# Patient Record
Sex: FEMALE | Race: WHITE | Employment: OTHER | URBAN - METROPOLITAN AREA
[De-identification: names, ages, dates, MRNs, and addresses within clinical notes are randomized per-mention and may not be internally consistent; named-entity substitution may affect disease eponyms.]

---

## 2017-08-18 ENCOUNTER — HOSPITAL ENCOUNTER (OUTPATIENT)
Dept: ULTRASOUND IMAGING | Age: 72
Discharge: HOME | End: 2017-08-18
Attending: INTERNAL MEDICINE
Payer: MEDICARE

## 2017-08-18 DIAGNOSIS — R09.89 BRUIT OF RIGHT CAROTID ARTERY: ICD-10-CM

## 2017-08-18 PROCEDURE — 93880 EXTRACRANIAL BILAT STUDY: CPT

## 2017-09-08 ENCOUNTER — HOSPITAL ENCOUNTER (OUTPATIENT)
Dept: MAMMOGRAPHY | Age: 72
Discharge: HOME | End: 2017-09-08
Attending: INTERNAL MEDICINE
Payer: MEDICARE

## 2017-09-08 DIAGNOSIS — Z12.31 VISIT FOR SCREENING MAMMOGRAM: ICD-10-CM

## 2017-09-08 PROCEDURE — 77063 BREAST TOMOSYNTHESIS BI: CPT

## 2017-11-21 ENCOUNTER — PATIENT OUTREACH (OUTPATIENT)
Dept: CASE MANAGEMENT | Age: 72
End: 2017-11-21

## 2017-11-21 NOTE — PROGRESS NOTES
Chart reviewed per Risk level 4 for CCM needs. Noted patient's last A1C of 6.1%. Outreach to Mrs. Sona Carpenter; CDE offered one visit to discuss risk factors and ways to prevent/delay T2DM dx. Mrs. Sona Carpenter states she is not interested in DSME to prevent T2DM at this time. CDE available for education if patient decides she is interested; given contact information.

## 2018-05-02 ENCOUNTER — HOSPITAL ENCOUNTER (OUTPATIENT)
Dept: MAMMOGRAPHY | Age: 73
Discharge: HOME | End: 2018-05-02
Attending: INTERNAL MEDICINE
Payer: MEDICARE

## 2018-05-02 ENCOUNTER — HOSPITAL ENCOUNTER (OUTPATIENT)
Dept: LAB | Age: 73
Discharge: HOME | End: 2018-05-02
Payer: MEDICARE

## 2018-05-02 DIAGNOSIS — Z85.3 HISTORY OF CANCER OF LEFT BREAST: ICD-10-CM

## 2018-05-02 DIAGNOSIS — N63.10 LUMP OF RIGHT BREAST: ICD-10-CM

## 2018-05-02 DIAGNOSIS — D47.2 MGUS (MONOCLONAL GAMMOPATHY OF UNKNOWN SIGNIFICANCE): ICD-10-CM

## 2018-05-02 DIAGNOSIS — E55.9 VITAMIN D DEFICIENCY: ICD-10-CM

## 2018-05-02 LAB
ALBUMIN SERPL-MCNC: 3.8 G/DL (ref 3.2–4.6)
ALBUMIN/GLOB SERPL: 0.8 {RATIO} (ref 1.2–3.5)
ALP SERPL-CCNC: 106 U/L (ref 50–136)
ALT SERPL-CCNC: 27 U/L (ref 12–65)
ANION GAP SERPL CALC-SCNC: 7 MMOL/L (ref 7–16)
AST SERPL-CCNC: 19 U/L (ref 15–37)
BASOPHILS # BLD: 0 K/UL (ref 0–0.2)
BASOPHILS NFR BLD: 1 % (ref 0–2)
BILIRUB SERPL-MCNC: 0.4 MG/DL (ref 0.2–1.1)
BUN SERPL-MCNC: 15 MG/DL (ref 8–23)
CALCIUM SERPL-MCNC: 9.3 MG/DL (ref 8.3–10.4)
CHLORIDE SERPL-SCNC: 97 MMOL/L (ref 98–107)
CO2 SERPL-SCNC: 28 MMOL/L (ref 21–32)
CREAT SERPL-MCNC: 1.07 MG/DL (ref 0.6–1)
CRP SERPL-MCNC: 2.2 MG/DL (ref 0–0.9)
DIFFERENTIAL METHOD BLD: ABNORMAL
EOSINOPHIL # BLD: 0.2 K/UL (ref 0–0.8)
EOSINOPHIL NFR BLD: 3 % (ref 0.5–7.8)
ERYTHROCYTE [DISTWIDTH] IN BLOOD BY AUTOMATED COUNT: 13.9 % (ref 11.9–14.6)
ERYTHROCYTE [SEDIMENTATION RATE] IN BLOOD: 65 MM/HR (ref 0–30)
GLOBULIN SER CALC-MCNC: 4.7 G/DL (ref 2.3–3.5)
GLUCOSE SERPL-MCNC: 106 MG/DL (ref 65–100)
HCT VFR BLD AUTO: 34.1 % (ref 35.8–46.3)
HGB BLD-MCNC: 11.6 G/DL (ref 11.7–15.4)
LYMPHOCYTES # BLD: 2.7 K/UL (ref 0.5–4.6)
LYMPHOCYTES NFR BLD: 47 % (ref 13–44)
MCH RBC QN AUTO: 30 PG (ref 26.1–32.9)
MCHC RBC AUTO-ENTMCNC: 34 G/DL (ref 31.4–35)
MCV RBC AUTO: 88.1 FL (ref 79.6–97.8)
MONOCYTES # BLD: 0.4 K/UL (ref 0.1–1.3)
MONOCYTES NFR BLD: 7 % (ref 4–12)
NEUTS SEG # BLD: 2.4 K/UL (ref 1.7–8.2)
NEUTS SEG NFR BLD: 42 % (ref 43–78)
NRBC # BLD: 0 K/UL (ref 0–0.2)
PLATELET # BLD AUTO: 255 K/UL (ref 150–450)
PMV BLD AUTO: 10.2 FL (ref 10.8–14.1)
POTASSIUM SERPL-SCNC: 3.7 MMOL/L (ref 3.5–5.1)
PROT SERPL-MCNC: 8.5 G/DL (ref 6.3–8.2)
RBC # BLD AUTO: 3.87 M/UL (ref 4.05–5.25)
SODIUM SERPL-SCNC: 132 MMOL/L (ref 136–145)
WBC # BLD AUTO: 5.7 K/UL (ref 4.3–11.1)

## 2018-05-02 PROCEDURE — 82652 VIT D 1 25-DIHYDROXY: CPT | Performed by: INTERNAL MEDICINE

## 2018-05-02 PROCEDURE — 82232 ASSAY OF BETA-2 PROTEIN: CPT | Performed by: INTERNAL MEDICINE

## 2018-05-02 PROCEDURE — 36415 COLL VENOUS BLD VENIPUNCTURE: CPT | Performed by: INTERNAL MEDICINE

## 2018-05-02 PROCEDURE — 85025 COMPLETE CBC W/AUTO DIFF WBC: CPT | Performed by: INTERNAL MEDICINE

## 2018-05-02 PROCEDURE — 86140 C-REACTIVE PROTEIN: CPT | Performed by: INTERNAL MEDICINE

## 2018-05-02 PROCEDURE — 84165 PROTEIN E-PHORESIS SERUM: CPT | Performed by: INTERNAL MEDICINE

## 2018-05-02 PROCEDURE — 77065 DX MAMMO INCL CAD UNI: CPT

## 2018-05-02 PROCEDURE — 86334 IMMUNOFIX E-PHORESIS SERUM: CPT | Performed by: INTERNAL MEDICINE

## 2018-05-02 PROCEDURE — 85652 RBC SED RATE AUTOMATED: CPT | Performed by: INTERNAL MEDICINE

## 2018-05-02 PROCEDURE — 86335 IMMUNFIX E-PHORSIS/URINE/CSF: CPT | Performed by: INTERNAL MEDICINE

## 2018-05-02 PROCEDURE — 83883 ASSAY NEPHELOMETRY NOT SPEC: CPT | Performed by: INTERNAL MEDICINE

## 2018-05-02 PROCEDURE — 84156 ASSAY OF PROTEIN URINE: CPT | Performed by: INTERNAL MEDICINE

## 2018-05-02 PROCEDURE — 76642 ULTRASOUND BREAST LIMITED: CPT

## 2018-05-02 PROCEDURE — 80053 COMPREHEN METABOLIC PANEL: CPT | Performed by: INTERNAL MEDICINE

## 2018-05-03 LAB
1,25(OH)2D3 SERPL-MCNC: 73.9 PG/ML (ref 19.9–79.3)
ALBUMIN SERPL ELPH-MCNC: 4.13 G/DL (ref 3.2–5.6)
ALBUMIN/GLOB SERPL: 1 {RATIO}
ALPHA1 GLOB SERPL ELPH-MCNC: 0.27 G/DL (ref 0.1–0.4)
ALPHA2 GLOB SERPL ELPH-MCNC: 0.86 G/DL (ref 0.4–1.2)
B-GLOBULIN SERPL QL ELPH: 1.24 G/DL (ref 0.6–1.3)
B2 MICROGLOB SERPL-MCNC: 3.2 MG/L (ref 0.8–2.34)
GAMMA GLOB MFR SERPL ELPH: 1.7 G/DL (ref 0.5–1.6)
IGA SERPL-MCNC: 165 MG/DL (ref 85–499)
IGG SERPL-MCNC: 1775 MG/DL (ref 610–1616)
IGM SERPL-MCNC: 294 MG/DL (ref 35–242)
KAPPA LC FREE SER-MCNC: 39.79 MG/L (ref 3.3–19.4)
KAPPA LC FREE/LAMBDA FREE SER: 1.58 {RATIO} (ref 0.26–1.65)
LAMBDA LC FREE SERPL-MCNC: 25.19 MG/L (ref 5.71–26.3)
M PROTEIN SERPL ELPH-MCNC: ABNORMAL G/DL
PROT PATTERN SERPL ELPH-IMP: ABNORMAL
PROT PATTERN SPEC IFE-IMP: ABNORMAL
PROT SERPL-MCNC: 8.2 G/DL (ref 6.3–8.2)

## 2018-05-04 LAB
ALBUMIN UR ELPH-MCNC: <1.2 MG/DL
ALPHA1 GLOB 24H UR ELPH-MCNC: <0.3 MG/DL
ALPHA2 GLOB SERPL ELPH-MCNC: <0.7 MG/DL
B-GLOBULIN UR QL ELPH: <1.1 MG/DL
GAMMA GLOB MFR UR ELPH: <1.7 MG/DL
M PROTEIN UR-MCNC: NORMAL MG/DL
PROT PATTERN SPEC IFE-IMP: NORMAL
PROT PATTERN UR ELPH-IMP: NORMAL
PROT UR-MCNC: <5 MG/DL

## 2018-05-10 ENCOUNTER — HOSPITAL ENCOUNTER (OUTPATIENT)
Dept: GENERAL RADIOLOGY | Age: 73
Discharge: HOME | End: 2018-05-10
Attending: INTERNAL MEDICINE
Payer: MEDICARE

## 2018-05-10 DIAGNOSIS — D47.2 MGUS (MONOCLONAL GAMMOPATHY OF UNKNOWN SIGNIFICANCE): ICD-10-CM

## 2018-05-10 PROCEDURE — 77074 RADEX OSSEOUS SURVEY LMTD: CPT

## 2018-05-21 ENCOUNTER — HOSPITAL ENCOUNTER (OUTPATIENT)
Dept: NUCLEAR MEDICINE | Age: 73
Discharge: HOME | End: 2018-05-21
Attending: INTERNAL MEDICINE
Payer: MEDICARE

## 2018-05-21 DIAGNOSIS — C50.912 MALIGNANT NEOPLASM OF LEFT FEMALE BREAST, UNSPECIFIED ESTROGEN RECEPTOR STATUS, UNSPECIFIED SITE OF BREAST (HCC): ICD-10-CM

## 2018-05-21 PROCEDURE — 78306 BONE IMAGING WHOLE BODY: CPT

## 2018-05-21 PROCEDURE — A9561 TC99M OXIDRONATE: HCPCS

## 2018-08-14 ENCOUNTER — HOSPITAL ENCOUNTER (OUTPATIENT)
Dept: PHYSICAL THERAPY | Age: 73
Discharge: HOME | End: 2018-08-14
Payer: MEDICARE

## 2018-08-14 DIAGNOSIS — M54.2 NECK PAIN: ICD-10-CM
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
## 2018-08-14 DIAGNOSIS — M25.511 ACUTE PAIN OF RIGHT SHOULDER: ICD-10-CM

## 2018-08-14 PROCEDURE — 97110 THERAPEUTIC EXERCISES: CPT

## 2018-08-14 PROCEDURE — 97162 PT EVAL MOD COMPLEX 30 MIN: CPT

## 2018-08-14 PROCEDURE — G8981 BODY POS CURRENT STATUS: HCPCS

## 2018-08-14 PROCEDURE — G8982 BODY POS GOAL STATUS: HCPCS

## 2018-08-14 PROCEDURE — 97014 ELECTRIC STIMULATION THERAPY: CPT
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
## 2018-08-14 NOTE — PROGRESS NOTES
Ambulatory/Rehab Services H2 Model Falls Risk Assessment    Risk Factor Pts. ·   Confusion/Disorientation/Impulsivity  []    4 ·   Symptomatic Depression  []   2 ·   Altered Elimination  []   1 ·   Dizziness/Vertigo  []   1 ·   Gender (Male)  []   1 ·   Any administered antiepileptics (anticonvulsants):  []   2 ·   Any administered benzodiazepines:  []   1 ·   Visual Impairment (specify):  []   1 ·   Portable Oxygen Use  []   1 ·   Orthostatic ? BP  []   1 ·   History of Recent Falls (within 3 mos.)  []   5     Ability to Rise from Chair (choose one) Pts. ·   Ability to rise in a single movement  [x]   0 ·   Pushes up, successful in one attempt  []   1 ·   Multiple attempts, but successful  []   3 ·   Unable to rise without assistance  []   4   Total: (5 or greater = High Risk) 0     Falls Prevention Plan:   []                Physical Limitations to Exercise (specify):   []                Mobility Assistance Device (type):   []                Exercise/Equipment Adaptation (specify):    ©2010 [facility] [address] Patent #[id number].  Federal Law prohibits the replication, distribution or use without written permission from [facility] [employer]

## 2018-08-16 ENCOUNTER — HOSPITAL ENCOUNTER (OUTPATIENT)
Dept: PHYSICAL THERAPY | Age: 73
Discharge: HOME | End: 2018-08-16
Payer: MEDICARE

## 2018-08-16 PROCEDURE — 97012 MECHANICAL TRACTION THERAPY: CPT

## 2018-08-16 PROCEDURE — 97140 MANUAL THERAPY 1/> REGIONS: CPT

## 2018-08-16 PROCEDURE — 97014 ELECTRIC STIMULATION THERAPY: CPT
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
## 2018-08-21 ENCOUNTER — HOSPITAL ENCOUNTER (OUTPATIENT)
Dept: PHYSICAL THERAPY | Age: 73
Discharge: HOME | End: 2018-08-21
Payer: MEDICARE

## 2018-08-21 PROCEDURE — 97014 ELECTRIC STIMULATION THERAPY: CPT

## 2018-08-21 PROCEDURE — 97012 MECHANICAL TRACTION THERAPY: CPT

## 2018-08-21 PROCEDURE — 97140 MANUAL THERAPY 1/> REGIONS: CPT
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
## 2018-08-23 ENCOUNTER — HOSPITAL ENCOUNTER (OUTPATIENT)
Dept: PHYSICAL THERAPY | Age: 73
Discharge: HOME | End: 2018-08-23
Payer: MEDICARE

## 2018-08-23 PROCEDURE — 97014 ELECTRIC STIMULATION THERAPY: CPT

## 2018-08-23 PROCEDURE — 97140 MANUAL THERAPY 1/> REGIONS: CPT

## 2018-08-23 PROCEDURE — 97012 MECHANICAL TRACTION THERAPY: CPT
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
## 2018-08-28 ENCOUNTER — HOSPITAL ENCOUNTER (OUTPATIENT)
Dept: PHYSICAL THERAPY | Age: 73
Discharge: HOME | End: 2018-08-28
Payer: MEDICARE

## 2018-08-28 PROCEDURE — 97012 MECHANICAL TRACTION THERAPY: CPT

## 2018-08-28 PROCEDURE — 97110 THERAPEUTIC EXERCISES: CPT

## 2018-08-28 PROCEDURE — 97014 ELECTRIC STIMULATION THERAPY: CPT

## 2018-08-28 PROCEDURE — 97140 MANUAL THERAPY 1/> REGIONS: CPT
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
## 2018-08-30 ENCOUNTER — HOSPITAL ENCOUNTER (OUTPATIENT)
Dept: PHYSICAL THERAPY | Age: 73
Discharge: HOME | End: 2018-08-30
Payer: MEDICARE

## 2018-08-30 PROCEDURE — 97110 THERAPEUTIC EXERCISES: CPT

## 2018-08-30 PROCEDURE — 97014 ELECTRIC STIMULATION THERAPY: CPT

## 2018-08-30 PROCEDURE — 97012 MECHANICAL TRACTION THERAPY: CPT

## 2018-08-30 PROCEDURE — 97140 MANUAL THERAPY 1/> REGIONS: CPT
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
## 2018-09-06 ENCOUNTER — HOSPITAL ENCOUNTER (OUTPATIENT)
Dept: PHYSICAL THERAPY | Age: 73
Discharge: HOME | End: 2018-09-06
Payer: MEDICARE

## 2018-09-06 PROCEDURE — 97014 ELECTRIC STIMULATION THERAPY: CPT

## 2018-09-06 PROCEDURE — 97012 MECHANICAL TRACTION THERAPY: CPT

## 2018-09-06 PROCEDURE — 97140 MANUAL THERAPY 1/> REGIONS: CPT
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
## 2018-09-11 ENCOUNTER — HOSPITAL ENCOUNTER (OUTPATIENT)
Dept: PHYSICAL THERAPY | Age: 73
Discharge: HOME | End: 2018-09-11
Payer: MEDICARE

## 2018-09-11 PROCEDURE — 97012 MECHANICAL TRACTION THERAPY: CPT

## 2018-09-11 PROCEDURE — 97110 THERAPEUTIC EXERCISES: CPT

## 2018-09-11 PROCEDURE — 97014 ELECTRIC STIMULATION THERAPY: CPT
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
## 2018-09-13 ENCOUNTER — APPOINTMENT (OUTPATIENT)
Dept: PHYSICAL THERAPY | Age: 73
End: 2018-09-13
Payer: MEDICARE

## 2018-09-18 ENCOUNTER — APPOINTMENT (OUTPATIENT)
Dept: PHYSICAL THERAPY | Age: 73
End: 2018-09-18
Payer: MEDICARE

## 2018-09-20 ENCOUNTER — APPOINTMENT (OUTPATIENT)
Dept: PHYSICAL THERAPY | Age: 73
End: 2018-09-20
Payer: MEDICARE

## 2018-11-01 ENCOUNTER — HOSPITAL ENCOUNTER (OUTPATIENT)
Dept: MRI IMAGING | Age: 73
Discharge: HOME | End: 2018-11-01
Attending: INTERNAL MEDICINE
Payer: MEDICARE

## 2018-11-01 DIAGNOSIS — M54.2 CHRONIC NECK PAIN: ICD-10-CM

## 2018-11-01 DIAGNOSIS — G89.29 CHRONIC NECK PAIN: ICD-10-CM

## 2018-11-01 PROCEDURE — 72141 MRI NECK SPINE W/O DYE: CPT

## 2018-11-02 NOTE — PROGRESS NOTES
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 Can you call the Orthopedic referral that was made for an appt with them? I requested Clydene Nissen but you may need to see Dr. Tara Harris first.  [phone]. If you need me to resend a referral I can but it was already sent to them before.    Take Care

## 2018-11-05 NOTE — PROGRESS NOTES
Spoke to patient, detailed message was relayed and understanding expressed. Patient has an appointment scheduled to see an NP at [facility] tomorrow. Per Dr. Devaughn Hodgkins, they have access to the findings and will get her taken care of.
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
## 2018-11-20 PROBLEM — I50.22 SYSTOLIC CHF, CHRONIC (HCC): Status: ACTIVE | Noted: 2018-11-20

## 2018-11-20 PROBLEM — R06.02 SHORTNESS OF BREATH: Status: ACTIVE | Noted: 2018-11-20

## 2019-01-15 PROBLEM — R06.02 SHORTNESS OF BREATH: Status: RESOLVED | Noted: 2018-11-20 | Resolved: 2019-01-15

## 2019-01-16 ENCOUNTER — HOSPITAL ENCOUNTER (OUTPATIENT)
Dept: DIABETES SERVICES | Age: 74
Discharge: HOME | End: 2019-01-16
Payer: MEDICARE

## 2019-01-16 DIAGNOSIS — E11.65 UNCONTROLLED TYPE 2 DIABETES MELLITUS WITH HYPERGLYCEMIA (HCC): ICD-10-CM

## 2019-01-16 PROCEDURE — G0108 DIAB MANAGE TRN  PER INDIV: HCPCS

## 2019-01-16 NOTE — PROGRESS NOTES
Came for diabetes educational assessment today. Provided basic information on carbohydrates, proteins and fats. Educational need/plan: Will attend 2 nutrition/2 diabetes sessions to address the following: diabetes disease process, nutritional management, physical activity, using medications, preventing complications, psychosocial adjustment, goal setting, problem solving, monitoring, behavior change strategies. Patients neuropathy was chemo induced. Four surgeries on left ankle. Difficulty with walking for exercise due to ankle per patient. Patient did ambulate to appointment with out any assist or assist devices.

## 2019-02-06 ENCOUNTER — HOSPITAL ENCOUNTER (OUTPATIENT)
Dept: DIABETES SERVICES | Age: 74
Discharge: HOME | End: 2019-02-06
Attending: INTERNAL MEDICINE
Payer: MEDICARE

## 2019-02-06 PROCEDURE — G0109 DIAB MANAGE TRN IND/GROUP: HCPCS

## 2019-02-06 NOTE — PROGRESS NOTES
Participant attended Diabetes #1 session today. Topics included: Characteristics/pathophysiology type 1/type 2 diabetes; Goal/acceptable blood glucose ranges/Hgb A1C/interpreting/using results;meters, continuous glucose monitors and insulin pumps. Using medications safely; Sick day management; Prevention/detection/treatment of acute complications. - Verbalized understanding of material covered. -Goal for next session Nutrition One  
-Anticipated adherence is Good  
-Problems/barriers none

## 2019-02-14 ENCOUNTER — HOSPITAL ENCOUNTER (OUTPATIENT)
Dept: DIABETES SERVICES | Age: 74
Discharge: HOME | End: 2019-02-14
Attending: INTERNAL MEDICINE
Payer: MEDICARE

## 2019-02-14 PROCEDURE — G0109 DIAB MANAGE TRN IND/GROUP: HCPCS

## 2019-02-27 ENCOUNTER — HOSPITAL ENCOUNTER (OUTPATIENT)
Dept: DIABETES SERVICES | Age: 74
Discharge: HOME | End: 2019-02-27
Attending: INTERNAL MEDICINE
Payer: MEDICARE

## 2019-02-27 PROCEDURE — G0109 DIAB MANAGE TRN IND/GROUP: HCPCS

## 2019-03-13 ENCOUNTER — HOSPITAL ENCOUNTER (OUTPATIENT)
Dept: DIABETES SERVICES | Age: 74
Discharge: HOME | End: 2019-03-13
Payer: MEDICARE

## 2019-03-13 PROCEDURE — G0109 DIAB MANAGE TRN IND/GROUP: HCPCS

## 2019-03-13 NOTE — PROGRESS NOTES
Participant attended Diabetes #2 session today. Topics included: Prevention/detection/treatment of chronic complications; sleep apnea; Developing strategies to promote health/change behavior/recommended screenings; Developing strategies to address psychosocial issues; Goal setting. Participants goal/support plan includes Medical Goal:  To exercise more. I will exercise 20 minutes a day everyday starting now at home for the rest of my life. I will reevaluate in a month and increase as tolerated. Medical Plan:  Set a timer everyday. Problems/barriers may be:none anticipated.     Plan for follow up/Recommendations: mail follow up survey in 3 months

## 2019-03-26 ENCOUNTER — HOSPITAL ENCOUNTER (OUTPATIENT)
Dept: DIABETES SERVICES | Age: 74
Discharge: HOME | End: 2019-03-26
Payer: MEDICARE

## 2019-03-26 PROCEDURE — G0109 DIAB MANAGE TRN IND/GROUP: HCPCS

## 2019-03-26 NOTE — PROGRESS NOTES
Attended diabetes follow up group class. Open forum for clients to ask questions based on entire diabetes self management education program. Education topics are driven in this class based on clients' individual questions and needs. Topics included: carbohydrates, proteins, fats, meal planning, sugar substitutes, recipe modification, portion control, weight loss, eating out, cholesterol, blood pressure, Hgb A1C, stress/depression, glucometer testing, continuous glucose monitoring and foot care.

## 2019-05-21 PROBLEM — I50.22 SYSTOLIC CHF, CHRONIC (HCC): Status: RESOLVED | Noted: 2018-11-20 | Resolved: 2019-05-21

## 2019-08-06 PROBLEM — I42.0 DILATED CARDIOMYOPATHY (HCC): Status: ACTIVE | Noted: 2019-08-06

## 2019-11-22 ENCOUNTER — HOSPITAL ENCOUNTER (OUTPATIENT)
Dept: MAMMOGRAPHY | Age: 74
Discharge: HOME | End: 2019-11-22
Attending: INTERNAL MEDICINE
Payer: MEDICARE

## 2019-11-22 DIAGNOSIS — Z12.39 BREAST CANCER SCREENING: ICD-10-CM

## 2019-11-22 DIAGNOSIS — N95.9 MENOPAUSAL AND POSTMENOPAUSAL DISORDER: ICD-10-CM

## 2019-11-22 PROCEDURE — 77080 DXA BONE DENSITY AXIAL: CPT

## 2019-11-22 PROCEDURE — 77067 SCR MAMMO BI INCL CAD: CPT

## 2020-02-05 PROBLEM — Z94.84 STEM CELLS TRANSPLANT STATUS (HCC): Status: ACTIVE | Noted: 2020-02-05

## 2020-02-06 PROBLEM — Z94.84 STEM CELLS TRANSPLANT STATUS (HCC): Status: RESOLVED | Noted: 2020-02-05 | Resolved: 2020-02-06

## 2020-07-23 ENCOUNTER — HOSPITAL ENCOUNTER (OUTPATIENT)
Dept: LAB | Age: 75
Discharge: HOME | End: 2020-07-23

## 2020-07-23 PROCEDURE — 88305 TISSUE EXAM BY PATHOLOGIST: CPT

## 2020-09-08 PROBLEM — R29.898 LEFT LEG WEAKNESS: Status: ACTIVE | Noted: 2020-09-08

## 2020-09-14 PROBLEM — Z94.84 STEM CELLS TRANSPLANT STATUS (HCC): Status: ACTIVE | Noted: 2020-09-14

## 2020-10-17 PROBLEM — G60.8 PERIPHERAL SENSORY-MOTOR AXONAL POLYNEUROPATHY: Status: ACTIVE | Noted: 2020-10-17

## 2020-11-05 PROBLEM — R00.2 PALPITATION: Status: ACTIVE | Noted: 2020-11-05

## 2020-11-10 ENCOUNTER — TRANSCRIBE ORDER (OUTPATIENT)
Dept: SCHEDULING | Age: 75
End: 2020-11-10

## 2020-11-10 DIAGNOSIS — Z12.31 SCREENING MAMMOGRAM, ENCOUNTER FOR: Primary | ICD-10-CM

## 2020-11-23 ENCOUNTER — HOSPITAL ENCOUNTER (OUTPATIENT)
Dept: MAMMOGRAPHY | Age: 75
Discharge: HOME | End: 2020-11-23
Attending: INTERNAL MEDICINE

## 2020-11-23 DIAGNOSIS — Z12.31 SCREENING MAMMOGRAM, ENCOUNTER FOR: ICD-10-CM

## 2021-02-09 PROBLEM — N18.31 STAGE 3A CHRONIC KIDNEY DISEASE (HCC): Status: ACTIVE | Noted: 2021-02-09

## 2021-02-11 PROBLEM — E55.9 VITAMIN D DEFICIENCY: Status: ACTIVE | Noted: 2021-02-11

## 2021-04-06 PROBLEM — M62.838 MUSCLE SPASM: Status: ACTIVE | Noted: 2019-12-17

## 2021-04-06 PROBLEM — G89.29 CHRONIC PERISCAPULAR PAIN ON RIGHT SIDE: Status: ACTIVE | Noted: 2020-07-06

## 2021-04-06 PROBLEM — M48.02 FORAMINAL STENOSIS OF CERVICAL REGION: Status: ACTIVE | Noted: 2018-11-28

## 2021-04-06 PROBLEM — M47.812 ARTHROPATHY OF CERVICAL FACET JOINT: Status: ACTIVE | Noted: 2018-11-28

## 2021-04-06 PROBLEM — M25.511 CHRONIC PERISCAPULAR PAIN ON RIGHT SIDE: Status: ACTIVE | Noted: 2020-07-06

## 2021-04-06 PROBLEM — R51.9 RECURRENT OCCIPITAL HEADACHE: Status: ACTIVE | Noted: 2018-11-28

## 2021-06-09 ENCOUNTER — TRANSCRIBE ORDER (OUTPATIENT)
Dept: SCHEDULING | Age: 76
End: 2021-06-09

## 2021-06-09 DIAGNOSIS — Z12.31 VISIT FOR SCREENING MAMMOGRAM: Primary | ICD-10-CM

## 2021-06-18 ENCOUNTER — HOSPITAL ENCOUNTER (OUTPATIENT)
Dept: CT IMAGING | Age: 76
Discharge: HOME | End: 2021-06-18
Attending: NURSE PRACTITIONER

## 2021-06-18 DIAGNOSIS — N39.0 RECURRENT UTI: ICD-10-CM

## 2021-06-18 DIAGNOSIS — R11.0 NAUSEA: ICD-10-CM

## 2021-06-18 DIAGNOSIS — R10.32 LLQ PAIN: ICD-10-CM

## 2021-06-18 DIAGNOSIS — R30.0 DYSURIA: ICD-10-CM

## 2021-06-18 DIAGNOSIS — N18.31 STAGE 3A CHRONIC KIDNEY DISEASE (HCC): ICD-10-CM

## 2021-06-18 DIAGNOSIS — I42.0 DILATED CARDIOMYOPATHY (HCC): ICD-10-CM

## 2021-06-18 DIAGNOSIS — Z85.3 HISTORY OF LEFT BREAST CANCER: ICD-10-CM

## 2021-06-18 DIAGNOSIS — Z87.448 HISTORY OF SIMPLE RENAL CYST: ICD-10-CM

## 2021-06-18 RX ORDER — SODIUM CHLORIDE 0.9 % (FLUSH) 0.9 %
10 SYRINGE (ML) INJECTION
Status: COMPLETED | OUTPATIENT
Start: 2021-06-18 | End: 2021-06-18

## 2021-06-18 RX ADMIN — Medication 10 ML: at 14:00

## 2021-06-20 NOTE — PROGRESS NOTES
Attended nutrition diabetes #2 group session today. Topics included: plate method for portion control; fiber and sodium guidelines; sugar substitutes; alcohol; eating out; recipe modification; label reading. Voiced/demonstrated understanding of material covered. Anticipated adherence is good. Pt's nutrition goal: To lower Hgb A1C and lose weight, she will slow eating time down to 20 minutes and drink water for most meals and re-evaluate in 3 months. Pt's support plan: Use the daily meal planning guide Problems/barriers: none identified by pt at this time. Recommend check pre-meal blood glucose and 2 hour post-prandial blood glucose to see how food choices affect blood glucose. Plan for follow up is attend diabetes #2 class.
Self

## 2021-06-20 NOTE — PROGRESS NOTES
Good news--there were no concerning findings on the CT. If you continue to have symptoms, we can repeat urinalysis and culture to monitor for recurrent UTI.

## 2021-06-21 NOTE — PROGRESS NOTES
Good news--there were no concerning findings on the CT. If you continue to have symptoms, we can repeat urinalysis and culture to monitor for recurrent UTI. Patient has been informed and voiced understanding.

## 2021-12-02 ENCOUNTER — HOSPITAL ENCOUNTER (OUTPATIENT)
Dept: MAMMOGRAPHY | Age: 76
Discharge: HOME | End: 2021-12-02
Attending: NURSE PRACTITIONER
Payer: MEDICARE

## 2021-12-02 DIAGNOSIS — Z78.0 ENCOUNTER FOR OSTEOPOROSIS SCREENING IN ASYMPTOMATIC POSTMENOPAUSAL PATIENT: ICD-10-CM

## 2021-12-02 DIAGNOSIS — M85.80 OSTEOPENIA, UNSPECIFIED LOCATION: ICD-10-CM

## 2021-12-02 DIAGNOSIS — Z13.820 ENCOUNTER FOR OSTEOPOROSIS SCREENING IN ASYMPTOMATIC POSTMENOPAUSAL PATIENT: ICD-10-CM

## 2021-12-02 DIAGNOSIS — Z12.31 VISIT FOR SCREENING MAMMOGRAM: ICD-10-CM

## 2021-12-02 PROCEDURE — 77080 DXA BONE DENSITY AXIAL: CPT

## 2021-12-02 PROCEDURE — 77067 SCR MAMMO BI INCL CAD: CPT

## 2021-12-03 NOTE — PROGRESS NOTES
DEXA showed osteopenia of hips, worsened since last DEXA in 2019 (improvement in lumbar spine). FRAX indicates 10-year risk of major osteoporotic fracture is 19.7% and of hip fracture is 4.7%. At this risk level, medication is recommended to strengthen your bones. Recommend making appointment to discuss medication options and lifestyle interventions to help your bones.

## 2021-12-06 NOTE — PROGRESS NOTES
No evidence of malignancy on mammogram.    Radiology also recommends obtaining screening breast MRI due to your history of breast cancer and dense tissues, for better evaluation.

## 2022-03-16 ENCOUNTER — HOSPITAL ENCOUNTER (OUTPATIENT)
Dept: MRI IMAGING | Age: 77
Discharge: HOME | End: 2022-03-16
Attending: NURSE PRACTITIONER
Payer: MEDICARE

## 2022-03-16 DIAGNOSIS — Z85.3 HISTORY OF BREAST CANCER: ICD-10-CM

## 2022-03-16 PROCEDURE — 74011250636 HC RX REV CODE- 250/636: Performed by: NURSE PRACTITIONER

## 2022-03-16 PROCEDURE — 77049 MRI BREAST C-+ W/CAD BI: CPT

## 2022-03-16 PROCEDURE — A9576 INJ PROHANCE MULTIPACK: HCPCS | Performed by: NURSE PRACTITIONER

## 2022-03-16 PROCEDURE — 74011000258 HC RX REV CODE- 258: Performed by: NURSE PRACTITIONER

## 2022-03-16 RX ORDER — SODIUM CHLORIDE 0.9 % (FLUSH) 0.9 %
10 SYRINGE (ML) INJECTION
Status: COMPLETED | OUTPATIENT
Start: 2022-03-16 | End: 2022-03-16

## 2022-03-16 RX ADMIN — SODIUM CHLORIDE 100 ML: 900 INJECTION, SOLUTION INTRAVENOUS at 17:37

## 2022-03-16 RX ADMIN — GADOTERIDOL 14 ML: 279.3 INJECTION, SOLUTION INTRAVENOUS at 17:36

## 2022-03-16 RX ADMIN — Medication 10 ML: at 17:37

## 2022-03-17 NOTE — PROGRESS NOTES
Spoke to patient, detailed message was relayed and understanding expressed. Aron Garrett doesn't know where the other breast MRI was done, but will try to figure it and let me know.

## 2022-03-17 NOTE — PROGRESS NOTES
Please call patient with results, and let me know their response. No signs of metastatic disease seen on MRI. Radiology would like to be able to compare to your last breast MRI (to compare lymph node appearance). Do you know where this was done?

## 2022-03-18 PROBLEM — G89.29 CHRONIC PERISCAPULAR PAIN ON RIGHT SIDE: Status: ACTIVE | Noted: 2020-07-06

## 2022-03-18 PROBLEM — M25.511 CHRONIC PERISCAPULAR PAIN ON RIGHT SIDE: Status: ACTIVE | Noted: 2020-07-06

## 2022-03-18 PROBLEM — M48.02 FORAMINAL STENOSIS OF CERVICAL REGION: Status: ACTIVE | Noted: 2018-11-28

## 2022-03-19 PROBLEM — E55.9 VITAMIN D DEFICIENCY: Status: ACTIVE | Noted: 2021-02-11

## 2022-03-19 PROBLEM — M62.838 MUSCLE SPASM: Status: ACTIVE | Noted: 2019-12-17

## 2022-03-19 PROBLEM — M47.812 ARTHROPATHY OF CERVICAL FACET JOINT: Status: ACTIVE | Noted: 2018-11-28

## 2022-03-19 PROBLEM — I42.0 DILATED CARDIOMYOPATHY (HCC): Status: ACTIVE | Noted: 2019-08-06

## 2022-03-19 PROBLEM — R29.898 LEFT LEG WEAKNESS: Status: ACTIVE | Noted: 2020-09-08

## 2022-03-19 PROBLEM — Z94.84 STEM CELLS TRANSPLANT STATUS (HCC): Status: ACTIVE | Noted: 2020-09-14

## 2022-03-19 PROBLEM — N18.31 STAGE 3A CHRONIC KIDNEY DISEASE (HCC): Status: ACTIVE | Noted: 2021-02-09

## 2022-03-19 PROBLEM — R51.9 RECURRENT OCCIPITAL HEADACHE: Status: ACTIVE | Noted: 2018-11-28

## 2022-03-19 PROBLEM — G60.8 PERIPHERAL SENSORY-MOTOR AXONAL POLYNEUROPATHY: Status: ACTIVE | Noted: 2020-10-17

## 2022-03-20 PROBLEM — R00.2 PALPITATION: Status: ACTIVE | Noted: 2020-11-05

## 2022-05-17 PROBLEM — N18.30 CHRONIC RENAL DISEASE, STAGE III (HCC): Status: ACTIVE | Noted: 2022-05-17

## 2022-06-02 ENCOUNTER — OFFICE VISIT (OUTPATIENT)
Dept: RHEUMATOLOGY | Age: 77
End: 2022-06-02
Payer: MEDICARE

## 2022-06-02 VITALS
BODY MASS INDEX: 23.51 KG/M2 | WEIGHT: 137.7 LBS | SYSTOLIC BLOOD PRESSURE: 136 MMHG | RESPIRATION RATE: 18 BRPM | HEIGHT: 64 IN | DIASTOLIC BLOOD PRESSURE: 82 MMHG | HEART RATE: 84 BPM

## 2022-06-02 DIAGNOSIS — M19.042 PRIMARY OSTEOARTHRITIS OF BOTH HANDS: Primary | ICD-10-CM

## 2022-06-02 DIAGNOSIS — M19.041 PRIMARY OSTEOARTHRITIS OF BOTH HANDS: Primary | ICD-10-CM

## 2022-06-02 PROCEDURE — 1123F ACP DISCUSS/DSCN MKR DOCD: CPT | Performed by: INTERNAL MEDICINE

## 2022-06-02 PROCEDURE — 99213 OFFICE O/P EST LOW 20 MIN: CPT | Performed by: INTERNAL MEDICINE

## 2022-06-02 RX ORDER — CALCIUM CARBONATE 200(500)MG
1 TABLET,CHEWABLE ORAL DAILY
COMMUNITY

## 2022-06-02 NOTE — PROGRESS NOTES
[facility] RHEUMATOLOGY  KRIS Gamble M.D. Phone: [phone]   Fax: [phone]    5/20/2021 11:36 AM      SUBJECTIVE: Per previous not from Dr. Raulito Flaherty on 5/20/21      Deleta Dandy is a 77 y.o. female is seen today because of a swollen left ankle and foot in addition to a positive SSA antibody and elevated sedimentation rate and CRP and slightly elevated rheumatoid factor. The SSA antibody was noted to be positive back in June 2018 and acute phase reactants have been elevated since about 2015 there is also a history of positive kappa light chains noted in the record from 2019    Originally seen about 3 or 4 years ago but last seen in 2018. Last seen she really has been fairly stable though continues to have chronic trouble with the left ankle where she had a fracture has had full surgical procedures. She is not quite up with her orthopedic surgeon recently. She still has some dry eye syndrome and a dry mouth but no systemic involvement related to Sjogren's syndrome though she has had a positive SSA antibody for years and positive acute phase reactants. She recently also as noted above had a low titer positive rheumatoid factor but has no clinical symptoms to suggest rheumatoid disease. She said no fever or rash no pulmonary problems she has had some urinary tract infections in the past.  She has no true dysphagia but does drink excessive liquids with meals and cannot eat saltine crackers without liquids she does not use any prescription eyedrops and has not had a tear duct plug and is followed by her ophthalmologist Dr. Alvarado Tejeda. Overall she seems stable. She does have some hand symptoms related to osteoarthritis. No Raynaud's or photosensitivity.     Apparently has some swelling of her left great toe in the past and did go to the [facility] urgent care and they considered gout but this has resolved it was a one-time event and has not recurred and only bears observation at this point. LAST VISIT 5/20/21:    Mild Sicca: Quite stable from the standpoint of mild sicca symptoms with positive serologic studies. There is no evidence of systemic involvement from Sjogren's syndrome. She does have some dryness in her eyes and uses over-the-counter drops. She has not required any prescription from her ophthalmologist.  She does have some diffuse aches and pains noted below. No pulmonary problems no GI problems other than some diarrhea since she had a colonoscopy several years ago. No unusual cutaneous trouble no Raynaud's or photosensitivity. Osteoarthritis of the hands: Heberden's and Maura's nodes and has some mild discomfort here and I did suggest she might try some Tylenol she got significantly worse might need to see the orthopedic hand specialist.    Osteopenia with high fracture risk: He had a recent DEXA scan ordered by Dr. Santi Padilla and did have increased fracture risk and has been put on alendronate but apparently did not tolerate it and will get back with them for follow-up therapy    The DEXA scan done in November 2019 and she is osteopenic but has a high fracture risk with a 10-year probability of hip fracture of 3.2% and should be on appropriate calcium and vitamin D as well as probable antiresorptive medication. She will check with Ms. Santi Padilla concerning this as I have not been specifically asked to get involved in the treatment of her osteopenia. LAB:  3/6/2021: Following were normal or negative: LUZ, chromatin, RNP, SM, SCL 70, SSB, anticentromere. SSA positive at greater than 8.0 I do not see an LUZ. CRP elevated at 97 normal less than 10, rheumatoid factor 27.6 normal less than 14. Urinalysis unremarkable. CBC normal.  2/9/9 2021: Vitamin D level 21.9  5/2/2019: Positive light chains of Kappa orgin. ESR 65, CRP slightly elevated to 2.2  6/27/2018: Following were normal or negative: SCL 70, SM antibody, RNP antibody, centromere antibody, DNA antibody.   SSB was normal.  SSA greater than 8.0  2/9/2015: ESR 82  1015/2020: Nerve conduction studies show bilateral carpal tunnel syndrome    XRAYS:      DXA:                       Patient Active Problem List   Diagnosis Code    Depression F32.9    Chronic anemia D64.9    Osteopenia M85.80    Osteoarthritis M19.90    Dyslipidemia E78.5    Cardiac Cath minimal CAD Z98.890    Vitamin B12 deficiency E53.8    Statin intolerance Z78.9    Essential hypertension I10    Chemotherapy-induced neuropathy ([facility]) G62.0, T45.1X5A    Dilated cardiomyopathy ([address] I42.0    Type 2 diabetes mellitus, controlled ([facility]) E11.9    Left leg weakness R29.898    Stem cells transplant status ([facility]) Z94.84    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  metoprolol succinate (TOPROL-XL) 50 mg XL tablet Take 1 Tab by mouth daily. 90 Tab 3    nitrofurantoin, macrocrystal-monohydrate, (MACROBID) 100 mg capsule Take 1 Cap by mouth two (2) times a day. 10 Cap 0    amLODIPine (Norvasc) 5 mg tablet Take 5 mg by mouth daily.  budesonide (RHINOCORT AQUA) 32 mcg/actuation nasal spray 32 Sprays by Both Nostrils route daily.  carvediloL (COREG) 6.25 mg tablet Take 6.25 mg by mouth daily.  fluticasone propionate (FLONASE) 50 mcg/actuation nasal spray 50 Sprays by Both Nostrils route daily.  meloxicam (MOBIC) 7.5 mg tablet Take 7.5 mg by mouth daily.  traMADoL (ULTRAM) 50 mg tablet Take 50 mg by mouth daily.       tiZANidine (ZANAFLEX) 2 mg tablet Take 2 mg by mouth daily.  benzonatate (TESSALON) 200 mg capsule       esomeprazole (NexIUM) 20 mg capsule Take  by mouth daily.  cranberry fruit extract (CRANBERRY EXTRACT PO) Take  by mouth.  lisinopriL (PRINIVIL, ZESTRIL) 5 mg tablet Take 0.5 Tabs by mouth daily. 45 Tab 1    cholecalciferol (VITAMIN D3) (2,000 UNITS /50 MCG) cap capsule Take 1 Cap by mouth daily. 90 Cap 1    valACYclovir (Valtrex) 1 gram tablet 2 tablets 2 times a day for 1 day only with mouth sore occurs 90 Tab 0    metFORMIN ER (GLUCOPHAGE XR) 500 mg tablet TAKE 2 TABLETS TWICE A  Tab 1    Insulin Syringe-Needle U-100 (BD Insulin Syringe) 1 mL 25 x 1\" syrg Use syringe to draw up for B-12 injection once weekly for 4 weeks and then once monthly. 12 Syringe 0    cyanocobalamin (Vitamin B-12) 1,000 mcg/mL injection 1 ML every week IM for 4 weeks,  then 1 ML monthly. 10 mL 3    cetirizine (ZyrTEC) 10 mg tablet Take 10 mg by mouth daily as needed for Allergies.  gabapentin (NEURONTIN) 600 mg tablet Take 1 Tab by mouth two (2) times a day. 180 Tab 3    potassium chloride (Klor-Con M20) 20 mEq tablet TAKE 1 TABLET EVERY        MORNING AND 2 TABLETS      EVERY EVENING (Patient taking differently: Take 20 mEq by mouth two (2) times a day. TAKE 1 TABLET EVERY        MORNING AND 2 TABLETS      EVERY EVENING) 270 Tab 1    mometasone (ELOCON) 0.1 % topical cream Apply  to affected area daily as needed for Skin Irritation. 45 g 1    furosemide (LASIX) 40 mg tablet Take 1 Tab by mouth as needed (swelling).  90 Tab 3    OTHER,NON-FORMULARY, Indications: CBD OIL      glucose blood VI test strips (ACCU-CHEK LULU PLUS TEST STRP) strip Dx E11.65  Use daily as directed 100 Strip 11    Blood-Glucose Meter (ACCU-CHEK LULU PLUS METER) misc Dx E11.65  Use daily as directed 1 Each 0    Lancing Device with Lancets (ACCU-CHEK MULTICLIX LANCET) kit Dx E11.65  Use daily as directed 1 Kit 0    Lancets (ACCU-CHEK MULTICLIX LANCET) misc Dx E11.65  Use daily as directed 100 Each 5    Blood Glucose Control High&Low (ACCU-CHEK LULU CONTROL SOLN) soln Dx E11.65  Use daily as directed 1 Bottle 5    acetaminophen (TYLENOL EXTRA STRENGTH) 500 mg tablet Take  by mouth every six (6) hours as needed for Pain.  diphenhydrAMINE (BENADRYL ALLERGY) 25 mg tablet Take 25 mg by mouth every six (6) hours as needed for Sleep.        Allergies   Allergen Reactions    Oxycodone Itching    Eucalyptus Hives and Other (comments)     Burns mouth      Morphine Vertigo     Feels crazy  Other reaction(s): Hallucinations    Pcn [Penicillins] Hives    Pineapple Hives and Other (comments)     Burns mouth      Statins-Hmg-Coa Reductase Inhibitors Other (comments)    Sulfa (Sulfonamide Antibiotics) Rash and Nausea Only    Vancomycin Unknown (comments)     Kidney function worsened    Zetia [Ezetimibe] Unknown (comments)     Social History     Tobacco Use    Smoking status: Never Smoker    Smokeless tobacco: Never Used   Substance Use Topics    Alcohol use: No    Drug use: No     [phone] (home)   Past Surgical History:   Procedure Laterality Date    HX BREAST BIOPSY      left breast biopsy    HX BREAST BIOPSY  1968    left cyst removed    HX BREAST LUMPECTOMY Left 2001    HX BREAST REDUCTION      Rt breast    HX CARPAL TUNNEL RELEASE Bilateral     HX COLONOSCOPY  07/2014    HX CYST REMOVAL Right 11/2020    hand    HX HEART CATHETERIZATION  2009    HX HEENT      Sinus Surgery    HX HEENT      RK procedure bilateral eyes    HX HYSTERECTOMY      HX LUMBAR LAMINECTOMY  1992    HX MASTECTOMY Left 2002    With Reconstruction    HX OOPHORECTOMY      Unilateral    HX OPEN CHOLECYSTECTOMY  1981    HX OPEN REDUCTION INTERNAL FIXATION Left 2013    Ankle    HX OPEN REDUCTION INTERNAL FIXATION  08/2016    Tibia    HX ORTHOPAEDIC Right 08/2013    Patellar fracture repair    HX OTHER SURGICAL      [place] Cath Placed and Removed    HX TONSILLECTOMY  HX TONSILLECTOMY      HX TRANSPLANT  2002    Stem Cell Transplant    HX UVULOPALATOPHARYNGOPLASTY  2004         ROS:    Gen.: no fever or significant change in appetite or weight          MSK:  Shoulders  and elbows unremarkable. She does have some Heberden's and Maura's nodes and degenerative changes of the [facility] joints. 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 and xerophthalmia  · And left ankle problem following fracture, several surgical procedures and apparently a MRSA infection years ago  · History of positive kappa light chains with unremarkable hematology work-up and presumed related to Sjogren's syndrome  · Scan showing high fracture risk of the hip-she would be a candidate for appropriate antiresorptive therapy is calcium and vitamin D  · Primary Osteoarthritis of the hands  · Osteopenia with high fracture risk but apparent intolerance to alendronate          PLAN:      · No Change in treatment and will recheck prn   · We will follow-up with Dr. Erica Espinozaain or nurse practitioner for the osteopenia. · Not involved in treating the osteopenia and high fracture risk since she was not referred for that specific problem but I would be happy to see her for that issue if her primary care physicians deem that necessary                    Long Krause M.D.   Rheumatology - [facility] Osteoporosis & Arthritis

## 2022-06-06 ENCOUNTER — OFFICE VISIT (OUTPATIENT)
Dept: INTERNAL MEDICINE CLINIC | Facility: CLINIC | Age: 77
End: 2022-06-06
Payer: MEDICARE

## 2022-06-06 VITALS
SYSTOLIC BLOOD PRESSURE: 137 MMHG | HEIGHT: 64 IN | HEART RATE: 64 BPM | BODY MASS INDEX: 25.95 KG/M2 | TEMPERATURE: 97.6 F | OXYGEN SATURATION: 95 % | DIASTOLIC BLOOD PRESSURE: 60 MMHG | WEIGHT: 152 LBS

## 2022-06-06 DIAGNOSIS — E11.22 CONTROLLED TYPE 2 DIABETES MELLITUS WITH STAGE 3 CHRONIC KIDNEY DISEASE, WITHOUT LONG-TERM CURRENT USE OF INSULIN (HCC): Chronic | ICD-10-CM

## 2022-06-06 DIAGNOSIS — E83.52 HYPERCALCEMIA: ICD-10-CM

## 2022-06-06 DIAGNOSIS — G62.0 CHEMOTHERAPY-INDUCED NEUROPATHY (HCC): Chronic | ICD-10-CM

## 2022-06-06 DIAGNOSIS — M85.89 OSTEOPENIA OF MULTIPLE SITES: Chronic | ICD-10-CM

## 2022-06-06 DIAGNOSIS — T45.1X5A CHEMOTHERAPY-INDUCED NEUROPATHY (HCC): Chronic | ICD-10-CM

## 2022-06-06 DIAGNOSIS — M15.9 PRIMARY OSTEOARTHRITIS INVOLVING MULTIPLE JOINTS: ICD-10-CM

## 2022-06-06 DIAGNOSIS — R11.0 NAUSEA: ICD-10-CM

## 2022-06-06 DIAGNOSIS — I42.0 DILATED CARDIOMYOPATHY (HCC): Primary | Chronic | ICD-10-CM

## 2022-06-06 DIAGNOSIS — E78.5 DYSLIPIDEMIA: ICD-10-CM

## 2022-06-06 DIAGNOSIS — E55.9 VITAMIN D DEFICIENCY: ICD-10-CM

## 2022-06-06 DIAGNOSIS — N18.31 STAGE 3A CHRONIC KIDNEY DISEASE (HCC): Chronic | ICD-10-CM

## 2022-06-06 DIAGNOSIS — I10 ESSENTIAL HYPERTENSION: Chronic | ICD-10-CM

## 2022-06-06 DIAGNOSIS — Z78.9 INTOLERANCE OF ORAL BISPHOSPHONATE THERAPY: ICD-10-CM

## 2022-06-06 DIAGNOSIS — E53.8 VITAMIN B12 DEFICIENCY: ICD-10-CM

## 2022-06-06 DIAGNOSIS — N18.30 CONTROLLED TYPE 2 DIABETES MELLITUS WITH STAGE 3 CHRONIC KIDNEY DISEASE, WITHOUT LONG-TERM CURRENT USE OF INSULIN (HCC): Chronic | ICD-10-CM

## 2022-06-06 DIAGNOSIS — M35.00 SJOGREN SYNDROME, UNSPECIFIED (HCC): Chronic | ICD-10-CM

## 2022-06-06 DIAGNOSIS — Z78.9 STATIN INTOLERANCE: ICD-10-CM

## 2022-06-06 DIAGNOSIS — F51.01 PRIMARY INSOMNIA: ICD-10-CM

## 2022-06-06 PROCEDURE — 99214 OFFICE O/P EST MOD 30 MIN: CPT | Performed by: NURSE PRACTITIONER

## 2022-06-06 PROCEDURE — G8417 CALC BMI ABV UP PARAM F/U: HCPCS | Performed by: NURSE PRACTITIONER

## 2022-06-06 PROCEDURE — 3044F HG A1C LEVEL LT 7.0%: CPT | Performed by: NURSE PRACTITIONER

## 2022-06-06 PROCEDURE — 1090F PRES/ABSN URINE INCON ASSESS: CPT | Performed by: NURSE PRACTITIONER

## 2022-06-06 PROCEDURE — 1036F TOBACCO NON-USER: CPT | Performed by: NURSE PRACTITIONER

## 2022-06-06 PROCEDURE — G8427 DOCREV CUR MEDS BY ELIG CLIN: HCPCS | Performed by: NURSE PRACTITIONER

## 2022-06-06 PROCEDURE — 1123F ACP DISCUSS/DSCN MKR DOCD: CPT | Performed by: NURSE PRACTITIONER

## 2022-06-06 PROCEDURE — G8399 PT W/DXA RESULTS DOCUMENT: HCPCS | Performed by: NURSE PRACTITIONER

## 2022-06-06 RX ORDER — ALBUTEROL SULFATE 90 UG/1
4 AEROSOL, METERED RESPIRATORY (INHALATION) PRN
OUTPATIENT
Start: 2022-06-06

## 2022-06-06 RX ORDER — LISINOPRIL 5 MG/1
5 TABLET ORAL DAILY
Qty: 90 TABLET | Refills: 1 | Status: SHIPPED | OUTPATIENT
Start: 2022-06-06

## 2022-06-06 RX ORDER — CYANOCOBALAMIN 1000 UG/ML
1000 INJECTION INTRAMUSCULAR; SUBCUTANEOUS
Qty: 3 ML | Refills: 3 | Status: SHIPPED | OUTPATIENT
Start: 2022-06-06 | End: 2022-08-11 | Stop reason: ALTCHOICE

## 2022-06-06 RX ORDER — TRAMADOL HYDROCHLORIDE 50 MG/1
50 TABLET ORAL DAILY PRN
Qty: 30 TABLET | Refills: 1 | Status: SHIPPED | OUTPATIENT
Start: 2022-06-06 | End: 2022-07-06

## 2022-06-06 RX ORDER — AMITRIPTYLINE HYDROCHLORIDE 10 MG/1
10 TABLET, FILM COATED ORAL NIGHTLY
Qty: 90 TABLET | Refills: 1 | Status: SHIPPED | OUTPATIENT
Start: 2022-06-06 | End: 2022-07-28

## 2022-06-06 RX ORDER — EPINEPHRINE 1 MG/ML
0.3 INJECTION, SOLUTION, CONCENTRATE INTRAVENOUS PRN
OUTPATIENT
Start: 2022-06-06

## 2022-06-06 RX ORDER — SODIUM CHLORIDE 9 MG/ML
INJECTION, SOLUTION INTRAVENOUS CONTINUOUS
OUTPATIENT
Start: 2022-06-06

## 2022-06-06 RX ORDER — ACETAMINOPHEN 325 MG/1
650 TABLET ORAL
OUTPATIENT
Start: 2022-06-06

## 2022-06-06 RX ORDER — PROMETHAZINE HYDROCHLORIDE 25 MG/1
25 TABLET ORAL DAILY PRN
Qty: 20 TABLET | Refills: 1 | Status: SHIPPED | OUTPATIENT
Start: 2022-06-06 | End: 2022-07-28 | Stop reason: SDUPTHER

## 2022-06-06 RX ORDER — FAMOTIDINE 10 MG/ML
20 INJECTION, SOLUTION INTRAVENOUS
OUTPATIENT
Start: 2022-06-06

## 2022-06-06 RX ORDER — METFORMIN HYDROCHLORIDE 500 MG/1
1000 TABLET, EXTENDED RELEASE ORAL 2 TIMES DAILY
Qty: 360 TABLET | Refills: 1 | Status: SHIPPED | OUTPATIENT
Start: 2022-06-06

## 2022-06-06 RX ORDER — GABAPENTIN 600 MG/1
600 TABLET ORAL 2 TIMES DAILY
Qty: 180 TABLET | Refills: 1 | Status: SHIPPED | OUTPATIENT
Start: 2022-06-06 | End: 2023-06-01

## 2022-06-06 RX ORDER — FUROSEMIDE 40 MG/1
40 TABLET ORAL EVERY OTHER DAY
Qty: 45 TABLET | Refills: 1 | Status: SHIPPED | OUTPATIENT
Start: 2022-06-06

## 2022-06-06 RX ORDER — DIPHENHYDRAMINE HYDROCHLORIDE 50 MG/ML
50 INJECTION INTRAMUSCULAR; INTRAVENOUS
OUTPATIENT
Start: 2022-06-06

## 2022-06-06 RX ORDER — ONDANSETRON 2 MG/ML
8 INJECTION INTRAMUSCULAR; INTRAVENOUS
OUTPATIENT
Start: 2022-06-06

## 2022-06-06 SDOH — ECONOMIC STABILITY: FOOD INSECURITY: WITHIN THE PAST 12 MONTHS, THE FOOD YOU BOUGHT JUST DIDN'T LAST AND YOU DIDN'T HAVE MONEY TO GET MORE.: NEVER TRUE

## 2022-06-06 SDOH — ECONOMIC STABILITY: FOOD INSECURITY: WITHIN THE PAST 12 MONTHS, YOU WORRIED THAT YOUR FOOD WOULD RUN OUT BEFORE YOU GOT MONEY TO BUY MORE.: NEVER TRUE

## 2022-06-06 ASSESSMENT — ENCOUNTER SYMPTOMS
BACK PAIN: 1
VOMITING: 0
ABDOMINAL PAIN: 0
COUGH: 0
WHEEZING: 0
NAUSEA: 1
SHORTNESS OF BREATH: 0

## 2022-06-06 ASSESSMENT — PATIENT HEALTH QUESTIONNAIRE - PHQ9
8. MOVING OR SPEAKING SO SLOWLY THAT OTHER PEOPLE COULD HAVE NOTICED. OR THE OPPOSITE, BEING SO FIGETY OR RESTLESS THAT YOU HAVE BEEN MOVING AROUND A LOT MORE THAN USUAL: 0
SUM OF ALL RESPONSES TO PHQ9 QUESTIONS 1 & 2: 0
6. FEELING BAD ABOUT YOURSELF - OR THAT YOU ARE A FAILURE OR HAVE LET YOURSELF OR YOUR FAMILY DOWN: 0
2. FEELING DOWN, DEPRESSED OR HOPELESS: 0
4. FEELING TIRED OR HAVING LITTLE ENERGY: 0
1. LITTLE INTEREST OR PLEASURE IN DOING THINGS: 0
SUM OF ALL RESPONSES TO PHQ QUESTIONS 1-9: 0
9. THOUGHTS THAT YOU WOULD BE BETTER OFF DEAD, OR OF HURTING YOURSELF: 0
SUM OF ALL RESPONSES TO PHQ QUESTIONS 1-9: 0
SUM OF ALL RESPONSES TO PHQ QUESTIONS 1-9: 0
3. TROUBLE FALLING OR STAYING ASLEEP: 0
SUM OF ALL RESPONSES TO PHQ QUESTIONS 1-9: 0
5. POOR APPETITE OR OVEREATING: 0
7. TROUBLE CONCENTRATING ON THINGS, SUCH AS READING THE NEWSPAPER OR WATCHING TELEVISION: 0

## 2022-06-06 ASSESSMENT — SOCIAL DETERMINANTS OF HEALTH (SDOH): HOW HARD IS IT FOR YOU TO PAY FOR THE VERY BASICS LIKE FOOD, HOUSING, MEDICAL CARE, AND HEATING?: NOT HARD AT ALL
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
## 2022-06-07 LAB
25(OH)D3 SERPL-MCNC: 54.2 NG/ML (ref 30–100)
ALBUMIN SERPL-MCNC: 3.8 G/DL (ref 3.2–4.6)
ALBUMIN/GLOB SERPL: 0.9 {RATIO} (ref 1.2–3.5)
ALP SERPL-CCNC: 54 U/L (ref 50–136)
ALT SERPL-CCNC: 20 U/L (ref 12–65)
ANION GAP SERPL CALC-SCNC: 11 MMOL/L (ref 7–16)
AST SERPL-CCNC: 18 U/L (ref 15–37)
BASOPHILS # BLD: 0 K/UL (ref 0–0.2)
BASOPHILS NFR BLD: 1 % (ref 0–2)
BILIRUB SERPL-MCNC: 0.3 MG/DL (ref 0.2–1.1)
BUN SERPL-MCNC: 16 MG/DL (ref 8–23)
CALCIUM SERPL-MCNC: 9.2 MG/DL (ref 8.3–10.4)
CALCIUM SERPL-MCNC: 9.3 MG/DL (ref 8.3–10.4)
CHLORIDE SERPL-SCNC: 104 MMOL/L (ref 98–107)
CHOLEST SERPL-MCNC: 226 MG/DL
CO2 SERPL-SCNC: 25 MMOL/L (ref 21–32)
CREAT SERPL-MCNC: 1 MG/DL (ref 0.6–1)
DIFFERENTIAL METHOD BLD: ABNORMAL
EOSINOPHIL # BLD: 0.2 K/UL (ref 0–0.8)
EOSINOPHIL NFR BLD: 3 % (ref 0.5–7.8)
ERYTHROCYTE [DISTWIDTH] IN BLOOD BY AUTOMATED COUNT: 13.4 % (ref 11.9–14.6)
EST. AVERAGE GLUCOSE BLD GHB EST-MCNC: 131 MG/DL
GLOBULIN SER CALC-MCNC: 4.1 G/DL (ref 2.3–3.5)
GLUCOSE SERPL-MCNC: 70 MG/DL (ref 65–100)
HBA1C MFR BLD: 6.2 % (ref 4.2–6.3)
HCT VFR BLD AUTO: 36.1 % (ref 35.8–46.3)
HDLC SERPL-MCNC: 52 MG/DL (ref 40–60)
HDLC SERPL: 4.3 {RATIO}
HGB BLD-MCNC: 11.8 G/DL (ref 11.7–15.4)
IMM GRANULOCYTES # BLD AUTO: 0 K/UL (ref 0–0.5)
IMM GRANULOCYTES NFR BLD AUTO: 0 % (ref 0–5)
LDLC SERPL CALC-MCNC: ABNORMAL MG/DL
LDLC SERPL DIRECT ASSAY-MCNC: 127 MG/DL
LYMPHOCYTES # BLD: 2.5 K/UL (ref 0.5–4.6)
LYMPHOCYTES NFR BLD: 41 % (ref 13–44)
MCH RBC QN AUTO: 30.3 PG (ref 26.1–32.9)
MCHC RBC AUTO-ENTMCNC: 32.7 G/DL (ref 31.4–35)
MCV RBC AUTO: 92.6 FL (ref 79.6–97.8)
MONOCYTES # BLD: 0.6 K/UL (ref 0.1–1.3)
MONOCYTES NFR BLD: 10 % (ref 4–12)
NEUTS SEG # BLD: 2.8 K/UL (ref 1.7–8.2)
NEUTS SEG NFR BLD: 46 % (ref 43–78)
NRBC # BLD: 0 K/UL (ref 0–0.2)
PLATELET # BLD AUTO: 286 K/UL (ref 150–450)
PMV BLD AUTO: 11 FL (ref 9.4–12.3)
POTASSIUM SERPL-SCNC: 3.8 MMOL/L (ref 3.5–5.1)
PROT SERPL-MCNC: 7.9 G/DL (ref 6.3–8.2)
PTH-INTACT SERPL-MCNC: 80.9 PG/ML (ref 18.5–88)
RBC # BLD AUTO: 3.9 M/UL (ref 4.05–5.2)
SODIUM SERPL-SCNC: 140 MMOL/L (ref 136–145)
TRIGL SERPL-MCNC: 450 MG/DL (ref 35–150)
TSH W FREE THYROID IF ABNORMAL: 2.68 UIU/ML (ref 0.36–3.74)
VIT B12 SERPL-MCNC: 1709 PG/ML (ref 193–986)
VLDLC SERPL CALC-MCNC: 90 MG/DL (ref 6–23)
WBC # BLD AUTO: 6.1 K/UL (ref 4.3–11.1)

## 2022-06-21 NOTE — RESULT ENCOUNTER NOTE
*blood counts normal   *A1c 6.2%   *thyroid normal   *cholesterol levels are up; I know these were nonfasting labs. Let's plan to check fasting labs at your next visit. *vitamin D normal   *vitamin B12 remains above goal.  Would try spacing your injections to every 6 weeks instead of every 4. Alternatively, you could try switching to an OTC oral supplement of vitamin B12/cyanocobalamin to see if this maintains your level adequately.

## 2022-06-29 ENCOUNTER — OFFICE VISIT (OUTPATIENT)
Dept: INTERNAL MEDICINE CLINIC | Facility: CLINIC | Age: 77
End: 2022-06-29
Payer: MEDICARE

## 2022-06-29 VITALS
SYSTOLIC BLOOD PRESSURE: 173 MMHG | OXYGEN SATURATION: 96 % | WEIGHT: 153 LBS | TEMPERATURE: 97.9 F | HEIGHT: 64 IN | BODY MASS INDEX: 26.12 KG/M2 | HEART RATE: 55 BPM | DIASTOLIC BLOOD PRESSURE: 75 MMHG

## 2022-06-29 DIAGNOSIS — M25.421 SWELLING OF RIGHT ELBOW: ICD-10-CM

## 2022-06-29 DIAGNOSIS — M25.441 SWELLING OF FINGER JOINT OF RIGHT HAND: ICD-10-CM

## 2022-06-29 DIAGNOSIS — A49.02 MRSA INFECTION: ICD-10-CM

## 2022-06-29 DIAGNOSIS — A49.02 MRSA INFECTION: Primary | ICD-10-CM

## 2022-06-29 PROCEDURE — G8399 PT W/DXA RESULTS DOCUMENT: HCPCS | Performed by: NURSE PRACTITIONER

## 2022-06-29 PROCEDURE — G8417 CALC BMI ABV UP PARAM F/U: HCPCS | Performed by: NURSE PRACTITIONER

## 2022-06-29 PROCEDURE — 99214 OFFICE O/P EST MOD 30 MIN: CPT | Performed by: NURSE PRACTITIONER

## 2022-06-29 PROCEDURE — 1036F TOBACCO NON-USER: CPT | Performed by: NURSE PRACTITIONER

## 2022-06-29 PROCEDURE — G8427 DOCREV CUR MEDS BY ELIG CLIN: HCPCS | Performed by: NURSE PRACTITIONER

## 2022-06-29 PROCEDURE — 1090F PRES/ABSN URINE INCON ASSESS: CPT | Performed by: NURSE PRACTITIONER

## 2022-06-29 PROCEDURE — 1123F ACP DISCUSS/DSCN MKR DOCD: CPT | Performed by: NURSE PRACTITIONER

## 2022-06-29 RX ORDER — LINEZOLID 600 MG/1
600 TABLET, FILM COATED ORAL 2 TIMES DAILY
Qty: 20 TABLET | Refills: 0 | Status: SHIPPED | OUTPATIENT
Start: 2022-06-29 | End: 2022-07-09

## 2022-06-29 RX ORDER — DOXYCYCLINE HYCLATE 100 MG/1
CAPSULE ORAL
COMMUNITY
Start: 2022-06-17 | End: 2022-07-07 | Stop reason: ALTCHOICE

## 2022-06-29 ASSESSMENT — ENCOUNTER SYMPTOMS
COUGH: 0
VOMITING: 0
NAUSEA: 0
SHORTNESS OF BREATH: 0

## 2022-06-29 ASSESSMENT — PATIENT HEALTH QUESTIONNAIRE - PHQ9
SUM OF ALL RESPONSES TO PHQ QUESTIONS 1-9: 0
3. TROUBLE FALLING OR STAYING ASLEEP: 0
SUM OF ALL RESPONSES TO PHQ QUESTIONS 1-9: 0
5. POOR APPETITE OR OVEREATING: 0
8. MOVING OR SPEAKING SO SLOWLY THAT OTHER PEOPLE COULD HAVE NOTICED. OR THE OPPOSITE, BEING SO FIGETY OR RESTLESS THAT YOU HAVE BEEN MOVING AROUND A LOT MORE THAN USUAL: 0
4. FEELING TIRED OR HAVING LITTLE ENERGY: 0
9. THOUGHTS THAT YOU WOULD BE BETTER OFF DEAD, OR OF HURTING YOURSELF: 0
SUM OF ALL RESPONSES TO PHQ9 QUESTIONS 1 & 2: 0
SUM OF ALL RESPONSES TO PHQ QUESTIONS 1-9: 0
1. LITTLE INTEREST OR PLEASURE IN DOING THINGS: 0
6. FEELING BAD ABOUT YOURSELF - OR THAT YOU ARE A FAILURE OR HAVE LET YOURSELF OR YOUR FAMILY DOWN: 0
SUM OF ALL RESPONSES TO PHQ QUESTIONS 1-9: 0
7. TROUBLE CONCENTRATING ON THINGS, SUCH AS READING THE NEWSPAPER OR WATCHING TELEVISION: 0
2. FEELING DOWN, DEPRESSED OR HOPELESS: 0

## 2022-06-29 NOTE — PATIENT INSTRUCTIONS
Patient Education        linezolid (oral/injection)  Pronunciation: abhinav EZ oh lid  Brand: Zyvox  What is the most important information I should know about linezolid? Some medicines can cause unwanted or dangerous effects when used with linezolid. Tell your doctor about all other medicines you use. Do not use this medicine if you have used an MAO inhibitor in the past 14 days, such as isocarboxazid, linezolid, methylene blue injection, phenelzine, ortranylcypromine. What is linezolid? Linezolid is an antibiotic that fights bacteria in the body. Linezolid is alsoan MAO (monoamine oxidase) inhibitor. Linezolid is used to treat different types of bacterial infections, such as pneumonia, skin infections, and infections that are resistant to otherantibiotics. Linezolid may also be used for purposes not listed in this medication guide. What should I discuss with my healthcare provider before using linezolid? You should not use linezolid if you are allergic to it. Do not use linezolid if you have used an MAO inhibitor in the past 14 days. A dangerous drug interaction could occur. MAO inhibitors include isocarboxazid, linezolid, methylene blue injection, phenelzine,tranylcypromine, and others. Tell your doctor if you also take stimulant medicine, opioid medicine, herbal products, or medicine for depression, mental illness, Parkinson's disease, migraine headaches, serious infections, or prevention of nausea and vomiting. An interaction with linezolid could cause a serious condition called serotonin syndrome. Tell your doctor if you have ever had:   high blood pressure;   a thyroid disorder;   a carcinoid tumor;   bone marrow suppression or a weak immune system;   kidney or liver disease;   pheochromocytoma (adrenal gland tumor);   diabetes;   seizures; or   if you use a catheter. Tell your doctor if you are pregnant or breastfeeding.   If you are breastfeeding, tell your doctor if you notice diarrhea or vomitingin the nursing baby. Linezolid liquid may contain phenylalanine and could be harmful if you have phenylketonuria(PKU). How is linezolid used? Follow all directions on your prescription label and read all medication guidesor instruction sheets. Use the medicine exactly as directed. Linezolid oral is taken by mouth. Linezolid tablets or liquid can be taken with or without food. Gently mix the oral suspension (liquid) by turning the bottle upside down 3 to 5 times. Do not shake. Measure a dose with the supplied syringe or a dose-measuring device (not akitchen spoon). Linezolid injection is given in a vein. Read and follow all Instructions for Use. Ask your doctor or pharmacist if you need help. Prepare an injection only when you are ready to give it. Call your pharmacist if the medicine has particles in it. Do not reuse a needle or syringe. Place them in a puncture-proof \"sharps\" container and dispose of it following state or local laws. Keep out of thereach of children and pets. You will need frequent medical tests. Your vision and blood pressure may alsoneed to be checked often. Keep using this medicine even if your symptoms quickly improve. Skipping doses could make your infection resistant to medication. Linezolid will not treat aviral infection (flu or a common cold). Store all forms of linezolid at room temperature away from moisture, heat, and light. Do not freeze. Throw away any liquid not used within 21 days. What happens if I miss a dose? Use the medicine as soon as you can, but skip the missed dose if it is almost time for your next dose. Do not use two doses at one time. What happens if I overdose? Seek emergency medical attention or call the Poison Help line at 1-650.251.3449. What should I avoid while using linezolid? Avoid taking anti-diarrhea medicine without first asking your doctor. Diarrhea may be a sign of a new infection.   Eating tyramine while you are using linezolid can raise your blood pressure to dangerous levels. Avoid foods that have a high level of tyramine, such as:   aged cheeses or meats;   pickled or fermented meats, smoked or air-dried meats;   sauerkraut;   soy sauce;   tap beer (alcoholic and nonalcoholic);   red wine; or   any meat, cheese, or other protein-based food that has been improperly stored. You should be very familiar with the list of foods you must avoid while you areusing linezolid. What are the possible side effects of linezolid? Get emergency medical help if you have signs of an allergic reaction (hives, difficult breathing, swelling in your face or throat) or a severe skin reaction (fever, sore throat, burning eyes, skin pain, red or purple skin rash withblistering and peeling). Call your doctor at once if you have:   vision problems, changes in color vision;   severe stomach pain, diarrhea that is watery or bloody;   a seizure;   sweating, feeling anxious or shaky (may be signs of low blood sugar);   lactic acidosis --unusual muscle pain, trouble breathing, stomach pain, vomiting, irregular heart rate, dizziness, feeling cold, or feeling very weak or tired; or   low blood cell counts --fever, chills, tiredness, weakness, confusion, mouth sores, skin sores, easy bruising, unusual bleeding, pale skin, cold hands and feet, feeling light-headed or short of breath. Seek medical attention right away if you have symptoms of serotonin syndrome, such as: agitation, hallucinations, fever, sweating, shivering, fast heart rate, musclestiffness, twitching, loss of coordination, nausea, vomiting, or diarrhea. Common side effects may include:   nausea, vomiting, diarrhea;   rash;   anemia (low red blood cells); or   headache, dizziness. This is not a complete list of side effects and others may occur. Call your doctor for medical advice about side effects. You may report side effects toFDA at 5-578-FDA-5602.   What other drugs will affect linezolid? Many drugs can affect linezolid, and some drugs should not be used at the same time. Tell your doctor about all other medicines you use. This includes prescription and over-the-counter medicines, vitamins, and herbal products. Not all possible interactions are listed here. Where can I get more information? Your pharmacist can provide more information about linezolid. Remember, keep this and all other medicines out of the reach of children, never share your medicines with others, and use this medication only for the indication prescribed. Every effort has been made to ensure that the information provided by [address] is accurate, up-to-date, and complete, but no guarantee is made to that effect. Drug information contained herein may be time sensitive. [facility] information has been compiled for use by healthcare practitioners and consumers in the [place] and therefore [employer] does not warrant that uses outside of the [place] are appropriate, unless specifically indicated otherwise. [facility]'s drug information does not endorse drugs, diagnose patients or recommend therapy. [facility]'s drug information is an informational resource designed to assist licensed healthcare practitioners in caring for their patients and/or to serve consumers viewing this service as a supplement to, and not a substitute for, the expertise, skill, knowledge and judgment of healthcare practitioners. The absence of a warning for a given drug or drug combination in no way should be construed to indicate that the drug or drug combination is safe, effective or appropriate for any given patient. [employer] does not assume any responsibility for any aspect of healthcare administered with the aid of information [facility] provides. The information contained herein is not intended to cover all possible uses, directions, precautions, warnings, drug interactions, allergic reactions, or adverse effects.  If you have questions about the drugs you are taking, check with yourdoctor, nurse or pharmacist.  Copyright 4760-1791 [facility] SOL[place]. Version: 11.02. Revision date:6/14/2021. Care instructions adapted under license by [facility] ([facility]). If you have questions about a medical condition or this instruction, always ask your healthcare professional. [address] any warranty or liability for your use of this information.

## 2022-06-29 NOTE — PROGRESS NOTES
[facility]  Office Visit Note    Subjective:  Chief Complaint   Patient presents with    Wound Check     Ugent care visit has MRSA in finger antibotics seem not to help        Patient ID: Grabiel Murphy is a 69 y.o. female presenting to the office for the above. 69year old female with finger infection. She was seen at urgent care about 10 days ago (records not visible to me) with swelling of right 1st DIP joint (swollen, erythema, heat). States a sample was taken and sent for culture, and she received a call saying it was MRSA. Treated with doxycycline. Since then, the 1st DIP has not improved. She has also developed swelling and pain of right 5th PIP joint, and right elbow. No fever/chills. She cannot take penicillins or Bactrim. --Treat with Zyvox. Creatinine clearance >40. Discussed risks/benefits, alternatives, potential side effects, proper administration of medication. She is going to stop amitriptyline for next few weeks. Take with food, take probiotic or daily yogurt with Lactobacillus. Stay hydrated. --Follow up next week; call sooner with any questions or concerns. Advised of symptoms that would require reevaluation, or that would require immediate medical attention in the ER; patient expresses understanding. History:   Allergies   Allergen Reactions    Oxycodone Itching    Eucalyptus Oil Hives and Other (See Comments)     Burns mouth    Ezetimibe Myalgia    Morphine Other (See Comments)     Feels crazy  Other reaction(s): Hallucinations    Penicillins Hives    Pineapple Hives and Other (See Comments)     Burns mouth    Vancomycin Other (See Comments)     Kidney function worsened    Sulfa Antibiotics Nausea Only and Rash       Past Medical History:   Diagnosis Date    Ankle fracture 2014    Ankle osteomyelitis, left ([address] 2014    Breast cancer (HCC)     Chemotherapy-induced neuropathy (HCC)     Chronic anemia     Depression     Dry eye syndrome of both eyes     Dyslipidemia     Essential hypertension     Fracture of right patella 2013    History of left breast cancer 2001    with mastectomy    Left leg weakness 9/8/2020    Non-ischemic cardiomyopathy ([facility] 2009    EF 15% 2009, Cath-minimal CAD. EF normalized     Osteoarthritis     Osteopenia     Peripheral sensory-motor axonal polyneuropathy 10/17/2020    S/P cardiac cath 2009    Statin intolerance     Tibial fracture 2016    Tibial plateau fracture 8/2/0239    Last Assessment & Plan:  Formatting of this note might be different from the original. Pod 2 ORIF doing well. C/o moderate pain. Controlled on PO meds.   Ready to go home    Type 2 diabetes mellitus, controlled ([address]     Vitamin B12 deficiency        Past Surgical History:   Procedure Laterality Date    BREAST BIOPSY      left breast biopsy    BREAST BIOPSY  1968    left cyst removed    BREAST LUMPECTOMY Left 2001    BREAST REDUCTION SURGERY      Rt breast    CARDIAC CATHETERIZATION  2009    CARPAL TUNNEL RELEASE Bilateral     CHOLECYSTECTOMY, OPEN  1981    COLONOSCOPY  07/2014    CYST REMOVAL Right 11/2020    hand    HEENT      Sinus Surgery    HEENT      RK procedure bilateral eyes    HX OPEN REDUCTION INTERNAL FIXATION Left 2013    Ankle    HX OPEN REDUCTION INTERNAL FIXATION  08/2016    [place]    HYSTERECTOMY (CERVIX STATUS UNKNOWN)      LUMBAR LAMINECTOMY  1992    MASTECTOMY Left 2002    With Reconstruction    ORTHOPEDIC SURGERY Right 08/2013    Patellar fracture repair    OTHER SURGICAL HISTORY      [place] Cath Placed and Removed    OVARY REMOVAL      Unilateral    TONSILLECTOMY      TONSILLECTOMY      TRANSPLANT  2002    Stem Cell Transplant    UVULOPALATOPHARYGOPLASTY  2004       Family History   Problem Relation Age of Onset    Cancer Mother     Diabetes Mother     Heart Disease Mother         MI at age 66    Heart Failure Mother         age 60    Breast Cancer Neg Hx     Stroke Sister     Cancer Brother     Heart Disease Brother     Stomach Cancer Neg Hx     Heart Disease Father         MI age 55       Social History     Tobacco Use   Smoking Status Never Smoker   Smokeless Tobacco Never Used       Social History     Substance and Sexual Activity   Alcohol Use No       Current Outpatient Medications   Medication Sig Dispense Refill    doxycycline hyclate (VIBRAMYCIN) 100 MG capsule       linezolid (ZYVOX) 600 MG tablet Take 1 tablet by mouth 2 times daily for 10 days 20 tablet 0    amitriptyline (ELAVIL) 10 mg tablet Take 1 tablet by mouth nightly 90 tablet 1    furosemide (LASIX) 40 MG tablet Take 1 tablet by mouth every other day 45 tablet 1    gabapentin (NEURONTIN) 600 MG tablet Take 1 tablet by mouth 2 times daily for 360 days. 180 tablet 1    lisinopril (PRINIVIL;ZESTRIL) 5 MG tablet Take 1 tablet by mouth daily 90 tablet 1    cyanocobalamin 1000 MCG/ML injection Inject 1 mL into the muscle every 7 days 3 mL 3    traMADol (ULTRAM) 50 MG tablet Take 1 tablet by mouth daily as needed for Pain for up to 30 days.  30 tablet 1    promethazine (PHENERGAN) 25 MG tablet Take 1 tablet by mouth daily as needed for Nausea 20 tablet 1    metFORMIN (GLUCOPHAGE-XR) 500 mg extended release tablet Take 2 tablets by mouth 2 times daily 360 tablet 1    calcium carbonate (TUMS) 500 MG chewable tablet Take 1 tablet by mouth daily      dextromethorphan-guaiFENesin (MUCINEX DM)  MG per extended release tablet Take 1 tablet by mouth every 12 hours as needed      acetaminophen (TYLENOL) 500 MG tablet Take by mouth every 6 hours as needed      cetirizine (ZYRTEC) 10 MG tablet Take 10 mg by mouth daily as needed      Cholecalciferol 50 MCG (2000 UT) CAPS Take 2,000 Units by mouth daily      metoprolol succinate (TOPROL XL) 50 MG extended release tablet Take 50 mg by mouth daily      potassium chloride (KLOR-CON M) 20 MEQ extended release tablet TAKE 1 TABLET EVERY MORNING AND 2 TABLETS EVERY EVENING      valACYclovir (VALTREX) 1 g tablet 2 tablets 2 times a day for 1 day only with mouth sore occurs       No current facility-administered medications for this visit. Review of Systems:  Review of Systems   Constitutional: Negative for activity change, chills, diaphoresis and fever. Respiratory: Negative for cough and shortness of breath. Cardiovascular: Negative for chest pain. Gastrointestinal: Negative for nausea and vomiting. Musculoskeletal: Positive for arthralgias and joint swelling. Skin: Negative for pallor and rash. Neurological: Negative for syncope and weakness. Psychiatric/Behavioral: Negative for dysphoric mood. The patient is not nervous/anxious. See HPI for other pertinent positives and negatives. Objective:  Vitals:    06/29/22 1431   BP: (!) 173/75   Site: Right Upper Arm   Position: Sitting   Cuff Size: Large Adult   Pulse: 55   Temp: 97.9 °F (36.6 °C)   TempSrc: Temporal   SpO2: 96%   Weight: 153 lb (69.4 kg)   Height: 5' 4\" (1.626 m)       Body mass index is 26.26 kg/m². Physical Exam  Vitals and nursing note reviewed. Constitutional:       General: She is not in acute distress. Appearance: Normal appearance. She is not ill-appearing. Eyes:      General: No scleral icterus. Right eye: No discharge. Left eye: No discharge. Cardiovascular:      Rate and Rhythm: Normal rate. Pulmonary:      Effort: Pulmonary effort is normal. No respiratory distress. Musculoskeletal:      Comments: Swelling, erythema, and tenderness of right 1st DIP, right 5th PIP, and right elbow. No open wounds or discharge. Skin:     General: Skin is warm and dry. Neurological:      Mental Status: She is alert and oriented to person, place, and time.    Psychiatric:         Mood and Affect: Mood normal.         Speech: Speech normal.         Behavior: Behavior normal.                  Lab Results   Component Value Date    WBC 6.1 06/06/2022 HGB 11.8 06/06/2022    HCT 36.1 06/06/2022    MCV 92.6 06/06/2022     06/06/2022   ,   Lab Results   Component Value Date     06/06/2022    K 3.8 06/06/2022     06/06/2022    CO2 25 06/06/2022    BUN 16 06/06/2022    CREATININE 1.00 06/06/2022    GLUCOSE 70 06/06/2022    CALCIUM 9.2 06/06/2022    CALCIUM 9.3 06/06/2022    ,   Lab Results   Component Value Date    TSH 1.690 06/08/2021     Lab Results   Component Value Date    ALT 20 06/06/2022    AST 18 06/06/2022    ALKPHOS 54 06/06/2022    BILITOT 0.3 06/06/2022   ,   Lab Results   Component Value Date    LABA1C 6.2 06/06/2022     Lab Results   Component Value Date     06/06/2022   ,   Lab Results   Component Value Date    LABMICR Comment 10/13/2020       PHQ-9  6/29/2022   Little interest or pleasure in doing things 0   Little interest or pleasure in doing things -   Feeling down, depressed, or hopeless 0   Trouble falling or staying asleep, or sleeping too much 0   Trouble falling or staying asleep, or sleeping too much -   Feeling tired or having little energy 0   Feeling tired or having little energy -   Poor appetite or overeating 0   Poor appetite, weight loss, or overeating -   Feeling bad about yourself - or that you are a failure or have let yourself or your family down 0   Feeling bad about yourself - or that you are a failure or have let yourself or your family down -   Trouble concentrating on things, such as reading the newspaper or watching television 0   Trouble concentrating on things such as school, work, reading, or watching TV -   Moving or speaking so slowly that other people could have noticed.  Or the opposite - being so fidgety or restless that you have been moving around a lot more than usual 0   Moving or speaking so slowly that other people could have noticed; or the opposite being so fidgety that others notice -   Thoughts that you would be better off dead, or of hurting yourself in some way 0   Thoughts of being better off dead, or hurting yourself in some way -   PHQ-2 Score 0   Total Score PHQ 2 -   PHQ-9 Total Score 0   PHQ 9 Score -        Assessment/Plan:      Health Maintenance Due   Topic Date Due    Hepatitis C screen  Never done    Shingles vaccine (2 of 3) 02/26/2015    COVID-19 Vaccine (3 - Pfizer risk 4-dose series) 03/13/2021          Isaura Sullivan was seen today for wound check. Diagnoses and all orders for this visit:    MRSA infection  -     linezolid (ZYVOX) 600 MG tablet; Take 1 tablet by mouth 2 times daily for 10 days  -     CBC with Auto Differential; Future  -     Basic Metabolic Panel; Future  -     Uric Acid; Future  -     C-Reactive Protein; Future  -     Sedimentation Rate; Future    Swelling of finger joint of right hand  -     CBC with Auto Differential; Future  -     Basic Metabolic Panel; Future  -     Uric Acid; Future  -     C-Reactive Protein; Future  -     Sedimentation Rate; Future    Swelling of right elbow  -     CBC with Auto Differential; Future  -     Basic Metabolic Panel; Future  -     Uric Acid; Future  -     C-Reactive Protein; Future  -     Sedimentation Rate; Future        Patient states she is otherwise doing well; has no further questions or concerns at this visit. Encouraged to contact office with any concerns prior to next visit. Return in about 1 week (around 7/6/2022), or if symptoms worsen or fail to improve, for Follow up.     LIU Garcia - CNP

## 2022-06-30 LAB
ANION GAP SERPL CALC-SCNC: 7 MMOL/L (ref 7–16)
BASOPHILS # BLD: 0 K/UL (ref 0–0.2)
BASOPHILS NFR BLD: 0 % (ref 0–2)
BUN SERPL-MCNC: 12 MG/DL (ref 8–23)
CALCIUM SERPL-MCNC: 9.3 MG/DL (ref 8.3–10.4)
CHLORIDE SERPL-SCNC: 107 MMOL/L (ref 98–107)
CO2 SERPL-SCNC: 28 MMOL/L (ref 21–32)
CREAT SERPL-MCNC: 1.1 MG/DL (ref 0.6–1)
CRP SERPL-MCNC: 1.9 MG/DL (ref 0–0.9)
DIFFERENTIAL METHOD BLD: ABNORMAL
EOSINOPHIL # BLD: 0.2 K/UL (ref 0–0.8)
EOSINOPHIL NFR BLD: 2 % (ref 0.5–7.8)
ERYTHROCYTE [DISTWIDTH] IN BLOOD BY AUTOMATED COUNT: 13.4 % (ref 11.9–14.6)
GLUCOSE SERPL-MCNC: 106 MG/DL (ref 65–100)
HCT VFR BLD AUTO: 34.5 % (ref 35.8–46.3)
HGB BLD-MCNC: 10.8 G/DL (ref 11.7–15.4)
IMM GRANULOCYTES # BLD AUTO: 0 K/UL (ref 0–0.5)
IMM GRANULOCYTES NFR BLD AUTO: 0 % (ref 0–5)
LYMPHOCYTES # BLD: 2.6 K/UL (ref 0.5–4.6)
LYMPHOCYTES NFR BLD: 38 % (ref 13–44)
MCH RBC QN AUTO: 28.8 PG (ref 26.1–32.9)
MCHC RBC AUTO-ENTMCNC: 31.3 G/DL (ref 31.4–35)
MCV RBC AUTO: 92 FL (ref 79.6–97.8)
MONOCYTES # BLD: 0.6 K/UL (ref 0.1–1.3)
MONOCYTES NFR BLD: 9 % (ref 4–12)
NEUTS SEG # BLD: 3.3 K/UL (ref 1.7–8.2)
NEUTS SEG NFR BLD: 51 % (ref 43–78)
NRBC # BLD: 0 K/UL (ref 0–0.2)
PLATELET # BLD AUTO: 257 K/UL (ref 150–450)
PMV BLD AUTO: 11.4 FL (ref 9.4–12.3)
POTASSIUM SERPL-SCNC: 3.4 MMOL/L (ref 3.5–5.1)
RBC # BLD AUTO: 3.75 M/UL (ref 4.05–5.2)
SODIUM SERPL-SCNC: 142 MMOL/L (ref 136–145)
URATE SERPL-MCNC: 8.4 MG/DL (ref 2.6–6)
WBC # BLD AUTO: 6.7 K/UL (ref 4.3–11.1)

## 2022-06-30 NOTE — RESULT ENCOUNTER NOTE
Labs stable. Uric acid a little high. Please finish course of antibiotics, and we'll follow up next week. Potassium is slightly low; need to increase potassium-rich foods in your diet.

## 2022-07-04 LAB — ERYTHROCYTE [SEDIMENTATION RATE] IN BLOOD: 8 MM/HR (ref 0–30)

## 2022-07-07 ENCOUNTER — OFFICE VISIT (OUTPATIENT)
Dept: INTERNAL MEDICINE CLINIC | Facility: CLINIC | Age: 77
End: 2022-07-07
Payer: MEDICARE

## 2022-07-07 ENCOUNTER — HOSPITAL ENCOUNTER (EMERGENCY)
Age: 77
Discharge: HOME | End: 2022-07-07
Attending: EMERGENCY MEDICINE
Payer: MEDICARE

## 2022-07-07 VITALS
WEIGHT: 154 LBS | HEIGHT: 66 IN | RESPIRATION RATE: 18 BRPM | OXYGEN SATURATION: 99 % | SYSTOLIC BLOOD PRESSURE: 210 MMHG | DIASTOLIC BLOOD PRESSURE: 104 MMHG | BODY MASS INDEX: 24.75 KG/M2 | HEART RATE: 77 BPM | TEMPERATURE: 98.5 F

## 2022-07-07 VITALS
BODY MASS INDEX: 26.29 KG/M2 | HEART RATE: 71 BPM | OXYGEN SATURATION: 98 % | WEIGHT: 154 LBS | HEIGHT: 64 IN | TEMPERATURE: 98.1 F | DIASTOLIC BLOOD PRESSURE: 81 MMHG | SYSTOLIC BLOOD PRESSURE: 192 MMHG

## 2022-07-07 DIAGNOSIS — L02.511 ABSCESS OF FINGER OF RIGHT HAND: Primary | ICD-10-CM

## 2022-07-07 DIAGNOSIS — A49.02 MRSA INFECTION: Primary | ICD-10-CM

## 2022-07-07 DIAGNOSIS — M25.441 SWELLING OF FINGER JOINT OF RIGHT HAND: ICD-10-CM

## 2022-07-07 DIAGNOSIS — M25.421 SWELLING OF RIGHT ELBOW: ICD-10-CM

## 2022-07-07 LAB
ANION GAP SERPL CALC-SCNC: 8 MMOL/L (ref 7–16)
BASOPHILS # BLD: 0 K/UL (ref 0–0.2)
BASOPHILS NFR BLD: 0 % (ref 0–2)
BUN SERPL-MCNC: 17 MG/DL (ref 8–23)
CALCIUM SERPL-MCNC: 10 MG/DL (ref 8.3–10.4)
CHLORIDE SERPL-SCNC: 108 MMOL/L (ref 98–107)
CO2 SERPL-SCNC: 22 MMOL/L (ref 21–32)
CREAT SERPL-MCNC: 1 MG/DL (ref 0.6–1)
DIFFERENTIAL METHOD BLD: ABNORMAL
EOSINOPHIL # BLD: 0.2 K/UL (ref 0–0.8)
EOSINOPHIL NFR BLD: 2 % (ref 0.5–7.8)
ERYTHROCYTE [DISTWIDTH] IN BLOOD BY AUTOMATED COUNT: 13 % (ref 11.9–14.6)
GLUCOSE SERPL-MCNC: 102 MG/DL (ref 65–100)
HCT VFR BLD AUTO: 34.8 % (ref 35.8–46.3)
HGB BLD-MCNC: 11.5 G/DL (ref 11.7–15.4)
IMM GRANULOCYTES # BLD AUTO: 0 K/UL (ref 0–0.5)
IMM GRANULOCYTES NFR BLD AUTO: 0 % (ref 0–5)
LYMPHOCYTES # BLD: 2.1 K/UL (ref 0.5–4.6)
LYMPHOCYTES NFR BLD: 26 % (ref 13–44)
MCH RBC QN AUTO: 29.6 PG (ref 26.1–32.9)
MCHC RBC AUTO-ENTMCNC: 33 G/DL (ref 31.4–35)
MCV RBC AUTO: 89.5 FL (ref 79.6–97.8)
MONOCYTES # BLD: 0.4 K/UL (ref 0.1–1.3)
MONOCYTES NFR BLD: 5 % (ref 4–12)
NEUTS SEG # BLD: 5.3 K/UL (ref 1.7–8.2)
NEUTS SEG NFR BLD: 67 % (ref 43–78)
NRBC # BLD: 0 K/UL (ref 0–0.2)
PLATELET # BLD AUTO: 253 K/UL (ref 150–450)
PMV BLD AUTO: 10.3 FL (ref 9.4–12.3)
POTASSIUM SERPL-SCNC: 4 MMOL/L (ref 3.5–5.1)
RBC # BLD AUTO: 3.89 M/UL (ref 4.05–5.2)
SODIUM SERPL-SCNC: 138 MMOL/L (ref 136–145)
WBC # BLD AUTO: 8 K/UL (ref 4.3–11.1)

## 2022-07-07 PROCEDURE — 1123F ACP DISCUSS/DSCN MKR DOCD: CPT | Performed by: NURSE PRACTITIONER

## 2022-07-07 PROCEDURE — 10061 I&D ABSCESS COMP/MULTIPLE: CPT

## 2022-07-07 PROCEDURE — 99213 OFFICE O/P EST LOW 20 MIN: CPT | Performed by: NURSE PRACTITIONER

## 2022-07-07 PROCEDURE — 1036F TOBACCO NON-USER: CPT | Performed by: NURSE PRACTITIONER

## 2022-07-07 PROCEDURE — G8427 DOCREV CUR MEDS BY ELIG CLIN: HCPCS | Performed by: NURSE PRACTITIONER

## 2022-07-07 PROCEDURE — 85025 COMPLETE CBC W/AUTO DIFF WBC: CPT

## 2022-07-07 PROCEDURE — 87205 SMEAR GRAM STAIN: CPT

## 2022-07-07 PROCEDURE — 99283 EMERGENCY DEPT VISIT LOW MDM: CPT

## 2022-07-07 PROCEDURE — 1090F PRES/ABSN URINE INCON ASSESS: CPT | Performed by: NURSE PRACTITIONER

## 2022-07-07 PROCEDURE — G8417 CALC BMI ABV UP PARAM F/U: HCPCS | Performed by: NURSE PRACTITIONER

## 2022-07-07 PROCEDURE — G8399 PT W/DXA RESULTS DOCUMENT: HCPCS | Performed by: NURSE PRACTITIONER

## 2022-07-07 PROCEDURE — 80048 BASIC METABOLIC PNL TOTAL CA: CPT

## 2022-07-07 RX ORDER — CLINDAMYCIN HYDROCHLORIDE 150 MG/1
450 CAPSULE ORAL 3 TIMES DAILY
Qty: 63 CAPSULE | Refills: 0 | Status: SHIPPED | OUTPATIENT
Start: 2022-07-07 | End: 2022-07-14

## 2022-07-07 ASSESSMENT — PATIENT HEALTH QUESTIONNAIRE - PHQ9
SUM OF ALL RESPONSES TO PHQ QUESTIONS 1-9: 0
SUM OF ALL RESPONSES TO PHQ QUESTIONS 1-9: 0
SUM OF ALL RESPONSES TO PHQ9 QUESTIONS 1 & 2: 0
2. FEELING DOWN, DEPRESSED OR HOPELESS: 0
3. TROUBLE FALLING OR STAYING ASLEEP: 0
7. TROUBLE CONCENTRATING ON THINGS, SUCH AS READING THE NEWSPAPER OR WATCHING TELEVISION: 0
6. FEELING BAD ABOUT YOURSELF - OR THAT YOU ARE A FAILURE OR HAVE LET YOURSELF OR YOUR FAMILY DOWN: 0
8. MOVING OR SPEAKING SO SLOWLY THAT OTHER PEOPLE COULD HAVE NOTICED. OR THE OPPOSITE, BEING SO FIGETY OR RESTLESS THAT YOU HAVE BEEN MOVING AROUND A LOT MORE THAN USUAL: 0
4. FEELING TIRED OR HAVING LITTLE ENERGY: 0
SUM OF ALL RESPONSES TO PHQ QUESTIONS 1-9: 0
SUM OF ALL RESPONSES TO PHQ QUESTIONS 1-9: 0
5. POOR APPETITE OR OVEREATING: 0
9. THOUGHTS THAT YOU WOULD BE BETTER OFF DEAD, OR OF HURTING YOURSELF: 0
1. LITTLE INTEREST OR PLEASURE IN DOING THINGS: 0

## 2022-07-07 ASSESSMENT — ENCOUNTER SYMPTOMS
SHORTNESS OF BREATH: 0
COUGH: 0
NAUSEA: 0

## 2022-07-07 ASSESSMENT — PAIN - FUNCTIONAL ASSESSMENT: PAIN_FUNCTIONAL_ASSESSMENT: 0-10

## 2022-07-07 NOTE — ED PROVIDER NOTES
[facility] Provider Note                   PCP:                Morteza Marie, APRN - MALOU               Age: 69 y.o. Sex: female       ICD-10-CM    1. Abscess of finger of right hand  L02.511        DISPOSITION          MDM   79-year-old female in the ED with complaints of finger pain and swelling. As in HPI. Patient denies all other complaints and declines offer of any further evaluation or management. States in the ED to have finger abscess drained, sent by her primary care provider. Patient is quite well-appearing and appears no distress. Afebrile and hemodynamically stable. She has raised area of fluctuance at the distal right second digit and at the fifth digit. She has full active range of motion the affected digits albeit with some pain. She has no pain with passive range of motion, she has no tenderness over tendon surfaces. No sausagelike finger, not held in passive flexion. There is no extension of redness, no streaking, there is no bleeding or drainage. She is neurovascular intact with Brisk cap refill intact and sensation intact. There is no obvious wound or injury. Differential diagnosis includes, but I have low clinical suspicion of gout flare, RA, abscess, fracture/dislocation, septic joint, osteomyelitis, tenosynovitis, cellulitis. Labs were obtained and are reassuring. I discussed the patient with ED attending, showing photographs of the patient's affected digits, who collaborated on care planning. Feel appropriate for incision and drainage in the ED, feeling imaging, admission, IV antibiotics not indicated at this time. Discussed this with patient and she is agreeable. Incision and drainage performed as in procedure note, patient tolerates well. Was able to express small amount of white, thick exudate from both sites. Obtained wound culture and sent to lab from the index finger. Applied dressings, discussed therapeutic measures.   Will prescribe clindamycin. Advised to follow-up with PCP in 1 day for reevaluation and to return to the ED immediately for any new, worsening, concerning symptoms. Patient is well-hydrated appearing, no distress. Nontoxic-appearing, tolerating oral intake, hemodynamically stable. All findings and plan were discussed with the patient. All questions answered. Discussed with the patient that an unremarkable evaluation in the ED does not preclude the development or presence of a serious or life threatening condition. Patient was instructed to return immediately for any worsening or change in current symptoms, or if symptoms do not continue to improve. I instructed them to follow up with their primary care provider, own specialist, or medical provider that I am recommending for him within the next 2-3 days  The patient acknowledged understanding plan of care and affirmed approval.     Signed by: MERRICK Hampton     This note created using Dragon voice recognition software. Please excuse any accidental errors associated with its use, as note has not been fully proofread and edited. Orders Placed This Encounter   Procedures    CBC with Auto Differential    Basic Metabolic Panel        Kirby Palacios is a 69 y.o. female who presents to the Emergency Department with chief complaint of    Chief Complaint   Patient presents with    Skin Problem      HPI  Pleasant and well-appearing 71-year-old female presents to the emergency department with complaints of finger pain and swelling. States symptoms began approximately 3 weeks ago when she noticed redness and swelling at the right index finger, just proximal to the nailbed. States that she was evaluated in urgent care and that they obtained a culture of the patient's skin and states they had a result of MRSA. States she is not sure if the were able to obtain any exudate for this testing. Patient was treated with doxycycline which states did not change her symptoms. She was seen by her PCP on 6/29 for reevaluation of this. Per chart review was treated with Zyvox and returned to day to her primary care office with complaint of continued pain, swelling and redness of the distal right index finger as well as pain and swelling at the right fifth finger, which per review of chart was noted on 6/29 episode of care. Patient states that her PCP thought there may have been pus in the finger and that the patient had have an incision and drainage, but was not able to perform that procedure in their clinic and recommended the patient go to the emergency department today for this. Patient denies known injury, activity change, fevers or chills, neck pain or stiffness, headaches, nausea or vomiting, night sweats, recent unplanned weight loss, numbness or tingling, radiating pain, difficulty with movement and no other complaint. Patient is alert and orient x4, afebrile, not tachycardic, not tachypneic, not hypoxic though is hypertensive. He is pleasant and conversational, jovial, making conversation with other patients in ED lobby and joking with provider throughout exam    Review of Systems  Constitutional: Negative for fever. Negative for appetite change, chills, diaphoresis and unexpected weight change.     HENT: Negative     Eyes: Negative   Respiratory: Negative  Cardiovascular: Negative  Musculoskeletal: In HPI  Skin: As in HPI     Allergic/Immunologic: Negative  Neurological: Negative                          Past Medical History:   Diagnosis Date    Ankle fracture 2014    Ankle osteomyelitis, left ([address] 2014    Breast cancer ([address]     Chemotherapy-induced neuropathy ([address]     Chronic anemia     Depression     Dry eye syndrome of both eyes     Dyslipidemia     Essential hypertension     Fracture of right patella 2013    History of left breast cancer 2001    with mastectomy    Left leg weakness 9/8/2020    Non-ischemic cardiomyopathy ([address] 2009    EF 15% 2009, Cath-minimal CAD. 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 Pod 2 ORIF doing well. C/o moderate pain. Controlled on PO meds.   Ready to go home    Type 2 diabetes mellitus, controlled ([address]     Vitamin B12 deficiency         Past Surgical History:   Procedure Laterality Date    BREAST BIOPSY      left breast biopsy    BREAST BIOPSY  1968    left cyst removed    BREAST LUMPECTOMY Left 2001    BREAST REDUCTION SURGERY      Rt breast    CARDIAC CATHETERIZATION  2009    CARPAL TUNNEL RELEASE Bilateral     CHOLECYSTECTOMY, OPEN  1981    COLONOSCOPY  07/2014    CYST REMOVAL Right 11/2020    hand    HEENT      Sinus Surgery    HEENT      RK procedure bilateral eyes    HX OPEN REDUCTION INTERNAL FIXATION Left 2013    Ankle    HX OPEN REDUCTION INTERNAL FIXATION  08/2016    [place]    HYSTERECTOMY (CERVIX STATUS UNKNOWN)      LUMBAR LAMINECTOMY  1992    MASTECTOMY Left 2002    With Reconstruction    ORTHOPEDIC SURGERY Right 08/2013    Patellar fracture repair    OTHER SURGICAL HISTORY      [place]hetan Cath Placed and Removed    OVARY REMOVAL      Unilateral    TONSILLECTOMY      TONSILLECTOMY      TRANSPLANT  2002    Stem Cell Transplant    UVULOPALATOPHARYGOPLASTY  2004        Family History   Problem Relation Age of Onset    Cancer Mother     Diabetes Mother     Heart Disease Mother         MI at age 66    Heart Failure Mother         age 58   [employer] Breast Cancer Neg Hx     Stroke Sister     Cancer Brother     Heart Disease Brother     Stomach Cancer Neg Hx     Heart Disease Father         MI age 55           Social Connections:     Frequency of Communication with Friends and Family: Not on file    Frequency of Social Gatherings with Friends and Family: Not on file    Attends Sikhism Services: Not on file    Active Member of Clubs or Organizations: Not on file    Attends Club or Organization Meetings: Not on file    Marital Status: Not on file        Allergies   Allergen Reactions    Oxycodone Itching    Eucalyptus Oil Hives and Other (See Comments)     Burns mouth    Ezetimibe Myalgia    Morphine Other (See Comments)     Feels crazy  Other reaction(s): Hallucinations    Penicillins Hives    Pineapple Hives and Other (See Comments)     Burns mouth    Vancomycin Other (See Comments)     Kidney function worsened    Sulfa Antibiotics Nausea Only and Rash        Vitals signs and nursing note reviewed. Patient Vitals for the past 4 hrs:   Temp Pulse Resp BP SpO2   07/07/22 1654 98.5 °F (36.9 °C) 77 18 (!) 210/104 99 %          Physical Exam   Constitutional: Oriented to person, place, and time. Appears well-developed and well-nourished. No distress. HENT:    Head: Normocephalic and atraumatic   Right Ear: External ear normal.    Left Ear: External ear normal.     Nose: Nose normal.   Mouth/Throat: Mouth normal.    Eyes: Conjunctivae are normal.   Neck: Supple. No tracheal deviation. Cardiovascular: Normal rate, intact distal pulses. Brisk capillary refill intact, less than 2 seconds. Regular rhythm present. No pitting edema. Pulmonary/Chest: Lungs are clear & equal bilaterally. No adventitious sounds. No respiratory distress. Abdominal: Soft. There is no tenderness. Musculoskeletal: No obvious deformity. There is swelling at the distal right second digit and at the right fifth digit. There is fluctuance at the sites. There is no tenderness over flexor or extensor tendon surfaces. There is no other swelling in fingers or not sausagelike. Not held in passive flexion. There is no pain with passive range of motion and she has full active range of motion of the affected digits and hand.   There is no streaking or other tenderness, no loss of sensation. Cap refill less than 2 seconds. Neurological: Alert and oriented to person, place, and time. No numbness/tingling. No loss of sensation. Positive PMS ×4. GCS= 15. Skin: Skin is warm and dry. Capillary refill takes less than 2 seconds. No abrasion, no lesion, no petechiae and no rash noted. Not diaphoretic. No cyanosis, erythema, or pallor. Psychiatric: Normal mood and affect. Behavior is normal.    Nursing note and vitals reviewed. Incision/Drainage    Date/Time: 7/7/2022 10:24 PM  Performed by: Delwyn Siemens, APRN - CNP  Authorized by: Dave La MD     Consent:     Consent obtained:  Verbal    Consent given by:  Patient    Risks discussed:  Bleeding, incomplete drainage, pain, infection and damage to other organs    Alternatives discussed:  Alternative treatment  Location:     Type:  Abscess    Size:  1    Location: Right 2nd finger. Pre-procedure details:     Skin preparation:  Betadine  Anesthesia (see MAR for exact dosages): Anesthesia method:  Local infiltration    Local anesthetic:  Lidocaine 1% w/o epi  Procedure type:     Complexity:  Simple  Procedure details:     Needle aspiration: no      Incision types:  Single straight    Scalpel blade:  11    Wound management:  Probed and deloculated, extensive cleaning and debrided    Drainage:  Bloody and purulent    Drainage amount:  Scant    Packing materials:  None  Post-procedure details:     Patient tolerance of procedure:   Tolerated well, no immediate complications  Incision/Drainage    Date/Time: 7/7/2022 10:25 PM  Performed by: Delwyn Siemens, APRN - CNP  Authorized by: Dave La MD     Consent:     Consent obtained:  Verbal    Consent given by:  Patient    Risks discussed:  Bleeding, infection, pain, incomplete drainage and damage to other organs    Alternatives discussed:  Alternative treatment  Location:     Type:  Abscess    Size:  1    Location: Right fifth digit. Pre-procedure details:     Skin preparation:  Betadine  Anesthesia (see MAR for exact dosages): Anesthesia method:  Local infiltration    Local anesthetic:  Lidocaine 1% w/o epi  Procedure type:     Complexity:  Simple  Procedure details:     Needle aspiration: no      Incision types:  Single straight    Scalpel blade:  11    Wound management:  Probed and deloculated and debrided    Drainage:  Bloody    Drainage amount:  Scant    Wound treatment:  Wound left open    Packing materials:  None  Post-procedure details:     Patient tolerance of procedure: Tolerated well, no immediate complications          Labs Reviewed   CBC WITH AUTO DIFFERENTIAL - Abnormal; Notable for the following components:       Result Value    RBC 3.89 (*)     Hemoglobin 11.5 (*)     Hematocrit 34.8 (*)     All other components within normal limits   BASIC METABOLIC PANEL - Abnormal; Notable for the following components:    Chloride 108 (*)     Glucose 102 (*)     GFR Non- 57 (*)     All other components within normal limits   CULTURE, WOUND        No orders to display                          Voice dictation software was used during the making of this note. This software is not perfect and grammatical and other typographical errors may be present. This note has not been completely proofread for errors.      Abner Parks, LIU - CNP  07/07/22 7577

## 2022-07-07 NOTE — ED TRIAGE NOTES
Patient ambulatory to triage with mask in place. Patient reports red swollen knuckles to right hand and elbow. Pt reports they were swabbed at Urgent Care and tested positive for MRSA. Pt has been put on ABX with no relief.

## 2022-07-07 NOTE — PROGRESS NOTES
[facility]  Office Visit Note    Subjective:  Chief Complaint   Patient presents with    Finger Injury     1 week follow up       Patient ID: Korey Jim is a 69 y.o. female presenting to the office for the above. 69year old female for follow up. HPI 6/29/22:    She was seen at urgent care about 10 days ago (records not visible to me) with swelling of right 1st DIP joint (swollen, erythema, heat). States a sample was taken and sent for culture, and she received a call saying it was MRSA. Treated with doxycycline. Since then, the 1st DIP has not improved. She has also developed swelling and pain of right 5th PIP joint, and right elbow. No fever/chills. She cannot take penicillins or Bactrim. 7/7/22:   She was treated with round of Zyvox. Her symptoms are no better. Continues to have erythema, heat, edema of right 1st DIP, right 5th PIP, and less severe in right elbow. There appears to be a pus pocket at the right 1st DIP. There is warmth of right arm. No extending red streaks. She denies fever/chills, but does generally feel unwell. She brings a copy of the culture from urgent care, which shows MRSA susceptible to Zyvox. However, her symptoms have worsened after a week on Zyvox. --Advise that she proceed to ER for evaluation, and she agrees. Report called to ER charge nurse. History:   Allergies   Allergen Reactions    Oxycodone Itching    Eucalyptus Oil Hives and Other (See Comments)     Burns mouth    Ezetimibe Myalgia    Morphine Other (See Comments)     Feels crazy  Other reaction(s): Hallucinations    Penicillins Hives    Pineapple Hives and Other (See Comments)     Burns mouth    Vancomycin Other (See Comments)     Kidney function worsened    Sulfa Antibiotics Nausea Only and Rash       Past Medical History:   Diagnosis Date    Ankle fracture 2014    Ankle osteomyelitis, left ([address] 2014    Breast cancer (HCC)     Chemotherapy-induced neuropathy ([address]     Chronic anemia     Depression     Dry eye syndrome of both eyes     Dyslipidemia     Essential hypertension     Fracture of right patella 2013    History of left breast cancer 2001    with mastectomy    Left leg weakness 9/8/2020    Non-ischemic cardiomyopathy ([address] 2009    EF 15% 2009, Cath-minimal CAD. 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 Pod 2 ORIF doing well. C/o moderate pain. Controlled on PO meds.   Ready to go home    Type 2 diabetes mellitus, controlled ([address]     Vitamin B12 deficiency        Past Surgical History:   Procedure Laterality Date    BREAST BIOPSY      left breast biopsy    BREAST BIOPSY  1968    left cyst removed    BREAST LUMPECTOMY Left 2001    BREAST REDUCTION SURGERY      Rt breast    CARDIAC CATHETERIZATION  2009    CARPAL TUNNEL RELEASE Bilateral     CHOLECYSTECTOMY, OPEN  1981    COLONOSCOPY  07/2014    CYST REMOVAL Right 11/2020    hand    HEENT      Sinus Surgery    HEENT      RK procedure bilateral eyes    HX OPEN REDUCTION INTERNAL FIXATION Left 2013    Ankle    HX OPEN REDUCTION INTERNAL FIXATION  08/2016    [place]    HYSTERECTOMY (CERVIX STATUS UNKNOWN)      LUMBAR LAMINECTOMY  1992    MASTECTOMY Left 2002    With Reconstruction    ORTHOPEDIC SURGERY Right 08/2013    Patellar fracture repair    OTHER SURGICAL HISTORY      [place] Cath Placed and Removed    OVARY REMOVAL      Unilateral    TONSILLECTOMY      TONSILLECTOMY      TRANSPLANT  2002    Stem Cell Transplant    UVULOPALATOPHARYGOPLASTY  2004       Family History   Problem Relation Age of Onset    Cancer Mother     Diabetes Mother     Heart Disease Mother         MI at age 66    Heart Failure Mother age 58    Breast Cancer Neg Hx     Stroke Sister     Cancer Brother     Heart Disease Brother     Stomach Cancer Neg Hx     Heart Disease Father         MI age 55       Social History     Tobacco Use   Smoking Status Never Smoker   Smokeless Tobacco Never Used       Social History     Substance and Sexual Activity   Alcohol Use No       Current Outpatient Medications   Medication Sig Dispense Refill    linezolid (ZYVOX) 600 MG tablet Take 1 tablet by mouth 2 times daily for 10 days 20 tablet 0    amitriptyline (ELAVIL) 10 mg tablet Take 1 tablet by mouth nightly 90 tablet 1    furosemide (LASIX) 40 MG tablet Take 1 tablet by mouth every other day 45 tablet 1    gabapentin (NEURONTIN) 600 MG tablet Take 1 tablet by mouth 2 times daily for 360 days.  180 tablet 1    lisinopril (PRINIVIL;ZESTRIL) 5 MG tablet Take 1 tablet by mouth daily 90 tablet 1    cyanocobalamin 1000 MCG/ML injection Inject 1 mL into the muscle every 7 days 3 mL 3    promethazine (PHENERGAN) 25 MG tablet Take 1 tablet by mouth daily as needed for Nausea 20 tablet 1    metFORMIN (GLUCOPHAGE-XR) 500 mg extended release tablet Take 2 tablets by mouth 2 times daily 360 tablet 1    calcium carbonate (TUMS) 500 MG chewable tablet Take 1 tablet by mouth daily      dextromethorphan-guaiFENesin (MUCINEX DM)  MG per extended release tablet Take 1 tablet by mouth every 12 hours as needed      acetaminophen (TYLENOL) 500 MG tablet Take by mouth every 6 hours as needed      cetirizine (ZYRTEC) 10 MG tablet Take 10 mg by mouth daily as needed      Cholecalciferol 50 MCG (2000 UT) CAPS Take 2,000 Units by mouth daily      metoprolol succinate (TOPROL XL) 50 MG extended release tablet Take 50 mg by mouth daily      potassium chloride (KLOR-CON M) 20 MEQ extended release tablet TAKE 1 TABLET EVERY MORNING AND 2 TABLETS EVERY EVENING      valACYclovir (VALTREX) 1 g tablet 2 tablets 2 times a day for 1 day only with mouth sore occurs No current facility-administered medications for this visit. Review of Systems:  Review of Systems   Constitutional: Negative for chills and fever. Respiratory: Negative for cough and shortness of breath. Gastrointestinal: Negative for nausea. Musculoskeletal: Positive for arthralgias and joint swelling. Skin: Negative for pallor and rash. Psychiatric/Behavioral: Negative for dysphoric mood. The patient is not nervous/anxious. See HPI for other pertinent positives and negatives. Objective:  Vitals:    07/07/22 1517 07/07/22 1533   BP: (!) 195/82 (!) 192/81   Site: Right Upper Arm Right Upper Arm   Position: Sitting Sitting   Cuff Size: Large Adult Large Adult   Pulse: 68 71   Temp: 98.1 °F (36.7 °C)    TempSrc: Temporal    SpO2: 98%    Weight: 154 lb (69.9 kg)    Height: 5' 4\" (1.626 m)        Body mass index is 26.43 kg/m². Physical Exam  Vitals and nursing note reviewed. Constitutional:       General: She is not in acute distress. Appearance: She is not diaphoretic. Eyes:      General: No scleral icterus. Right eye: No discharge. Left eye: No discharge. Cardiovascular:      Rate and Rhythm: Normal rate. Pulses: Normal pulses. Pulmonary:      Effort: Pulmonary effort is normal. No respiratory distress. Musculoskeletal:      Comments: Swelling, erythema, and tenderness of right 1st DIP, right 5th PIP, and right elbow. No open wounds or discharge. Skin:     General: Skin is warm and dry. Neurological:      Mental Status: She is alert and oriented to person, place, and time.    Psychiatric:         Mood and Affect: Mood normal.         Speech: Speech normal.         Behavior: Behavior normal.                  Lab Results   Component Value Date    WBC 6.7 06/29/2022    HGB 10.8 (L) 06/29/2022    HCT 34.5 (L) 06/29/2022    MCV 92.0 06/29/2022     06/29/2022   ,   Lab Results   Component Value Date/Time     06/29/2022 03:36 PM PHQ 9 Score -        Assessment/Plan:      Health Maintenance Due   Topic Date Due    Shingles vaccine (2 of 3) 02/26/2015    COVID-19 Vaccine (3 - Pfizer risk 4-dose series) 03/13/2021          Bobby Mckeon was seen today for finger injury. Diagnoses and all orders for this visit:    MRSA infection    Swelling of finger joint of right hand    Swelling of right elbow        Patient is proceeding to ER. Encouraged to contact office with any concerns prior to next visit. Return if symptoms worsen or fail to improve, for Next scheduled visit.     Carlos Blake, APRN - CNP

## 2022-07-10 LAB
BACTERIA SPEC CULT: NORMAL
GRAM STN SPEC: NORMAL
GRAM STN SPEC: NORMAL
SERVICE CMNT-IMP: NORMAL

## 2022-07-12 ENCOUNTER — HOSPITAL ENCOUNTER (EMERGENCY)
Age: 77
Discharge: HOME | End: 2022-07-12
Attending: EMERGENCY MEDICINE
Payer: MEDICARE

## 2022-07-12 ENCOUNTER — HOSPITAL ENCOUNTER (EMERGENCY)
Dept: GENERAL RADIOLOGY | Age: 77
Discharge: HOME | End: 2022-07-15
Payer: MEDICARE

## 2022-07-12 VITALS
DIASTOLIC BLOOD PRESSURE: 83 MMHG | SYSTOLIC BLOOD PRESSURE: 164 MMHG | HEART RATE: 72 BPM | RESPIRATION RATE: 16 BRPM | HEIGHT: 66 IN | TEMPERATURE: 97.7 F | BODY MASS INDEX: 24.11 KG/M2 | WEIGHT: 150 LBS | OXYGEN SATURATION: 98 %

## 2022-07-12 DIAGNOSIS — L03.011 CELLULITIS OF RIGHT INDEX FINGER: Primary | ICD-10-CM

## 2022-07-12 LAB
ALBUMIN SERPL-MCNC: 3.4 G/DL (ref 3.2–4.6)
ALBUMIN/GLOB SERPL: 0.7 {RATIO} (ref 1.2–3.5)
ALP SERPL-CCNC: 78 U/L (ref 50–136)
ALT SERPL-CCNC: 17 U/L (ref 12–65)
ANION GAP SERPL CALC-SCNC: 12 MMOL/L (ref 7–16)
AST SERPL-CCNC: 14 U/L (ref 15–37)
BILIRUB SERPL-MCNC: 0.3 MG/DL (ref 0.2–1.1)
BUN SERPL-MCNC: 15 MG/DL (ref 8–23)
CALCIUM SERPL-MCNC: 10.3 MG/DL (ref 8.3–10.4)
CHLORIDE SERPL-SCNC: 104 MMOL/L (ref 98–107)
CO2 SERPL-SCNC: 22 MMOL/L (ref 21–32)
CREAT SERPL-MCNC: 1.1 MG/DL (ref 0.6–1)
ERYTHROCYTE [DISTWIDTH] IN BLOOD BY AUTOMATED COUNT: 12.7 % (ref 11.9–14.6)
GLOBULIN SER CALC-MCNC: 4.9 G/DL (ref 2.3–3.5)
GLUCOSE SERPL-MCNC: 134 MG/DL (ref 65–100)
HCT VFR BLD AUTO: 34.2 % (ref 35.8–46.3)
HGB BLD-MCNC: 11.4 G/DL (ref 11.7–15.4)
LACTATE SERPL-SCNC: 3.4 MMOL/L (ref 0.4–2)
MCH RBC QN AUTO: 29.4 PG (ref 26.1–32.9)
MCHC RBC AUTO-ENTMCNC: 33.3 G/DL (ref 31.4–35)
MCV RBC AUTO: 88.1 FL (ref 79.6–97.8)
NRBC # BLD: 0 K/UL (ref 0–0.2)
PLATELET # BLD AUTO: 216 K/UL (ref 150–450)
PMV BLD AUTO: 10 FL (ref 9.4–12.3)
POTASSIUM SERPL-SCNC: 3.4 MMOL/L (ref 3.5–5.1)
PROT SERPL-MCNC: 8.3 G/DL (ref 6.3–8.2)
RBC # BLD AUTO: 3.88 M/UL (ref 4.05–5.2)
SODIUM SERPL-SCNC: 138 MMOL/L (ref 136–145)
WBC # BLD AUTO: 7.9 K/UL (ref 4.3–11.1)

## 2022-07-12 PROCEDURE — 6370000000 HC RX 637 (ALT 250 FOR IP): Performed by: EMERGENCY MEDICINE

## 2022-07-12 PROCEDURE — 80053 COMPREHEN METABOLIC PANEL: CPT

## 2022-07-12 PROCEDURE — 85027 COMPLETE CBC AUTOMATED: CPT

## 2022-07-12 PROCEDURE — 73130 X-RAY EXAM OF HAND: CPT

## 2022-07-12 PROCEDURE — 83605 ASSAY OF LACTIC ACID: CPT

## 2022-07-12 PROCEDURE — 99284 EMERGENCY DEPT VISIT MOD MDM: CPT

## 2022-07-12 RX ORDER — HYDROCODONE BITARTRATE AND ACETAMINOPHEN 5; 325 MG/1; MG/1
1 TABLET ORAL EVERY 6 HOURS PRN
Qty: 20 TABLET | Refills: 0 | Status: SHIPPED | OUTPATIENT
Start: 2022-07-12 | End: 2022-07-15

## 2022-07-12 RX ORDER — HYDROCODONE BITARTRATE AND ACETAMINOPHEN 5; 325 MG/1; MG/1
1 TABLET ORAL ONCE
Status: COMPLETED | OUTPATIENT
Start: 2022-07-12 | End: 2022-07-12

## 2022-07-12 RX ADMIN — HYDROCODONE BITARTRATE AND ACETAMINOPHEN 1 TABLET: 5; 325 TABLET ORAL at 19:43

## 2022-07-12 ASSESSMENT — PAIN SCALES - GENERAL
PAINLEVEL_OUTOF10: 10
PAINLEVEL_OUTOF10: 10

## 2022-07-12 NOTE — ED PROVIDER NOTES
[facility] Provider Note                   PCP:                LIU Pretty CNP               Age: 69 y.o. Sex: female     No diagnosis found. DISPOSITION         MDM  Number of Diagnoses or Management Options  Diagnosis management comments: Patient is on clindamycin and has a wound culture which shows MRSA sensitive to clinda. Patient has been encouraged to continue clindamycin. We will add Lortab for pain control since she has no prescription pain medication and she is taking that without issue in the past.  Patient has an appointment with hand surgery on Monday and she has been encouraged to keep that appointment. Patient was instructed return for any worsening symptoms or change in symptomatology. Amount and/or Complexity of Data Reviewed  Clinical lab tests: reviewed and ordered  Tests in the radiology section of CPT®: ordered and reviewed  Review and summarize past medical records: yes  Independent visualization of images, tracings, or specimens: yes    Risk of Complications, Morbidity, and/or Mortality  Presenting problems: moderate  Management options: moderate         Orders Placed This Encounter   Procedures    XR HAND RIGHT (MIN 3 VIEWS)    CBC    Comprehensive Metabolic Panel    Lactic Acid    Insert peripheral IV        Emmanuel Birmingham is a 69 y.o. female who presents to the Emergency Department with chief complaint of    Chief Complaint   Patient presents with    Hand Pain      Patient is a 71-year-old female with a history of osteoarthritis hypertension hyperlipidemia depression chronic anemia who presents with a wound/abscess involving her right index finger dorsal aspect distal phalanx as well as a wound over her fifth dorsal PIP joint. Patient states she has had this for at least 6 weeks and approximate 3 weeks ago she was seen in urgent care in which they did performed a wound culture and started her on Zyvox.   Patient was seen here 7/7/2022 and had the second digit and fifth digit abscess I&D'd and was sent home on Cleocin. Patient states now she has a swelling over the distal phalanx dorsal aspect of her fourth finger. Patient denies any fevers or chills denies any cold cough congestion or shortness of breath. The history is provided by the patient. Abscess  Location:  Finger  Finger abscess location:  R index finger and R little finger  Abscess quality: fluctuance    Red streaking: no    Duration:  6 weeks  Progression:  Worsening  Chronicity:  Recurrent  Context: not diabetes and not immunosuppression    Relieved by:  Nothing  Worsened by:  Nothing  Ineffective treatments:  Oral antibiotics and draining/squeezing  Associated symptoms: no anorexia, no fatigue, no fever and no headaches        All other systems reviewed and are negative. Review of Systems   Constitutional: Negative for fatigue and fever. Gastrointestinal: Negative for anorexia. Skin: Positive for wound (Right second and fifth and fourth finger). Neurological: Negative for headaches. All other systems reviewed and are negative. Past Medical History:   Diagnosis Date    Ankle fracture 2014    Ankle osteomyelitis, left ([address] 2014    Breast cancer ([address]     Chemotherapy-induced neuropathy (HCC)     Chronic anemia     Depression     Dry eye syndrome of both eyes     Dyslipidemia     Essential hypertension     Fracture of right patella 2013    History of left breast cancer 2001    with mastectomy    Left leg weakness 9/8/2020    Non-ischemic cardiomyopathy ([address] 2009    EF 15% 2009, Cath-minimal CAD. 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 Pod 2 ORIF doing well.   C/o moderate pain.  Controlled on PO meds.   Ready to go home    Type 2 diabetes mellitus, controlled ([address]     Vitamin B12 deficiency         Past Surgical History:   Procedure Laterality Date    BREAST BIOPSY      left breast biopsy    BREAST BIOPSY  1968    left cyst removed    BREAST LUMPECTOMY Left 2001    BREAST REDUCTION SURGERY      Rt breast    CARDIAC CATHETERIZATION  2009    CARPAL TUNNEL RELEASE Bilateral     CHOLECYSTECTOMY, OPEN  1981    COLONOSCOPY  07/2014    CYST REMOVAL Right 11/2020    hand    HEENT      Sinus Surgery    HEENT      RK procedure bilateral eyes    HX OPEN REDUCTION INTERNAL FIXATION Left 2013    Ankle    HX OPEN REDUCTION INTERNAL FIXATION  08/2016    [place]    HYSTERECTOMY (CERVIX STATUS UNKNOWN)      LUMBAR LAMINECTOMY  1992    MASTECTOMY Left 2002    With Reconstruction    ORTHOPEDIC SURGERY Right 08/2013    Patellar fracture repair    OTHER SURGICAL HISTORY      [place] Cath Placed and Removed    OVARY REMOVAL      Unilateral    TONSILLECTOMY      TONSILLECTOMY      TRANSPLANT  2002    Stem Cell Transplant    UVULOPALATOPHARYGOPLASTY  2004        Family History   Problem Relation Age of Onset    Cancer Mother     Diabetes Mother     Heart Disease Mother         MI at age 66    Heart Failure Mother         age 58   [facility] Breast Cancer Neg Hx     Stroke Sister     Cancer Brother     Heart Disease Brother     Stomach Cancer Neg Hx     Heart Disease Father         MI age 55           Social Connections:     Frequency of Communication with Friends and Family: Not on file    Frequency of Social Gatherings with Friends and Family: Not on file    Attends Buddhist Services: Not on file    Active Member of Clubs or Organizations: Not on file    Attends Club or Organization Meetings: Not on file    Marital Status: Not on file        Allergies   Allergen Reactions    Oxycodone Itching    Eucalyptus Oil Hives and Other (See Comments)     Burns mouth    Ezetimibe Myalgia    Morphine Other (See Comments)     Feels crazy  Other reaction(s): Hallucinations    Penicillins Hives    Pineapple Hives and Other (See Comments)     Burns mouth    Vancomycin Other (See Comments)     Kidney function worsened    Sulfa Antibiotics Nausea Only and Rash        Vitals signs and nursing note reviewed. No data found. Physical Exam  Vitals and nursing note reviewed. Constitutional:       Appearance: Normal appearance. HENT:      Head: Normocephalic and atraumatic. Nose: Nose normal.      Mouth/Throat:      Mouth: Mucous membranes are moist.      Pharynx: Oropharynx is clear. Eyes:      Extraocular Movements: Extraocular movements intact. Conjunctiva/sclera: Conjunctivae normal.      Pupils: Pupils are equal, round, and reactive to light. Cardiovascular:      Rate and Rhythm: Normal rate. Pulses: Normal pulses. Pulmonary:      Effort: Pulmonary effort is normal.   Abdominal:      General: Abdomen is flat. Bowel sounds are normal.      Palpations: Abdomen is soft. Musculoskeletal:         General: Normal range of motion. Cervical back: Normal range of motion and neck supple. Skin:     General: Skin is warm and dry. Capillary Refill: Capillary refill takes less than 2 seconds. Comments: Right hand: Right index finger distal aspect dorsal draining wound  Right hand: Right 4th finger dorsal aspect distal phalanx indurated area no fluctuance  Right hand: Right fifth finger and dorsal aspect over PIP indurated area no fluctuance   Neurological:      General: No focal deficit present. Mental Status: She is alert and oriented to person, place, and time. Mental status is at baseline. Psychiatric:         Mood and Affect: Mood normal.         Behavior: Behavior normal.         Thought Content:  Thought content normal.         Judgment: Judgment normal.          Procedures        Labs Reviewed   CBC - Abnormal; Notable for the following

## 2022-07-12 NOTE — ED TRIAGE NOTES
Patient arrives ambulatory to triage with mask in place. Patient reports on 7/7 she was in the ER and had abscesses to fingers on right hand opened and drained. Patient reports hand is now swelling and having increasing pain. Reports she has been taking cleocin as prescribed.

## 2022-07-12 NOTE — ED TRIAGE NOTES
52-year-old female presents today for reevaluation of right hand pain. She has pain and swelling noted to the right index finger and right fourth digit. This has been ongoing for several weeks now. She was seen 5 days ago here and had labs performed that were reassuring and incision and drainage performed. She states that since then she has had increasing pain and swelling of the fingers and the pain is now radiating up the proximal hand and forearm. She denies any associated swelling. No fevers. She does have history of MRSA in the past.  Apparently when symptoms first began she was seen at urgent care and wound culture was obtained, she showed me the results and it shows MRSA that was susceptible to clindamycin. A wound culture was performed here on 7/7 which showed no growth of bacteria. Patient does have underlying type 2 diabetes, states her blood sugars typically run in the 100s. VSS. Erythema, swelling and abscess noted to the left index finger, swelling and redness without fluctuance noted to the right 4th digit. No red streaking proximally. Patient evaluated initially in triage. Rapid Medical Evaluation was conducted and necessary orders have been placed. I have performed a medical screening exam.  Care will now be transferred to the provider in the back of the emergency department.   TERESA Schmitt 3:42 PM

## 2022-07-18 ENCOUNTER — OFFICE VISIT (OUTPATIENT)
Dept: ORTHOPEDIC SURGERY | Age: 77
End: 2022-07-18
Payer: MEDICARE

## 2022-07-18 ENCOUNTER — NURSE TRIAGE (OUTPATIENT)
Dept: OTHER | Facility: CLINIC | Age: 77
End: 2022-07-18

## 2022-07-18 VITALS — HEIGHT: 66 IN | WEIGHT: 150 LBS | BODY MASS INDEX: 24.11 KG/M2

## 2022-07-18 DIAGNOSIS — M10.9 ACUTE GOUT OF RIGHT HAND, UNSPECIFIED CAUSE: Primary | ICD-10-CM

## 2022-07-18 PROCEDURE — 99205 OFFICE O/P NEW HI 60 MIN: CPT | Performed by: ORTHOPAEDIC SURGERY

## 2022-07-18 PROCEDURE — 1123F ACP DISCUSS/DSCN MKR DOCD: CPT | Performed by: ORTHOPAEDIC SURGERY

## 2022-07-18 RX ORDER — METHYLPREDNISOLONE 4 MG/1
TABLET ORAL
Qty: 1 KIT | Refills: 0 | Status: SHIPPED | OUTPATIENT
Start: 2022-07-18 | End: 2022-07-28 | Stop reason: SDUPTHER

## 2022-07-18 NOTE — TELEPHONE ENCOUNTER
Received call from [facility] at [facility] with [facility]. Subjective: Caller states \"Gout in b/l hands\"     Current Symptoms: Pt saw Ortho doc this morning, dx with gout,advised to see PCP. No triage indicated. Pt states she has seen PCP for this in the past as well. Patient connected with Chen at [facility] for appt scheduling. Attention Provider: Thank you for allowing me to participate in the care of your patient. The patient was connected to triage in response to information provided to the ECC/PSC. Please do not respond through this encounter as the response is not directed to a shared pool.       Reason for Disposition   Caller has already spoken with the PCP (or office), and has no further questions     Saw ortho doc this morning    Protocols used: No Contact or Duplicate Contact Call-ADULT-[place]

## 2022-07-18 NOTE — PROGRESS NOTES
[facility]opa[facility] Hand Clinic Note    Name: Marcos Khan  YOB: 1945  Gender: female  MRN: [id number]      CC: Patient referred for evaluation of upper extremity pain    HPI: Marcos Khan is a 69 y.o. female with a chief complaint of approximately 6-week history of redness and swelling at the right index finger. She has had severe pain, and has significant difficulty using the finger. She went to urgent care when this started, and states that they swabbed her skin, and told her she had a MRSA infection. She was placed on antibiotics for this, but it did not improve the swelling or redness in the index finger. She then developed swelling and redness in the right small finger as well, was seen by her PCP who recommended that she present to the emergency department. The emergency department performed incision and drainage of both the right index distal interphalangeal joint, and right small finger proximal interphalangeal joint. She said that a white material was drained out of these areas, and they obtain cultures as well. She has been on several antibiotics, including linezolid and Cleocin, but has not had any improvement in the swelling or pain in her hand, and she is now beginning to have some pain in the wrist as well. She does see a rheumatologist but has no knowledge of a personal history of gout. She has not had any fevers or chills. .      ROS/Meds/PSH/PMH/FH/SH: I personally reviewed the patients standard intake form. Pertinents are discussed in the HPI    Physical Examination:    Musculoskeletal Exam:  Examination on the right upper extremity demonstrates cap refill < 5 seconds in all fingers, there is swelling, mild erythema, and palpable deposit subcutaneously at the right index distal interphalangeal joint. There is no fluctuance here, the deposits have the appearance of gouty tophi. The erythema is limited to the dorsal aspect of the DIP.  There is no fusiform swelling, and no tenderness over the flexor tendon sheath. She is tender to palpation at the index distal interphalangeal joint as well as the small finger proximal interphalangeal joint. There is mild soft tissue swelling at the proximal interphalangeal joint of the small finger, but no warmth or erythema. She has tenderness to palpation and pain with range of motion of the wrist, no but warmth, swelling, or erythema. Imaging / Electrodiagnostic Tests:     I independently reviewed and interpreted radiographs of the right hand from the emergency department. She has severe degenerative changes at the index finger distal interphalangeal joint, with joint space narrowing, osteophyte formation and periarticular calcification. She also has moderate to severe degenerative changes at the middle ring and small distal interphalangeal joints. There is severe degenerative changes at the middle finger metacarpophalangeal joint with joint space narrowing and periarticular calcifications. She also has calcification of the TFCC and periarticular calcifications at the radiocarpal joint, consistent with pseudogout. Assessment:     ICD-10-CM    1. Acute gout of right hand, unspecified cause  M10.9 methylPREDNISolone (MEDROL DOSEPACK) 4 MG tablet          Plan:   I reviewed the patient's records from her emergency department visits, as well as her laboratory data. White Blood cell counts from 6/29 and 7/7 were normal.  Uric acid level from 6/29 was elevated at 8.4 mg/dL, CRP is 1.9, ESR is normal.  Wound cultures from 7 7 demonstrated no white blood cells and no organisms to date. 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 I have given her a prescription for a Medrol dose pack.   Patient already has a rheumatologist recommended that she schedule appointment with him soon as possible for evaluation for gout acute as well as prophylactic medications. She will follow up as needed. Patient voiced accordance and understanding of the information provided and the formulated plan. All questions were answered to the patient's satisfaction during the encounter.       Edmar Aguilar MD  Orthopaedic Surgery  07/18/22  10:18 AM

## 2022-07-18 NOTE — TELEPHONE ENCOUNTER
Spoke with pt and she stated that she just started the steroid karey today and she also scheduled an appointment for next Thursday

## 2022-07-28 ENCOUNTER — OFFICE VISIT (OUTPATIENT)
Dept: INTERNAL MEDICINE CLINIC | Facility: CLINIC | Age: 77
End: 2022-07-28
Payer: MEDICARE

## 2022-07-28 VITALS
HEIGHT: 65 IN | BODY MASS INDEX: 24.66 KG/M2 | DIASTOLIC BLOOD PRESSURE: 78 MMHG | WEIGHT: 148 LBS | TEMPERATURE: 97.2 F | HEART RATE: 61 BPM | SYSTOLIC BLOOD PRESSURE: 193 MMHG | OXYGEN SATURATION: 96 %

## 2022-07-28 DIAGNOSIS — R30.0 DYSURIA: ICD-10-CM

## 2022-07-28 DIAGNOSIS — M1A.0410 IDIOPATHIC CHRONIC GOUT OF RIGHT HAND WITHOUT TOPHUS: Primary | ICD-10-CM

## 2022-07-28 DIAGNOSIS — R11.0 NAUSEA: ICD-10-CM

## 2022-07-28 LAB
BILIRUBIN, URINE, POC: NEGATIVE
BLOOD URINE, POC: ABNORMAL
GLUCOSE URINE, POC: NEGATIVE
KETONES, URINE, POC: NEGATIVE
LEUKOCYTE ESTERASE, URINE, POC: ABNORMAL
NITRITE, URINE, POC: NEGATIVE
PH, URINE, POC: 5.5 (ref 4.6–8)
PROTEIN,URINE, POC: NEGATIVE
SPECIFIC GRAVITY, URINE, POC: 1.02 (ref 1–1.03)
URINALYSIS CLARITY, POC: CLEAR
URINALYSIS COLOR, POC: ABNORMAL
UROBILINOGEN, POC: 0.2

## 2022-07-28 PROCEDURE — 1036F TOBACCO NON-USER: CPT | Performed by: NURSE PRACTITIONER

## 2022-07-28 PROCEDURE — 1090F PRES/ABSN URINE INCON ASSESS: CPT | Performed by: NURSE PRACTITIONER

## 2022-07-28 PROCEDURE — G8427 DOCREV CUR MEDS BY ELIG CLIN: HCPCS | Performed by: NURSE PRACTITIONER

## 2022-07-28 PROCEDURE — G8399 PT W/DXA RESULTS DOCUMENT: HCPCS | Performed by: NURSE PRACTITIONER

## 2022-07-28 PROCEDURE — 1123F ACP DISCUSS/DSCN MKR DOCD: CPT | Performed by: NURSE PRACTITIONER

## 2022-07-28 PROCEDURE — G8420 CALC BMI NORM PARAMETERS: HCPCS | Performed by: NURSE PRACTITIONER

## 2022-07-28 PROCEDURE — 99214 OFFICE O/P EST MOD 30 MIN: CPT | Performed by: NURSE PRACTITIONER

## 2022-07-28 PROCEDURE — 81003 URINALYSIS AUTO W/O SCOPE: CPT | Performed by: NURSE PRACTITIONER

## 2022-07-28 RX ORDER — PROMETHAZINE HYDROCHLORIDE 25 MG/1
25 TABLET ORAL DAILY PRN
Qty: 20 TABLET | Refills: 1 | Status: SHIPPED | OUTPATIENT
Start: 2022-07-28 | End: 2022-07-28 | Stop reason: SDUPTHER

## 2022-07-28 RX ORDER — TRAMADOL HYDROCHLORIDE 50 MG/1
50 TABLET ORAL 2 TIMES DAILY PRN
Qty: 28 TABLET | Refills: 0 | Status: SHIPPED | OUTPATIENT
Start: 2022-07-28 | End: 2022-08-11

## 2022-07-28 RX ORDER — PROMETHAZINE HYDROCHLORIDE 25 MG/1
25 TABLET ORAL DAILY PRN
Qty: 90 TABLET | Refills: 0 | Status: SHIPPED | OUTPATIENT
Start: 2022-07-28

## 2022-07-28 RX ORDER — ALLOPURINOL 100 MG/1
100 TABLET ORAL DAILY
Qty: 30 TABLET | Refills: 1 | Status: SHIPPED | OUTPATIENT
Start: 2022-07-28

## 2022-07-28 RX ORDER — METHYLPREDNISOLONE 4 MG/1
TABLET ORAL
Qty: 1 KIT | Refills: 0 | Status: SHIPPED | OUTPATIENT
Start: 2022-07-28 | End: 2022-08-11 | Stop reason: ALTCHOICE

## 2022-07-28 ASSESSMENT — ENCOUNTER SYMPTOMS
VOMITING: 0
WHEEZING: 0
NAUSEA: 1
COUGH: 0
SHORTNESS OF BREATH: 0

## 2022-07-28 ASSESSMENT — PATIENT HEALTH QUESTIONNAIRE - PHQ9
1. LITTLE INTEREST OR PLEASURE IN DOING THINGS: 0
SUM OF ALL RESPONSES TO PHQ QUESTIONS 1-9: 0
3. TROUBLE FALLING OR STAYING ASLEEP: 0
SUM OF ALL RESPONSES TO PHQ QUESTIONS 1-9: 0
SUM OF ALL RESPONSES TO PHQ QUESTIONS 1-9: 0
2. FEELING DOWN, DEPRESSED OR HOPELESS: 0
6. FEELING BAD ABOUT YOURSELF - OR THAT YOU ARE A FAILURE OR HAVE LET YOURSELF OR YOUR FAMILY DOWN: 0
4. FEELING TIRED OR HAVING LITTLE ENERGY: 0
SUM OF ALL RESPONSES TO PHQ9 QUESTIONS 1 & 2: 0
8. MOVING OR SPEAKING SO SLOWLY THAT OTHER PEOPLE COULD HAVE NOTICED. OR THE OPPOSITE, BEING SO FIGETY OR RESTLESS THAT YOU HAVE BEEN MOVING AROUND A LOT MORE THAN USUAL: 0
5. POOR APPETITE OR OVEREATING: 0
9. THOUGHTS THAT YOU WOULD BE BETTER OFF DEAD, OR OF HURTING YOURSELF: 0
SUM OF ALL RESPONSES TO PHQ QUESTIONS 1-9: 0
7. TROUBLE CONCENTRATING ON THINGS, SUCH AS READING THE NEWSPAPER OR WATCHING TELEVISION: 0

## 2022-07-28 NOTE — PROGRESS NOTES
[facility]  Office Visit Note    Subjective:  Chief Complaint   Patient presents with    Gout     Follow up on gout    Nausea       Patient ID: Korey Jim is a 69 y.o. female presenting to the office for the above. 69year old female for follow up of gout. Gout--    She was seen at urgent care in June with swelling of right 1st DIP. A sample was taken and sent for culture, and she received a call saying it was MRSA. Treated with doxycycline. Then seen in this office and at ER as her symptoms failed to improve. Was treated with Zyvox. Was then evaluated by orthopedics and diagnosed with gout. Treated with Medrol. 7/28/22:   Symptoms have improved slightly, but have not resolved. Continues to have erythema, heat, edema of right 1st DIP, not much improved. Right 5th PIP is improved. No more pain in elbow. No extending red streaks. Uric acid 8.4 in June 2022. --Medrol dose pack, tramadol prn. Tolerated both well in the past.  Discussed risks/benefits, alternatives, potential side effects, proper administration of medication. --Start allopurinol. Discussed risks/benefits, alternatives, potential side effects, proper administration of medication. She has follow up in about a month; recheck uric acid at that visit. Nausea, abdominal discomfort--  Started a few days ago. Denies fever/chills, flank pain, gross hematuria. UA in office unremarkable; will send for culture, with treatment as indicated. Stay hydrated. She uses phenergan prn. Osteopenia--  DEXA December 2021 showed worsening osteopenia of bilateral hips, some improvement in lumbar spine. FRAX indicates 10-year risk of major osteoporotic fracture is 19.7% and of hip fracture is 4.7%. She is taking calcium and vitamin D supplements. Vitamin D was 38.1 in June 2021.     She started Fosamax in April 2022, but caused intolerable side effects (made her feel very tired and out of it; resolved after stopping the medication). --Discussed options, and she is interested in Prolia. Discussed risks/benefits, alternatives, potential side effects, proper administration of medication. Treatment plan initiated today; notify office if do not receive call to schedule appointment in the next few weeks. --Advised to continue vitamin D supplements (level was normal in June 2021; monitor today), and calcium supplements. Regular weight-bearing exercise such as walking to strengthen bones. Take precautions to avoid falls. --Plan to repeat DEXA in April 2023, after 2 years on medication. 7/28/22: She has not yet heard to schedule Prolia. Will request call for patient to schedule. History: Allergies   Allergen Reactions    Oxycodone Itching    Eucalyptus Oil Hives and Other (See Comments)     Burns mouth    Ezetimibe Myalgia    Morphine Other (See Comments)     Feels crazy  Other reaction(s): Hallucinations    Penicillins Hives    Pineapple Hives and Other (See Comments)     Burns mouth    Vancomycin Other (See Comments)     Kidney function worsened    Sulfa Antibiotics Nausea Only and Rash       Past Medical History:   Diagnosis Date    Ankle fracture 2014    Ankle osteomyelitis, left ([address] 2014    Breast cancer ([address]     Chemotherapy-induced neuropathy ([address]     Chronic anemia     Depression     Dry eye syndrome of both eyes     Dyslipidemia     Essential hypertension     Fracture of right patella 2013    History of left breast cancer 2001    with mastectomy    Left leg weakness 9/8/2020    Non-ischemic cardiomyopathy ([address] 2009    EF 15% 2009, Cath-minimal CAD. 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 Pod 2 ORIF doing well. C/o moderate pain.   Controlled on PO meds.  Ready to go home    Type 2 diabetes mellitus, controlled ([address]     Vitamin B12 deficiency        Past Surgical History:   Procedure Laterality Date    BREAST BIOPSY      left breast biopsy    BREAST BIOPSY  1968    left cyst removed    BREAST LUMPECTOMY Left 2001    BREAST REDUCTION SURGERY      Rt breast    CARDIAC CATHETERIZATION  2009    CARPAL TUNNEL RELEASE Bilateral     CHOLECYSTECTOMY, OPEN  1981    COLONOSCOPY  07/2014    CYST REMOVAL Right 11/2020    hand    HEENT      Sinus Surgery    HEENT      RK procedure bilateral eyes    HX OPEN REDUCTION INTERNAL FIXATION Left 2013    Ankle    HX OPEN REDUCTION INTERNAL FIXATION  08/2016    [place]ibia    HYSTERECTOMY (CERVIX STATUS UNKNOWN)      LUMBAR LAMINECTOMY  1992    MASTECTOMY Left 2002    With Reconstruction    ORTHOPEDIC SURGERY Right 08/2013    Patellar fracture repair    OTHER SURGICAL HISTORY      Chetan Cath Placed and Removed    OVARY REMOVAL      Unilateral    TONSILLECTOMY      TONSILLECTOMY      TRANSPLANT  2002    Stem Cell Transplant    UVULOPALATOPHARYGOPLASTY  2004       Family History   Problem Relation Age of Onset    Cancer Mother     Diabetes Mother     Heart Disease Mother         MI at age 66    Heart Failure Mother         age 58    Breast Cancer Neg Hx     Stroke Sister     Cancer Brother     Heart Disease Brother     Stomach Cancer Neg Hx     Heart Disease Father         MI age 55       Social History     Tobacco Use   Smoking Status Never   Smokeless Tobacco Never       Social History     Substance and Sexual Activity   Alcohol Use No       Current Outpatient Medications   Medication Sig Dispense Refill    allopurinol (ZYLOPRIM) 100 MG tablet Take 1 tablet by mouth in the morning. 30 tablet 1    methylPREDNISolone (MEDROL DOSEPACK) 4 MG tablet Follow package instructions 1 kit 0    traMADol (ULTRAM) 50 MG tablet Take 1 tablet by mouth 2 times daily as needed for Pain for up to 14 days. Intended supply: 3 days.  Take lowest dose possible to manage pain 28 tablet 0    promethazine (PHENERGAN) 25 MG tablet Take 1 tablet by mouth daily as needed for Nausea 90 tablet 0    furosemide (LASIX) 40 MG tablet Take 1 tablet by mouth every other day 45 tablet 1    gabapentin (NEURONTIN) 600 MG tablet Take 1 tablet by mouth 2 times daily for 360 days. 180 tablet 1    lisinopril (PRINIVIL;ZESTRIL) 5 MG tablet Take 1 tablet by mouth daily 90 tablet 1    cyanocobalamin 1000 MCG/ML injection Inject 1 mL into the muscle every 7 days 3 mL 3    metFORMIN (GLUCOPHAGE-XR) 500 mg extended release tablet Take 2 tablets by mouth 2 times daily 360 tablet 1    calcium carbonate (TUMS) 500 MG chewable tablet Take 1 tablet by mouth daily      dextromethorphan-guaiFENesin (MUCINEX DM)  MG per extended release tablet Take 1 tablet by mouth every 12 hours as needed      acetaminophen (TYLENOL) 500 MG tablet Take by mouth every 6 hours as needed      cetirizine (ZYRTEC) 10 MG tablet Take 10 mg by mouth daily as needed      Cholecalciferol 50 MCG (2000 UT) CAPS Take 2,000 Units by mouth daily      metoprolol succinate (TOPROL XL) 50 MG extended release tablet Take 50 mg by mouth daily      potassium chloride (KLOR-CON M) 20 MEQ extended release tablet TAKE 1 TABLET EVERY MORNING AND 2 TABLETS EVERY EVENING      valACYclovir (VALTREX) 1 g tablet 2 tablets 2 times a day for 1 day only with mouth sore occurs       No current facility-administered medications for this visit. Review of Systems:  Review of Systems   Constitutional:  Negative for activity change, appetite change, fever and unexpected weight change. Respiratory:  Negative for cough, shortness of breath and wheezing. Cardiovascular:  Negative for chest pain. Gastrointestinal:  Positive for nausea. Negative for vomiting. Genitourinary:  Positive for dysuria and urgency. Negative for flank pain, hematuria and pelvic pain. Musculoskeletal:  Positive for arthralgias and joint swelling.    Skin: 07/12/2022 03:36 PM    CREATININE 1.10 07/12/2022 03:36 PM    GLUCOSE 134 07/12/2022 03:36 PM    CALCIUM 10.3 07/12/2022 03:36 PM    ,   Lab Results   Component Value Date    TSH 1.690 06/08/2021   ,   Lab Results   Component Value Date    ALT 17 07/12/2022    AST 14 (L) 07/12/2022    ALKPHOS 78 07/12/2022    BILITOT 0.3 07/12/2022   ,   Lab Results   Component Value Date    LABA1C 6.2 06/06/2022     Lab Results   Component Value Date     06/06/2022   ,   Lab Results   Component Value Date    LABMICR Comment 10/13/2020       PHQ-9  7/28/2022   Little interest or pleasure in doing things 0   Little interest or pleasure in doing things -   Feeling down, depressed, or hopeless 0   Trouble falling or staying asleep, or sleeping too much 0   Trouble falling or staying asleep, or sleeping too much -   Feeling tired or having little energy 0   Feeling tired or having little energy -   Poor appetite or overeating 0   Poor appetite, weight loss, or overeating -   Feeling bad about yourself - or that you are a failure or have let yourself or your family down 0   Feeling bad about yourself - or that you are a failure or have let yourself or your family down -   Trouble concentrating on things, such as reading the newspaper or watching television 0   Trouble concentrating on things such as school, work, reading, or watching TV -   Moving or speaking so slowly that other people could have noticed.  Or the opposite - being so fidgety or restless that you have been moving around a lot more than usual 0   Moving or speaking so slowly that other people could have noticed; or the opposite being so fidgety that others notice -   Thoughts that you would be better off dead, or of hurting yourself in some way 0   Thoughts of being better off dead, or hurting yourself in some way -   PHQ-2 Score 0   Total Score PHQ 2 -   PHQ-9 Total Score 0   PHQ 9 Score -        Assessment/Plan:      Health Maintenance Due   Topic Date Due Shingles vaccine (1 of 2) 02/26/2015    COVID-19 Vaccine (3 - Pfizer risk series) 03/13/2021          Safia Gonzalez was seen today for gout and nausea. 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  -     methylPREDNISolone (MEDROL DOSEPACK) 4 MG tablet; Follow package instructions  -     traMADol (ULTRAM) 50 MG tablet; Take 1 tablet by mouth 2 times daily as needed for Pain for up to 14 days. Intended supply: 3 days. Take lowest dose possible to manage pain    Nausea  -     Discontinue: promethazine (PHENERGAN) 25 MG tablet; Take 1 tablet by mouth daily as needed for Nausea  -     promethazine (PHENERGAN) 25 MG tablet; Take 1 tablet by mouth daily as needed for Nausea    Dysuria  -     Culture, Urine; Future  -     AMB POC URINALYSIS DIP STICK AUTO W/O MICRO      Patient states she is otherwise doing well; has no further questions or concerns at this visit. Encouraged to contact office with any concerns prior to next visit. Return if symptoms worsen or fail to improve, for Next scheduled visit.     Padmini Leyva, APRN - CNP

## 2022-07-31 LAB
BACTERIA SPEC CULT: NORMAL
SERVICE CMNT-IMP: NORMAL

## 2022-08-02 ENCOUNTER — TELEPHONE (OUTPATIENT)
Dept: INTERNAL MEDICINE CLINIC | Facility: CLINIC | Age: 77
End: 2022-08-02

## 2022-08-02 NOTE — TELEPHONE ENCOUNTER
Spoke to patient, she needs the number for the [facility] to schedule her Prolia shot. Patient given the number  [phone] to call.

## 2022-08-02 NOTE — TELEPHONE ENCOUNTER
----- Message from Carlos Wasserman sent at 8/1/2022  3:09 PM EDT -----  Subject: Message to Provider    QUESTIONS  Information for Provider? Pt called in and stated was seen on 7/28 and was   given a number to set up appointment for polio shot for bones but the   number the provider gave the pt was sleep study and pt needs the number to   set up appointment .  ---------------------------------------------------------------------------  --------------  [id number] [employer]  [phone]; OK to leave message on voicemail  ---------------------------------------------------------------------------  --------------  SCRIPT ANSWERS  Relationship to Patient?  Self

## 2022-08-11 ENCOUNTER — OFFICE VISIT (OUTPATIENT)
Dept: RHEUMATOLOGY | Age: 77
End: 2022-08-11
Payer: MEDICARE

## 2022-08-11 VITALS
WEIGHT: 148.7 LBS | SYSTOLIC BLOOD PRESSURE: 134 MMHG | DIASTOLIC BLOOD PRESSURE: 76 MMHG | HEIGHT: 65 IN | RESPIRATION RATE: 18 BRPM | HEART RATE: 76 BPM | BODY MASS INDEX: 24.78 KG/M2

## 2022-08-11 DIAGNOSIS — M13.141: Primary | ICD-10-CM

## 2022-08-11 PROCEDURE — 1123F ACP DISCUSS/DSCN MKR DOCD: CPT | Performed by: INTERNAL MEDICINE

## 2022-08-11 PROCEDURE — 99213 OFFICE O/P EST LOW 20 MIN: CPT | Performed by: INTERNAL MEDICINE

## 2022-08-11 PROCEDURE — G8427 DOCREV CUR MEDS BY ELIG CLIN: HCPCS | Performed by: INTERNAL MEDICINE

## 2022-08-11 PROCEDURE — G8399 PT W/DXA RESULTS DOCUMENT: HCPCS | Performed by: INTERNAL MEDICINE

## 2022-08-11 PROCEDURE — 1090F PRES/ABSN URINE INCON ASSESS: CPT | Performed by: INTERNAL MEDICINE

## 2022-08-11 PROCEDURE — G8420 CALC BMI NORM PARAMETERS: HCPCS | Performed by: INTERNAL MEDICINE

## 2022-08-11 PROCEDURE — 1036F TOBACCO NON-USER: CPT | Performed by: INTERNAL MEDICINE

## 2022-08-11 RX ORDER — PREDNISONE 20 MG/1
20 TABLET ORAL 2 TIMES DAILY
Qty: 20 TABLET | Refills: 0 | Status: SHIPPED | OUTPATIENT
Start: 2022-08-11 | End: 2022-08-21

## 2022-08-11 NOTE — PROGRESS NOTES
[facility] RHEUMATOLOGY  KRIS Ochoa M.D. Phone: [phone]   Fax: [phone]    5/20/2021 11:36 AM      SUBJECTIVE: Per previous not from Dr. Zoey Herndon on 5/20/21      Edgar Frost is a 77 y.o. female is seen today because of a swollen left ankle and foot in addition to a positive SSA antibody and elevated sedimentation rate and CRP and slightly elevated rheumatoid factor. The SSA antibody was noted to be positive back in June 2018 and acute phase reactants have been elevated since about 2015 there is also a history of positive kappa light chains noted in the record from 2019    Originally seen about 3 or 4 years ago but last seen in 2018. Last seen she really has been fairly stable though continues to have chronic trouble with the left ankle where she had a fracture has had full surgical procedures. She is not quite up with her orthopedic surgeon recently. She still has some dry eye syndrome and a dry mouth but no systemic involvement related to Sjogren's syndrome though she has had a positive SSA antibody for years and positive acute phase reactants. She recently also as noted above had a low titer positive rheumatoid factor but has no clinical symptoms to suggest rheumatoid disease. She said no fever or rash no pulmonary problems she has had some urinary tract infections in the past.  She has no true dysphagia but does drink excessive liquids with meals and cannot eat saltine crackers without liquids she does not use any prescription eyedrops and has not had a tear duct plug and is followed by her ophthalmologist Dr. Rosa Maria Dick. Overall she seems stable. She does have some hand symptoms related to osteoarthritis. No Raynaud's or photosensitivity.     Apparently has some swelling of her left great toe in the past and did go to the [facility] urgent care and they considered gout but this has resolved it was a one-time event and has not recurred and only bears observation at this point. LAST VISIT 6/2/2022:     Finger arthritis: DIP #2 now for several months. Has been persistent inflamed and red. She has had 2 Medrol Dosepaks with minimal improvement and apparently did have an aspiration at some point and a MRSA 3 with no improvement. Specialist who thought that she probably had gout as she had a uric acid of 8.4. This has been persistent now for about 2 months no episodic changes no history of lower extremity problems or previous significant inflammatory problems though she does have extensive primary osteoarthritis of the hands to include that joint. No history of psoriasis. Mild Sicca: Quite stable from the standpoint of mild sicca symptoms with positive serologic studies. There is no evidence of systemic involvement from Sjogren's syndrome. She does have some dryness in her eyes and uses over-the-counter drops. She has not required any prescription from her ophthalmologist.  She does have some diffuse aches and pains noted below. No pulmonary problems no GI problems other than some diarrhea since she had a colonoscopy several years ago. No unusual cutaneous trouble no Raynaud's or photosensitivity. Osteoarthritis of the hands: Heberden's and Maura's nodes and has some mild discomfort here and I did suggest she might try some Tylenol she got significantly worse might need to see the orthopedic hand specialist.    Osteopenia with high fracture risk: He had a recent DEXA scan ordered by Dr. Oliverio Del Cid and did have increased fracture risk and has been put on alendronate but apparently did not tolerate it and will get back with them for follow-up therapy    The DEXA scan done in November 2019 and she is osteopenic but has a high fracture risk with a 10-year probability of hip fracture of 3.2% and should be on appropriate calcium and vitamin D as well as probable antiresorptive medication.   She will check with Ms. Oliverio Del Cid concerning this as I have not been specifically asked to get involved in the treatment of her osteopenia. LAB:  6/29/2022: Uric Acid 8.4. Hemoglobin 10.8. Potassium 3.4 and blood sugar 134 otherwise unremarkable CMP. Creatinine elevated 1.0 and GFR decreased to 51. Liver functions normal total protein normal globulins increased to 4.1. CRP minimally elevated at 1.9 with normal being less than 1.0.  ESR is normal  3/6/2021: Following were normal or negative: LUZ, chromatin, RNP, SM, SCL 70, SSB, anticentromere. SSA positive at greater than 8.0 I do not see an LUZ. CRP elevated at 97 normal less than 10, rheumatoid factor 27.6 normal less than 14. Urinalysis unremarkable. CBC normal.  2/9/9 2021: Vitamin D level 21.9  5/2/2019: Positive light chains of Kappa orgin. ESR 65, CRP slightly elevated to 2.2  6/27/2018: Following were normal or negative: SCL 70, SM antibody, RNP antibody, centromere antibody, DNA antibody.   SSB was normal.  SSA greater than 8.0  2/9/2015: ESR 82  1015/2020: Nerve conduction studies show bilateral carpal tunnel syndrome    XRAYS:      DXA:                       Patient Active Problem List   Diagnosis Code    Depression F32.9    Chronic anemia D64.9    Osteopenia M85.80    Osteoarthritis M19.90    Dyslipidemia E78.5    Cardiac Cath minimal CAD Z98.890    Vitamin B12 deficiency E53.8    Statin intolerance Z78.9    Essential hypertension I10    Chemotherapy-induced neuropathy (HCC) G62.0, T45.1X5A    Dilated cardiomyopathy ([facility]) I42.0    Type 2 diabetes mellitus, controlled ([facility]) E11.9    Left leg weakness R29.898    Stem cells transplant status ([facility]) Z94.84    Peripheral sensory-motor axonal polyneuropathy G60.8    Palpitation R00.2    Stage 3a chronic kidney disease (HCC) N18.31    Vitamin D deficiency E55.9    Arthropathy of cervical facet joint M47.812    Chronic periscapular pain on right side M25.511, G89.29    Foraminal stenosis of cervical region M48.02    Muscle spasm M62.838    Recurrent occipital headache R51.9    Tibial plateau fracture H48.743K     Current Outpatient Medications   Medication Sig Dispense Refill    guaifenesin (MUCINEX PO) Take  by mouth.      metoprolol succinate (TOPROL-XL) 50 mg XL tablet Take 1 Tab by mouth daily. 90 Tab 3    nitrofurantoin, macrocrystal-monohydrate, (MACROBID) 100 mg capsule Take 1 Cap by mouth two (2) times a day. 10 Cap 0    amLODIPine (Norvasc) 5 mg tablet Take 5 mg by mouth daily. budesonide (RHINOCORT AQUA) 32 mcg/actuation nasal spray 32 Sprays by Both Nostrils route daily. carvediloL (COREG) 6.25 mg tablet Take 6.25 mg by mouth daily. fluticasone propionate (FLONASE) 50 mcg/actuation nasal spray 50 Sprays by Both Nostrils route daily. meloxicam (MOBIC) 7.5 mg tablet Take 7.5 mg by mouth daily. traMADoL (ULTRAM) 50 mg tablet Take 50 mg by mouth daily. tiZANidine (ZANAFLEX) 2 mg tablet Take 2 mg by mouth daily. benzonatate (TESSALON) 200 mg capsule       esomeprazole (NexIUM) 20 mg capsule Take  by mouth daily. cranberry fruit extract (CRANBERRY EXTRACT PO) Take  by mouth.      lisinopriL (PRINIVIL, ZESTRIL) 5 mg tablet Take 0.5 Tabs by mouth daily. 45 Tab 1    cholecalciferol (VITAMIN D3) (2,000 UNITS /50 MCG) cap capsule Take 1 Cap by mouth daily. 90 Cap 1    valACYclovir (Valtrex) 1 gram tablet 2 tablets 2 times a day for 1 day only with mouth sore occurs 90 Tab 0    metFORMIN ER (GLUCOPHAGE XR) 500 mg tablet TAKE 2 TABLETS TWICE A  Tab 1    Insulin Syringe-Needle U-100 (BD Insulin Syringe) 1 mL 25 x 1\" syrg Use syringe to draw up for B-12 injection once weekly for 4 weeks and then once monthly. 12 Syringe 0    cyanocobalamin (Vitamin B-12) 1,000 mcg/mL injection 1 ML every week IM for 4 weeks,  then 1 ML monthly. 10 mL 3    cetirizine (ZyrTEC) 10 mg tablet Take 10 mg by mouth daily as needed for Allergies. gabapentin (NEURONTIN) 600 mg tablet Take 1 Tab by mouth two (2) times a day.  180 Tab 3    potassium chloride (Klor-Con M20) 20 mEq tablet TAKE 1 TABLET EVERY        MORNING AND 2 TABLETS      EVERY EVENING (Patient taking differently: Take 20 mEq by mouth two (2) times a day. TAKE 1 TABLET EVERY        MORNING AND 2 TABLETS      EVERY EVENING) 270 Tab 1    mometasone (ELOCON) 0.1 % topical cream Apply  to affected area daily as needed for Skin Irritation. 45 g 1    furosemide (LASIX) 40 mg tablet Take 1 Tab by mouth as needed (swelling). 90 Tab 3    OTHER,NON-FORMULARY, Indications: CBD OIL      glucose blood VI test strips (ACCU-CHEK LULU PLUS TEST STRP) strip Dx E11.65  Use daily as directed 100 Strip 11    Blood-Glucose Meter (ACCU-CHEK LULU PLUS METER) misc Dx E11.65  Use daily as directed 1 Each 0    Lancing Device with Lancets (ACCU-CHEK MULTICLIX LANCET) kit Dx E11.65  Use daily as directed 1 Kit 0    Lancets (ACCU-CHEK MULTICLIX LANCET) misc Dx E11.65  Use daily as directed 100 Each 5    Blood Glucose Control High&Low (ACCU-CHEK LULU CONTROL SOLN) soln Dx E11.65  Use daily as directed 1 Bottle 5    acetaminophen (TYLENOL EXTRA STRENGTH) 500 mg tablet Take  by mouth every six (6) hours as needed for Pain. diphenhydrAMINE (BENADRYL ALLERGY) 25 mg tablet Take 25 mg by mouth every six (6) hours as needed for Sleep.        Allergies   Allergen Reactions    Oxycodone Itching    Eucalyptus Hives and Other (comments)     Burns mouth      Morphine Vertigo     Feels crazy  Other reaction(s): Hallucinations    Pcn [Penicillins] Hives    Pineapple Hives and Other (comments)     Burns mouth      Statins-Hmg-Coa Reductase Inhibitors Other (comments)    Sulfa (Sulfonamide Antibiotics) Rash and Nausea Only    Vancomycin Unknown (comments)     Kidney function worsened    Zetia [Ezetimibe] Unknown (comments)     Social History     Tobacco Use    Smoking status: Never Smoker    Smokeless tobacco: Never Used   Substance Use Topics    Alcohol use: No    Drug use: No     [phone] (home)   Past Surgical History:   Procedure Laterality Date HX BREAST BIOPSY      left breast biopsy    HX BREAST BIOPSY  1968    left cyst removed    HX BREAST LUMPECTOMY Left 2001    HX BREAST REDUCTION      Rt breast    HX CARPAL TUNNEL RELEASE Bilateral     HX COLONOSCOPY  07/2014    HX CYST REMOVAL Right 11/2020    hand    HX HEART CATHETERIZATION  2009    HX HEENT      Sinus Surgery    HX HEENT      RK procedure bilateral eyes    HX HYSTERECTOMY      HX LUMBAR LAMINECTOMY  1992    HX MASTECTOMY Left 2002    With Reconstruction    HX OOPHORECTOMY      Unilateral    HX OPEN CHOLECYSTECTOMY  1981    HX OPEN REDUCTION INTERNAL FIXATION Left 2013    Ankle    HX OPEN REDUCTION INTERNAL FIXATION  08/2016    [place]    HX ORTHOPAEDIC Right 08/2013    Patellar fracture repair    HX OTHER SURGICAL      Chetan Cath Placed and Removed    HX TONSILLECTOMY      HX TONSILLECTOMY      HX TRANSPLANT  2002    Stem Cell Transplant    HX UVULOPALATOPHARYNGOPLASTY  2004         ROS:    Gen.: no fever or significant change in appetite or weight          MSK:  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 The right second DIP is swollen inflamed erythematous somewhat fluctuant. No drainage  Is present and no definitive tophi noted              ASSESSMENT:        Can not dx Gout at this time without a typical history and without urate crystals being demonstrated. May Represent inflammatory osteoarthritis. Calcification was noted on the x-ray though I cannot bring up the x-ray two-view today which would certainly bring calcium pyrophosphate disease into the picture. PLAN:      Pred 20 mg bid for week and then return to the office  D? C Aiiopuronol forcCurrent time as it probably at this point,. Even even if this is gout, it is possibly causing more difficulties that is helping.   Normally I would like to get the acute flareup completely subsided before I start allopurinol or other ULT          May Need to return to The orthopedic hand specialist to have aspiration of the DIP joint done specifically to look for urate crystal  order to make definite dx of gouts of gout. Sudheer Mata M.D.   Rheumatology - [facility]

## 2022-08-18 ENCOUNTER — OFFICE VISIT (OUTPATIENT)
Dept: RHEUMATOLOGY | Age: 77
End: 2022-08-18
Payer: MEDICARE

## 2022-08-18 VITALS
SYSTOLIC BLOOD PRESSURE: 122 MMHG | BODY MASS INDEX: 24.44 KG/M2 | HEART RATE: 76 BPM | DIASTOLIC BLOOD PRESSURE: 68 MMHG | WEIGHT: 146.7 LBS | RESPIRATION RATE: 16 BRPM | HEIGHT: 65 IN

## 2022-08-18 DIAGNOSIS — M13.141: Primary | ICD-10-CM

## 2022-08-18 PROCEDURE — G8420 CALC BMI NORM PARAMETERS: HCPCS | Performed by: INTERNAL MEDICINE

## 2022-08-18 PROCEDURE — G8427 DOCREV CUR MEDS BY ELIG CLIN: HCPCS | Performed by: INTERNAL MEDICINE

## 2022-08-18 PROCEDURE — 1123F ACP DISCUSS/DSCN MKR DOCD: CPT | Performed by: INTERNAL MEDICINE

## 2022-08-18 PROCEDURE — 1090F PRES/ABSN URINE INCON ASSESS: CPT | Performed by: INTERNAL MEDICINE

## 2022-08-18 PROCEDURE — G8399 PT W/DXA RESULTS DOCUMENT: HCPCS | Performed by: INTERNAL MEDICINE

## 2022-08-18 PROCEDURE — 1036F TOBACCO NON-USER: CPT | Performed by: INTERNAL MEDICINE

## 2022-08-18 PROCEDURE — 99213 OFFICE O/P EST LOW 20 MIN: CPT | Performed by: INTERNAL MEDICINE

## 2022-08-18 RX ORDER — COLCHICINE 0.6 MG/1
0.6 TABLET ORAL 2 TIMES DAILY
Qty: 60 TABLET | Refills: 5 | Status: SHIPPED | OUTPATIENT
Start: 2022-08-18

## 2022-08-18 NOTE — PROGRESS NOTES
[facility] RHEUMATOLOGY  KRIS Mcdaniels M.D. Phone: [phone]   Fax: [phone]    5/20/2021 11:36 AM      SUBJECTIVE: Per previous not from Dr. Migdalia Salmeron on 5/20/21      Irma Ramirez is a 77 y.o. female is seen today because of a swollen left ankle and foot in addition to a positive SSA antibody and elevated sedimentation rate and CRP and slightly elevated rheumatoid factor. The SSA antibody was noted to be positive back in June 2018 and acute phase reactants have been elevated since about 2015 there is also a history of positive kappa light chains noted in the record from 2019    Originally seen about 3 or 4 years ago but last seen in 2018. Last seen she really has been fairly stable though continues to have chronic trouble with the left ankle where she had a fracture has had full surgical procedures. She is not quite up with her orthopedic surgeon recently. She still has some dry eye syndrome and a dry mouth but no systemic involvement related to Sjogren's syndrome though she has had a positive SSA antibody for years and positive acute phase reactants. She recently also as noted above had a low titer positive rheumatoid factor but has no clinical symptoms to suggest rheumatoid disease. She said no fever or rash no pulmonary problems she has had some urinary tract infections in the past.  She has no true dysphagia but does drink excessive liquids with meals and cannot eat saltine crackers without liquids she does not use any prescription eyedrops and has not had a tear duct plug and is followed by her ophthalmologist Dr. Gianni Wang. Overall she seems stable. She does have some hand symptoms related to osteoarthritis. No Raynaud's or photosensitivity.     Apparently has some swelling of her left great toe in the past and did go to the [facility] urgent care and they considered gout but this has resolved it was a one-time event and has not recurred and only bears observation at this point. LAST VISIT 8/11/22:     Finger arthritis: Been on prednisone 20 twice a day for about a week and the Right DIP #2  is significantly better though still a little bit enlarged. Before this has been a persistent problem for about 6 weeks and not episodic to suggest gout. Urate crystals have not been looked for in the joint fluid and I think that is necessary to try to make a firm diagnosis. Inflammatory osteoarthritis psoriatic arthritis even pseudogout could do this. Apparently the orthopedic hand gas at [facility] [employer] thought there was some radiocarpal calcification suggestive of chondrocalcinosis that was not read into the report by the radiologist and I personally have not been able to bring up the x-rays to look at. He did go see Dr. Zeynep Cowart who is done surgery on her arm in the past and see if he can get some fluid out of the joint to have the lab look for urate crystals. Uric acid of 8.4 but is on Lasix which can elevated to some degree. And again the history is not typical of gout with the lack of lower extremity involvement in the past and the cute onset persistent nature of this as opposed to episodic nature. Mild Sicca: Quite stable from the standpoint of mild sicca symptoms with positive serologic studies. There is no evidence of systemic involvement from Sjogren's syndrome. She does have some dryness in her eyes and uses over-the-counter drops. She has not required any prescription from her ophthalmologist.  She does have some diffuse aches and pains noted below. No pulmonary problems no GI problems other than some diarrhea since she had a colonoscopy several years ago. No unusual cutaneous trouble no Raynaud's or photosensitivity.     Osteoarthritis of the hands: Heberden's and Maura's nodes and has some mild discomfort here and I did suggest she might try some Tylenol she got significantly worse might need to see the orthopedic hand specialist.    Osteopenia with high fracture risk: He had a recent DEXA scan ordered by Dr. Tevin Sahu and did have increased fracture risk and has been put on alendronate but apparently did not tolerate it and will get back with them for follow-up therapy    The DEXA scan done in November 2019 and she is osteopenic but has a high fracture risk with a 10-year probability of hip fracture of 3.2% and should be on appropriate calcium and vitamin D as well as probable antiresorptive medication. She will check with Ms. Tevin Sahu concerning this as I have not been specifically asked to get involved in the treatment of her osteopenia. LAB:  7/12/22: K+ 3.4 and glucose 134. Creatinine elevated at 1.1 GFR decreased to 51 and total proteins increased to 8.3 otherwise unremarkable CMP. Hemoglobin low 11.4 otherwise unremarkable CMP PSA  6/29/2022: Uric Acid 8.4. Hemoglobin 10.8. Potassium 3.4 and blood sugar 134 otherwise unremarkable CMP. Creatinine elevated 1.0 and GFR decreased to 51. Liver functions normal total protein normal globulins increased to 4.1. CRP minimally elevated at 1.9 with normal being less than 1.0.  ESR is normal  3/6/2021: Following were normal or negative: LUZ, chromatin, RNP, SM, SCL 70, SSB, anticentromere. SSA positive at greater than 8.0 I do not see an LUZ. CRP elevated at 97 normal less than 10, rheumatoid factor 27.6 normal less than 14. Urinalysis unremarkable. CBC normal.  2/9/9 2021: Vitamin D level 21.9  5/2/2019: Positive light chains of Kappa orgin. ESR 65, CRP slightly elevated to 2.2  6/27/2018: Following were normal or negative: SCL 70, SM antibody, RNP antibody, centromere antibody, DNA antibody.   SSB was normal.  SSA greater than 8.0  2/9/2015: ESR 82  1015/2020: Nerve conduction studies show bilateral carpal tunnel syndrome    XRAYS:      DXA:                       Patient Active Problem List   Diagnosis Code    Depression F32.9    Chronic anemia D64.9    Osteopenia M85.80    Osteoarthritis M19.90 Dyslipidemia E78.5    Cardiac Cath minimal CAD Z98.890    Vitamin B12 deficiency E53.8    Statin intolerance Z78.9    Essential hypertension I10    Chemotherapy-induced neuropathy ([facility]) G62.0, T45.1X5A    Dilated cardiomyopathy ([facility]) I42.0    Type 2 diabetes mellitus, controlled ([facility]) E11.9    Left leg weakness R29.898    Stem cells transplant status ([facility]) Z94.84    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      metoprolol succinate (TOPROL-XL) 50 mg XL tablet Take 1 Tab by mouth daily. 90 Tab 3    nitrofurantoin, macrocrystal-monohydrate, (MACROBID) 100 mg capsule Take 1 Cap by mouth two (2) times a day. 10 Cap 0    amLODIPine (Norvasc) 5 mg tablet Take 5 mg by mouth daily. budesonide (RHINOCORT AQUA) 32 mcg/actuation nasal spray 32 Sprays by Both Nostrils route daily. carvediloL (COREG) 6.25 mg tablet Take 6.25 mg by mouth daily. fluticasone propionate (FLONASE) 50 mcg/actuation nasal spray 50 Sprays by Both Nostrils route daily. meloxicam (MOBIC) 7.5 mg tablet Take 7.5 mg by mouth daily. traMADoL (ULTRAM) 50 mg tablet Take 50 mg by mouth daily. tiZANidine (ZANAFLEX) 2 mg tablet Take 2 mg by mouth daily. benzonatate (TESSALON) 200 mg capsule       esomeprazole (NexIUM) 20 mg capsule Take  by mouth daily. cranberry fruit extract (CRANBERRY EXTRACT PO) Take  by mouth.      lisinopriL (PRINIVIL, ZESTRIL) 5 mg tablet Take 0.5 Tabs by mouth daily.  45 Tab 1    cholecalciferol (VITAMIN D3) (2,000 UNITS /50 MCG) cap as needed for Sleep. Allergies   Allergen Reactions    Oxycodone Itching    Eucalyptus Hives and Other (comments)     Burns mouth      Morphine Vertigo     Feels crazy  Other reaction(s): Hallucinations    Pcn [Penicillins] Hives    Pineapple Hives and Other (comments)     Burns mouth      Statins-Hmg-Coa Reductase Inhibitors Other (comments)    Sulfa (Sulfonamide Antibiotics) Rash and Nausea Only    Vancomycin Unknown (comments)     Kidney function worsened    Zetia [Ezetimibe] Unknown (comments)     Social History     Tobacco Use    Smoking status: Never Smoker    Smokeless tobacco: Never Used   Substance Use Topics    Alcohol use: No    Drug use: No     [phone] (home)   Past Surgical History:   Procedure Laterality Date    HX BREAST BIOPSY      left breast biopsy    HX BREAST BIOPSY  1968    left cyst removed    HX BREAST LUMPECTOMY Left 2001    HX BREAST REDUCTION      Rt breast    HX CARPAL TUNNEL RELEASE Bilateral     HX COLONOSCOPY  07/2014    HX CYST REMOVAL Right 11/2020    hand    HX HEART CATHETERIZATION  2009    HX HEENT      Sinus Surgery    HX HEENT      RK procedure bilateral eyes    HX HYSTERECTOMY      HX LUMBAR LAMINECTOMY  1992    HX MASTECTOMY Left 2002    With Reconstruction    HX OOPHORECTOMY      Unilateral    HX OPEN CHOLECYSTECTOMY  1981    HX OPEN REDUCTION INTERNAL FIXATION Left 2013    Ankle    HX OPEN REDUCTION INTERNAL FIXATION  08/2016    Tibia    HX ORTHOPAEDIC Right 08/2013    Patellar fracture repair    HX OTHER SURGICAL      [place]an Cath Placed and Removed    HX TONSILLECTOMY      HX TONSILLECTOMY      HX TRANSPLANT  2002    Stem Cell Transplant    HX UVULOPALATOPHARYNGOPLASTY  2004         ROS:    Gen.: no fever or significant change in appetite or weight          MSK:  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 The right second DIP is setter is somewhat enlarged minimally erythematous and minimally tender today wollen inflamed erythematous somewhat fluctuant.   No drainage  Is present and no definitive tophi noted              ASSESSMENT:      Acute inflammatory arthritis of the right second DIP joint: Gout, pseudogout, inflammatory osteoarthritis all possible. Doubt psoriatic arthritis        PLAN:      Colchicine 0.6 mg twice a day which should help if this is gout or pseudogout  Overlap prednisone  20 mg twice a day for 2 days with the colchicine and then discontinue prednisone is on 20 twice a day for 2 more days  She will see Dr. Gary Henao to have that right second DIP aspirated to look for urate crystals  RTO 6 weeks          KRIS Hernandez M.D.   Rheumatology - [facility] Osteoporosis & Arthritis

## 2022-08-19 ENCOUNTER — HOSPITAL ENCOUNTER (OUTPATIENT)
Dept: LAB | Age: 77
Discharge: HOME | End: 2022-08-22

## 2022-08-19 ENCOUNTER — HOSPITAL ENCOUNTER (OUTPATIENT)
Dept: INFUSION THERAPY | Age: 77
Discharge: HOME | End: 2022-08-19
Payer: MEDICARE

## 2022-08-19 VITALS
TEMPERATURE: 98.3 F | SYSTOLIC BLOOD PRESSURE: 152 MMHG | DIASTOLIC BLOOD PRESSURE: 79 MMHG | RESPIRATION RATE: 18 BRPM | HEART RATE: 65 BPM | OXYGEN SATURATION: 96 %

## 2022-08-19 DIAGNOSIS — M85.89 OSTEOPENIA OF MULTIPLE SITES: Primary | ICD-10-CM

## 2022-08-19 LAB
ALBUMIN SERPL-MCNC: 3.6 G/DL (ref 3.2–4.6)
ALBUMIN/GLOB SERPL: 0.9 {RATIO} (ref 1.2–3.5)
ALP SERPL-CCNC: 47 U/L (ref 50–136)
ALT SERPL-CCNC: 16 U/L (ref 12–65)
ANION GAP SERPL CALC-SCNC: 8 MMOL/L (ref 7–16)
AST SERPL-CCNC: 7 U/L (ref 15–37)
BILIRUB SERPL-MCNC: 0.4 MG/DL (ref 0.2–1.1)
BUN SERPL-MCNC: 46 MG/DL (ref 8–23)
CALCIUM SERPL-MCNC: 9.5 MG/DL (ref 8.3–10.4)
CHLORIDE SERPL-SCNC: 104 MMOL/L (ref 98–107)
CO2 SERPL-SCNC: 25 MMOL/L (ref 21–32)
CREAT SERPL-MCNC: 1.5 MG/DL (ref 0.6–1)
GLOBULIN SER CALC-MCNC: 4.2 G/DL (ref 2.3–3.5)
GLUCOSE SERPL-MCNC: 164 MG/DL (ref 65–100)
POTASSIUM SERPL-SCNC: 4.6 MMOL/L (ref 3.5–5.1)
PROT SERPL-MCNC: 7.8 G/DL (ref 6.3–8.2)
SODIUM SERPL-SCNC: 137 MMOL/L (ref 136–145)

## 2022-08-19 PROCEDURE — 36415 COLL VENOUS BLD VENIPUNCTURE: CPT

## 2022-08-19 PROCEDURE — 6360000002 HC RX W HCPCS: Performed by: NURSE PRACTITIONER

## 2022-08-19 PROCEDURE — 80053 COMPREHEN METABOLIC PANEL: CPT

## 2022-08-19 PROCEDURE — 96372 THER/PROPH/DIAG INJ SC/IM: CPT

## 2022-08-19 RX ORDER — ACETAMINOPHEN 325 MG/1
650 TABLET ORAL
OUTPATIENT
Start: 2023-02-17

## 2022-08-19 RX ORDER — DIPHENHYDRAMINE HYDROCHLORIDE 50 MG/ML
50 INJECTION INTRAMUSCULAR; INTRAVENOUS
OUTPATIENT
Start: 2023-02-17

## 2022-08-19 RX ORDER — EPINEPHRINE 1 MG/ML
0.3 INJECTION, SOLUTION, CONCENTRATE INTRAVENOUS PRN
OUTPATIENT
Start: 2023-02-17

## 2022-08-19 RX ORDER — SODIUM CHLORIDE 9 MG/ML
INJECTION, SOLUTION INTRAVENOUS CONTINUOUS
OUTPATIENT
Start: 2023-02-17

## 2022-08-19 RX ORDER — ALBUTEROL SULFATE 90 UG/1
4 AEROSOL, METERED RESPIRATORY (INHALATION) PRN
OUTPATIENT
Start: 2023-02-17

## 2022-08-19 RX ORDER — ONDANSETRON 2 MG/ML
8 INJECTION INTRAMUSCULAR; INTRAVENOUS
OUTPATIENT
Start: 2023-02-17

## 2022-08-19 RX ADMIN — DENOSUMAB 60 MG: 60 INJECTION SUBCUTANEOUS at 11:53

## 2022-08-19 NOTE — PROGRESS NOTES
Arrived to the [facility]. Prolia completed. Provided education on Prolia    Patient instructed to report any side affects to ordering provider. Patient tolerated without complications. Any issues or concerns during appointment: NO.  Patient aware in next injection due in  months. Discharged ambulatory.

## 2022-08-22 ENCOUNTER — TELEMEDICINE (OUTPATIENT)
Dept: INTERNAL MEDICINE CLINIC | Facility: CLINIC | Age: 77
End: 2022-08-22
Payer: MEDICARE

## 2022-08-22 ENCOUNTER — TELEPHONE (OUTPATIENT)
Dept: ORTHOPEDIC SURGERY | Age: 77
End: 2022-08-22

## 2022-08-22 DIAGNOSIS — M25.441 SWELLING OF FINGER JOINT OF RIGHT HAND: ICD-10-CM

## 2022-08-22 DIAGNOSIS — E11.9 CONTROLLED TYPE 2 DIABETES MELLITUS WITHOUT COMPLICATION, WITHOUT LONG-TERM CURRENT USE OF INSULIN (HCC): ICD-10-CM

## 2022-08-22 DIAGNOSIS — U07.1 COVID-19: Primary | ICD-10-CM

## 2022-08-22 PROCEDURE — 1090F PRES/ABSN URINE INCON ASSESS: CPT | Performed by: NURSE PRACTITIONER

## 2022-08-22 PROCEDURE — 3044F HG A1C LEVEL LT 7.0%: CPT | Performed by: NURSE PRACTITIONER

## 2022-08-22 PROCEDURE — 99214 OFFICE O/P EST MOD 30 MIN: CPT | Performed by: NURSE PRACTITIONER

## 2022-08-22 PROCEDURE — 1123F ACP DISCUSS/DSCN MKR DOCD: CPT | Performed by: NURSE PRACTITIONER

## 2022-08-22 PROCEDURE — G8399 PT W/DXA RESULTS DOCUMENT: HCPCS | Performed by: NURSE PRACTITIONER

## 2022-08-22 PROCEDURE — G8427 DOCREV CUR MEDS BY ELIG CLIN: HCPCS | Performed by: NURSE PRACTITIONER

## 2022-08-22 RX ORDER — GLIPIZIDE 5 MG/1
2.5 TABLET ORAL 2 TIMES DAILY PRN
Qty: 60 TABLET | Refills: 0 | Status: SHIPPED | OUTPATIENT
Start: 2022-08-22

## 2022-08-22 ASSESSMENT — PATIENT HEALTH QUESTIONNAIRE - PHQ9
SUM OF ALL RESPONSES TO PHQ QUESTIONS 1-9: 0
1. LITTLE INTEREST OR PLEASURE IN DOING THINGS: 0
3. TROUBLE FALLING OR STAYING ASLEEP: 0
9. THOUGHTS THAT YOU WOULD BE BETTER OFF DEAD, OR OF HURTING YOURSELF: 0
SUM OF ALL RESPONSES TO PHQ QUESTIONS 1-9: 0
5. POOR APPETITE OR OVEREATING: 0
6. FEELING BAD ABOUT YOURSELF - OR THAT YOU ARE A FAILURE OR HAVE LET YOURSELF OR YOUR FAMILY DOWN: 0
8. MOVING OR SPEAKING SO SLOWLY THAT OTHER PEOPLE COULD HAVE NOTICED. OR THE OPPOSITE, BEING SO FIGETY OR RESTLESS THAT YOU HAVE BEEN MOVING AROUND A LOT MORE THAN USUAL: 0
10. IF YOU CHECKED OFF ANY PROBLEMS, HOW DIFFICULT HAVE THESE PROBLEMS MADE IT FOR YOU TO DO YOUR WORK, TAKE CARE OF THINGS AT HOME, OR GET ALONG WITH OTHER PEOPLE: 0
4. FEELING TIRED OR HAVING LITTLE ENERGY: 0
2. FEELING DOWN, DEPRESSED OR HOPELESS: 0
SUM OF ALL RESPONSES TO PHQ QUESTIONS 1-9: 0
SUM OF ALL RESPONSES TO PHQ QUESTIONS 1-9: 0
7. TROUBLE CONCENTRATING ON THINGS, SUCH AS READING THE NEWSPAPER OR WATCHING TELEVISION: 0
SUM OF ALL RESPONSES TO PHQ9 QUESTIONS 1 & 2: 0

## 2022-08-22 ASSESSMENT — ENCOUNTER SYMPTOMS
SORE THROAT: 1
VOMITING: 0
NAUSEA: 1
SHORTNESS OF BREATH: 0
WHEEZING: 0
COUGH: 1
TROUBLE SWALLOWING: 0
DIARRHEA: 0

## 2022-08-22 ASSESSMENT — ANXIETY QUESTIONNAIRES
2. NOT BEING ABLE TO STOP OR CONTROL WORRYING: 0
4. TROUBLE RELAXING: 0
1. FEELING NERVOUS, ANXIOUS, OR ON EDGE: 0
3. WORRYING TOO MUCH ABOUT DIFFERENT THINGS: 0
7. FEELING AFRAID AS IF SOMETHING AWFUL MIGHT HAPPEN: 0
IF YOU CHECKED OFF ANY PROBLEMS ON THIS QUESTIONNAIRE, HOW DIFFICULT HAVE THESE PROBLEMS MADE IT FOR YOU TO DO YOUR WORK, TAKE CARE OF THINGS AT HOME, OR GET ALONG WITH OTHER PEOPLE: NOT DIFFICULT AT ALL
5. BEING SO RESTLESS THAT IT IS HARD TO SIT STILL: 0
6. BECOMING EASILY ANNOYED OR IRRITABLE: 0
GAD7 TOTAL SCORE: 0
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
## 2022-08-23 NOTE — TELEPHONE ENCOUNTER
I spoke with patient yesterday, she states her other doctor wants Dr. Rosalie Gonzalez to order repeat labs. Per Dr. Rosalie Gonzalez, I called and spoke with patient. Advised her that if her physician wants repeat labs ordered, they will have to order them on their own. Patient asked me to cancel her follow up appt with Dr. Rosalie Gonzalez. I advised her I would do that.

## 2022-09-12 ENCOUNTER — TELEPHONE (OUTPATIENT)
Dept: ORTHOPEDIC SURGERY | Age: 77
End: 2022-09-12

## 2022-09-22 ENCOUNTER — OFFICE VISIT (OUTPATIENT)
Dept: INTERNAL MEDICINE CLINIC | Facility: CLINIC | Age: 77
End: 2022-09-22
Payer: MEDICARE

## 2022-09-22 VITALS
WEIGHT: 154.2 LBS | HEIGHT: 65 IN | HEART RATE: 69 BPM | OXYGEN SATURATION: 99 % | DIASTOLIC BLOOD PRESSURE: 84 MMHG | BODY MASS INDEX: 25.69 KG/M2 | SYSTOLIC BLOOD PRESSURE: 144 MMHG | TEMPERATURE: 97 F

## 2022-09-22 DIAGNOSIS — G89.29 OTHER CHRONIC PAIN: ICD-10-CM

## 2022-09-22 DIAGNOSIS — M85.89 OSTEOPENIA OF MULTIPLE SITES: ICD-10-CM

## 2022-09-22 DIAGNOSIS — N18.31 STAGE 3A CHRONIC KIDNEY DISEASE (HCC): ICD-10-CM

## 2022-09-22 DIAGNOSIS — Z23 NEEDS FLU SHOT: ICD-10-CM

## 2022-09-22 DIAGNOSIS — Z94.84 STEM CELLS TRANSPLANT STATUS (HCC): ICD-10-CM

## 2022-09-22 DIAGNOSIS — N18.31 TYPE 2 DIABETES MELLITUS WITH STAGE 3A CHRONIC KIDNEY DISEASE, WITHOUT LONG-TERM CURRENT USE OF INSULIN (HCC): Primary | ICD-10-CM

## 2022-09-22 DIAGNOSIS — G62.0 CHEMOTHERAPY-INDUCED NEUROPATHY (HCC): ICD-10-CM

## 2022-09-22 DIAGNOSIS — T45.1X5A CHEMOTHERAPY-INDUCED NEUROPATHY (HCC): ICD-10-CM

## 2022-09-22 DIAGNOSIS — E78.5 DYSLIPIDEMIA: ICD-10-CM

## 2022-09-22 DIAGNOSIS — F51.01 PRIMARY INSOMNIA: ICD-10-CM

## 2022-09-22 DIAGNOSIS — I42.0 DILATED CARDIOMYOPATHY (HCC): ICD-10-CM

## 2022-09-22 DIAGNOSIS — M35.00 SJOGREN SYNDROME, UNSPECIFIED (HCC): ICD-10-CM

## 2022-09-22 DIAGNOSIS — E11.22 TYPE 2 DIABETES MELLITUS WITH STAGE 3A CHRONIC KIDNEY DISEASE, WITHOUT LONG-TERM CURRENT USE OF INSULIN (HCC): Primary | ICD-10-CM

## 2022-09-22 DIAGNOSIS — I10 ESSENTIAL HYPERTENSION: ICD-10-CM

## 2022-09-22 PROCEDURE — G8399 PT W/DXA RESULTS DOCUMENT: HCPCS | Performed by: NURSE PRACTITIONER

## 2022-09-22 PROCEDURE — 1090F PRES/ABSN URINE INCON ASSESS: CPT | Performed by: NURSE PRACTITIONER

## 2022-09-22 PROCEDURE — G8417 CALC BMI ABV UP PARAM F/U: HCPCS | Performed by: NURSE PRACTITIONER

## 2022-09-22 PROCEDURE — 1123F ACP DISCUSS/DSCN MKR DOCD: CPT | Performed by: NURSE PRACTITIONER

## 2022-09-22 PROCEDURE — 90694 VACC AIIV4 NO PRSRV 0.5ML IM: CPT | Performed by: NURSE PRACTITIONER

## 2022-09-22 PROCEDURE — 99214 OFFICE O/P EST MOD 30 MIN: CPT | Performed by: NURSE PRACTITIONER

## 2022-09-22 PROCEDURE — G8427 DOCREV CUR MEDS BY ELIG CLIN: HCPCS | Performed by: NURSE PRACTITIONER

## 2022-09-22 PROCEDURE — 1036F TOBACCO NON-USER: CPT | Performed by: NURSE PRACTITIONER

## 2022-09-22 PROCEDURE — 3044F HG A1C LEVEL LT 7.0%: CPT | Performed by: NURSE PRACTITIONER

## 2022-09-22 PROCEDURE — G0008 ADMIN INFLUENZA VIRUS VAC: HCPCS | Performed by: NURSE PRACTITIONER

## 2022-09-22 RX ORDER — TRAMADOL HYDROCHLORIDE 50 MG/1
50 TABLET ORAL 2 TIMES DAILY PRN
Qty: 30 TABLET | Refills: 1 | Status: SHIPPED | OUTPATIENT
Start: 2022-09-22 | End: 2022-10-22

## 2022-09-22 RX ORDER — FLUTICASONE PROPIONATE 50 MCG
SPRAY, SUSPENSION (ML) NASAL
COMMUNITY
Start: 2022-09-08

## 2022-09-22 ASSESSMENT — ENCOUNTER SYMPTOMS
ABDOMINAL PAIN: 0
COUGH: 0
NAUSEA: 0
TROUBLE SWALLOWING: 0
WHEEZING: 0
VOMITING: 0
DIARRHEA: 0
SHORTNESS OF BREATH: 0
BACK PAIN: 1

## 2022-09-22 ASSESSMENT — PATIENT HEALTH QUESTIONNAIRE - PHQ9
1. LITTLE INTEREST OR PLEASURE IN DOING THINGS: 0
2. FEELING DOWN, DEPRESSED OR HOPELESS: 0
8. MOVING OR SPEAKING SO SLOWLY THAT OTHER PEOPLE COULD HAVE NOTICED. OR THE OPPOSITE, BEING SO FIGETY OR RESTLESS THAT YOU HAVE BEEN MOVING AROUND A LOT MORE THAN USUAL: 0
SUM OF ALL RESPONSES TO PHQ9 QUESTIONS 1 & 2: 0
4. FEELING TIRED OR HAVING LITTLE ENERGY: 0
10. IF YOU CHECKED OFF ANY PROBLEMS, HOW DIFFICULT HAVE THESE PROBLEMS MADE IT FOR YOU TO DO YOUR WORK, TAKE CARE OF THINGS AT HOME, OR GET ALONG WITH OTHER PEOPLE: 0
SUM OF ALL RESPONSES TO PHQ QUESTIONS 1-9: 0
5. POOR APPETITE OR OVEREATING: 0
7. TROUBLE CONCENTRATING ON THINGS, SUCH AS READING THE NEWSPAPER OR WATCHING TELEVISION: 0
3. TROUBLE FALLING OR STAYING ASLEEP: 0
9. THOUGHTS THAT YOU WOULD BE BETTER OFF DEAD, OR OF HURTING YOURSELF: 0
6. FEELING BAD ABOUT YOURSELF - OR THAT YOU ARE A FAILURE OR HAVE LET YOURSELF OR YOUR FAMILY DOWN: 0
SUM OF ALL RESPONSES TO PHQ QUESTIONS 1-9: 0

## 2022-09-22 ASSESSMENT — ANXIETY QUESTIONNAIRES
GAD7 TOTAL SCORE: 0
6. BECOMING EASILY ANNOYED OR IRRITABLE: 0
3. WORRYING TOO MUCH ABOUT DIFFERENT THINGS: 0
IF YOU CHECKED OFF ANY PROBLEMS ON THIS QUESTIONNAIRE, HOW DIFFICULT HAVE THESE PROBLEMS MADE IT FOR YOU TO DO YOUR WORK, TAKE CARE OF THINGS AT HOME, OR GET ALONG WITH OTHER PEOPLE: NOT DIFFICULT AT ALL
1. FEELING NERVOUS, ANXIOUS, OR ON EDGE: 0
2. NOT BEING ABLE TO STOP OR CONTROL WORRYING: 0
7. FEELING AFRAID AS IF SOMETHING AWFUL MIGHT HAPPEN: 0
4. TROUBLE RELAXING: 0
5. BEING SO RESTLESS THAT IT IS HARD TO SIT STILL: 0
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
## 2022-10-04 ENCOUNTER — OFFICE VISIT (OUTPATIENT)
Dept: INTERNAL MEDICINE CLINIC | Facility: CLINIC | Age: 77
End: 2022-10-04
Payer: MEDICARE

## 2022-10-04 VITALS
BODY MASS INDEX: 25.66 KG/M2 | HEIGHT: 65 IN | TEMPERATURE: 97 F | HEART RATE: 66 BPM | WEIGHT: 154 LBS | SYSTOLIC BLOOD PRESSURE: 140 MMHG | DIASTOLIC BLOOD PRESSURE: 82 MMHG | OXYGEN SATURATION: 99 %

## 2022-10-04 DIAGNOSIS — R30.0 DYSURIA: ICD-10-CM

## 2022-10-04 DIAGNOSIS — N30.00 ACUTE CYSTITIS WITHOUT HEMATURIA: Primary | ICD-10-CM

## 2022-10-04 LAB
BILIRUBIN, URINE, POC: NEGATIVE
BLOOD URINE, POC: ABNORMAL
GLUCOSE URINE, POC: NEGATIVE
KETONES, URINE, POC: NEGATIVE
LEUKOCYTE ESTERASE, URINE, POC: ABNORMAL
NITRITE, URINE, POC: NEGATIVE
PH, URINE, POC: 5.5 (ref 4.6–8)
PROTEIN,URINE, POC: POSITIVE
SPECIFIC GRAVITY, URINE, POC: 1.02 (ref 1–1.03)
URINALYSIS CLARITY, POC: CLEAR
URINALYSIS COLOR, POC: ABNORMAL
UROBILINOGEN, POC: 0.2

## 2022-10-04 PROCEDURE — 1090F PRES/ABSN URINE INCON ASSESS: CPT | Performed by: NURSE PRACTITIONER

## 2022-10-04 PROCEDURE — G8399 PT W/DXA RESULTS DOCUMENT: HCPCS | Performed by: NURSE PRACTITIONER

## 2022-10-04 PROCEDURE — G8427 DOCREV CUR MEDS BY ELIG CLIN: HCPCS | Performed by: NURSE PRACTITIONER

## 2022-10-04 PROCEDURE — 99214 OFFICE O/P EST MOD 30 MIN: CPT | Performed by: NURSE PRACTITIONER

## 2022-10-04 PROCEDURE — 1036F TOBACCO NON-USER: CPT | Performed by: NURSE PRACTITIONER

## 2022-10-04 PROCEDURE — G8484 FLU IMMUNIZE NO ADMIN: HCPCS | Performed by: NURSE PRACTITIONER

## 2022-10-04 PROCEDURE — G8417 CALC BMI ABV UP PARAM F/U: HCPCS | Performed by: NURSE PRACTITIONER

## 2022-10-04 PROCEDURE — 81003 URINALYSIS AUTO W/O SCOPE: CPT | Performed by: NURSE PRACTITIONER

## 2022-10-04 PROCEDURE — 1123F ACP DISCUSS/DSCN MKR DOCD: CPT | Performed by: NURSE PRACTITIONER

## 2022-10-04 RX ORDER — FLUCONAZOLE 150 MG/1
150 TABLET ORAL ONCE
Qty: 2 TABLET | Refills: 0 | Status: SHIPPED | OUTPATIENT
Start: 2022-10-04 | End: 2022-10-04

## 2022-10-04 RX ORDER — CEPHALEXIN 500 MG/1
500 CAPSULE ORAL 3 TIMES DAILY
Qty: 21 CAPSULE | Refills: 0 | Status: SHIPPED | OUTPATIENT
Start: 2022-10-04 | End: 2022-10-11

## 2022-10-04 ASSESSMENT — ENCOUNTER SYMPTOMS
DIARRHEA: 0
SHORTNESS OF BREATH: 0
NAUSEA: 0
ABDOMINAL PAIN: 0
VOMITING: 0
COUGH: 0

## 2022-10-04 NOTE — PROGRESS NOTES
[facility]  Office Visit Note    Subjective:  Chief Complaint   Patient presents with    Urinary Pain     Going on for about 1 week        Patient ID: Jose Maria Lund is a 69 y.o. female presenting to the office for the above. 69year old female with UTI symptoms. Symptoms started about a week ago, with dysuria, frequency, urgency, low back pain, nausea. Denies fever/chills, vomiting, flank pain, gross hematuria. --Treat with Keflex. Discussed risks/benefits, alternatives, potential side effects, proper administration of medication; educational handout given. --Stay well hydrated. Will send urine for culture, with treatment adjustment as indicated. --Will send Diflucan as well, as she sometimes gets yeast infections with antibiotics. Discussed risks/benefits, alternatives, potential side effects, proper administration of medication; educational handout given. --Advised of symptoms that would require reevaluation, or that would require immediate medical attention; she expresses understanding. History: Allergies   Allergen Reactions    Oxycodone Itching    Eucalyptus Oil Hives and Other (See Comments)     Burns mouth    Ezetimibe Myalgia    Morphine Other (See Comments)     Feels crazy  Other reaction(s): Hallucinations    Penicillins Hives    Pineapple Hives and Other (See Comments)     Burns mouth    Vancomycin Other (See Comments)     Kidney function worsened    Sulfa Antibiotics Nausea Only and Rash       Past Medical History:   Diagnosis Date    Ankle fracture 2014    Ankle osteomyelitis, left ([address] 2014    Breast cancer ([address]     Chemotherapy-induced neuropathy ([address]     Chronic anemia     Depression     Dry eye syndrome of both eyes     Dyslipidemia     Essential hypertension     Fracture of right patella 2013    History of left breast cancer 2001    with mastectomy    Left leg weakness 9/8/2020    Non-ischemic cardiomyopathy ([address] 2009    EF 15% 2009, Cath-minimal CAD.  EF normalized     Osteoarthritis     Osteopenia     Peripheral sensory-motor axonal polyneuropathy 10/17/2020    S/P cardiac cath 2009    Statin intolerance     Tibial fracture 2016    Tibial plateau fracture 5/8/6079    Last Assessment & Plan:  Formatting of this note might be different from the original. Pod 2 ORIF doing well. C/o moderate pain. Controlled on PO meds.   Ready to go home    Type 2 diabetes mellitus, controlled ([address]     Vitamin B12 deficiency        Past Surgical History:   Procedure Laterality Date    BREAST BIOPSY      left breast biopsy    BREAST BIOPSY  1968    left cyst removed    BREAST LUMPECTOMY Left 2001    BREAST REDUCTION SURGERY      Rt breast    CARDIAC CATHETERIZATION  2009    CARPAL TUNNEL RELEASE Bilateral     CHOLECYSTECTOMY, OPEN  1981    COLONOSCOPY  07/2014    CYST REMOVAL Right 11/2020    hand    HEENT      Sinus Surgery    HEENT      RK procedure bilateral eyes    HX OPEN REDUCTION INTERNAL FIXATION Left 2013    [place]kle    HX OPEN REDUCTION INTERNAL FIXATION  08/2016    [place]    HYSTERECTOMY (CERVIX STATUS UNKNOWN)      LUMBAR LAMINECTOMY  1992    MASTECTOMY Left 2002    With Reconstruction    ORTHOPEDIC SURGERY Right 08/2013    Patellar fracture repair    OTHER SURGICAL HISTORY      Chetan Cath Placed and Removed    OVARY REMOVAL      Unilateral    TONSILLECTOMY      TONSILLECTOMY      TRANSPLANT  2002    Stem Cell Transplant    UVULOPALATOPHARYGOPLASTY  2004       Family History   Problem Relation Age of Onset    Cancer Mother     Diabetes Mother     Heart Disease Mother         MI at age 66    Heart Failure Mother         age 58    Breast Cancer Neg Hx     Stroke Sister     Cancer Brother     Heart Disease Brother     Stomach Cancer Neg Hx     Heart Disease Father         MI age 55       Social History     Tobacco Use   Smoking Status Never   Smokeless Tobacco Never       Social History     Substance and Sexual Activity   Alcohol Use No       Current Outpatient Medications   Medication Sig Dispense Refill    cephALEXin (KEFLEX) 500 MG capsule Take 1 capsule by mouth 3 times daily for 7 days 21 capsule 0    fluconazole (DIFLUCAN) 150 MG tablet Take 1 tablet by mouth once for 1 dose Take at first sign of yeast infection. May repeat dose in 72 hours if still having symptoms. 2 tablet 0    fluticasone (FLONASE) 50 MCG/ACT nasal spray       traMADol (ULTRAM) 50 MG tablet Take 1 tablet by mouth 2 times daily as needed for Pain for up to 30 days. Intended supply: 3 days. Take lowest dose possible to manage pain 30 tablet 1    glipiZIDE (GLUCOTROL) 5 MG tablet Take 0.5 tablets by mouth 2 times daily as needed (elevated glucose) 60 tablet 0    colchicine (COLCRYS) 0.6 MG tablet Take 1 tablet by mouth 2 times daily 60 tablet 5    allopurinol (ZYLOPRIM) 100 MG tablet Take 1 tablet by mouth in the morning. 30 tablet 1    promethazine (PHENERGAN) 25 MG tablet Take 1 tablet by mouth daily as needed for Nausea 90 tablet 0    furosemide (LASIX) 40 MG tablet Take 1 tablet by mouth every other day 45 tablet 1    gabapentin (NEURONTIN) 600 MG tablet Take 1 tablet by mouth 2 times daily for 360 days.  180 tablet 1    lisinopril (PRINIVIL;ZESTRIL) 5 MG tablet Take 1 tablet by mouth daily (Patient taking differently: Take 5 mg by mouth daily Bid) 90 tablet 1    metFORMIN (GLUCOPHAGE-XR) 500 mg extended release tablet Take 2 tablets by mouth 2 times daily 360 tablet 1    calcium carbonate (TUMS) 500 MG chewable tablet Take 1 tablet by mouth daily      dextromethorphan-guaiFENesin (MUCINEX DM)  MG per extended release tablet Take 1 tablet by mouth every 12 hours as needed      acetaminophen (TYLENOL) 500 MG tablet Take by mouth every 6 hours as needed      cetirizine (ZYRTEC) 10 MG tablet Take 10 mg by mouth daily as needed      metoprolol succinate (TOPROL XL) 50 MG extended release tablet Take 50 mg by mouth daily Once a day      potassium chloride (KLOR-CON M) 20 MEQ extended release tablet TAKE 1 TABLET EVERY MORNING AND 2 TABLETS EVERY EVENING      valACYclovir (VALTREX) 1 g tablet 2 tablets 2 times a day for 1 day only with mouth sore occurs      Cholecalciferol 50 MCG (2000 UT) CAPS Take 2,000 Units by mouth daily (Patient not taking: Reported on 10/4/2022)       No current facility-administered medications for this visit. Review of Systems:  Review of Systems   Constitutional:  Negative for appetite change, chills and fever. Respiratory:  Negative for cough and shortness of breath. Cardiovascular:  Negative for chest pain. Gastrointestinal:  Negative for abdominal pain, diarrhea, nausea and vomiting. Genitourinary:  Positive for dysuria, frequency and urgency. Negative for flank pain, hematuria and pelvic pain. Psychiatric/Behavioral:  Negative for dysphoric mood. The patient is not nervous/anxious. See HPI for other pertinent positives and negatives. Objective:  Vitals:    10/04/22 0802 10/04/22 0808 10/04/22 0832   BP: (!) 181/82 (!) 170/87 (!) 140/82   Pulse: 66     Temp: 97 °F (36.1 °C)     TempSrc: Temporal     SpO2: 99%     Weight: 154 lb (69.9 kg)     Height: 5' 5\" (1.651 m)         Body mass index is 25.63 kg/m². Physical Exam  Vitals and nursing note reviewed. Constitutional:       General: She is not in acute distress. Appearance: Normal appearance. She is not ill-appearing or diaphoretic. HENT:      Head: Normocephalic and atraumatic. Eyes:      General: No scleral icterus. Right eye: No discharge. Left eye: No discharge. Cardiovascular:      Rate and Rhythm: Normal rate and regular rhythm. Heart sounds: Normal heart sounds. Pulmonary:      Effort: Pulmonary effort is normal. No respiratory distress. Breath sounds: Normal breath sounds. Abdominal:      General: Bowel sounds are normal.      Palpations: Abdomen is soft. Tenderness: There is no right CVA tenderness or left CVA tenderness.    Skin: General: Skin is warm and dry. Neurological:      Mental Status: She is alert and oriented to person, place, and time.    Psychiatric:         Mood and Affect: Mood normal.         Speech: Speech normal.         Behavior: Behavior normal.                Lab Results   Component Value Date    WBC 7.9 07/12/2022    HGB 11.4 (L) 07/12/2022    HCT 34.2 (L) 07/12/2022    MCV 88.1 07/12/2022     07/12/2022   ,   Lab Results   Component Value Date/Time     08/19/2022 10:54 AM    K 4.6 08/19/2022 10:54 AM     08/19/2022 10:54 AM    CO2 25 08/19/2022 10:54 AM    BUN 46 08/19/2022 10:54 AM    CREATININE 1.50 08/19/2022 10:54 AM    GLUCOSE 164 08/19/2022 10:54 AM    CALCIUM 9.5 08/19/2022 10:54 AM    ,   Lab Results   Component Value Date    TSH 1.690 06/08/2021   ,   Lab Results   Component Value Date    ALT 16 08/19/2022    AST 7 (L) 08/19/2022    ALKPHOS 47 (L) 08/19/2022    BILITOT 0.4 08/19/2022   ,   Lab Results   Component Value Date    LABA1C 6.2 06/06/2022     Lab Results   Component Value Date     06/06/2022   ,   Lab Results   Component Value Date    LABMICR Comment 10/13/2020       PHQ-9  9/22/2022   Little interest or pleasure in doing things 0   Little interest or pleasure in doing things -   Feeling down, depressed, or hopeless 0   Trouble falling or staying asleep, or sleeping too much 0   Trouble falling or staying asleep, or sleeping too much -   Feeling tired or having little energy 0   Feeling tired or having little energy -   Poor appetite or overeating 0   Poor appetite, weight loss, or overeating -   Feeling bad about yourself - or that you are a failure or have let yourself or your family down 0   Feeling bad about yourself - or that you are a failure or have let yourself or your family down -   Trouble concentrating on things, such as reading the newspaper or watching television 0   Trouble concentrating on things such as school, work, reading, or watching TV -   Moving or speaking so slowly that other people could have noticed. Or the opposite - being so fidgety or restless that you have been moving around a lot more than usual 0   Moving or speaking so slowly that other people could have noticed; or the opposite being so fidgety that others notice -   Thoughts that you would be better off dead, or of hurting yourself in some way 0   Thoughts of being better off dead, or hurting yourself in some way -   PHQ-2 Score 0   Total Score PHQ 2 -   PHQ-9 Total Score 0   PHQ 9 Score -   If you checked off any problems, how difficult have these problems made it for you to do your work, take care of things at home, or get along with other people? 0        Assessment/Plan:      Health Maintenance Due   Topic Date Due    Shingles vaccine (1 of 2) 02/26/2015    COVID-19 Vaccine (3 - Pfizer risk series) 03/13/2021          Vivek Norton was seen today for urinary pain. Diagnoses and all orders for this visit:    Acute cystitis without hematuria  -     cephALEXin (KEFLEX) 500 MG capsule; Take 1 capsule by mouth 3 times daily for 7 days  -     fluconazole (DIFLUCAN) 150 MG tablet; Take 1 tablet by mouth once for 1 dose Take at first sign of yeast infection. May repeat dose in 72 hours if still having symptoms. Dysuria  -     Culture, Urine; Future  -     AMB POC URINALYSIS DIP STICK AUTO W/O MICRO      Patient states she is otherwise doing well; has no further questions or concerns at this visit. Encouraged to contact office with any concerns prior to next visit. Return if symptoms worsen or fail to improve, for Next scheduled visit.     Trent Alanis, APRN - CNP

## 2022-10-07 LAB
BACTERIA SPEC CULT: ABNORMAL
BACTERIA SPEC CULT: ABNORMAL
SERVICE CMNT-IMP: ABNORMAL

## 2022-10-13 ENCOUNTER — TELEPHONE (OUTPATIENT)
Dept: ORTHOPEDIC SURGERY | Age: 77
End: 2022-10-13

## 2022-10-13 ENCOUNTER — OFFICE VISIT (OUTPATIENT)
Dept: RHEUMATOLOGY | Age: 77
End: 2022-10-13
Payer: MEDICARE

## 2022-10-13 VITALS
DIASTOLIC BLOOD PRESSURE: 78 MMHG | BODY MASS INDEX: 25.16 KG/M2 | HEIGHT: 65 IN | HEART RATE: 71 BPM | WEIGHT: 151 LBS | SYSTOLIC BLOOD PRESSURE: 146 MMHG

## 2022-10-13 DIAGNOSIS — M13.141: Primary | ICD-10-CM

## 2022-10-13 PROCEDURE — G8427 DOCREV CUR MEDS BY ELIG CLIN: HCPCS | Performed by: INTERNAL MEDICINE

## 2022-10-13 PROCEDURE — 1090F PRES/ABSN URINE INCON ASSESS: CPT | Performed by: INTERNAL MEDICINE

## 2022-10-13 PROCEDURE — G8399 PT W/DXA RESULTS DOCUMENT: HCPCS | Performed by: INTERNAL MEDICINE

## 2022-10-13 PROCEDURE — 99213 OFFICE O/P EST LOW 20 MIN: CPT | Performed by: INTERNAL MEDICINE

## 2022-10-13 PROCEDURE — 1036F TOBACCO NON-USER: CPT | Performed by: INTERNAL MEDICINE

## 2022-10-13 PROCEDURE — G8484 FLU IMMUNIZE NO ADMIN: HCPCS | Performed by: INTERNAL MEDICINE

## 2022-10-13 PROCEDURE — 1123F ACP DISCUSS/DSCN MKR DOCD: CPT | Performed by: INTERNAL MEDICINE

## 2022-10-13 PROCEDURE — G8417 CALC BMI ABV UP PARAM F/U: HCPCS | Performed by: INTERNAL MEDICINE

## 2022-10-13 NOTE — PROGRESS NOTES
[facility] RHEUMATOLOGY  KRIS Leiva M.D. Phone: [phone]   Fax: [phone]    5/20/2021 11:36 AM      SUBJECTIVE: Per previous not from Dr. Anila Zacarias on 5/20/21      Ulysses Redwood is a 77 y.o. female is seen today because of a swollen left ankle and foot in addition to a positive SSA antibody and elevated sedimentation rate and CRP and slightly elevated rheumatoid factor. The SSA antibody was noted to be positive back in June 2018 and acute phase reactants have been elevated since about 2015 there is also a history of positive kappa light chains noted in the record from 2019    Originally seen about 3 or 4 years ago but last seen in 2018. Last seen she really has been fairly stable though continues to have chronic trouble with the left ankle where she had a fracture has had full surgical procedures. She is not quite up with her orthopedic surgeon recently. She still has some dry eye syndrome and a dry mouth but no systemic involvement related to Sjogren's syndrome though she has had a positive SSA antibody for years and positive acute phase reactants. She recently also as noted above had a low titer positive rheumatoid factor but has no clinical symptoms to suggest rheumatoid disease. She said no fever or rash no pulmonary problems she has had some urinary tract infections in the past.  She has no true dysphagia but does drink excessive liquids with meals and cannot eat saltine crackers without liquids she does not use any prescription eyedrops and has not had a tear duct plug and is followed by her ophthalmologist Dr. Hannah Crowley. Overall she seems stable. She does have some hand symptoms related to osteoarthritis. No Raynaud's or photosensitivity.     Apparently has some swelling of her left great toe in the past and did go to the [address] [place] [address] urgent care and they considered gout but this has resolved it was a one-time event and has not recurred and only bears observation at this point. LAST VISIT 8/18/2022:     Finger arthritis: She comes in today on colchicine 0.6 mg twice a day and no steroids. The right second DIP is still somewhat erythematous and minimally tender and a little fluctuant though not as painful as before taking the colchicine. Exam notable for this is unusual for gout to began in the upper extremity, be persistent instead of intermittent. In view of this I would like to have Dr. Trina Maldonado aspirate that DIP joint and have the lab look for urate and calcium pyrophosphate crystals. Apparently some calcification was seen in the wrist consistent with calcium pyrophosphate disease which theoretically could do this as could inflammatory osteoarthritis. She does have elevated uric acid is noted before but again due to the peculiar nature of this I would like better documentation of gout before pursuing the elevated uric acid level    Mild Sicca: Quite stable from the standpoint of mild sicca symptoms with positive serologic studies. There is no evidence of systemic involvement from Sjogren's syndrome. She does have some dryness in her eyes and uses over-the-counter drops. She has not required any prescription from her ophthalmologist.  She does have some diffuse aches and pains noted below. No pulmonary problems no GI problems other than some diarrhea since she had a colonoscopy several years ago. No unusual cutaneous trouble no Raynaud's or photosensitivity.     Osteoarthritis of the hands: Heberden's and Maura's nodes and has some mild discomfort here and I did suggest she might try some Tylenol she got significantly worse might need to see the orthopedic hand specialist.    Osteopenia with high fracture risk: He had a recent DEXA scan ordered by Dr. Clarisa Bruce and did have increased fracture risk and has been put on alendronate but apparently did not tolerate it and will get back with them for follow-up therapy    The DEXA scan done in November 2019 and she is osteopenic but has a high fracture risk with a 10-year probability of hip fracture of 3.2% and should be on appropriate calcium and vitamin D as well as probable antiresorptive medication. She will check with Ms. Macario Syed concerning this as I have not been specifically asked to get involved in the treatment of her osteopenia. LAB:  7/12/22: K+ 3.4 and glucose 134. Creatinine elevated at 1.1 GFR decreased to 51 and total proteins increased to 8.3 otherwise unremarkable CMP. Hemoglobin low 11.4 otherwise unremarkable CMP PSA  6/29/2022: Uric Acid 8.4. Hemoglobin 10.8. Potassium 3.4 and blood sugar 134 otherwise unremarkable CMP. Creatinine elevated 1.0 and GFR decreased to 51. Liver functions normal total protein normal globulins increased to 4.1. CRP minimally elevated at 1.9 with normal being less than 1.0.  ESR is normal  3/6/2021: Following were normal or negative: LUZ, chromatin, RNP, SM, SCL 70, SSB, anticentromere. SSA positive at greater than 8.0 I do not see an LUZ. CRP elevated at 97 normal less than 10, rheumatoid factor 27.6 normal less than 14. Urinalysis unremarkable. CBC normal.  2/9/9 2021: Vitamin D level 21.9  5/2/2019: Positive light chains of Kappa orgin. ESR 65, CRP slightly elevated to 2.2  6/27/2018: Following were normal or negative: SCL 70, SM antibody, RNP antibody, centromere antibody, DNA antibody.   SSB was normal.  SSA greater than 8.0  2/9/2015: ESR 82  1015/2020: Nerve conduction studies show bilateral carpal tunnel syndrome    XRAYS:      DXA:                       Patient Active Problem List   Diagnosis Code    Depression F32.9    Chronic anemia D64.9    Osteopenia M85.80    Osteoarthritis M19.90    Dyslipidemia E78.5    Cardiac Cath minimal CAD Z98.890    Vitamin B12 deficiency E53.8    Statin intolerance Z78.9    Essential hypertension I10    Chemotherapy-induced neuropathy (HCC) G62.0, T45.1X5A    Dilated cardiomyopathy ([address] I42.0    Type 2 diabetes mellitus, controlled ([facility] E11.9    Left leg weakness R29.898    Stem cells transplant status ([facility]) Z94.84    Peripheral sensory-motor axonal polyneuropathy G60.8    Palpitation R00.2    Stage 3a chronic kidney disease (HCC) N18.31    Vitamin D deficiency E55.9    Arthropathy of cervical facet joint M47.812    Chronic periscapular pain on right side M25.511, G89.29    Foraminal stenosis of cervical region M48.02    Muscle spasm M62.838    Recurrent occipital headache R51.9    Tibial plateau fracture H50.704W     Current Outpatient Medications   Medication Sig Dispense Refill    guaifenesin (MUCINEX PO) Take  by mouth.      metoprolol succinate (TOPROL-XL) 50 mg XL tablet Take 1 Tab by mouth daily. 90 Tab 3    nitrofurantoin, macrocrystal-monohydrate, (MACROBID) 100 mg capsule Take 1 Cap by mouth two (2) times a day. 10 Cap 0    amLODIPine (Norvasc) 5 mg tablet Take 5 mg by mouth daily. budesonide (RHINOCORT AQUA) 32 mcg/actuation nasal spray 32 Sprays by Both Nostrils route daily. carvediloL (COREG) 6.25 mg tablet Take 6.25 mg by mouth daily. fluticasone propionate (FLONASE) 50 mcg/actuation nasal spray 50 Sprays by Both Nostrils route daily. meloxicam (MOBIC) 7.5 mg tablet Take 7.5 mg by mouth daily. traMADoL (ULTRAM) 50 mg tablet Take 50 mg by mouth daily. tiZANidine (ZANAFLEX) 2 mg tablet Take 2 mg by mouth daily. benzonatate (TESSALON) 200 mg capsule       esomeprazole (NexIUM) 20 mg capsule Take  by mouth daily. cranberry fruit extract (CRANBERRY EXTRACT PO) Take  by mouth.      lisinopriL (PRINIVIL, ZESTRIL) 5 mg tablet Take 0.5 Tabs by mouth daily. 45 Tab 1    cholecalciferol (VITAMIN D3) (2,000 UNITS /50 MCG) cap capsule Take 1 Cap by mouth daily.  90 Cap 1    valACYclovir (Valtrex) 1 gram tablet 2 tablets 2 times a day for 1 day only with mouth sore occurs 90 Tab 0    metFORMIN ER (GLUCOPHAGE XR) 500 mg tablet TAKE 2 TABLETS TWICE A  Tab 1    Insulin Syringe-Needle U-100 (BD Insulin Syringe) 1 mL 25 x 1\" syrg Use syringe to draw up for B-12 injection once weekly for 4 weeks and then once monthly. 12 Syringe 0    cyanocobalamin (Vitamin B-12) 1,000 mcg/mL injection 1 ML every week IM for 4 weeks,  then 1 ML monthly. 10 mL 3    cetirizine (ZyrTEC) 10 mg tablet Take 10 mg by mouth daily as needed for Allergies. gabapentin (NEURONTIN) 600 mg tablet Take 1 Tab by mouth two (2) times a day. 180 Tab 3    potassium chloride (Klor-Con M20) 20 mEq tablet TAKE 1 TABLET EVERY        MORNING AND 2 TABLETS      EVERY EVENING (Patient taking differently: Take 20 mEq by mouth two (2) times a day. TAKE 1 TABLET EVERY        MORNING AND 2 TABLETS      EVERY EVENING) 270 Tab 1    mometasone (ELOCON) 0.1 % topical cream Apply  to affected area daily as needed for Skin Irritation. 45 g 1    furosemide (LASIX) 40 mg tablet Take 1 Tab by mouth as needed (swelling). 90 Tab 3    OTHER,NON-FORMULARY, Indications: CBD OIL      glucose blood VI test strips (ACCU-CHEK LULU PLUS TEST STRP) strip Dx E11.65  Use daily as directed 100 Strip 11    Blood-Glucose Meter (ACCU-CHEK LULU PLUS METER) misc Dx E11.65  Use daily as directed 1 Each 0    Lancing Device with Lancets (ACCU-CHEK MULTICLIX LANCET) kit Dx E11.65  Use daily as directed 1 Kit 0    Lancets (ACCU-CHEK MULTICLIX LANCET) misc Dx E11.65  Use daily as directed 100 Each 5    Blood Glucose Control High&Low (ACCU-CHEK LULU CONTROL SOLN) soln Dx E11.65  Use daily as directed 1 Bottle 5    acetaminophen (TYLENOL EXTRA STRENGTH) 500 mg tablet Take  by mouth every six (6) hours as needed for Pain. diphenhydrAMINE (BENADRYL ALLERGY) 25 mg tablet Take 25 mg by mouth every six (6) hours as needed for Sleep.        Allergies   Allergen Reactions    Oxycodone Itching    Eucalyptus Hives and Other (comments)     Burns mouth      Morphine Vertigo     Feels crazy  Other reaction(s): Hallucinations    Pcn [Penicillins] Hives    Pineapple Hives and Other (comments) Burns mouth      Statins-Hmg-Coa Reductase Inhibitors Other (comments)    Sulfa (Sulfonamide Antibiotics) Rash and Nausea Only    Vancomycin Unknown (comments)     Kidney function worsened    Zetia [Ezetimibe] Unknown (comments)     Social History     Tobacco Use    Smoking status: Never Smoker    Smokeless tobacco: Never Used   Substance Use Topics    Alcohol use: No    Drug use: No     [phone] (home)   Past Surgical History:   Procedure Laterality Date    HX BREAST BIOPSY      left breast biopsy    HX BREAST BIOPSY  1968    left cyst removed    HX BREAST LUMPECTOMY Left 2001    HX BREAST REDUCTION      Rt breast    HX CARPAL TUNNEL RELEASE Bilateral     HX COLONOSCOPY  07/2014    HX CYST REMOVAL Right 11/2020    hand    HX HEART CATHETERIZATION  2009    HX HEENT      Sinus Surgery    HX HEENT      RK procedure bilateral eyes    HX HYSTERECTOMY      HX LUMBAR LAMINECTOMY  1992    HX MASTECTOMY Left 2002    With Reconstruction    HX OOPHORECTOMY      Unilateral    HX OPEN CHOLECYSTECTOMY  1981    HX OPEN REDUCTION INTERNAL FIXATION Left 2013    [place]    HX OPEN REDUCTION INTERNAL FIXATION  08/2016    [place]    HX ORTHOPAEDIC Right 08/2013    Patellar fracture repair    HX OTHER SURGICAL      Chetan Cath Placed and Removed    HX TONSILLECTOMY      HX TONSILLECTOMY      HX TRANSPLANT  2002    Stem Cell Transplant    HX UVULOPALATOPHARYNGOPLASTY  2004         ROS:    Gen.: no fever or significant change in appetite or weight          MSK:  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 The right second DIP is setter is somewhat enlarged minimally erythematous and minimally tender today wollen inflamed erythematous somewhat fluctuant. No drainage  Is present and no definitive tophi noted              ASSESSMENT:      Acute inflammatory arthritis of the right second DIP joint: Gout, pseudogout, inflammatory osteoarthritis all possible.   Doubt psoriatic arthritis        PLAN:      Continue with colchicine 0.6 mg twice a day which should help if this is gout or pseudogout  Return in 3 months  We will have Dr. Alyssa Brar see her to aspirate that right second DIP joint looking for urate and calcium pyrophosphate crystals          KRIS Mojica M.D.   Rheumatology - [facility] Osteoporosis & Arthritis RUQ pain

## 2022-10-13 NOTE — TELEPHONE ENCOUNTER
Pt says  her  dr Perri Stevens  wants  [facility]  to  do a  biopsy on a finger of  her  right  hand    She  did not  go to see  dr Christal Leahy. .. There is  a new  referral in her chart. Is it  ok to schedule  this  pt another offic e visit?

## 2022-10-31 ENCOUNTER — OFFICE VISIT (OUTPATIENT)
Dept: ORTHOPEDIC SURGERY | Age: 77
End: 2022-10-31
Payer: MEDICARE

## 2022-10-31 VITALS — WEIGHT: 151 LBS | HEIGHT: 65 IN | BODY MASS INDEX: 25.16 KG/M2

## 2022-10-31 DIAGNOSIS — M1A.4411 OTHER SECONDARY CHRONIC GOUT OF RIGHT HAND WITH TOPHUS: Primary | ICD-10-CM

## 2022-10-31 DIAGNOSIS — M15.1 DEGENERATIVE ARTHRITIS OF DISTAL INTERPHALANGEAL JOINT OF INDEX FINGER OF RIGHT HAND: ICD-10-CM

## 2022-10-31 PROCEDURE — G8417 CALC BMI ABV UP PARAM F/U: HCPCS | Performed by: ORTHOPAEDIC SURGERY

## 2022-10-31 PROCEDURE — G8427 DOCREV CUR MEDS BY ELIG CLIN: HCPCS | Performed by: ORTHOPAEDIC SURGERY

## 2022-10-31 PROCEDURE — 1090F PRES/ABSN URINE INCON ASSESS: CPT | Performed by: ORTHOPAEDIC SURGERY

## 2022-10-31 PROCEDURE — 1123F ACP DISCUSS/DSCN MKR DOCD: CPT | Performed by: ORTHOPAEDIC SURGERY

## 2022-10-31 PROCEDURE — 99214 OFFICE O/P EST MOD 30 MIN: CPT | Performed by: ORTHOPAEDIC SURGERY

## 2022-10-31 PROCEDURE — G8399 PT W/DXA RESULTS DOCUMENT: HCPCS | Performed by: ORTHOPAEDIC SURGERY

## 2022-10-31 PROCEDURE — 1036F TOBACCO NON-USER: CPT | Performed by: ORTHOPAEDIC SURGERY

## 2022-10-31 PROCEDURE — G8484 FLU IMMUNIZE NO ADMIN: HCPCS | Performed by: ORTHOPAEDIC SURGERY

## 2022-10-31 NOTE — H&P (VIEW-ONLY)
[facility] Note    Name: Adebayo Abraham  YOB: 1945  Gender: female  MRN: [id number]      Follow up visit:   1. 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 female who is following up for right index finger pain and swelling. She says that the redness resolved after she was given the prescription for steroids. She still has swelling and pain when she moves the finger. She was sent back by her rheumatologist she says either to have the finger drained or cut so that that material can be sent to the lab. .      ROS/Meds/PSH/PMH/FH/SH: I personally reviewed the patients standard intake form. Pertinents are discussed in the HPI    Physical Examination:    Musculoskeletal Examination:  Examination on the right upper extremity demonstrates cap refill < 5 seconds in all fingers, there is swelling at the index DIP, no erythema or fluctuance. There is tenderness to palpation at the DIP, and pain with DIP range of motion . Imaging / Electrodiagnostic Tests:     Finger XR: AP, Lateral, Oblique views     Clinical Indication:  1. 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  We discussed observation, therapy, antiinflammatory medications and other pertinent treatment modalities. I explained that arthrocentesis of the distal interphalangeal joint is not possible as the joint is too small to obtain adequate fluid sample. Explained that I do not perform excision of gouty tophi in the office, that this would be a surgical procedure. I discussed that even if I remove the material on the dorsal aspect of the distal interphalangeal joint, she would likely continue to have distal interphalangeal joint pain, as a result of the underlying arthritis. As a result, she wishes to proceed with DIP arthrodesis. Patient understands risks and benefits of right index finger distal interphalangeal joint arthrodesis including but not limited to nerve injury, vessel injury, infection, failure to achieve desired results and possible need for additional surgery. Patient understands and wishes to proceed with surgery. On Exam:   The patient is alert and oriented  Cardiovascular: regular rate and rhythm  Respiratory: Non labored breathing      Patient voiced accordance and understanding of the information provided and the formulated plan. All questions were answered to the patient's satisfaction during the encounter.       Kate Zamudio MD  Orthopaedic Surgery  10/31/22  4:42 PM

## 2022-11-02 DIAGNOSIS — M15.1 DEGENERATIVE ARTHRITIS OF DISTAL INTERPHALANGEAL JOINT OF INDEX FINGER OF RIGHT HAND: Primary | ICD-10-CM

## 2022-11-04 ENCOUNTER — OFFICE VISIT (OUTPATIENT)
Dept: INTERNAL MEDICINE CLINIC | Facility: CLINIC | Age: 77
End: 2022-11-04
Payer: MEDICARE

## 2022-11-04 VITALS
DIASTOLIC BLOOD PRESSURE: 82 MMHG | WEIGHT: 151 LBS | OXYGEN SATURATION: 100 % | HEART RATE: 65 BPM | HEIGHT: 65 IN | SYSTOLIC BLOOD PRESSURE: 138 MMHG | BODY MASS INDEX: 25.16 KG/M2 | TEMPERATURE: 97.2 F

## 2022-11-04 DIAGNOSIS — R30.0 DYSURIA: ICD-10-CM

## 2022-11-04 DIAGNOSIS — N39.0 RECURRENT UTI: ICD-10-CM

## 2022-11-04 DIAGNOSIS — E11.22 TYPE 2 DIABETES MELLITUS WITH STAGE 3A CHRONIC KIDNEY DISEASE, WITHOUT LONG-TERM CURRENT USE OF INSULIN (HCC): ICD-10-CM

## 2022-11-04 DIAGNOSIS — I10 ESSENTIAL HYPERTENSION: ICD-10-CM

## 2022-11-04 DIAGNOSIS — R30.0 DYSURIA: Primary | ICD-10-CM

## 2022-11-04 DIAGNOSIS — N18.31 TYPE 2 DIABETES MELLITUS WITH STAGE 3A CHRONIC KIDNEY DISEASE, WITHOUT LONG-TERM CURRENT USE OF INSULIN (HCC): ICD-10-CM

## 2022-11-04 LAB
BILIRUBIN, URINE, POC: NEGATIVE
BLOOD URINE, POC: ABNORMAL
GLUCOSE URINE, POC: NEGATIVE
KETONES, URINE, POC: NEGATIVE
LEUKOCYTE ESTERASE, URINE, POC: ABNORMAL
NITRITE, URINE, POC: ABNORMAL
PH, URINE, POC: 5.5 (ref 4.6–8)
PROTEIN,URINE, POC: 30
SPECIFIC GRAVITY, URINE, POC: 1.02 (ref 1–1.03)
URINALYSIS CLARITY, POC: CLEAR
URINALYSIS COLOR, POC: YELLOW
UROBILINOGEN, POC: 0.2

## 2022-11-04 PROCEDURE — 3074F SYST BP LT 130 MM HG: CPT | Performed by: NURSE PRACTITIONER

## 2022-11-04 PROCEDURE — G8427 DOCREV CUR MEDS BY ELIG CLIN: HCPCS | Performed by: NURSE PRACTITIONER

## 2022-11-04 PROCEDURE — G8417 CALC BMI ABV UP PARAM F/U: HCPCS | Performed by: NURSE PRACTITIONER

## 2022-11-04 PROCEDURE — 81003 URINALYSIS AUTO W/O SCOPE: CPT | Performed by: NURSE PRACTITIONER

## 2022-11-04 PROCEDURE — 1123F ACP DISCUSS/DSCN MKR DOCD: CPT | Performed by: NURSE PRACTITIONER

## 2022-11-04 PROCEDURE — 99214 OFFICE O/P EST MOD 30 MIN: CPT | Performed by: NURSE PRACTITIONER

## 2022-11-04 PROCEDURE — G8399 PT W/DXA RESULTS DOCUMENT: HCPCS | Performed by: NURSE PRACTITIONER

## 2022-11-04 PROCEDURE — 3044F HG A1C LEVEL LT 7.0%: CPT | Performed by: NURSE PRACTITIONER

## 2022-11-04 PROCEDURE — 3078F DIAST BP <80 MM HG: CPT | Performed by: NURSE PRACTITIONER

## 2022-11-04 PROCEDURE — 1090F PRES/ABSN URINE INCON ASSESS: CPT | Performed by: NURSE PRACTITIONER

## 2022-11-04 PROCEDURE — G8484 FLU IMMUNIZE NO ADMIN: HCPCS | Performed by: NURSE PRACTITIONER

## 2022-11-04 PROCEDURE — 1036F TOBACCO NON-USER: CPT | Performed by: NURSE PRACTITIONER

## 2022-11-04 RX ORDER — BLOOD-GLUCOSE TRANSMITTER
1 EACH MISCELLANEOUS CONTINUOUS
Qty: 1 EACH | Refills: 2 | Status: SHIPPED | OUTPATIENT
Start: 2022-11-04 | End: 2022-11-15

## 2022-11-04 RX ORDER — BLOOD-GLUCOSE SENSOR
1 EACH MISCELLANEOUS CONTINUOUS
Qty: 6 EACH | Refills: 1 | Status: SHIPPED | OUTPATIENT
Start: 2022-11-04 | End: 2022-11-15

## 2022-11-04 RX ORDER — ESTRADIOL 0.1 MG/G
CREAM VAGINAL
Qty: 1 EACH | Refills: 3 | Status: SHIPPED | OUTPATIENT
Start: 2022-11-04

## 2022-11-04 RX ORDER — FLUCONAZOLE 150 MG/1
150 TABLET ORAL ONCE
Qty: 2 TABLET | Refills: 1 | Status: SHIPPED | OUTPATIENT
Start: 2022-11-04 | End: 2022-11-04

## 2022-11-04 RX ORDER — AMITRIPTYLINE HYDROCHLORIDE 10 MG/1
TABLET, FILM COATED ORAL NIGHTLY PRN
COMMUNITY
Start: 2022-10-13

## 2022-11-04 RX ORDER — BLOOD-GLUCOSE,RECEIVER,CONT
1 EACH MISCELLANEOUS CONTINUOUS
Qty: 1 EACH | Refills: 2 | Status: SHIPPED | OUTPATIENT
Start: 2022-11-04 | End: 2022-11-15

## 2022-11-04 RX ORDER — CEPHALEXIN 500 MG/1
500 CAPSULE ORAL 3 TIMES DAILY
Qty: 21 CAPSULE | Refills: 0 | Status: SHIPPED | OUTPATIENT
Start: 2022-11-04 | End: 2022-11-11

## 2022-11-04 ASSESSMENT — ANXIETY QUESTIONNAIRES
6. BECOMING EASILY ANNOYED OR IRRITABLE: 0
7. FEELING AFRAID AS IF SOMETHING AWFUL MIGHT HAPPEN: 0
3. WORRYING TOO MUCH ABOUT DIFFERENT THINGS: 0
GAD7 TOTAL SCORE: 0
IF YOU CHECKED OFF ANY PROBLEMS ON THIS QUESTIONNAIRE, HOW DIFFICULT HAVE THESE PROBLEMS MADE IT FOR YOU TO DO YOUR WORK, TAKE CARE OF THINGS AT HOME, OR GET ALONG WITH OTHER PEOPLE: NOT DIFFICULT AT ALL
1. FEELING NERVOUS, ANXIOUS, OR ON EDGE: 0
2. NOT BEING ABLE TO STOP OR CONTROL WORRYING: 0
4. TROUBLE RELAXING: 0
5. BEING SO RESTLESS THAT IT IS HARD TO SIT STILL: 0

## 2022-11-04 ASSESSMENT — PATIENT HEALTH QUESTIONNAIRE - PHQ9
3. TROUBLE FALLING OR STAYING ASLEEP: 0
6. FEELING BAD ABOUT YOURSELF - OR THAT YOU ARE A FAILURE OR HAVE LET YOURSELF OR YOUR FAMILY DOWN: 0
SUM OF ALL RESPONSES TO PHQ QUESTIONS 1-9: 0
9. THOUGHTS THAT YOU WOULD BE BETTER OFF DEAD, OR OF HURTING YOURSELF: 0
8. MOVING OR SPEAKING SO SLOWLY THAT OTHER PEOPLE COULD HAVE NOTICED. OR THE OPPOSITE, BEING SO FIGETY OR RESTLESS THAT YOU HAVE BEEN MOVING AROUND A LOT MORE THAN USUAL: 0
2. FEELING DOWN, DEPRESSED OR HOPELESS: 0
7. TROUBLE CONCENTRATING ON THINGS, SUCH AS READING THE NEWSPAPER OR WATCHING TELEVISION: 0
SUM OF ALL RESPONSES TO PHQ QUESTIONS 1-9: 0
SUM OF ALL RESPONSES TO PHQ9 QUESTIONS 1 & 2: 0
4. FEELING TIRED OR HAVING LITTLE ENERGY: 0
SUM OF ALL RESPONSES TO PHQ QUESTIONS 1-9: 0
SUM OF ALL RESPONSES TO PHQ QUESTIONS 1-9: 0
10. IF YOU CHECKED OFF ANY PROBLEMS, HOW DIFFICULT HAVE THESE PROBLEMS MADE IT FOR YOU TO DO YOUR WORK, TAKE CARE OF THINGS AT HOME, OR GET ALONG WITH OTHER PEOPLE: 0
5. POOR APPETITE OR OVEREATING: 0
1. LITTLE INTEREST OR PLEASURE IN DOING THINGS: 0

## 2022-11-04 ASSESSMENT — ENCOUNTER SYMPTOMS
COUGH: 0
NAUSEA: 0
VOMITING: 0
ABDOMINAL PAIN: 1
SHORTNESS OF BREATH: 0

## 2022-11-04 NOTE — PROGRESS NOTES
[facility]  Office Visit Note    Subjective:  Chief Complaint   Patient presents with    Urinary Tract Infection     Possible, dysuria, hurting in stomach and back, not able to make it to the bathroom        Patient ID: Korey Finch is a 69 y.o. female presenting to the office for the above. 69year old female with UTI symptoms. Symptoms started several days ago, with dysuria, frequency, urgency, low back pain, suprapubic discomfort. Denies fever/chills, vomiting, flank pain, gross hematuria. She has had recurrent UTIs. Treated for one in October with Keflex, then [place] with Macrobid (initially felt better, then symptoms returned). --Treat with Keflex. Will send Diflucan as well, as she sometimes gets yeast infections with antibiotics. Discussed risks/benefits, alternatives, potential side effects, proper administration of medication; educational handout given. --Stay well hydrated. Will send urine for culture, with treatment adjustment as indicated. --Start vaginal Estrace cream.  Discussed risks/benefits, alternatives, potential side effects, proper administration of medication. --Will refer to urogynecology due to frequent UTIs. Advised patient to contact office if they do not hear from the referral in the next 7-10 days; they express understanding. --Advised of symptoms that would require reevaluation, or that would require immediate medical attention; she expresses understanding. Diabetes--  Last A1c 6.2% June 2022 (5.9% February 2022, 5.8% October 2021). Currently treated with metformin 1000mg BID. Takes glipizide 2.5mg BID prn if glucose is elevated (rarely needs). Tolerating well without report of side effects. She checks her blood sugars at home, with readings <150. She tries to follow a diabetic diet. Denies hypoglycemic episodes or symptoms.    *Urine microalbumin level: February 2022, microalbuminuria; on lisinopril   *Diabetic eye exam: 2/18/21, Dr. Ulises Brandt at [facility], no retinopathy. *Diabetic foot exam: 2/24/2021. Has pre-ulcerative callus at base of right great toe. Has peripheral neuropathy. Has history of surgery to left ankle making it hard to find shoes that fit well. Has diabetic shoes. 11/4/22: She would like to get the Dexcom continuous glucose monitor. Her fingers are getting very sore from daily finger checks. Order placed today. BP was initially quite elevated with automatic cuff, but question the reading, as manual recheck soon after was 138/82. Advised to monitor BP regularly at home, and notify office if consistently >140/90. History: Allergies   Allergen Reactions    Oxycodone Itching    Eucalyptus Oil Hives and Other (See Comments)     Burns mouth    Ezetimibe Myalgia    Morphine Other (See Comments)     Feels crazy  Other reaction(s): Hallucinations    Penicillins Hives    Pineapple Hives and Other (See Comments)     Burns mouth    Vancomycin Other (See Comments)     Kidney function worsened    Sulfa Antibiotics Nausea Only and Rash       Past Medical History:   Diagnosis Date    Ankle fracture 2014    Ankle osteomyelitis, left ([address] 2014    Breast cancer ([address]     Chemotherapy-induced neuropathy ([address]     Chronic anemia     Depression     Dry eye syndrome of both eyes     Dyslipidemia     Essential hypertension     Fracture of right patella 2013    History of left breast cancer 2001    with mastectomy    Left leg weakness 9/8/2020    Non-ischemic cardiomyopathy ([address] 2009    EF 15% 2009, Cath-minimal CAD. EF normalized     Osteoarthritis     Osteopenia     Peripheral sensory-motor axonal polyneuropathy 10/17/2020    S/P cardiac cath 2009    Statin intolerance     Tibial fracture 2016    Tibial plateau fracture 4/9/4835    Last Assessment & Plan:  Formatting of this note might be different from the original. Pod 2 ORIF doing well. C/o moderate pain. Controlled on PO meds.   Ready to go home Type 2 diabetes mellitus, controlled ([facility]     Vitamin B12 deficiency        Past Surgical History:   Procedure Laterality Date    BREAST BIOPSY      left breast biopsy    BREAST BIOPSY  1968    left cyst removed    BREAST LUMPECTOMY Left 2001    BREAST REDUCTION SURGERY      Rt breast    CARDIAC CATHETERIZATION  2009    CARPAL TUNNEL RELEASE Bilateral     CHOLECYSTECTOMY, OPEN  1981    COLONOSCOPY  07/2014    CYST REMOVAL Right 11/2020    hand    HEENT      Sinus Surgery    HEENT      RK procedure bilateral eyes    HX OPEN REDUCTION INTERNAL FIXATION Left 2013    Ankle    HX OPEN REDUCTION INTERNAL FIXATION  08/2016    [place]    HYSTERECTOMY (CERVIX STATUS UNKNOWN)      LUMBAR LAMINECTOMY  1992    MASTECTOMY Left 2002    With Reconstruction    ORTHOPEDIC SURGERY Right 08/2013    Patellar fracture repair    OTHER SURGICAL HISTORY      [place] Cath Placed and Removed    OVARY REMOVAL      Unilateral    TONSILLECTOMY      TONSILLECTOMY      TRANSPLANT  2002    Stem Cell Transplant    UVULOPALATOPHARYGOPLASTY  2004       Family History   Problem Relation Age of Onset    Cancer Mother     Diabetes Mother     Heart Disease Mother         MI at age 66    Heart Failure Mother         age 58    Breast Cancer Neg Hx     Stroke Sister     Cancer Brother     Heart Disease Brother     Stomach Cancer Neg Hx     Heart Disease Father         MI age 55       Social History     Tobacco Use   Smoking Status Never   Smokeless Tobacco Never       Social History     Substance and Sexual Activity   Alcohol Use No       Current Outpatient Medications   Medication Sig Dispense Refill    cephALEXin (KEFLEX) 500 MG capsule Take 1 capsule by mouth 3 times daily for 7 days 21 capsule 0    fluconazole (DIFLUCAN) 150 MG tablet Take 1 tablet by mouth once for 1 dose Take at first sign of yeast infection. May repeat dose in 48-72 hours if still having symptoms.  2 tablet 1    estradiol (ESTRACE VAGINAL) 0.1 MG/GM vaginal cream Insert 2g daily vaginally for two weeks. Then insert 1g three times a week. 1 each 3    Continuous Blood Gluc Transmit (DEXCOM G6 TRANSMITTER) MISC 1 each by Does not apply route continuous 1 each 2    Continuous Blood Gluc Sensor (DEXCOM G6 SENSOR) MISC 1 each by Does not apply route continuous 6 each 1    Continuous Blood Gluc  (DEXCOM G6 ) GABRIEL 1 each by Does not apply route continuous 1 each 2    fluticasone (FLONASE) 50 MCG/ACT nasal spray       glipiZIDE (GLUCOTROL) 5 MG tablet Take 0.5 tablets by mouth 2 times daily as needed (elevated glucose) 60 tablet 0    colchicine (COLCRYS) 0.6 MG tablet Take 1 tablet by mouth 2 times daily 60 tablet 5    allopurinol (ZYLOPRIM) 100 MG tablet Take 1 tablet by mouth in the morning. 30 tablet 1    promethazine (PHENERGAN) 25 MG tablet Take 1 tablet by mouth daily as needed for Nausea 90 tablet 0    furosemide (LASIX) 40 MG tablet Take 1 tablet by mouth every other day 45 tablet 1    gabapentin (NEURONTIN) 600 MG tablet Take 1 tablet by mouth 2 times daily for 360 days.  180 tablet 1    lisinopril (PRINIVIL;ZESTRIL) 5 MG tablet Take 1 tablet by mouth daily (Patient taking differently: Take 5 mg by mouth daily Bid) 90 tablet 1    metFORMIN (GLUCOPHAGE-XR) 500 mg extended release tablet Take 2 tablets by mouth 2 times daily 360 tablet 1    calcium carbonate (TUMS) 500 MG chewable tablet Take 1 tablet by mouth daily      dextromethorphan-guaiFENesin (MUCINEX DM)  MG per extended release tablet Take 1 tablet by mouth every 12 hours as needed      acetaminophen (TYLENOL) 500 MG tablet Take by mouth every 6 hours as needed      cetirizine (ZYRTEC) 10 MG tablet Take 10 mg by mouth daily as needed      Cholecalciferol 50 MCG (2000 UT) CAPS Take 2,000 Units by mouth daily      metoprolol succinate (TOPROL XL) 50 MG extended release tablet Take 50 mg by mouth daily Once a day      potassium chloride (KLOR-CON M) 20 MEQ extended release tablet TAKE 1 TABLET EVERY MORNING AND 2 TABLETS EVERY EVENING      valACYclovir (VALTREX) 1 g tablet 2 tablets 2 times a day for 1 day only with mouth sore occurs      amitriptyline (ELAVIL) 10 MG tablet        No current facility-administered medications for this visit. Review of Systems:  Review of Systems   Constitutional:  Negative for activity change, appetite change, fever and unexpected weight change. Respiratory:  Negative for cough and shortness of breath. Cardiovascular:  Negative for chest pain. Gastrointestinal:  Positive for abdominal pain (suprapubic discomfort). Negative for nausea and vomiting. Genitourinary:  Positive for dysuria, frequency and urgency. Negative for flank pain, hematuria and pelvic pain. Musculoskeletal:  Positive for arthralgias. Skin:  Negative for pallor and rash. Neurological:  Negative for dizziness, seizures, syncope, weakness and headaches. Psychiatric/Behavioral:  Negative for dysphoric mood. The patient is not nervous/anxious. See HPI for other pertinent positives and negatives. Objective:  Vitals:    11/04/22 0814 11/04/22 0849   BP: (!) 211/91 138/82   Site: Right Upper Arm    Position: Sitting    Cuff Size: Large Adult    Pulse: 65    Temp: 97.2 °F (36.2 °C)    TempSrc: Temporal    SpO2: 100%    Weight: 151 lb (68.5 kg)    Height: 5' 5\" (1.651 m)        Body mass index is 25.13 kg/m². Physical Exam  Vitals and nursing note reviewed. Constitutional:       General: She is not in acute distress. Appearance: Normal appearance. She is not ill-appearing or diaphoretic. HENT:      Head: Normocephalic and atraumatic. Eyes:      General: No scleral icterus. Right eye: No discharge. Left eye: No discharge. Cardiovascular:      Rate and Rhythm: Normal rate and regular rhythm. Heart sounds: Normal heart sounds. Pulmonary:      Effort: Pulmonary effort is normal. No respiratory distress. Musculoskeletal:      Right lower leg: No edema. Left lower leg: No edema. Skin:     General: Skin is warm and dry. Neurological:      Mental Status: She is alert and oriented to person, place, and time.    Psychiatric:         Mood and Affect: Mood normal.         Speech: Speech normal.         Behavior: Behavior normal.                Lab Results   Component Value Date    WBC 7.9 07/12/2022    HGB 11.4 (L) 07/12/2022    HCT 34.2 (L) 07/12/2022    MCV 88.1 07/12/2022     07/12/2022   ,   Lab Results   Component Value Date/Time     08/19/2022 10:54 AM    K 4.6 08/19/2022 10:54 AM     08/19/2022 10:54 AM    CO2 25 08/19/2022 10:54 AM    BUN 46 08/19/2022 10:54 AM    CREATININE 1.50 08/19/2022 10:54 AM    GLUCOSE 164 08/19/2022 10:54 AM    CALCIUM 9.5 08/19/2022 10:54 AM    ,   Lab Results   Component Value Date    TSH 1.690 06/08/2021   ,  Lab Results   Component Value Date    ALT 16 08/19/2022    AST 7 (L) 08/19/2022    ALKPHOS 47 (L) 08/19/2022    BILITOT 0.4 08/19/2022   ,   Hemoglobin A1C   Date Value Ref Range Status   06/06/2022 6.2 4.20 - 6.30 % Final   ,   Lab Results   Component Value Date    LABMICR Comment 10/13/2020       PHQ-9  11/4/2022   Little interest or pleasure in doing things 0   Little interest or pleasure in doing things -   Feeling down, depressed, or hopeless 0   Trouble falling or staying asleep, or sleeping too much 0   Trouble falling or staying asleep, or sleeping too much -   Feeling tired or having little energy 0   Feeling tired or having little energy -   Poor appetite or overeating 0   Poor appetite, weight loss, or overeating -   Feeling bad about yourself - or that you are a failure or have let yourself or your family down 0   Feeling bad about yourself - or that you are a failure or have let yourself or your family down -   Trouble concentrating on things, such as reading the newspaper or watching television 0   Trouble concentrating on things such as school, work, reading, or watching TV -   Moving or speaking so slowly that other people could have noticed. Or the opposite - being so fidgety or restless that you have been moving around a lot more than usual 0   Moving or speaking so slowly that other people could have noticed; or the opposite being so fidgety that others notice -   Thoughts that you would be better off dead, or of hurting yourself in some way 0   Thoughts of being better off dead, or hurting yourself in some way -   PHQ-2 Score 0   Total Score PHQ 2 -   PHQ-9 Total Score 0   PHQ 9 Score -   If you checked off any problems, how difficult have these problems made it for you to do your work, take care of things at home, or get along with other people? 0        Assessment/Plan:      Health Maintenance Due   Topic Date Due    Shingles vaccine (1 of 2) 02/26/2015    COVID-19 Vaccine (3 - Pfizer risk series) 03/13/2021          Thomas Looney was seen today for urinary tract infection. Diagnoses and all orders for this visit:    Dysuria  -     AMB POC URINALYSIS DIP STICK AUTO W/O MICRO  -     Culture, Urine; Future    Recurrent UTI  -     AMB POC URINALYSIS DIP STICK AUTO W/O MICRO  -     Culture, Urine; Future  -     cephALEXin (KEFLEX) 500 MG capsule; Take 1 capsule by mouth 3 times daily for 7 days  -     fluconazole (DIFLUCAN) 150 MG tablet; Take 1 tablet by mouth once for 1 dose Take at first sign of yeast infection. May repeat dose in 48-72 hours if still having symptoms.  -     [facility] - Alea Jacome DO, Urogynecology, [place]  -     estradiol (ESTRACE VAGINAL) 0.1 MG/GM vaginal cream; Insert 2g daily vaginally for two weeks. Then insert 1g three times a week.     Type 2 diabetes mellitus with stage 3a chronic kidney disease, without long-term current use of insulin (HCC)  -     Continuous Blood Gluc Transmit (DEXCOM G6 TRANSMITTER) MISC; 1 each by Does not apply route continuous  -     Continuous Blood Gluc Sensor (DEXCOM G6 SENSOR) MISC; 1 each by Does not apply route continuous  -     Continuous Blood Gluc  ([address]) [address]; 1 each by Does not apply route continuous    Essential hypertension      Patient states she is otherwise doing well; has no further questions or concerns at this visit. Encouraged to contact office with any concerns prior to next visit. Advise patient to notify office if they do not receive results of any labs or tests ordered within 2-3 days of the lab/test.     Return if symptoms worsen or fail to improve, for Next scheduled visit.     Ancelmo Valera, APRN - CNP

## 2022-11-07 LAB
BACTERIA SPEC CULT: NORMAL
SERVICE CMNT-IMP: NORMAL

## 2022-11-09 ENCOUNTER — TELEPHONE (OUTPATIENT)
Dept: ORTHOPEDIC SURGERY | Age: 77
End: 2022-11-09

## 2022-11-09 DIAGNOSIS — M15.1 DEGENERATIVE ARTHRITIS OF DISTAL INTERPHALANGEAL JOINT OF INDEX FINGER OF RIGHT HAND: Primary | ICD-10-CM

## 2022-11-09 NOTE — TELEPHONE ENCOUNTER
I spoke w/ MsJose Martin Hugo [place] and gave her her sx information as well as her post op appts. She voiced understanding.

## 2022-11-15 ENCOUNTER — ANESTHESIA EVENT (OUTPATIENT)
Dept: SURGERY | Age: 77
End: 2022-11-15
Payer: MEDICARE

## 2022-11-15 NOTE — PRE-PROCEDURE INSTRUCTIONS
Patient verified name and . Order for consent was not found in EHR ; patient verifies procedure. Type 1B surgery, phone assessment complete. Orders were not received. Labs per surgeon: none received. Labs per anesthesia protocol: DOS Glucose and Potassium. Patient answered medical/surgical history questions at their best of ability. All prior to admission medications documented in [facility]. Patient instructed to take the following medications the day of surgery according to anesthesia guidelines with a small sip of water: Gabapentin, Cetirizine and Flonase Nasal spray as needed. On the day before surgery please take Acetaminophen 1000mg in the morning and then again before bed. You may substitute for Tylenol 650 mg. Hold all vitamins 7 days prior to surgery and NSAIDS 5 days prior to surgery. Prescription meds to hold:Metformin, Lisinopril, Furosemide. Patient instructed on the following:    > Arrive at Outpatient Entrance, time of arrival to be called the day before by 1700  > NPO after midnight, unless otherwise indicated, including gum, mints, and ice chips  > Responsible adult must drive patient to the hospital, stay during surgery, and patient will need supervision 24 hours after anesthesia  > Use antibacterial soap in shower the night before surgery and on the morning of surgery  > All piercings must be removed prior to arrival.    > Leave all valuables (money and jewelry) at home but bring insurance card and ID on DOS.   > You may be required to pay a deductible or co-pay on the day of your procedure. You can pre-pay by calling [phone] if your surgery is at the [facility] or [phone] if your surgery is at the [facility]. > Do not wear make-up, nail polish, lotions, cologne, perfumes, powders, or oil on skin. Artificial nails are not permitted.

## 2022-11-16 ENCOUNTER — HOSPITAL ENCOUNTER (OUTPATIENT)
Age: 77
Setting detail: OUTPATIENT SURGERY
Discharge: HOME | End: 2022-11-16
Attending: ORTHOPAEDIC SURGERY | Admitting: ORTHOPAEDIC SURGERY
Payer: MEDICARE

## 2022-11-16 ENCOUNTER — APPOINTMENT (OUTPATIENT)
Dept: GENERAL RADIOLOGY | Age: 77
End: 2022-11-16
Attending: ORTHOPAEDIC SURGERY
Payer: MEDICARE

## 2022-11-16 ENCOUNTER — ANESTHESIA (OUTPATIENT)
Dept: SURGERY | Age: 77
End: 2022-11-16
Payer: MEDICARE

## 2022-11-16 VITALS
RESPIRATION RATE: 18 BRPM | OXYGEN SATURATION: 100 % | WEIGHT: 151 LBS | HEART RATE: 61 BPM | SYSTOLIC BLOOD PRESSURE: 184 MMHG | BODY MASS INDEX: 24.27 KG/M2 | HEIGHT: 66 IN | TEMPERATURE: 97.9 F | DIASTOLIC BLOOD PRESSURE: 86 MMHG

## 2022-11-16 DIAGNOSIS — M15.1 OSTEOARTHRITIS OF DISTAL INTERPHALANGEAL (DIP) JOINT OF RIGHT INDEX FINGER: ICD-10-CM

## 2022-11-16 PROBLEM — M1A.0410 CHRONIC GOUT OF RIGHT HAND: Status: ACTIVE | Noted: 2022-11-16

## 2022-11-16 LAB
GLUCOSE BLD STRIP.AUTO-MCNC: 101 MG/DL (ref 65–100)
POTASSIUM BLD-SCNC: 3.7 MMOL/L (ref 3.5–5.1)
SERVICE CMNT-IMP: ABNORMAL

## 2022-11-16 PROCEDURE — 2500000003 HC RX 250 WO HCPCS: Performed by: NURSE ANESTHETIST, CERTIFIED REGISTERED

## 2022-11-16 PROCEDURE — C1713 ANCHOR/SCREW BN/BN,TIS/BN: HCPCS | Performed by: ORTHOPAEDIC SURGERY

## 2022-11-16 PROCEDURE — 6360000002 HC RX W HCPCS: Performed by: ANESTHESIOLOGY

## 2022-11-16 PROCEDURE — 26860 FUSION OF FINGER JOINT: CPT | Performed by: ORTHOPAEDIC SURGERY

## 2022-11-16 PROCEDURE — 7100000011 HC PHASE II RECOVERY - ADDTL 15 MIN: Performed by: ORTHOPAEDIC SURGERY

## 2022-11-16 PROCEDURE — 3600000004 HC SURGERY LEVEL 4 BASE: Performed by: ORTHOPAEDIC SURGERY

## 2022-11-16 PROCEDURE — 2580000003 HC RX 258: Performed by: NURSE ANESTHETIST, CERTIFIED REGISTERED

## 2022-11-16 PROCEDURE — 7100000010 HC PHASE II RECOVERY - FIRST 15 MIN: Performed by: ORTHOPAEDIC SURGERY

## 2022-11-16 PROCEDURE — 2500000003 HC RX 250 WO HCPCS: Performed by: ORTHOPAEDIC SURGERY

## 2022-11-16 PROCEDURE — 88305 TISSUE EXAM BY PATHOLOGIST: CPT

## 2022-11-16 PROCEDURE — 6360000002 HC RX W HCPCS: Performed by: NURSE ANESTHETIST, CERTIFIED REGISTERED

## 2022-11-16 PROCEDURE — 3700000000 HC ANESTHESIA ATTENDED CARE: Performed by: ORTHOPAEDIC SURGERY

## 2022-11-16 PROCEDURE — 2709999900 HC NON-CHARGEABLE SUPPLY: Performed by: ORTHOPAEDIC SURGERY

## 2022-11-16 PROCEDURE — 7100000001 HC PACU RECOVERY - ADDTL 15 MIN: Performed by: ORTHOPAEDIC SURGERY

## 2022-11-16 PROCEDURE — 6360000002 HC RX W HCPCS: Performed by: ORTHOPAEDIC SURGERY

## 2022-11-16 PROCEDURE — 84132 ASSAY OF SERUM POTASSIUM: CPT

## 2022-11-16 PROCEDURE — 7100000000 HC PACU RECOVERY - FIRST 15 MIN: Performed by: ORTHOPAEDIC SURGERY

## 2022-11-16 PROCEDURE — 3700000001 HC ADD 15 MINUTES (ANESTHESIA): Performed by: ORTHOPAEDIC SURGERY

## 2022-11-16 PROCEDURE — 3600000014 HC SURGERY LEVEL 4 ADDTL 15MIN: Performed by: ORTHOPAEDIC SURGERY

## 2022-11-16 PROCEDURE — 88304 TISSUE EXAM BY PATHOLOGIST: CPT

## 2022-11-16 PROCEDURE — 82962 GLUCOSE BLOOD TEST: CPT

## 2022-11-16 PROCEDURE — 2720000010 HC SURG SUPPLY STERILE: Performed by: ORTHOPAEDIC SURGERY

## 2022-11-16 RX ORDER — PROPOFOL 10 MG/ML
INJECTION, EMULSION INTRAVENOUS PRN
Status: DISCONTINUED | OUTPATIENT
Start: 2022-11-16 | End: 2022-11-16 | Stop reason: SDUPTHER

## 2022-11-16 RX ORDER — LIDOCAINE HYDROCHLORIDE 10 MG/ML
1 INJECTION, SOLUTION INFILTRATION; PERINEURAL
Status: DISCONTINUED | OUTPATIENT
Start: 2022-11-16 | End: 2022-11-16 | Stop reason: HOSPADM

## 2022-11-16 RX ORDER — PROCHLORPERAZINE EDISYLATE 5 MG/ML
5 INJECTION INTRAMUSCULAR; INTRAVENOUS
Status: DISCONTINUED | OUTPATIENT
Start: 2022-11-16 | End: 2022-11-16 | Stop reason: HOSPADM

## 2022-11-16 RX ORDER — LABETALOL HYDROCHLORIDE 5 MG/ML
10 INJECTION, SOLUTION INTRAVENOUS
Status: DISCONTINUED | OUTPATIENT
Start: 2022-11-16 | End: 2022-11-16 | Stop reason: HOSPADM

## 2022-11-16 RX ORDER — HYDROCODONE BITARTRATE AND ACETAMINOPHEN 5; 325 MG/1; MG/1
1 TABLET ORAL EVERY 6 HOURS PRN
Qty: 20 TABLET | Refills: 0 | Status: SHIPPED | OUTPATIENT
Start: 2022-11-16 | End: 2022-11-19

## 2022-11-16 RX ORDER — MIDAZOLAM HYDROCHLORIDE 2 MG/2ML
2 INJECTION, SOLUTION INTRAMUSCULAR; INTRAVENOUS
Status: DISCONTINUED | OUTPATIENT
Start: 2022-11-16 | End: 2022-11-16 | Stop reason: HOSPADM

## 2022-11-16 RX ORDER — SODIUM CHLORIDE 0.9 % (FLUSH) 0.9 %
5-40 SYRINGE (ML) INJECTION EVERY 12 HOURS SCHEDULED
Status: DISCONTINUED | OUTPATIENT
Start: 2022-11-16 | End: 2022-11-16 | Stop reason: HOSPADM

## 2022-11-16 RX ORDER — SODIUM CHLORIDE, SODIUM LACTATE, POTASSIUM CHLORIDE, CALCIUM CHLORIDE 600; 310; 30; 20 MG/100ML; MG/100ML; MG/100ML; MG/100ML
INJECTION, SOLUTION INTRAVENOUS CONTINUOUS PRN
Status: DISCONTINUED | OUTPATIENT
Start: 2022-11-16 | End: 2022-11-16 | Stop reason: SDUPTHER

## 2022-11-16 RX ORDER — LIDOCAINE HYDROCHLORIDE 20 MG/ML
INJECTION, SOLUTION EPIDURAL; INFILTRATION; INTRACAUDAL; PERINEURAL PRN
Status: DISCONTINUED | OUTPATIENT
Start: 2022-11-16 | End: 2022-11-16 | Stop reason: SDUPTHER

## 2022-11-16 RX ORDER — SODIUM CHLORIDE 0.9 % (FLUSH) 0.9 %
5-40 SYRINGE (ML) INJECTION PRN
Status: DISCONTINUED | OUTPATIENT
Start: 2022-11-16 | End: 2022-11-16 | Stop reason: HOSPADM

## 2022-11-16 RX ORDER — BUPIVACAINE HYDROCHLORIDE 2.5 MG/ML
INJECTION, SOLUTION EPIDURAL; INFILTRATION; INTRACAUDAL PRN
Status: DISCONTINUED | OUTPATIENT
Start: 2022-11-16 | End: 2022-11-16 | Stop reason: HOSPADM

## 2022-11-16 RX ORDER — FENTANYL CITRATE 50 UG/ML
100 INJECTION, SOLUTION INTRAMUSCULAR; INTRAVENOUS
Status: COMPLETED | OUTPATIENT
Start: 2022-11-16 | End: 2022-11-16

## 2022-11-16 RX ORDER — SODIUM CHLORIDE 9 MG/ML
INJECTION, SOLUTION INTRAVENOUS PRN
Status: DISCONTINUED | OUTPATIENT
Start: 2022-11-16 | End: 2022-11-16 | Stop reason: HOSPADM

## 2022-11-16 RX ORDER — ONDANSETRON 2 MG/ML
4 INJECTION INTRAMUSCULAR; INTRAVENOUS
Status: DISCONTINUED | OUTPATIENT
Start: 2022-11-16 | End: 2022-11-16 | Stop reason: HOSPADM

## 2022-11-16 RX ORDER — HYDROMORPHONE HYDROCHLORIDE 2 MG/ML
0.25 INJECTION, SOLUTION INTRAMUSCULAR; INTRAVENOUS; SUBCUTANEOUS EVERY 5 MIN PRN
Status: DISCONTINUED | OUTPATIENT
Start: 2022-11-16 | End: 2022-11-16 | Stop reason: HOSPADM

## 2022-11-16 RX ORDER — HYDRALAZINE HYDROCHLORIDE 20 MG/ML
10 INJECTION INTRAMUSCULAR; INTRAVENOUS
Status: DISCONTINUED | OUTPATIENT
Start: 2022-11-16 | End: 2022-11-16 | Stop reason: HOSPADM

## 2022-11-16 RX ORDER — LIDOCAINE HYDROCHLORIDE 10 MG/ML
INJECTION, SOLUTION INFILTRATION; PERINEURAL PRN
Status: DISCONTINUED | OUTPATIENT
Start: 2022-11-16 | End: 2022-11-16 | Stop reason: HOSPADM

## 2022-11-16 RX ORDER — HYDROMORPHONE HYDROCHLORIDE 2 MG/ML
0.5 INJECTION, SOLUTION INTRAMUSCULAR; INTRAVENOUS; SUBCUTANEOUS EVERY 5 MIN PRN
Status: DISCONTINUED | OUTPATIENT
Start: 2022-11-16 | End: 2022-11-16 | Stop reason: HOSPADM

## 2022-11-16 RX ADMIN — PROPOFOL 40 MG: 10 INJECTION, EMULSION INTRAVENOUS at 09:54

## 2022-11-16 RX ADMIN — HYDRALAZINE HYDROCHLORIDE 10 MG: 20 INJECTION INTRAMUSCULAR; INTRAVENOUS at 11:07

## 2022-11-16 RX ADMIN — PROPOFOL 140 MCG/KG/MIN: 10 INJECTION, EMULSION INTRAVENOUS at 09:55

## 2022-11-16 RX ADMIN — SODIUM CHLORIDE, SODIUM LACTATE, POTASSIUM CHLORIDE, AND CALCIUM CHLORIDE: 600; 310; 30; 20 INJECTION, SOLUTION INTRAVENOUS at 09:50

## 2022-11-16 RX ADMIN — FENTANYL CITRATE 25 MCG: 50 INJECTION, SOLUTION INTRAMUSCULAR; INTRAVENOUS at 10:02

## 2022-11-16 RX ADMIN — PROPOFOL 30 MG: 10 INJECTION, EMULSION INTRAVENOUS at 09:58

## 2022-11-16 RX ADMIN — LIDOCAINE HYDROCHLORIDE 40 MG: 20 INJECTION, SOLUTION EPIDURAL; INFILTRATION; INTRACAUDAL; PERINEURAL at 09:54

## 2022-11-16 RX ADMIN — Medication 2000 MG: at 09:55

## 2022-11-16 ASSESSMENT — PAIN - FUNCTIONAL ASSESSMENT: PAIN_FUNCTIONAL_ASSESSMENT: 0-10

## 2022-11-16 NOTE — ANESTHESIA PRE PROCEDURE
Department of Anesthesiology  Preprocedure Note       Name:  Mj Andrade   Age:  69 y.o.  :  1945                                          MRN:  [id number]         Date:  2022      Surgeon: Noris Escalona):  Raisa Ferguson MD    Procedure: Procedure(s):  Right index finger distal interphalangeal joint arthrodesis    Medications prior to admission:   Prior to Admission medications    Medication Sig Start Date End Date Taking? Authorizing Provider   CRANBERRY PO Take by mouth daily   Yes Historical Provider, MD   amitriptyline (ELAVIL) 10 MG tablet nightly as needed 10/13/22   Historical Provider, MD   estradiol (ESTRACE VAGINAL) 0.1 MG/GM vaginal cream Insert 2g daily vaginally for two weeks. Then insert 1g three times a week. 22   Radha LIU Reaves - MALOU   fluticasone [facility]) 50 MCG/ACT nasal spray 2 times daily as needed 22   Historical Provider, MD   colchicine (COLCRYS) 0.6 MG tablet Take 1 tablet by mouth 2 times daily  Patient taking differently: Take 0.6 mg by mouth 2 times daily as needed 22   Levi Renee MD   promethazine (PHENERGAN) 25 MG tablet Take 1 tablet by mouth daily as needed for Nausea 22   LIU Adam - CNP   furosemide (LASIX) 40 MG tablet Take 1 tablet by mouth every other day 22   LIU Adam - CNP   gabapentin (NEURONTIN) 600 MG tablet Take 1 tablet by mouth 2 times daily for 360 days.  22  RadhaLIU Small - CNP   lisinopril (PRINIVIL;ZESTRIL) 5 MG tablet Take 1 tablet by mouth daily  Patient taking differently: Take 5 mg by mouth daily Bid 22   RadhaLIU Small - CNP   metFORMIN (GLUCOPHAGE-XR) 500 mg extended release tablet Take 2 tablets by mouth 2 times daily 22   LIU Adam - CNP   calcium carbonate (TUMS) 500 MG chewable tablet Take 1 tablet by mouth at bedtime    Historical Provider, MD   dextromethorphan-guaiFENesin ([place] DM)  MG per extended release tablet Take 1 tablet by mouth every 12 hours as needed    Historical Provider, MD   acetaminophen (TYLENOL) 500 MG tablet Take by mouth every 6 hours as needed    Ar Automatic Reconciliation   cetirizine (ZYRTEC) 10 MG tablet Take 10 mg by mouth daily as needed    Ar Automatic Reconciliation   metoprolol succinate (TOPROL XL) 50 MG extended release tablet Take 50 mg by mouth at bedtime Once a day 8/27/21   Ar Automatic Reconciliation   potassium chloride (KLOR-CON M) 20 MEQ extended release tablet TAKE 1 TABLET EVERY MORNING AND 2 TABLETS EVERY EVENING 5/25/21   Ar Automatic Reconciliation   valACYclovir (VALTREX) 1 g tablet 2 tablets 2 times a day for 1 day only with mouth sore occurs 12/7/20   Ar Automatic Reconciliation       Current medications:    Current Facility-Administered Medications   Medication Dose Route Frequency Provider Last Rate Last Admin    ceFAZolin (ANCEF) 2000 mg in sterile water 20 mL IV syringe  2,000 mg IntraVENous On Call to Beatrice Valencia MD        sodium chloride flush 0.9 % injection 5-40 mL  5-40 mL IntraVENous 2 times per day Ted Pierson MD        sodium chloride flush 0.9 % injection 5-40 mL  5-40 mL IntraVENous PRN Ted Pierson MD        0.9 % sodium chloride infusion   IntraVENous PRN Ted Pierson MD        lidocaine 1 % injection 1 mL  1 mL IntraDERmal Once PRN Lucille Uriostegui DO        fentaNYL (SUBLIMAZE) injection 100 mcg  100 mcg IntraVENous Once PRN Lucille Uriostegui DO        midazolam PF (VERSED) injection 2 mg  2 mg IntraVENous Once PRN Lucille Gonzales,            Allergies:     Allergies   Allergen Reactions    Oxycodone Itching    Eucalyptus Oil Hives and Other (See Comments)     Burns mouth    Ezetimibe Myalgia    Morphine Other (See Comments)     Feels crazy  Other reaction(s): Hallucinations    Penicillins Hives    Pineapple Hives and Other (See Comments)     Burns mouth    Vancomycin Other (See Comments)     Kidney function worsened    Sulfa Antibiotics Nausea Only and Rash       Problem List:    Patient Active Problem List   Diagnosis Code    Chronic periscapular pain on right side M25.511, G89.29    Foraminal stenosis of cervical region M48.02    Statin intolerance Z78.9    Dyslipidemia E78.5    Depression F32. 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    Osteopenia M85.80    Type 2 diabetes mellitus, controlled ([facility]) E11.9    Muscle spasm M62.838    Recurrent occipital headache R51.9    Left leg weakness R29.898    Chemotherapy-induced neuropathy (HCC) G62.0, T45.1X5A    Stage 3a chronic kidney disease ([facility]) N18.31    Arthropathy of cervical facet joint M47.812    Stem cells transplant status ([facility] Z94.84    Dilated cardiomyopathy ([facility]) I42.0    Vitamin D deficiency E55.9    Vitamin B12 deficiency E53.8    S/P cardiac cath Z98.890    Essential hypertension I10    Chronic anemia D64.9    Palpitation R00.2    Chronic renal disease, stage III ([facility]) N18.30    Primary insomnia F51.01    Intolerance of oral bisphosphonate therapy Z78.9    Sjogren syndrome, unspecified ([facility]) M35.00    Type 2 diabetes mellitus with chronic kidney disease E11.22    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 M15.1       Past Medical History:        Diagnosis Date    Ankle fracture 2014    Ankle osteomyelitis, left ([facility] [facility] 2014    Breast cancer (HCC)     Chemotherapy-induced neuropathy (HCC)     Chronic anemia     Chronic systolic CHF (congestive heart failure) (HCC)     CKD (chronic kidney disease) stage 3, GFR 30-59 ml/min (HCC)     Depression     Dilated cardiomyopathy (HCC)     Dry eye syndrome of both eyes     Dyslipidemia     Essential hypertension     Fracture of right patella 2013    History of left breast cancer 2001    with mastectomy    Left leg weakness 9/8/2020    Non-ischemic cardiomyopathy ([facility] [address] 2018    Echo 50-55%, Cath-minimal CAD. EF normalized    Osteoarthritis     Osteopenia     Peripheral sensory-motor axonal polyneuropathy 10/17/2020    S/P cardiac cath 2009    Statin intolerance     Tibial fracture 2016    Tibial plateau fracture 6/5/5442    Last Assessment & Plan:  Formatting of this note might be different from the original. Pod 2 ORIF doing well. C/o moderate pain. Controlled on PO meds. Ready to go home    Type 2 diabetes mellitus, controlled ([address]     BS am 104    Vitamin B12 deficiency        Past Surgical History:        Procedure Laterality Date    BREAST BIOPSY      left breast biopsy    BREAST BIOPSY  1968    left cyst removed    BREAST LUMPECTOMY Left 2001    BREAST REDUCTION SURGERY      Rt breast    CARDIAC CATHETERIZATION  2009    CARPAL TUNNEL RELEASE Bilateral     CHOLECYSTECTOMY, OPEN  1981    COLONOSCOPY  07/2014    CYST REMOVAL Right 11/2020    hand    HEENT      Sinus Surgery    HEENT      RK procedure bilateral eyes    HX OPEN REDUCTION INTERNAL FIXATION Left 2013    Ankle    HX OPEN REDUCTION INTERNAL FIXATION  08/2016    [place]    HYSTERECTOMY (CERVIX STATUS UNKNOWN)      LUMBAR LAMINECTOMY  1992    MASTECTOMY Left 2002    With Reconstruction    ORTHOPEDIC SURGERY Right 08/2013    Patellar fracture repair    OTHER SURGICAL HISTORY      [place] Cath Placed and Removed    OVARY REMOVAL      Unilateral    TONSILLECTOMY      TONSILLECTOMY      TRANSPLANT  2002    Stem Cell Transplant    UVULOPALATOPHARYGOPLASTY  2004       Social History:    Social History     Tobacco Use    Smoking status: Never    Smokeless tobacco: Never   Substance Use Topics    Alcohol use:  No                                Counseling given: Not Answered      Vital Signs (Current):   Vitals:    11/15/22 1127 11/16/22 0857 11/16/22 0900   BP:  (!) 191/87    Pulse:  71    Resp:  18    Temp:  98.3 °F (36.8 °C)    TempSrc:  Oral    SpO2:  100%    Weight: 150 lb (68 kg) 150 lb (68 kg) 151 lb (68.5 kg)   Height: 5' 5.5\" (1.664 m)  5' 5.5\" (1.664 m)                                              BP Readings from Last 3 Encounters:   11/16/22 (!) 191/87   11/04/22 138/82   10/13/22 (!) 146/78       NPO Status: Time of last liquid consumption: 2300                        Time of last solid consumption: 2300                        Date of last liquid consumption: 11/15/22                        Date of last solid food consumption: 11/15/22    BMI:   Wt Readings from Last 3 Encounters:   11/16/22 151 lb (68.5 kg)   11/04/22 151 lb (68.5 kg)   10/31/22 151 lb (68.5 kg)     Body mass index is 24.75 kg/m².     CBC:   Lab Results   Component Value Date/Time    WBC 7.9 07/12/2022 03:36 PM    RBC 3.88 07/12/2022 03:36 PM    HGB 11.4 07/12/2022 03:36 PM    HCT 34.2 07/12/2022 03:36 PM    MCV 88.1 07/12/2022 03:36 PM    RDW 12.7 07/12/2022 03:36 PM     07/12/2022 03:36 PM       CMP:   Lab Results   Component Value Date/Time     08/19/2022 10:54 AM    K 4.6 08/19/2022 10:54 AM     08/19/2022 10:54 AM    CO2 25 08/19/2022 10:54 AM    BUN 46 08/19/2022 10:54 AM    CREATININE 1.50 08/19/2022 10:54 AM    GFRAA 43 08/19/2022 10:54 AM    AGRATIO 1.4 10/13/2020 02:56 PM    LABGLOM 36 08/19/2022 10:54 AM    GLUCOSE 164 08/19/2022 10:54 AM    PROT 7.8 08/19/2022 10:54 AM    CALCIUM 9.5 08/19/2022 10:54 AM    BILITOT 0.4 08/19/2022 10:54 AM    ALKPHOS 47 08/19/2022 10:54 AM    ALKPHOS 65 06/08/2021 02:12 PM    AST 7 08/19/2022 10:54 AM    ALT 16 08/19/2022 10:54 AM       POC Tests:   Recent Labs     11/16/22  0913   POCGLU 101*   POCK 3.7       Coags: No results found for: PROTIME, INR, APTT    HCG (If Applicable): No results found for: PREGTESTUR, PREGSERUM, HCG, HCGQUANT     ABGs: No results found for: PHART, PO2ART, RIF7PKX, NYS1AYE, BEART, E3MOFYYN     Type & Screen (If Applicable):  No results found for: LABABO, LABRH    Drug/Infectious Status (If Applicable):  No results found for: HIV, HEPCAB    COVID-19 Screening (If Applicable): No results found for: COVID19        Anesthesia Evaluation  Patient summary reviewed and Nursing notes reviewed  Airway: Mallampati: II          Dental:    (+) edentulous      Pulmonary:Negative Pulmonary ROS and normal exam  breath sounds clear to auscultation                             Cardiovascular:    (+) hypertension:, CHF: no interval change,         Rhythm: regular  Rate: normal                    Neuro/Psych:   (+) neuromuscular disease:, headaches:, psychiatric history: stable with treatment            GI/Hepatic/Renal:   (+) renal disease: CRI,           Endo/Other:    (+) DiabetesType II DM, , : arthritis: OA., .                 Abdominal:             Vascular: Other Findings:           Anesthesia Plan      TIVA     ASA 3       Induction: intravenous. Anesthetic plan and risks discussed with patient and spouse.                         Minnie Padron DO   11/16/2022

## 2022-11-16 NOTE — OP NOTE
ORTHOPAEDIC SURGICAL NOTE        Ulysses Redwood female 69 y.o.  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 Item Serial No.  Lot No. LRB No. Used Action   28 mm Screw    [employer] [id number] Right 1 Implanted      Procedure(s):  Right index finger distal interphalangeal joint arthrodesis   Surgeon(s):  Laura Portillo MD   Procedure(s):  Right index finger distal interphalangeal joint arthrodesis     ANESTHESIA: TIVA     STAFF:    Circulator: Sallie Ibanez RN  Scrub Person First: Annmarie Turner  Scrub Person Second: Germaine Contreras     ESTIMATED BLOOD LOSS: Minimal       TOTAL IV FLUIDS : See anesthesia note    COMPLICATIONS: None     TOURNIQUET TIME:   Total Tourniquet Time Documented:  Arm  (laterality) - 23 minutes  Total: Arm  (laterality) - 23 minutes       INDICATION FOR PROCEDURE:     Ulysses Redwood has a history of gouty arthritis right index distal interphalangeal joint. Surgical and non-surgical treatment options were discussed with the patient and their family, as well as the risk and benefits of each option. After thorough discussion, the patient decided to proceed with surgical management. Specific to this treatment plan, we discussed in detail surgical risks including scar, pain, bleeding, infection, anesthetic risks, neurovascular injury, failure to achieve desired results, hardware problems, need for further surgery,  weakness, stiffness, risk of death and potential risk of other unforseen complication. The Patient consented to the procedure after discussion of the risks and benefits.          DESCRIPTION OF PROCEDURE:     The patient was identified in the holding room. The right index finger was marked and confirmed as the correct operative site. They were then brought to the OR and transferred onto the OR table in the supine position. All bony prominences were well padded. SCDs were placed on the bilateral legs throughout the case. A timeout was performed, verifying the correct patient, the correct side  and the correct procedure. Antibiotics were then administered, and were redosed during the procedure as needed at indicated intervals. A non-sterile tourniquet was placed on the arm. The upper extremity was pre scrubbed and then prepped and draped in routine sterile fashion. An incisional timeout was performed re-confirming the correct patient, surgical site and procedure, as well as verifying antibiotics. Right upper extremity was exsanguinated and the tourniquet was inflated to 250 mmHg. 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 This was excised in its entirety and sent to pathology for identification. Extensor tendon and collateral ligaments were incised in order to access the articular surfaces. Articular surfaces were then prepared with cup and cone reamers. A guidewire for a skeletal dynamics 2.5 mm arthrodesis screw was then placed across the distal interphalangeal joint using fluoroscopic guidance. I then drilled measured and placed a 2.5 mm arthrodesis screw, and good compression at the arthrodesis site was noted with final tightening of the screw. Final radiographs confirmed appropriate hardware position    The tourniquet was deflated and hemostasis was ensured. The wounds were then thoroughly irrigated and closed with 4-0 nylon     A sterile dressing was applied followed by a  alumafoam splint     The patient was awakened and taken to PACU in stable condition.  All sponge and needle counts were correct at the end of the case. I was present and scrubbed for the entire procedure. DISPOSITION:    The patient will be discharged home.      Weightbearing status: NWB       CHEMICAL VTE (DVT) PROPHYLAXIS: None warranted for this case     FOLLOW-UP:  10-14 days for wound check and XRs     Katina Martin MD    11/16/22  10:37 AM

## 2022-11-16 NOTE — PERIOP NOTE
Pt has lymphedema in left arm. States no sticks or Bps are to be done in this arm. Dr. Alexia Rivera notified. Stated that the ryan block doesn't have to be completed, she can do local numbing in the OR. States iv can be placed in the antecubital on the right side.

## 2022-11-16 NOTE — DISCHARGE INSTRUCTIONS
Postoperative  Instructions:      Weightbearing or Lifting: You  are  not  allowed  to  lift  any  weight  or  bear  any  weight  on  the  surgical  extremity  until  cleared  by  your  surgeon. Dressing  instructions:    Keep  your  dressing  and/or  splint  clean  and  dry  at  all  times. It  will  be  removed  at  your  first  post-operative  appointment or therapy apppointment. Your  stitches  will  be  removed  at  this  visit. Showering  Instructions:  May  shower  But keep surgical dressing clean and dry until removed as explained above. Pain  Control:  - You  have  been  given  a  prescription  to  be  taken  as  directed  for  post-operative  pain  control. In  addition,  elevate  the  operative  extremity  above  the  heart  at  all  times  to  prevent  swelling  and  throbbing  pain. - If you develop constipation while taking narcotic pain medications (Norco, Hydrocodone, Percocet, Oxycodone, Dilaudid, Hydromorphone) take  over-the-counter  Colace,  100mg  by  mouth  twice  a  Day. - Nausea  is  a  common  side  effect  of  many  pain  medications. You  will  want  to  eat something  before  taking  your  pain  medicine  to  help  prevent  Nausea. - If  you  are  taking  a  prescription  pain  medication  that  contains  acetaminophen,  we  recommend  that  you  do  not  take  additional  over  the  counter  acetaminophen  (Tylenol®). Other  pain  relieving  options:   - Using  a  cold  pack  to  ice  the  affected  area  a  few  times  a  day  (15  to  20  minutes  at  a  time)  can  help  to  relieve  pain,  reduce  swelling  and  bruising.      - Elevation  of  the  affected  area  can  also  help  to  reduce  pain  and  swelling. Did  you  receive  a  nerve  Block? A  nerve  block  can  provide  pain  relief  for  one  hour  to  two  days  after  your  surgery.   As  long  as  the  nerve  block  is  working,  you  will  experience  little  or  no sensation  in  the  area  the  surgeon  operated  on. As  the  nerve  block  wears  off,  you  will  begin  to  experience  pain  or  discomfort. It  is  very  important  that  you  begin  taking  your  prescribed  pain  medication  before  the  nerve  block  fully  wears  off. The first sign that the nerve block is wearing off is tingling in your fingers. Treating  your  pain  at  the  first  sign  of  the  block  wearing  off  will  ensure  your  pain  is  better  controlled  and  more  tolerable  when  full-sensation  returns. Do  not  wait  until  the  pain  is  intolerable,  as  the  medicine  will  be  less  effective. It  is  better  to  treat  pain  in  advance  than  to  try  and  catch  up. General  Anesthesia or Sedation:      If  you  did  not  receive  a  nerve  block  during  your  surgery,  you  will  need  to  start  taking  your  pain  medication  shortly  after  your  surgery  and  should  continue  to  do  so  as  prescribed  by  your  surgeon. Please  call  [phone]  with any concern and ask to speak with John Houston    Concerning problems include:      -  Excessive  redness  of  the  incisions      -  Drainage  for  more  than  2  Days after surgery or any foul smelling drainage  -  Fever  of  more  than  101.5  F      Please  call  [phone]  if  you  do  not  receive  or  are  unsure  of  your  first  follow-up  appointment. You  should  see  the  doctor  10-14  days  after  your  Surgery. Thank you for choosing me and [address] [place] [address] for your care. I will go above and beyond to ensure you receive the best care possible. Leyda Almazan MD    ACTIVITY  As tolerated and as directed by your doctor. Bathe or shower as directed by your doctor. DIET  Clear liquids until no nausea or vomiting; then light diet for the first day. Advance to regular diet on second day, unless your doctor orders otherwise.    If nausea and vomiting continues, call your doctor. PAIN  Take pain medication as directed by your doctor. Call your doctor if pain is NOT relieved by medication. DO NOT take aspirin of blood thinners unless directed by your doctor. MEDICATION INTERACTION:During your procedure you potentially received a medication or medications which may reduce the effectiveness of oral contraceptives. Please consider other forms of contraception for 1 month following your procedure if you are currently using oral contraceptives as your primary form of birth control. In addition to this, we recommend continuing your oral contraceptive as prescribed, unless otherwise instructed by your physician, during this time      [address] IF   Excessive bleeding that does not stop after holding pressure over the area  Temperature of 101 degrees F or above  Excessive redness, swelling or bruising, and/ or green or yellow, smelly discharge from incision    After general anesthesia or intravenous sedation, for 24 hours or while taking prescription Narcotics:  Limit your activities  A responsible adult needs to be with you for the next 24 hours  Do not drive and operate hazardous machinery  Do not make important personal or business decisions  Do not drink alcoholic beverages  If you have not urinated within 8 hours after discharge, and you are experiencing discomfort from urinary retention, please go to the nearest ED. If you have sleep apnea and have a CPAP machine, please use it for all naps and sleeping. Please use caution when taking narcotics and any of your home medications that may cause drowsiness. *  Please give a list of your current medications to your Primary Care Provider. *  Please update this list whenever your medications are discontinued, doses are      changed, or new medications (including over-the-counter products) are added. *  Please carry medication information at all times in case of emergency situations.     These are general instructions for a healthy lifestyle:  No smoking/ No tobacco products/ Avoid exposure to second hand smoke  Surgeon General's Warning:  Quitting smoking now greatly reduces serious risk to your health. Obesity, smoking, and sedentary lifestyle greatly increases your risk for illness  A healthy diet, regular physical exercise & weight monitoring are important for maintaining a healthy lifestyle    You may be retaining fluid if you have a history of heart failure or if you experience any of the following symptoms:  Weight gain of 3 pounds or more overnight or 5 pounds in a week, increased swelling in our hands or feet or shortness of breath while lying flat in bed. Please call your doctor as soon as you notice any of these symptoms; do not wait until your next office visit.

## 2022-11-16 NOTE — PERIOP NOTE
Pt received from OR on NC tolerating JANEE Horta at bedside, discharge instructions reviewed and questions answered. Denies pain/nausea. VSS. Pt will discuss hypertension with PCP tomorrow at her appointment. No further needs at this time.

## 2022-11-17 ENCOUNTER — OFFICE VISIT (OUTPATIENT)
Dept: CARDIOLOGY CLINIC | Age: 77
End: 2022-11-17
Payer: MEDICARE

## 2022-11-17 VITALS
WEIGHT: 152 LBS | DIASTOLIC BLOOD PRESSURE: 82 MMHG | BODY MASS INDEX: 24.43 KG/M2 | SYSTOLIC BLOOD PRESSURE: 138 MMHG | HEIGHT: 66 IN | HEART RATE: 62 BPM

## 2022-11-17 DIAGNOSIS — I10 ESSENTIAL HYPERTENSION: ICD-10-CM

## 2022-11-17 DIAGNOSIS — R00.2 PALPITATION: ICD-10-CM

## 2022-11-17 DIAGNOSIS — N18.31 STAGE 3A CHRONIC KIDNEY DISEASE (HCC): ICD-10-CM

## 2022-11-17 DIAGNOSIS — I42.0 DILATED CARDIOMYOPATHY (HCC): Primary | ICD-10-CM

## 2022-11-17 PROCEDURE — 1036F TOBACCO NON-USER: CPT | Performed by: INTERNAL MEDICINE

## 2022-11-17 PROCEDURE — 99214 OFFICE O/P EST MOD 30 MIN: CPT | Performed by: INTERNAL MEDICINE

## 2022-11-17 PROCEDURE — G8427 DOCREV CUR MEDS BY ELIG CLIN: HCPCS | Performed by: INTERNAL MEDICINE

## 2022-11-17 PROCEDURE — 1123F ACP DISCUSS/DSCN MKR DOCD: CPT | Performed by: INTERNAL MEDICINE

## 2022-11-17 PROCEDURE — G8399 PT W/DXA RESULTS DOCUMENT: HCPCS | Performed by: INTERNAL MEDICINE

## 2022-11-17 PROCEDURE — 1090F PRES/ABSN URINE INCON ASSESS: CPT | Performed by: INTERNAL MEDICINE

## 2022-11-17 PROCEDURE — 3078F DIAST BP <80 MM HG: CPT | Performed by: INTERNAL MEDICINE

## 2022-11-17 PROCEDURE — G8484 FLU IMMUNIZE NO ADMIN: HCPCS | Performed by: INTERNAL MEDICINE

## 2022-11-17 PROCEDURE — 3074F SYST BP LT 130 MM HG: CPT | Performed by: INTERNAL MEDICINE

## 2022-11-17 PROCEDURE — G8420 CALC BMI NORM PARAMETERS: HCPCS | Performed by: INTERNAL MEDICINE

## 2022-11-17 RX ORDER — LISINOPRIL 10 MG/1
10 TABLET ORAL 2 TIMES DAILY
Qty: 180 TABLET | Refills: 3 | Status: SHIPPED | OUTPATIENT
Start: 2022-11-17

## 2022-11-17 RX ORDER — METOPROLOL SUCCINATE 50 MG/1
50 TABLET, EXTENDED RELEASE ORAL NIGHTLY
Qty: 90 TABLET | Refills: 3 | Status: SHIPPED | OUTPATIENT
Start: 2022-11-17

## 2022-11-17 NOTE — PROGRESS NOTES
[address], [address] [employer] [address]  PHONE: [phone]     22    NAME:  Jose Maria Lund  :   MRN: [id number]       SUBJECTIVE:   Jose Maria Lund is a 69 y.o. female seen for a follow up visit regarding the following:     Chief Complaint   Patient presents with    Hypertension    Cardiomyopathy       HPI:    She has h/o NICM and SHF, EF 40% in  with LHC showing mild CAD. She has prior breast CA, went thru chemo and radiation on left breast in .     2018 NST: low risk   Echo 2018: low normal EF. No new angina, CP. Not as active with hand gout issue. No new RUFFIN. BP can run higher now. Patient denies recent history of orthopnea, PND, excessive dizziness and/or syncope. Patient denies recent history of orthopnea, PND, excessive dizziness and/or syncope. Past Medical History, Past Surgical History, Family history, Social History, and Medications were all reviewed with the patient today and updated as necessary. Current Outpatient Medications   Medication Sig Dispense Refill    metoprolol succinate (TOPROL XL) 50 MG extended release tablet Take 1 tablet by mouth at bedtime Once a day 90 tablet 3    lisinopril (PRINIVIL;ZESTRIL) 10 MG tablet Take 1 tablet by mouth 2 times daily 180 tablet 3    HYDROcodone-acetaminophen (NORCO) 5-325 MG per tablet Take 1 tablet by mouth every 6 hours as needed for Pain for up to 3 days. Intended supply: 3 days. Take lowest dose possible to manage pain 20 tablet 0    CRANBERRY PO Take by mouth daily      estradiol (ESTRACE VAGINAL) 0.1 MG/GM vaginal cream Insert 2g daily vaginally for two weeks. Then insert 1g three times a week.  1 each 3    fluticasone (FLONASE) 50 MCG/ACT nasal spray 2 times daily as needed      colchicine (COLCRYS) 0.6 MG tablet Take 1 tablet by mouth 2 times daily (Patient taking differently: Take 0.6 mg by mouth 2 times daily as needed) 60 tablet 5    promethazine (PHENERGAN) 25 MG tablet Take 1 tablet by mouth daily as needed for Nausea 90 tablet 0    furosemide (LASIX) 40 MG tablet Take 1 tablet by mouth every other day 45 tablet 1    gabapentin (NEURONTIN) 600 MG tablet Take 1 tablet by mouth 2 times daily for 360 days. 180 tablet 1    lisinopril (PRINIVIL;ZESTRIL) 5 MG tablet Take 1 tablet by mouth daily (Patient taking differently: Take 5 mg by mouth in the morning and at bedtime) 90 tablet 1    metFORMIN (GLUCOPHAGE-XR) 500 mg extended release tablet Take 2 tablets by mouth 2 times daily 360 tablet 1    calcium carbonate (TUMS) 500 MG chewable tablet Take 1 tablet by mouth at bedtime      dextromethorphan-guaiFENesin (MUCINEX DM)  MG per extended release tablet Take 1 tablet by mouth every 12 hours as needed      acetaminophen (TYLENOL) 500 MG tablet Take by mouth every 6 hours as needed      cetirizine (ZYRTEC) 10 MG tablet Take 10 mg by mouth daily as needed      potassium chloride (KLOR-CON M) 20 MEQ extended release tablet TAKE 1 TABLET EVERY MORNING AND 2 TABLETS EVERY EVENING      valACYclovir (VALTREX) 1 g tablet 2 tablets 2 times a day for 1 day only with mouth sore occurs      amitriptyline (ELAVIL) 10 MG tablet nightly as needed       No current facility-administered medications for this visit.         Allergies   Allergen Reactions    Oxycodone Itching    Eucalyptus Oil Hives and Other (See Comments)     Burns mouth    Ezetimibe Myalgia    Morphine Other (See Comments)     Feels crazy  Other reaction(s): Hallucinations    Penicillins Hives    Pineapple Hives and Other (See Comments)     Burns mouth    Vancomycin Other (See Comments)     Kidney function worsened    Sulfa Antibiotics Nausea Only and Rash     Patient Active Problem List    Diagnosis Date Noted    Chronic gout of right hand 11/16/2022     Priority: Medium    Sjogren syndrome, unspecified ([facility] 09/22/2022     Priority: Medium    Type 2 diabetes mellitus with chronic kidney disease 09/22/2022     Priority: Medium    Primary insomnia 06/06/2022     Priority: Medium    Intolerance of oral bisphosphonate therapy 06/06/2022     Priority: Medium    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  Appears to be either referral pattern pain from the neck or, muscle   spasms. See treatment above        Muscle spasm 12/17/2019     Last Assessment & Plan:   Formatting of this note might be different from the original.  Periscapular pain may be muscle spasms could be referral pattern from the   cervical spine. Plan trigger point injections  I discussed the risks and benefits of the procedure with the patient. Risks include but are not limited to death, paralysis, spinal headache,   drug reaction, nerve damage and bleeding. The patient agrees with the   above plan. For any patient with diabetes they are warned of the concern   over increase in blood glucose with steroids and are made aware to call   their primary care MD for assistance to control their blood glucose and   also to watch diet/ sugar levels closely after the injection. If the   patient must have anticoagulation medication stopped for this procedure,   an approval from the writing MD must be obtained and the patient   understands the risk of stopping anticoagulation including, but not   limited to, stroke.   Is not interested in physical therapy/dry needling secondary to COVID   concerns        Type 2 diabetes mellitus, controlled ([facility]     Dilated cardiomyopathy ([facility] 08/06/2019    Foraminal stenosis of cervical region 11/28/2018     Last Assessment & Plan:   Formatting of this note might be different from the original.  Continue to the right periscapular pain, if treatment for muscle spasms   with muscle relaxant and trigger point injections are not beneficial then   I would recommend cervical epidural steroid injection for diagnostic and   therapeutic purposes        Recurrent occipital headache 11/28/2018     Last Assessment & Plan:   Formatting of this note might be different from the original.  Occipital neuralgia versus referral pattern pain from cervical facets. Reassess after injection. Arthropathy of cervical facet joint 11/28/2018     Last Assessment & Plan:   Formatting of this note might be different from the original.  Over 90% pain relief after cervical facet medial branch block/RFA, RFA was   in sick/4/20.         Statin intolerance     Dyslipidemia     Osteoarthritis     Osteopenia     Chemotherapy-induced neuropathy (HCC)     Vitamin B12 deficiency     Essential hypertension     Chronic anemia     Depression 02/06/2013    S/P cardiac cath 01/01/2009      Past Surgical History:   Procedure Laterality Date    BREAST BIOPSY      left breast biopsy    BREAST BIOPSY  1968    left cyst removed    BREAST LUMPECTOMY Left 2001    BREAST REDUCTION SURGERY      Rt breast    CARDIAC CATHETERIZATION  2009    CARPAL TUNNEL RELEASE Bilateral     CHOLECYSTECTOMY, OPEN  1981    COLONOSCOPY  07/2014    CYST REMOVAL Right 11/2020    hand    HEENT      Sinus Surgery    HEENT      RK procedure bilateral eyes    HX OPEN REDUCTION INTERNAL FIXATION Left 2013    Ankle    HX OPEN REDUCTION INTERNAL FIXATION  08/2016    [place]    HYSTERECTOMY (CERVIX STATUS UNKNOWN)      LUMBAR LAMINECTOMY  1992    MASTECTOMY Left 2002    With Reconstruction    ORTHOPEDIC SURGERY Right 08/2013    Patellar fracture repair    OTHER SURGICAL HISTORY      Chetan Cath Placed and Removed    OVARY REMOVAL      Unilateral    TONSILLECTOMY      TONSILLECTOMY      TRANSPLANT  2002    Stem Cell Transplant    UVULOPALATOPHARYGOPLASTY  2004     Family History   Problem Relation Age of Onset    Cancer Mother     Diabetes Mother     Heart Disease Mother         MI at age 66    Heart Failure Mother         age 58    Breast Cancer Neg Hx     Stroke Sister     Cancer Brother     Heart Disease Brother     Stomach Cancer Neg Hx     Heart Disease Father         MI age 55     Social History     Tobacco Use    Smoking status: Never    Smokeless tobacco: Never   Substance Use Topics    Alcohol use: No         ROS:    No obvious pertinent positives on review of systems except for what was outlined in the HPI today.       PHYSICAL EXAM:     /82   Pulse 62   Ht 5' 5.5\" (1.664 m)   Wt 152 lb (68.9 kg)   BMI 24.91 kg/m²    General/Constitutional:   Alert and oriented x 3, no acute distress  HEENT:   normocephalic, atraumatic, moist mucous membranes  Neck:   No JVD or carotid bruits bilaterally  Cardiovascular:   regular rate and rhythm, no murmur/rub/gallop appreciated  Pulmonary:   clear to auscultation bilaterally, no respiratory distress  Abdomen:   soft, non-tender, non-distended  Ext:   No sig LE edema bilaterally  Skin:  warm and dry, no obvious rashes seen  Neuro:   no obvious sensory or motor deficits  Psychiatric:   normal mood and affect      Lab Results   Component Value Date/Time     08/19/2022 10:54 AM    K 4.6 08/19/2022 10:54 AM     08/19/2022 10:54 AM    CO2 25 08/19/2022 10:54 AM    BUN 46 08/19/2022 10:54 AM    CREATININE 1.50 08/19/2022 10:54 AM    GLUCOSE 164 08/19/2022 10:54 AM    CALCIUM 9.5 08/19/2022 10:54 AM        Lab Results   Component Value Date    WBC 7.9 07/12/2022    HGB 11.4 (L) 07/12/2022    HCT 34.2 (L) 07/12/2022    MCV 88.1 07/12/2022     07/12/2022       Lab Results   Component Value Date    TSH 1.690 06/08/2021 Lab Results   Component Value Date    LABA1C 6.2 06/06/2022     Lab Results   Component Value Date     06/06/2022       Lab Results   Component Value Date    CHOL 226 (H) 06/06/2022    CHOL 191 06/08/2021    CHOL 267 (H) 10/13/2020     Lab Results   Component Value Date    TRIG 450 (H) 06/06/2022    TRIG 293 (H) 06/08/2021    TRIG 237 (H) 10/13/2020     Lab Results   Component Value Date    HDL 52 06/06/2022    HDL 53 06/08/2021    HDL 66 10/13/2020     Lab Results   Component Value Date    LDLCALC Not calculated due to elevated triglyceride level 06/06/2022    LDLCALC 90 06/08/2021    LDLCALC 158 (H) 10/13/2020     Lab Results   Component Value Date    LABVLDL 90 (H) 06/06/2022    LABVLDL 43 (H) 09/26/2019    VLDL 48 (H) 06/08/2021    VLDL 43 (H) 10/13/2020     Lab Results   Component Value Date    CHOLHDLRATIO 4.3 06/06/2022           I have Independently reviewed prior care notes, any ER records available, cardiac testing, labs and results with the patient and before seeing the patient today. Also independently reviewed outside records when available. ASSESSMENT:    Wai Luke was seen today for hypertension and cardiomyopathy. Diagnoses and all orders for this visit:    Dilated cardiomyopathy ([facility]    Essential hypertension    Palpitation    Stage 3a chronic kidney disease ([facility]    Other orders  -     metoprolol succinate (TOPROL XL) 50 MG extended release tablet; Take 1 tablet by mouth at bedtime Once a day  -     lisinopril (PRINIVIL;ZESTRIL) 10 MG tablet; Take 1 tablet by mouth 2 times daily        PLAN:      1. HTN:   Follow on higher dose BB. Work on stress mgmt. Anxiety mgmt is key for her. Inc Ace to 10 BID. Needs better diet, less SALT. Instructed patient to follow low sodium diet. Monitor BP at home, will review at follow up. Aim for at least 30 minutes moderate physical activity at least 5 days a week. If needed, work on stress reduction and lifestyle modification. 2. NICM/SHF: EF better now, on toprol and Ace. Lasix PRN. follow, as needed. Less K as above. 3. Palp:   Remain on BB, follow stress. Patient has been instructed and agrees to call our office with any issues or other concerns related to their cardiac condition(s) and/or complaint(s). Return in about 3 months (around 2/17/2023).        MAGDALENE WALDEN,   11/17/2022

## 2022-11-18 ENCOUNTER — TELEPHONE (OUTPATIENT)
Dept: INTERNAL MEDICINE CLINIC | Facility: CLINIC | Age: 77
End: 2022-11-18

## 2022-11-18 NOTE — TELEPHONE ENCOUNTER
Referral was placed to urogynecology, Dr. Isidro Cordova, on 11/4. Looks like this was approved, and they should be calling her to schedule. Can you give her their phone number in case she would like to go ahead and call herself?   Thanks

## 2022-11-18 NOTE — TELEPHONE ENCOUNTER
----- Message from Farzaneh Laws sent at 11/17/2022  3:29 PM EST -----  Subject: Message to Provider    QUESTIONS  Information for Provider? Patient is calling in regarding a referral for a   kidney doctor that her and Dr. Scooby Connell had discussed prior. Patient   requesting a call back please.   ---------------------------------------------------------------------------  --------------  Oumou Werner [facility]  [phone]; OK to leave message on voicemail  ---------------------------------------------------------------------------  --------------  SCRIPT ANSWERS  Relationship to Patient?  Self

## 2022-11-28 ENCOUNTER — OFFICE VISIT (OUTPATIENT)
Dept: ORTHOPEDIC SURGERY | Age: 77
End: 2022-11-28

## 2022-11-28 DIAGNOSIS — R29.898 UPPER EXTREMITY WEAKNESS: ICD-10-CM

## 2022-11-28 DIAGNOSIS — M25.641 STIFFNESS OF FINGER JOINT OF RIGHT HAND: ICD-10-CM

## 2022-11-28 DIAGNOSIS — M15.1 DEGENERATIVE ARTHRITIS OF DISTAL INTERPHALANGEAL JOINT OF INDEX FINGER OF RIGHT HAND: Primary | ICD-10-CM

## 2022-11-28 DIAGNOSIS — R29.898 DECREASED GRIP STRENGTH: ICD-10-CM

## 2022-11-28 DIAGNOSIS — M79.641 PAIN OF RIGHT HAND: ICD-10-CM

## 2022-11-28 PROCEDURE — 99024 POSTOP FOLLOW-UP VISIT: CPT | Performed by: ORTHOPAEDIC SURGERY

## 2022-11-28 NOTE — PROGRESS NOTES
[address] [facility] ORTHOPEDICS - INTERNATIONAL  [facility] [address] [address]  Dept: [phone]      Occupational Therapy Initial Assessment     Referring MD: Julito Cooper MD    Diagnosis:     ICD-10-CM    1. 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 Stiffness of finger joint of right hand  M25.641       3. Pain of right hand  M79.641       4. Upper extremity weakness  R29.898       5. Decreased  strength  R29.898            Surgery/Medical Dx: Date DOS  11/16/22     Therapy precautions:  DIP Arthrodesis     History of injury/onset : Pt developed redness/edema/pain and inability to flex/use R IF approx 6/1/22 which was diagnosed by Dr Hans Barksdale in July as gout,  pt underwent R IF DIP arthrodesis on 11/16/22      Total Direct Treatment Time: 20 min  Total In Office Time: 55 min    Preferred Name: Felecia Ospina HISTORY     [facility]:   Past Medical History:   Diagnosis Date    Ankle fracture 2014    Ankle osteomyelitis, left ([address] 2014    Breast cancer ([address]     Chemotherapy-induced neuropathy ([address]     Chronic anemia     Chronic systolic CHF (congestive heart failure) ([facility])     CKD (chronic kidney disease) stage 3, GFR 30-59 ml/min ([facility])     Depression     Dilated cardiomyopathy ([address]     Dry eye syndrome of both eyes     Dyslipidemia     Essential hypertension     Fracture of right patella 2013    History of left breast cancer 2001    with mastectomy    Left leg weakness 9/8/2020    Non-ischemic cardiomyopathy ([address] 2018    Echo 50-55%, Cath-minimal CAD. EF normalized    Osteoarthritis     Osteopenia     Peripheral sensory-motor axonal polyneuropathy 10/17/2020    S/P cardiac cath 2009    Statin intolerance     Tibial fracture 2016    Tibial plateau fracture 3/9/1952    Last Assessment & Plan:  Formatting of this note might be different from the original. Pod 2 ORIF doing well. C/o moderate pain.   Controlled on PO meds.  Ready to go home    Type 2 diabetes mellitus, controlled ([address]     BS am 104    Vitamin B12 deficiency    ,   Past Surgical History:   Procedure Laterality Date    BREAST BIOPSY      left breast biopsy    BREAST BIOPSY  1968    left cyst removed    BREAST LUMPECTOMY Left 2001    BREAST REDUCTION SURGERY      Rt breast    CARDIAC CATHETERIZATION  2009    CARPAL TUNNEL RELEASE Bilateral     CHOLECYSTECTOMY, OPEN  1981    COLONOSCOPY  07/2014    CYST REMOVAL Right 11/2020    hand    HAND SURGERY Right 11/16/2022    Right index finger distal interphalangeal joint arthrodesis performed by Edilberto Madsen MD at [facility]      Sinus Surgery    HEENT      RK procedure bilateral eyes    HX OPEN REDUCTION INTERNAL FIXATION Left 2013    Ankle    HX OPEN REDUCTION INTERNAL FIXATION  08/2016    [place]    HYSTERECTOMY (CERVIX STATUS UNKNOWN)      LUMBAR LAMINECTOMY  1992    MASTECTOMY Left 2002    With Reconstruction    ORTHOPEDIC SURGERY Right 08/2013    Patellar fracture repair    OTHER SURGICAL HISTORY      Chetan Cath Placed and Removed    OVARY REMOVAL      Unilateral    TONSILLECTOMY      TONSILLECTOMY      TRANSPLANT  2002    Stem Cell Transplant    UVULOPALATOPHARYGOPLASTY  2004      Medications. : Reviewed in chart  Allergies: Allergies   Allergen Reactions    Oxycodone Itching    Eucalyptus Oil Hives and Other (See Comments)     Burns mouth    Ezetimibe Myalgia    Morphine Other (See Comments)     Feels crazy  Other reaction(s): Hallucinations    Penicillins Hives    Pineapple Hives and Other (See Comments)     Burns mouth    Vancomycin Other (See Comments)     Kidney function worsened    Sulfa Antibiotics Nausea Only and Rash        SUBJECTIVE     Current Symptoms/Chief complaints: No chief complaint on file.       Chief complaint/history of injury:   Date symptoms began: approx 6/1/22  Juan Caroleen of condition:Chronic (continuous duration > 3 months)  Primary cause of current episode:  degenerative gout  How did symptoms start: 6/1/22  Describe current symptoms: pain/redness/stiffness and inability to use R IF    Received previous outpatient therapy? No      Pain Assessment:  Pain location: R IF   Average Pain/symptom intensity (0-10 scale)  Last 24 hours: 0-7/10  Last week (1-7 days): _0-7/10  How often do you feel symptoms? Occasionally (26-50%)  Description: sharp and \"shooting\" paresthesias  Aggravating factors:  R IF PIP/MP arom  Alleviating factors:  rest/orthosis    Social/Functional Hx:  Pt lives lives with their spouse   Current DME: none  Work Status: Home-maker   Sleep: minimally disturbed  PLOF & Social Hx/Interests:  Independent and active without physical limitations and participated in cooking  Current level of function:  unable to use R IF due to healing fusion/pain/precautions    Neuro screen: Pt c/o, tingling, radiating symptoms, and in R IF intermittently    Patient Stated Goals: \"to get this healed and get back to using my hand\"    OBJECTIVE     Functional Outcome Measures: Quick Dash  42 score=   70 % functional deficit  Hand/Side Dominance: right handed  Observation/Posture: Muscle guarding R IF in MP/IP Ext  Palpation: TTP dorsal P3  Swelling/Edema:  R IF Min/mod  Skin Integrity: steri-strips intact     AROM Measures:    Shoulder and elbow arom WNLs      Digital AROM: IE IF MF RF SF   MCP 0/59°  0/85°  0/93°  +25/95°    PIP 0/40°  0/90°  0/103°  -25/97°    DIP FUSED°  0/60°  °-25/97 -10/80    Flexion to [facility]                Special Tests:  None performed   Evaluation Modifications/Assistance required : Verbal cues and Physical cues  Degree of assistance provided: minimal     Treatment:  Initial Evaluation: Low Complexity (46872)     Fabricated (1)  R IF DIP extension orthosis to protect healing arthrodesis/ for pain/ edema management      Occupational Therapy Orthosis Note       ORTHOSIS ISSUED:   : FO (Finger orthosis) C/F (custom fabricated) without joints, include soft interface, straps, includes fitting and adjustment. Fabricated (1)  R IF DIP extension orthosis to protect healing arthrodesis/ for pain/ edema management    TREATMENT PROVIDED:   Patient instructed in purpose, care, and precaution of orthosis wearing, Patient instructed in wearing schedule, Patient instructed on pin/wound care and signs of infection, and Patient instructed in HEP    WEAR SCHEDULE:   Remove for hygiene under running water    CARE OF ORTHOSIS AND PRECAUTIONS REVIEWED:   The orthosis can be cleaned with soap and water, rubbing alcohol, or a mild cleanser  Keep away from any direct source of heat, it will melt and/or lose it's shape  Keep away from dogs and/or pets that will chew the orthosis  Should the orthosis result in increased pain, numbness/tingling, increased swelling, or overall worsening of condition, patient is to contact the office immediately during business hours     TREATMENT GOALS:   Patient to demonstrate independence with donning/doffing orthosis in one visit, Patient to verbalize understanding of inspecting skin to prevent pressure areas and allergic reaction due to orthosis, Patient to verbalize understanding of precautions and necessity of wearing orthosis as indicated by therapist, and Patient to demonstrate independence with HEP in one visit    ALL TREATMENT GOALS MET AT TIME OF EVALUATION:   No: Will review next visit to assure independence    PLAN:   Pt will be seen 1 x every 1 to 2 wks for formal OT       Therapeutic exercise (68084) x 15 min:  Home Exercise Program development and Education: see below. Patient I with program following instruction and performance  Use of heat and ice as needed for pain and inflammation reduction. Precautions reviewed.   OT POC and rationale, education for surgical precautions and pain management, edema management  With DIP ext in place, pt was instructed and performed:  PIP blocking, MP flexion, MP/IP extension and full fist to JEANETH/HORTENSIA/SF    Access Code: [email or web address]  URL: [email or web address]  Date: 11/28/2022  Prepared by: Orly Vásquez    Exercises  Seated Finger PIP AROM - 5 x daily - 7 x weekly - 1 sets - 15 reps  Finger MP Flexion AROM - 5 x daily - 7 x weekly - 1 sets - 10 reps  Seated Full Fist AROM - 5 x daily - 7 x weekly - 1 sets - 10 reps    CLINICAL DECISION MAKING/ASSESSMENT     Performance Deficits  Physical: Dexterity, Mobility, Strength, and Fine motor coordination  Cognitive: No deficiencies noted  Psychosocial: No deficiencies noted  Rehab potential: good    The patient presents today s/p L IF DIP arthrodesis . The patient presents with R IF pain/edema/ MP/PIP stiffness and decreased fxal use . These deficits appear to be secondary to condition/sx . Based on these subjective and objective findings, the patient is perceived as currently having a primary functional deficit of  72% dysfunction. After a brief chart/PMH and OT profile review, evaluation requiring minimal  assistance/modifications and simple patient and data analysis, I have determined the patient exhibits several (1-3) performance deficits. Comorbidities may affect the patient's occupational performance. The following performance deficits (including but not limited to physical, cognitive and/or psychosocial components) result in activity limitations and participation restrictions: Mobility and Strength    The patient would benefit from skilled occupational therapy services to address the deficits noted above for return to prior level of function. PLAN OF CARE     Effective Dates: 11/28/2022 TO 1/27/2023 (60 days).     Frequency/Duration:  1 to 2 x wk  for 60 Day(s)  Interventions may include but are not limited to: (72787) Therapeutic exercise to develop strength and endurance, range of motion, and flexibility, (09476) Manual Therapy:   Consisting of but not limited to hands on treatment by therapist for connective tissue massage, pain management, edema management, joint mobilization and manipulation, manual traction, passive range of motion, soft tissue and neural system mobilization/manipulation and therapeutic massage., (71919) Therapeutic activities:Direct one on one patient contact with provider, use of dynamic activities to improve functional performance, (91252 Initial orthotic management and training: Fabrication/adjustments as indicated, (36794) Subsequent orthotic management as indicated, Modalities prn to address pain, spasms, and swelling: (35012) Hot/cold pack  (30237) Fluidotherapy  (44930) Paraffin, Home exercise program (HEP) development, Orthotic codes: , (88605) Kineseotaping for Neuromuscular Re-education : Muscle inhibition or facilitation, space correction, to improve proprioception,; for endurance or correction of postural stabilizers; addressing trophic changes of complex regional pain syndrome, (47755) Kineseotaping for Manual Therapy Techniques for soft tissue mobilization, facilitation of muscles, pain management, lymphatic management, swelling management, scar mobilization, myofascial correction, mechanical joint correction or support, and (64914) Kineseotaping for Therapeutic Procedure/Exercise  for strengthening or lengthening a muscle. Used in conjunction with retraining posture, endurance and flexibility    The referring physician has reviewed and approved this evaluation and plan of care as noted by the electronic signature attached to note. GOALS   Short Term Goals:  12/26/2022  (4 weeks)  Patient will be I with HEP and precautions.   Decrease pain to no > than 1-3/10 at rest,  Pt will increase R active MF/RF/SF flexion to [facility] and will increase R IF MP/PIP to 80 deg or > to increase ease with gripping toothbrush in future  Incision will be healed  Decrease hypersensitivity to minimal or be resolved  Improve Quick DASH functional assessment score to less than 40% deficit     Long Term Goals: 1/23/2023  (8 weeks)  Pt will have full active MF/RF/SF flexion to [facility] and will increase R IF MP/PIP to 85 degrees each of greater to increase ease with grasping  Pt will have adequate strength to be independent with buttons/pinching open ziplocks and gripping containers  Pt will be able to lift and carry items (ie grocery bags, laundry basket etc) with R UE pain 2 of 10 or less.   Pts Quick DASH functional assessment score will be less than 20% functional deficit  Pt will be I with HEP for ROM and strengthening as indicated at time of discharge     [address] [facility] Portal     OT Protocols

## 2022-11-28 NOTE — PROGRESS NOTES
Orthopaedic Hand Surgery Note    Name: Dimas Peterson  YOB: 1945  Gender: female  MRN: [id number]    Post Operative Visit: Right index finger distal interphalangeal joint arthrodesis - Right    HPI: Patient is status post Right index finger distal interphalangeal joint arthrodesis - Right on 11/16/2022. Patient reports well controlled pain. Physical Examination:  Surgical incision is clean, dry and intact. Sutures are in place. There is no erythema or drainage. Sensation is intact in all fingers. Motor exam reveals no deficits. .    Imaging:     Finger XR: AP, Lateral, Oblique views     Clinical Indication:  1. 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 Sutures removed and Steri-Strips were applied today. She has an appointment to begin hand therapy today, and will be constructed a custom molded finger splint. Splint should remain in place at all times set for hygiene and range of motion exercises. She denies do any lifting heavier than 1 to 2 pounds with the right upper extremity.   I will see her back in 4 weeks for repeat radiographs        Tova Tan MD  11/28/22  4:25 PM

## 2022-12-05 ENCOUNTER — OFFICE VISIT (OUTPATIENT)
Dept: ORTHOPEDIC SURGERY | Age: 77
End: 2022-12-05
Payer: MEDICARE

## 2022-12-05 DIAGNOSIS — Z78.9 DECREASED ACTIVITIES OF DAILY LIVING (ADL): ICD-10-CM

## 2022-12-05 DIAGNOSIS — M25.641 STIFFNESS OF FINGER JOINT OF RIGHT HAND: Primary | ICD-10-CM

## 2022-12-05 DIAGNOSIS — M79.644 PAIN OF FINGER OF RIGHT HAND: ICD-10-CM

## 2022-12-05 DIAGNOSIS — R29.898 DECREASED GRIP STRENGTH: ICD-10-CM

## 2022-12-05 DIAGNOSIS — R29.898 UPPER EXTREMITY WEAKNESS: ICD-10-CM

## 2022-12-05 PROCEDURE — 97110 THERAPEUTIC EXERCISES: CPT | Performed by: OCCUPATIONAL THERAPIST

## 2022-12-05 PROCEDURE — 97010 HOT OR COLD PACKS THERAPY: CPT | Performed by: OCCUPATIONAL THERAPIST

## 2022-12-05 NOTE — PROGRESS NOTES
eschar still present)  DIP Extension orthosis refurbished with new velcro/straps, fitting well  With DIP ext in place, pt performed AROM including  PIP blocking, MP flexion, MP/IP extension and straight fist, full flexion MF/RF/SFs    Access Code: [email or web address]  URL: [email or web address]  Date: 11/28/2022  Prepared by: Harry Schumacher    Exercises  Seated Finger PIP AROM - 5 x daily - 7 x weekly - 1 sets - 15 reps  Finger MP Flexion AROM - 5 x daily - 7 x weekly - 1 sets - 10 reps  Seated Full Fist AROM - 5 x daily - 7 x weekly - 1 sets - 10 reps    CLINICAL DECISION MAKING/ASSESSMENT     ASSESSMENT:   Progressing well with IF MP/PIP flexion improving nicely, edema decreasing, pain decreasing, reports only occasional paresthesias in IF, incision healing, dried eschar \"scab\" still present. PLAN OF CARE     PLAN:  Pt requesting to limit OT visits due to distance traveled, to be seen at next MD apt in 2 wks but was instructed to call OT if any redness/increases in edema arise    GOALS   Short Term Goals:  12/26/2022  (4 weeks)  Patient will be I with HEP and precautions. Decrease pain to no > than 1-3/10 at rest,  Pt will increase R active MF/RF/SF flexion to [facility] and will increase R IF MP/PIP to 80 deg or > to increase ease with gripping toothbrush in future  Incision will be healed  Decrease hypersensitivity to minimal or be resolved  Improve Quick DASH functional assessment score to less than 40% deficit     Long Term Goals: 1/23/2023  (8 weeks)  Pt will have full active MF/RF/SF flexion to [facility] and will increase R IF MP/PIP to 85 degrees each of greater to increase ease with grasping  Pt will have adequate strength to be independent with buttons/pinching open ziplocks and gripping containers  Pt will be able to lift and carry items (ie grocery bags, laundry basket etc) with R UE pain 2 of 10 or less.   Pts Quick DASH functional assessment score will be less than 20% functional deficit  Pt will be I with HEP for ROM and strengthening as indicated at time of discharge     [address] [facility] Portal     OT Protocols

## 2022-12-07 DIAGNOSIS — R11.0 NAUSEA: ICD-10-CM

## 2022-12-08 RX ORDER — PROMETHAZINE HYDROCHLORIDE 25 MG/1
25 TABLET ORAL DAILY PRN
Qty: 90 TABLET | Refills: 0 | Status: SHIPPED | OUTPATIENT
Start: 2022-12-08

## 2022-12-16 ENCOUNTER — OFFICE VISIT (OUTPATIENT)
Dept: INTERNAL MEDICINE CLINIC | Facility: CLINIC | Age: 77
End: 2022-12-16
Payer: MEDICARE

## 2022-12-16 VITALS
BODY MASS INDEX: 23.5 KG/M2 | OXYGEN SATURATION: 96 % | HEART RATE: 75 BPM | SYSTOLIC BLOOD PRESSURE: 132 MMHG | DIASTOLIC BLOOD PRESSURE: 78 MMHG | WEIGHT: 146.2 LBS | HEIGHT: 66 IN | TEMPERATURE: 97.3 F

## 2022-12-16 DIAGNOSIS — F51.01 PRIMARY INSOMNIA: ICD-10-CM

## 2022-12-16 DIAGNOSIS — I10 ESSENTIAL HYPERTENSION: Chronic | ICD-10-CM

## 2022-12-16 DIAGNOSIS — N18.31 TYPE 2 DIABETES MELLITUS WITH STAGE 3A CHRONIC KIDNEY DISEASE, WITHOUT LONG-TERM CURRENT USE OF INSULIN (HCC): Chronic | ICD-10-CM

## 2022-12-16 DIAGNOSIS — E11.22 TYPE 2 DIABETES MELLITUS WITH STAGE 3A CHRONIC KIDNEY DISEASE, WITHOUT LONG-TERM CURRENT USE OF INSULIN (HCC): Chronic | ICD-10-CM

## 2022-12-16 DIAGNOSIS — R63.1 POLYDIPSIA: ICD-10-CM

## 2022-12-16 DIAGNOSIS — M35.00 SJOGREN SYNDROME, UNSPECIFIED (HCC): Chronic | ICD-10-CM

## 2022-12-16 DIAGNOSIS — M85.89 OSTEOPENIA OF MULTIPLE SITES: ICD-10-CM

## 2022-12-16 DIAGNOSIS — R30.0 DYSURIA: ICD-10-CM

## 2022-12-16 DIAGNOSIS — R21 PERINEAL RASH IN FEMALE: ICD-10-CM

## 2022-12-16 DIAGNOSIS — D64.9 ANEMIA, UNSPECIFIED TYPE: ICD-10-CM

## 2022-12-16 DIAGNOSIS — E11.22 TYPE 2 DIABETES MELLITUS WITH STAGE 3A CHRONIC KIDNEY DISEASE, WITHOUT LONG-TERM CURRENT USE OF INSULIN (HCC): Primary | Chronic | ICD-10-CM

## 2022-12-16 DIAGNOSIS — M1A.0410 IDIOPATHIC CHRONIC GOUT OF RIGHT HAND WITHOUT TOPHUS: ICD-10-CM

## 2022-12-16 DIAGNOSIS — N18.31 STAGE 3A CHRONIC KIDNEY DISEASE (HCC): ICD-10-CM

## 2022-12-16 DIAGNOSIS — N39.0 FREQUENT UTI: ICD-10-CM

## 2022-12-16 DIAGNOSIS — N18.31 TYPE 2 DIABETES MELLITUS WITH STAGE 3A CHRONIC KIDNEY DISEASE, WITHOUT LONG-TERM CURRENT USE OF INSULIN (HCC): Primary | Chronic | ICD-10-CM

## 2022-12-16 PROCEDURE — 1036F TOBACCO NON-USER: CPT | Performed by: NURSE PRACTITIONER

## 2022-12-16 PROCEDURE — G8399 PT W/DXA RESULTS DOCUMENT: HCPCS | Performed by: NURSE PRACTITIONER

## 2022-12-16 PROCEDURE — 1123F ACP DISCUSS/DSCN MKR DOCD: CPT | Performed by: NURSE PRACTITIONER

## 2022-12-16 PROCEDURE — G8427 DOCREV CUR MEDS BY ELIG CLIN: HCPCS | Performed by: NURSE PRACTITIONER

## 2022-12-16 PROCEDURE — 99215 OFFICE O/P EST HI 40 MIN: CPT | Performed by: NURSE PRACTITIONER

## 2022-12-16 PROCEDURE — G8484 FLU IMMUNIZE NO ADMIN: HCPCS | Performed by: NURSE PRACTITIONER

## 2022-12-16 PROCEDURE — 3044F HG A1C LEVEL LT 7.0%: CPT | Performed by: NURSE PRACTITIONER

## 2022-12-16 PROCEDURE — 3074F SYST BP LT 130 MM HG: CPT | Performed by: NURSE PRACTITIONER

## 2022-12-16 PROCEDURE — G8420 CALC BMI NORM PARAMETERS: HCPCS | Performed by: NURSE PRACTITIONER

## 2022-12-16 PROCEDURE — 1090F PRES/ABSN URINE INCON ASSESS: CPT | Performed by: NURSE PRACTITIONER

## 2022-12-16 PROCEDURE — 3078F DIAST BP <80 MM HG: CPT | Performed by: NURSE PRACTITIONER

## 2022-12-16 RX ORDER — CYANOCOBALAMIN 1000 UG/ML
INJECTION, SOLUTION INTRAMUSCULAR; SUBCUTANEOUS
COMMUNITY
Start: 2022-11-16

## 2022-12-16 RX ORDER — SYRINGE WITH NEEDLE, 1 ML 25GX5/8"
SYRINGE, EMPTY DISPOSABLE MISCELLANEOUS
COMMUNITY
Start: 2022-11-16

## 2022-12-16 RX ORDER — FLUCONAZOLE 150 MG/1
150 TABLET ORAL ONCE
Qty: 2 TABLET | Refills: 1 | Status: SHIPPED | OUTPATIENT
Start: 2022-12-16 | End: 2022-12-16

## 2022-12-16 RX ORDER — CEPHALEXIN 500 MG/1
500 CAPSULE ORAL 2 TIMES DAILY
Qty: 14 CAPSULE | Refills: 0 | Status: SHIPPED | OUTPATIENT
Start: 2022-12-16 | End: 2022-12-23

## 2022-12-16 ASSESSMENT — ENCOUNTER SYMPTOMS
DIARRHEA: 0
COUGH: 0
ABDOMINAL PAIN: 0
BACK PAIN: 1
SHORTNESS OF BREATH: 0
VOMITING: 0
TROUBLE SWALLOWING: 0
PHOTOPHOBIA: 0
WHEEZING: 0
NAUSEA: 0

## 2022-12-16 ASSESSMENT — ANXIETY QUESTIONNAIRES
7. FEELING AFRAID AS IF SOMETHING AWFUL MIGHT HAPPEN: 0
4. TROUBLE RELAXING: 0
2. NOT BEING ABLE TO STOP OR CONTROL WORRYING: 0
6. BECOMING EASILY ANNOYED OR IRRITABLE: 0
IF YOU CHECKED OFF ANY PROBLEMS ON THIS QUESTIONNAIRE, HOW DIFFICULT HAVE THESE PROBLEMS MADE IT FOR YOU TO DO YOUR WORK, TAKE CARE OF THINGS AT HOME, OR GET ALONG WITH OTHER PEOPLE: NOT DIFFICULT AT ALL
3. WORRYING TOO MUCH ABOUT DIFFERENT THINGS: 0
1. FEELING NERVOUS, ANXIOUS, OR ON EDGE: 0
5. BEING SO RESTLESS THAT IT IS HARD TO SIT STILL: 0
GAD7 TOTAL SCORE: 0

## 2022-12-16 ASSESSMENT — PATIENT HEALTH QUESTIONNAIRE - PHQ9
4. FEELING TIRED OR HAVING LITTLE ENERGY: 0
1. LITTLE INTEREST OR PLEASURE IN DOING THINGS: 0
SUM OF ALL RESPONSES TO PHQ9 QUESTIONS 1 & 2: 0
SUM OF ALL RESPONSES TO PHQ QUESTIONS 1-9: 0
SUM OF ALL RESPONSES TO PHQ QUESTIONS 1-9: 0
10. IF YOU CHECKED OFF ANY PROBLEMS, HOW DIFFICULT HAVE THESE PROBLEMS MADE IT FOR YOU TO DO YOUR WORK, TAKE CARE OF THINGS AT HOME, OR GET ALONG WITH OTHER PEOPLE: 0
3. TROUBLE FALLING OR STAYING ASLEEP: 0
6. FEELING BAD ABOUT YOURSELF - OR THAT YOU ARE A FAILURE OR HAVE LET YOURSELF OR YOUR FAMILY DOWN: 0
SUM OF ALL RESPONSES TO PHQ QUESTIONS 1-9: 0
9. THOUGHTS THAT YOU WOULD BE BETTER OFF DEAD, OR OF HURTING YOURSELF: 0
8. MOVING OR SPEAKING SO SLOWLY THAT OTHER PEOPLE COULD HAVE NOTICED. OR THE OPPOSITE, BEING SO FIGETY OR RESTLESS THAT YOU HAVE BEEN MOVING AROUND A LOT MORE THAN USUAL: 0
2. FEELING DOWN, DEPRESSED OR HOPELESS: 0
5. POOR APPETITE OR OVEREATING: 0
7. TROUBLE CONCENTRATING ON THINGS, SUCH AS READING THE NEWSPAPER OR WATCHING TELEVISION: 0
SUM OF ALL RESPONSES TO PHQ QUESTIONS 1-9: 0
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
## 2022-12-17 ENCOUNTER — HOSPITAL ENCOUNTER (OUTPATIENT)
Dept: MAMMOGRAPHY | Age: 77
DRG: 683 | End: 2022-12-17
Payer: MEDICARE

## 2022-12-17 DIAGNOSIS — Z12.31 VISIT FOR SCREENING MAMMOGRAM: ICD-10-CM

## 2022-12-17 LAB
ANION GAP SERPL CALC-SCNC: 9 MMOL/L (ref 2–11)
BUN SERPL-MCNC: 29 MG/DL (ref 8–23)
CALCIUM SERPL-MCNC: 10.3 MG/DL (ref 8.3–10.4)
CHLORIDE SERPL-SCNC: 102 MMOL/L (ref 101–110)
CO2 SERPL-SCNC: 27 MMOL/L (ref 21–32)
CORTIS BS SERPL-MCNC: 20.7 UG/DL
CREAT SERPL-MCNC: 2.2 MG/DL (ref 0.6–1)
GLUCOSE SERPL-MCNC: 110 MG/DL (ref 65–100)
IRON SATN MFR SERPL: 19 %
IRON SERPL-MCNC: 69 UG/DL (ref 35–150)
IRON SERPL-MCNC: 69 UG/DL (ref 35–150)
POTASSIUM SERPL-SCNC: 3.7 MMOL/L (ref 3.5–5.1)
SODIUM SERPL-SCNC: 138 MMOL/L (ref 133–143)
TIBC SERPL-MCNC: 361 UG/DL (ref 250–450)
TIBC SERPL-MCNC: 368 UG/DL (ref 250–450)

## 2022-12-17 PROCEDURE — 77063 BREAST TOMOSYNTHESIS BI: CPT

## 2022-12-19 ENCOUNTER — TELEPHONE (OUTPATIENT)
Dept: INTERNAL MEDICINE CLINIC | Facility: CLINIC | Age: 77
End: 2022-12-19

## 2022-12-19 ENCOUNTER — HOSPITAL ENCOUNTER (INPATIENT)
Age: 77
LOS: 2 days | Discharge: HOME | DRG: 683 | End: 2022-12-21
Attending: EMERGENCY MEDICINE | Admitting: FAMILY MEDICINE
Payer: MEDICARE

## 2022-12-19 ENCOUNTER — HOSPITAL ENCOUNTER (EMERGENCY)
Dept: CT IMAGING | Age: 77
Discharge: HOME | DRG: 683 | End: 2022-12-22
Payer: MEDICARE

## 2022-12-19 DIAGNOSIS — N17.9 AKI (ACUTE KIDNEY INJURY) (HCC): ICD-10-CM

## 2022-12-19 DIAGNOSIS — I42.0 DILATED CARDIOMYOPATHY (HCC): Chronic | ICD-10-CM

## 2022-12-19 DIAGNOSIS — R30.0 DYSURIA: ICD-10-CM

## 2022-12-19 DIAGNOSIS — N39.0 RECURRENT URINARY TRACT INFECTION: ICD-10-CM

## 2022-12-19 DIAGNOSIS — N17.9 AKI (ACUTE KIDNEY INJURY) (HCC): Primary | ICD-10-CM

## 2022-12-19 DIAGNOSIS — R30.0 DYSURIA: Primary | ICD-10-CM

## 2022-12-19 DIAGNOSIS — I10 ESSENTIAL HYPERTENSION: Chronic | ICD-10-CM

## 2022-12-19 PROBLEM — I50.42 CHRONIC COMBINED SYSTOLIC AND DIASTOLIC CHF (CONGESTIVE HEART FAILURE) (HCC): Status: ACTIVE | Noted: 2022-12-19

## 2022-12-19 LAB
ANION GAP SERPL CALC-SCNC: 7 MMOL/L (ref 2–11)
ANION GAP SERPL CALC-SCNC: 8 MMOL/L (ref 2–11)
APPEARANCE UR: CLEAR
BASOPHILS # BLD: 0.1 K/UL (ref 0–0.2)
BASOPHILS NFR BLD: 1 % (ref 0–2)
BILIRUB UR QL: NEGATIVE
BUN SERPL-MCNC: 44 MG/DL (ref 8–23)
BUN SERPL-MCNC: 45 MG/DL (ref 8–23)
CALCIUM SERPL-MCNC: 10.6 MG/DL (ref 8.3–10.4)
CALCIUM SERPL-MCNC: 10.8 MG/DL (ref 8.3–10.4)
CHLORIDE SERPL-SCNC: 104 MMOL/L (ref 101–110)
CHLORIDE SERPL-SCNC: 104 MMOL/L (ref 101–110)
CO2 SERPL-SCNC: 26 MMOL/L (ref 21–32)
CO2 SERPL-SCNC: 27 MMOL/L (ref 21–32)
COLOR UR: ABNORMAL
CREAT SERPL-MCNC: 1.6 MG/DL (ref 0.6–1)
CREAT SERPL-MCNC: 1.7 MG/DL (ref 0.6–1)
DIFFERENTIAL METHOD BLD: ABNORMAL
EOSINOPHIL # BLD: 0.1 K/UL (ref 0–0.8)
EOSINOPHIL NFR BLD: 2 % (ref 0.5–7.8)
ERYTHROCYTE [DISTWIDTH] IN BLOOD BY AUTOMATED COUNT: 13.1 % (ref 11.9–14.6)
GLUCOSE SERPL-MCNC: 112 MG/DL (ref 65–100)
GLUCOSE SERPL-MCNC: 99 MG/DL (ref 65–100)
GLUCOSE UR STRIP.AUTO-MCNC: NEGATIVE MG/DL
HCT VFR BLD AUTO: 39.7 % (ref 35.8–46.3)
HGB BLD-MCNC: 12.6 G/DL (ref 11.7–15.4)
HGB UR QL STRIP: NEGATIVE
IMM GRANULOCYTES # BLD AUTO: 0 K/UL (ref 0–0.5)
IMM GRANULOCYTES NFR BLD AUTO: 0 % (ref 0–5)
KETONES UR QL STRIP.AUTO: NEGATIVE MG/DL
LEUKOCYTE ESTERASE UR QL STRIP.AUTO: ABNORMAL
LYMPHOCYTES # BLD: 2.3 K/UL (ref 0.5–4.6)
LYMPHOCYTES NFR BLD: 45 % (ref 13–44)
MCH RBC QN AUTO: 29.2 PG (ref 26.1–32.9)
MCHC RBC AUTO-ENTMCNC: 31.7 G/DL (ref 31.4–35)
MCV RBC AUTO: 91.9 FL (ref 82–102)
MONOCYTES # BLD: 0.5 K/UL (ref 0.1–1.3)
MONOCYTES NFR BLD: 10 % (ref 4–12)
NEUTS SEG # BLD: 2.2 K/UL (ref 1.7–8.2)
NEUTS SEG NFR BLD: 42 % (ref 43–78)
NITRITE UR QL STRIP.AUTO: NEGATIVE
NRBC # BLD: 0 K/UL (ref 0–0.2)
PH UR STRIP: 5 (ref 5–9)
PLATELET # BLD AUTO: 229 K/UL (ref 150–450)
PMV BLD AUTO: 11.5 FL (ref 9.4–12.3)
POTASSIUM SERPL-SCNC: 3.9 MMOL/L (ref 3.5–5.1)
POTASSIUM SERPL-SCNC: 4.2 MMOL/L (ref 3.5–5.1)
PROT UR STRIP-MCNC: ABNORMAL MG/DL
RBC # BLD AUTO: 4.32 M/UL (ref 4.05–5.2)
SODIUM SERPL-SCNC: 137 MMOL/L (ref 133–143)
SODIUM SERPL-SCNC: 139 MMOL/L (ref 133–143)
SP GR UR REFRACTOMETRY: 1.02 (ref 1–1.02)
UROBILINOGEN UR QL STRIP.AUTO: 0.2 EU/DL (ref 0.2–1)
WBC # BLD AUTO: 5.2 K/UL (ref 4.3–11.1)

## 2022-12-19 PROCEDURE — 74176 CT ABD & PELVIS W/O CONTRAST: CPT

## 2022-12-19 PROCEDURE — 2580000003 HC RX 258: Performed by: EMERGENCY MEDICINE

## 2022-12-19 PROCEDURE — 81001 URINALYSIS AUTO W/SCOPE: CPT

## 2022-12-19 PROCEDURE — 99285 EMERGENCY DEPT VISIT HI MDM: CPT

## 2022-12-19 PROCEDURE — 80048 BASIC METABOLIC PNL TOTAL CA: CPT

## 2022-12-19 PROCEDURE — 85025 COMPLETE CBC W/AUTO DIFF WBC: CPT

## 2022-12-19 PROCEDURE — 1100000000 HC RM PRIVATE

## 2022-12-19 RX ORDER — POLYETHYLENE GLYCOL 3350 17 G/17G
17 POWDER, FOR SOLUTION ORAL DAILY PRN
Status: DISCONTINUED | OUTPATIENT
Start: 2022-12-19 | End: 2022-12-21 | Stop reason: HOSPADM

## 2022-12-19 RX ORDER — DEXTROSE MONOHYDRATE 100 MG/ML
INJECTION, SOLUTION INTRAVENOUS CONTINUOUS PRN
Status: DISCONTINUED | OUTPATIENT
Start: 2022-12-19 | End: 2022-12-21 | Stop reason: HOSPADM

## 2022-12-19 RX ORDER — INSULIN LISPRO 100 [IU]/ML
0-4 INJECTION, SOLUTION INTRAVENOUS; SUBCUTANEOUS NIGHTLY
Status: DISCONTINUED | OUTPATIENT
Start: 2022-12-19 | End: 2022-12-21 | Stop reason: HOSPADM

## 2022-12-19 RX ORDER — METOPROLOL SUCCINATE 50 MG/1
50 TABLET, EXTENDED RELEASE ORAL NIGHTLY
Status: DISCONTINUED | OUTPATIENT
Start: 2022-12-19 | End: 2022-12-21 | Stop reason: HOSPADM

## 2022-12-19 RX ORDER — SODIUM CHLORIDE 0.9 % (FLUSH) 0.9 %
5-40 SYRINGE (ML) INJECTION PRN
Status: DISCONTINUED | OUTPATIENT
Start: 2022-12-19 | End: 2022-12-21 | Stop reason: HOSPADM

## 2022-12-19 RX ORDER — GABAPENTIN 300 MG/1
300 CAPSULE ORAL 2 TIMES DAILY
Status: DISCONTINUED | OUTPATIENT
Start: 2022-12-19 | End: 2022-12-21 | Stop reason: HOSPADM

## 2022-12-19 RX ORDER — CALCIUM CARBONATE 200(500)MG
1 TABLET,CHEWABLE ORAL NIGHTLY
Status: DISCONTINUED | OUTPATIENT
Start: 2022-12-19 | End: 2022-12-21 | Stop reason: HOSPADM

## 2022-12-19 RX ORDER — ACETAMINOPHEN 650 MG/1
650 SUPPOSITORY RECTAL EVERY 6 HOURS PRN
Status: DISCONTINUED | OUTPATIENT
Start: 2022-12-19 | End: 2022-12-21 | Stop reason: HOSPADM

## 2022-12-19 RX ORDER — LIDOCAINE 4 G/G
1 PATCH TOPICAL EVERY 24 HOURS
Status: DISCONTINUED | OUTPATIENT
Start: 2022-12-19 | End: 2022-12-21 | Stop reason: HOSPADM

## 2022-12-19 RX ORDER — ACETAMINOPHEN 325 MG/1
650 TABLET ORAL EVERY 6 HOURS PRN
Status: DISCONTINUED | OUTPATIENT
Start: 2022-12-19 | End: 2022-12-21 | Stop reason: HOSPADM

## 2022-12-19 RX ORDER — SODIUM CHLORIDE 0.9 % (FLUSH) 0.9 %
5-40 SYRINGE (ML) INJECTION EVERY 12 HOURS SCHEDULED
Status: DISCONTINUED | OUTPATIENT
Start: 2022-12-19 | End: 2022-12-21 | Stop reason: HOSPADM

## 2022-12-19 RX ORDER — 0.9 % SODIUM CHLORIDE 0.9 %
1000 INTRAVENOUS SOLUTION INTRAVENOUS
Status: COMPLETED | OUTPATIENT
Start: 2022-12-19 | End: 2022-12-19

## 2022-12-19 RX ORDER — SODIUM CHLORIDE 9 MG/ML
INJECTION, SOLUTION INTRAVENOUS PRN
Status: DISCONTINUED | OUTPATIENT
Start: 2022-12-19 | End: 2022-12-21 | Stop reason: HOSPADM

## 2022-12-19 RX ORDER — SODIUM CHLORIDE 9 MG/ML
INJECTION, SOLUTION INTRAVENOUS CONTINUOUS
Status: DISCONTINUED | OUTPATIENT
Start: 2022-12-19 | End: 2022-12-20

## 2022-12-19 RX ORDER — INSULIN LISPRO 100 [IU]/ML
0-8 INJECTION, SOLUTION INTRAVENOUS; SUBCUTANEOUS
Status: DISCONTINUED | OUTPATIENT
Start: 2022-12-19 | End: 2022-12-21 | Stop reason: HOSPADM

## 2022-12-19 RX ADMIN — SODIUM CHLORIDE 1000 ML: 9 INJECTION, SOLUTION INTRAVENOUS at 15:43

## 2022-12-19 ASSESSMENT — ENCOUNTER SYMPTOMS: ABDOMINAL PAIN: 1

## 2022-12-19 ASSESSMENT — PAIN SCALES - GENERAL: PAINLEVEL_OUTOF10: 6

## 2022-12-19 ASSESSMENT — PAIN DESCRIPTION - LOCATION: LOCATION: ABDOMEN;BACK

## 2022-12-19 ASSESSMENT — PAIN - FUNCTIONAL ASSESSMENT: PAIN_FUNCTIONAL_ASSESSMENT: 0-10

## 2022-12-19 NOTE — TELEPHONE ENCOUNTER
----- Message from LIU Ivory CNP sent at 12/19/2022 11:26 AM EST -----  Kidney function has decreased. Need to have her return today to recheck labs. How is she feeling?

## 2022-12-19 NOTE — TELEPHONE ENCOUNTER
Pt called asking about her labs . She states that she is still not feeling well and still ing having pain in the abdomen area. She made an steven for today at 1:30pm for recheck labs.

## 2022-12-19 NOTE — ED PROVIDER NOTES
Emergency Department Provider Note                   PCP:                Florencio Puente APRN - MALOU               Age: 69 y.o. Sex: female       ICD-10-CM    1. JACKY (acute kidney injury) ([facility] [address]  N17.9       2. Recurrent urinary tract infection  N39.0           DISPOSITION Decision To Admit 12/19/2022 05:14:47 PM       MDM  Number of Diagnoses or Management Options  JACKY (acute kidney injury) ([facility] [address]  Recurrent urinary tract infection  Diagnosis management comments: Viral infection, gastroenteritis, viral adenitis, pseudomembranous colitis, inflammatory  bowel disease, infectious diarrhea    Abdominal wall pain,     Constipation, fecal impaction, small bowel obstruction, partial small bowel obstruction,  Ileus    gallbladder disease, cholecystitis, diverticulitis, appendicitis, appendicitis with rupture,    UTI, pyelonephritis, renal colic, ureteral stone     Peptic ulcer disease, esophagitis, GERD    Pancreatitis, pancreatic pseudocyst,    hepatic cirrhosis, GI bleed, esophageal varices, poisoning,    ingestion of foreign material         Amount and/or Complexity of Data Reviewed  Clinical lab tests: ordered and reviewed  Tests in the radiology section of CPT®: ordered and reviewed  Tests in the medicine section of CPT®: reviewed and ordered  Review and summarize past medical records: yes  Independent visualization of images, tracings, or specimens: yes         ED Course as of 12/19/22 1714   Mon Dec 19, 2022   1645 CT ABDOMEN PELVIS RENAL STONE  IMPRESSION:  1. No urinary tract calculi or hydronephrosis. No acute changes in the abdomen  or pelvis to account for patient's symptoms. No bowel obstruction or  diverticulitis. [QD]   0130 I talked to the patient and the patient's  about the findings in the emergency department and the discussed plan for admission. [RG]   1905 Hospitalist consulted and will see the patient for planned admission secondary to renal failure.  [KH]      ED Course User Index  [KH] Mar Gilliland DO        Orders Placed This Encounter   Procedures    CT ABDOMEN PELVIS RENAL STONE    Basic Metabolic Panel    CBC with Auto Differential    Urinalysis w rflx microscopic    Nursing communication        Medications   0.9 % sodium chloride bolus (1,000 mLs IntraVENous New Bag 12/19/22 8823)       New Prescriptions    No medications on file        Norma Borwn is a 69 y.o. female who presents to the Emergency Department with chief complaint of    Chief Complaint   Patient presents with    Abnormal Lab      Patient is a 69-year-old female presenting to the emergency department today complaining of right flank pain and acute renal failure. The patient's had serial urinary tract infections on multiple rounds of antibiotics. She saw her doctor on Friday and had blood work done and they called her today to come back to the office secondary to acute renal failure. The patient has had decreased urination over the last 3 days. The patient has not had any fevers. All other systems reviewed and are negative unless otherwise stated in the history of present illness section. Review of Systems   Gastrointestinal:  Positive for abdominal pain. Genitourinary:  Positive for difficulty urinating and flank pain. All other systems reviewed and are negative. Past Medical History:   Diagnosis Date    Ankle fracture 2014    Ankle osteomyelitis, left ([address] 2014    Breast cancer (HCC)     Chemotherapy-induced neuropathy ([facility])     Chronic anemia     Chronic systolic CHF (congestive heart failure) ([facility])     CKD (chronic kidney disease) stage 3, GFR 30-59 ml/min ([facility])     Depression     Dilated cardiomyopathy ([address]     Dry eye syndrome of both eyes     Dyslipidemia     Essential hypertension     Fracture of right patella 2013    History of left breast cancer 2001    with mastectomy    Left leg weakness 9/8/2020    Non-ischemic cardiomyopathy ([address] 2018    Echo 50-55%, Cath-minimal CAD.  EF normalized    Osteoarthritis     Osteopenia     Peripheral sensory-motor axonal polyneuropathy 10/17/2020    S/P cardiac cath 2009    Statin intolerance     Tibial fracture 2016    Tibial plateau fracture 8/1/0551    Last Assessment & Plan:  Formatting of this note might be different from the original. Pod 2 ORIF doing well. C/o moderate pain. Controlled on PO meds.   Ready to go home    Type 2 diabetes mellitus, controlled ([address]     BS am 104    Vitamin B12 deficiency         Past Surgical History:   Procedure Laterality Date    BREAST BIOPSY      left breast biopsy    BREAST BIOPSY  1968    left cyst removed    BREAST LUMPECTOMY Left 2001    BREAST REDUCTION SURGERY      Rt breast    CARDIAC CATHETERIZATION  2009    CARPAL TUNNEL RELEASE Bilateral     CHOLECYSTECTOMY, OPEN  1981    COLONOSCOPY  07/2014    CYST REMOVAL Right 11/2020    hand    HAND SURGERY Right 11/16/2022    Right index finger distal interphalangeal joint arthrodesis performed by Dorcas Cummings MD at [address] [place] [address]      Sinus Surgery    HEENT      RK procedure bilateral eyes    HX OPEN REDUCTION INTERNAL FIXATION Left 2013    Ankle    HX OPEN REDUCTION INTERNAL FIXATION  08/2016    [place]ia    HYSTERECTOMY (CERVIX STATUS UNKNOWN)      LUMBAR LAMINECTOMY  1992    MASTECTOMY Left 2002    With Reconstruction    ORTHOPEDIC SURGERY Right 08/2013    Patellar fracture repair    OTHER SURGICAL HISTORY      [place] Cath Placed and Removed    OVARY REMOVAL      Unilateral    TONSILLECTOMY      TONSILLECTOMY      TRANSPLANT  2002    Stem Cell Transplant    UVULOPALATOPHARYGOPLASTY  2004        Family History   Problem Relation Age of Onset    Cancer Mother     Diabetes Mother     Heart Disease Mother         MI at age 66    Heart Failure Mother         age 58    Breast Cancer Neg Hx     Stroke Sister     Cancer Brother     Heart Disease Brother     Stomach Cancer Neg Hx     Heart Disease Father         MI age 55        Social History     Socioeconomic History    Marital status:    Tobacco Use    Smoking status: Never    Smokeless tobacco: Never   Vaping Use    Vaping Use: Never used   Substance and Sexual Activity    Alcohol use: No    Drug use: No     Social Determinants of Health     Financial Resource Strain: Low Risk     Difficulty of Paying Living Expenses: Not hard at all   Food Insecurity: No Food Insecurity    Worried About Running Out of Food in the Last Year: Never true    Ran Out of Food in the Last Year: Never true        Allergies: Oxycodone, Eucalyptus oil, Ezetimibe, Morphine, Penicillins, Pineapple, Vancomycin, and Sulfa antibiotics    Previous Medications    ACETAMINOPHEN (TYLENOL) 500 MG TABLET    Take by mouth every 6 hours as needed    AMITRIPTYLINE (ELAVIL) 10 MG TABLET    nightly as needed    B-D 3CC LUER-KATY SYR 25GX1\" 25G X 1\" 3 ML MISC        CALCIUM CARBONATE (TUMS) 500 MG CHEWABLE TABLET    Take 1 tablet by mouth at bedtime    CEPHALEXIN (KEFLEX) 500 MG CAPSULE    Take 1 capsule by mouth 2 times daily for 7 days    CETIRIZINE (ZYRTEC) 10 MG TABLET    Take 10 mg by mouth daily as needed    COLCHICINE (COLCRYS) 0.6 MG TABLET    Take 1 tablet by mouth 2 times daily    CRANBERRY PO    Take by mouth daily    CYANOCOBALAMIN 1000 MCG/ML INJECTION        DEXTROMETHORPHAN-GUAIFENESIN (MUCINEX DM)  MG PER EXTENDED RELEASE TABLET    Take 1 tablet by mouth every 12 hours as needed    ESTRADIOL (ESTRACE VAGINAL) 0.1 MG/GM VAGINAL CREAM    Insert 2g daily vaginally for two weeks. Then insert 1g three times a week. FLUTICASONE (FLONASE) 50 MCG/ACT NASAL SPRAY    2 times daily as needed    FUROSEMIDE (LASIX) 40 MG TABLET    Take 1 tablet by mouth every other day    GABAPENTIN (NEURONTIN) 600 MG TABLET    Take 1 tablet by mouth 2 times daily for 360 days.     LISINOPRIL (PRINIVIL;ZESTRIL) 10 MG TABLET    Take 1 tablet by mouth 2 times daily    METFORMIN (GLUCOPHAGE-XR) 500 MG EXTENDED RELEASE TABLET    Take 2 tablets by mouth 2 times daily    METOPROLOL SUCCINATE (TOPROL XL) 50 MG EXTENDED RELEASE TABLET    Take 1 tablet by mouth at bedtime Once a day    NYSTATIN-TRIAMCINOLONE (MYCOLOG II) 696442-9.1 UNIT/GM-% CREAM    Apply topically 2 times daily. NYSTATIN-TRIAMCINOLONE (MYCOLOG II) 443543-8.1 UNIT/GM-% CREAM    Apply topically 2 times daily. POTASSIUM CHLORIDE (KLOR-CON M) 20 MEQ EXTENDED RELEASE TABLET    TAKE 1 TABLET EVERY MORNING AND 2 TABLETS EVERY EVENING    PROMETHAZINE (PHENERGAN) 25 MG TABLET    Take 1 tablet by mouth daily as needed for Nausea    VALACYCLOVIR (VALTREX) 1 G TABLET    2 tablets 2 times a day for 1 day only with mouth sore occurs        Vitals signs and nursing note reviewed. Patient Vitals for the past 4 hrs:   Temp Pulse Resp BP SpO2   12/19/22 1504 97.7 °F (36.5 °C) -- 18 -- --   12/19/22 1434 -- 70 -- (!) 168/72 98 %          Physical Exam     GENERAL:The patient has Body mass index is 23.93 kg/m². Well-hydrated. VITAL SIGNS: Heart rate, blood pressure, respiratory rate reviewed as recorded in  nurse's notes  EYES: Pupils reactive. Extraocular motion intact. No conjunctival redness or drainage. MOUTH/THROAT: Pharynx clear; airway patent. NECK: Supple, no meningeal signs. Trachea midline. No masses or thyromegaly. LUNGS: Breath sounds clear and equal bilaterally no accessory muscle use. CHEST: No deformity  CARDIOVASCULAR: Regular rate and rhythm  ABDOMEN: Soft with RLQ tenderness. No palpable masses or organomegaly. No  peritoneal signs. No rigidity. EXTREMITIES: No clubbing or cyanosis. No joint swelling. Normal muscle tone. No  restricted range of motion appreciated. NEUROLOGIC: Sensation is grossly intact. Cranial nerve exam reveals face is  symmetrical, tongue is midline speech is clear. SKIN: No rash or petechiae. Good skin turgor palpated. PSYCHIATRIC: Alert and oriented. Appropriate behavior and judgment.       Procedures      Results for orders placed or performed during the hospital encounter of 12/19/22   CT ABDOMEN PELVIS RENAL STONE    Narrative    CT ABDOMEN PELVIS RENAL STONE 12/19/2022 3:21 PM    HISTORY: Decreased urine output and frequent UTIs. Flank pain. COMPARISON: CT chest abdomen pelvis 6/18/2021. TECHNIQUE: Multiple axial images were obtained through the abdomen and pelvis  without intravenous or oral contrast.  Radiation dose reduction techniques were  used for this study: All CT scans performed at this facility use one or all of  the following: Automated exposure control, adjustment of the mA and/or kVp  according to patient's size, iterative reconstruction. FINDINGS:    LOWER CHEST: Normal.    HEPATOBILIARY: Liver is unremarkable allowing for the noncontrast technique. Cholecystectomy. PANCREAS: Normal.    SPLEEN: Normal.    ADRENAL GLANDS: Normal.    KIDNEYS/BLADDER: No urinary tract calculi or hydronephrosis. Areas of bilateral  renal cortical scarring are present. Bladder is decompressed. BOWEL: No bowel obstruction. Appendix not visualized. LYMPH NODES: No enlarged abdominal or pelvic lymph nodes. VASCULATURE: Unremarkable. PELVIC ORGANS: The uterus not identified. No pelvic mass or free fluid. Rectum  is unremarkable. MUSCULOSKELETAL: Degenerative spine changes. Impression    1. No urinary tract calculi or hydronephrosis. No acute changes in the abdomen  or pelvis to account for patient's symptoms. No bowel obstruction or  diverticulitis.      Basic Metabolic Panel   Result Value Ref Range    Sodium 137 133 - 143 mmol/L    Potassium 4.2 3.5 - 5.1 mmol/L    Chloride 104 101 - 110 mmol/L    CO2 26 21 - 32 mmol/L    Anion Gap 7 2 - 11 mmol/L    Glucose 99 65 - 100 mg/dL    BUN 44 (H) 8 - 23 MG/DL    Creatinine 1.60 (H) 0.6 - 1.0 MG/DL    Est, Glom Filt Rate 33 (L) >60 ml/min/1.73m2    Calcium 10.6 (H) 8.3 - 10.4 MG/DL   CBC with Auto Differential   Result Value Ref Range    WBC 5.2 4.3 - 11.1 K/uL    RBC 4.32 4.05 - 5.2 M/uL    Hemoglobin 12.6 11.7 - 15.4 g/dL    Hematocrit 39.7 35.8 - 46.3 %    MCV 91.9 82 - 102 FL    MCH 29.2 26.1 - 32.9 PG    MCHC 31.7 31.4 - 35.0 g/dL    RDW 13.1 11.9 - 14.6 %    Platelets 127 031 - 516 K/uL    MPV 11.5 9.4 - 12.3 FL    nRBC 0.00 0.0 - 0.2 K/uL    Differential Type AUTOMATED      Seg Neutrophils 42 (L) 43 - 78 %    Lymphocytes 45 (H) 13 - 44 %    Monocytes 10 4.0 - 12.0 %    Eosinophils % 2 0.5 - 7.8 %    Basophils 1 0.0 - 2.0 %    Immature Granulocytes 0 0.0 - 5.0 %    Segs Absolute 2.2 1.7 - 8.2 K/UL    Absolute Lymph # 2.3 0.5 - 4.6 K/UL    Absolute Mono # 0.5 0.1 - 1.3 K/UL    Absolute Eos # 0.1 0.0 - 0.8 K/UL    Basophils Absolute 0.1 0.0 - 0.2 K/UL    Absolute Immature Granulocyte 0.0 0.0 - 0.5 K/UL   Urinalysis w rflx microscopic   Result Value Ref Range    Color, UA YELLOW/STRAW      Appearance CLEAR      Specific Gravity, UA 1.018 1.001 - 1.023      pH, Urine 5.0 5.0 - 9.0      Protein, UA TRACE (A) NEG mg/dL    Glucose, UA Negative mg/dL    Ketones, Urine Negative NEG mg/dL    Bilirubin Urine Negative NEG      Blood, Urine Negative NEG      Urobilinogen, Urine 0.2 0.2 - 1.0 EU/dL    Nitrite, Urine Negative NEG      Leukocyte Esterase, Urine MODERATE (A) NEG          CT ABDOMEN PELVIS RENAL STONE   Final Result   1. No urinary tract calculi or hydronephrosis. No acute changes in the abdomen   or pelvis to account for patient's symptoms. No bowel obstruction or   diverticulitis. Voice dictation software was used during the making of this note. This software is not perfect and grammatical and other typographical errors may be present. This note has not been completely proofread for errors.        Juliana French, DO  12/19/22 [id number]

## 2022-12-19 NOTE — TELEPHONE ENCOUNTER
----- Message from LIU Pemberton CNP sent at 12/19/2022 11:26 AM EST -----  Kidney function has decreased. Need to have her return today to recheck labs. How is she feeling?

## 2022-12-19 NOTE — ED TRIAGE NOTES
Patient ambulatory to triage with report that she was sent from her Dr after having blood work done on 12/16 with decreased urine output.  Reports frequent UTI

## 2022-12-20 ENCOUNTER — TELEPHONE (OUTPATIENT)
Dept: INTERNAL MEDICINE CLINIC | Facility: CLINIC | Age: 77
End: 2022-12-20

## 2022-12-20 PROBLEM — I50.42 CHRONIC COMBINED SYSTOLIC AND DIASTOLIC CHF (CONGESTIVE HEART FAILURE) (HCC): Chronic | Status: ACTIVE | Noted: 2022-12-19

## 2022-12-20 PROBLEM — N39.0 UTI (URINARY TRACT INFECTION): Status: ACTIVE | Noted: 2022-12-20

## 2022-12-20 PROBLEM — N18.31 ACUTE RENAL FAILURE SUPERIMPOSED ON STAGE 3A CHRONIC KIDNEY DISEASE (HCC): Status: ACTIVE | Noted: 2022-12-19

## 2022-12-20 PROBLEM — E11.22 TYPE 2 DIABETES MELLITUS WITH CHRONIC KIDNEY DISEASE (HCC): Chronic | Status: ACTIVE | Noted: 2022-09-22

## 2022-12-20 LAB
ANION GAP SERPL CALC-SCNC: 5 MMOL/L (ref 2–11)
BASOPHILS # BLD: 0 K/UL (ref 0–0.2)
BASOPHILS NFR BLD: 1 % (ref 0–2)
BUN SERPL-MCNC: 32 MG/DL (ref 8–23)
CALCIUM SERPL-MCNC: 9.9 MG/DL (ref 8.3–10.4)
CHLORIDE SERPL-SCNC: 107 MMOL/L (ref 101–110)
CO2 SERPL-SCNC: 26 MMOL/L (ref 21–32)
CREAT SERPL-MCNC: 1.3 MG/DL (ref 0.6–1)
DIFFERENTIAL METHOD BLD: ABNORMAL
EOSINOPHIL # BLD: 0.1 K/UL (ref 0–0.8)
EOSINOPHIL NFR BLD: 2 % (ref 0.5–7.8)
ERYTHROCYTE [DISTWIDTH] IN BLOOD BY AUTOMATED COUNT: 12.9 % (ref 11.9–14.6)
GLUCOSE BLD STRIP.AUTO-MCNC: 102 MG/DL (ref 65–100)
GLUCOSE BLD STRIP.AUTO-MCNC: 110 MG/DL (ref 65–100)
GLUCOSE BLD STRIP.AUTO-MCNC: 81 MG/DL (ref 65–100)
GLUCOSE BLD STRIP.AUTO-MCNC: 87 MG/DL (ref 65–100)
GLUCOSE BLD STRIP.AUTO-MCNC: 94 MG/DL (ref 65–100)
GLUCOSE SERPL-MCNC: 88 MG/DL (ref 65–100)
HCT VFR BLD AUTO: 38.4 % (ref 35.8–46.3)
HGB BLD-MCNC: 12.4 G/DL (ref 11.7–15.4)
IMM GRANULOCYTES # BLD AUTO: 0 K/UL (ref 0–0.5)
IMM GRANULOCYTES NFR BLD AUTO: 0 % (ref 0–5)
LYMPHOCYTES # BLD: 2.5 K/UL (ref 0.5–4.6)
LYMPHOCYTES NFR BLD: 50 % (ref 13–44)
MCH RBC QN AUTO: 29.5 PG (ref 26.1–32.9)
MCHC RBC AUTO-ENTMCNC: 32.3 G/DL (ref 31.4–35)
MCV RBC AUTO: 91.4 FL (ref 82–102)
MONOCYTES # BLD: 0.5 K/UL (ref 0.1–1.3)
MONOCYTES NFR BLD: 10 % (ref 4–12)
NEUTS SEG # BLD: 1.9 K/UL (ref 1.7–8.2)
NEUTS SEG NFR BLD: 37 % (ref 43–78)
NRBC # BLD: 0 K/UL (ref 0–0.2)
PLATELET # BLD AUTO: 217 K/UL (ref 150–450)
PMV BLD AUTO: 11.5 FL (ref 9.4–12.3)
POTASSIUM SERPL-SCNC: 3.9 MMOL/L (ref 3.5–5.1)
RBC # BLD AUTO: 4.2 M/UL (ref 4.05–5.2)
SERVICE CMNT-IMP: ABNORMAL
SERVICE CMNT-IMP: ABNORMAL
SERVICE CMNT-IMP: NORMAL
SODIUM SERPL-SCNC: 138 MMOL/L (ref 133–143)
WBC # BLD AUTO: 5.1 K/UL (ref 4.3–11.1)

## 2022-12-20 PROCEDURE — 6370000000 HC RX 637 (ALT 250 FOR IP): Performed by: FAMILY MEDICINE

## 2022-12-20 PROCEDURE — 6360000002 HC RX W HCPCS: Performed by: FAMILY MEDICINE

## 2022-12-20 PROCEDURE — 2580000003 HC RX 258: Performed by: FAMILY MEDICINE

## 2022-12-20 PROCEDURE — 6370000000 HC RX 637 (ALT 250 FOR IP): Performed by: INTERNAL MEDICINE

## 2022-12-20 PROCEDURE — 85025 COMPLETE CBC W/AUTO DIFF WBC: CPT

## 2022-12-20 PROCEDURE — 6360000002 HC RX W HCPCS: Performed by: INTERNAL MEDICINE

## 2022-12-20 PROCEDURE — 82962 GLUCOSE BLOOD TEST: CPT

## 2022-12-20 PROCEDURE — 2580000003 HC RX 258: Performed by: EMERGENCY MEDICINE

## 2022-12-20 PROCEDURE — 1100000000 HC RM PRIVATE

## 2022-12-20 PROCEDURE — 80048 BASIC METABOLIC PNL TOTAL CA: CPT

## 2022-12-20 RX ORDER — HYDRALAZINE HYDROCHLORIDE 25 MG/1
25 TABLET, FILM COATED ORAL EVERY 8 HOURS SCHEDULED
Status: DISCONTINUED | OUTPATIENT
Start: 2022-12-20 | End: 2022-12-21 | Stop reason: HOSPADM

## 2022-12-20 RX ORDER — HYDRALAZINE HYDROCHLORIDE 20 MG/ML
20 INJECTION INTRAMUSCULAR; INTRAVENOUS EVERY 4 HOURS PRN
Status: DISCONTINUED | OUTPATIENT
Start: 2022-12-20 | End: 2022-12-21 | Stop reason: HOSPADM

## 2022-12-20 RX ORDER — HYDRALAZINE HYDROCHLORIDE 20 MG/ML
10 INJECTION INTRAMUSCULAR; INTRAVENOUS EVERY 4 HOURS PRN
Status: DISCONTINUED | OUTPATIENT
Start: 2022-12-20 | End: 2022-12-21 | Stop reason: HOSPADM

## 2022-12-20 RX ORDER — ENOXAPARIN SODIUM 100 MG/ML
40 INJECTION SUBCUTANEOUS EVERY 24 HOURS
Status: DISCONTINUED | OUTPATIENT
Start: 2022-12-20 | End: 2022-12-21 | Stop reason: HOSPADM

## 2022-12-20 RX ADMIN — CEFTRIAXONE 1000 MG: 1 INJECTION, POWDER, FOR SOLUTION INTRAMUSCULAR; INTRAVENOUS at 00:49

## 2022-12-20 RX ADMIN — METOPROLOL SUCCINATE 50 MG: 50 TABLET, EXTENDED RELEASE ORAL at 00:40

## 2022-12-20 RX ADMIN — SODIUM CHLORIDE: 9 INJECTION, SOLUTION INTRAVENOUS at 11:54

## 2022-12-20 RX ADMIN — METOPROLOL SUCCINATE 50 MG: 50 TABLET, EXTENDED RELEASE ORAL at 20:47

## 2022-12-20 RX ADMIN — GABAPENTIN 300 MG: 300 CAPSULE ORAL at 20:46

## 2022-12-20 RX ADMIN — GABAPENTIN 300 MG: 300 CAPSULE ORAL at 00:40

## 2022-12-20 RX ADMIN — CEFTRIAXONE 1000 MG: 1 INJECTION, POWDER, FOR SOLUTION INTRAMUSCULAR; INTRAVENOUS at 19:19

## 2022-12-20 RX ADMIN — HYDRALAZINE HYDROCHLORIDE 25 MG: 25 TABLET, FILM COATED ORAL at 20:46

## 2022-12-20 RX ADMIN — GABAPENTIN 300 MG: 300 CAPSULE ORAL at 09:19

## 2022-12-20 RX ADMIN — HYDRALAZINE HYDROCHLORIDE 20 MG: 20 INJECTION INTRAMUSCULAR; INTRAVENOUS at 23:40

## 2022-12-20 RX ADMIN — ENOXAPARIN SODIUM 40 MG: 100 INJECTION SUBCUTANEOUS at 14:02

## 2022-12-20 RX ADMIN — HYDRALAZINE HYDROCHLORIDE 25 MG: 25 TABLET, FILM COATED ORAL at 14:02

## 2022-12-20 RX ADMIN — CALCIUM CARBONATE (ANTACID) CHEW TAB 500 MG 500 MG: 500 CHEW TAB at 20:46

## 2022-12-20 RX ADMIN — CALCIUM CARBONATE (ANTACID) CHEW TAB 500 MG 500 MG: 500 CHEW TAB at 00:45

## 2022-12-20 ASSESSMENT — PAIN SCALES - GENERAL
PAINLEVEL_OUTOF10: 0
PAINLEVEL_OUTOF10: 0

## 2022-12-20 NOTE — PROGRESS NOTES
END OF SHIFT NOTE:    INTAKE/OUTPUT  No intake/output data recorded. Voiding: Yes  Catheter: No  Drain:              Flatus: Patient does have flatus present. Stool:  occurrences. Characteristics:           Stool Assessment  Incontinence: No    Emesis:  occurrences. Characteristics:        VITAL SIGNS  Patient Vitals for the past 12 hrs:   Temp Pulse Resp BP SpO2   12/20/22 1516 97.4 °F (36.3 °C) 62 18 134/70 94 %   12/20/22 1124 98.7 °F (37.1 °C) 64 18 (!) 179/79 96 %   12/20/22 1030 -- 68 -- (!) 140/59 93 %   12/20/22 1014 -- -- -- -- 95 %   12/20/22 1004 -- -- -- (!) 168/73 --   12/20/22 0744 -- -- -- -- 97 %   12/20/22 0656 -- -- -- (!) 164/64 98 %   12/20/22 0638 -- -- -- -- 98 %   12/20/22 0635 -- -- -- (!) 177/66 --       Pain Assessment  Pain Level: 0 (12/20/22 1121)  Pain Location: Abdomen, Back  Patient's Stated Pain Goal: 0 - No pain    Ambulating  Yes, sat up in the chair. Shift report given to oncoming nurse at the bedside.     Vitor Cano RN

## 2022-12-20 NOTE — PROGRESS NOTES
Hospitalist Progress Note   Admit Date:  2022  3:09 PM   Name:  Jami Ahn   Age:  69 y.o. Sex:  female  :  1945   MRN:  [id number]   Room:      Presenting Complaint: Abnormal Lab     Reason(s) for Admission: Recurrent urinary tract infection [N39.0]  JACKY (acute kidney injury) [facility]) [N17.9]     Hospital Course:   Patient is a 69 y.o. female who presented to the ED for cc abnormal labs by per PCP. Saw her PCP Friday due to right flank pain and was found to have JACKY along with a UTI. Was placed on Keflex. Hx of combined CHF EF 50% from ECHO in 2018, left breast cancer s/p mastectomy, DM type II, HLD, CKD stage 3. CT negative for stone or other abnormalities. Admitted for UTI and [id number]. Subjective & 24hr Events (22): Pt feeling better. Still slightly tired and weak. Reports normal PO intake. No pain, SOB, fevers    Likely home tomorrow if still stable/improved. Assessment & Plan:         Acute renal failure superimposed on stage 3a chronic kidney disease (HCC)  -baseline Cr around 1.1?  -slightly improved today  -stop IVF today; should continue to improve now that she is euvolemic with regular PO intake.  -daily BMP  -holding home lisinopril      UTI (urinary tract infection)  -pt says she was feeling better on keflex even before she came to hospital. Will continue rocephin here but likely can resume keflex at discharge      Type 2 diabetes mellitus with chronic kidney disease  -controlled. ISS      Chronic combined systolic and diastolic CHF (congestive heart failure) ([address]  -stop IVF today  -holding home lasix      Essential hypertension  -uncontrolled. Start PO hydralazine today. IV PRN ordered also  -holding home lisinopril for now      Anticipated discharge needs:    -therapy ordered but expect home tomorrow    Diet:  ADULT DIET; Regular; 4 carb choices (60 gm/meal);  Low Fat/Low Chol/High Fiber/2 gm Na  DVT PPx: lovenox  Code status: Full Code    Hospital Problems:  Principal Problem:    JACKY (acute kidney injury) ([facility]  Active Problems:    Type 2 diabetes mellitus with chronic kidney disease    Chronic combined systolic and diastolic CHF (congestive heart failure) ([facility]    Dyslipidemia    Essential hypertension  Resolved Problems:    * No resolved hospital problems. *      Objective:   Patient Vitals for the past 24 hrs:   Temp Pulse Resp BP SpO2   12/20/22 1124 98.7 °F (37.1 °C) 64 18 (!) 179/79 96 %   12/20/22 1030 -- 68 -- (!) 140/59 93 %   12/20/22 1014 -- -- -- -- 95 %   12/20/22 1004 -- -- -- (!) 168/73 --   12/20/22 0744 -- -- -- -- 97 %   12/20/22 0656 -- -- -- (!) 164/64 98 %   12/20/22 0638 -- -- -- -- 98 %   12/20/22 0635 -- -- -- (!) 177/66 --   12/20/22 0530 -- -- -- (!) 172/86 97 %   12/20/22 0514 -- -- -- (!) 177/83 98 %   12/20/22 0345 -- -- -- (!) 180/101 --   12/20/22 0335 -- -- -- -- 97 %   12/20/22 0040 -- 74 18 (!) 162/84 98 %   12/19/22 2140 -- 68 16 (!) 168/78 100 %   12/19/22 1959 -- -- 16 (!) 158/74 100 %   12/19/22 1504 97.7 °F (36.5 °C) -- 18 -- --   12/19/22 1434 -- 70 -- (!) 168/72 98 %       Oxygen Therapy  SpO2: 96 %  O2 Device: None (Room air)    Estimated body mass index is 23.93 kg/m² as calculated from the following:    Height as of this encounter: 5' 5.5\" (1.664 m). Weight as of this encounter: 146 lb (66.2 kg). Intake/Output Summary (Last 24 hours) at 12/20/2022 1237  Last data filed at 12/20/2022 1124  Gross per 24 hour   Intake --   Output 0 ml   Net 0 ml         Physical Exam:     Blood pressure (!) 179/79, pulse 64, temperature 98.7 °F (37.1 °C), temperature source Oral, resp. rate 18, height 5' 5.5\" (1.664 m), weight 146 lb (66.2 kg), SpO2 96 %. General:    Well nourished. Head:  Normocephalic, atraumatic  Eyes:  Sclerae appear normal.  Pupils equally round. ENT:  Nares appear normal, no drainage. Moist oral mucosa  Neck:  No restricted ROM. Trachea midline   CV:   RRR.    No jugular venous distension. Lungs:   No distress. Symmetric expansion. Abdomen:   nondistended. Extremities: No cyanosis or clubbing. No edema  Skin:     No rashes and normal coloration. Warm and dry. Neuro:  CN II-XII grossly intact. Sensation intact. Alert  Psych:  Normal mood and affect. I have personally reviewed labs and tests showing:  Recent Labs:  Recent Results (from the past 48 hour(s))   Basic Metabolic Panel    Collection Time: 12/19/22  1:33 PM   Result Value Ref Range    Sodium 139 133 - 143 mmol/L    Potassium 3.9 3.5 - 5.1 mmol/L    Chloride 104 101 - 110 mmol/L    CO2 27 21 - 32 mmol/L    Anion Gap 8 2 - 11 mmol/L    Glucose 112 (H) 65 - 100 mg/dL    BUN 45 (H) 8 - 23 MG/DL    Creatinine 1.70 (H) 0.6 - 1.0 MG/DL    Est, Glom Filt Rate 31 (L) >60 ml/min/1.73m2    Calcium 10.8 (H) 8.3 - 10.4 MG/DL   Culture, Urine    Collection Time: 12/19/22  1:33 PM    Specimen: Urine, clean catch   Result Value Ref Range    Special Requests NO SPECIAL REQUESTS      Culture        No growth after short period of incubation. Further results to follow after overnight incubation.    Basic Metabolic Panel    Collection Time: 12/19/22  3:34 PM   Result Value Ref Range    Sodium 137 133 - 143 mmol/L    Potassium 4.2 3.5 - 5.1 mmol/L    Chloride 104 101 - 110 mmol/L    CO2 26 21 - 32 mmol/L    Anion Gap 7 2 - 11 mmol/L    Glucose 99 65 - 100 mg/dL    BUN 44 (H) 8 - 23 MG/DL    Creatinine 1.60 (H) 0.6 - 1.0 MG/DL    Est, Glom Filt Rate 33 (L) >60 ml/min/1.73m2    Calcium 10.6 (H) 8.3 - 10.4 MG/DL   CBC with Auto Differential    Collection Time: 12/19/22  3:34 PM   Result Value Ref Range    WBC 5.2 4.3 - 11.1 K/uL    RBC 4.32 4.05 - 5.2 M/uL    Hemoglobin 12.6 11.7 - 15.4 g/dL    Hematocrit 39.7 35.8 - 46.3 %    MCV 91.9 82 - 102 FL    MCH 29.2 26.1 - 32.9 PG    MCHC 31.7 31.4 - 35.0 g/dL    RDW 13.1 11.9 - 14.6 %    Platelets 968 482 - 881 K/uL    MPV 11.5 9.4 - 12.3 FL    nRBC 0.00 0.0 - 0.2 K/uL    Differential Type AUTOMATED      Seg Neutrophils 42 (L) 43 - 78 %    Lymphocytes 45 (H) 13 - 44 %    Monocytes 10 4.0 - 12.0 %    Eosinophils % 2 0.5 - 7.8 %    Basophils 1 0.0 - 2.0 %    Immature Granulocytes 0 0.0 - 5.0 %    Segs Absolute 2.2 1.7 - 8.2 K/UL    Absolute Lymph # 2.3 0.5 - 4.6 K/UL    Absolute Mono # 0.5 0.1 - 1.3 K/UL    Absolute Eos # 0.1 0.0 - 0.8 K/UL    Basophils Absolute 0.1 0.0 - 0.2 K/UL    Absolute Immature Granulocyte 0.0 0.0 - 0.5 K/UL   Urinalysis w rflx microscopic    Collection Time: 12/19/22  3:34 PM   Result Value Ref Range    Color, UA YELLOW/STRAW      Appearance CLEAR      Specific Gravity, UA 1.018 1.001 - 1.023      pH, Urine 5.0 5.0 - 9.0      Protein, UA TRACE (A) NEG mg/dL    Glucose, UA Negative mg/dL    Ketones, Urine Negative NEG mg/dL    Bilirubin Urine Negative NEG      Blood, Urine Negative NEG      Urobilinogen, Urine 0.2 0.2 - 1.0 EU/dL    Nitrite, Urine Negative NEG      Leukocyte Esterase, Urine MODERATE (A) NEG     POCT Glucose    Collection Time: 12/20/22 12:39 AM   Result Value Ref Range    POC Glucose 87 65 - 100 mg/dL    Performed by:  Jc    Basic Metabolic Panel w/ Reflex to MG    Collection Time: 12/20/22  5:40 AM   Result Value Ref Range    Sodium 138 133 - 143 mmol/L    Potassium 3.9 3.5 - 5.1 mmol/L    Chloride 107 101 - 110 mmol/L    CO2 26 21 - 32 mmol/L    Anion Gap 5 2 - 11 mmol/L    Glucose 88 65 - 100 mg/dL    BUN 32 (H) 8 - 23 MG/DL    Creatinine 1.30 (H) 0.6 - 1.0 MG/DL    Est, Glom Filt Rate 43 (L) >60 ml/min/1.73m2    Calcium 9.9 8.3 - 10.4 MG/DL   CBC with Auto Differential    Collection Time: 12/20/22  5:40 AM   Result Value Ref Range    WBC 5.1 4.3 - 11.1 K/uL    RBC 4.20 4.05 - 5.2 M/uL    Hemoglobin 12.4 11.7 - 15.4 g/dL    Hematocrit 38.4 35.8 - 46.3 %    MCV 91.4 82 - 102 FL    MCH 29.5 26.1 - 32.9 PG    MCHC 32.3 31.4 - 35.0 g/dL    RDW 12.9 11.9 - 14.6 %    Platelets 208 564 - 707 K/uL    MPV 11.5 9.4 - 12.3 FL    nRBC 0.00 0.0 - 0.2 K/uL Differential Type AUTOMATED      Seg Neutrophils 37 (L) 43 - 78 %    Lymphocytes 50 (H) 13 - 44 %    Monocytes 10 4.0 - 12.0 %    Eosinophils % 2 0.5 - 7.8 %    Basophils 1 0.0 - 2.0 %    Immature Granulocytes 0 0.0 - 5.0 %    Segs Absolute 1.9 1.7 - 8.2 K/UL    Absolute Lymph # 2.5 0.5 - 4.6 K/UL    Absolute Mono # 0.5 0.1 - 1.3 K/UL    Absolute Eos # 0.1 0.0 - 0.8 K/UL    Basophils Absolute 0.0 0.0 - 0.2 K/UL    Absolute Immature Granulocyte 0.0 0.0 - 0.5 K/UL   POCT Glucose    Collection Time: 12/20/22  9:01 AM   Result Value Ref Range    POC Glucose 81 65 - 100 mg/dL    Performed by: Destiny    POCT Glucose    Collection Time: 12/20/22 11:23 AM   Result Value Ref Range    POC Glucose 102 (H) 65 - 100 mg/dL    Performed by: Joie QUIROGA have personally reviewed imaging studies showing: Other Studies:  CT ABDOMEN PELVIS RENAL STONE   Final Result   1. No urinary tract calculi or hydronephrosis. No acute changes in the abdomen   or pelvis to account for patient's symptoms. No bowel obstruction or   diverticulitis.              Current Meds:  Current Facility-Administered Medications   Medication Dose Route Frequency    hydrALAZINE (APRESOLINE) injection 10 mg  10 mg IntraVENous Q4H PRN    Or    hydrALAZINE (APRESOLINE) injection 20 mg  20 mg IntraVENous Q4H PRN    hydrALAZINE (APRESOLINE) tablet 25 mg  25 mg Oral 3 times per day    sodium chloride flush 0.9 % injection 5-40 mL  5-40 mL IntraVENous 2 times per day    sodium chloride flush 0.9 % injection 5-40 mL  5-40 mL IntraVENous PRN    0.9 % sodium chloride infusion   IntraVENous PRN    polyethylene glycol (GLYCOLAX) packet 17 g  17 g Oral Daily PRN    acetaminophen (TYLENOL) tablet 650 mg  650 mg Oral Q6H PRN    Or    acetaminophen (TYLENOL) suppository 650 mg  650 mg Rectal Q6H PRN    cefTRIAXone (ROCEPHIN) 1,000 mg in sodium chloride 0.9 % 50 mL IVPB mini-bag  1,000 mg IntraVENous Q24H    calcium carbonate (TUMS) chewable tablet 500 mg  1 tablet Oral Nightly    gabapentin (NEURONTIN) capsule 300 mg  300 mg Oral BID    metoprolol succinate (TOPROL XL) extended release tablet 50 mg  50 mg Oral Nightly    insulin lispro (HUMALOG) injection vial 0-8 Units  0-8 Units SubCUTAneous TID WC    insulin lispro (HUMALOG) injection vial 0-4 Units  0-4 Units SubCUTAneous Nightly    glucose chewable tablet 16 g  4 tablet Oral PRN    dextrose bolus 10% 125 mL  125 mL IntraVENous PRN    Or    dextrose bolus 10% 250 mL  250 mL IntraVENous PRN    glucagon (rDNA) injection 1 mg  1 mg SubCUTAneous PRN    dextrose 10 % infusion   IntraVENous Continuous PRN    lidocaine 4 % external patch 1 patch  1 patch TransDERmal Q24H       Signed:  Kristine Manriquez MD    Part of this note may have been written by using a voice dictation software. The note has been proof read but may still contain some grammatical/other typographical errors.

## 2022-12-20 NOTE — CARE COORDINATION
CM following for d/c planning. Patient admitted 12.19.2022 with acute renal failure. Patient was admitted from home where she lives with her . Patient stated she is independent and does not use any dme/home health services. PCP confirmed. Patient plans to go home @ d/c - potentially tomorrow if medically stable. CM will follow patient's plan of care and assist with supportive care referrals pending patient's clinical progress. Please consult case management if specific needs arise. 12/20/22 1512   Service Assessment   Patient Orientation Alert and Oriented   Cognition Alert   History Provided By Patient   Primary Caregiver Self   Support Systems Spouse/Significant Other   Patient's Healthcare Decision Maker is: Legal Next of Kin   PCP Verified by CM Yes   Last Visit to PCP Within last 6 months   Prior Functional Level Independent in ADLs/IADLs   Current Functional Level Independent in ADLs/IADLs   Can patient return to prior living arrangement Yes   Ability to make needs known: Good   Family able to assist with home care needs: Yes   Would you like for me to discuss the discharge plan with any other family members/significant others, and if so, who? No   Social/Functional History   Lives With Spouse   Type of Home House   ADL Assistance Independent   Homemaking Assistance Independent   Ambulation Assistance Independent   Transfer Assistance Independent   Discharge Planning   [facility]/Significant Other   [facility]   Mode of Transport at Discharge Self   Confirm Follow Up Transport Family   Condition of Participation: Discharge Planning   The Patient and/or Patient Representative was provided with a Choice of Provider? Patient   The Patient and/Or Patient Representative agree with the Discharge Plan?  Yes   Freedom of Choice list was provided with basic dialogue that supports the patient's individualized plan of care/goals, treatment preferences, and shares the quality data associated with the providers?   Yes

## 2022-12-20 NOTE — ED NOTES
TRANSFER - OUT REPORT:    Verbal report given to Susana Billy RN on Jose Maria Lund  being transferred to [facility] for routine progression of patient care       Report consisted of patient's Situation, Background, Assessment and   Recommendations(SBAR). Information from the following report(s) ED Encounter Summary was reviewed with the receiving nurse. [place] Assessment: Presents to emergency department  because of falls (Syncope, seizure, or loss of consciousness): No, Age > 79: Yes, Altered Mental Status, Intoxication with alcohol or substance confusion (Disorientation, impaired judgment, poor safety awaremess, or inability to follow instructions): No, Impaired Mobility: Ambulates or transfers with assistive devices or assistance; Unable to ambulate or transer.: No, Nursing Judgement: No  Lines:   Peripheral IV 12/19/22 Right Antecubital (Active)   Site Assessment Clean, dry & intact 12/19/22 1535   Line Status Blood return noted 12/19/22 1535   Phlebitis Assessment No symptoms 12/19/22 1535   Infiltration Assessment 0 12/19/22 1535   Alcohol Cap Used No 12/19/22 1535   Dressing Status Clean, dry & intact 12/19/22 1535   Dressing Type Transparent 12/19/22 1535   Dressing Intervention New 12/19/22 1535        Opportunity for questions and clarification was provided.       Patient transported with:  Rosa Elena Denis RN  12/20/22 2305

## 2022-12-20 NOTE — TELEPHONE ENCOUNTER
----- Message from Veva Goltz, APRN - CNP sent at 12/20/2022  3:25 PM EST -----  No evidence of malignancy on mammogram

## 2022-12-20 NOTE — H&P
Hospitalist Admission History and Physical         NAME:            Estela Sahu    Age:                69 y.o.    :               1945    MRN:              [id number]    PCP: LIU Wilburn - CNP    Consulting MD:    Treatment Team: Attending Provider: Juanita Holter, DO; Registered Nurse: Vicky Carlos RN         Chief Complaint   Patient presents with    Abnormal Lab   HPI:    Patient is a 69 y.o. female who presented to the ED for cc abnormal labs by per PCP. Saw her PCP Friday due to right flank pain and was found to have JACKY along with a UTI. Was placed on Keflex. Hx of combined CHF EF 50% from ECHO in 2018, left breast cancer s/p mastectomy, DM type II, HLD, CKD stage 3       CT renal -   No urinary tract calculi or hydronephrosis. No acute changes in the abdomen   or pelvis to account for patient's symptoms. No bowel obstruction or   diverticulitis. Creatine 1.6 from baseline 1  Past Medical History:   Diagnosis Date    Ankle fracture     Ankle osteomyelitis, left ([address]     Breast cancer (HCC)     Chemotherapy-induced neuropathy (HCC)     Chronic anemia     Chronic systolic CHF (congestive heart failure) (HCC)     CKD (chronic kidney disease) stage 3, GFR 30-59 ml/min (HCC)     Depression     Dilated cardiomyopathy ([address]     Dry eye syndrome of both eyes     Dyslipidemia     Essential hypertension     Fracture of right patella     History of left breast cancer 2001    with mastectomy    Left leg weakness 2020    Non-ischemic cardiomyopathy ([address] 2018    Echo 50-55%, Cath-minimal CAD. 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 Pod 2 ORIF doing well. C/o moderate pain. Controlled on PO meds.   Ready to go home    Type 2 diabetes mellitus, controlled ([address]     BS am 104    Vitamin B12 deficiency             Past Surgical History:   Procedure Laterality Date    BREAST BIOPSY      left breast biopsy    BREAST BIOPSY  1968    left cyst removed    BREAST LUMPECTOMY Left 2001    BREAST REDUCTION SURGERY      Rt breast    CARDIAC CATHETERIZATION  2009    CARPAL TUNNEL RELEASE Bilateral     CHOLECYSTECTOMY, OPEN  1981    COLONOSCOPY  07/2014    CYST REMOVAL Right 11/2020    hand    HAND SURGERY Right 11/16/2022    Right index finger distal interphalangeal joint arthrodesis performed by Ashely Blanchard MD at [address] [facility]      Sinus Surgery    HEENT      RK procedure bilateral eyes    HX OPEN REDUCTION INTERNAL FIXATION Left 2013    Ankle    HX OPEN REDUCTION INTERNAL FIXATION  08/2016    [place]    HYSTERECTOMY (CERVIX STATUS UNKNOWN)      LUMBAR LAMINECTOMY  1992    MASTECTOMY Left 2002    With Reconstruction    ORTHOPEDIC SURGERY Right 08/2013    Patellar fracture repair    OTHER SURGICAL HISTORY      [place] Cath Placed and Removed    OVARY REMOVAL      Unilateral    TONSILLECTOMY      TONSILLECTOMY      TRANSPLANT  2002    Stem Cell Transplant    UVULOPALATOPHARYGOPLASTY  2004            Family History   Problem Relation Age of Onset    Cancer Mother     Diabetes Mother     Heart Disease Mother         MI at age 66    Heart Failure Mother         age 58    Breast Cancer Neg Hx     Stroke Sister     Cancer Brother     Heart Disease Brother     Stomach Cancer Neg Hx     Heart Disease Father         MI age 55       Family history reviewed and negative except as noted above.          Social History     Social History Narrative    Not on file            Social History     Tobacco Use    Smoking status: Never    Smokeless tobacco: Never   Substance Use Topics    Alcohol use: No            Social History     Substance and Sexual Activity   Drug Use No                 Allergies   Allergen Reactions    Oxycodone Itching    Eucalyptus Oil Hives and Other (See Comments)     Burns mouth    Ezetimibe Myalgia    Morphine Other (See Comments)     Feels crazy  Other reaction(s): Hallucinations    Penicillins Hives    Pineapple Hives and Other (See Comments)     Burns mouth    Vancomycin Other (See Comments)     Kidney function worsened    Sulfa Antibiotics Nausea Only and Rash            Prior to Admission medications    Medication Sig Start Date End Date Taking? Authorizing Provider   B-D 3CC LUER-KATY SYR 25GX1\" 25G X 1\" 3 ML MISC  11/16/22   Historical Provider, MD   cyanocobalamin 1000 MCG/ML injection  11/16/22   Historical Provider, MD   nystatin-triamcinolone (MYCOLOG II) 656172-7.4 UNIT/GM-% cream Apply topically 2 times daily. 12/16/22   LIU Smith CNP   cephALEXin [facility]) 500 MG capsule Take 1 capsule by mouth 2 times daily for 7 days 12/16/22 12/23/22  LIU Smith CNP   nystatin-triamcinolone [facility]) 801298-6.0 UNIT/GM-% cream Apply topically 2 times daily. 12/16/22   LIU Smith CNP   promethazine (PHENERGAN) 25 MG tablet Take 1 tablet by mouth daily as needed for Nausea 12/8/22   LIU Smith CNP   metoprolol succinate (TOPROL XL) 50 MG extended release tablet Take 1 tablet by mouth at bedtime Once a day 11/17/22   Daniel Parmar DO   lisinopril (PRINIVIL;ZESTRIL) 10 MG tablet Take 1 tablet by mouth 2 times daily 11/17/22   Daniel Parmar DO   CRANBERRY PO Take by mouth daily    Historical Provider, MD   amitriptyline (ELAVIL) 10 MG tablet nightly as needed 10/13/22   Historical Provider, MD   estradiol (ESTRACE VAGINAL) 0.1 MG/GM vaginal cream Insert 2g daily vaginally for two weeks. Then insert 1g three times a week.  11/4/22   LIU Smith CNP   fluticasone [facility]) 50 MCG/ACT nasal spray 2 times daily as needed 9/8/22   Historical Provider, MD   colchicine (COLCRYS) 0.6 MG tablet Take 1 tablet by mouth 2 times daily  Patient taking differently: Take 0.6 mg by mouth 2 times daily as needed 8/18/22   Rell Olguin MD   furosemide (LASIX) 40 MG tablet Take 1 tablet by mouth every other day 6/6/22   LIU Gomez  CNP   gabapentin (NEURONTIN) 600 MG tablet Take 1 tablet by mouth 2 times daily for 360 days.  6/6/22 6/1/23  LIU Gomez CNP   metFORMIN (GLUCOPHAGE-XR) 500 mg extended release tablet Take 2 tablets by mouth 2 times daily 6/6/22   LIU Gomez  MALOU   calcium carbonate (TUMS) 500 MG chewable tablet Take 1 tablet by mouth at bedtime    Historical Provider, MD   dextromethorphan-guaiFENesin ([address] DM)  MG per extended release tablet Take 1 tablet by mouth every 12 hours as needed    Historical Provider, MD   acetaminophen (TYLENOL) 500 MG tablet Take by mouth every 6 hours as needed    Ar Automatic Reconciliation   cetirizine (ZYRTEC) 10 MG tablet Take 10 mg by mouth daily as needed    Ar Automatic Reconciliation   potassium chloride (KLOR-CON M) 20 MEQ extended release tablet TAKE 1 TABLET EVERY MORNING AND 2 TABLETS EVERY EVENING 5/25/21   Ar Automatic Reconciliation   valACYclovir (VALTREX) 1 g tablet 2 tablets 2 times a day for 1 day only with mouth sore occurs 12/7/20   Ar Automatic Reconciliation                      Review of Systems         Constitutional:  NAD    Eyes:  no change in visual acuity, no photophobia    Ears, nose, mouth, throat, and face: no  Odynphagia, dysphagia, no thrush or exudate, negative for chronic sinus congestion, recurrent headaches    Respiratory: negative for SOB, hemoptysis or cough    Cardiovascular: negative for CP, palpitations, or PND    Gastrointestinal: negative for abdominal pain, no hematemesis, hematochezia or BRBPR    Genitourinary: no urgency, frequency, or dysuria, no nocturia    Integument/breast: negative for skin rash or skin lesions    Hematologic/lymphatic: negative for known bleeding disorder    Musculoskeletal:Chronic back pain with right sided CVA tenderness    Neurological: negative for lightheadedness, syncope or presyncopal events, no seizure or CVA history    Behavioral/Psych: negative for depression or chronic anxiety,    Endocrine: negative for polydyspia, polyuria or intolerance to heat or cold    Allergic/Immunologic: negative for chronic allergic rhinitis, or known connective tissue disorder              Objective:         Patient Vitals for the past 24 hrs:   Temp Pulse Resp BP SpO2   12/19/22 1504 97.7 °F (36.5 °C) -- 18 -- --   12/19/22 1434 -- 70 -- (!) 168/72 98 %            No intake/output data recorded. No intake/output data recorded.          Data Review:   Recent Results (from the past 24 hour(s))   Basic Metabolic Panel    Collection Time: 12/19/22  3:34 PM   Result Value Ref Range    Sodium 137 133 - 143 mmol/L    Potassium 4.2 3.5 - 5.1 mmol/L    Chloride 104 101 - 110 mmol/L    CO2 26 21 - 32 mmol/L    Anion Gap 7 2 - 11 mmol/L    Glucose 99 65 - 100 mg/dL    BUN 44 (H) 8 - 23 MG/DL    Creatinine 1.60 (H) 0.6 - 1.0 MG/DL    Est, Glom Filt Rate 33 (L) >60 ml/min/1.73m2    Calcium 10.6 (H) 8.3 - 10.4 MG/DL   CBC with Auto Differential    Collection Time: 12/19/22  3:34 PM   Result Value Ref Range    WBC 5.2 4.3 - 11.1 K/uL    RBC 4.32 4.05 - 5.2 M/uL    Hemoglobin 12.6 11.7 - 15.4 g/dL    Hematocrit 39.7 35.8 - 46.3 %    MCV 91.9 82 - 102 FL    MCH 29.2 26.1 - 32.9 PG    MCHC 31.7 31.4 - 35.0 g/dL    RDW 13.1 11.9 - 14.6 %    Platelets 767 916 - 511 K/uL    MPV 11.5 9.4 - 12.3 FL    nRBC 0.00 0.0 - 0.2 K/uL    Differential Type AUTOMATED      Seg Neutrophils 42 (L) 43 - 78 %    Lymphocytes 45 (H) 13 - 44 %    Monocytes 10 4.0 - 12.0 %    Eosinophils % 2 0.5 - 7.8 %    Basophils 1 0.0 - 2.0 %    Immature Granulocytes 0 0.0 - 5.0 %    Segs Absolute 2.2 1.7 - 8.2 K/UL    Absolute Lymph # 2.3 0.5 - 4.6 K/UL    Absolute Mono # 0.5 0.1 - 1.3 K/UL    Absolute Eos # 0.1 0.0 - 0.8 K/UL    Basophils Absolute 0.1 0.0 - 0.2 K/UL    Absolute Immature Granulocyte 0.0 0.0 - 0.5 K/UL   Urinalysis w rflx microscopic    Collection Time: 12/19/22  3:34 PM   Result Value Ref Range    Color, UA YELLOW/STRAW      Appearance CLEAR      Specific Gravity, UA 1.018 1.001 - 1.023      pH, Urine 5.0 5.0 - 9.0      Protein, UA TRACE (A) NEG mg/dL    Glucose, UA Negative mg/dL    Ketones, Urine Negative NEG mg/dL    Bilirubin Urine Negative NEG      Blood, Urine Negative NEG      Urobilinogen, Urine 0.2 0.2 - 1.0 EU/dL    Nitrite, Urine Negative NEG      Leukocyte Esterase, Urine MODERATE (A) NEG              Physical Exam:         General:    Alert, cooperative, no distress    Eyes:    Conjunctivae/corneas clear. PERRL    Ears:    Normal     Nose:    Nares normal.     Mouth/Throat:    Lips, mucosa, and tongue normal. Teeth and gums normal.    Neck:    no JVD. Back:     Mild tenderness to right CVA but no erythema or masses palpated. Scar to lumbar region    Lungs:     Clear to auscultation bilaterally. Heart:    Regular rate and rhythm, S1, S2 normal    Abdomen:     Soft, non-tender. Bowel sounds normal. No masses,  No organomegaly. Extremities:    Chronic edema to left arm from mastectomy     Skin:    Skin color, texture, turgor normal. No rashes or lesions    Neurologic:    CNII-XII intact. Assessment and Plan         Principal Problem:    JACKY (acute kidney injury) ([address]  Active Problems:    Type 2 diabetes mellitus with chronic kidney disease    Chronic combined systolic and diastolic CHF (congestive heart failure) ([address]    Dyslipidemia    Essential hypertension  Resolved Problems:    * No resolved hospital problems. *    JACKY - Pre renal. IV fluids given in ER. Holding lasix, metformin, and ACE. UTI - cultures in the past have grown Klebsiella sensitive to Cefriaxone. Will start Ceftriaxone. Order urine culture. Per , constantly gets UTIs. May need to see urology outpatient for daily abx.      Chronic back pain - Believe her right sided CVA tenderness is musckuloskeletal related. Can continue her gabapentin (reduced dose due to current creatine clearance) but will add lidocaine patch    Combined CHF - Appearing compensated on exam. Holding lasix for today. Hold ACE due to JACKY. BB. Strict Is and Os. DM type II - Hold metformin due to JACKY.  SS    DVT prophylaxis - SCDs  Signed By:    Usman Montelongo DO    December 19, 2022

## 2022-12-20 NOTE — ACP (ADVANCE CARE PLANNING)
[facility]  At the heart of better care     Advance Care Planning   Admit Date:  2022  3:09 PM   Name:  Alvaro Bruce   Age:  69 y.o. Sex:  female  :  1945   MRN:  [id number]   Room:  [place] [address]    Alvaro Bruce is able to make her own decisions:   Yes    Patient / surrogate decision-maker directed code status:  FULL    Patient or surrogate consented to discussion of the current conditions, workup, management plans, prognosis, and the risk for further deterioration. Time spent: 17 minutes in direct discussion.       Signed:  Usman Montelongo DO

## 2022-12-21 VITALS
SYSTOLIC BLOOD PRESSURE: 124 MMHG | HEART RATE: 81 BPM | HEIGHT: 66 IN | DIASTOLIC BLOOD PRESSURE: 73 MMHG | WEIGHT: 146 LBS | BODY MASS INDEX: 23.46 KG/M2 | RESPIRATION RATE: 18 BRPM | TEMPERATURE: 98.2 F | OXYGEN SATURATION: 95 %

## 2022-12-21 LAB
ANION GAP SERPL CALC-SCNC: 6 MMOL/L (ref 2–11)
BUN SERPL-MCNC: 21 MG/DL (ref 8–23)
CALCIUM SERPL-MCNC: 10 MG/DL (ref 8.3–10.4)
CHLORIDE SERPL-SCNC: 108 MMOL/L (ref 101–110)
CO2 SERPL-SCNC: 26 MMOL/L (ref 21–32)
CREAT SERPL-MCNC: 1.1 MG/DL (ref 0.6–1)
GLUCOSE BLD STRIP.AUTO-MCNC: 89 MG/DL (ref 65–100)
GLUCOSE SERPL-MCNC: 93 MG/DL (ref 65–100)
POTASSIUM SERPL-SCNC: 3.7 MMOL/L (ref 3.5–5.1)
SERVICE CMNT-IMP: NORMAL
SODIUM SERPL-SCNC: 140 MMOL/L (ref 133–143)

## 2022-12-21 PROCEDURE — 36415 COLL VENOUS BLD VENIPUNCTURE: CPT

## 2022-12-21 PROCEDURE — 97165 OT EVAL LOW COMPLEX 30 MIN: CPT

## 2022-12-21 PROCEDURE — 6370000000 HC RX 637 (ALT 250 FOR IP): Performed by: FAMILY MEDICINE

## 2022-12-21 PROCEDURE — 6370000000 HC RX 637 (ALT 250 FOR IP): Performed by: INTERNAL MEDICINE

## 2022-12-21 PROCEDURE — 2580000003 HC RX 258: Performed by: FAMILY MEDICINE

## 2022-12-21 PROCEDURE — 97161 PT EVAL LOW COMPLEX 20 MIN: CPT

## 2022-12-21 PROCEDURE — 80048 BASIC METABOLIC PNL TOTAL CA: CPT

## 2022-12-21 PROCEDURE — 97535 SELF CARE MNGMENT TRAINING: CPT

## 2022-12-21 PROCEDURE — 82962 GLUCOSE BLOOD TEST: CPT

## 2022-12-21 RX ORDER — CLOTRIMAZOLE 1 %
100 CREAM WITH APPLICATOR VAGINAL NIGHTLY
Qty: 7 SUPPOSITORY | Refills: 0 | Status: SHIPPED | OUTPATIENT
Start: 2022-12-21 | End: 2022-12-28

## 2022-12-21 RX ORDER — GREEN TEA/HOODIA GORDONII 315-12.5MG
1 CAPSULE ORAL 2 TIMES DAILY
Qty: 14 TABLET | Refills: 0 | Status: SHIPPED | OUTPATIENT
Start: 2022-12-21 | End: 2022-12-28

## 2022-12-21 RX ADMIN — HYDRALAZINE HYDROCHLORIDE 25 MG: 25 TABLET, FILM COATED ORAL at 05:42

## 2022-12-21 RX ADMIN — SODIUM CHLORIDE, PRESERVATIVE FREE 10 ML: 5 INJECTION INTRAVENOUS at 08:52

## 2022-12-21 RX ADMIN — GABAPENTIN 300 MG: 300 CAPSULE ORAL at 08:53

## 2022-12-21 ASSESSMENT — PAIN SCALES - GENERAL
PAINLEVEL_OUTOF10: 0
PAINLEVEL_OUTOF10: 0

## 2022-12-21 NOTE — PLAN OF CARE
Problem: Discharge Planning  Goal: Discharge to home or other facility with appropriate resources  12/21/2022 0800 by Sarah Melendez RN  Outcome: Progressing  12/20/2022 1912 by Reji Hurtado RN  Outcome: Progressing     Problem: Skin/Tissue Integrity  Goal: Absence of new skin breakdown  Description: 1. Monitor for areas of redness and/or skin breakdown  2. Assess vascular access sites hourly  3. Every 4-6 hours minimum:  Change oxygen saturation probe site  4. Every 4-6 hours:  If on nasal continuous positive airway pressure, respiratory therapy assess nares and determine need for appliance change or resting period.   12/21/2022 0800 by Sarah Melendez RN  Outcome: Progressing  12/20/2022 1912 by Reji Hurtado RN  Outcome: Progressing     Problem: Safety - Adult  Goal: Free from fall injury  12/21/2022 0800 by Sarah Melendez RN  Outcome: Progressing  12/20/2022 1912 by Reji Hurtado RN  Outcome: Progressing

## 2022-12-21 NOTE — PLAN OF CARE
Problem: Discharge Planning  Goal: Discharge to home or other facility with appropriate resources  12/20/2022 1912 by Sugar Hood RN  Outcome: Progressing  12/20/2022 1144 by Magda Dixon RN  Outcome: Progressing     Problem: Skin/Tissue Integrity  Goal: Absence of new skin breakdown  Description: 1. Monitor for areas of redness and/or skin breakdown  2. Assess vascular access sites hourly  3. Every 4-6 hours minimum:  Change oxygen saturation probe site  4. Every 4-6 hours:  If on nasal continuous positive airway pressure, respiratory therapy assess nares and determine need for appliance change or resting period.   Outcome: Progressing     Problem: Safety - Adult  Goal: Free from fall injury  Outcome: Progressing

## 2022-12-21 NOTE — DISCHARGE SUMMARY
Hospitalist Discharge Summary   Admit Date:  2022  3:09 PM   DC Note date: 2022  Name:  Vamshi Pina   Age:  69 y.o. Sex:  female  :  1945   MRN:  [id number]   Room:    PCP:  LIU Baeza CNP    Presenting Complaint: Abnormal Lab     Initial Admission Diagnosis: Recurrent urinary tract infection [N39.0]  JACKY (acute kidney injury) ([facility] [address] [N17.9]     Problem List for this Hospitalization (present on admission):    Principal Problem:    Acute renal failure superimposed on stage 3a chronic kidney disease ([address]  Active Problems:    Type 2 diabetes mellitus with chronic kidney disease    Chronic combined systolic and diastolic CHF (congestive heart failure) ([facility] [address]    UTI (urinary tract infection)    Dyslipidemia    Essential hypertension  Resolved Problems:    * No resolved hospital problems. [facility] Course:  Patient is a 69 y.o. female w/ a PMH of combined CHF EF 50% from ECHO in 2018, left breast cancer s/p mastectomy, DM type II, HLD, CKD stage 3 who presented to the ER due to abnormal labs by per PCP. She saw her PCP Friday due to right flank pain and was found to have JACKY along with a UTI. She was prescribed Keflex. CT negative for stone or other abnormalities. Admitted for UTI and OiOCG9m. She improved with IV fluids and her renal function has returned to her baseline. Her home ACEi, diuretic, and metformin were held on admission. She will complete her Keflex post discharge. We asked her to follow up with her PCP in 1 week for a check up and medication review. She may be discharged home on Dec/.     A&P  Acute renal failure superimposed on stage 3a CKD (HCC)  -baseline Cr around 1.1  -Cr has returned to 1.1 on the day of discharge  -holding home lisinopril     UTI (urinary tract infection)  -pt says she was feeling better on Keflex even before she came to hospital.   -Was on ceftriaxone here but will resume Keflex at discharge  -CT abd/pel w/o any acute finding     Type 2 diabetes mellitus with chronic kidney disease  -controlled. -correction scale lispro while inpatient  -metformin was held on admission     Chronic combined systolic and diastolic HF ([address]  -held home Lasix  -takes every other day at home; review dosing with your prescriber     Essential hypertension  -uncontrolled.     -hydralazine while inpatient  -ACEi can be resumed at discharge as renal function has returned to her baseline      Disposition: Home  Diet: ADULT DIET; Regular; 4 carb choices (60 gm/meal); Low Fat/Low Chol/High Fiber/2 gm Na  Code Status: Full Code    Follow Ups:   Follow-up Information     Veva Goltz, APRN - CNP Follow up in 1 week(s). Specialties: Family Medicine, Nurse Practitioner  Why: post discharge check up and medication review  Contact information:  [address] Follow up. Specialty: Urology  Why: follow up ASAP new patient appointment for recurrent UTIs.  may need   cystoscopy to investigate for bladder abnormalities. Contact information:  [address] [place] [address] Rd  [phone]                   Time spent in patient discharge and coordination 37 minutes. Plan was discussed with the patient. All questions answered. Patient was stable at time of discharge. Instructions given to call a physician or return if any concerns. Current Discharge Medication List        START taking these medications    Details   Probiotic Acidophilus (FLORANEX) TABS Take 1 tablet by mouth 2 times daily for 7 days  Qty: 14 tablet, Refills: 0           CONTINUE these medications which have NOT CHANGED    Details   B-D 3CC LUER-KATY SYR 25GX1\" 25G X 1\" 3 ML MISC       nystatin-triamcinolone (MYCOLOG II) 028874-3.2 UNIT/GM-% cream Apply topically 2 times daily.   Qty: 1 each, Refills: 1    Associated Diagnoses: Perineal rash in female      cephALEXin (KEFLEX) 500 MG capsule Take 1 capsule by mouth 2 times daily for 7 days  Qty: 14 capsule, Refills: 0    Associated Diagnoses: Frequent UTI; Dysuria      promethazine (PHENERGAN) 25 MG tablet Take 1 tablet by mouth daily as needed for Nausea  Qty: 90 tablet, Refills: 0    Comments: Pt requests 90 day supply  Associated Diagnoses: Nausea      metoprolol succinate (TOPROL XL) 50 MG extended release tablet Take 1 tablet by mouth at bedtime Once a day  Qty: 90 tablet, Refills: 3      lisinopril (PRINIVIL;ZESTRIL) 10 MG tablet Take 1 tablet by mouth 2 times daily  Qty: 180 tablet, Refills: 3      CRANBERRY PO Take by mouth daily      estradiol (ESTRACE VAGINAL) 0.1 MG/GM vaginal cream Insert 2g daily vaginally for two weeks. Then insert 1g three times a week. Qty: 1 each, Refills: 3    Associated Diagnoses: Recurrent UTI      fluticasone (FLONASE) 50 MCG/ACT nasal spray 2 times daily as needed      colchicine (COLCRYS) 0.6 MG tablet Take 1 tablet by mouth 2 times daily  Qty: 60 tablet, Refills: 5    Associated Diagnoses: Monoarthritis of hand, right      furosemide (LASIX) 40 MG tablet Take 1 tablet by mouth every other day  Qty: 45 tablet, Refills: 1    Associated Diagnoses: Dilated cardiomyopathy ([address] Essential hypertension      gabapentin (NEURONTIN) 600 MG tablet Take 1 tablet by mouth 2 times daily for 360 days. Qty: 180 tablet, Refills: 1    Associated Diagnoses: Controlled type 2 diabetes mellitus with stage 3 chronic kidney disease, without long-term current use of insulin ([address]  Chemotherapy-induced neuropathy (HCC)      metFORMIN (GLUCOPHAGE-XR) 500 mg extended release tablet Take 2 tablets by mouth 2 times daily  Qty: 360 tablet, Refills: 1    Associated Diagnoses: Controlled type 2 diabetes mellitus with stage 3 chronic kidney disease, without long-term current use of insulin (HCC)      calcium carbonate (TUMS) 500 MG chewable tablet Take 1 tablet by mouth at bedtime      dextromethorphan-guaiFENesin (MUCINEX DM)  MG per extended release tablet Take 1 tablet by mouth every 12 hours as needed      cetirizine (ZYRTEC) 10 MG tablet Take 10 mg by mouth daily as needed      potassium chloride (KLOR-CON M) 20 MEQ extended release tablet TAKE 1 TABLET EVERY MORNING AND 2 TABLETS EVERY EVENING      valACYclovir (VALTREX) 1 g tablet 2 tablets 2 times a day for 1 day only with mouth sore occurs             Procedures done this admission:  * No surgery found *    Consults this admission:  None    Echocardiogram results:  No results found for this or any previous visit. Diagnostic Imaging/Tests:   XR FINGER RIGHT (MIN 2 VIEWS)    Result Date: 11/28/2022  See Progress note in the chart. BRIAN MARTIN DIGITAL SCREEN UNILATERAL RIGHT    Result Date: 12/20/2022  No mammographic evidence of malignancy on the right. Recommend annual mammogram in one year. A reminder letter will be scheduled. BI-RADS Assessment Category 1: Negative     CT ABDOMEN PELVIS RENAL STONE    Result Date: 12/19/2022  1. No urinary tract calculi or hydronephrosis. No acute changes in the abdomen or pelvis to account for patient's symptoms. No bowel obstruction or diverticulitis.         Labs: Results:       BMP, Mg, Phos Recent Labs     12/19/22  1534 12/20/22  0540 12/21/22  0657    138 140   K 4.2 3.9 3.7    107 108   CO2 26 26 26   ANIONGAP 7 5 6   BUN 44* 32* 21   CREATININE 1.60* 1.30* 1.10*   LABGLOM 33* 43* 52*   CALCIUM 10.6* 9.9 10.0   GLUCOSE 99 88 93      CBC Recent Labs     12/19/22  1534 12/20/22  0540   WBC 5.2 5.1   RBC 4.32 4.20   HGB 12.6 12.4   HCT 39.7 38.4   MCV 91.9 91.4   MCH 29.2 29.5   MCHC 31.7 32.3   RDW 13.1 12.9    217   MPV 11.5 11.5   NRBC 0.00 0.00   SEGS 42* 37*   LYMPHOPCT 45* 50*   EOSRELPCT 2 2   MONOPCT 10 10   BASOPCT 1 1   IMMGRAN 0 0   SEGSABS 2.2 1.9   LYMPHSABS 2.3 2.5   EOSABS 0.1 0.1   MONOSABS 0.5 0.5   BASOSABS 0.1 0.0   ABSIMMGRAN 0.0 0.0      LFT No results for input(s): BILITOT, BILIDIR, ALKPHOS, AST, ALT, PROT, LABALBU, GLOB in the last 72 hours. Cardiac  No results found for: NTPROBNP, TROPHS   Coags No results found for: PROTIME, INR, APTT   A1c Lab Results   Component Value Date/Time    LABA1C 6.2 06/06/2022 02:38 PM    LABA1C 5.9 02/24/2022 03:44 PM    LABA1C 5.8 10/12/2021 01:55 PM     06/06/2022 02:38 PM     02/24/2022 03:44 PM     10/12/2021 01:55 PM      Lipids Lab Results   Component Value Date/Time    CHOL 226 06/06/2022 02:38 PM    [address] Not calculated due to elevated triglyceride level 06/06/2022 02:38 PM    LABVLDL 90 06/06/2022 02:38 PM    HDL 52 06/06/2022 02:38 PM    CHOLHDLRATIO 4.3 06/06/2022 02:38 PM    TRIG 450 06/06/2022 02:38 PM      Thyroid  Lab Results   Component Value Date/Time    TSHELE 2.68 06/06/2022 02:38 PM        Most Recent UA Lab Results   Component Value Date/Time    COLORU YELLOW/STRAW 12/19/2022 03:34 PM    APPEARANCE CLEAR 12/19/2022 03:34 PM    SPECGRAV 1.018 12/19/2022 03:34 PM    LABPH 5.0 12/19/2022 03:34 PM    PROTEINU TRACE 12/19/2022 03:34 PM    GLUCOSEU Negative 12/19/2022 03:34 PM    KETUA Negative 12/19/2022 03:34 PM    BILIRUBINUR Negative 12/19/2022 03:34 PM    BILIRUBINUR Negative 02/09/2021 02:48 PM    BLOODU Negative 12/19/2022 03:34 PM    UROBILINOGEN 0.2 12/19/2022 03:34 PM    NITRU Negative 12/19/2022 03:34 PM    LEUKOCYTESUR MODERATE 12/19/2022 03:34 PM    WBCUA 0-5 05/21/2019 02:41 PM    RBCUA 0-2 05/21/2019 02:41 PM    BACTERIA Few 05/21/2019 02:41 PM    MUCUS Present 05/21/2019 02:41 PM        Recent Labs     12/19/22  1333   CULTURE No growth after short period of incubation. Further results to follow after overnight incubation.        All Labs from Last 24 Hrs:  Recent Results (from the past 24 hour(s))   POCT Glucose    Collection Time: 12/20/22  9:01 AM   Result Value Ref Range    POC Glucose 81 65 - 100 mg/dL    Performed by: Destiny    POCT Glucose    Collection Time: 12/20/22 11:23 AM   Result Value Ref Range    POC Glucose 102 (H) 65 - 100 mg/dL    Performed by: Joie    POCT Glucose    Collection Time: 12/20/22  5:01 PM   Result Value Ref Range    POC Glucose 110 (H) 65 - 100 mg/dL    Performed by: Joie    POCT Glucose    Collection Time: 12/20/22  9:13 PM   Result Value Ref Range    POC Glucose 94 65 - 100 mg/dL    Performed by: Jackeline Eller    Basic Metabolic Panel w/ Reflex to MG    Collection Time: 12/21/22  6:57 AM   Result Value Ref Range    Sodium 140 133 - 143 mmol/L    Potassium 3.7 3.5 - 5.1 mmol/L    Chloride 108 101 - 110 mmol/L    CO2 26 21 - 32 mmol/L    Anion Gap 6 2 - 11 mmol/L    Glucose 93 65 - 100 mg/dL    BUN 21 8 - 23 MG/DL    Creatinine 1.10 (H) 0.6 - 1.0 MG/DL    Est, Glom Filt Rate 52 (L) >60 ml/min/1.73m2    Calcium 10.0 8.3 - 10.4 MG/DL   POCT Glucose    Collection Time: 12/21/22  7:03 AM   Result Value Ref Range    POC Glucose 89 65 - 100 mg/dL    Performed by:  Joie        Allergies   Allergen Reactions    Oxycodone Itching    Eucalyptus Oil Hives and Other (See Comments)     Burns mouth    Ezetimibe Myalgia    Morphine Other (See Comments)     Feels crazy  Other reaction(s): Hallucinations    Penicillins Hives    Pineapple Hives and Other (See Comments)     Burns mouth    Vancomycin Other (See Comments)     Kidney function worsened    Sulfa Antibiotics Nausea Only and Rash     Immunization History   Administered Date(s) Administered    COVID-19, PFIZER PURPLE top, DILUTE for use, (age 15 y+), 30mcg/0.3mL 01/19/2021, 02/13/2021    Influenza Trivalent 09/18/2013, 10/06/2014    Influenza Virus Vaccine 09/24/2020    Influenza, FLUAD, (age 72 y+), Adjuvanted, 0.5mL 09/24/2020, 10/07/2021, 10/07/2021, 09/22/2022    Influenza, High Dose (Fluzone 65 yrs and older) 10/10/2016, 11/01/2017    Influenza, Triv, inactivated, subunit, adjuvanted, IM (Fluad 65 yrs and older) 10/16/2018, 09/26/2019    Influenza, Trivalent PF 09/28/2015    Pneumococcal Conjugate 13-valent [place]) 12/30/2015    Pneumococcal Polysaccharide ([id number]) 08/22/2013    Tdap (Boostrix, Adacel) 03/18/2014    Zoster Live (Zostavax) 01/01/2015       Recent Vital Data:  Patient Vitals for the past 24 hrs:   Temp Pulse Resp BP SpO2   12/21/22 0704 98.2 °F (36.8 °C) 81 18 124/73 95 %   12/21/22 0311 97.3 °F (36.3 °C) 68 18 (!) 170/73 96 %   12/21/22 0129 98.1 °F (36.7 °C) 76 18 (!) 153/69 96 %   12/20/22 2337 98.2 °F (36.8 °C) 62 19 (!) 181/77 96 %   12/20/22 1955 97.9 °F (36.6 °C) 65 18 (!) 166/76 95 %   12/20/22 1516 97.4 °F (36.3 °C) 62 18 134/70 94 %   12/20/22 1124 98.7 °F (37.1 °C) 64 18 (!) 179/79 96 %   12/20/22 1030 -- 68 -- (!) 140/59 93 %   12/20/22 1014 -- -- -- -- 95 %   12/20/22 1004 -- -- -- [facility] 168/73 --       Oxygen Therapy  SpO2: 95 %  O2 Device: None (Room air)    Estimated body mass index is 23.93 kg/m² as calculated from the following:    Height as of this encounter: 5' 5.5\" (1.664 m). Weight as of this encounter: 146 lb (66.2 kg). Intake/Output Summary (Last 24 hours) at 12/21/2022 0859  Last data filed at 12/21/2022 9982  Gross per 24 hour   Intake 750 ml   Output 250 ml   Net 500 ml         Physical Exam:  General:    No overt distress  Head:  Normocephalic, atraumatic  Eyes:  Sclerae appear normal.  Pupils equally round. HENT:  Nares appear normal, no drainage. Moist mucous membranes  Neck:  No restricted ROM. Trachea midline  CV:   RRR. No m/r/g. Lungs: No wheezing. Respirations even, unlabored on room air. Abdomen:   Soft, nondistended. Extremities: Warm and dry. No cyanosis or clubbing. Skin:     No rashes. Normal coloration  Neuro:  CN II-XII grossly intact. Psych:  Normal mood and affect. Signed:  LIU Griffin CNP    Part of this note may have been written by using a voice dictation software. The note has been proof read but may still contain some grammatical/other typographical errors.

## 2022-12-21 NOTE — PROGRESS NOTES
END OF SHIFT NOTE:    Pt c/o \"vaginal yeast infection\"    INTAKE/OUTPUT  12/20 0701 - 12/21 0700  In: 350 [P.O.:350]  Out: 250 [Urine:250]  Voiding: Yes  Catheter: No  Drain:              Flatus: Patient does have flatus present. Stool: 0 occurrences. Characteristics:           Stool Assessment  Incontinence: No    Emesis: 0  occurrences. Characteristics:        VITAL SIGNS  Patient Vitals for the past 12 hrs:   Temp Pulse Resp BP SpO2   12/21/22 0311 97.3 °F (36.3 °C) 68 18 (!) 170/73 96 %   12/21/22 0129 98.1 °F (36.7 °C) 76 18 (!) 153/69 96 %   12/20/22 2337 98.2 °F (36.8 °C) 62 19 (!) 181/77 96 %   12/20/22 1955 97.9 °F (36.6 °C) 65 18 (!) 166/76 95 %       Pain Assessment  Pain Level: 0 (12/21/22 0130)  Pain Location: Abdomen, Back  Patient's Stated Pain Goal: 0 - No pain    Ambulating  Yes    Shift report given to oncoming nurse at the bedside.     Bev Bryson, RN

## 2022-12-21 NOTE — PROGRESS NOTES
Pt c/o itching and \"vaginal yeast infection\" Messaged hospitalist. Will need day team to examine pt. No new orders. Will pass on to day nurse.

## 2022-12-21 NOTE — PROGRESS NOTES
ACUTE OCCUPATIONAL THERAPY GOALS:   (Developed with and agreed upon by patient and/or caregiver.)  1. Pt will complete LB ADL set up only with AE as needed. 2. Pt will complete showering supervision only. 3. Pt will tolerate OT treatment session requiring 1-2 breaks as needed. 4. Pt will complete functional mobility supervision. ALL GOALS MET 12/21/22    OCCUPATIONAL THERAPY Initial Assessment and Discharge       OT Visit Days: 1  Acknowledge Orders  Time  OT Charge Capture  Rehab Caseload Tracker      Eugenia Parisi is a 69 y.o. female   PRIMARY DIAGNOSIS: Acute renal failure superimposed on stage 3a chronic kidney disease ([facility] [address]  Recurrent urinary tract infection [N39.0]  JACKY (acute kidney injury) ([facility] [address] [N17.9]       Reason for Referral: Generalized Muscle Weakness (M62.81)  Inpatient: Payor: MEDICARE / Plan: MEDICARE PART A AND B / Product Type: *No Product type* /     ASSESSMENT:     REHAB RECOMMENDATIONS:   Recommendation to date pending progress:  Setting:  No further skilled therapy after discharge from hospital    Equipment:    None     ASSESSMENT:  Ms. Maninder Gee is a 69 y F with a hx of CHF, L breast cancer/mastectomy/lymphedema LUE, DMII, HLD, CKDIII, gout LH. Pt was admitted for ARF/frequent UTI. Pt was received standing in room by bed. Pt required assistance for functional mobility and ADLs today for fall safety protocol only. Pt is functioning at supervision level for all mobility and ADLs. Pt states she showered at hospital yesterday. Pt is not appropriate for skilled acute OT services at this time. OT will DC.       [employer] AM-PAC 6 Clicks Daily Activity Inpatient Short Form:    AM-PAC Daily Activity Inpatient   How much help for putting on and taking off regular lower body clothing?: None  How much help for Bathing?: None  How much help for Toileting?: None  How much help for putting on and taking off regular upper body clothing?: None  How much help for taking care of personal grooming?: None  How much help for eating meals?: None  AM-PAC Inpatient Daily Activity Raw Score: 24  AM-PAC Inpatient ADL T-Scale Score : 57.54  ADL Inpatient CMS 0-100% Score: 0  ADL Inpatient CMS G-Code Modifier : CH           SUBJECTIVE:     Ms. Houston Peoples states, \"My finger don't bend. \"     Social/Functional Lives With: Spouse  Type of Home: House  ADL Assistance: Independent  Homemaking Assistance: Independent  Ambulation Assistance: Independent  Transfer Assistance: Independent    OBJECTIVE:     LINES / Verlean Appl / Micheal Whytes: NA    RESTRICTIONS/PRECAUTIONS:       PAIN: VITALS / O2:   Pre Treatment:   Pain Assessment: None - Denies Pain      Post Treatment: none       Vitals          Oxygen            GROSS EVALUATION: INTACT IMPAIRED   (See Comments)   UE AROM [x] []   UE PROM [] []   Strength WFL[]       Posture / Balance [] Sitting - Dynamic: Good  Standing - Dynamic: Fair, +, Good, -   Sensation [x]     Coordination [x]       Tone [x]       Edema [x]    Activity Tolerance [x] Patient Tolerated treatment well     Hand Dominance R [] L []      COGNITION/  PERCEPTION: INTACT IMPAIRED   (See Comments)   Orientation [x]     Vision [x]     Hearing [x]     Cognition  [x]     Perception []       MOBILITY: I Mod I S SBA CGA Min Mod Max Total  NT x2 Comments:   Bed Mobility    Rolling [] [] [] [] [] [] [] [] [] [] []    Supine to Sit [] [] [] [] [] [] [] [] [] [] []    Scooting [] [] [] [] [] [] [] [] [] [] []    Sit to Supine [] [] [] [] [] [] [] [] [] [] []    Transfers    Sit to Stand [] [] [x] [] [] [] [] [] [] [] []    Bed to Chair [] [] [] [] [] [] [] [] [] [] []    Stand to Sit [] [] [x] [] [] [] [] [] [] [] []    Tub/Shower [] [] [] [] [] [] [] [] [] [] []     Toilet [] [] [] [] [] [] [] [] [] [] []      [] [] [] [] [] [] [] [] [] [] []    I=Independent, Mod I=Modified Independent, S=Supervision/Setup, SBA=Standby Assistance, CGA=Contact Guard Assistance, Min=Minimal Assistance, Mod=Moderate Assistance, Max=Maximal Assistance, Total=Total Assistance, NT=Not Tested    ACTIVITIES OF DAILY LIVING: I Mod I S SBA CGA Min Mod Max Total NT Comments   BASIC ADLs:              Upper Body Bathing  [] [] [] [] [] [] [] [] [] []    Lower Body Bathing [] [] [] [] [] [] [] [] [] []    Toileting [] [] [] [] [] [] [] [] [] []    Upper Body Dressing [x] [] [] [] [] [] [] [] [] [] Sitting EOB doffing robe   Lower Body Dressing [] [] [x] [] [] [] [] [] [] [] Sitting EOB donning socks   Feeding [] [] [] [] [] [] [] [] [] []    Grooming [] [] [] [] [] [] [] [] [] []    Personal Device Care [] [] [] [] [] [] [] [] [] []    Functional Mobility [] [] [x] [] [] [] [] [] [] [] Bed > hallway > bed no AD   I=Independent, Mod I=Modified Independent, S=Supervision/Setup, SBA=Standby Assistance, CGA=Contact Guard Assistance, Min=Minimal Assistance, Mod=Moderate Assistance, Max=Maximal Assistance, Total=Total Assistance, NT=Not Tested    PLAN:   FREQUENCY/DURATION   OT Plan of Care:  (EVAL AND DC) for duration of hospital stay or until stated goals are met, whichever comes first.    PROBLEM LIST:   (Skilled intervention is medically necessary to address:)  NONE   INTERVENTIONS PLANNED:  (Benefits and precautions of occupational therapy have been discussed with the patient.)  NONE         TREATMENT:     EVALUATION: LOW COMPLEXITY: (Untimed Charge)    TREATMENT:   Self Care (8 minutes): Patient participated in upper body dressing and lower body dressing ADLs in unsupported sitting with no visual, verbal, and manual cueing to increase activity tolerance. Patient also participated in functional mobility training to increase activity tolerance. TREATMENT GRID:  N/A    AFTER TREATMENT PRECAUTIONS: Bed, Call light within reach, Needs within reach, RN notified, and Side rails x2    INTERDISCIPLINARY COLLABORATION:  RN/ PCT, PT/ PTA, and OT/ CADENA    EDUCATION:  Education Given To: Patient  Education Provided: Role of Therapy;Plan of Care; Fall Prevention Strategies  Education Outcome: Verbalized understanding    TOTAL TREATMENT DURATION AND TIME:  Time In: 0930  Time Out: [address] [facility]  Minutes: Julia Gunn., OT

## 2022-12-21 NOTE — PROGRESS NOTES
Pt /69. PRN Q4 hydralazine 20mg given at 2340 . Pt has PRN hydralazine 10mg IV for High Blood Pressure, -160 or DBP . Messaged hospitalist. Continue to monitor.

## 2022-12-21 NOTE — THERAPY DISCHARGE
ACUTE PHYSICAL THERAPY GOALS: All goals met at [facility]  (Developed with and agreed upon by patient and/or caregiver.)  (1.) Dimas Peterson will perform standing static and dynamic balance activities x 10 minutes with INDEPENDENT to improve safety within 1 treatment day(s). (2.) Dimas Peterson will transfer from bed to chair and chair to bed with INDEPENDENT using the least restrictive device within 1 treatment day(s). (3.) Dimas Peterson will ambulate with INDEPENDENT for 250 feet with the least restrictive device within 1 treatment day(s). PHYSICAL THERAPY Initial Assessment and Discharge  (Link to Caseload Tracking: PT Visit Days : 1  Acknowledge Orders  Time In/Out  PT Charge Capture  Rehab Caseload Tracker    Dimas Peterson is a 69 y.o. female   PRIMARY DIAGNOSIS: Acute renal failure superimposed on stage 3a chronic kidney disease ([facility]  Recurrent urinary tract infection [N39.0]  JACKY (acute kidney injury) ([facility] [address] [facility] [N17.9]       Reason for Referral: Other abnormalities of gait and mobility (R26.89)  Inpatient: Payor: MEDICARE / Plan: MEDICARE PART A AND B / Product Type: *No Product type* /     ASSESSMENT:     REHAB RECOMMENDATIONS:   Recommendation to date pending progress:  Setting:  No further skilled therapy after discharge from hospital    Equipment:    None     ASSESSMENT:  Ms. Bronson Teague is a 70 y/o female who presents with abnormal labs due to UTI and JACKY. Pt is IND at baseline with no h/o falls in the past 4-5 years. Pt was IND for mobility during evaluation including ambulation x250ft. No skilled therapy needs identified at this time and pt will be discharged from PT caseload. Please reconsult if pt status changes.      [address] [facility] [address] [address] AM-PAC 6 Clicks Basic Mobility Inpatient Short Form  AM-PAC Mobility Inpatient   How much difficulty turning over in bed?: None  How much difficulty sitting down on / standing up from a chair with arms?: None  How much difficulty moving from lying on back to sitting on side of bed?: None  How much help from another person moving to and from a bed to a chair?: None  How much help from another person needed to walk in hospital room?: None  How much help from another person for climbing 3-5 steps with a railing?: None  AM-PAC Inpatient Mobility Raw Score : 24  AM-PAC Inpatient T-Scale Score : 61.14  Mobility Inpatient CMS 0-100% Score: 0  Mobility Inpatient CMS G-Code Modifier : CH    SUBJECTIVE:   Ms. Deanne Hollingsworth states, \"My left knee collapsed after all the surgery so that's why I walk weird\"     Social/Functional Lives With: Spouse  Type of Home: House  ADL Assistance: Independent  Homemaking Assistance: Independent  Ambulation Assistance: Independent  Transfer Assistance: Independent    OBJECTIVE:     PAIN: Anil Barb / O2: Sidney Butler / Jadon Raymond / Charmayne Big:   Pre Treatment: None         Post Treatment: None Vitals    VSS    Oxygen   Room air   None    RESTRICTIONS/PRECAUTIONS:                    GROSS EVALUATION: Intact Impaired (Comments):   AROM [x]     PROM [x]    Strength [x]     Balance []  Good standing balance   Posture [] Forward Head  Rounded Shoulders   Sensation [x]     Coordination [x]      Tone [x]     Edema [x]    Activity Tolerance [x]      []      COGNITION/  PERCEPTION: Intact Impaired (Comments):   Orientation [x]     Vision [x]     Hearing [x]     Cognition  [x]       MOBILITY: I Mod I S SBA CGA Min Mod Max Total  NT x2 Comments: standing in room upon PT entry   Bed Mobility    Rolling [] [] [] [] [] [] [] [] [] [x] []    Supine to Sit [] [] [] [] [] [] [] [] [] [x] []    Scooting [] [] [] [] [] [] [] [] [] [x] []    Sit to Supine [] [] [] [] [] [] [] [] [] [x] []    Transfers    Sit to Stand [x] [] [] [] [] [] [] [] [] [] []    Bed to Chair [] [] [] [] [] [] [] [] [] [x] []    Stand to Sit [x] [] [] [] [] [] [] [] [] [] []     [] [] [] [] [] [] [] [] [] [] []    I=Independent, Mod I=Modified Independent, S=Supervision, SBA=Standby Assistance, CGA=Contact Guard Assistance,   Min=Minimal Assistance, Mod=Moderate Assistance, Max=Maximal Assistance, Total=Total Assistance, NT=Not Tested    GAIT: I Mod I S SBA CGA Min Mod Max Total  NT x2 Comments:   Level of Assistance [x] [] [] [] [] [] [] [] [] [] []    Distance 250  feet    DME None    Gait Quality Hip hike, Trunk sway increased, Wide base of support, and limited L knee flexion/extension    Weightbearing Status      Stairs      I=Independent, Mod I=Modified Independent, S=Supervision, SBA=Standby Assistance, CGA=Contact Guard Assistance,   Min=Minimal Assistance, Mod=Moderate Assistance, Max=Maximal Assistance, Total=Total Assistance, NT=Not Tested    PLAN:     THERAPY PROGNOSIS: Excellent    PROBLEM LIST:   (Skilled intervention is medically necessary to address:)  No skilled needs INTERVENTIONS PLANNED:   (Benefits and precautions of physical therapy have been discussed with the patient.)  N/A       TREATMENT:   EVALUATION: LOW COMPLEXITY: (Untimed Charge)    AFTER TREATMENT PRECAUTIONS: Bed, Bed/Chair Locked, Call light within reach, and RN notified    INTERDISCIPLINARY COLLABORATION:  RN/ PCT and OT/ CADENA    EDUCATION: Education Given To: Patient  Education Provided: Role of Therapy;Plan of Care  Education Method: Verbal  Barriers to Learning: None  Education Outcome: Verbalized understanding    TIME IN/OUT:  Time In: 0930  Time Out: [place] 231  Minutes: [facility],

## 2022-12-21 NOTE — CARE COORDINATION
Patient is medically stable for discharge. Patient will be discharging home today with no needs at this time.      ASSESSMENT NOTE    Attending Physician: Darlene Mcnally MD  Admit Problem: Recurrent urinary tract infection [N39.0]  JACKY (acute kidney injury) ([facility] [address] [N17.9]  Date/Time of Admission: 12/19/2022  3:09 PM  Problem List:  Patient Active Problem List   Diagnosis    Chronic periscapular pain on right side    Foraminal stenosis of cervical region    Statin intolerance    Dyslipidemia    Depression    Osteoarthritis    Peripheral sensory-motor axonal polyneuropathy    Osteopenia    Type 2 diabetes mellitus, controlled (HCC)    Muscle spasm    Recurrent occipital headache    Left leg weakness    Chemotherapy-induced neuropathy (HCC)    Stage 3a chronic kidney disease (HCC)    Arthropathy of cervical facet joint    Stem cells transplant status ([facility] [address]    Dilated cardiomyopathy (HCC)    Vitamin D deficiency    Vitamin B12 deficiency    S/P cardiac cath    Essential hypertension    Chronic anemia    Palpitation    Chronic renal disease, stage III (HCC)    Primary insomnia    Intolerance of oral bisphosphonate therapy    Sjogren syndrome, unspecified (HCC)    Type 2 diabetes mellitus with chronic kidney disease    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    Chronic gout of right hand    Acute renal failure superimposed on stage 3a chronic kidney disease (HCC)    Chronic combined systolic and diastolic CHF (congestive heart failure) (HCC)    UTI (urinary tract infection)       Service Assessment  Patient Orientation Alert and Oriented   Cognition Alert   History Provided By Patient   Primary Caregiver Self   Accompanied By/Relationship     Support Systems Spouse/Significant Other   Patient's Healthcare Decision Maker is: Legal Next of Kin   PCP Verified by CM Yes   Last Visit to PCP Within last 6 months   Prior Functional Level Independent in ADLs/IADLs   Current Functional Level Independent in ADLs/IADLs   Can patient return to prior living arrangement Yes   Ability to make needs known: Good   Family able to assist with home care needs: Yes   Would you like for me to discuss the discharge plan with any other family members/significant others, and if so, who? No   Financial Resources     Community Resources     CM/SW Referral       Social/Functional History  Lives With Spouse   Type of [address] [place] [address] Access     Entrance Stairs - Number of Steps     Entrance Stairs - Rails     Bathroom Shower/Tub     Bathroom Toilet     Bathroom Equipment     Bathroom Accessibility     Home Equipment     Receives Help From     ADL Assistance Independent   Bath     Dressing     Grooming     Feeding     Toileting     [place]   Meal Prep     Laundry     Vacuuming     Cleaning     Gardening     Yard Work     Driving     Shopping          Other (Comment)     Homemaking Responsibilities     Meal Prep Responsibility     Laundry Responsibility     Cleaning Responsibility     Bill Paying/Finance Responsibility     [address] Management     Other (Comment)     Ambulation Assistance Independent   Transfer Assistance Independent   Active      Patient's  Info     Mode of Transportation     Education     Occupation     Type of Occupation       Discharge Planning   Type of [facility] Prior To Admission None   Potential Assistance Needed N/A   DME     DME     DME Ordered? Potential Assistance Purchasing Medications     Meds-to-Beds: Does the patient want to have any new prescriptions delivered to bedside prior to discharge?      Type of Home Care Services None   Patient expects to be discharged to: House   Follow Up Appointment: Best Day/Time     One/Two Story Residence:     # of Interior Steps     Height of Each Step (in)     [employer] Available     History of Falls? Services At/After Discharge  Transition of Care Consult (CM Consult): Internal Home Health     Internal Hospice     Reason Outside Agency [address]     Partner SNF     Reason Why Partner SNF Not Chosen     Internal Comfort Care     Reason Outside [address] Discharge     [address] Provided? Mode of Transport at Discharge [address] [facility] Time of Discharge     Confirm Follow Up Transport Family     Condition of Participation: Discharge Planning  The plan for Transition of Care is related to the following treatment goals: The Patient and/or Patient Representative was provided with a Choice of Provider? Patient   Name of the Patient Representative who was provided with the Choice of Provider and agrees with the Discharge Plan? The Patient and/or Patient Representative Agree with the Discharge Plan? Yes   Freedom of Choice list was provided with basic dialogue that supports the individualized plan of care/goals, treatment preferences, and shares the quality data associated with the providers?  Yes     Documentation for Discharge Appeal  Discharge Appealed by     Date notified by QIO of appeal request:     Time notified by QIO of appeal request:     Detailed Notice of Discharge given to:     Date Notice of Discharge given:     Time Notice of Discharge given:     Date records sent to [facility]     Time records sent to [facility]     Date Notified of Outcome     Time Notified of Outcome     Outcome of appeal           [facility] 12/21/22 9:40 AM

## 2022-12-22 ENCOUNTER — CARE COORDINATION (OUTPATIENT)
Dept: CARE COORDINATION | Facility: CLINIC | Age: 77
End: 2022-12-22

## 2022-12-22 LAB
BACTERIA SPEC CULT: NORMAL
SERVICE CMNT-IMP: NORMAL

## 2022-12-22 NOTE — CARE COORDINATION
[facility] Care Transitions Initial Follow Up Call    Call within 2 business days of discharge: Yes    LPN Care Coordinator contacted the patient by telephone to perform post hospital discharge assessment. Verified name and  with patient as identifiers. Provided introduction to self, and explanation of the LPN Care Coordinator role. Patient: Krissy Smith Patient : [id number]   MRN: [id number]  Reason for Admission: UTI  Discharge Date: 22 RARS: Readmission Risk Score: 12.9      Last Discharge  Street       Date Complaint Diagnosis Description Type Department Provider    22 Abnormal Lab JACKY (acute kidney injury) ([facility] [address] . .. ED to Hosp-Admission (Discharged) (ADMITTED) Nima Le MD; Jasson Amaya,... Was this an external facility discharge? No Discharge Facility: [facility]    Challenges to be reviewed by the provider   Additional needs identified to be addressed with provider: No  none               Method of communication with provider: none. LPN Care Coordinator reviewed discharge instructions, medical action plan, and red flags with patient who verbalized understanding. The patient was given an opportunity to ask questions and does not have any further questions or concerns at this time. Were discharge instructions available to patient? Yes. Reviewed appropriate site of care based on symptoms and resources available to patient including: PCP  Specialist  Urgent care clinics  When to call [address] The patient agrees to contact the PCP office for questions related to their healthcare. Advance Care Planning:   Does patient have an Advance Directive: not on file; education provided. Medication reconciliation was performed with patient, who verbalizes understanding of administration of home medications.  Medications reviewed, 1111F entered: N/A    Was patient discharged with a pulse oximeter? no    Non-face-to-face services provided:  Obtained and reviewed discharge summary and/or continuity of care documents  Education of patient/family/caregiver/guardian to support self-management-Alcira Guerrero LPN -[phone]  All scheduled appointments. Offered patient enrollment in the Remote Patient Monitoring (RPM) program for in-home monitoring: NA.    Care Transitions 24 Hour Call    Do you have a copy of your discharge instructions?: Yes  Do you have all of your prescriptions and are they filled?: Yes  Have you been contacted by a [address]?: No  Have you scheduled your follow up appointment?: No  Do you have support at home?: Partner/Spouse/SO  Do you feel like you have everything you need to keep you well at home?: Yes  Are you an active caregiver in your home?: No  Care Transitions Interventions         Follow Up  Future Appointments   Date Time Provider Susana Hutchins   1/12/2023  2:00 PM Alea Jacome DO [facility]   1/19/2023  2:00 PM Heavenly Luque MD [facility]   [facility]20[facility]  3:15 [facility]   3/27/2023  9:20 AM [facility] [address] [facility] provided contact information. Plan for follow-up call in 5-7 days based on severity of symptoms and risk factors. Plan for next call: self management-diet, hydration, exercise, follow up appointments.      Leno Villagomez LPN

## 2022-12-23 ENCOUNTER — TELEPHONE (OUTPATIENT)
Dept: INTERNAL MEDICINE CLINIC | Facility: CLINIC | Age: 77
End: 2022-12-23

## 2022-12-29 ENCOUNTER — CARE COORDINATION (OUTPATIENT)
Dept: CARE COORDINATION | Facility: CLINIC | Age: 77
End: 2022-12-29

## 2022-12-29 NOTE — CARE COORDINATION
[facility] Care Transitions Follow Up Call    LPN Care Coordinator contacted the patient by telephone to follow up after admission on 2022. Verified name and  with patient as identifiers. Patient: Suhail Mancia  Patient : [id number]   MRN: [id number]  Reason for Admission: UTI  Discharge Date: 22 RARS: Readmission Risk Score: 12.9      Needs to be reviewed by the provider   Additional needs identified to be addressed with provider: No  none             Method of communication with provider: none. Addressed changes since last contact:  none  Discussed follow-up appointments. If no appointment was previously scheduled, appointment scheduling offered: Yes. Is follow up appointment scheduled within 7 days of discharge? No.    Follow Up  Future Appointments   Date Time Provider Susana Hutchins   1/3/2023 11:20 AM LIU Greene - CNP [address] [facility] AM[facility]   2023  2:00 PM Alea Jacome DO [facility]   2023  1:15 PM Millie Santana OT [facility]   2023  2:30 PM Suhail Bardales MD [facility]   2023  2:00 PM Barron Nails MD [facility]   2023  2:00 PM Charlie Ashby MD [facility]   2023  3:15 PM Tc Quintanilla DO [facility]   3/27/2023  9:20 AM LIU Greene CNP [address] [facility]     Non-BS follow up appointment(s): tyler    LPN Care Coordinator reviewed discharge instructions, medical action plan, and red flags with patient and discussed any barriers to care and/or understanding of plan of care after discharge. Discussed appropriate site of care based on symptoms and resources available to patient including: PCP  Specialist  Urgent care clinics  When to call [phone]. The patient agrees to contact the PCP office for questions related to their healthcare. Advance Care Planning:   not on file; education provided.      Patients top risk factors for readmission: medical condition-s/dCHF, HTN, Cardiomyopathy, DM, UTI, CKD, HLD, Gout right hand, Breast CA, Left Mastectomy  Interventions to address risk factors: Obtained and reviewed discharge summary and/or continuity of care documents, Education of patient/family/caregiver/guardian to support self-management-Alcira Guerrero LPN -[phone], and all scheduled appointments. Offered patient enrollment in the Remote Patient Monitoring (RPM) program for in-home monitoring: NA.     Care Transitions Subsequent and Final Call    Subsequent and Final Calls  Do you have any ongoing symptoms?: No  Have your medications changed?: No  Do you have any questions related to your medications?: No  Do you currently have any active services?: No  Do you have any needs or concerns that I can assist you with?: No  Identified Barriers: None  Care Transitions Interventions  Other Interventions:             LPN Care Coordinator provided contact information for future needs. Plan for follow-up call in 7-10 days based on severity of symptoms and risk factors. Plan for next call: self management-diet, hydration, daily weight, exercise, follow up appointments.      Elizabeth Anderson LPN

## 2023-01-03 ENCOUNTER — OFFICE VISIT (OUTPATIENT)
Dept: INTERNAL MEDICINE CLINIC | Facility: CLINIC | Age: 78
End: 2023-01-03

## 2023-01-03 VITALS
OXYGEN SATURATION: 95 % | HEIGHT: 66 IN | DIASTOLIC BLOOD PRESSURE: 75 MMHG | TEMPERATURE: 97.3 F | WEIGHT: 145.6 LBS | SYSTOLIC BLOOD PRESSURE: 155 MMHG | HEART RATE: 58 BPM | BODY MASS INDEX: 23.4 KG/M2

## 2023-01-03 DIAGNOSIS — N18.31 STAGE 3A CHRONIC KIDNEY DISEASE (HCC): ICD-10-CM

## 2023-01-03 DIAGNOSIS — G89.29 OTHER CHRONIC PAIN: ICD-10-CM

## 2023-01-03 DIAGNOSIS — Z09 HOSPITAL DISCHARGE FOLLOW-UP: Primary | ICD-10-CM

## 2023-01-03 DIAGNOSIS — N17.9 ACUTE RENAL FAILURE SUPERIMPOSED ON STAGE 3A CHRONIC KIDNEY DISEASE, UNSPECIFIED ACUTE RENAL FAILURE TYPE (HCC): ICD-10-CM

## 2023-01-03 DIAGNOSIS — N39.0 URINARY TRACT INFECTION WITHOUT HEMATURIA, SITE UNSPECIFIED: ICD-10-CM

## 2023-01-03 DIAGNOSIS — N18.31 ACUTE RENAL FAILURE SUPERIMPOSED ON STAGE 3A CHRONIC KIDNEY DISEASE, UNSPECIFIED ACUTE RENAL FAILURE TYPE (HCC): ICD-10-CM

## 2023-01-03 DIAGNOSIS — K13.79 MOUTH SORES: ICD-10-CM

## 2023-01-03 DIAGNOSIS — E11.22 TYPE 2 DIABETES MELLITUS WITH STAGE 3A CHRONIC KIDNEY DISEASE, WITHOUT LONG-TERM CURRENT USE OF INSULIN (HCC): Chronic | ICD-10-CM

## 2023-01-03 DIAGNOSIS — N18.31 TYPE 2 DIABETES MELLITUS WITH STAGE 3A CHRONIC KIDNEY DISEASE, WITHOUT LONG-TERM CURRENT USE OF INSULIN (HCC): Chronic | ICD-10-CM

## 2023-01-03 PROBLEM — N18.30 CHRONIC RENAL DISEASE, STAGE III (HCC): Status: RESOLVED | Noted: 2022-05-17 | Resolved: 2023-01-03

## 2023-01-03 RX ORDER — TRAMADOL HYDROCHLORIDE 50 MG/1
50 TABLET ORAL DAILY PRN
Qty: 20 TABLET | Refills: 0 | Status: SHIPPED | OUTPATIENT
Start: 2023-01-03 | End: 2023-02-02

## 2023-01-03 RX ORDER — VALACYCLOVIR HYDROCHLORIDE 1 G/1
1000 TABLET, FILM COATED ORAL
Qty: 40 TABLET | Refills: 1 | Status: SHIPPED | OUTPATIENT
Start: 2023-01-03 | End: 2023-01-03

## 2023-01-03 ASSESSMENT — PATIENT HEALTH QUESTIONNAIRE - PHQ9
5. POOR APPETITE OR OVEREATING: 0
2. FEELING DOWN, DEPRESSED OR HOPELESS: 0
6. FEELING BAD ABOUT YOURSELF - OR THAT YOU ARE A FAILURE OR HAVE LET YOURSELF OR YOUR FAMILY DOWN: 0
SUM OF ALL RESPONSES TO PHQ QUESTIONS 1-9: 0
SUM OF ALL RESPONSES TO PHQ QUESTIONS 1-9: 0
8. MOVING OR SPEAKING SO SLOWLY THAT OTHER PEOPLE COULD HAVE NOTICED. OR THE OPPOSITE, BEING SO FIGETY OR RESTLESS THAT YOU HAVE BEEN MOVING AROUND A LOT MORE THAN USUAL: 0
SUM OF ALL RESPONSES TO PHQ9 QUESTIONS 1 & 2: 0
10. IF YOU CHECKED OFF ANY PROBLEMS, HOW DIFFICULT HAVE THESE PROBLEMS MADE IT FOR YOU TO DO YOUR WORK, TAKE CARE OF THINGS AT HOME, OR GET ALONG WITH OTHER PEOPLE: 0
3. TROUBLE FALLING OR STAYING ASLEEP: 0
1. LITTLE INTEREST OR PLEASURE IN DOING THINGS: 0
7. TROUBLE CONCENTRATING ON THINGS, SUCH AS READING THE NEWSPAPER OR WATCHING TELEVISION: 0
SUM OF ALL RESPONSES TO PHQ QUESTIONS 1-9: 0
4. FEELING TIRED OR HAVING LITTLE ENERGY: 0
SUM OF ALL RESPONSES TO PHQ QUESTIONS 1-9: 0
9. THOUGHTS THAT YOU WOULD BE BETTER OFF DEAD, OR OF HURTING YOURSELF: 0

## 2023-01-03 ASSESSMENT — ANXIETY QUESTIONNAIRES
GAD7 TOTAL SCORE: 0
2. NOT BEING ABLE TO STOP OR CONTROL WORRYING: 0
5. BEING SO RESTLESS THAT IT IS HARD TO SIT STILL: 0
IF YOU CHECKED OFF ANY PROBLEMS ON THIS QUESTIONNAIRE, HOW DIFFICULT HAVE THESE PROBLEMS MADE IT FOR YOU TO DO YOUR WORK, TAKE CARE OF THINGS AT HOME, OR GET ALONG WITH OTHER PEOPLE: NOT DIFFICULT AT ALL
4. TROUBLE RELAXING: 0
1. FEELING NERVOUS, ANXIOUS, OR ON EDGE: 0
6. BECOMING EASILY ANNOYED OR IRRITABLE: 0
7. FEELING AFRAID AS IF SOMETHING AWFUL MIGHT HAPPEN: 0
3. WORRYING TOO MUCH ABOUT DIFFERENT THINGS: 0

## 2023-01-03 ASSESSMENT — ENCOUNTER SYMPTOMS
DIARRHEA: 0
NAUSEA: 0
SHORTNESS OF BREATH: 0
VOMITING: 0
COUGH: 0
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
## 2023-01-05 ENCOUNTER — CARE COORDINATION (OUTPATIENT)
Dept: CARE COORDINATION | Facility: CLINIC | Age: 78
End: 2023-01-05

## 2023-01-05 NOTE — CARE COORDINATION
[facility] Care Transitions Follow Up Call    LPN Care Coordinator contacted the patient by telephone to follow up after admission on 2022. Verified name and  with patient as identifiers. Patient: Rula Goins  Patient : 1945   MRN: [id number]  Reason for Admission: UTI  Discharge Date: 22 RARS: Readmission Risk Score: 12.9      Needs to be reviewed by the provider   Additional needs identified to be addressed with provider: No  none             Method of communication with provider: none. Addressed changes since last contact:  none  Discussed follow-up appointments. If no appointment was previously scheduled, appointment scheduling offered: Yes. Is follow up appointment scheduled within 7 days of discharge? No.    Follow Up  Future Appointments   Date Time Provider Susana Hutchins   2023  2:00 PM Alea Jacome, DO BSUG [facility] AMB   2023  1:15 PM Millie Santana, OT POAI [facility] AMB   2023  2:30 PM Lynette Nissen, MD POAI [facility] AMB   2023  2:00 PM Zaida Esposito  [address], [address] [place] [address] [facility] AMB   2023  2:00 PM Alla Gonzalez MD [id number] [facility] AMB   2023  3:15 PM Antonio Kc DO UCDG [facility] AMB   3/27/2023  9:20 AM LIU Dickerson - CNP [address] [facility] AMB     Non-BS follow up appointment(s): tyler    LPN Care Coordinator reviewed discharge instructions, medical action plan, and red flags with patient and discussed any barriers to care and/or understanding of plan of care after discharge. Discussed appropriate site of care based on symptoms and resources available to patient including: PCP  Specialist  Urgent care clinics  When to call [address] The patient agrees to contact the PCP office for questions related to their healthcare. Advance Care Planning:   not on file; education provided. Patients top risk factors for readmission: medical condition-s/dCHF, HTN, Cardiomyopathy, DM, UTI, CKD, HLD, Gout right hand, Breast CA, Left Mastectomy. Interventions to address risk factors: Obtained and reviewed discharge summary and/or continuity of care documents, Education of patient/family/caregiver/guardian to support self-management-Alcira Guerrero LPN -[phone], and all scheduled appointments. Offered patient enrollment in the Remote Patient Monitoring (RPM) program for in-home monitoring: NA.     Care Transitions Subsequent and Final Call    Subsequent and Final Calls  Do you have any ongoing symptoms?: No  Have your medications changed?: No  Do you have any questions related to your medications?: No  Do you currently have any active services?: No  Do you have any needs or concerns that I can assist you with?: No  Identified Barriers: None  Care Transitions Interventions  Other Interventions:             LPN Care Coordinator provided contact information for future needs. Plan for follow-up call in 7-10 days based on severity of symptoms and risk factors.   Plan for next call: self management-diet, hydration, exercise, follow up appointment,     Cr Uribe LPN

## 2023-01-12 ENCOUNTER — CARE COORDINATION (OUTPATIENT)
Dept: CARE COORDINATION | Facility: CLINIC | Age: 78
End: 2023-01-12

## 2023-01-12 ENCOUNTER — OFFICE VISIT (OUTPATIENT)
Dept: UROGYNECOLOGY | Age: 78
End: 2023-01-12

## 2023-01-12 DIAGNOSIS — N17.9 ACUTE RENAL FAILURE SUPERIMPOSED ON STAGE 3A CHRONIC KIDNEY DISEASE, UNSPECIFIED ACUTE RENAL FAILURE TYPE (HCC): Primary | ICD-10-CM

## 2023-01-12 DIAGNOSIS — N18.31 ACUTE RENAL FAILURE SUPERIMPOSED ON STAGE 3A CHRONIC KIDNEY DISEASE, UNSPECIFIED ACUTE RENAL FAILURE TYPE (HCC): Primary | ICD-10-CM

## 2023-01-12 LAB
BILIRUBIN, URINE, POC: NEGATIVE
BLOOD URINE, POC: NEGATIVE
GLUCOSE URINE, POC: NEGATIVE
KETONES, URINE, POC: NEGATIVE
LEUKOCYTE ESTERASE, URINE, POC: NEGATIVE
NITRITE, URINE, POC: NEGATIVE
PH, URINE, POC: 5 (ref 4.6–8)
PROTEIN,URINE, POC: NEGATIVE
SPECIFIC GRAVITY, URINE, POC: 1.02 (ref 1–1.03)
URINALYSIS CLARITY, POC: CLEAR
URINALYSIS COLOR, POC: YELLOW
UROBILINOGEN, POC: 0.2

## 2023-01-12 NOTE — PROGRESS NOTES
[facility]  [address]  Dept: [phone]        PCP:  LIU Lehman CNP    1/12/2023    HPI:  I am being asked to see this patient in consultation by Dr. Herrera Sandy   for New Patient and Incontinence    Ms. Nando Palomino  has been leaking urine for 2-3 years. She feels like it when it happens is when she is having a UTI sx. Otherwise she does not leak urine at all. She does not leak urine with cough, laugh, sneeze and activity. She does not leak urine with urgency. She uses adult depends and they are always dry unless she has a UTI. She has not tried PT, she has tried medication Myrbetriq. She has not had procedures/surgery for this in the past.     She only leaks with a UTI. Otherwise she only voids 2 times per day. She voids 1-2 times during the day. She voids 0 times over night. She has 7-10 everytime she eats BM per week, and does not strain. She drinks 1 caffeine drinks beverages per day. She uses 1 artificial sweeteners per day. She drinks 0 alcoholic beverages per week. She has not pelvic surgery in the past.   Her last PAP: 2 years ago  No components found for: [id number]  Her last Colonoscopy: 2020  Her last Mammogram: 2022    She does have a history of DM. Lab Results   Component Value Date    LABA1C 6.2 06/06/2022     Lab Results   Component Value Date     06/06/2022       She does have a history of sleep apnea. Tobacco: No    Sexual History: not sexually active. Notes were reviewed from the referring provider Dr Herrera Sandy.   Results were reviewed from prior tests:     Results for orders placed or performed in visit on 01/12/23   AMB POC URINALYSIS DIP STICK AUTO W/O MICRO   Result Value Ref Range    Color, Urine, POC yellow     Clarity, Urine, POC clear     Glucose, Urine, POC Negative Negative    Bilirubin, Urine, POC Negative Negative    Ketones, Urine, POC Negative Negative    Specific Gravity, Urine, POC 1.020 1.001 - 1.035    Blood, Urine, POC negative Negative    pH, Urine, POC 5.0 4.6 - 8.0    Protein, Urine, POC Negative Negative    Urobilinogen, POC 0.2     Nitrate, Urine, POC negative Negative    Leukocyte Esterase, Urine, POC Negative Negative       There were no vitals taken for this visit. PVR by straight catheterization: 60 cc    Physical Exam  Vitals reviewed. Exam conducted with a chaperone present. Constitutional:       General: She is not in acute distress. Appearance: Normal appearance. She is normal weight. HENT:      Head: Normocephalic and atraumatic. Pulmonary:      Effort: Pulmonary effort is normal. No respiratory distress. Abdominal:      General: There is no distension. Palpations: There is no mass. Tenderness: There is no abdominal tenderness. There is no guarding or rebound. Hernia: No hernia is present. Musculoskeletal:      Cervical back: Normal range of motion. Skin:     General: Skin is warm and dry. Neurological:      Mental Status: She is alert and oriented to person, place, and time. Psychiatric:         Mood and Affect: Mood normal.         Behavior: Behavior normal.         Thought Content: Thought content normal.         Judgment: Judgment normal.        Female Genitourinary   Vulva:    Normal. No lesions  Bartholin's Gland:  Bilateral , Normal, nontender  Skenes Gland:  Bilateral, Normal, nontender   Clitoris:  Normal.   Introitus:    Normal.   Urethral Meatus:  Normal appearing, normal size, no lesions, no prolapse  Urethra:  No masses, no tenderness  Vagina:  No atrophy, no discharge, no lesions  Cervix:  No lesions, no discharge  Uterus:  No tenderness, normal mobility   Adnexa:   No masses palpated, no tenderness  Bladder:  No tenderness, no masses palpated  Perineum:  Normal, no lesions    Rectal   Anorectal Exam: No hemorrhoids and no masses or lesions of the perineum        POP-Q: (Pelvic Organ Prolapse - Quantification Exam):   No flowsheet data found. Pelvic floor muscles: Tender Spasm     R. Puborectalis: NO 0 /5    L. Puborectalis: NO 0 /5    R. Pubococcyg NO 0 /5    L. Pubococcyg NO 0 /5    R. Ileococcyg: NO 0 /5    L. Ileococcyg: NO 0 /5    R. Obturator Int: NO 0 /5    L. Obturator Int: NO 0 /5    R. Coccygeus: NO 0 /5    L. Coccygeus: NO 0 /5      Pelvic floor contractions: 0/5    Supine Stress Test of RAHUL: negative      1. Acute renal failure superimposed on stage 3a chronic kidney disease, unspecified acute renal failure type [facility])  Assessment & Plan:  Recently discharged from the hospital with stage 3 CKD. She is seeing urology on the 23rd. This is likely some of the cause of her urinary issues. She is not leaking urine. Her PVR is normal so she is not retaining urine      Follow up with Urology and PCP. Not having any urogyn issues at this time. She knows to call if symptoms change. No follow-ups on file.             Lauran Jamari, DO

## 2023-01-12 NOTE — ASSESSMENT & PLAN NOTE
Recently discharged from the hospital with stage 3 CKD. She is seeing urology on the 23rd. This is likely some of the cause of her urinary issues. She is not leaking urine. Her PVR is normal so she is not retaining urine      Follow up with Urology and PCP. Not having any urogyn issues at this time. She knows to call if symptoms change.

## 2023-01-17 ENCOUNTER — OFFICE VISIT (OUTPATIENT)
Age: 78
End: 2023-01-17
Payer: MEDICARE

## 2023-01-17 ENCOUNTER — OFFICE VISIT (OUTPATIENT)
Dept: ORTHOPEDIC SURGERY | Age: 78
End: 2023-01-17

## 2023-01-17 DIAGNOSIS — R29.898 UPPER EXTREMITY WEAKNESS: ICD-10-CM

## 2023-01-17 DIAGNOSIS — R29.898 DECREASED GRIP STRENGTH: ICD-10-CM

## 2023-01-17 DIAGNOSIS — M79.644 PAIN OF FINGER OF RIGHT HAND: ICD-10-CM

## 2023-01-17 DIAGNOSIS — Z78.9 DECREASED ACTIVITIES OF DAILY LIVING (ADL): ICD-10-CM

## 2023-01-17 DIAGNOSIS — M25.641 STIFFNESS OF FINGER JOINT OF RIGHT HAND: Primary | ICD-10-CM

## 2023-01-17 DIAGNOSIS — M15.1 DEGENERATIVE ARTHRITIS OF DISTAL INTERPHALANGEAL JOINT OF INDEX FINGER OF RIGHT HAND: Primary | ICD-10-CM

## 2023-01-17 PROCEDURE — 99024 POSTOP FOLLOW-UP VISIT: CPT | Performed by: ORTHOPAEDIC SURGERY

## 2023-01-17 PROCEDURE — 97022 WHIRLPOOL THERAPY: CPT | Performed by: OCCUPATIONAL THERAPIST

## 2023-01-17 PROCEDURE — 97110 THERAPEUTIC EXERCISES: CPT | Performed by: OCCUPATIONAL THERAPIST

## 2023-01-17 NOTE — PROGRESS NOTES
Orthopaedic Hand Surgery Note    Name: Polly Mcclain  YOB: 1945  Gender: female  MRN: [id number]    Post Operative Visit: Right index finger distal interphalangeal joint arthrodesis - Right    HPI: Patient is status post Right index finger distal interphalangeal joint arthrodesis - Right on 11/16/2022. Patient reports well controlled pain. Physical Examination:  Surgical incision is well healed. There is no erythema or drainage. Sensation is intact in all fingers. Motor exam reveals no deficits. Good index PIP motion    Imaging:     Finger XR: AP, Lateral, Oblique views     Clinical Indication:  1. 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 She can continue with hand therapy. She can progress strengthening as tolerated. She no longer needs to use her splint.   She can follow-up as needed        Nevaeh Cobian MD  01/17/23  4:18 PM

## 2023-01-17 NOTE — PROGRESS NOTES
[facility] [address] [address]  Dept: [phone]      Occupational Discharge Note     Referring MD: Jose Ahn MD    Diagnosis:     ICD-10-CM    1. 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 Stiffness of finger joint of right hand  M25.641       3. Pain of right hand  M79.641       4. Upper extremity weakness  R29.898       5. Decreased  strength  R29.898            Surgery/Medical Dx: Date DOS  11/16/22     Therapy precautions: DIP Arthrodesis     History of injury/onset : Pt developed redness/edema/pain and inability to flex/use R IF approx 6/1/22 which was diagnosed by Dr Albert Mercado in July as gout,  pt underwent R IF DIP arthrodesis on 11/16/22      Total Direct Treatment Time: 25  min  Total In Office Time: 35 min    Preferred Name: Roque Duarte     Pt reports she can use her IF/hand for all she needs and wants to do at home, feels ready for D/C with HEP at this time    OBJECTIVE       AROM Measures:    AROM (1/17/23 - Post tx)      R IF MP   0/95 PIP 0/80    DIP fused      Treatment:    Fluidotherapy [phone] x 10 minutes Therapist directed exercise in Fluidotherapy to right hand/wrist for preparation for tx and desensitization     Therapeutic exercise (05530) x 25 min:  Home Exercise Program development and Education: see below. Patient I with program following instruction and performance  Use of heat and ice as needed for pain and inflammation reduction. Precautions reviewed. OT POC and rationale, education for surgical precautions and pain management, edema management  Retrograde edema massage with lotion  AROM including  PIP blocking, MP flexion, MP/IP extension and straight fist, full flexion  Brief reassessment, AROM for D/C planning    Access Code: [email or web address]  URL: [email or web address]  Date: 11/28/2022  Prepared by: Yaritza Spangler Higinio-Betker    Exercises  Seated Finger PIP AROM - 5 x daily - 7 x weekly - 1 sets - 15 reps  Finger MP Flexion AROM - 5 x daily - 7 x weekly - 1 sets - 10 reps  Seated Full Fist AROM - 5 x daily - 7 x weekly - 1 sets - 10 reps    CLINICAL DECISION MAKING/ASSESSMENT     ASSESSMENT:   Pt reports she has full functional painfree use of her R IF/hand at home, and feels ready for D/C from OT at this time. PLAN OF CARE     PLAN:  D/C from OT at this time    GOALS   Short Term Goals:  12/26/2022  (4 weeks)  Patient will be I with HEP and precautions. MET   Decrease pain to no > than 1-3/10 at rest,   Met   Pt will increase R active MF/RF/SF flexion to [facility] and will increase R IF MP/PIP to 80 deg or > to increase ease with gripping toothbrush in future  Incision will be healed  MET 1/17/23  Decrease hypersensitivity to minimal or be resolved   MET  1/17/23  Improve Quick DASH functional assessment score to less than 40% deficit    MET    Long Term Goals: 1/23/2023  (8 weeks)  Pt will have full active MF/RF/SF flexion to [facility] and will increase R IF MP/PIP to 85 degrees each of greater to increase ease with grasping   MET 1/17/23  Pt will have adequate strength to be independent with buttons/pinching open ziplocks and gripping containers  MET 1/17/23  Pt will be able to lift and carry items (ie grocery bags, laundry basket etc) with R UE pain 2 of 10 or less.   MET  1/17/23  Pts Quick DASH functional assessment score will be less than 20% functional deficit     MET 1/17/23  Pt will be I with HEP for ROM and strengthening as indicated at time of discharge   MET 1/17/23    [employer] Portal     OT Protocols

## 2023-01-19 ENCOUNTER — CARE COORDINATION (OUTPATIENT)
Dept: CARE COORDINATION | Facility: CLINIC | Age: 78
End: 2023-01-19

## 2023-01-19 ENCOUNTER — OFFICE VISIT (OUTPATIENT)
Dept: RHEUMATOLOGY | Age: 78
End: 2023-01-19
Payer: MEDICARE

## 2023-01-19 VITALS
HEART RATE: 88 BPM | RESPIRATION RATE: 18 BRPM | BODY MASS INDEX: 23.63 KG/M2 | SYSTOLIC BLOOD PRESSURE: 170 MMHG | WEIGHT: 147 LBS | DIASTOLIC BLOOD PRESSURE: 71 MMHG | HEIGHT: 66 IN

## 2023-01-19 DIAGNOSIS — M13.141: Primary | ICD-10-CM

## 2023-01-19 PROBLEM — N39.0 UTI (URINARY TRACT INFECTION): Status: RESOLVED | Noted: 2022-12-20 | Resolved: 2023-01-19

## 2023-01-19 PROCEDURE — 1090F PRES/ABSN URINE INCON ASSESS: CPT | Performed by: INTERNAL MEDICINE

## 2023-01-19 PROCEDURE — 99213 OFFICE O/P EST LOW 20 MIN: CPT | Performed by: INTERNAL MEDICINE

## 2023-01-19 PROCEDURE — G8484 FLU IMMUNIZE NO ADMIN: HCPCS | Performed by: INTERNAL MEDICINE

## 2023-01-19 PROCEDURE — G8399 PT W/DXA RESULTS DOCUMENT: HCPCS | Performed by: INTERNAL MEDICINE

## 2023-01-19 PROCEDURE — 1111F DSCHRG MED/CURRENT MED MERGE: CPT | Performed by: INTERNAL MEDICINE

## 2023-01-19 PROCEDURE — 3078F DIAST BP <80 MM HG: CPT | Performed by: INTERNAL MEDICINE

## 2023-01-19 PROCEDURE — 3077F SYST BP >= 140 MM HG: CPT | Performed by: INTERNAL MEDICINE

## 2023-01-19 PROCEDURE — 1123F ACP DISCUSS/DSCN MKR DOCD: CPT | Performed by: INTERNAL MEDICINE

## 2023-01-19 PROCEDURE — G8420 CALC BMI NORM PARAMETERS: HCPCS | Performed by: INTERNAL MEDICINE

## 2023-01-19 PROCEDURE — 1036F TOBACCO NON-USER: CPT | Performed by: INTERNAL MEDICINE

## 2023-01-19 PROCEDURE — G8427 DOCREV CUR MEDS BY ELIG CLIN: HCPCS | Performed by: INTERNAL MEDICINE

## 2023-01-19 RX ORDER — VALACYCLOVIR HYDROCHLORIDE 1 G/1
1000 TABLET, FILM COATED ORAL 2 TIMES DAILY
COMMUNITY

## 2023-01-19 RX ORDER — LANOLIN ALCOHOL/MO/W.PET/CERES
3 CREAM (GRAM) TOPICAL DAILY
COMMUNITY

## 2023-01-19 RX ORDER — AMITRIPTYLINE HYDROCHLORIDE 10 MG/1
10 TABLET, FILM COATED ORAL NIGHTLY
COMMUNITY

## 2023-01-19 NOTE — CARE COORDINATION
Patient has graduated from the Care Transitions program on 01/19/2023. Patient/family has the ability to self-manage at this time. Patient has no further care management needs, no referral to the [facility] team for further management. Patient has Care Transition Nurse's contact information for any further questions, concerns, or needs. [address] [phone].   Patients upcoming visits:    Future Appointments   Date Time Provider Susana Hutchins   1/19/2023  2:00 PM Ángel Parmar MD [facility] AM[facility]   1/23/2023  2:00 PM Joe Goncalves MD [id number] AMB   2/24/2023  3:15 PM Ulises Gomez DO [facility] AMB   3/27/2023  9:20 AM Garland Ramsey APRN - CNP [address] [facility] AMB

## 2023-01-19 NOTE — PROGRESS NOTES
[facility] RHEUMATOLOGY  KRIS Fall M.D. Phone: [phone]   Fax: [phone]    5/20/2021 11:36 AM      SUBJECTIVE: Per previous not from Dr. Caty Sue on 5/20/21      Jessie Anand is a 77 y.o. female is seen today because of a swollen left ankle and foot in addition to a positive SSA antibody and elevated sedimentation rate and CRP and slightly elevated rheumatoid factor. The SSA antibody was noted to be positive back in June 2018 and acute phase reactants have been elevated since about 2015 there is also a history of positive kappa light chains noted in the record from 2019    Originally seen about 3 or 4 years ago but last seen in 2018. Last seen she really has been fairly stable though continues to have chronic trouble with the left ankle where she had a fracture has had full surgical procedures. She is not quite up with her orthopedic surgeon recently. She still has some dry eye syndrome and a dry mouth but no systemic involvement related to Sjogren's syndrome though she has had a positive SSA antibody for years and positive acute phase reactants. She recently also as noted above had a low titer positive rheumatoid factor but has no clinical symptoms to suggest rheumatoid disease. She said no fever or rash no pulmonary problems she has had some urinary tract infections in the past.  She has no true dysphagia but does drink excessive liquids with meals and cannot eat saltine crackers without liquids she does not use any prescription eyedrops and has not had a tear duct plug and is followed by her ophthalmologist Dr. Elijah Bunch. Overall she seems stable. She does have some hand symptoms related to osteoarthritis. No Raynaud's or photosensitivity.     Apparently has some swelling of her left great toe in the past and did go to the [facility] urgent care and they considered gout but this has resolved it was a one-time event and has not recurred and only bears observation at this point. LAST VISIT 10/13/2022:    She was hospitalized for renal failure at present due to some sort of urinary tract infection but apparently with antibiotics and time things resolved. Finger arthritis: Since her last visit she has had an arthrodesis done of that distal interphalangeal joint though again there is no apparent fluid or any other thing to look for evidence of gout. She has had an elevated uric acid as noted below under the lab but has never had classic symptoms of crystal arthropathy in that her problem has been chronic since the onset most suggestive of active psoriatic arthritis which I do not think she has or possible inflammatory osteoarthritis. I will note that historically she had what may have been an episode of podagra in the past and went to an urgent care center who told her she probably had gout but again I do not know that any crystals have yet to be identified. Since she had the surgery with fusion of that right second DIP joint she has had minimal symptoms and no real inflammatory change. She is still on colchicine twice a day 0.6 mg      Mild Sicca symptoms with a positive SSA antibody: Quite stable from the standpoint of mild sicca symptoms with positive serologic studies. There is no evidence of other systemic involvement from Sjogren's syndrome. She does have some dryness in her eyes and uses over-the-counter drops. She has not required any prescription from her ophthalmologist.  She does have some diffuse aches and pains noted below. No pulmonary problems no GI problems other than some diarrhea since she had a colonoscopy several years ago. No unusual cutaneous trouble no Raynaud's or photosensitivity.     Osteoarthritis of the hands: Heberden's and Maura's nodes and has some mild discomfort here and I did suggest she might try some Tylenol she got significantly worse might need to see the orthopedic hand specialist.    Osteopenia with high fracture risk: He had a recent DEXA scan ordered by Dr. Virgen Sharp and did have increased fracture risk and has been put on alendronate but apparently did not tolerate it and will get back with them for follow-up therapy. .. Juan David Zhu since she was last here she has been started on denosumab and received an injection I think in August 2022      LAB:  1/3/2023: Creatinine elevated 1.2 GFR decreased at 47  7/12/22: K+ 3.4 and glucose 134. Creatinine elevated at 1.1 GFR decreased to 51 and total proteins increased to 8.3 otherwise unremarkable CMP. Hemoglobin low 11.4 otherwise unremarkable CMP PSA  6/29/2022: Uric Acid 8.4. Hemoglobin 10.8. Potassium 3.4 and blood sugar 134 otherwise unremarkable CMP. Creatinine elevated 1.0 and GFR decreased to 51. Liver functions normal total protein normal globulins increased to 4.1. CRP minimally elevated at 1.9 with normal being less than 1.0.  ESR is normal  3/6/2021: Following were normal or negative: LUZ, chromatin, RNP, SM, SCL 70, SSB, anticentromere. SSA positive at greater than 8.0 I do not see an LUZ. CRP elevated at 97 normal less than 10, rheumatoid factor 27.6 normal less than 14. Urinalysis unremarkable. CBC normal.  2/9/9 2021: Vitamin D level 21.9  5/2/2019: Positive light chains of Kappa orgin. ESR 65, CRP slightly elevated to 2.2  6/27/2018: Following were normal or negative: SCL 70, SM antibody, RNP antibody, centromere antibody, DNA antibody.   SSB was normal.  SSA greater than 8.0  2/9/2015: ESR 82  1015/2020: Nerve conduction studies show bilateral carpal tunnel syndrome    XRAYS:      DXA:                       Patient Active Problem List   Diagnosis Code    Depression F32.9    Chronic anemia D64.9    Osteopenia M85.80    Osteoarthritis M19.90    Dyslipidemia E78.5    Cardiac Cath minimal CAD Z98.890    Vitamin B12 deficiency E53.8    Statin intolerance Z78.9    Essential hypertension I10    Chemotherapy-induced neuropathy (HCC) G62.0, T45.1X5A    Dilated cardiomyopathy ([address] I42.0 Type 2 diabetes mellitus, controlled ([facility]) E11.9    Left leg weakness R29.898    Stem cells transplant status ([facility]) Z94.84    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      metoprolol succinate (TOPROL-XL) 50 mg XL tablet Take 1 Tab by mouth daily. 90 Tab 3    nitrofurantoin, macrocrystal-monohydrate, (MACROBID) 100 mg capsule Take 1 Cap by mouth two (2) times a day. 10 Cap 0    amLODIPine (Norvasc) 5 mg tablet Take 5 mg by mouth daily. budesonide (RHINOCORT AQUA) 32 mcg/actuation nasal spray 32 Sprays by Both Nostrils route daily. carvediloL (COREG) 6.25 mg tablet Take 6.25 mg by mouth daily. fluticasone propionate (FLONASE) 50 mcg/actuation nasal spray 50 Sprays by Both Nostrils route daily. meloxicam (MOBIC) 7.5 mg tablet Take 7.5 mg by mouth daily. traMADoL (ULTRAM) 50 mg tablet Take 50 mg by mouth daily. tiZANidine (ZANAFLEX) 2 mg tablet Take 2 mg by mouth daily. benzonatate (TESSALON) 200 mg capsule       esomeprazole (NexIUM) 20 mg capsule Take  by mouth daily. cranberry fruit extract (CRANBERRY EXTRACT PO) Take  by mouth.      lisinopriL (PRINIVIL, ZESTRIL) 5 mg tablet Take 0.5 Tabs by mouth daily. 45 Tab 1    cholecalciferol (VITAMIN D3) (2,000 UNITS /50 MCG) cap capsule Take 1 Cap by mouth daily.  90 Cap 1    valACYclovir (Valtrex) 1 gram tablet 2 tablets 2 times a day for 1 day only with mouth sore occurs 90 Tab 0    metFORMIN ER (GLUCOPHAGE XR) 500 mg tablet TAKE 2 TABLETS TWICE A  Tab 1 Insulin Syringe-Needle U-100 (BD Insulin Syringe) 1 mL 25 x 1\" syrg Use syringe to draw up for B-12 injection once weekly for 4 weeks and then once monthly. 12 Syringe 0    cyanocobalamin (Vitamin B-12) 1,000 mcg/mL injection 1 ML every week IM for 4 weeks,  then 1 ML monthly. 10 mL 3    cetirizine (ZyrTEC) 10 mg tablet Take 10 mg by mouth daily as needed for Allergies. gabapentin (NEURONTIN) 600 mg tablet Take 1 Tab by mouth two (2) times a day. 180 Tab 3    potassium chloride (Klor-Con M20) 20 mEq tablet TAKE 1 TABLET EVERY        MORNING AND 2 TABLETS      EVERY EVENING (Patient taking differently: Take 20 mEq by mouth two (2) times a day. TAKE 1 TABLET EVERY        MORNING AND 2 TABLETS      EVERY EVENING) 270 Tab 1    mometasone (ELOCON) 0.1 % topical cream Apply  to affected area daily as needed for Skin Irritation. 45 g 1    furosemide (LASIX) 40 mg tablet Take 1 Tab by mouth as needed (swelling). 90 Tab 3    OTHER,NON-FORMULARY, Indications: CBD OIL      glucose blood VI test strips (ACCU-CHEK LULU PLUS TEST STRP) strip Dx E11.65  Use daily as directed 100 Strip 11    Blood-Glucose Meter (ACCU-CHEK LULU PLUS METER) misc Dx E11.65  Use daily as directed 1 Each 0    Lancing Device with Lancets (ACCU-CHEK MULTICLIX LANCET) kit Dx E11.65  Use daily as directed 1 Kit 0    Lancets (ACCU-CHEK MULTICLIX LANCET) misc Dx E11.65  Use daily as directed 100 Each 5    Blood Glucose Control High&Low (ACCU-CHEK LULU CONTROL SOLN) soln Dx E11.65  Use daily as directed 1 Bottle 5    acetaminophen (TYLENOL EXTRA STRENGTH) 500 mg tablet Take  by mouth every six (6) hours as needed for Pain. diphenhydrAMINE (BENADRYL ALLERGY) 25 mg tablet Take 25 mg by mouth every six (6) hours as needed for Sleep.        Allergies   Allergen Reactions    Oxycodone Itching    Eucalyptus Hives and Other (comments)     Burns mouth      Morphine Vertigo     Feels crazy  Other reaction(s): Hallucinations    Pcn [Penicillins] Hives Pineapple Hives and Other (comments)     Burns mouth      Statins-Hmg-Coa Reductase Inhibitors Other (comments)    Sulfa (Sulfonamide Antibiotics) Rash and Nausea Only    Vancomycin Unknown (comments)     Kidney function worsened    Zetia [Ezetimibe] Unknown (comments)     Social History     Tobacco Use    Smoking status: Never Smoker    Smokeless tobacco: Never Used   Substance Use Topics    Alcohol use: No    Drug use: No     [phone] (home)   Past Surgical History:   Procedure Laterality Date    HX BREAST BIOPSY      left breast biopsy    HX BREAST BIOPSY  1968    left cyst removed    HX BREAST LUMPECTOMY Left 2001    HX BREAST REDUCTION      Rt breast    HX CARPAL TUNNEL RELEASE Bilateral     HX COLONOSCOPY  07/2014    HX CYST REMOVAL Right 11/2020    hand    HX HEART CATHETERIZATION  2009    HX HEENT      Sinus Surgery    HX HEENT      RK procedure bilateral eyes    HX HYSTERECTOMY      HX LUMBAR LAMINECTOMY  1992    HX MASTECTOMY Left 2002    With Reconstruction    HX OOPHORECTOMY      Unilateral    HX OPEN CHOLECYSTECTOMY  1981    HX OPEN REDUCTION INTERNAL FIXATION Left 2013    Ankle    HX OPEN REDUCTION INTERNAL FIXATION  08/2016    [place]    HX ORTHOPAEDIC Right 08/2013    Patellar fracture repair    HX OTHER SURGICAL      Chetan Cath Placed and Removed    HX TONSILLECTOMY      HX TONSILLECTOMY      HX TRANSPLANT  2002    Stem Cell Transplant    HX UVULOPALATOPHARYNGOPLASTY  2004         ROS:    Gen.: no fever or significant change in appetite or weight          MSK:  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 She does have fairly scattered Heberden's and Maura's nodes. 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   Not at this point convinced this is gout        PLAN:      Reduce colchicine to 1 a day for a month and then discontinue   return in 6 months  If she gets other future significant inflammatory arthropathy then I will at that point consider treating her as gout but for now we will observe her clinical course. Rogelio Fernandez M.D.   Rheumatology - [facility] Osteoporosis & Arthritis

## 2023-01-22 NOTE — PROGRESS NOTES
Physician Progress Note      Chirag MUNOZ #:                  [id number]  :                       1945  ADMIT DATE:       2022 3:09 PM  Andrew Leggett DATE:        2022 10:35 AM  RESPONDING  PROVIDER #:        Carmelita Contreras MD          QUERY TEXT:    Patient admitted with JACKY and UTI. In order to support the diagnosis of JACKY,   please include additional clinical indicators in your documentation. ? Or   please document if the diagnosis of JACKY has been ruled out after further   study. The medical record reflects the following:  Risk Factors: DM2, HLD, CKD3  Clinical Indicators: Cr 1.6 ()> 1.3 ()> 1.1 ()  Treatment: serial labs, IVFs, held Lasix/metformin/ACE    Defined by Kidney Disease Improving Global Outcomes (KDIGO) clinical practice   guideline for acute kidney injury:  -Increase in SCr by greater than or equal to 0.3 mg/dl within 48 hours; or  -Increase or decrease in SCr to greater than or equal to 1.5 times baseline,   which is known or presumed to have occurred within the prior 7 days; or  -Urine volume < 0.5ml/kg/h for 6 hours. Options provided:  -- Acute kidney injury evidenced by, Please document evidence as well as a   numerical baseline creatinine, if known. -- Currently resolved acute kidney injury was evidenced by, Please document   evidence as well as a numerical baseline creatinine, if known.   -- Acute kidney injury ruled out after study  -- Other - I will add my own diagnosis  -- Disagree - Not applicable / Not valid  -- Disagree - Clinically unable to determine / Unknown  -- Refer to Clinical Documentation Reviewer    PROVIDER RESPONSE TEXT:    This patient has acute kidney injury as evidenced by Cr of 1.7, baseline of   1.1    Query created by: Pam Belcher on 2023 2:53 PM      Electronically signed by:  Carmelita Contreras MD 2023 4:19 PM

## 2023-01-23 ENCOUNTER — OFFICE VISIT (OUTPATIENT)
Dept: UROLOGY | Age: 78
End: 2023-01-23
Payer: MEDICARE

## 2023-01-23 DIAGNOSIS — N39.0 RECURRENT UTI (URINARY TRACT INFECTION): Primary | ICD-10-CM

## 2023-01-23 LAB
BILIRUBIN, URINE, POC: NEGATIVE
BLOOD URINE, POC: NEGATIVE
GLUCOSE URINE, POC: NEGATIVE
KETONES, URINE, POC: NEGATIVE
LEUKOCYTE ESTERASE, URINE, POC: NORMAL
NITRITE, URINE, POC: NEGATIVE
PH, URINE, POC: 5.5 (ref 4.6–8)
PROTEIN,URINE, POC: 30
SPECIFIC GRAVITY, URINE, POC: 1.02 (ref 1–1.03)
URINALYSIS CLARITY, POC: NORMAL
URINALYSIS COLOR, POC: NORMAL
UROBILINOGEN, POC: NORMAL

## 2023-01-23 PROCEDURE — 1036F TOBACCO NON-USER: CPT | Performed by: UROLOGY

## 2023-01-23 PROCEDURE — 99204 OFFICE O/P NEW MOD 45 MIN: CPT | Performed by: UROLOGY

## 2023-01-23 PROCEDURE — G8420 CALC BMI NORM PARAMETERS: HCPCS | Performed by: UROLOGY

## 2023-01-23 PROCEDURE — 1123F ACP DISCUSS/DSCN MKR DOCD: CPT | Performed by: UROLOGY

## 2023-01-23 PROCEDURE — G8484 FLU IMMUNIZE NO ADMIN: HCPCS | Performed by: UROLOGY

## 2023-01-23 PROCEDURE — 1090F PRES/ABSN URINE INCON ASSESS: CPT | Performed by: UROLOGY

## 2023-01-23 PROCEDURE — G8427 DOCREV CUR MEDS BY ELIG CLIN: HCPCS | Performed by: UROLOGY

## 2023-01-23 PROCEDURE — 81003 URINALYSIS AUTO W/O SCOPE: CPT | Performed by: UROLOGY

## 2023-01-23 PROCEDURE — G8399 PT W/DXA RESULTS DOCUMENT: HCPCS | Performed by: UROLOGY

## 2023-01-23 RX ORDER — NITROFURANTOIN MACROCRYSTALS 50 MG/1
50 CAPSULE ORAL DAILY
Qty: 30 CAPSULE | Refills: 11 | Status: SHIPPED | OUTPATIENT
Start: 2023-01-23

## 2023-01-23 NOTE — PROGRESS NOTES
[facility] Urology  [address] [facility] [address], [address]  [phone]          Meera Best  : 1945    Chief Complaint   Patient presents with    New Patient     Recurrent UTI's. HPI     Meera Best is a 69 y.o. female referred in regards to recurrent UTI's. She has had this issue since . At this point, to her, it seems a UTI comes back shortly anytime she finishes an antibiotic.  UTI's are accompanied by urinary incontinence. She as admitted for 2 days in  with ARF. With fluids, her serum cr returned to normal prior to discharge. She uses vaginal estrace cream twice weekly. She is on cranberry supplementation po. Patient can think of no other instigating or exacerbating events. PVR: 13 ml          Past Medical History:   Diagnosis Date    Ankle fracture     Ankle osteomyelitis, left ([address]     Breast cancer (HCC)     Chemotherapy-induced neuropathy (HCC)     Chronic anemia     Chronic systolic CHF (congestive heart failure) ([facility])     CKD (chronic kidney disease) stage 3, GFR 30-59 ml/min (HCC)     Colon polyp     Depression     Dilated cardiomyopathy ([address]     Dry eye syndrome of both eyes     Dyslipidemia     Essential hypertension     Fracture of right patella     History of left breast cancer 2001    with mastectomy    Left leg weakness 2020    Non-ischemic cardiomyopathy ([address] 2018    Echo 50-55%, Cath-minimal CAD. EF normalized    Osteoarthritis     Osteopenia     Peripheral sensory-motor axonal polyneuropathy 10/17/2020    S/P cardiac cath 2009    Statin intolerance     Tibial fracture 2016    Tibial plateau fracture 1848    Last Assessment & Plan:  Formatting of this note might be different from the original. Pod 2 ORIF doing well. C/o moderate pain. Controlled on PO meds.   Ready to go home    Type 2 diabetes mellitus, controlled ([address]     BS am 104    UTI (urinary tract infection)     Vaginal infection Vitamin B12 deficiency      Past Surgical History:   Procedure Laterality Date    BREAST BIOPSY      left breast biopsy    BREAST BIOPSY  1968    left cyst removed    BREAST LUMPECTOMY Left 2001    BREAST REDUCTION SURGERY      Rt breast    CARDIAC CATHETERIZATION  2009    CARPAL TUNNEL RELEASE Bilateral     CHOLECYSTECTOMY, OPEN  1981    COLONOSCOPY  07/2014    CYST REMOVAL Right 11/2020    hand    HAND SURGERY Right 11/16/2022    Right index finger distal interphalangeal joint arthrodesis performed by Fernando Gayle MD at [address] [facility]      Sinus Surgery    HEENT      RK procedure bilateral eyes    HX OPEN REDUCTION INTERNAL FIXATION Left 2013    Ankle    HX OPEN REDUCTION INTERNAL FIXATION  08/2016    [place]    HYSTERECTOMY (CERVIX STATUS UNKNOWN)      LUMBAR LAMINECTOMY  1992    MASTECTOMY Left 2002    With Reconstruction    ORTHOPEDIC SURGERY Right 08/2013    Patellar fracture repair    OTHER SURGICAL HISTORY      Chetan Cath Placed and Removed    OVARY REMOVAL      Unilateral    PARTIAL HYSTERECTOMY (CERVIX NOT REMOVED)  1974    TONSILLECTOMY      TONSILLECTOMY      TRANSPLANT  2002    Stem Cell Transplant    UVULOPALATOPHARYGOPLASTY  2004     Current Outpatient Medications   Medication Sig Dispense Refill    nitrofurantoin (MACRODANTIN) 50 MG capsule Take 1 capsule by mouth daily 30 capsule 11    valACYclovir (VALTREX) 1 g tablet Take 1,000 mg by mouth 2 times daily      amitriptyline (ELAVIL) 10 MG tablet Take 10 mg by mouth nightly      melatonin 3 MG TABS tablet Take 3 mg by mouth daily      traMADol (ULTRAM) 50 MG tablet Take 1 tablet by mouth daily as needed for Pain for up to 30 days. Intended supply: 3 days. Take lowest dose possible to manage pain Max Daily Amount: 50 mg 20 tablet 0    B-D 3CC LUER-KATY SYR 25GX1\" 25G X 1\" 3 ML MISC       nystatin-triamcinolone (MYCOLOG II) 934637-1.6 UNIT/GM-% cream Apply topically 2 times daily.  1 each 1    promethazine (PHENERGAN) 25 MG tablet Take 1 tablet by mouth daily as needed for Nausea 90 tablet 0    metoprolol succinate (TOPROL XL) 50 MG extended release tablet Take 1 tablet by mouth at bedtime Once a day 90 tablet 3    lisinopril (PRINIVIL;ZESTRIL) 10 MG tablet Take 1 tablet by mouth 2 times daily 180 tablet 3    CRANBERRY PO Take by mouth daily      estradiol (ESTRACE VAGINAL) 0.1 MG/GM vaginal cream Insert 2g daily vaginally for two weeks. Then insert 1g three times a week. 1 each 3    fluticasone (FLONASE) 50 MCG/ACT nasal spray 2 times daily as needed      colchicine (COLCRYS) 0.6 MG tablet Take 1 tablet by mouth 2 times daily (Patient taking differently: Take 0.6 mg by mouth 2 times daily as needed) 60 tablet 5    furosemide (LASIX) 40 MG tablet Take 1 tablet by mouth every other day 45 tablet 1    gabapentin (NEURONTIN) 600 MG tablet Take 1 tablet by mouth 2 times daily for 360 days. 180 tablet 1    metFORMIN (GLUCOPHAGE-XR) 500 mg extended release tablet Take 2 tablets by mouth 2 times daily 360 tablet 1    calcium carbonate (TUMS) 500 MG chewable tablet Take 1 tablet by mouth at bedtime      dextromethorphan-guaiFENesin (MUCINEX DM)  MG per extended release tablet Take 1 tablet by mouth every 12 hours as needed      cetirizine (ZYRTEC) 10 MG tablet Take 10 mg by mouth daily as needed      potassium chloride (KLOR-CON M) 20 MEQ extended release tablet TAKE 1 TABLET EVERY MORNING AND 2 TABLETS EVERY EVENING       No current facility-administered medications for this visit.      Allergies   Allergen Reactions    Oxycodone Itching    Eucalyptus Oil Hives and Other (See Comments)     Burns mouth    Ezetimibe Myalgia    Morphine Other (See Comments)     Feels crazy  Other reaction(s): Hallucinations    Penicillins Hives    Pineapple Hives and Other (See Comments)     Burns mouth    Vancomycin Other (See Comments)     Kidney function worsened    Sulfa Antibiotics Nausea Only and Rash     Social History     Socioeconomic History    Marital status:      Spouse name: Not on file    Number of children: Not on file    Years of education: Not on file    Highest education level: Not on file   Occupational History    Not on file   Tobacco Use    Smoking status: Never    Smokeless tobacco: Never    Tobacco comments:     never used tobacco   Vaping Use    Vaping Use: Never used   Substance and Sexual Activity    Alcohol use: No    Drug use: No    Sexual activity: Not Currently     Partners: Male   Other Topics Concern    Not on file   Social History Narrative    Not on file     Social Determinants of Health     Financial Resource Strain: Low Risk     Difficulty of Paying Living Expenses: Not hard at all   Food Insecurity: No Food Insecurity    Worried About Running Out of Food in the Last Year: Never true    Ran Out of Food in the Last Year: Never true   Transportation Needs: Not on file   Physical Activity: Not on file   Stress: Not on file   Social Connections: Not on file   Intimate Partner Violence: Not on file   Housing Stability: Not on file     Family History   Problem Relation Age of Onset    Cancer Mother     Diabetes Mother     Heart Disease Mother         MI at age 66    Heart Failure Mother         age 58    Stroke Sister     Cancer Brother     Heart Disease Brother     Heart Disease Father         MI age 55    Breast Cancer Neg Hx     Stomach Cancer Neg Hx        ROS    Urinalysis  UA - Dipstick  Results for orders placed or performed in visit on 01/23/23   AMB POC URINALYSIS DIP STICK AUTO W/O MICRO   Result Value Ref Range    Color (UA POC)      Clarity (UA POC)      Glucose, Urine, POC Negative Negative    Bilirubin, Urine, POC Negative Negative    KETONES, Urine, POC Negative Negative    Specific Gravity, Urine, POC 1.020 1.001 - 1.035    Blood (UA POC) Negative Negative    pH, Urine, POC 5.5 4.6 - 8.0    Protein, Urine, POC 30 Negative    Urobilinogen, POC 0.2 mg/dL     Nitrite, Urine, POC Negative Negative    Leukocyte Esterase, Urine, POC Trace Negative   Results for orders placed or performed in visit on 01/12/23   AMB POC URINALYSIS DIP STICK AUTO W/O MICRO   Result Value Ref Range    Color, Urine, POC yellow     Clarity, Urine, POC clear     Glucose, Urine, POC Negative Negative    Bilirubin, Urine, POC Negative Negative    Ketones, Urine, POC Negative Negative    Specific Gravity, Urine, POC 1.020 1.001 - 1.035    Blood, Urine, POC negative Negative    pH, Urine, POC 5.0 4.6 - 8.0    Protein, Urine, POC Negative Negative    Urobilinogen, POC 0.2     Nitrate, Urine, POC negative Negative    Leukocyte Esterase, Urine, POC Negative Negative       There were no vitals taken for this visit. GENERAL: NAD, ALERT, A&O x 3, GAIT NORMAL  LUNGS: easy work of breathing  ABDOMEN: soft, non tender  NEUROLOGIC: cranial nerves 2-12 grossly intact         Assessment and Plan    ICD-10-CM    1. Recurrent UTI (urinary tract infection)  N39.0 AMB POC URINALYSIS DIP STICK AUTO W/O MICRO     nitrofurantoin (MACRODANTIN) 50 MG capsule     AMB POC PVR, MELODY,POST-VOID RES,US,NON-IMAGING        UA does not appear infected today. PVR is low. Options were discussed. She will continue estrace and cranberry supplements. She will begin 50 mg daily macrodantin. Potential side effects were discussed. She will return in 3 mo with NP and, if doing well, macrodantin could drop to M,W,F for 3 more months prior to cessation.

## 2023-02-06 ENCOUNTER — HOSPITAL ENCOUNTER (INPATIENT)
Age: 78
LOS: 4 days | Discharge: NURSING, REHAB OR LONG-TERM CARE | End: 2023-02-10
Attending: EMERGENCY MEDICINE | Admitting: FAMILY MEDICINE
Payer: MEDICARE

## 2023-02-06 ENCOUNTER — APPOINTMENT (OUTPATIENT)
Dept: GENERAL RADIOLOGY | Age: 78
End: 2023-02-06
Payer: MEDICARE

## 2023-02-06 DIAGNOSIS — M25.552 ACUTE PAIN OF LEFT HIP: ICD-10-CM

## 2023-02-06 DIAGNOSIS — R93.89 ABNORMAL FINDING ON CT SCAN: ICD-10-CM

## 2023-02-06 DIAGNOSIS — S72.002A CLOSED LEFT HIP FRACTURE, INITIAL ENCOUNTER (HCC): ICD-10-CM

## 2023-02-06 DIAGNOSIS — S72.002A CLOSED FRACTURE OF LEFT HIP, INITIAL ENCOUNTER (HCC): Primary | ICD-10-CM

## 2023-02-06 PROBLEM — N18.31 STAGE 3A CHRONIC KIDNEY DISEASE (HCC): Status: ACTIVE | Noted: 2021-02-09

## 2023-02-06 PROBLEM — M25.551 RIGHT HIP PAIN: Status: ACTIVE | Noted: 2023-02-06

## 2023-02-06 LAB
ALBUMIN SERPL-MCNC: 3.8 G/DL (ref 3.2–4.6)
ALBUMIN/GLOB SERPL: 0.8 (ref 0.4–1.6)
ALP SERPL-CCNC: 74 U/L (ref 50–136)
ALT SERPL-CCNC: 36 U/L (ref 12–65)
ANION GAP SERPL CALC-SCNC: 5 MMOL/L (ref 2–11)
AST SERPL-CCNC: 29 U/L (ref 15–37)
BASOPHILS # BLD: 0 K/UL (ref 0–0.2)
BASOPHILS NFR BLD: 0 % (ref 0–2)
BILIRUB SERPL-MCNC: 0.6 MG/DL (ref 0.2–1.1)
BUN SERPL-MCNC: 18 MG/DL (ref 8–23)
CALCIUM SERPL-MCNC: 9.6 MG/DL (ref 8.3–10.4)
CHLORIDE SERPL-SCNC: 103 MMOL/L (ref 101–110)
CO2 SERPL-SCNC: 30 MMOL/L (ref 21–32)
CREAT SERPL-MCNC: 1 MG/DL (ref 0.6–1)
DIFFERENTIAL METHOD BLD: NORMAL
EOSINOPHIL # BLD: 0.1 K/UL (ref 0–0.8)
EOSINOPHIL NFR BLD: 1 % (ref 0.5–7.8)
ERYTHROCYTE [DISTWIDTH] IN BLOOD BY AUTOMATED COUNT: 14.3 % (ref 11.9–14.6)
GLOBULIN SER CALC-MCNC: 4.5 G/DL (ref 2.8–4.5)
GLUCOSE BLD STRIP.AUTO-MCNC: 127 MG/DL (ref 65–100)
GLUCOSE BLD STRIP.AUTO-MCNC: 84 MG/DL (ref 65–100)
GLUCOSE SERPL-MCNC: 123 MG/DL (ref 65–100)
HCT VFR BLD AUTO: 40.2 % (ref 35.8–46.3)
HGB BLD-MCNC: 12.9 G/DL (ref 11.7–15.4)
IMM GRANULOCYTES # BLD AUTO: 0.1 K/UL (ref 0–0.5)
IMM GRANULOCYTES NFR BLD AUTO: 1 % (ref 0–5)
LYMPHOCYTES # BLD: 1.6 K/UL (ref 0.5–4.6)
LYMPHOCYTES NFR BLD: 18 % (ref 13–44)
MCH RBC QN AUTO: 29.6 PG (ref 26.1–32.9)
MCHC RBC AUTO-ENTMCNC: 32.1 G/DL (ref 31.4–35)
MCV RBC AUTO: 92.2 FL (ref 82–102)
MONOCYTES # BLD: 0.6 K/UL (ref 0.1–1.3)
MONOCYTES NFR BLD: 7 % (ref 4–12)
NEUTS SEG # BLD: 6.2 K/UL (ref 1.7–8.2)
NEUTS SEG NFR BLD: 73 % (ref 43–78)
NRBC # BLD: 0 K/UL (ref 0–0.2)
PLATELET # BLD AUTO: 245 K/UL (ref 150–450)
PMV BLD AUTO: 10.6 FL (ref 9.4–12.3)
POTASSIUM SERPL-SCNC: 3.6 MMOL/L (ref 3.5–5.1)
PROT SERPL-MCNC: 8.3 G/DL (ref 6.3–8.2)
RBC # BLD AUTO: 4.36 M/UL (ref 4.05–5.2)
SERVICE CMNT-IMP: ABNORMAL
SERVICE CMNT-IMP: NORMAL
SODIUM SERPL-SCNC: 138 MMOL/L (ref 133–143)
WBC # BLD AUTO: 8.5 K/UL (ref 4.3–11.1)

## 2023-02-06 PROCEDURE — 2500000003 HC RX 250 WO HCPCS: Performed by: FAMILY MEDICINE

## 2023-02-06 PROCEDURE — 6370000000 HC RX 637 (ALT 250 FOR IP): Performed by: FAMILY MEDICINE

## 2023-02-06 PROCEDURE — 6360000002 HC RX W HCPCS: Performed by: FAMILY MEDICINE

## 2023-02-06 PROCEDURE — 1100000000 HC RM PRIVATE

## 2023-02-06 PROCEDURE — 85025 COMPLETE CBC W/AUTO DIFF WBC: CPT

## 2023-02-06 PROCEDURE — 80053 COMPREHEN METABOLIC PANEL: CPT

## 2023-02-06 PROCEDURE — 96374 THER/PROPH/DIAG INJ IV PUSH: CPT

## 2023-02-06 PROCEDURE — 82962 GLUCOSE BLOOD TEST: CPT

## 2023-02-06 PROCEDURE — 99285 EMERGENCY DEPT VISIT HI MDM: CPT

## 2023-02-06 PROCEDURE — 71045 X-RAY EXAM CHEST 1 VIEW: CPT

## 2023-02-06 PROCEDURE — 96376 TX/PRO/DX INJ SAME DRUG ADON: CPT

## 2023-02-06 PROCEDURE — 2580000003 HC RX 258: Performed by: FAMILY MEDICINE

## 2023-02-06 PROCEDURE — 73502 X-RAY EXAM HIP UNI 2-3 VIEWS: CPT

## 2023-02-06 PROCEDURE — 6360000002 HC RX W HCPCS: Performed by: EMERGENCY MEDICINE

## 2023-02-06 PROCEDURE — 93005 ELECTROCARDIOGRAM TRACING: CPT | Performed by: EMERGENCY MEDICINE

## 2023-02-06 RX ORDER — ONDANSETRON 4 MG/1
4 TABLET, ORALLY DISINTEGRATING ORAL EVERY 8 HOURS PRN
Status: DISCONTINUED | OUTPATIENT
Start: 2023-02-06 | End: 2023-02-09

## 2023-02-06 RX ORDER — ACETAMINOPHEN 650 MG/1
650 SUPPOSITORY RECTAL EVERY 6 HOURS PRN
Status: DISCONTINUED | OUTPATIENT
Start: 2023-02-06 | End: 2023-02-10 | Stop reason: HOSPADM

## 2023-02-06 RX ORDER — INSULIN LISPRO 100 [IU]/ML
0-4 INJECTION, SOLUTION INTRAVENOUS; SUBCUTANEOUS NIGHTLY
Status: DISCONTINUED | OUTPATIENT
Start: 2023-02-06 | End: 2023-02-10 | Stop reason: HOSPADM

## 2023-02-06 RX ORDER — CALCIUM CARBONATE 200(500)MG
1 TABLET,CHEWABLE ORAL NIGHTLY
Status: DISCONTINUED | OUTPATIENT
Start: 2023-02-06 | End: 2023-02-10 | Stop reason: HOSPADM

## 2023-02-06 RX ORDER — LABETALOL HYDROCHLORIDE 5 MG/ML
10 INJECTION, SOLUTION INTRAVENOUS EVERY 4 HOURS PRN
Status: DISCONTINUED | OUTPATIENT
Start: 2023-02-06 | End: 2023-02-08 | Stop reason: HOSPADM

## 2023-02-06 RX ORDER — SODIUM CHLORIDE 9 MG/ML
INJECTION, SOLUTION INTRAVENOUS PRN
Status: DISCONTINUED | OUTPATIENT
Start: 2023-02-06 | End: 2023-02-10 | Stop reason: HOSPADM

## 2023-02-06 RX ORDER — FUROSEMIDE 40 MG/1
40 TABLET ORAL EVERY OTHER DAY
Status: DISCONTINUED | OUTPATIENT
Start: 2023-02-06 | End: 2023-02-10 | Stop reason: HOSPADM

## 2023-02-06 RX ORDER — NITROFURANTOIN MACROCRYSTALS 50 MG/1
50 CAPSULE ORAL DAILY
Status: DISCONTINUED | OUTPATIENT
Start: 2023-02-06 | End: 2023-02-10 | Stop reason: HOSPADM

## 2023-02-06 RX ORDER — SODIUM CHLORIDE 9 MG/ML
INJECTION, SOLUTION INTRAVENOUS CONTINUOUS
Status: DISCONTINUED | OUTPATIENT
Start: 2023-02-06 | End: 2023-02-10

## 2023-02-06 RX ORDER — POTASSIUM CHLORIDE 750 MG/1
10 TABLET, EXTENDED RELEASE ORAL
Status: DISCONTINUED | OUTPATIENT
Start: 2023-02-07 | End: 2023-02-10 | Stop reason: HOSPADM

## 2023-02-06 RX ORDER — LISINOPRIL 5 MG/1
10 TABLET ORAL 2 TIMES DAILY
Status: DISCONTINUED | OUTPATIENT
Start: 2023-02-06 | End: 2023-02-10 | Stop reason: HOSPADM

## 2023-02-06 RX ORDER — LANOLIN ALCOHOL/MO/W.PET/CERES
3 CREAM (GRAM) TOPICAL DAILY
Status: DISCONTINUED | OUTPATIENT
Start: 2023-02-06 | End: 2023-02-06

## 2023-02-06 RX ORDER — METOPROLOL SUCCINATE 50 MG/1
50 TABLET, EXTENDED RELEASE ORAL NIGHTLY
Status: DISCONTINUED | OUTPATIENT
Start: 2023-02-06 | End: 2023-02-10 | Stop reason: HOSPADM

## 2023-02-06 RX ORDER — POLYETHYLENE GLYCOL 3350 17 G/17G
17 POWDER, FOR SOLUTION ORAL DAILY PRN
Status: DISCONTINUED | OUTPATIENT
Start: 2023-02-06 | End: 2023-02-10 | Stop reason: HOSPADM

## 2023-02-06 RX ORDER — INSULIN LISPRO 100 [IU]/ML
0-8 INJECTION, SOLUTION INTRAVENOUS; SUBCUTANEOUS
Status: DISCONTINUED | OUTPATIENT
Start: 2023-02-06 | End: 2023-02-10 | Stop reason: HOSPADM

## 2023-02-06 RX ORDER — HYDROMORPHONE HYDROCHLORIDE 1 MG/ML
1 INJECTION, SOLUTION INTRAMUSCULAR; INTRAVENOUS; SUBCUTANEOUS EVERY 4 HOURS PRN
Status: DISCONTINUED | OUTPATIENT
Start: 2023-02-06 | End: 2023-02-08

## 2023-02-06 RX ORDER — ENOXAPARIN SODIUM 100 MG/ML
40 INJECTION SUBCUTANEOUS EVERY 24 HOURS
Status: DISCONTINUED | OUTPATIENT
Start: 2023-02-06 | End: 2023-02-08

## 2023-02-06 RX ORDER — SODIUM CHLORIDE 0.9 % (FLUSH) 0.9 %
5-40 SYRINGE (ML) INJECTION EVERY 12 HOURS SCHEDULED
Status: DISCONTINUED | OUTPATIENT
Start: 2023-02-06 | End: 2023-02-10 | Stop reason: HOSPADM

## 2023-02-06 RX ORDER — LANOLIN ALCOHOL/MO/W.PET/CERES
3 CREAM (GRAM) TOPICAL NIGHTLY
Status: DISCONTINUED | OUTPATIENT
Start: 2023-02-06 | End: 2023-02-10 | Stop reason: HOSPADM

## 2023-02-06 RX ORDER — AMITRIPTYLINE HYDROCHLORIDE 10 MG/1
10 TABLET, FILM COATED ORAL NIGHTLY
Status: DISCONTINUED | OUTPATIENT
Start: 2023-02-06 | End: 2023-02-10 | Stop reason: HOSPADM

## 2023-02-06 RX ORDER — GABAPENTIN 300 MG/1
600 CAPSULE ORAL 2 TIMES DAILY
Status: DISCONTINUED | OUTPATIENT
Start: 2023-02-06 | End: 2023-02-10 | Stop reason: HOSPADM

## 2023-02-06 RX ORDER — ONDANSETRON 2 MG/ML
4 INJECTION INTRAMUSCULAR; INTRAVENOUS EVERY 6 HOURS PRN
Status: DISCONTINUED | OUTPATIENT
Start: 2023-02-06 | End: 2023-02-09

## 2023-02-06 RX ORDER — ACETAMINOPHEN 325 MG/1
650 TABLET ORAL EVERY 6 HOURS PRN
Status: DISCONTINUED | OUTPATIENT
Start: 2023-02-06 | End: 2023-02-10 | Stop reason: HOSPADM

## 2023-02-06 RX ORDER — SODIUM CHLORIDE 0.9 % (FLUSH) 0.9 %
5-40 SYRINGE (ML) INJECTION PRN
Status: DISCONTINUED | OUTPATIENT
Start: 2023-02-06 | End: 2023-02-10 | Stop reason: HOSPADM

## 2023-02-06 RX ORDER — CETIRIZINE HYDROCHLORIDE 10 MG/1
10 TABLET ORAL DAILY PRN
Status: DISCONTINUED | OUTPATIENT
Start: 2023-02-06 | End: 2023-02-10 | Stop reason: HOSPADM

## 2023-02-06 RX ADMIN — ONDANSETRON 4 MG: 2 INJECTION INTRAMUSCULAR; INTRAVENOUS at 20:38

## 2023-02-06 RX ADMIN — FENTANYL CITRATE 25 MCG: 50 INJECTION, SOLUTION INTRAMUSCULAR; INTRAVENOUS at 13:23

## 2023-02-06 RX ADMIN — HYDROMORPHONE HYDROCHLORIDE 1 MG: 1 INJECTION, SOLUTION INTRAMUSCULAR; INTRAVENOUS; SUBCUTANEOUS at 20:08

## 2023-02-06 RX ADMIN — LISINOPRIL 10 MG: 5 TABLET ORAL at 22:02

## 2023-02-06 RX ADMIN — CALCIUM CARBONATE (ANTACID) CHEW TAB 500 MG 500 MG: 500 CHEW TAB at 22:01

## 2023-02-06 RX ADMIN — FENTANYL CITRATE 50 MCG: 50 INJECTION, SOLUTION INTRAMUSCULAR; INTRAVENOUS at 14:48

## 2023-02-06 RX ADMIN — SODIUM CHLORIDE: 9 INJECTION, SOLUTION INTRAVENOUS at 17:12

## 2023-02-06 RX ADMIN — ENOXAPARIN SODIUM 40 MG: 100 INJECTION SUBCUTANEOUS at 17:16

## 2023-02-06 RX ADMIN — Medication 3 MG: at 22:01

## 2023-02-06 RX ADMIN — METOPROLOL SUCCINATE 50 MG: 50 TABLET, EXTENDED RELEASE ORAL at 22:02

## 2023-02-06 RX ADMIN — GABAPENTIN 600 MG: 300 CAPSULE ORAL at 22:02

## 2023-02-06 RX ADMIN — LABETALOL HYDROCHLORIDE 10 MG: 5 INJECTION INTRAVENOUS at 20:11

## 2023-02-06 RX ADMIN — FUROSEMIDE 40 MG: 40 TABLET ORAL at 17:15

## 2023-02-06 RX ADMIN — NITROFURANTOIN MACROCRYSTALS 50 MG: 50 CAPSULE ORAL at 19:50

## 2023-02-06 ASSESSMENT — ENCOUNTER SYMPTOMS
COLOR CHANGE: 0
COUGH: 0
NAUSEA: 0
ABDOMINAL PAIN: 0
VOMITING: 0
SHORTNESS OF BREATH: 0

## 2023-02-06 ASSESSMENT — PAIN - FUNCTIONAL ASSESSMENT
PAIN_FUNCTIONAL_ASSESSMENT: 0-10

## 2023-02-06 ASSESSMENT — PAIN SCALES - GENERAL
PAINLEVEL_OUTOF10: 5
PAINLEVEL_OUTOF10: 8
PAINLEVEL_OUTOF10: 8
PAINLEVEL_OUTOF10: 10
PAINLEVEL_OUTOF10: 10
PAINLEVEL_OUTOF10: 6
PAINLEVEL_OUTOF10: 10
PAINLEVEL_OUTOF10: 10

## 2023-02-06 ASSESSMENT — LIFESTYLE VARIABLES
HOW MANY STANDARD DRINKS CONTAINING ALCOHOL DO YOU HAVE ON A TYPICAL DAY: PATIENT DOES NOT DRINK
HOW OFTEN DO YOU HAVE A DRINK CONTAINING ALCOHOL: NEVER

## 2023-02-06 NOTE — ED NOTES
Message sent to pharmacy for missing medication & pt stated she only takes melatonin at HS.       Ora Gonsalves RN  02/06/23 [id number]

## 2023-02-06 NOTE — H&P
Hospitalist History and Physical   Admit Date:  2023 11:38 AM   Name:  Sharon Gonzalez   Age:  69 y.o. Sex:  female  :  1945   MRN:  [id number]   Room:  ER15/15    Presenting Complaint: Fall and Hip Pain     Reason(s) for Admission: Right hip pain [M25.551]     History of Present Illness:   Sharon Gonzalez is a 69 y.o. female with medical history of GERD, diabetes mellitus, recurrent UTIs, CKD stage IIIa, hyperlipidemia, Sjogren's syndrome, polyneuropathy, breast cancer status post mastectomy and chemotherapy and GERD presented to ED after a fall. Patient reports she twisted her ankle and fell on the left hip. Complaining of left hip pain with movement. Denies hitting her head or loss of consciousness. Patient reports left leg is weak due to history of left ankle and knee cap fractures. Denies chest pain, palpitation, nausea, vomiting or abdominal pain. Denies dizziness. Does not use any assistance for ambulation at home. Not on blood thinner. In the ED patient was hypertensive. Labs unremarkable. X-ray hip with degenerative changes versus nondisplaced left femur neck fracture. ED spoke to Ortho who recommended MRI and hospitalist to admit. Hospitalist consulted for admission. Assessment & Plan:     Concern for left femoral neck fracture:  Status post mechanical fall  MRI hip with and without contrast ordered  Pain control  N.p.o. after midnight in case patient needs intervention  IV fluids  PT/OT depending upon MRI results    Uncontrolled hypertension:  Blood pressure elevated, likely pain contributing factor  Pain control  Continue home meds lisinopril, metoprolol, furosemide  Labetalol as needed for blood pressure more than 160/100    CKD stage IIIa:   Stable  Avoid nephrotoxic agents    GERD:  Continue PPI    Diabetes mellitus:  Hold metformin  Start patient on sliding scale    History of recurrent UTIs:  Overactive bladder:   On nitrofurantoin, continue  Continue Myrbetriq 25 mg daily    Polyneuropathy:  Continue gabapentin    Sjogren's syndrome:  Following rheumatology outpatient    History of breast cancer:  Status post mastectomy and chemotherapy  Outpatient follow-up      Anticipated discharge needs: Will need rehab on discharge, PPD ordered    Diet: ADULT DIET; Regular  Diet NPO  VTE ppx: Lovenox  Code status: Full Code    Hospital Problems:  Principal Problem:    Right hip pain  Resolved Problems:    * No resolved hospital problems. *       Past History:     Past Medical History:   Diagnosis Date    Ankle fracture 2014    Ankle osteomyelitis, left ([address] 2014    Breast cancer ([address]     Chemotherapy-induced neuropathy (HCC)     Chronic anemia     Chronic systolic CHF (congestive heart failure) (HCC)     CKD (chronic kidney disease) stage 3, GFR 30-59 ml/min (HCC)     Colon polyp 2020    Depression     Dilated cardiomyopathy ([address]     Dry eye syndrome of both eyes     Dyslipidemia     Essential hypertension     Fracture of right patella 2013    History of left breast cancer 2001    with mastectomy    Left leg weakness 9/8/2020    Non-ischemic cardiomyopathy ([address] 2018    Echo 50-55%, Cath-minimal CAD. EF normalized    Osteoarthritis     Osteopenia     Peripheral sensory-motor axonal polyneuropathy 10/17/2020    S/P cardiac cath 2009    Statin intolerance     Tibial fracture 2016    Tibial plateau fracture 6/5/8606    Last Assessment & Plan:  Formatting of this note might be different from the original. Pod 2 ORIF doing well. C/o moderate pain. Controlled on PO meds.   Ready to go home    Type 2 diabetes mellitus, controlled ([address]     BS am 104    UTI (urinary tract infection) 2020    Vaginal infection     Vitamin B12 deficiency        Past Surgical History:   Procedure Laterality Date    BREAST BIOPSY      left breast biopsy    BREAST BIOPSY  1968    left cyst removed    BREAST LUMPECTOMY Left 2001    BREAST REDUCTION SURGERY      Rt breast    CARDIAC CATHETERIZATION  2009    CARPAL TUNNEL RELEASE Bilateral     CHOLECYSTECTOMY, OPEN  1981    COLONOSCOPY  07/2014    CYST REMOVAL Right 11/2020    hand    HAND SURGERY Right 11/16/2022    Right index finger distal interphalangeal joint arthrodesis performed by Sofia Walter MD at [address] [place] [address]      Sinus Surgery    HEENT      RK procedure bilateral eyes    HX OPEN REDUCTION INTERNAL FIXATION Left 2013    Ankle    HX OPEN REDUCTION INTERNAL FIXATION  08/2016    [place]    HYSTERECTOMY (CERVIX STATUS UNKNOWN)      LUMBAR LAMINECTOMY  1992    MASTECTOMY Left 2002    With Reconstruction    ORTHOPEDIC SURGERY Right 08/2013    Patellar fracture repair    OTHER SURGICAL HISTORY      [place] Cath Placed and Removed    OVARY REMOVAL      Unilateral    PARTIAL HYSTERECTOMY (CERVIX NOT REMOVED)  1974    TONSILLECTOMY      TONSILLECTOMY      TRANSPLANT  2002    Stem Cell Transplant    UVULOPALATOPHARYGOPLASTY  2004        Social History     Tobacco Use    Smoking status: Never    Smokeless tobacco: Never    Tobacco comments:     never used tobacco   Substance Use Topics    Alcohol use: No      Social History     Substance and Sexual Activity   Drug Use No       Family History   Problem Relation Age of Onset    Cancer Mother     Diabetes Mother     Heart Disease Mother         MI at age 66    Heart Failure Mother         age 58    Stroke Sister     Cancer Brother     Heart Disease Brother     Heart Disease Father         MI age 55    Breast Cancer Neg Hx     Stomach Cancer Neg Hx         Immunization History   Administered Date(s) Administered    COVID-19, PFIZER PURPLE top, DILUTE for use, (age 15 y+), 30mcg/0.3mL 01/19/2021, 02/13/2021    Influenza Trivalent 09/18/2013, 10/06/2014    Influenza Virus Vaccine 09/24/2020    Influenza, FLUAD, (age 72 y+), Adjuvanted, 0.5mL 09/24/2020, 10/07/2021, 10/07/2021, 09/22/2022    Influenza, High Dose (Fluzone 65 yrs and older) 10/10/2016, 11/01/2017    Influenza, Triv, inactivated, subunit, adjuvanted, IM (Fluad 65 yrs and older) 10/16/2018, 09/26/2019    Influenza, Trivalent PF 09/28/2015    Pneumococcal Conjugate 13-valent ([id number]) 12/30/2015    Pneumococcal Polysaccharide ([id number]) 08/22/2013    Tdap (Boostrix, Adacel) 03/18/2014    Zoster Live (Zostavax) 01/01/2015     Allergies   Allergen Reactions    Oxycodone Itching    Eucalyptus Oil Hives and Other (See Comments)     Burns mouth    Ezetimibe Myalgia    Morphine Other (See Comments)     Feels crazy  Other reaction(s): Hallucinations    Penicillins Hives    Pineapple Hives and Other (See Comments)     Burns mouth    Vancomycin Other (See Comments)     Kidney function worsened    Sulfa Antibiotics Nausea Only and Rash     Prior to Admit Medications:  Current Outpatient Medications   Medication Instructions    amitriptyline (ELAVIL) 10 mg, Oral, NIGHTLY    B-D 3CC LUER-KATY SYR 25GX1\" 25G X 1\" 3 ML MISC No dose, route, or frequency recorded. calcium carbonate (TUMS) 500 MG chewable tablet 1 tablet, Oral, Nightly    cetirizine (ZYRTEC) 10 mg, Oral, DAILY PRN    colchicine (COLCRYS) 0.6 mg, Oral, 2 TIMES DAILY    CRANBERRY PO Oral, DAILY    dextromethorphan-guaiFENesin (MUCINEX DM)  MG per extended release tablet 1 tablet, Oral, EVERY 12 HOURS PRN    estradiol (ESTRACE VAGINAL) 0.1 MG/GM vaginal cream Insert 2g daily vaginally for two weeks. Then insert 1g three times a week. fluticasone (FLONASE) 50 MCG/ACT nasal spray 2 TIMES DAILY PRN    furosemide (LASIX) 40 mg, Oral, EVERY OTHER DAY    gabapentin (NEURONTIN) 600 mg, Oral, 2 TIMES DAILY    lisinopril (PRINIVIL;ZESTRIL) 10 mg, Oral, 2 TIMES DAILY    melatonin 3 mg, Oral, DAILY    metFORMIN (GLUCOPHAGE-XR) 1,000 mg, Oral, 2 TIMES DAILY    metoprolol succinate (TOPROL XL) 50 mg, Oral, Nightly, Once a day    nitrofurantoin (MACRODANTIN) 50 mg, Oral, DAILY    nystatin-triamcinolone (MYCOLOG II) 872784-6.1 UNIT/GM-% cream Apply topically 2 times daily.     potassium chloride (KLOR-CON M) 20 MEQ extended release tablet TAKE 1 TABLET EVERY MORNING AND 2 TABLETS EVERY EVENING    promethazine (PHENERGAN) 25 mg, Oral, DAILY PRN    valACYclovir (VALTREX) 1,000 mg, Oral, 2 TIMES DAILY         Objective:   Patient Vitals for the past 24 hrs:   Temp Pulse Resp BP SpO2   02/06/23 1449 -- 74 15 (!) 186/94 92 %   02/06/23 1429 -- 73 15 (!) 196/96 91 %   02/06/23 1419 -- 75 13 (!) 204/97 95 %   02/06/23 1359 -- 74 13 (!) 197/90 93 %   02/06/23 1349 -- 73 15 (!) 200/90 94 %   02/06/23 1329 -- 73 18 (!) 209/96 92 %   02/06/23 1325 -- 75 15 (!) 180/94 94 %   02/06/23 1159 -- 71 16 (!) 188/92 --   02/06/23 1143 98.5 °F (36.9 °C) 75 18 (!) 191/98 92 %       Oxygen Therapy  SpO2: 92 %  Pulse Oximeter Device Mode: Continuous  O2 Device: None (Room air)    Estimated body mass index is 24.3 kg/m² as calculated from the following:    Height as of this encounter: 5' 5\" (1.651 m). Weight as of this encounter: 146 lb (66.2 kg). No intake or output data in the 24 hours ending 02/06/23 1506      Physical Exam:    Blood pressure (!) 186/94, pulse 74, temperature 98.5 °F (36.9 °C), temperature source Oral, resp. rate 15, height 5' 5\" (1.651 m), weight 146 lb (66.2 kg), SpO2 92 %. General:    Well nourished. Head:  Normocephalic, atraumatic  Eyes:  Sclerae appear normal.  Pupils equally round. ENT:  Nares appear normal.  Moist oral mucosa  Neck:  No restricted ROM. Trachea midline   CV:   RRR. No m/r/g. No jugular venous distension. Lungs:   CTAB. No wheezing, rhonchi, or rales. Symmetric expansion. Abdomen:   Soft, nontender, nondistended. Extremities: Left hip tenderness, limited range of motion due to pain  Skin:     No rashes and normal coloration. Warm and dry. Neuro:  CN II-XII grossly intact. Sensation intact. Psych:  Normal mood and affect.       I have personally reviewed labs and tests:  Recent Labs:  Recent Results (from the past 24 hour(s))   CBC with Auto Differential Collection Time: 02/06/23 12:01 PM   Result Value Ref Range    WBC 8.5 4.3 - 11.1 K/uL    RBC 4.36 4.05 - 5.2 M/uL    Hemoglobin 12.9 11.7 - 15.4 g/dL    Hematocrit 40.2 35.8 - 46.3 %    MCV 92.2 82 - 102 FL    MCH 29.6 26.1 - 32.9 PG    MCHC 32.1 31.4 - 35.0 g/dL    RDW 14.3 11.9 - 14.6 %    Platelets 830 695 - 874 K/uL    MPV 10.6 9.4 - 12.3 FL    nRBC 0.00 0.0 - 0.2 K/uL    Differential Type AUTOMATED      Seg Neutrophils 73 43 - 78 %    Lymphocytes 18 13 - 44 %    Monocytes 7 4.0 - 12.0 %    Eosinophils % 1 0.5 - 7.8 %    Basophils 0 0.0 - 2.0 %    Immature Granulocytes 1 0.0 - 5.0 %    Segs Absolute 6.2 1.7 - 8.2 K/UL    Absolute Lymph # 1.6 0.5 - 4.6 K/UL    Absolute Mono # 0.6 0.1 - 1.3 K/UL    Absolute Eos # 0.1 0.0 - 0.8 K/UL    Basophils Absolute 0.0 0.0 - 0.2 K/UL    Absolute Immature Granulocyte 0.1 0.0 - 0.5 K/UL   CMP    Collection Time: 02/06/23 12:01 PM   Result Value Ref Range    Sodium 138 133 - 143 mmol/L    Potassium 3.6 3.5 - 5.1 mmol/L    Chloride 103 101 - 110 mmol/L    CO2 30 21 - 32 mmol/L    Anion Gap 5 2 - 11 mmol/L    Glucose 123 (H) 65 - 100 mg/dL    BUN 18 8 - 23 MG/DL    Creatinine 1.00 0.6 - 1.0 MG/DL    Est, Glom Filt Rate 58 (L) >60 ml/min/1.73m2    Calcium 9.6 8.3 - 10.4 MG/DL    Total Bilirubin 0.6 0.2 - 1.1 MG/DL    ALT 36 12 - 65 U/L    AST 29 15 - 37 U/L    Alk Phosphatase 74 50 - 136 U/L    Total Protein 8.3 (H) 6.3 - 8.2 g/dL    Albumin 3.8 3.2 - 4.6 g/dL    Globulin 4.5 2.8 - 4.5 g/dL    Albumin/Globulin Ratio 0.8 0.4 - 1.6         I have personally reviewed imaging studies:  XR HIP LEFT (2-3 VIEWS)    Result Date: 2/6/2023  Left Hip INDICATION: Freada Ed, Pain Three views of the left hip were obtained FINDINGS: There is faint sclerosis in the subcapital portion of the left femoral neck. This could represent a nondisplaced fracture or artifact from degenerative change. No other evidence of fracture is seen. There are degenerative changes in the lumbar spine.      Degenerative change versus nondisplaced left femoral neck fracture. If high clinical concern, consider follow-up films or MRI evaluation. XR CHEST PORTABLE    Result Date: 2/6/2023  Chest X-ray INDICATION: Trauma, left hip pain AP/PA view of the chest was obtained. FINDINGS: The lungs are clear. There are no infiltrates or effusions. There is stable mild cardiomegaly. There are surgical changes in the left axilla. .      No acute findings in the chest       Echocardiogram:  No results found for this or any previous visit. Orders Placed This Encounter   Medications    fentaNYL (SUBLIMAZE) injection 25 mcg    fentaNYL (SUBLIMAZE) injection 50 mcg    amitriptyline (ELAVIL) tablet 10 mg    calcium carbonate (TUMS) chewable tablet 500 mg    cetirizine (ZYRTEC) tablet 10 mg    dextromethorphan-guaiFENesin (MUCINEX DM)  MG per extended release tablet 1 tablet    furosemide (LASIX) tablet 40 mg    gabapentin (NEURONTIN) tablet 600 mg    lisinopril (PRINIVIL;ZESTRIL) tablet 10 mg    melatonin tablet 3 mg    insulin lispro (HUMALOG) injection vial 0-8 Units    insulin lispro (HUMALOG) injection vial 0-4 Units    metoprolol succinate (TOPROL XL) extended release tablet 50 mg    nitrofurantoin (MACRODANTIN) capsule 50 mg     Order Specific Question:   Antimicrobial Indications     Answer:    Other     Order Specific Question:   Other Abx Indication     Answer:   UTI prophylaxis    potassium chloride (KLOR-CON M) extended release tablet 10 mEq    sodium chloride flush 0.9 % injection 5-40 mL    sodium chloride flush 0.9 % injection 5-40 mL    0.9 % sodium chloride infusion    enoxaparin (LOVENOX) injection 40 mg     Order Specific Question:   Indication of Use     Answer:   Prophylaxis-DVT/PE    OR Linked Order Group     ondansetron (ZOFRAN-ODT) disintegrating tablet 4 mg     ondansetron (ZOFRAN) injection 4 mg    polyethylene glycol (GLYCOLAX) packet 17 g    OR Linked Order Group     acetaminophen (TYLENOL) tablet 650 mg acetaminophen (TYLENOL) suppository 650 mg    tuberculin injection 5 Units    0.9 % sodium chloride infusion    HYDROmorphone HCl PF (DILAUDID) injection 1 mg         Signed:  Jessica Parson MD    Part of this note may have been written by using a voice dictation software. The note has been proof read but may still contain some grammatical/other typographical errors.

## 2023-02-06 NOTE — ED PROVIDER NOTES
Emergency Department Provider Note                   PCP:                LIU Mora - MALOU               Age: 69 y.o. Sex: female       ICD-10-CM    1. Closed fracture of left hip, initial encounter ([facility]  S72.002A       2. Acute pain of left hip  M25.552           DISPOSITION Admitted 02/06/2023 03:05:45 PM        Medical Decision Making  70 yo F w/ PMHx of anemia, hypertension, CKD, CHF, depression, dyslipidemia, breast cancer, history of left ankle fracture status post repair presents via EMS status post mechanical trip and fall with complaint of left hip pain. Patient unable to place any weight on affected extremity since fall. Denies hitting head or loss of consciousness. Differential diagnosis includes but is not limited to left hip fracture, left hip dislocation, osteoarthritis, syncope, electrolyte abnormality, anemia, UTI, etc.  Patient with limited range of motion of left hip with moderate tenderness to palpation. NVID. Cap refill <2 sec. DP/PT pulses 2+ and equal throughout. X-ray L hip w/ degenerative change versus nondisplaced left femoral neck fracture. If high clinical concern, consider follow-up films or MRI evaluation. X-ray reviewed by myself. In agreement with radiologist interpretation. Given history of trauma with limited range of motion and significant pain on exam concern for fracture. CBC with no leukocytosis. H&H stable. CMP with no major electrolyte abnormalities. Creatinine 1.00. Glucose 123. Portable Chest X-ray with no acute cardiopulmonary process. Chest x-ray reviewed by myself; in agreement with radiologist's interpretation. Patient given fentanyl IV for pain control. Discussed with Ortho LOUISA Greene who recommends hospitalist admit for further work-up including MRI. Discussed case with hospitalist via [employer]. Hospitalist to admit at this time.     Problems Addressed:  Acute pain of left hip: acute illness or injury  Closed fracture of left hip, initial encounter [facility]): acute illness or injury    Amount and/or Complexity of Data Reviewed  Labs: ordered. Radiology: ordered. ECG/medicine tests: ordered and independent interpretation performed. Risk  Decision regarding hospitalization. Complexity of Problem: 1 acute complicated illness or injury. (4)  The patients assessment required an independent historian: I spoke with the paramedic. I have conducted an independent ordering and review of Labs. I have conducted an independent ordering and review of EKG. I have conducted an independent ordering and review of X-rays. Considerations: Shared decision making was utilized in the care of this patient. I discussed study results with another external provider. I discussed disposition with another provider. Patient was admitted I have communication with admitting physician. ED Course as of 02/06/23 1900   Mon Feb 06, 2023   1307 Portable Chest X-ray    FINDINGS: The lungs are clear. There are no infiltrates or effusions. There is  stable mild cardiomegaly. There are surgical changes in the left axilla. .       IMPRESSION:  No acute findings in the chest. [DF]   1307 X-ray L hip FINDINGS: There is faint sclerosis in the subcapital portion of the left femoral  neck. This could represent a nondisplaced fracture or artifact from  degenerative change. No other evidence of fracture is seen. There are  degenerative changes in the lumbar spine. IMPRESSION:  Degenerative change versus nondisplaced left femoral neck fracture. If high clinical concern, consider follow-up films or MRI evaluation. [DF]      ED Course User Index  [DF] Michael Dupont MD        Orders Placed This Encounter   Procedures    XR HIP LEFT (2-3 VIEWS)    XR CHEST PORTABLE    CBC with Auto Differential    CMP    Basic Metabolic Panel w/ Reflex to MG    CBC with Auto Differential    ADULT DIET;  Regular    Diet NPO    Vital signs per unit routine    Up with assistance    Intake and output    Monitor for signs/symptoms of urinary retention    Full code    Inpatient consult to Orthopedic Surgery    OT eval and treat    PT evaluation and treat    Initiate Oxygen Therapy Protocol    PLEASE READ & DOCUMENT PPD TEST IN 24 HRS    PLEASE READ & DOCUMENT PPD TEST IN 48 HRS    PLEASE READ & DOCUMENT PPD TEST IN 72 HRS    POCT Glucose    EKG 12 Lead    Saline lock IV    ADMIT TO INPATIENT        Medications   amitriptyline (ELAVIL) tablet 10 mg (has no administration in time range)   calcium carbonate (TUMS) chewable tablet 500 mg (has no administration in time range)   cetirizine (ZYRTEC) tablet 10 mg (has no administration in time range)   dextromethorphan-guaiFENesin (MUCINEX DM)  MG per extended release tablet 1 tablet (Patient Supplied) (has no administration in time range)   furosemide (LASIX) tablet 40 mg (40 mg Oral Given 2/6/23 1715)   gabapentin (NEURONTIN) capsule 600 mg (has no administration in time range)   lisinopril (PRINIVIL;ZESTRIL) tablet 10 mg (has no administration in time range)   melatonin tablet 3 mg (has no administration in time range)   insulin lispro (HUMALOG) injection vial 0-8 Units (0 Units SubCUTAneous Not Given 2/6/23 1738)   insulin lispro (HUMALOG) injection vial 0-4 Units (has no administration in time range)   metoprolol succinate (TOPROL XL) extended release tablet 50 mg (has no administration in time range)   nitrofurantoin (MACRODANTIN) capsule 50 mg (has no administration in time range)   potassium chloride (KLOR-CON M) extended release tablet 10 mEq (has no administration in time range)   sodium chloride flush 0.9 % injection 5-40 mL (has no administration in time range)   sodium chloride flush 0.9 % injection 5-40 mL (has no administration in time range)   0.9 % sodium chloride infusion (has no administration in time range)   enoxaparin (LOVENOX) injection 40 mg (40 mg SubCUTAneous Given 2/6/23 1716)   ondansetron (ZOFRAN-ODT) disintegrating tablet 4 mg (has no administration in time range)     Or   ondansetron (ZOFRAN) injection 4 mg (has no administration in time range)   polyethylene glycol (GLYCOLAX) packet 17 g (has no administration in time range)   acetaminophen (TYLENOL) tablet 650 mg (has no administration in time range)     Or   acetaminophen (TYLENOL) suppository 650 mg (has no administration in time range)   tuberculin injection 5 Units (has no administration in time range)   0.9 % sodium chloride infusion ( IntraVENous New Bag 2/6/23 1712)   HYDROmorphone HCl PF (DILAUDID) injection 1 mg (has no administration in time range)   mirabegron (MYRBETRIQ) extended release tablet 25 mg (Patient Supplied) (25 mg Oral Not Given 2/6/23 1829)   labetalol (NORMODYNE;TRANDATE) injection 10 mg (has no administration in time range)   fentaNYL (SUBLIMAZE) injection 25 mcg (25 mcg IntraVENous Given 2/6/23 1323)   fentaNYL (SUBLIMAZE) injection 50 mcg (50 mcg IntraVENous Given 2/6/23 1448)       New Prescriptions    No medications on file        Debra Starkey is a 69 y.o. female who presents to the Emergency Department with chief complaint of L hip pain. Chief Complaint   Patient presents with    Fall    Hip Pain      80-year-old female with history of anemia, CHF, breast cancer, hypertension, history of left ankle fracture s/p repair who presents via EMS with complaint of mechanical trip and fall earlier today with complaint of left hip pain. Denies hitting head or loss of conscious. Denies being on any anticoagulation. Denies neck pain, back pain. Rates pain as severe when attempting to move affected extremity. Radiation of pain. Denies bowel or bladder incontinence. States that she has been unable to place any weight on affected extremity since fall. The history is provided by the patient and the EMS personnel. No  was used. Review of Systems   Constitutional:  Negative for chills, fatigue and fever. Respiratory:  Negative for cough and shortness of breath. Cardiovascular:  Negative for chest pain. Gastrointestinal:  Negative for abdominal pain, nausea and vomiting. Genitourinary:  Negative for dysuria and flank pain. Musculoskeletal:  Positive for arthralgias, gait problem and joint swelling. Negative for neck pain and neck stiffness. Skin:  Negative for color change and wound. Neurological:  Negative for dizziness, syncope, weakness and light-headedness. Hematological:  Does not bruise/bleed easily. Psychiatric/Behavioral:  Negative for confusion. Past Medical History:   Diagnosis Date    Ankle fracture 2014    Ankle osteomyelitis, left ([address] 2014    Breast cancer (HCC)     Chemotherapy-induced neuropathy (HCC)     Chronic anemia     Chronic systolic CHF (congestive heart failure) (HCC)     CKD (chronic kidney disease) stage 3, GFR 30-59 ml/min (HCC)     Colon polyp 2020    Depression     Dilated cardiomyopathy ([address]     Dry eye syndrome of both eyes     Dyslipidemia     Essential hypertension     Fracture of right patella 2013    History of left breast cancer 2001    with mastectomy    Left leg weakness 9/8/2020    Non-ischemic cardiomyopathy ([address] 2018    Echo 50-55%, Cath-minimal CAD. 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 Pod 2 ORIF doing well. C/o moderate pain. Controlled on PO meds.   Ready to go home    Type 2 diabetes mellitus, controlled ([address]     BS am 104    UTI (urinary tract infection) 2020    Vaginal infection     Vitamin B12 deficiency         Past Surgical History:   Procedure Laterality Date    BREAST BIOPSY      left breast biopsy    BREAST BIOPSY  1968    left cyst removed    BREAST LUMPECTOMY Left 2001    BREAST REDUCTION SURGERY      Rt breast    CARDIAC CATHETERIZATION  2009    CARPAL TUNNEL RELEASE Bilateral     CHOLECYSTECTOMY, OPEN  1981    COLONOSCOPY  07/2014    CYST REMOVAL Right 11/2020    hand    HAND SURGERY Right 11/16/2022    Right index finger distal interphalangeal joint arthrodesis performed by Edy Dean MD at [facility]      Sinus Surgery    HEENT      RK procedure bilateral eyes    HX OPEN REDUCTION INTERNAL FIXATION Left 2013    Ankle    HX OPEN REDUCTION INTERNAL FIXATION  08/2016    [place]    HYSTERECTOMY (CERVIX STATUS UNKNOWN)      LUMBAR LAMINECTOMY  1992    MASTECTOMY Left 2002    With Reconstruction    ORTHOPEDIC SURGERY Right 08/2013    Patellar fracture repair    OTHER SURGICAL HISTORY      [place] Cath Placed and Removed    OVARY REMOVAL      Unilateral    PARTIAL HYSTERECTOMY (CERVIX NOT REMOVED)  1974    TONSILLECTOMY      TONSILLECTOMY      TRANSPLANT  2002    Stem Cell Transplant    UVULOPALATOPHARYGOPLASTY  2004        Family History   Problem Relation Age of Onset    Cancer Mother     Diabetes Mother     Heart Disease Mother         MI at age 66    Heart Failure Mother         age 58    Stroke Sister     Cancer Brother     Heart Disease Brother     Heart Disease Father         MI age 55    Breast Cancer Neg Hx     Stomach Cancer Neg Hx         Social History     Socioeconomic History    Marital status:    Tobacco Use    Smoking status: Never    Smokeless tobacco: Never    Tobacco comments:     never used tobacco   Vaping Use    Vaping Use: Never used   Substance and Sexual Activity    Alcohol use: No    Drug use: No    Sexual activity: Not Currently     Partners: Male     Social Determinants of Health     Financial Resource Strain: Low Risk     Difficulty of Paying Living Expenses: Not hard at all   Food Insecurity: No Food Insecurity    Worried About Running Out of Food in the Last Year: Never true    Ran Out of Food in the Last Year: Never true Oxycodone, Eucalyptus oil, Ezetimibe, Morphine, Penicillins, Pineapple, Vancomycin, and Sulfa antibiotics     Previous Medications    AMITRIPTYLINE (ELAVIL) 10 MG TABLET    Take 10 mg by mouth nightly    B-D 3CC LUER-KATY SYR 25GX1\" 25G X 1\" 3 ML MISC        CALCIUM CARBONATE (TUMS) 500 MG CHEWABLE TABLET    Take 1 tablet by mouth at bedtime    CETIRIZINE (ZYRTEC) 10 MG TABLET    Take 10 mg by mouth daily as needed    COLCHICINE (COLCRYS) 0.6 MG TABLET    Take 1 tablet by mouth 2 times daily    CRANBERRY PO    Take by mouth daily    DEXTROMETHORPHAN-GUAIFENESIN (MUCINEX DM)  MG PER EXTENDED RELEASE TABLET    Take 1 tablet by mouth every 12 hours as needed    ESTRADIOL (ESTRACE VAGINAL) 0.1 MG/GM VAGINAL CREAM    Insert 2g daily vaginally for two weeks. Then insert 1g three times a week. FLUTICASONE (FLONASE) 50 MCG/ACT NASAL SPRAY    2 times daily as needed    FUROSEMIDE (LASIX) 40 MG TABLET    Take 1 tablet by mouth every other day    GABAPENTIN (NEURONTIN) 600 MG TABLET    Take 1 tablet by mouth 2 times daily for 360 days. LISINOPRIL (PRINIVIL;ZESTRIL) 10 MG TABLET    Take 1 tablet by mouth 2 times daily    MELATONIN 3 MG TABS TABLET    Take 3 mg by mouth daily    METFORMIN (GLUCOPHAGE-XR) 500 MG EXTENDED RELEASE TABLET    Take 2 tablets by mouth 2 times daily    METOPROLOL SUCCINATE (TOPROL XL) 50 MG EXTENDED RELEASE TABLET    Take 1 tablet by mouth at bedtime Once a day    NITROFURANTOIN (MACRODANTIN) 50 MG CAPSULE    Take 1 capsule by mouth daily    NYSTATIN-TRIAMCINOLONE (MYCOLOG II) 092066-4.1 UNIT/GM-% CREAM    Apply topically 2 times daily. POTASSIUM CHLORIDE (KLOR-CON M) 20 MEQ EXTENDED RELEASE TABLET    TAKE 1 TABLET EVERY MORNING AND 2 TABLETS EVERY EVENING    PROMETHAZINE (PHENERGAN) 25 MG TABLET    Take 1 tablet by mouth daily as needed for Nausea    VALACYCLOVIR (VALTREX) 1 G TABLET    Take 1,000 mg by mouth 2 times daily        Vitals signs and nursing note reviewed.    Patient Vitals for the past 4 hrs:   Pulse Resp BP SpO2   02/06/23 1831 73 14 (!) 173/86 97 %   02/06/23 1800 73 17 (!) 184/92 98 %   02/06/23 1740 73 17 (!) 217/95 99 %   02/06/23 1735 75 15 (!) 151/99 99 %   02/06/23 1715 70 13 (!) 181/87 99 %   02/06/23 1645 73 16 (!) 168/86 96 %   02/06/23 1625 70 14 (!) 170/81 97 %   02/06/23 1615 71 15 (!) 181/92 97 %   02/06/23 1557 75 12 (!) 191/95 95 %   02/06/23 1527 73 17 (!) 174/94 (!) 89 %          Physical Exam  Vitals and nursing note reviewed. Constitutional:       Appearance: Normal appearance. HENT:      Head: Normocephalic. Comments: Atraumatic. No findings suggestive of basilar skull fracture. Nose: Nose normal.      Mouth/Throat:      Mouth: Mucous membranes are moist.   Eyes:      Extraocular Movements: Extraocular movements intact. Pupils: Pupils are equal, round, and reactive to light. Neck:      Comments: FROM. No midline C-spine TTP. Cardiovascular:      Rate and Rhythm: Normal rate. Pulses: Normal pulses. Heart sounds: Normal heart sounds. Pulmonary:      Effort: Pulmonary effort is normal.      Breath sounds: Normal breath sounds. Abdominal:      Palpations: Abdomen is soft. Tenderness: There is no abdominal tenderness. There is no guarding or rebound. Comments: Soft, NTND. Musculoskeletal:         General: Tenderness and deformity present. No swelling. Normal range of motion. Cervical back: Normal range of motion. No tenderness. Comments: LLE shortening noted. Significant L hip TTP. Limited ROM. DP/PT pulses 2+ and equal bilaterally. NVID. Cap refill <2 sec. No midline T-spine, L-spine TTP. No step-off. S/p L ankle repair. Skin:     General: Skin is warm. Findings: No rash. Comments: No abrasions, lacerations, bruising noted. Neurological:      General: No focal deficit present. Mental Status: She is alert and oriented to person, place, and time.       Cranial Nerves: No cranial nerve deficit. Sensory: No sensory deficit. Motor: No weakness. Comments: No focal deficits. Strength 5/5 throughout. Normal sensory exam. No saddle anesthesia. Normal DTRs. EKG 12 Lead    Date/Time: 2/6/2023 2:34 PM  Performed by: Alejandro Gong MD  Authorized by: Alejandro Gong MD     ECG reviewed by ED Physician in the absence of a cardiologist: yes    Rate:     ECG rate:  70    ECG rate assessment: normal    Rhythm:     Rhythm: sinus rhythm    Ectopy:     Ectopy: none    QRS:     QRS axis:  Right  ST segments:     ST segments:  Normal  T waves:     T waves: normal      Results for orders placed or performed during the hospital encounter of 02/06/23   XR HIP LEFT (2-3 VIEWS)    Narrative    Left Hip    INDICATION: Marnie Janes, Pain    Three views of the left hip were obtained    FINDINGS: There is faint sclerosis in the subcapital portion of the left femoral  neck. This could represent a nondisplaced fracture or artifact from  degenerative change. No other evidence of fracture is seen. There are  degenerative changes in the lumbar spine. Impression    Degenerative change versus nondisplaced left femoral neck fracture. If high clinical concern, consider follow-up films or MRI evaluation. XR CHEST PORTABLE    Narrative    Chest X-ray    INDICATION: Trauma, left hip pain    AP/PA view of the chest was obtained. FINDINGS: The lungs are clear. There are no infiltrates or effusions. There is  stable mild cardiomegaly. There are surgical changes in the left axilla. .        Impression    No acute findings in the chest     CBC with Auto Differential   Result Value Ref Range    WBC 8.5 4.3 - 11.1 K/uL    RBC 4.36 4.05 - 5.2 M/uL    Hemoglobin 12.9 11.7 - 15.4 g/dL    Hematocrit 40.2 35.8 - 46.3 %    MCV 92.2 82 - 102 FL    MCH 29.6 26.1 - 32.9 PG    MCHC 32.1 31.4 - 35.0 g/dL    RDW 14.3 11.9 - 14.6 %    Platelets 262 389 - 595 K/uL    MPV 10.6 9.4 - 12.3 FL    nRBC 0.00 0.0 - 0.2 K/uL    Differential Type AUTOMATED      Seg Neutrophils 73 43 - 78 %    Lymphocytes 18 13 - 44 %    Monocytes 7 4.0 - 12.0 %    Eosinophils % 1 0.5 - 7.8 %    Basophils 0 0.0 - 2.0 %    Immature Granulocytes 1 0.0 - 5.0 %    Segs Absolute 6.2 1.7 - 8.2 K/UL    Absolute Lymph # 1.6 0.5 - 4.6 K/UL    Absolute Mono # 0.6 0.1 - 1.3 K/UL    Absolute Eos # 0.1 0.0 - 0.8 K/UL    Basophils Absolute 0.0 0.0 - 0.2 K/UL    Absolute Immature Granulocyte 0.1 0.0 - 0.5 K/UL   CMP   Result Value Ref Range    Sodium 138 133 - 143 mmol/L    Potassium 3.6 3.5 - 5.1 mmol/L    Chloride 103 101 - 110 mmol/L    CO2 30 21 - 32 mmol/L    Anion Gap 5 2 - 11 mmol/L    Glucose 123 (H) 65 - 100 mg/dL    BUN 18 8 - 23 MG/DL    Creatinine 1.00 0.6 - 1.0 MG/DL    Est, Glom Filt Rate 58 (L) >60 ml/min/1.73m2    Calcium 9.6 8.3 - 10.4 MG/DL    Total Bilirubin 0.6 0.2 - 1.1 MG/DL    ALT 36 12 - 65 U/L    AST 29 15 - 37 U/L    Alk Phosphatase 74 50 - 136 U/L    Total Protein 8.3 (H) 6.3 - 8.2 g/dL    Albumin 3.8 3.2 - 4.6 g/dL    Globulin 4.5 2.8 - 4.5 g/dL    Albumin/Globulin Ratio 0.8 0.4 - 1.6     POCT Glucose   Result Value Ref Range    POC Glucose 84 65 - 100 mg/dL    Performed by: Eber         XR HIP LEFT (2-3 VIEWS)   Final Result   Degenerative change versus nondisplaced left femoral neck fracture. If high clinical concern, consider follow-up films or MRI evaluation. XR CHEST PORTABLE   Final Result   No acute findings in the chest                             Voice dictation software was used during the making of this note. This software is not perfect and grammatical and other typographical errors may be present. This note has not been completely proofread for errors.      Roselia Martinez., MD  02/06/23 4021

## 2023-02-06 NOTE — ED TRIAGE NOTES
Pt arrives via EMS from home. Pt fell on left hip. Pt reports pain with movement.  called ambulance. Pt is not on blood thinners. Pt denies hitting head or losing consciousness. Pt denies dizziness. Pt states her ankle gave way. /110, hx of HTN, HR 80, )2 98 on room air,      Hx of diabetes. Left leg shortened due to previous injury and surgery.

## 2023-02-06 NOTE — PROGRESS NOTES
[facility]        Consult Received:       Left hip concern for fracture. Awaiting MRI. Patient discussed with Dr. Amira Rivera. Hospitalist to admit. Hold blood thinners. Will follow up after MRI resulted.

## 2023-02-07 ENCOUNTER — APPOINTMENT (OUTPATIENT)
Dept: MRI IMAGING | Age: 78
End: 2023-02-07
Payer: MEDICARE

## 2023-02-07 ENCOUNTER — APPOINTMENT (OUTPATIENT)
Dept: GENERAL RADIOLOGY | Age: 78
End: 2023-02-07
Payer: MEDICARE

## 2023-02-07 ENCOUNTER — ANESTHESIA EVENT (OUTPATIENT)
Dept: SURGERY | Age: 78
End: 2023-02-07
Payer: MEDICARE

## 2023-02-07 ENCOUNTER — APPOINTMENT (OUTPATIENT)
Dept: CT IMAGING | Age: 78
End: 2023-02-07
Payer: MEDICARE

## 2023-02-07 PROBLEM — R41.0 DELIRIUM: Status: ACTIVE | Noted: 2023-02-07

## 2023-02-07 PROBLEM — G92.8 TOXIC METABOLIC ENCEPHALOPATHY: Status: ACTIVE | Noted: 2023-02-07

## 2023-02-07 PROBLEM — I42.0 DILATED CARDIOMYOPATHY (HCC): Status: ACTIVE | Noted: 2019-08-06

## 2023-02-07 PROBLEM — S72.002A CLOSED LEFT HIP FRACTURE, INITIAL ENCOUNTER (HCC): Status: ACTIVE | Noted: 2023-02-06

## 2023-02-07 LAB
ANION GAP SERPL CALC-SCNC: 5 MMOL/L (ref 2–11)
B PERT DNA SPEC QL NAA+PROBE: NOT DETECTED
BASOPHILS # BLD: 0 K/UL (ref 0–0.2)
BASOPHILS # BLD: 0 K/UL (ref 0–0.2)
BASOPHILS NFR BLD: 1 % (ref 0–2)
BASOPHILS NFR BLD: 1 % (ref 0–2)
BORDETELLA PARAPERTUSSIS BY PCR: NOT DETECTED
BUN SERPL-MCNC: 20 MG/DL (ref 8–23)
C PNEUM DNA SPEC QL NAA+PROBE: NOT DETECTED
CALCIUM SERPL-MCNC: 9.5 MG/DL (ref 8.3–10.4)
CHLORIDE SERPL-SCNC: 100 MMOL/L (ref 101–110)
CO2 SERPL-SCNC: 33 MMOL/L (ref 21–32)
CREAT SERPL-MCNC: 1.2 MG/DL (ref 0.6–1)
DIFFERENTIAL METHOD BLD: ABNORMAL
DIFFERENTIAL METHOD BLD: ABNORMAL
EOSINOPHIL # BLD: 0.1 K/UL (ref 0–0.8)
EOSINOPHIL # BLD: 0.1 K/UL (ref 0–0.8)
EOSINOPHIL NFR BLD: 1 % (ref 0.5–7.8)
EOSINOPHIL NFR BLD: 2 % (ref 0.5–7.8)
ERYTHROCYTE [DISTWIDTH] IN BLOOD BY AUTOMATED COUNT: 14.4 % (ref 11.9–14.6)
ERYTHROCYTE [DISTWIDTH] IN BLOOD BY AUTOMATED COUNT: 14.6 % (ref 11.9–14.6)
FLUAV SUBTYP SPEC NAA+PROBE: NOT DETECTED
FLUBV RNA SPEC QL NAA+PROBE: NOT DETECTED
GLUCOSE BLD STRIP.AUTO-MCNC: 105 MG/DL (ref 65–100)
GLUCOSE BLD STRIP.AUTO-MCNC: 106 MG/DL (ref 65–100)
GLUCOSE BLD STRIP.AUTO-MCNC: 84 MG/DL (ref 65–100)
GLUCOSE BLD STRIP.AUTO-MCNC: 86 MG/DL (ref 65–100)
GLUCOSE SERPL-MCNC: 113 MG/DL (ref 65–100)
HADV DNA SPEC QL NAA+PROBE: NOT DETECTED
HCOV 229E RNA SPEC QL NAA+PROBE: NOT DETECTED
HCOV HKU1 RNA SPEC QL NAA+PROBE: NOT DETECTED
HCOV NL63 RNA SPEC QL NAA+PROBE: NOT DETECTED
HCOV OC43 RNA SPEC QL NAA+PROBE: NOT DETECTED
HCT VFR BLD AUTO: 33.9 % (ref 35.8–46.3)
HCT VFR BLD AUTO: 35.5 % (ref 35.8–46.3)
HGB BLD-MCNC: 10.7 G/DL (ref 11.7–15.4)
HGB BLD-MCNC: 11.5 G/DL (ref 11.7–15.4)
HMPV RNA SPEC QL NAA+PROBE: NOT DETECTED
HPIV1 RNA SPEC QL NAA+PROBE: NOT DETECTED
HPIV2 RNA SPEC QL NAA+PROBE: NOT DETECTED
HPIV3 RNA SPEC QL NAA+PROBE: NOT DETECTED
HPIV4 RNA SPEC QL NAA+PROBE: NOT DETECTED
IMM GRANULOCYTES # BLD AUTO: 0 K/UL (ref 0–0.5)
IMM GRANULOCYTES # BLD AUTO: 0 K/UL (ref 0–0.5)
IMM GRANULOCYTES NFR BLD AUTO: 0 % (ref 0–5)
IMM GRANULOCYTES NFR BLD AUTO: 1 % (ref 0–5)
LACTATE SERPL-SCNC: 1.2 MMOL/L (ref 0.4–2)
LYMPHOCYTES # BLD: 1.5 K/UL (ref 0.5–4.6)
LYMPHOCYTES # BLD: 1.9 K/UL (ref 0.5–4.6)
LYMPHOCYTES NFR BLD: 24 % (ref 13–44)
LYMPHOCYTES NFR BLD: 29 % (ref 13–44)
M PNEUMO DNA SPEC QL NAA+PROBE: NOT DETECTED
MCH RBC QN AUTO: 29.6 PG (ref 26.1–32.9)
MCH RBC QN AUTO: 30.2 PG (ref 26.1–32.9)
MCHC RBC AUTO-ENTMCNC: 31.6 G/DL (ref 31.4–35)
MCHC RBC AUTO-ENTMCNC: 32.4 G/DL (ref 31.4–35)
MCV RBC AUTO: 93.2 FL (ref 82–102)
MCV RBC AUTO: 93.9 FL (ref 82–102)
MONOCYTES # BLD: 0.5 K/UL (ref 0.1–1.3)
MONOCYTES # BLD: 0.5 K/UL (ref 0.1–1.3)
MONOCYTES NFR BLD: 7 % (ref 4–12)
MONOCYTES NFR BLD: 9 % (ref 4–12)
NEUTS SEG # BLD: 4 K/UL (ref 1.7–8.2)
NEUTS SEG # BLD: 4 K/UL (ref 1.7–8.2)
NEUTS SEG NFR BLD: 61 % (ref 43–78)
NEUTS SEG NFR BLD: 64 % (ref 43–78)
NRBC # BLD: 0 K/UL (ref 0–0.2)
NRBC # BLD: 0 K/UL (ref 0–0.2)
PLATELET # BLD AUTO: 192 K/UL (ref 150–450)
PLATELET # BLD AUTO: 226 K/UL (ref 150–450)
PMV BLD AUTO: 10.7 FL (ref 9.4–12.3)
PMV BLD AUTO: 11 FL (ref 9.4–12.3)
POTASSIUM SERPL-SCNC: 4.2 MMOL/L (ref 3.5–5.1)
PROCALCITONIN SERPL-MCNC: 0.18 NG/ML (ref 0–0.49)
RBC # BLD AUTO: 3.61 M/UL (ref 4.05–5.2)
RBC # BLD AUTO: 3.81 M/UL (ref 4.05–5.2)
RSV RNA SPEC QL NAA+PROBE: NOT DETECTED
RV+EV RNA SPEC QL NAA+PROBE: NOT DETECTED
SARS-COV-2 RNA RESP QL NAA+PROBE: NOT DETECTED
SERVICE CMNT-IMP: ABNORMAL
SERVICE CMNT-IMP: ABNORMAL
SERVICE CMNT-IMP: NORMAL
SERVICE CMNT-IMP: NORMAL
SODIUM SERPL-SCNC: 138 MMOL/L (ref 133–143)
WBC # BLD AUTO: 6.2 K/UL (ref 4.3–11.1)
WBC # BLD AUTO: 6.6 K/UL (ref 4.3–11.1)

## 2023-02-07 PROCEDURE — 6360000004 HC RX CONTRAST MEDICATION: Performed by: PSYCHIATRY & NEUROLOGY

## 2023-02-07 PROCEDURE — 1100000000 HC RM PRIVATE

## 2023-02-07 PROCEDURE — 6360000002 HC RX W HCPCS: Performed by: STUDENT IN AN ORGANIZED HEALTH CARE EDUCATION/TRAINING PROGRAM

## 2023-02-07 PROCEDURE — 6370000000 HC RX 637 (ALT 250 FOR IP): Performed by: STUDENT IN AN ORGANIZED HEALTH CARE EDUCATION/TRAINING PROGRAM

## 2023-02-07 PROCEDURE — 36415 COLL VENOUS BLD VENIPUNCTURE: CPT

## 2023-02-07 PROCEDURE — 6360000002 HC RX W HCPCS: Performed by: FAMILY MEDICINE

## 2023-02-07 PROCEDURE — 71045 X-RAY EXAM CHEST 1 VIEW: CPT

## 2023-02-07 PROCEDURE — 70450 CT HEAD/BRAIN W/O DYE: CPT

## 2023-02-07 PROCEDURE — 73721 MRI JNT OF LWR EXTRE W/O DYE: CPT

## 2023-02-07 PROCEDURE — 2580000003 HC RX 258: Performed by: STUDENT IN AN ORGANIZED HEALTH CARE EDUCATION/TRAINING PROGRAM

## 2023-02-07 PROCEDURE — 6370000000 HC RX 637 (ALT 250 FOR IP): Performed by: PHYSICIAN ASSISTANT

## 2023-02-07 PROCEDURE — 6370000000 HC RX 637 (ALT 250 FOR IP): Performed by: FAMILY MEDICINE

## 2023-02-07 PROCEDURE — 99222 1ST HOSP IP/OBS MODERATE 55: CPT | Performed by: PSYCHIATRY & NEUROLOGY

## 2023-02-07 PROCEDURE — APPSS60 APP SPLIT SHARED TIME 46-60 MINUTES: Performed by: NURSE PRACTITIONER

## 2023-02-07 PROCEDURE — 84145 PROCALCITONIN (PCT): CPT

## 2023-02-07 PROCEDURE — 0202U NFCT DS 22 TRGT SARS-COV-2: CPT

## 2023-02-07 PROCEDURE — 80048 BASIC METABOLIC PNL TOTAL CA: CPT

## 2023-02-07 PROCEDURE — 82962 GLUCOSE BLOOD TEST: CPT

## 2023-02-07 PROCEDURE — 85025 COMPLETE CBC W/AUTO DIFF WBC: CPT

## 2023-02-07 PROCEDURE — 83605 ASSAY OF LACTIC ACID: CPT

## 2023-02-07 PROCEDURE — 2580000003 HC RX 258: Performed by: FAMILY MEDICINE

## 2023-02-07 PROCEDURE — 2580000003 HC RX 258: Performed by: PSYCHIATRY & NEUROLOGY

## 2023-02-07 PROCEDURE — 2500000003 HC RX 250 WO HCPCS: Performed by: STUDENT IN AN ORGANIZED HEALTH CARE EDUCATION/TRAINING PROGRAM

## 2023-02-07 PROCEDURE — 70498 CT ANGIOGRAPHY NECK: CPT

## 2023-02-07 PROCEDURE — 87040 BLOOD CULTURE FOR BACTERIA: CPT

## 2023-02-07 PROCEDURE — 99223 1ST HOSP IP/OBS HIGH 75: CPT | Performed by: PHYSICIAN ASSISTANT

## 2023-02-07 RX ORDER — 0.9 % SODIUM CHLORIDE 0.9 %
100 INTRAVENOUS SOLUTION INTRAVENOUS
Status: COMPLETED | OUTPATIENT
Start: 2023-02-07 | End: 2023-02-07

## 2023-02-07 RX ORDER — SODIUM CHLORIDE 0.9 % (FLUSH) 0.9 %
10 SYRINGE (ML) INJECTION
Status: DISCONTINUED | OUTPATIENT
Start: 2023-02-07 | End: 2023-02-10 | Stop reason: HOSPADM

## 2023-02-07 RX ORDER — DEXTROSE MONOHYDRATE 100 MG/ML
INJECTION, SOLUTION INTRAVENOUS CONTINUOUS PRN
Status: DISCONTINUED | OUTPATIENT
Start: 2023-02-07 | End: 2023-02-10 | Stop reason: HOSPADM

## 2023-02-07 RX ORDER — VITAMIN B COMPLEX
2000 TABLET ORAL DAILY
Status: DISCONTINUED | OUTPATIENT
Start: 2023-02-07 | End: 2023-02-10 | Stop reason: HOSPADM

## 2023-02-07 RX ADMIN — GABAPENTIN 600 MG: 300 CAPSULE ORAL at 21:59

## 2023-02-07 RX ADMIN — SODIUM CHLORIDE: 9 INJECTION, SOLUTION INTRAVENOUS at 17:38

## 2023-02-07 RX ADMIN — IOPAMIDOL 50 ML: 755 INJECTION, SOLUTION INTRAVENOUS at 17:17

## 2023-02-07 RX ADMIN — ACETAMINOPHEN 650 MG: 325 TABLET ORAL at 17:13

## 2023-02-07 RX ADMIN — Medication 1 AMPULE: at 12:08

## 2023-02-07 RX ADMIN — AMITRIPTYLINE HYDROCHLORIDE 10 MG: 10 TABLET, FILM COATED ORAL at 21:59

## 2023-02-07 RX ADMIN — HYDROMORPHONE HYDROCHLORIDE 1 MG: 1 INJECTION, SOLUTION INTRAMUSCULAR; INTRAVENOUS; SUBCUTANEOUS at 06:30

## 2023-02-07 RX ADMIN — Medication 3 MG: at 21:59

## 2023-02-07 RX ADMIN — HYDROMORPHONE HYDROCHLORIDE 1 MG: 1 INJECTION, SOLUTION INTRAMUSCULAR; INTRAVENOUS; SUBCUTANEOUS at 12:22

## 2023-02-07 RX ADMIN — CEFEPIME 2000 MG: 2 INJECTION, POWDER, FOR SOLUTION INTRAVENOUS at 18:53

## 2023-02-07 RX ADMIN — GABAPENTIN 600 MG: 300 CAPSULE ORAL at 09:39

## 2023-02-07 RX ADMIN — DOXYCYCLINE 100 MG: 100 INJECTION, POWDER, LYOPHILIZED, FOR SOLUTION INTRAVENOUS at 17:38

## 2023-02-07 RX ADMIN — LISINOPRIL 10 MG: 5 TABLET ORAL at 09:39

## 2023-02-07 RX ADMIN — CALCIUM CARBONATE (ANTACID) CHEW TAB 500 MG 500 MG: 500 CHEW TAB at 21:58

## 2023-02-07 RX ADMIN — LISINOPRIL 10 MG: 5 TABLET ORAL at 21:58

## 2023-02-07 RX ADMIN — METOPROLOL SUCCINATE 50 MG: 50 TABLET, EXTENDED RELEASE ORAL at 21:59

## 2023-02-07 RX ADMIN — Medication 1 AMPULE: at 21:57

## 2023-02-07 RX ADMIN — POTASSIUM CHLORIDE 10 MEQ: 1500 TABLET, EXTENDED RELEASE ORAL at 09:39

## 2023-02-07 RX ADMIN — HYDROMORPHONE HYDROCHLORIDE 1 MG: 1 INJECTION, SOLUTION INTRAMUSCULAR; INTRAVENOUS; SUBCUTANEOUS at 21:57

## 2023-02-07 RX ADMIN — NITROFURANTOIN MACROCRYSTALS 50 MG: 50 CAPSULE ORAL at 09:39

## 2023-02-07 RX ADMIN — SODIUM CHLORIDE 100 ML: 9 INJECTION, SOLUTION INTRAVENOUS at 17:17

## 2023-02-07 RX ADMIN — CHOLECALCIFEROL TAB 25 MCG (1000 UNIT) 2000 UNITS: 25 TAB at 12:22

## 2023-02-07 ASSESSMENT — PAIN DESCRIPTION - ORIENTATION
ORIENTATION: LEFT

## 2023-02-07 ASSESSMENT — PAIN DESCRIPTION - ONSET: ONSET: ON-GOING

## 2023-02-07 ASSESSMENT — PAIN - FUNCTIONAL ASSESSMENT: PAIN_FUNCTIONAL_ASSESSMENT: PREVENTS OR INTERFERES WITH MANY ACTIVE NOT PASSIVE ACTIVITIES

## 2023-02-07 ASSESSMENT — PAIN DESCRIPTION - FREQUENCY: FREQUENCY: CONTINUOUS

## 2023-02-07 ASSESSMENT — PAIN SCALES - GENERAL
PAINLEVEL_OUTOF10: 8
PAINLEVEL_OUTOF10: 5
PAINLEVEL_OUTOF10: 8
PAINLEVEL_OUTOF10: 10
PAINLEVEL_OUTOF10: 0

## 2023-02-07 ASSESSMENT — PAIN DESCRIPTION - DESCRIPTORS
DESCRIPTORS: ACHING
DESCRIPTORS: HEAVINESS;ACHING

## 2023-02-07 ASSESSMENT — PAIN DESCRIPTION - PAIN TYPE: TYPE: ACUTE PAIN

## 2023-02-07 ASSESSMENT — PAIN DESCRIPTION - LOCATION
LOCATION: HIP
LOCATION: HIP
LOCATION: LEG

## 2023-02-07 NOTE — PROGRESS NOTES
Hospitalist Progress Note   Admit Date:  2023 11:38 AM   Name:  Mallorie Miller   Age:  69 y.o. Sex:  female  :  1945   MRN:  [id number]   Room:  [place] [address]    Presenting Complaint: Fall and Hip Pain     Reason(s) for Admission: Right hip pain [M25.551]  Closed fracture of left hip, initial encounter ([facility] [S72.002A]  Acute pain of left hip [facility]     Hospital Course:   Patient is a 70 y/o female with medical history of GERD, DM II, recurrent UTI, CKD IIIa, hyperlipidemia, Sjogren's syndrome, OA of bilateral hands, osteopenia, polyneuropathy, vit D and B12 deficiency, breast cancer s/p mastectomy and chemotherapy, depression, dilated cardiomyopathy, hypertension who presented to ED s/p mechanical fall with impact to left hip. Denies hitting head or LOC. Patient does not take any blood thinners. ED workup: hypertensive to 191/98   Labs unremarkable, CXR with no acute findings  Hip XR with degenerative changes vs. nondisplaced left femur neck fracture. Ortho recommended MRI. Hospitalist consulted for admission. MRI demonstrates minimally displaced acute left femoral neck fracture. Other noted findings include left gluteus medius/minimus muscle strains, bilateral hip chondrosis, partial-thickness tears of bilateral gluteus minimus/medius tendinous insertions, bilateral hamstring tendon origins. Orthopedics consultation, plan for surgical intervention of L femoral neck fracture . Subjective & 24hr Events (23): Patient alert and oriented, supine in bed. Family members present in room. Patient denies chest pain, SOB, abdominal pain. LLE pain with movement otherwise at rest with no complaints.      Assessment & Plan:     Closed left hip fracture, initial encounter [facility])  -Orthopedic consultation, surgical intervention planned   -PT/OT post-operatively, place ppd, likely rehab on discharge  -prn analgesia  -Noted history of osteopenia, did not tolerate alendronate, did receive Prolia  injection last August  -Cont Vit D supplementation 2000 u daily x 12 weeks    Sjogren syndrome, unspecified ([facility]  -Follows Rheumatology, reviewed OV 1/19/23, no evidence of systemic involvement    Type 2 diabetes mellitus with chronic kidney disease  -Hold metformin, cont sliding scale insulin, adjust to carb controlled diet    Chronic gout of right hand / Bilateral Inflammatory Arthritis  -Follows Rheumatology, reviewed OV 1/19/23, noted BID colchicine but patient reports not taking    Stage 3a chronic kidney disease ([facility]  -Renal function stable at baseline    Essential hypertension  -Cont lisinopril, toprol-xl, furosemide  -BP improved, currently well controlled    Recurrent UTI  -Cont prophylactic nitrofurantoin    Depression  -Cont elavil    Dilated Cardiomyopathy  -Reviewed Echo 2018 with low-norm LVEF  -Cont Furosemide  -Strict I&O's, daily weights    Discharge planning: OR 2/8, therapy evaluations post-op    Diet:  Diet NPO  ADULT DIET; Regular; 4 carb choices (60 gm/meal)  DVT PPx: Lovenox  Code status: Full Code    Hospital Problems:  Principal Problem:    Closed left hip fracture, initial encounter ([facility]  Active Problems:    Sjogren syndrome, unspecified ([facility]    Type 2 diabetes mellitus with chronic kidney disease    Chronic gout of right hand    Left hip pain    Depression    Stage 3a chronic kidney disease (HCC)    Dilated cardiomyopathy ([facility]    Essential hypertension  Resolved Problems:    * No resolved hospital problems.  *      Objective:   Patient Vitals for the past 24 hrs:   Temp Pulse Resp BP SpO2   02/07/23 0858 98.3 °F (36.8 °C) 72 18 111/89 97 %   02/07/23 0700 -- -- 18 -- --   02/07/23 0630 -- -- 18 -- --   02/07/23 0355 98.4 °F (36.9 °C) 66 16 130/69 96 %   02/06/23 2130 98.6 °F (37 °C) 74 16 (!) 161/73 94 %   02/06/23 2055 -- 73 12 (!) 169/81 94 %   02/06/23 2029 -- 78 13 (!) 164/88 93 %   02/06/23 2019 -- 79 20 (!) 146/81 96 %   02/06/23 1955 -- 76 20 (!) 191/95 97 %   02/06/23 1831 -- 73 14 (!) 173/86 97 %   02/06/23 1800 -- 73 17 (!) 184/92 98 %   02/06/23 1740 -- 73 17 (!) 217/95 99 %   02/06/23 1735 -- 75 15 (!) 151/99 99 %   02/06/23 1715 -- 70 13 (!) 181/87 99 %   02/06/23 1645 -- 73 16 (!) 168/86 96 %   02/06/23 1625 -- 70 14 (!) 170/81 97 %   02/06/23 1615 -- 71 15 (!) 181/92 97 %   02/06/23 1557 -- 75 12 (!) 191/95 95 %   02/06/23 1527 -- 73 17 (!) 174/94 (!) 89 %   02/06/23 1449 -- 74 15 (!) 186/94 92 %   02/06/23 1429 -- 73 15 (!) 196/96 91 %   02/06/23 1419 -- 75 13 (!) 204/97 95 %   02/06/23 1359 -- 74 13 (!) 197/90 93 %   02/06/23 1349 -- 73 15 (!) 200/90 94 %   02/06/23 1329 -- 73 18 (!) 209/96 92 %   02/06/23 1325 -- 75 15 (!) 180/94 94 %   02/06/23 1159 -- 71 16 (!) 188/92 --   02/06/23 1143 98.5 °F (36.9 °C) 75 18 (!) 191/98 92 %       Oxygen Therapy  SpO2: 97 %  Pulse Oximeter Device Mode: Continuous  O2 Device: Nasal cannula  O2 Flow Rate (L/min): 2 L/min    Estimated body mass index is 24.3 kg/m² as calculated from the following:    Height as of this encounter: 5' 5\" (1.651 m). Weight as of this encounter: 146 lb (66.2 kg). Intake/Output Summary (Last 24 hours) at 2/7/2023 1055  Last data filed at 2/7/2023 1020  Gross per 24 hour   Intake 720 ml   Output 250 ml   Net 470 ml         Physical Exam:     Blood pressure 111/89, pulse 72, temperature 98.3 °F (36.8 °C), temperature source Oral, resp. rate 18, height 5' 5\" (1.651 m), weight 146 lb (66.2 kg), SpO2 97 %. General:    Well nourished. No acute distress. Head:  Normocephalic, atraumatic  Eyes:  Sclerae appear normal.  Pupils equally round. Glasses intact. ENT:  Nares appear normal.  Moist oral mucosa  Neck:  No restricted ROM. Trachea midline   CV:   RRR. No m/r/g. No jugular venous distension. Lungs:   CTAB. No wheezing, rhonchi, or rales. Symmetric expansion. Respirations even and unlabored on RA  Abdomen:   Soft, nontender, nondistended. Extremities: No cyanosis or clubbing. Left hip TTP.  No peripheral edema. Skin:     No rashes and normal coloration. Warm and dry. Neuro:  CN II-XII grossly intact. A&O x 3. Psych:  Normal mood and affect.       I have personally reviewed labs and tests:  Recent Labs:  Recent Results (from the past 48 hour(s))   CBC with Auto Differential    Collection Time: 02/06/23 12:01 PM   Result Value Ref Range    WBC 8.5 4.3 - 11.1 K/uL    RBC 4.36 4.05 - 5.2 M/uL    Hemoglobin 12.9 11.7 - 15.4 g/dL    Hematocrit 40.2 35.8 - 46.3 %    MCV 92.2 82 - 102 FL    MCH 29.6 26.1 - 32.9 PG    MCHC 32.1 31.4 - 35.0 g/dL    RDW 14.3 11.9 - 14.6 %    Platelets 591 574 - 694 K/uL    MPV 10.6 9.4 - 12.3 FL    nRBC 0.00 0.0 - 0.2 K/uL    Differential Type AUTOMATED      Seg Neutrophils 73 43 - 78 %    Lymphocytes 18 13 - 44 %    Monocytes 7 4.0 - 12.0 %    Eosinophils % 1 0.5 - 7.8 %    Basophils 0 0.0 - 2.0 %    Immature Granulocytes 1 0.0 - 5.0 %    Segs Absolute 6.2 1.7 - 8.2 K/UL    Absolute Lymph # 1.6 0.5 - 4.6 K/UL    Absolute Mono # 0.6 0.1 - 1.3 K/UL    Absolute Eos # 0.1 0.0 - 0.8 K/UL    Basophils Absolute 0.0 0.0 - 0.2 K/UL    Absolute Immature Granulocyte 0.1 0.0 - 0.5 K/UL   CMP    Collection Time: 02/06/23 12:01 PM   Result Value Ref Range    Sodium 138 133 - 143 mmol/L    Potassium 3.6 3.5 - 5.1 mmol/L    Chloride 103 101 - 110 mmol/L    CO2 30 21 - 32 mmol/L    Anion Gap 5 2 - 11 mmol/L    Glucose 123 (H) 65 - 100 mg/dL    BUN 18 8 - 23 MG/DL    Creatinine 1.00 0.6 - 1.0 MG/DL    Est, Glom Filt Rate 58 (L) >60 ml/min/1.73m2    Calcium 9.6 8.3 - 10.4 MG/DL    Total Bilirubin 0.6 0.2 - 1.1 MG/DL    ALT 36 12 - 65 U/L    AST 29 15 - 37 U/L    Alk Phosphatase 74 50 - 136 U/L    Total Protein 8.3 (H) 6.3 - 8.2 g/dL    Albumin 3.8 3.2 - 4.6 g/dL    Globulin 4.5 2.8 - 4.5 g/dL    Albumin/Globulin Ratio 0.8 0.4 - 1.6     POCT Glucose    Collection Time: 02/06/23  5:38 PM   Result Value Ref Range    POC Glucose 84 65 - 100 mg/dL    Performed by: Eber    POCT Glucose Collection Time: 02/06/23 10:57 PM   Result Value Ref Range    POC Glucose 127 (H) 65 - 100 mg/dL    [facility]    Basic Metabolic Panel w/ Reflex to MG    Collection Time: 02/07/23  2:59 AM   Result Value Ref Range    Sodium 138 133 - 143 mmol/L    Potassium 4.2 3.5 - 5.1 mmol/L    Chloride 100 (L) 101 - 110 mmol/L    CO2 33 (H) 21 - 32 mmol/L    Anion Gap 5 2 - 11 mmol/L    Glucose 113 (H) 65 - 100 mg/dL    BUN 20 8 - 23 MG/DL    Creatinine 1.20 (H) 0.6 - 1.0 MG/DL    Est, Glom Filt Rate 47 (L) >60 ml/min/1.73m2    Calcium 9.5 8.3 - 10.4 MG/DL   CBC with Auto Differential    Collection Time: 02/07/23  2:59 AM   Result Value Ref Range    WBC 6.6 4.3 - 11.1 K/uL    RBC 3.81 (L) 4.05 - 5.2 M/uL    Hemoglobin 11.5 (L) 11.7 - 15.4 g/dL    Hematocrit 35.5 (L) 35.8 - 46.3 %    MCV 93.2 82 - 102 FL    MCH 30.2 26.1 - 32.9 PG    MCHC 32.4 31.4 - 35.0 g/dL    RDW 14.6 11.9 - 14.6 %    Platelets 307 910 - 065 K/uL    MPV 11.0 9.4 - 12.3 FL    nRBC 0.00 0.0 - 0.2 K/uL    Differential Type AUTOMATED      Seg Neutrophils 61 43 - 78 %    Lymphocytes 29 13 - 44 %    Monocytes 7 4.0 - 12.0 %    Eosinophils % 1 0.5 - 7.8 %    Basophils 1 0.0 - 2.0 %    Immature Granulocytes 1 0.0 - 5.0 %    Segs Absolute 4.0 1.7 - 8.2 K/UL    Absolute Lymph # 1.9 0.5 - 4.6 K/UL    Absolute Mono # 0.5 0.1 - 1.3 K/UL    Absolute Eos # 0.1 0.0 - 0.8 K/UL    Basophils Absolute 0.0 0.0 - 0.2 K/UL    Absolute Immature Granulocyte 0.0 0.0 - 0.5 K/UL   POCT Glucose    Collection Time: 02/07/23  8:57 AM   Result Value Ref Range    POC Glucose 106 (H) 65 - 100 mg/dL    Performed by: Prabhu Birmingham)        I have personally reviewed imaging studies:  Other Studies:  MRI HIP LEFT WO CONTRAST   Final Result      1. Minimally displaced acute left femoral neck fracture. 2. Intramuscular edema within the left gluteus medius/minimus muscles on the   lateral left adductor musculature, likely reflecting muscle strains.       3. Mild bilateral hip chondrosis. Degeneration of the left superior anterior   acetabular labrum. 4. Partial-thickness tears of the bilateral gluteus minimus tendinous   insertions, worse on the right. Low-grade partial-thickness tears of the   bilateral gluteus medius tendon insertions. 5. Low-grade partial-thickness tears of the bilateral hamstring tendon origins. XR HIP LEFT (2-3 VIEWS)   Final Result   Degenerative change versus nondisplaced left femoral neck fracture. If high clinical concern, consider follow-up films or MRI evaluation.          XR CHEST PORTABLE   Final Result   No acute findings in the chest             Current Meds:  Current Facility-Administered Medications   Medication Dose Route Frequency    glucose chewable tablet 16 g  4 tablet Oral PRN    dextrose bolus 10% 125 mL  125 mL IntraVENous PRN    Or    dextrose bolus 10% 250 mL  250 mL IntraVENous PRN    glucagon (rDNA) injection 1 mg  1 mg SubCUTAneous PRN    dextrose 10 % infusion   IntraVENous Continuous PRN    alcohol 62% (NOZIN) nasal  1 ampule  1 ampule Topical Q12H    amitriptyline (ELAVIL) tablet 10 mg  10 mg Oral Nightly    calcium carbonate (TUMS) chewable tablet 500 mg  1 tablet Oral Nightly    cetirizine (ZYRTEC) tablet 10 mg  10 mg Oral Daily PRN    dextromethorphan-guaiFENesin (MUCINEX DM)  MG per extended release tablet 1 tablet (Patient Supplied)  1 tablet Oral Q12H PRN    furosemide (LASIX) tablet 40 mg  40 mg Oral Every Other Day    gabapentin (NEURONTIN) capsule 600 mg  600 mg Oral BID    lisinopril (PRINIVIL;ZESTRIL) tablet 10 mg  10 mg Oral BID    insulin lispro (HUMALOG) injection vial 0-8 Units  0-8 Units SubCUTAneous TID     insulin lispro (HUMALOG) injection vial 0-4 Units  0-4 Units SubCUTAneous Nightly    metoprolol succinate (TOPROL XL) extended release tablet 50 mg  50 mg Oral Nightly    nitrofurantoin (MACRODANTIN) capsule 50 mg  50 mg Oral Daily    potassium chloride (KLOR-CON M) extended release tablet 10 mEq  10 mEq Oral Daily with breakfast    sodium chloride flush 0.9 % injection 5-40 mL  5-40 mL IntraVENous 2 times per day    sodium chloride flush 0.9 % injection 5-40 mL  5-40 mL IntraVENous PRN    0.9 % sodium chloride infusion   IntraVENous PRN    [Held by provider] enoxaparin (LOVENOX) injection 40 mg  40 mg SubCUTAneous Q24H    ondansetron (ZOFRAN-ODT) disintegrating tablet 4 mg  4 mg Oral Q8H PRN    Or    ondansetron (ZOFRAN) injection 4 mg  4 mg IntraVENous Q6H PRN    polyethylene glycol (GLYCOLAX) packet 17 g  17 g Oral Daily PRN    acetaminophen (TYLENOL) tablet 650 mg  650 mg Oral Q6H PRN    Or    acetaminophen (TYLENOL) suppository 650 mg  650 mg Rectal Q6H PRN    tuberculin injection 5 Units  5 Units IntraDERmal Once    0.9 % sodium chloride infusion   IntraVENous Continuous    HYDROmorphone HCl PF (DILAUDID) injection 1 mg  1 mg IntraVENous Q4H PRN    mirabegron (MYRBETRIQ) extended release tablet 25 mg (Patient Supplied)  25 mg Oral Daily    labetalol (NORMODYNE;TRANDATE) injection 10 mg  10 mg IntraVENous Q4H PRN    melatonin tablet 3 mg  3 mg Oral Nightly     Signed: Humaira Olmedo AGACNP-BC

## 2023-02-07 NOTE — PROGRESS NOTES
Occupational Therapy Note:    Attempted to see patient this AM for occupational therapy evaluation session. Patient is s/p fall and is awaiting MRI for possible nondisplaced left femur neck fracture. Will HOLD until MRI results. Will follow and re-attempt as schedule permits/patient available. Thank you,    DEBORAH ECHOLS, OT    Rehab Caseload Tracker         ADDENDUM: Left femoral neck fracture confirmed on imaging. Plan for surgical repair tomorrow with Dr. Bradly Almodovar. Will see post operatively per orthopedic surgery.     Mary Leon MS, OTR/L

## 2023-02-07 NOTE — PROGRESS NOTES
Physical Therapy Note:    Physical therapy evaluation orders received and chart reviewed. Patient admitted s/p fall with MRI pending for concern for left femur fracture. Will hold mobility assessment at this time and await PLOC. Thank you,    Rachelle Alicia, PT, DPT  2/7/23    ADDENDUM: Left femoral neck fracture confirmed on imaging. Plan for surgical repair tomorrow with Dr. Karine Marquez. Will see post operatively per orthopedic surgery.

## 2023-02-07 NOTE — CARE COORDINATION
CM spoke with patient's spouse at bedside in regards to assessment. Spouse states that him and patient live in a one story home with 3STE. Prior to admission, patient was independent with ADL's, does not drive - spouse transports her everywhere. Spouse states that patient doesn't use any DME's to ambulate. Demographics, insurance and PCP confirmed. Patient had HH(RN) in the past multiple times - unknown agency. No hx of STR but has had Outpatient therapy several times. PT/OT evals pending. PPD placed already just incase SNF needed. CM will continue to follow to assist with discharge planning. 02/07/23 1112   Service Assessment   Patient Orientation Unable to Assess   History Provided By Spouse   Support Systems Spouse/Significant Other   PCP Verified by CM Yes   Prior Functional Level Independent in ADLs/IADLs   Can patient return to prior living arrangement Unknown at present   Family able to assist with home care needs: Yes   Would you like for me to discuss the discharge plan with any other family members/significant others, and if so, who?  Yes  (patient's spouse)   Social/Functional History   Lives With Spouse   Type of [address] One level   Entrance Stairs - Number of Steps 3   Discharge Planning   Type of Residence House   Living Arrangements Spouse/Significant Other   [address] Medications No

## 2023-02-07 NOTE — CONSULTS
[facility]  Consultation Note    Patient ID:  Name: Coral Guerrero  MRN: [id number]  AGE: 69 y.o.  : 1945    Date of Consultation:  2023  Referring Physician:  Hospitalist     Subjective: Pt complains of left hip pain after sustaining a ground level fall at home on yesterday . They presented to the [facility] Emergency Dept. They were found to have left hip pain. Initially x-rays were inconclusive. An MRI was ordered yesterday and was performed this morning. They were admitted by the hospitalist. They localizes their pain to the left hip. They have pain with weightbearing. They have no other orthopedic concerns at this time. She has an extensive orthopedic history. She has undergone ORIF of the right patella by Dr. Hira Ferreira in the past.  She is recently underwent surgery with Dr. Breanna Baxter for her finger. She has had multiple surgeries with her left ankle and says that she is normally short on her left side because of this ankle she has a history of MRSA infection. Past Medical History Includes:   Past Medical History:   Diagnosis Date    Ankle fracture     Ankle osteomyelitis, left ([address]     Breast cancer ([address]     Chemotherapy-induced neuropathy (HCC)     Chronic anemia     Chronic systolic CHF (congestive heart failure) (HCC)     CKD (chronic kidney disease) stage 3, GFR 30-59 ml/min (HCC)     Colon polyp     Depression     Dilated cardiomyopathy ([address]     Dry eye syndrome of both eyes     Dyslipidemia     Essential hypertension     Fracture of right patella     History of left breast cancer 2001    with mastectomy    Left leg weakness 2020    Non-ischemic cardiomyopathy ([address] 2018    Echo 50-55%, Cath-minimal CAD.  EF normalized    Osteoarthritis     Osteopenia     Peripheral sensory-motor axonal polyneuropathy 10/17/2020    S/P cardiac cath 2009    Statin intolerance     Tibial fracture 2016    Tibial plateau fracture 2502    Last Assessment & Plan:  Formatting of this note might be different from the original. Pod 2 ORIF doing well. C/o moderate pain. Controlled on PO meds.   Ready to go home    Type 2 diabetes mellitus, controlled ([address]     BS am 104    UTI (urinary tract infection) 2020    Vaginal infection     Vitamin B12 deficiency    ,   Past Surgical History:   Procedure Laterality Date    BREAST BIOPSY      left breast biopsy    BREAST BIOPSY  1968    left cyst removed    BREAST LUMPECTOMY Left 2001    BREAST REDUCTION SURGERY      Rt breast    CARDIAC CATHETERIZATION  2009    CARPAL TUNNEL RELEASE Bilateral     CHOLECYSTECTOMY, OPEN  1981    COLONOSCOPY  07/2014    CYST REMOVAL Right 11/2020    hand    HAND SURGERY Right 11/16/2022    Right index finger distal interphalangeal joint arthrodesis performed by Reed Gill MD at [facility]      Sinus Surgery    HEENT      RK procedure bilateral eyes    HX OPEN REDUCTION INTERNAL FIXATION Left 2013    Ankle    HX OPEN REDUCTION INTERNAL FIXATION  08/2016    [place]    HYSTERECTOMY (CERVIX STATUS UNKNOWN)      LUMBAR LAMINECTOMY  1992    MASTECTOMY Left 2002    With Reconstruction    ORTHOPEDIC SURGERY Right 08/2013    Patellar fracture repair    OTHER SURGICAL HISTORY      [place] Cath Placed and Removed    OVARY REMOVAL      Unilateral    PARTIAL HYSTERECTOMY (CERVIX NOT REMOVED)  [address]  2002    Stem Cell Transplant    UVULOPALATOPHARYGOPLASTY  2004     Family History:   Family History   Problem Relation Age of Onset    Cancer Mother     Diabetes Mother     Heart Disease Mother         MI at age 66    Heart Failure Mother         age 58    Stroke Sister     Cancer Brother     Heart Disease Brother     Heart Disease Father         MI age 55    Breast Cancer Neg Hx     Stomach Cancer Neg Hx       Social History:   Social History     Tobacco Use    Smoking status: Never    Smokeless tobacco: Never    Tobacco comments:     never used tobacco Substance Use Topics    Alcohol use: No       ALLERGIES:   Allergies   Allergen Reactions    Oxycodone Itching    Eucalyptus Oil Hives and Other (See Comments)     Burns mouth    Ezetimibe Myalgia    Morphine Other (See Comments)     Feels crazy  Other reaction(s): Hallucinations    Penicillins Hives    Pineapple Hives and Other (See Comments)     Burns mouth    Vancomycin Other (See Comments)     Kidney function worsened    Sulfa Antibiotics Nausea Only and Rash        Patient Medications    Current Facility-Administered Medications   Medication Dose Route Frequency    glucose chewable tablet 16 g  4 tablet Oral PRN    dextrose bolus 10% 125 mL  125 mL IntraVENous PRN    Or    dextrose bolus 10% 250 mL  250 mL IntraVENous PRN    glucagon (rDNA) injection 1 mg  1 mg SubCUTAneous PRN    dextrose 10 % infusion   IntraVENous Continuous PRN    alcohol 62% (NOZIN) nasal  1 ampule  1 ampule Topical Q12H    amitriptyline (ELAVIL) tablet 10 mg  10 mg Oral Nightly    calcium carbonate (TUMS) chewable tablet 500 mg  1 tablet Oral Nightly    cetirizine (ZYRTEC) tablet 10 mg  10 mg Oral Daily PRN    dextromethorphan-guaiFENesin (MUCINEX DM)  MG per extended release tablet 1 tablet (Patient Supplied)  1 tablet Oral Q12H PRN    furosemide (LASIX) tablet 40 mg  40 mg Oral Every Other Day    gabapentin (NEURONTIN) capsule 600 mg  600 mg Oral BID    lisinopril (PRINIVIL;ZESTRIL) tablet 10 mg  10 mg Oral BID    insulin lispro (HUMALOG) injection vial 0-8 Units  0-8 Units SubCUTAneous TID WC    insulin lispro (HUMALOG) injection vial 0-4 Units  0-4 Units SubCUTAneous Nightly    metoprolol succinate (TOPROL XL) extended release tablet 50 mg  50 mg Oral Nightly    nitrofurantoin (MACRODANTIN) capsule 50 mg  50 mg Oral Daily    potassium chloride (KLOR-CON M) extended release tablet 10 mEq  10 mEq Oral Daily with breakfast    sodium chloride flush 0.9 % injection 5-40 mL  5-40 mL IntraVENous 2 times per day    sodium chloride flush 0.9 % injection 5-40 mL  5-40 mL IntraVENous PRN    0.9 % sodium chloride infusion   IntraVENous PRN    [Held by provider] enoxaparin (LOVENOX) injection 40 mg  40 mg SubCUTAneous Q24H    ondansetron (ZOFRAN-ODT) disintegrating tablet 4 mg  4 mg Oral Q8H PRN    Or    ondansetron (ZOFRAN) injection 4 mg  4 mg IntraVENous Q6H PRN    polyethylene glycol (GLYCOLAX) packet 17 g  17 g Oral Daily PRN    acetaminophen (TYLENOL) tablet 650 mg  650 mg Oral Q6H PRN    Or    acetaminophen (TYLENOL) suppository 650 mg  650 mg Rectal Q6H PRN    tuberculin injection 5 Units  5 Units IntraDERmal Once    0.9 % sodium chloride infusion   IntraVENous Continuous    HYDROmorphone HCl PF (DILAUDID) injection 1 mg  1 mg IntraVENous Q4H PRN    mirabegron (MYRBETRIQ) extended release tablet 25 mg (Patient Supplied)  25 mg Oral Daily    labetalol (NORMODYNE;TRANDATE) injection 10 mg  10 mg IntraVENous Q4H PRN    melatonin tablet 3 mg  3 mg Oral Nightly         Review of Systems:  Pertinent items are noted in HPI. Physical Exam:      General: NAD, Alert, Oriented x 3   Mental Status: Appropriate   Psych: Normal Affect, Normal Mood    HEENT: Normal Cephalic/Atraumatic, PERRL   Lungs: Respirations even and unlabored, Breath Sounds were clear, no respiratory distress   Heart: Regular Rate and Rhythm   Vascular: Distal pulses intact, good capillary refill   Skin: No redness, No Rashes, Skin is dry   Musculoskeletal: Range of motion left hip not assessed due to fracture. . NV intact.    Lymphatic: No lympahdenopathy, No distal edema   Neuro: No gross deficits   Abdomen: Soft, Non tender, No distension      VITALS: Patient Vitals for the past 8 hrs:   BP Temp Temp src Pulse Resp SpO2   23 0858 111/89 98.3 °F (36.8 °C) Oral 72 18 97 %   23 0700 -- -- -- -- 18 --   23 0630 -- -- -- -- 18 --   23 0355 130/69 98.4 °F (36.9 °C) Oral 66 16 96 %    , Temp (24hrs), Av.5 °F (36.9 °C), Min:98.3 °F (36.8 °C), Max:98.6 °F (37 °C)           Diagnosis   Patient Active Problem List   Diagnosis    Chronic periscapular pain on right side    Foraminal stenosis of cervical region    Statin intolerance    Dyslipidemia    Depression    Osteoarthritis    Peripheral sensory-motor axonal polyneuropathy    Osteopenia    Type 2 diabetes mellitus, controlled (HCC)    Muscle spasm    Recurrent occipital headache    Left leg weakness    Chemotherapy-induced neuropathy (HCC)    Stage 3a chronic kidney disease (HCC)    Arthropathy of cervical facet joint    Stem cells transplant status [address]    Dilated cardiomyopathy ([facility])    Vitamin D deficiency    Vitamin B12 deficiency    S/P cardiac cath    Essential hypertension    Chronic anemia    Palpitation    Primary insomnia    Intolerance of oral bisphosphonate therapy    Sjogren syndrome, unspecified (HCC)    Type 2 diabetes mellitus with chronic kidney disease    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    Chronic gout of right hand    Acute renal failure superimposed on stage 3a chronic kidney disease (HCC)    Chronic combined systolic and diastolic CHF (congestive heart failure) (HCC)    Left hip pain          Assessment     Closed nondisplaced left femoral neck fracture    Medical Decision Making:     X-rays and chart have been reviewed. The patient was discussed with Dr. Gita Hanson. The patient has a closed nondisplaced left femoral neck fracture. We will plan for operative intervention for tomorrow morning. The patient will be n.p.o. after midnight. Hold blood thinners. The patient does have a history of MRSA infections. Will order nosin. Dr. Gita Hanson will discuss the exact surgical plan with the patient prior to surgery.        TERESA Saba, PA  2/7/2023,  9:43 AM

## 2023-02-07 NOTE — PROGRESS NOTES
TRANSFER - IN REPORT:    Verbal report received from IRINEO Haile on Daija Arn  being received from [facility] [place] for routine progression of patient care      Report consisted of patient's Situation, Background, Assessment and   Recommendations(SBAR). Information from the following report(s) Nurse Handoff Report was reviewed with the receiving nurse. Opportunity for questions and clarification was provided. Assessment completed upon patient's arrival to unit and care assumed.

## 2023-02-07 NOTE — CASE COMMUNICATION
Code S called due to word alex. Presented to room 703 with primary RN. Patient alert, oriented x 2. Appears confused. She is following commands in all extremities but delayed response. Some words are clear but some noted slurring. New onset fever of 102.9. BP elevated 178/105. Glucose wnl. Patient was A&O x 3 on rounding earlier today when I saw her in the ED overflow, afebrile, no leukocytosis on morning labs. Neurology to bedside, confusion likely related to new onset fever. Obtain stat head CT. Further recommendations by Neurology. Complete full fever workup including blood cultures, UA, CXR, LA, procal, RVP. Will initiate Cefepime/Doxycycline at this time (allergies to Vancomycin, PCN, Sulfa). I called and spoke with spouse Meche Medrano and updated on plan of care.     Veronica Burkett AGACNP-BC

## 2023-02-07 NOTE — ED NOTES
TRANSFER - OUT REPORT:    Verbal report given to IRINEO Zhu on Greg Records  being transferred to [facility] for routine progression of patient care       Report consisted of patient's Situation, Background, Assessment and   Recommendations(SBAR). Information from the following report(s) Nurse Handoff Report, ED Encounter Summary, ED SBAR, MAR, and Event Log was reviewed with the receiving nurse. Ronan Assessment: Presents to emergency department  because of falls (Syncope, seizure, or loss of consciousness): Yes, Age > 79: Yes, Altered Mental Status, Intoxication with alcohol or substance confusion (Disorientation, impaired judgment, poor safety awaremess, or inability to follow instructions): No, Impaired Mobility: Ambulates or transfers with assistive devices or assistance; Unable to ambulate or transer.: No, Nursing Judgement: Yes  Lines:   Peripheral IV 02/06/23 Right Antecubital (Active)   Site Assessment Clean, dry & intact 02/06/23 1203   Line Status Normal saline locked 02/06/23 [address] Connections checked and tightened 02/06/23 1203   Phlebitis Assessment No symptoms 02/06/23 1203   Infiltration Assessment 0 02/06/23 1203   Dressing Status Clean, dry & intact 02/06/23 1203   Dressing Type Transparent 02/06/23 1203   Dressing Intervention New 02/06/23 1203        Opportunity for questions and clarification was provided.       Patient transported with:  Registered Nurse          Michelle Rea RN  02/06/23 8291

## 2023-02-07 NOTE — ANESTHESIA PRE PROCEDURE
Department of Anesthesiology  Preprocedure Note       Name:  Rubio Noland   Age:  69 y.o.  :  1945                                          MRN:  [id number]         Date:  2023      Surgeon: Terrence Hickey):  Guilherme Wallace MD    Procedure: Procedure(s):  HIP OPEN REDUCTION INTERNAL FIXATION    Medications prior to admission:   Prior to Admission medications    Medication Sig Start Date End Date Taking? Authorizing Provider   nitrofurantoin (MACRODANTIN) 50 MG capsule Take 1 capsule by mouth daily 23   Tigre Montano MD   valACYclovir (VALTREX) 1 g tablet Take 1,000 mg by mouth 2 times daily    Historical Provider, MD   amitriptyline (ELAVIL) 10 MG tablet Take 10 mg by mouth nightly  Patient not taking: Reported on 2023    Historical Provider, MD   melatonin 3 MG TABS tablet Take 3 mg by mouth daily    Historical Provider, MD SIMPSON 3CC LUER-KATY SYR 25GX1\" 25G X 1\" 3 ML MISC  22   Historical Provider, MD   nystatin-triamcinolone (MYCOLOG II) 698494-8.1 UNIT/GM-% cream Apply topically 2 times daily. 22   LIU Smith CNP   promethazine (PHENERGAN) 25 MG tablet Take 1 tablet by mouth daily as needed for Nausea 22   LIU Smith CNP   metoprolol succinate (TOPROL XL) 50 MG extended release tablet Take 1 tablet by mouth at bedtime Once a day 22   Amberly Sparks DO   lisinopril (PRINIVIL;ZESTRIL) 10 MG tablet Take 1 tablet by mouth 2 times daily 22   Amberly Sparks DO   CRANBERRY PO Take by mouth daily    Historical Provider, MD   estradiol (ESTRACE VAGINAL) 0.1 MG/GM vaginal cream Insert 2g daily vaginally for two weeks. Then insert 1g three times a week.  22   LIU Smith CNP   fluticasone [facility]) 50 MCG/ACT nasal spray 2 times daily as needed 22   Historical Provider, MD   colchicine (COLCRYS) 0.6 MG tablet Take 1 tablet by mouth 2 times daily  Patient not taking: Reported on 2023   Chantal Cola Cristo Davis MD   furosemide (LASIX) 40 MG tablet Take 1 tablet by mouth every other day 6/6/22   LIU Rodriguez CNP   gabapentin (NEURONTIN) 600 MG tablet Take 1 tablet by mouth 2 times daily for 360 days.  6/6/22 6/1/23  LIU Rodriguez CNP   metFORMIN (GLUCOPHAGE-XR) 500 mg extended release tablet Take 2 tablets by mouth 2 times daily 6/6/22   LIU Rodriguez CNP   calcium carbonate (TUMS) 500 MG chewable tablet Take 1 tablet by mouth at bedtime    Historical Provider, MD   dextromethorphan-guaiFENesin ([place] [address] DM)  MG per extended release tablet Take 1 tablet by mouth every 12 hours as needed    Historical Provider, MD   cetirizine (ZYRTEC) 10 MG tablet Take 10 mg by mouth daily as needed  Patient not taking: Reported on 2/6/2023    Ar Automatic Reconciliation   potassium chloride (KLOR-CON M) 20 MEQ extended release tablet TAKE 1 TABLET EVERY MORNING AND 2 TABLETS EVERY EVENING 5/25/21   Ar Automatic Reconciliation       Current medications:    Current Facility-Administered Medications   Medication Dose Route Frequency Provider Last Rate Last Admin    glucose chewable tablet 16 g  4 tablet Oral PRN Caity Tilley MD        dextrose bolus 10% 125 mL  125 mL IntraVENous PRN Caity Tilley MD        Or    dextrose bolus 10% 250 mL  250 mL IntraVENous PRN Caity Tilley MD        glucagon (rDNA) injection 1 mg  1 mg SubCUTAneous PRN Caity Tilley MD        dextrose 10 % infusion   IntraVENous Continuous PRN Caity Tilley MD        alcohol 62% (NOZIN) nasal  1 ampule  1 ampule Topical Q12H TERESA Yeung   1 ampule at 02/07/23 1208    Vitamin D (CHOLECALCIFEROL) tablet 2,000 Units  2,000 Units Oral Daily LIU Carbone CNP   2,000 Units at 02/07/23 1222    ceFAZolin (ANCEF) 2000 mg in sterile water 20 mL IV syringe  2,000 mg IntraVENous On Call to 1100 Ciro Dawkins MD        doxycycline (VIBRAMYCIN) 100 mg in sodium chloride 0.9 % 100 mL IVPB (mini-bag)  100 mg IntraVENous Q12H LIU Fischer -  mL/hr at 02/07/23 1738 100 mg at 02/07/23 1738    sodium chloride flush 0.9 % injection 10 mL  10 mL IntraVENous ONCE PRN Nusrat Martinez MD        cefepime (MAXIPIME) 2,000 mg in sodium chloride 0.9 % 50 mL IVPB (mini-bag)  2,000 mg IntraVENous Once Chaka Browne MD        Followed by   Michael Tellez ON 2/8/2023] cefepime (MAXIPIME) 2,000 mg in sodium chloride 0.9 % 50 mL IVPB (mini-bag)  2,000 mg IntraVENous Q12H Chaka Browne MD        amitriptyline (ELAVIL) tablet 10 mg  10 mg Oral Nightly Evelyn Bucio MD        calcium carbonate (TUMS) chewable tablet 500 mg  1 tablet Oral Nightly Evelyn Bucio MD   500 mg at 02/06/23 2201    cetirizine (ZYRTEC) tablet 10 mg  10 mg Oral Daily PRN Evelyn Bucio MD        dextromethorphan-guaiFENesin ([place] [address] DM)  MG per extended release tablet 1 tablet (Patient Supplied)  1 tablet Oral Q12H PRN Evelyn Bucio MD        furosemide (LASIX) tablet 40 mg  40 mg Oral Every Other Fritz Torres MD   40 mg at 02/06/23 1715    gabapentin (NEURONTIN) capsule 600 mg  600 mg Oral BID Evelyn Bucio MD   600 mg at 02/07/23 0939    lisinopril (PRINIVIL;ZESTRIL) tablet 10 mg  10 mg Oral BID Evelyn Bucio MD   10 mg at 02/07/23 0939    insulin lispro (HUMALOG) injection vial 0-8 Units  0-8 Units SubCUTAneous TID WC Evelyn Bucio MD        insulin lispro (HUMALOG) injection vial 0-4 Units  0-4 Units SubCUTAneous Nightly Evelyn Bucio MD        metoprolol succinate (TOPROL XL) extended release tablet 50 mg  50 mg Oral Nightly Evelyn Bucio MD   50 mg at 02/06/23 2202    nitrofurantoin (MACRODANTIN) capsule 50 mg  50 mg Oral Daily Evelyn Bucio MD   50 mg at 02/07/23 7999    potassium chloride (KLOR-CON M) extended release tablet 10 mEq  10 mEq Oral Daily with breakfast Evelyn Bucio MD   10 mEq at 02/07/23 0939    sodium chloride flush 0.9 % injection 5-40 mL  5-40 mL IntraVENous 2 times per day Evelyn Bucio MD        sodium chloride flush 0.9 % injection 5-40 mL  5-40 mL IntraVENous PRN Aleksandra Dominguez MD        0.9 % sodium chloride infusion   IntraVENous PRN Aleksandra Dominguez MD        [Held by provider] enoxaparin (LOVENOX) injection 40 mg  40 mg SubCUTAneous Q24H Aleksandra Dominguez MD   40 mg at 02/06/23 1716    ondansetron (ZOFRAN-ODT) disintegrating tablet 4 mg  4 mg Oral Q8H PRN Aleksandra Dominguez MD        Or    ondansetron (ZOFRAN) injection 4 mg  4 mg IntraVENous Q6H PRN Aleksandra Dominguez MD   4 mg at 02/06/23 2038    polyethylene glycol (GLYCOLAX) packet 17 g  17 g Oral Daily PRN Aleksandra Dominguez MD        acetaminophen (TYLENOL) tablet 650 mg  650 mg Oral Q6H PRN Aleksandra Dominguez MD   650 mg at 02/07/23 1713    Or    acetaminophen (TYLENOL) suppository 650 mg  650 mg Rectal Q6H PRN Aleksandra Dominguez MD        tuberculin injection 5 Units  5 Units IntraDERmal Once Aleksandra Dominguez MD        0.9 % sodium chloride infusion   IntraVENous Continuous Aleksandra Dominguez MD 75 mL/hr at 02/07/23 1738 New Bag at 02/07/23 1738    HYDROmorphone HCl PF (DILAUDID) injection 1 mg  1 mg IntraVENous Q4H PRN Aleksandra Dominguez MD   1 mg at 02/07/23 1222    mirabegron (MYRBETRIQ) extended release tablet 25 mg (Patient Supplied)  25 mg Oral Daily Aleksandra Dominguez MD        labetalol (NORMODYNE;TRANDATE) injection 10 mg  10 mg IntraVENous Q4H PRN Aleksandra Dominguez MD   10 mg at 02/06/23 2011    melatonin tablet 3 mg  3 mg Oral Nightly Aleksandra Dominguez MD   3 mg at 02/06/23 2201       Allergies:     Allergies   Allergen Reactions    Oxycodone Itching    Eucalyptus Oil Hives and Other (See Comments)     Burns mouth    Ezetimibe Myalgia    Morphine Other (See Comments)     Feels crazy  Other reaction(s): Hallucinations    Penicillins Hives    Pineapple Hives and Other (See Comments)     Burns mouth    Vancomycin Other (See Comments)     Kidney function worsened    Sulfa Antibiotics Nausea Only and Rash       Problem List:    Patient Active Problem List   Diagnosis Code    Chronic periscapular pain on right side M25.511, G89.29    Foraminal stenosis of cervical region M48.02    Statin intolerance Z78.9    Dyslipidemia E78.5    Depression F32. 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    Osteopenia M85.80    Type 2 diabetes mellitus, controlled ([facility]) E11.9    Muscle spasm M62.838    Recurrent occipital headache R51.9    Left leg weakness R29.898    Chemotherapy-induced neuropathy ([facility]) G62.0, T45.1X5A    Stage 3a chronic kidney disease (HCC) N18.31    Arthropathy of cervical facet joint M47.812    Stem cells transplant status ([facility] Z94.84    Dilated cardiomyopathy ([facility]) I42.0    Vitamin D deficiency E55.9    Vitamin B12 deficiency E53.8    S/P cardiac cath Z98.890    Essential hypertension I10    Chronic anemia D64.9    Palpitation R00.2    Primary insomnia F51.01    Intolerance of oral bisphosphonate therapy Z78.9    Sjogren syndrome, unspecified ([facility] [facility] M35.00    Type 2 diabetes mellitus with chronic kidney disease E11.22    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 M15.1    Chronic gout of right hand M1A.0410    Acute renal failure superimposed on stage 3a chronic kidney disease (HCC) N17.9, N18.31    Chronic combined systolic and diastolic CHF (congestive heart failure) ([facility]) I50.42    Left hip pain M25.552    Closed fracture of left hip ([facility]) S72.002A    Delirium R41.0    Toxic metabolic encephalopathy I12.3       Past Medical History:        Diagnosis Date    Ankle fracture 2014    Ankle osteomyelitis, left ([facility] [address] 2014    Breast cancer (HCC)     Chemotherapy-induced neuropathy (HCC)     Chronic anemia     Chronic systolic CHF (congestive heart failure) ([facility])     CKD (chronic kidney disease) stage 3, GFR 30-59 ml/min (HCC)     Colon polyp 2020    Depression     Dilated cardiomyopathy (HCC)     Dry eye syndrome of both eyes     Dyslipidemia     Essential hypertension     Fracture of right patella 2013    History of left breast cancer 2001    with mastectomy    Left leg weakness 9/8/2020    Non-ischemic cardiomyopathy ([facility] [address] 2018    Echo 50-55%, Cath-minimal CAD. EF normalized    Osteoarthritis     Osteopenia     Peripheral sensory-motor axonal polyneuropathy 10/17/2020    S/P cardiac cath 2009    Statin intolerance     Tibial fracture 2016    Tibial plateau fracture 7/5/4181    Last Assessment & Plan:  Formatting of this note might be different from the original. Pod 2 ORIF doing well. C/o moderate pain. Controlled on PO meds.   Ready to go home    Type 2 diabetes mellitus, controlled ([address]     BS am 104    UTI (urinary tract infection) 2020    Vaginal infection     Vitamin B12 deficiency        Past Surgical History:        Procedure Laterality Date    BREAST BIOPSY      left breast biopsy    BREAST BIOPSY  1968    left cyst removed    BREAST LUMPECTOMY Left 2001    BREAST REDUCTION SURGERY      Rt breast    CARDIAC CATHETERIZATION  2009    CARPAL TUNNEL RELEASE Bilateral     CHOLECYSTECTOMY, OPEN  1981    COLONOSCOPY  07/2014    CYST REMOVAL Right 11/2020    hand    HAND SURGERY Right 11/16/2022    Right index finger distal interphalangeal joint arthrodesis performed by Elmer Hernandez MD at [address] [place] [address]      Sinus Surgery    HEENT      RK procedure bilateral eyes    HX OPEN REDUCTION INTERNAL FIXATION Left 2013    Ankle    HX OPEN REDUCTION INTERNAL FIXATION  08/2016    [place]    HYSTERECTOMY (CERVIX STATUS UNKNOWN)      LUMBAR LAMINECTOMY  1992    MASTECTOMY Left 2002    With Reconstruction    ORTHOPEDIC SURGERY Right 08/2013    Patellar fracture repair    OTHER SURGICAL HISTORY      [place] Cath Placed and Removed    OVARY REMOVAL      Unilateral    PARTIAL HYSTERECTOMY (CERVIX NOT REMOVED)  1974    TONSILLECTOMY      TONSILLECTOMY      TRANSPLANT  2002    Stem Cell Transplant    UVULOPALATOPHARYGOPLASTY  2004       Social History:    Social History Tobacco Use    Smoking status: Never    Smokeless tobacco: Never    Tobacco comments:     never used tobacco   Substance Use Topics    Alcohol use: No                                Counseling given: Not Answered  Tobacco comments: never used tobacco      Vital Signs (Current):   Vitals:    02/07/23 1103 02/07/23 1252 02/07/23 1626 02/07/23 1638   BP: (!) 163/80  (!) 194/80 (!) 178/108   Pulse: 80  84    Resp: 20 18 15    Temp: 98.8 °F (37.1 °C)  (!) 102.9 °F (39.4 °C)    TempSrc: Oral  Oral    SpO2: 96%  99%    Weight:       Height:                                                  BP Readings from Last 3 Encounters:   02/07/23 (!) 178/108   01/19/23 (!) 170/71   01/03/23 (!) 155/75       NPO Status:                                                                                 BMI:   Wt Readings from Last 3 Encounters:   02/06/23 146 lb (66.2 kg)   02/06/23 146 lb (66.2 kg)   01/19/23 147 lb (66.7 kg)     Body mass index is 24.3 kg/m².     CBC:   Lab Results   Component Value Date/Time    WBC 6.6 02/07/2023 02:59 AM    RBC 3.81 02/07/2023 02:59 AM    HGB 11.5 02/07/2023 02:59 AM    HCT 35.5 02/07/2023 02:59 AM    MCV 93.2 02/07/2023 02:59 AM    RDW 14.6 02/07/2023 02:59 AM     02/07/2023 02:59 AM       CMP:   Lab Results   Component Value Date/Time     02/07/2023 02:59 AM    K 4.2 02/07/2023 02:59 AM     02/07/2023 02:59 AM    CO2 33 02/07/2023 02:59 AM    BUN 20 02/07/2023 02:59 AM    CREATININE 1.20 02/07/2023 02:59 AM    GFRAA 43 08/19/2022 10:54 AM    AGRATIO 1.4 10/13/2020 02:56 PM    LABGLOM 47 02/07/2023 02:59 AM    GLUCOSE 113 02/07/2023 02:59 AM    PROT 8.3 02/06/2023 12:01 PM    CALCIUM 9.5 02/07/2023 02:59 AM    BILITOT 0.6 02/06/2023 12:01 PM    ALKPHOS 74 02/06/2023 12:01 PM    ALKPHOS 65 06/08/2021 02:12 PM    AST 29 02/06/2023 12:01 PM    ALT 36 02/06/2023 12:01 PM       POC Tests:   Recent Labs     02/07/23  1620   POCGLU 105*       Coags: No results found for: PROTIME, INR, APTT    HCG (If Applicable): No results found for: PREGTESTUR, PREGSERUM, HCG, HCGQUANT     ABGs: No results found for: PHART, PO2ART, REV1LXZ, WZZ6VGZ, BEART, H1JOWUAC     Type & Screen (If Applicable):  No results found for: LABABO, LABRH    Drug/Infectious Status (If Applicable):  No results found for: HIV, HEPCAB    COVID-19 Screening (If Applicable): No results found for: COVID19        Anesthesia Evaluation  Patient summary reviewed and Nursing notes reviewed  Airway: Mallampati: II  TM distance: >3 FB   Neck ROM: full     Dental:          Pulmonary:Negative Pulmonary ROS and normal exam                               Cardiovascular:  Exercise tolerance: good (>4 METS),   (+) hypertension:, CHF:,         Rhythm: regular  Rate: normal                    Neuro/Psych:   Negative Neuro/Psych ROS  (+) CVA:,             GI/Hepatic/Renal: Neg GI/Hepatic/Renal ROS            Endo/Other: Negative Endo/Other ROS   (+) DiabetesType II DM, , .          Pt had no PAT visit       Abdominal:             Vascular: negative vascular ROS. Other Findings:           Anesthesia Plan      general and spinal     ASA 3     (New cva  Ruled out)  Induction: intravenous. MIPS: Postoperative opioids intended and Prophylactic antiemetics administered. Anesthetic plan and risks discussed with patient.       Plan discussed with surgical team.                    Shivani Marquez MD   2/7/2023

## 2023-02-07 NOTE — CONSULTS
[facility]  [address] [facility], [address] [facility]            Noa Rocha is a 69 y.o. female who presents on referral from an acute Code S the patient was admitted to the hospital yesterday after a fall the patient fell on her left hip  Past medical history of GERD diabetes UTIs chronic kidney disease dyslipidemia Sjogren's disease polyneuropathy status post breast cancer mastectomy and chemo  No prior neurologic history but does have a history of chronic congestive heart failure and dilated cardiomyopathy  Acute onset of confusion and disorientation this afternoon with some comment that the way she was speaking was reminiscent of word salad however immediately thereafter it was noted that she was febrile with a temperature of 103 and developed some rigors while she was in the CT scanner      Past Medical History:   Diagnosis Date    Ankle fracture 2014    Ankle osteomyelitis, left ([address] 2014    Breast cancer ([address]     Chemotherapy-induced neuropathy (HCC)     Chronic anemia     Chronic systolic CHF (congestive heart failure) (HCC)     CKD (chronic kidney disease) stage 3, GFR 30-59 ml/min ([address]     Colon polyp 2020    Depression     Dilated cardiomyopathy ([address]     Dry eye syndrome of both eyes     Dyslipidemia     Essential hypertension     Fracture of right patella 2013    History of left breast cancer 2001    with mastectomy    Left leg weakness 9/8/2020    Non-ischemic cardiomyopathy ([address] 2018    Echo 50-55%, Cath-minimal CAD. EF normalized    Osteoarthritis     Osteopenia     Peripheral sensory-motor axonal polyneuropathy 10/17/2020    S/P cardiac cath 2009    Statin intolerance     Tibial fracture 2016    Tibial plateau fracture 8/2/1352    Last Assessment & Plan:  Formatting of this note might be different from the original. Pod 2 ORIF doing well. C/o moderate pain. Controlled on PO meds.   Ready to go home    Type 2 diabetes mellitus, controlled ([address]     BS am 104    UTI (urinary tract infection) 2020    Vaginal infection     Vitamin B12 deficiency        Past Surgical History:   Procedure Laterality Date    BREAST BIOPSY      left breast biopsy    BREAST BIOPSY  1968    left cyst removed    BREAST LUMPECTOMY Left 2001    BREAST REDUCTION SURGERY      Rt breast    CARDIAC CATHETERIZATION  2009    CARPAL TUNNEL RELEASE Bilateral     CHOLECYSTECTOMY, OPEN  1981    COLONOSCOPY  07/2014    CYST REMOVAL Right 11/2020    hand    HAND SURGERY Right 11/16/2022    Right index finger distal interphalangeal joint arthrodesis performed by Severa Maine, MD at [address] [facility]      Sinus Surgery    HEENT      RK procedure bilateral eyes    HX OPEN REDUCTION INTERNAL FIXATION Left 2013    Ankle    HX OPEN REDUCTION INTERNAL FIXATION  08/2016    [place]    HYSTERECTOMY (CERVIX STATUS UNKNOWN)      LUMBAR LAMINECTOMY  1992    MASTECTOMY Left 2002    With Reconstruction    ORTHOPEDIC SURGERY Right 08/2013    Patellar fracture repair    OTHER SURGICAL HISTORY      Chetan Cath Placed and Removed    OVARY REMOVAL      Unilateral    PARTIAL HYSTERECTOMY (CERVIX NOT REMOVED)  1974    TONSILLECTOMY      TONSILLECTOMY      TRANSPLANT  2002    Stem Cell Transplant    UVULOPALATOPHARYGOPLASTY  2004        Family History   Problem Relation Age of Onset    Cancer Mother     Diabetes Mother     Heart Disease Mother         MI at age 66    Heart Failure Mother         age 58    Stroke Sister     Cancer Brother     Heart Disease Brother     Heart Disease Father         MI age 55    Breast Cancer Neg Hx     Stomach Cancer Neg Hx         Social History     Socioeconomic History    Marital status:    Tobacco Use    Smoking status: Never    Smokeless tobacco: Never    Tobacco comments:     never used tobacco   Vaping Use    Vaping Use: Never used   Substance and Sexual Activity    Alcohol use: No    Drug use: No    Sexual activity: Not Currently     Partners: Male     Social Determinants of Health     Financial Resource Strain: Low Risk     Difficulty of Paying Living Expenses: Not hard at all   Food Insecurity: No Food Insecurity    Worried About [facility] in the Last Year: Never true    Ran Out of Food in the Last Year: Never true         Current Facility-Administered Medications   Medication Dose Route Frequency Provider Last Rate Last Admin    glucose chewable tablet 16 g  4 tablet Oral PRN Vickey Amezcua MD        dextrose bolus 10% 125 mL  125 mL IntraVENous PRN Vickey Amezcua MD        Or    dextrose bolus 10% 250 mL  250 mL IntraVENous PRN Vickey Amezcua MD        glucagon (rDNA) injection 1 mg  1 mg SubCUTAneous PRN Vickey Amezcua MD        dextrose 10 % infusion   IntraVENous Continuous PRN Vickey Amezcua MD        alcohol 62% (NOZIN) nasal  1 ampule  1 ampule Topical Q12H TERESA Goldstein   1 ampule at 02/07/23 1208    Vitamin D (CHOLECALCIFEROL) tablet 2,000 Units  2,000 Units Oral Daily LIU Cho CNP   2,000 Units at 02/07/23 1222    ceFAZolin (ANCEF) 2000 mg in sterile water 20 mL IV syringe  2,000 mg IntraVENous On Call to 1100 Ciro Dawkins MD        cefepime (MAXIPIME) 2,000 mg in sodium chloride 0.9 % 50 mL IVPB (mini-bag)  2,000 mg IntraVENous Q12H LIU Cho CNP        doxycycline (VIBRAMYCIN) 100 mg in sodium chloride 0.9 % 100 mL IVPB (mini-bag)  100 mg IntraVENous Q12H LIU Cho - MALOU        amitriptyline (ELAVIL) tablet 10 mg  10 mg Oral Nightly Tiera Ordaz MD        calcium carbonate (TUMS) chewable tablet 500 mg  1 tablet Oral Nightly Tiera Ordaz MD   500 mg at 02/06/23 2201    cetirizine (ZYRTEC) tablet 10 mg  10 mg Oral Daily PRN Tiera Ordaz MD        dextromethorphan-guaiFENesin (MUCINEX DM)  MG per extended release tablet 1 tablet (Patient Supplied)  1 tablet Oral Q12H PRN Tiera Ordaz MD        furosemide (LASIX) tablet 40 mg  40 mg Oral Every Other Day Tiera Ordaz MD   40 mg at 02/06/23 1715 gabapentin (NEURONTIN) capsule 600 mg  600 mg Oral BID Inessa Culver MD   600 mg at 02/07/23 0939    lisinopril (PRINIVIL;ZESTRIL) tablet 10 mg  10 mg Oral BID Inessa Culver MD   10 mg at 02/07/23 0939    insulin lispro (HUMALOG) injection vial 0-8 Units  0-8 Units SubCUTAneous TID WC Inessa Culver MD        insulin lispro (HUMALOG) injection vial 0-4 Units  0-4 Units SubCUTAneous Nightly Inessa Culver MD        metoprolol succinate (TOPROL XL) extended release tablet 50 mg  50 mg Oral Nightly Inessa Culver MD   50 mg at 02/06/23 2202    nitrofurantoin (MACRODANTIN) capsule 50 mg  50 mg Oral Daily Inessa Culver MD   50 mg at 02/07/23 0939    potassium chloride (KLOR-CON M) extended release tablet 10 mEq  10 mEq Oral Daily with breakfast Inessa Culver MD   10 mEq at 02/07/23 0939    sodium chloride flush 0.9 % injection 5-40 mL  5-40 mL IntraVENous 2 times per day Inessa Culver MD        sodium chloride flush 0.9 % injection 5-40 mL  5-40 mL IntraVENous PRN Inessa Culver MD        0.9 % sodium chloride infusion   IntraVENous PRN Inessa Culver MD        [Held by provider] enoxaparin (LOVENOX) injection 40 mg  40 mg SubCUTAneous Q24H Inessa Culver MD   40 mg at 02/06/23 1716    ondansetron (ZOFRAN-ODT) disintegrating tablet 4 mg  4 mg Oral Q8H PRN Inessa Culver MD        Or    ondansetron (ZOFRAN) injection 4 mg  4 mg IntraVENous Q6H PRN Inessa Culver MD   4 mg at 02/06/23 2038    polyethylene glycol (GLYCOLAX) packet 17 g  17 g Oral Daily PRN Inessa Culver MD        acetaminophen (TYLENOL) tablet 650 mg  650 mg Oral Q6H PRN Inessa Culver MD        Or    acetaminophen (TYLENOL) suppository 650 mg  650 mg Rectal Q6H PRN Inessa Culver MD        tuberculin injection 5 Units  5 Units IntraDERmal Once Inessa Culver MD        0.9 % sodium chloride infusion   IntraVENous Continuous Inessa Culver MD 75 mL/hr at 02/07/23 0700 Rate Verify at 02/07/23 0700    HYDROmorphone HCl PF (DILAUDID) injection 1 mg  1 mg IntraVENous Q4H PRN Inessa Culver MD   1 mg at 02/07/23 1222    mirabegron (MYRBETRIQ) extended release tablet 25 mg (Patient Supplied)  25 mg Oral Daily Leeann Villanueva MD        labetalol (NORMODYNE;TRANDATE) injection 10 mg  10 mg IntraVENous Q4H PRN Leeann Villanueva MD   10 mg at 02/06/23 2011    melatonin tablet 3 mg  3 mg Oral Nightly Leeann Villanueva MD   3 mg at 02/06/23 2201        Allergies   Allergen Reactions    Oxycodone Itching    Eucalyptus Oil Hives and Other (See Comments)     Burns mouth    Ezetimibe Myalgia    Morphine Other (See Comments)     Feels crazy  Other reaction(s): Hallucinations    Penicillins Hives    Pineapple Hives and Other (See Comments)     Burns mouth    Vancomycin Other (See Comments)     Kidney function worsened    Sulfa Antibiotics Nausea Only and Rash     Medical record review  Recent visit with Dr. Tiera Shen of urology who reported that she had a history of chronic urinary tract infections which were recurrent she reported having symptoms including incontinence she had been admitted with acute renal failure in December 2022. At the time she was seen by urology her urinalysis was unremarkable she was placed on Macrodantin 50 mg daily  I note that she had seen Dr. Cristy Jimenez with a normal pelvic examination 1 month ago    Review of Systems  Acutely ill not obtained  BP (!) 178/108   Pulse 84   Temp (!) 102.9 °F (39.4 °C) (Oral)   Resp 15   Ht 5' 5\" (1.651 m)   Wt 146 lb (66.2 kg)   SpO2 99%   BMI 24.30 kg/m²     Neurologic Exam  She is a confused lady who appears disoriented can give us her name and her age. She does not have any facial asymmetry has a full range of extraocular movements no drift of the upper extremities. The neurologic examination in the lower extremities is clearly compromised secondary to the acute pathology in the left hip    Most recent MRI   No results found for this or any previous visit. Most recent MRA   No results found for this or any previous visit.         Most recent CT  CT head scan demonstrates white matter ischemic disease and evidence of probable old infarct in the right occipital lobe there is no acute pathology no evidence of a mass shift or other pathology    Most recent Echo  No results found for this or any previous visit. Most recent lipid panels  Lab Results   Component Value Date/Time    CHOL 226 06/06/2022 02:38 PM    HDL 52 06/06/2022 02:38 PM    VLDL 48 06/08/2021 02:12 PM       Most recent Hgb A1C  No results found for: HBA1C, [id number]      Assessment/Plan:    Most likely underlying etiology is clearly a acute toxic metabolic event. Given the history of recurrent UTIs this would certainly appear to be the most likely etiology and a urinalysis may be of help. Will defer infection work-up to hospitalists.   We will see on request        Arianna Hernandez MD

## 2023-02-07 NOTE — PROGRESS NOTES
TRANSFER - OUT REPORT:    Verbal report given to Sotero Smith (name) on Jensen Kraus  being transferred to 7th floor (unit) for routine progression of patient care       Report consisted of patients Situation, Background, Assessment and   Recommendations(SBAR). Information from the following report(s) Nurse Handoff Report was reviewed with the receiving nurse. Opportunity for questions and clarification was provided. Patient transported with:   Patient-specific medications from Pharmacy    Lines: 1 Help Text      This SmartLink retrieves the last documented value for LDA assessment data, retrieved by either LDA type or by LDA group ID. This SmartLink should be used in a SmartText or SmartPhrase. If one is not available, please contact your .

## 2023-02-07 NOTE — ED NOTES
PIV redressed d/t blood noted under dressing. BP cuff placed on wrist d/t putting pressure on PIV when inflating.       Pantera Lynn RN  02/06/23 1944

## 2023-02-07 NOTE — PROGRESS NOTES
Rapid Response Team Note      Subjective: Responded to Code Stroke secondary to new onset word salad, confusion. Summary: Pt. New admission to 703 from ED for hip fx. Upon arrival to room patient confused with word salad at times. Stated Niya Mukherjee was president, it was 2002, and she was at [address] commands appropriately no other deficits noted. BP elevated and patient febrile, temp 102.9    Dr. Rolando Corona and Adela Sales, NP at bedside for assessment. Stat head CT ordered, assisted with transport to and from [address] with 7th floor charge RN. See Rapid Response/Code Blue Narrator for full documentation    Outcome: Patient to remain in current location. Will follow-up per Critical Care Clinical Rounding protocol.     Randa Louis, [address] [facility]: [facility]: [phone]

## 2023-02-07 NOTE — PERIOP NOTE
ONE[facility] nurse informed of patient's procedure on 2/8 with Dr. Christian Darden. OR time of 0939 with pre-op arrival of 0730.

## 2023-02-08 ENCOUNTER — ANESTHESIA (OUTPATIENT)
Dept: SURGERY | Age: 78
End: 2023-02-08
Payer: MEDICARE

## 2023-02-08 ENCOUNTER — APPOINTMENT (OUTPATIENT)
Dept: GENERAL RADIOLOGY | Age: 78
End: 2023-02-08
Payer: MEDICARE

## 2023-02-08 LAB
ABO + RH BLD: NORMAL
ANION GAP SERPL CALC-SCNC: 3 MMOL/L (ref 2–11)
APPEARANCE UR: CLEAR
BACTERIA URNS QL MICRO: NEGATIVE /HPF
BILIRUB UR QL: NEGATIVE
BLOOD GROUP ANTIBODIES SERPL: NORMAL
BUN SERPL-MCNC: 17 MG/DL (ref 8–23)
CALCIUM SERPL-MCNC: 9.6 MG/DL (ref 8.3–10.4)
CASTS URNS QL MICRO: ABNORMAL /LPF
CHLORIDE SERPL-SCNC: 105 MMOL/L (ref 101–110)
CO2 SERPL-SCNC: 30 MMOL/L (ref 21–32)
COLOR UR: ABNORMAL
CREAT SERPL-MCNC: 0.9 MG/DL (ref 0.6–1)
EPI CELLS #/AREA URNS HPF: ABNORMAL /HPF
GLUCOSE BLD STRIP.AUTO-MCNC: 101 MG/DL (ref 65–100)
GLUCOSE BLD STRIP.AUTO-MCNC: 113 MG/DL (ref 65–100)
GLUCOSE BLD STRIP.AUTO-MCNC: 135 MG/DL (ref 65–100)
GLUCOSE BLD STRIP.AUTO-MCNC: 90 MG/DL (ref 65–100)
GLUCOSE BLD STRIP.AUTO-MCNC: 94 MG/DL (ref 65–100)
GLUCOSE SERPL-MCNC: 108 MG/DL (ref 65–100)
GLUCOSE UR STRIP.AUTO-MCNC: NEGATIVE MG/DL
HCT VFR BLD AUTO: 31.9 % (ref 35.8–46.3)
HGB BLD-MCNC: 10.2 G/DL (ref 11.7–15.4)
HGB UR QL STRIP: NEGATIVE
KETONES UR QL STRIP.AUTO: NEGATIVE MG/DL
LEUKOCYTE ESTERASE UR QL STRIP.AUTO: NEGATIVE
MUCOUS THREADS URNS QL MICRO: 0 /LPF
NITRITE UR QL STRIP.AUTO: NEGATIVE
PH UR STRIP: 5 (ref 5–9)
POTASSIUM SERPL-SCNC: 3.9 MMOL/L (ref 3.5–5.1)
PROT UR STRIP-MCNC: NEGATIVE MG/DL
RBC #/AREA URNS HPF: ABNORMAL /HPF
SERVICE CMNT-IMP: ABNORMAL
SERVICE CMNT-IMP: NORMAL
SERVICE CMNT-IMP: NORMAL
SODIUM SERPL-SCNC: 138 MMOL/L (ref 133–143)
SP GR UR REFRACTOMETRY: 1.03 (ref 1–1.02)
SPECIMEN EXP DATE BLD: NORMAL
URINE CULTURE IF INDICATED: ABNORMAL
UROBILINOGEN UR QL STRIP.AUTO: 0.2 EU/DL (ref 0.2–1)
WBC URNS QL MICRO: ABNORMAL /HPF

## 2023-02-08 PROCEDURE — 3600000004 HC SURGERY LEVEL 4 BASE: Performed by: ORTHOPAEDIC SURGERY

## 2023-02-08 PROCEDURE — 3700000001 HC ADD 15 MINUTES (ANESTHESIA): Performed by: ORTHOPAEDIC SURGERY

## 2023-02-08 PROCEDURE — 2580000003 HC RX 258: Performed by: FAMILY MEDICINE

## 2023-02-08 PROCEDURE — 3600000014 HC SURGERY LEVEL 4 ADDTL 15MIN: Performed by: ORTHOPAEDIC SURGERY

## 2023-02-08 PROCEDURE — 6370000000 HC RX 637 (ALT 250 FOR IP): Performed by: PHYSICIAN ASSISTANT

## 2023-02-08 PROCEDURE — 6370000000 HC RX 637 (ALT 250 FOR IP): Performed by: ORTHOPAEDIC SURGERY

## 2023-02-08 PROCEDURE — 6370000000 HC RX 637 (ALT 250 FOR IP): Performed by: ANESTHESIOLOGY

## 2023-02-08 PROCEDURE — 2580000003 HC RX 258: Performed by: ANESTHESIOLOGY

## 2023-02-08 PROCEDURE — 2709999900 HC NON-CHARGEABLE SUPPLY: Performed by: ORTHOPAEDIC SURGERY

## 2023-02-08 PROCEDURE — 2580000003 HC RX 258: Performed by: ORTHOPAEDIC SURGERY

## 2023-02-08 PROCEDURE — 36415 COLL VENOUS BLD VENIPUNCTURE: CPT

## 2023-02-08 PROCEDURE — 6360000002 HC RX W HCPCS: Performed by: ANESTHESIOLOGY

## 2023-02-08 PROCEDURE — 6360000002 HC RX W HCPCS: Performed by: REGISTERED NURSE

## 2023-02-08 PROCEDURE — C1713 ANCHOR/SCREW BN/BN,TIS/BN: HCPCS | Performed by: ORTHOPAEDIC SURGERY

## 2023-02-08 PROCEDURE — 2580000003 HC RX 258: Performed by: STUDENT IN AN ORGANIZED HEALTH CARE EDUCATION/TRAINING PROGRAM

## 2023-02-08 PROCEDURE — 2720000010 HC SURG SUPPLY STERILE: Performed by: ORTHOPAEDIC SURGERY

## 2023-02-08 PROCEDURE — 2500000003 HC RX 250 WO HCPCS: Performed by: STUDENT IN AN ORGANIZED HEALTH CARE EDUCATION/TRAINING PROGRAM

## 2023-02-08 PROCEDURE — 6360000002 HC RX W HCPCS: Performed by: ORTHOPAEDIC SURGERY

## 2023-02-08 PROCEDURE — 3700000000 HC ANESTHESIA ATTENDED CARE: Performed by: ORTHOPAEDIC SURGERY

## 2023-02-08 PROCEDURE — 80048 BASIC METABOLIC PNL TOTAL CA: CPT

## 2023-02-08 PROCEDURE — 6360000002 HC RX W HCPCS: Performed by: STUDENT IN AN ORGANIZED HEALTH CARE EDUCATION/TRAINING PROGRAM

## 2023-02-08 PROCEDURE — 0QS70ZZ REPOSITION LEFT UPPER FEMUR, OPEN APPROACH: ICD-10-PCS | Performed by: ORTHOPAEDIC SURGERY

## 2023-02-08 PROCEDURE — 1100000000 HC RM PRIVATE

## 2023-02-08 PROCEDURE — C1769 GUIDE WIRE: HCPCS | Performed by: ORTHOPAEDIC SURGERY

## 2023-02-08 PROCEDURE — 7100000001 HC PACU RECOVERY - ADDTL 15 MIN: Performed by: ORTHOPAEDIC SURGERY

## 2023-02-08 PROCEDURE — 7100000000 HC PACU RECOVERY - FIRST 15 MIN: Performed by: ORTHOPAEDIC SURGERY

## 2023-02-08 PROCEDURE — 27236 TREAT THIGH FRACTURE: CPT | Performed by: ORTHOPAEDIC SURGERY

## 2023-02-08 PROCEDURE — 82962 GLUCOSE BLOOD TEST: CPT

## 2023-02-08 PROCEDURE — 73502 X-RAY EXAM HIP UNI 2-3 VIEWS: CPT

## 2023-02-08 PROCEDURE — 2500000003 HC RX 250 WO HCPCS: Performed by: ORTHOPAEDIC SURGERY

## 2023-02-08 PROCEDURE — 86901 BLOOD TYPING SEROLOGIC RH(D): CPT

## 2023-02-08 PROCEDURE — 81001 URINALYSIS AUTO W/SCOPE: CPT

## 2023-02-08 PROCEDURE — 85018 HEMOGLOBIN: CPT

## 2023-02-08 PROCEDURE — 6360000002 HC RX W HCPCS: Performed by: FAMILY MEDICINE

## 2023-02-08 PROCEDURE — 2500000003 HC RX 250 WO HCPCS: Performed by: REGISTERED NURSE

## 2023-02-08 DEVICE — SCREW BONE L44MM DIA5MM TI ALLOY LCK ST W/ T25 STARDRV: Type: IMPLANTABLE DEVICE | Site: HIP | Status: FUNCTIONAL

## 2023-02-08 DEVICE — SCREW BONE L90MM DIA6.4MM BLU TI ALLOY ANTIROTATION T25: Type: IMPLANTABLE DEVICE | Site: HIP | Status: FUNCTIONAL

## 2023-02-08 DEVICE — PLATE BONE 1 H TI ALLOY FOR FEM NK SYS: Type: IMPLANTABLE DEVICE | Site: HIP | Status: FUNCTIONAL

## 2023-02-08 DEVICE — BOLT BONE L90MM DIA10MM GLD TI ALLOY FOR FEM NK SYS: Type: IMPLANTABLE DEVICE | Site: HIP | Status: FUNCTIONAL

## 2023-02-08 RX ORDER — HYDROMORPHONE HYDROCHLORIDE 1 MG/ML
0.5 INJECTION, SOLUTION INTRAMUSCULAR; INTRAVENOUS; SUBCUTANEOUS
Status: DISCONTINUED | OUTPATIENT
Start: 2023-02-08 | End: 2023-02-10 | Stop reason: HOSPADM

## 2023-02-08 RX ORDER — SODIUM CHLORIDE 0.9 % (FLUSH) 0.9 %
5-40 SYRINGE (ML) INJECTION EVERY 12 HOURS SCHEDULED
Status: DISCONTINUED | OUTPATIENT
Start: 2023-02-08 | End: 2023-02-08 | Stop reason: HOSPADM

## 2023-02-08 RX ORDER — PROPOFOL 10 MG/ML
INJECTION, EMULSION INTRAVENOUS PRN
Status: DISCONTINUED | OUTPATIENT
Start: 2023-02-08 | End: 2023-02-08 | Stop reason: SDUPTHER

## 2023-02-08 RX ORDER — SODIUM CHLORIDE 0.9 % (FLUSH) 0.9 %
5-40 SYRINGE (ML) INJECTION PRN
Status: DISCONTINUED | OUTPATIENT
Start: 2023-02-08 | End: 2023-02-08 | Stop reason: HOSPADM

## 2023-02-08 RX ORDER — HYDROMORPHONE HYDROCHLORIDE 2 MG/1
1 TABLET ORAL EVERY 4 HOURS PRN
Status: DISCONTINUED | OUTPATIENT
Start: 2023-02-08 | End: 2023-02-10 | Stop reason: HOSPADM

## 2023-02-08 RX ORDER — HYDROMORPHONE HCL 110MG/55ML
0.5 PATIENT CONTROLLED ANALGESIA SYRINGE INTRAVENOUS
Status: DISPENSED | OUTPATIENT
Start: 2023-02-08 | End: 2023-02-08

## 2023-02-08 RX ORDER — MIDAZOLAM HYDROCHLORIDE 2 MG/2ML
2 INJECTION, SOLUTION INTRAMUSCULAR; INTRAVENOUS
Status: DISCONTINUED | OUTPATIENT
Start: 2023-02-08 | End: 2023-02-08 | Stop reason: HOSPADM

## 2023-02-08 RX ORDER — ONDANSETRON 4 MG/1
4 TABLET, ORALLY DISINTEGRATING ORAL EVERY 8 HOURS PRN
Status: DISCONTINUED | OUTPATIENT
Start: 2023-02-08 | End: 2023-02-10 | Stop reason: HOSPADM

## 2023-02-08 RX ORDER — ONDANSETRON 2 MG/ML
4 INJECTION INTRAMUSCULAR; INTRAVENOUS
Status: DISCONTINUED | OUTPATIENT
Start: 2023-02-08 | End: 2023-02-08

## 2023-02-08 RX ORDER — HYDROMORPHONE HYDROCHLORIDE 2 MG/1
1 TABLET ORAL EVERY 4 HOURS PRN
Status: DISCONTINUED | OUTPATIENT
Start: 2023-02-08 | End: 2023-02-08 | Stop reason: SDUPTHER

## 2023-02-08 RX ORDER — ONDANSETRON 2 MG/ML
INJECTION INTRAMUSCULAR; INTRAVENOUS PRN
Status: DISCONTINUED | OUTPATIENT
Start: 2023-02-08 | End: 2023-02-08 | Stop reason: SDUPTHER

## 2023-02-08 RX ORDER — TRAMADOL HYDROCHLORIDE 50 MG/1
50 TABLET ORAL
Status: COMPLETED | OUTPATIENT
Start: 2023-02-08 | End: 2023-02-08

## 2023-02-08 RX ORDER — LIDOCAINE HYDROCHLORIDE 20 MG/ML
INJECTION, SOLUTION EPIDURAL; INFILTRATION; INTRACAUDAL; PERINEURAL PRN
Status: DISCONTINUED | OUTPATIENT
Start: 2023-02-08 | End: 2023-02-08 | Stop reason: SDUPTHER

## 2023-02-08 RX ORDER — ONDANSETRON 2 MG/ML
4 INJECTION INTRAMUSCULAR; INTRAVENOUS EVERY 6 HOURS PRN
Status: DISCONTINUED | OUTPATIENT
Start: 2023-02-08 | End: 2023-02-10 | Stop reason: HOSPADM

## 2023-02-08 RX ORDER — SODIUM CHLORIDE 0.9 % (FLUSH) 0.9 %
5-40 SYRINGE (ML) INJECTION EVERY 12 HOURS SCHEDULED
Status: DISCONTINUED | OUTPATIENT
Start: 2023-02-08 | End: 2023-02-10 | Stop reason: HOSPADM

## 2023-02-08 RX ORDER — SODIUM CHLORIDE, SODIUM LACTATE, POTASSIUM CHLORIDE, CALCIUM CHLORIDE 600; 310; 30; 20 MG/100ML; MG/100ML; MG/100ML; MG/100ML
INJECTION, SOLUTION INTRAVENOUS CONTINUOUS
Status: DISCONTINUED | OUTPATIENT
Start: 2023-02-08 | End: 2023-02-08 | Stop reason: HOSPADM

## 2023-02-08 RX ORDER — SODIUM CHLORIDE 0.9 % (FLUSH) 0.9 %
5-40 SYRINGE (ML) INJECTION PRN
Status: DISCONTINUED | OUTPATIENT
Start: 2023-02-08 | End: 2023-02-10 | Stop reason: HOSPADM

## 2023-02-08 RX ORDER — SODIUM CHLORIDE 9 MG/ML
INJECTION, SOLUTION INTRAVENOUS PRN
Status: DISCONTINUED | OUTPATIENT
Start: 2023-02-08 | End: 2023-02-10 | Stop reason: HOSPADM

## 2023-02-08 RX ORDER — HYDROMORPHONE HYDROCHLORIDE 2 MG/ML
0.5 INJECTION, SOLUTION INTRAMUSCULAR; INTRAVENOUS; SUBCUTANEOUS
Status: DISCONTINUED | OUTPATIENT
Start: 2023-02-08 | End: 2023-02-08 | Stop reason: SDUPTHER

## 2023-02-08 RX ORDER — SODIUM CHLORIDE 9 MG/ML
INJECTION, SOLUTION INTRAVENOUS PRN
Status: DISCONTINUED | OUTPATIENT
Start: 2023-02-08 | End: 2023-02-08 | Stop reason: HOSPADM

## 2023-02-08 RX ORDER — FENTANYL CITRATE 50 UG/ML
100 INJECTION, SOLUTION INTRAMUSCULAR; INTRAVENOUS
Status: DISCONTINUED | OUTPATIENT
Start: 2023-02-08 | End: 2023-02-08 | Stop reason: HOSPADM

## 2023-02-08 RX ORDER — ACETAMINOPHEN 500 MG
1000 TABLET ORAL ONCE
Status: COMPLETED | OUTPATIENT
Start: 2023-02-08 | End: 2023-02-08

## 2023-02-08 RX ORDER — DIPHENHYDRAMINE HYDROCHLORIDE 50 MG/ML
12.5 INJECTION INTRAMUSCULAR; INTRAVENOUS
Status: DISCONTINUED | OUTPATIENT
Start: 2023-02-08 | End: 2023-02-08

## 2023-02-08 RX ADMIN — NITROFURANTOIN MACROCRYSTALS 50 MG: 50 CAPSULE ORAL at 13:26

## 2023-02-08 RX ADMIN — ACETAMINOPHEN 650 MG: 325 TABLET ORAL at 23:34

## 2023-02-08 RX ADMIN — Medication 1 AMPULE: at 08:04

## 2023-02-08 RX ADMIN — CEFEPIME 2000 MG: 2 INJECTION, POWDER, FOR SOLUTION INTRAVENOUS at 06:12

## 2023-02-08 RX ADMIN — POTASSIUM CHLORIDE 10 MEQ: 1500 TABLET, EXTENDED RELEASE ORAL at 13:26

## 2023-02-08 RX ADMIN — Medication 3 MG: at 21:22

## 2023-02-08 RX ADMIN — CEFEPIME 2000 MG: 2 INJECTION, POWDER, FOR SOLUTION INTRAVENOUS at 17:48

## 2023-02-08 RX ADMIN — HYDROMORPHONE HYDROCHLORIDE 0.5 MG: 2 INJECTION, SOLUTION INTRAMUSCULAR; INTRAVENOUS; SUBCUTANEOUS at 11:12

## 2023-02-08 RX ADMIN — ONDANSETRON 4 MG: 2 INJECTION INTRAMUSCULAR; INTRAVENOUS at 10:17

## 2023-02-08 RX ADMIN — Medication 1 AMPULE: at 23:34

## 2023-02-08 RX ADMIN — DOXYCYCLINE 100 MG: 100 INJECTION, POWDER, LYOPHILIZED, FOR SOLUTION INTRAVENOUS at 06:12

## 2023-02-08 RX ADMIN — HYDROMORPHONE HYDROCHLORIDE 0.5 MG: 1 INJECTION, SOLUTION INTRAMUSCULAR; INTRAVENOUS; SUBCUTANEOUS at 21:21

## 2023-02-08 RX ADMIN — SODIUM CHLORIDE, SODIUM LACTATE, POTASSIUM CHLORIDE, AND CALCIUM CHLORIDE: 600; 310; 30; 20 INJECTION, SOLUTION INTRAVENOUS at 08:05

## 2023-02-08 RX ADMIN — METOPROLOL SUCCINATE 50 MG: 50 TABLET, EXTENDED RELEASE ORAL at 21:22

## 2023-02-08 RX ADMIN — LIDOCAINE HYDROCHLORIDE 100 MG: 20 INJECTION, SOLUTION EPIDURAL; INFILTRATION; INTRACAUDAL; PERINEURAL at 10:01

## 2023-02-08 RX ADMIN — HYDROMORPHONE HYDROCHLORIDE 1 MG: 1 INJECTION, SOLUTION INTRAMUSCULAR; INTRAVENOUS; SUBCUTANEOUS at 04:29

## 2023-02-08 RX ADMIN — CALCIUM CARBONATE (ANTACID) CHEW TAB 500 MG 500 MG: 500 CHEW TAB at 21:22

## 2023-02-08 RX ADMIN — TRAMADOL HYDROCHLORIDE 50 MG: 50 TABLET, COATED ORAL at 11:06

## 2023-02-08 RX ADMIN — SODIUM CHLORIDE, PRESERVATIVE FREE 10 ML: 5 INJECTION INTRAVENOUS at 13:29

## 2023-02-08 RX ADMIN — TUBERCULIN PURIFIED PROTEIN DERIVATIVE 5 UNITS: 5 INJECTION, SOLUTION INTRADERMAL at 21:00

## 2023-02-08 RX ADMIN — PROPOFOL 100 MG: 10 INJECTION, EMULSION INTRAVENOUS at 10:01

## 2023-02-08 RX ADMIN — AMITRIPTYLINE HYDROCHLORIDE 10 MG: 10 TABLET, FILM COATED ORAL at 21:22

## 2023-02-08 RX ADMIN — LISINOPRIL 10 MG: 5 TABLET ORAL at 21:22

## 2023-02-08 RX ADMIN — FUROSEMIDE 40 MG: 40 TABLET ORAL at 13:26

## 2023-02-08 RX ADMIN — SODIUM CHLORIDE: 9 INJECTION, SOLUTION INTRAVENOUS at 06:12

## 2023-02-08 RX ADMIN — LISINOPRIL 10 MG: 5 TABLET ORAL at 13:25

## 2023-02-08 RX ADMIN — HYDROMORPHONE HYDROCHLORIDE 0.5 MG: 2 INJECTION, SOLUTION INTRAMUSCULAR; INTRAVENOUS; SUBCUTANEOUS at 11:35

## 2023-02-08 RX ADMIN — GABAPENTIN 600 MG: 300 CAPSULE ORAL at 13:25

## 2023-02-08 RX ADMIN — DOXYCYCLINE 100 MG: 100 INJECTION, POWDER, LYOPHILIZED, FOR SOLUTION INTRAVENOUS at 17:44

## 2023-02-08 RX ADMIN — GABAPENTIN 600 MG: 300 CAPSULE ORAL at 21:22

## 2023-02-08 RX ADMIN — CHOLECALCIFEROL TAB 25 MCG (1000 UNIT) 2000 UNITS: 25 TAB at 13:26

## 2023-02-08 RX ADMIN — ACETAMINOPHEN 1000 MG: 500 TABLET, FILM COATED ORAL at 08:15

## 2023-02-08 ASSESSMENT — PAIN DESCRIPTION - DESCRIPTORS
DESCRIPTORS: ACHING

## 2023-02-08 ASSESSMENT — PAIN DESCRIPTION - ORIENTATION
ORIENTATION: LEFT

## 2023-02-08 ASSESSMENT — PAIN DESCRIPTION - LOCATION
LOCATION: HIP
LOCATION: LEG

## 2023-02-08 ASSESSMENT — PAIN - FUNCTIONAL ASSESSMENT
PAIN_FUNCTIONAL_ASSESSMENT: ACTIVITIES ARE NOT PREVENTED
PAIN_FUNCTIONAL_ASSESSMENT: 0-10
PAIN_FUNCTIONAL_ASSESSMENT: ACTIVITIES ARE NOT PREVENTED

## 2023-02-08 ASSESSMENT — PAIN DESCRIPTION - PAIN TYPE: TYPE: SURGICAL PAIN

## 2023-02-08 ASSESSMENT — PAIN SCALES - GENERAL
PAINLEVEL_OUTOF10: 8
PAINLEVEL_OUTOF10: 10
PAINLEVEL_OUTOF10: 5
PAINLEVEL_OUTOF10: 7
PAINLEVEL_OUTOF10: 10

## 2023-02-08 ASSESSMENT — PAIN DESCRIPTION - ONSET: ONSET: ON-GOING

## 2023-02-08 ASSESSMENT — PAIN DESCRIPTION - FREQUENCY: FREQUENCY: CONTINUOUS

## 2023-02-08 NOTE — INTERVAL H&P NOTE
Update History & Physical    The Patient's History and Physical of 2/6/2023 was reviewed with the patient and I examined the patient. There was no change. The surgical site was confirmed by the patient and me. Plan:  The risk, benefits, expected outcome, and alternative to the recommended procedure have been discussed with the patient. Patient understands and wants to proceed with open treatment of left femoral neck fracture with plate and screw fixation.     Electronically signed by Narayan Ramon MD on 2/8/2023 at 7:02 AM

## 2023-02-08 NOTE — OP NOTE
Operative Report    Patient: Yan Felipe MRN: [id number]  SSN: [id number]    YOB: 1945  Age: 69 y.o. Sex: female       Date of Surgery: February 8, 2023     History:  Yan Felipe is a 69 y.o. female who fell and injured her left hip. She was seen and had plain film x-rays were little bit equivocal for something going on with her femoral neck so we did get an MRI to confirm she did have a acute left femoral neck fracture. This was nondisplaced we talked her about operative fixation we thought it would be reasonable to try to fix this with a Synthes femoral neck system. She seemed to be okay with this idea. I talked to the patient and/or their representative and explained the exact nature the procedure. I also went through a detailed list of the material risks associated with  the procedure which included risk of bleeding, infection, injury to nearby structures, worsening the situation, as well as the risks associate with anesthesia and finally death. Also talked with him regarding the benefits and alternatives to the procedure. Preoperative Diagnosis: Closed left hip fracture, initial encounter ([facility] [S72.002A]     Postoperative Diagnosis:   Closed nondisplaced left femoral neck fracture      Surgeon(s) and Role:     * Lg Damico MD - Primary    Anesthesia: Choice     Procedure: Open treatment of left femoral neck fracture with plate and screw fixation    Procedure in Detail: After the successful induction of general anesthesia the left lower extremity was placed in boot traction on a fracture table. At that point we made sure we could visualize the hip on both the AP and lateral projection with the C arm image intensifier. Once this was confirmed we then prepped and draped the ostoeporotic  hip. After this was done I then used a guidewire to localize my incision and then made a small incision laterally over the left hip.   I placed a guidewire into the center the femoral head on both the AP and the lateral projection. After placing the guidewire and confirmed its position I then measured for a 90 mm screw. The proximal femur was then drilled corresponding to the length of the screw. Once the screw and plate construct was assembled we then placed this with an impacter and confirmed its position on both AP and lateral projection. I then drilled for and placed an antirotation screw corresponding to the same length of the screw through the plate which in this case was a 90 mm screw. I then used the outrigger device and drilled for and placed a single screw in the shaft portion of the plate. As I placed both of my screws I tried to make sure to limit the amount of torque present and make sure that both of these would back out so that there was minimal risk for cold welding of the locking screws to the plate. Once this was in appropriate position I checked my final reduction as well as the placement of my hardware on both AP and lateral projection. I was pleased with this. I then remove the insertion device and closed my incision with 2.0 Monocryl for the subcutaneous tissue and staples for the skin. Dressings were applied and the patient was awakened and taken to the recovery room in stable condition. Estimated Blood Loss: 120 cc    Tourniquet Time: * No tourniquets in log *      Implants:   Implant Name Type Inv.  Item Serial No.  Lot No. LRB No. Used Action   PLATE BONE 1 H TI ALLOY FOR FEM NK SYS - [id number]  PLATE BONE 1 H TI ALLOY FOR FEM NK SYS  [employer] [id number] Left 1 Implanted   BOLT BONE L90MM [id number] GLD TI ALLOY FOR FEM NK SYS - [id number]  BOLT BONE L90MM [id number] GLD TI ALLOY FOR FEM NK SYS  [employer] [facility] [id number] Left 1 Implanted   SCREW BONE L90MM DIA6.4MM IESHA TI ALLOY ANTIROTATION T25 - [id number]  SCREW BONE L90MM DIA6.4MM IESHA TI ALLOY ANTIROTATION T25  [employer] [id number] Left 1 Implanted   SCREW BONE L44MM DIA5MM TI ALLOY LCK ST W/ T25 [employer] - [id number]  SCREW BONE L44MM DIA5MM TI ALLOY LCK ST W/ T25 [employer]  [employer]WD [id number] Left 1 Implanted               Specimens: * No specimens in log *        Drains: None                Complications: None    Counts: Sponge and needle counts were correct times two.     Signed By:  Florencio Briseno MD     February 8, 2023

## 2023-02-08 NOTE — PROGRESS NOTES
Physician Progress Note      PATIENT:               Juan David Vazquez  CSN #:                  [id number]  :                       1945  ADMIT DATE:       2023 11:38 AM  [address] [place] DATE:  Khurram Edwards  PROVIDER #:        Caity Tliley MD          QUERY TEXT:    Pt admitted with a Left Hip Fracture . Pt noted to have a hx of osteopenia. Last dexascan showed low bone density . If possible, please document in   progress notes and discharge summary if you are evaluating and/or treating any   of the following: The medical record reflects the following:  Risk Factors: Osteopenia, Low bone density, DM, hx of breast cancer  Clinical Indicators: Fall from standing height--- last dexascan showing low   bone density. . Has osteopenia listed per history. .. Treatment: Pt take supplements outpatient---vitd, calcium. .. for surgery. Options provided:  -- Pathological L Hip  fracture due to osteopenia following fall which would   not usually break a normal, healthy bone  -- Osteoporotic L hip  fracture following fall which would not usually break a   normal, healthy bone  -- Traumatic L hip  fracture  -- Other - I will add my own diagnosis  -- Disagree - Not applicable / Not valid  -- Disagree - Clinically unable to determine / Unknown  -- Refer to Clinical Documentation Reviewer    PROVIDER RESPONSE TEXT:    This patient has an osteoporotic L hip fracture following fall which would not   break a normal, healthy bone.     Query created by: Jahaira Carter on 2023 11:21 AM      Electronically signed by:  Caity Tilley MD 2023 1:41 PM

## 2023-02-08 NOTE — PROGRESS NOTES
TRANSFER - OUT REPORT:    Verbal report given to Theresa Morrison on Junaid Garcia  being transferred to Preop for ordered procedure       Report consisted of patient's Situation, Background, Assessment and   Recommendations(SBAR). Information from the following report(s) Nurse Handoff Report was reviewed with the receiving nurse. Amelia Assessment: Presents to emergency department  because of falls (Syncope, seizure, or loss of consciousness): Yes, Age > 79: Yes, Altered Mental Status, Intoxication with alcohol or substance confusion (Disorientation, impaired judgment, poor safety awaremess, or inability to follow instructions): No, Impaired Mobility: Ambulates or transfers with assistive devices or assistance; Unable to ambulate or transer.: No, Nursing Judgement: Yes  Lines:   Peripheral IV 02/06/23 Right Antecubital (Active)   Site Assessment Clean, dry & intact 02/07/23 2157   Line Status Infusing 02/07/23 2157   Line Care Connections checked and tightened 02/07/23 2157   Phlebitis Assessment No symptoms 02/07/23 2157   Infiltration Assessment 0 02/07/23 2157   Alcohol Cap Used No 02/07/23 2157   Dressing Status Clean, dry & intact 02/07/23 2157   Dressing Type Transparent 02/07/23 2157   Dressing Intervention New 02/06/23 1203        Opportunity for questions and clarification was provided.

## 2023-02-08 NOTE — PROGRESS NOTES
[address] [place] [address] responded to Code Stroke. [address] [place] [address] prayed silently for PT, PT's Family, and Staff. PT is [employer]. [address] [place] [address] spoke gently to PT as PT went for testing. [address] [place] [address] checked in with staff and offered support. Rev. Meme Antunez M.Div.

## 2023-02-08 NOTE — PROGRESS NOTES
Hospitalist Progress Note   Admit Date:  2023 11:38 AM   Name:  Dianelys Valiente   Age:  69 y.o. Sex:  female  :  1945   MRN:  [id number]   Room:  /    Presenting Complaint: Fall and Hip Pain     Reason(s) for Admission: Right hip pain [M25.551]  Closed fracture of left hip, initial encounter ([facility] [address] [S72.002A]  Acute pain of left hip [facility]     Hospital Course:   Patient is a 70 y/o female with medical history of GERD, DM II, recurrent UTI, CKD IIIa, hyperlipidemia, Sjogren's syndrome, OA of bilateral hands, osteopenia, polyneuropathy, vit D and B12 deficiency, breast cancer s/p mastectomy and chemotherapy, depression, dilated cardiomyopathy, hypertension who presented to ED s/p mechanical fall with impact to left hip. Denies hitting head or LOC. Patient does not take any blood thinners. ED workup: hypertensive to 191/98   Labs unremarkable, CXR with no acute findings  Hip XR with degenerative changes vs. nondisplaced left femur neck fracture. Hospitalist consulted for admission. MRI demonstrates minimally displaced acute left femoral neck fracture. Other noted findings include left gluteus medius/minimus muscle strains, bilateral hip chondrosis, partial-thickness tears of bilateral gluteus minimus/medius tendinous insertions, bilateral hamstring tendon origins. Orthopedic consultation. Patient underwent open treatment of left femoral neck fracture with plate and screw fixation with Dr. Konstantin Eli . Fever of 102.9 with associated AMS 2/7, noted rigors. Code S called due to word salad. Neurology evaluation, secondary to acute toxic metabolic event. CT head obtained and negative for acute pathology. 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 Infectious workup initiated, currently negative to date.  UA negative. Procal 0.18. No leukocytosis. RVP negative. Blood cultures obtained and pending. Continue broad-spectrum antibiotics. No fever recurrence. Mentation has normalized. CT head did show large calcification adjacent to choroid plexus, possible choroid plexus calcification vs meningioma due to size, recommendations for MRI brain w/wo contrast for further evaluation. I have ordered although spaced out due to contrast use yesterday. Subjective & 24hr Events (02/08/23): Patient is alert and oriented x 3 today. Granddaughter present at bedside. Patient denies chest pain, SOB, abdominal pain. She has history of MRSA infection in left ankle and fingers but no current open wounds. She is ready to work with therapy tomorrow. She is very knowledgeable on past medical history. No focal deficits currently.     Assessment & Plan:     Closed left hip fracture, initial encounter [facility])  -s/p surgical repair of left femoral neck fracture with plate and screw fixation with Dr. Gabriel Hoff 2/8  -PT/OT post-operatively, likely rehab on discharge  -prn analgesia  -Noted history of osteopenia, did not tolerate alendronate, first Prolia  injection last August  -Cont Vit D supplementation 2000 u daily x 12 weeks    Sjogren syndrome, unspecified ([facility] [address]  -Follows Rheumatology, reviewed OV 1/19/23, no evidence of systemic involvement    Type 2 diabetes mellitus with chronic kidney disease  -Hold metformin, cont sliding scale insulin, adjust to carb controlled diet    Chronic gout of right hand / Bilateral Inflammatory Arthritis  -Follows Rheumatology, reviewed OV 1/19/23, noted BID colchicine but patient reports not taking, R first DIP joint mildly swollen, baseline per patient    Stage 3a chronic kidney disease ([address]  -Renal function stable at baseline    Essential hypertension  -Cont lisinopril, toprol-xl, furosemide  -BP control improved today    Recurrent UTI  -Prophylactic nitrofurantoin  -UA negative    Depression  -Cont elavil    Dilated Cardiomyopathy  -Reviewed Echo 2018 with low-norm LVEF  -Cont Furosemide  -Strict I&O's, daily weights    Fever of unknown origin  -Work-up noted above, UA negative, CXR negative, RVP negative, Bcx pending, continue cefepime/doxycycline, continue to follow cultures, no fever recurrence, no leukocytosis, procal 0.18    Discharge planning: therapy evaluations tomorrow    Diet:  ADULT DIET; Regular; 4 carb choices (60 gm/meal)  DVT PPx: Lovenox  Code status: Full Code    Hospital Problems:  Principal Problem:    Closed fracture of left hip ([facility] [address]  Active Problems:    Sjogren syndrome, unspecified ([facility] [address]    Type 2 diabetes mellitus with chronic kidney disease    Chronic gout of right hand    Left hip pain    Delirium    Toxic metabolic encephalopathy    Depression    Stage 3a chronic kidney disease (HCC)    Dilated cardiomyopathy ([facility] [address]    Essential hypertension  Resolved Problems:    * No resolved hospital problems.  *      Objective:   Patient Vitals for the past 24 hrs:   Temp Pulse Resp BP SpO2   02/08/23 1248 98.1 °F (36.7 °C) 75 16 (!) 152/74 95 %   02/08/23 1210 -- 76 18 (!) 153/70 94 %   02/08/23 1205 -- 75 18 (!) 155/74 95 %   02/08/23 1200 -- 78 18 (!) 157/69 93 %   02/08/23 1155 -- 79 18 (!) 161/77 95 %   02/08/23 1150 -- 74 16 (!) 158/71 92 %   02/08/23 1147 -- 76 16 (!) 155/71 96 %   02/08/23 1142 -- 75 16 (!) 157/72 96 %   02/08/23 1137 -- 77 16 (!) 175/83 93 %   02/08/23 1135 -- -- 16 -- --   02/08/23 1132 -- 75 16 (!) 175/81 92 %   02/08/23 1127 -- 77 14 (!) 173/79 94 %   02/08/23 1122 -- 76 16 (!) 182/84 94 %   02/08/23 1117 -- 74 14 (!) 192/84 93 %   02/08/23 1112 -- 75 14 (!) 195/89 94 %   02/08/23 1107 -- 76 16 (!) 197/95 91 %   02/08/23 1106 -- -- 16 -- --   02/08/23 1102 -- 76 18 (!) 195/94 95 %   02/08/23 1057 -- 75 16 (!) 190/91 94 %   02/08/23 1052 -- 75 16 (!) 194/89 93 %   02/08/23 1047 -- 76 16 (!) 189/96 96 %   02/08/23 1043 98.3 °F (36.8 °C) -- 16 -- --   02/08/23 1042 (!) 95.3 °F (35.2 °C) 77 16 (!) 202/93 95 %   02/08/23 0746 98.2 °F (36.8 °C) 76 16 (!) 174/80 100 %   02/08/23 0717 98.8 °F (37.1 °C) 75 16 (!) 188/94 100 %   02/08/23 0408 99.7 °F (37.6 °C) 74 17 (!) 169/70 100 %   02/07/23 2330 98.8 °F (37.1 °C) 81 18 (!) 151/89 96 %   02/07/23 2157 -- -- 18 -- --   02/07/23 1956 98.6 °F (37 °C) 94 18 139/65 97 %   02/07/23 1638 -- -- -- (!) 178/108 --   02/07/23 1626 (!) 102.9 °F (39.4 °C) 84 15 (!) 194/80 99 %       Oxygen Therapy  SpO2: 95 %  Pulse via Oximetry: 76 beats per minute  Pulse Oximeter Device Mode: Continuous  O2 Device: Nasal cannula  O2 Flow Rate (L/min): 4 L/min    Estimated body mass index is 24.3 kg/m² as calculated from the following:    Height as of this encounter: 5' 5\" (1.651 m). Weight as of this encounter: 146 lb (66.2 kg). Intake/Output Summary (Last 24 hours) at 2/8/2023 1500  Last data filed at 2/8/2023 1135  Gross per 24 hour   Intake 1061.25 ml   Output 25 ml   Net 1036.25 ml         Physical Exam:     Blood pressure (!) 152/74, pulse 75, temperature 98.1 °F (36.7 °C), temperature source Oral, resp. rate 16, height 5' 5\" (1.651 m), weight 146 lb (66.2 kg), SpO2 95 %. General:    Well nourished. No acute distress. Alert. Head:  Normocephalic, atraumatic  Eyes:  Sclerae appear normal.  Pupils equally round. Glasses intact. ENT:  Nares appear normal.  Moist oral mucosa  Neck:  No restricted ROM. Trachea midline   CV:   RRR. No m/r/g. No jugular venous distension. Lungs:   CTAB anterior. No wheezing, rhonchi, or rales. Abdomen:   Soft, nontender, nondistended. Extremities: No cyanosis or clubbing. No peripheral edema. Left hip dressing C/D/I. Mild swelling to R first DIP/baseline  Skin:     No rashes and normal coloration. Warm and dry. No open wounds on feet or hands  Neuro:  CN II-XII grossly intact. A&O x 3. No focal deficits. Psych:  Normal mood and affect.       I have personally reviewed labs and tests:  Recent Labs:  Recent Results (from the past 48 hour(s))   POCT Glucose    Collection Time: 02/06/23  5:38 PM   Result Value Ref Range    POC Glucose 84 65 - 100 mg/dL    Performed by: Eber    POCT Glucose    Collection Time: 02/06/23 10:57 PM   Result Value Ref Range    POC Glucose 127 (H) 65 - 100 mg/dL    Performed by: Betsey    Basic Metabolic Panel w/ Reflex to MG    Collection Time: 02/07/23  2:59 AM   Result Value Ref Range    Sodium 138 133 - 143 mmol/L    Potassium 4.2 3.5 - 5.1 mmol/L    Chloride 100 (L) 101 - 110 mmol/L    CO2 33 (H) 21 - 32 mmol/L    Anion Gap 5 2 - 11 mmol/L    Glucose 113 (H) 65 - 100 mg/dL    BUN 20 8 - 23 MG/DL    Creatinine 1.20 (H) 0.6 - 1.0 MG/DL    Est, Glom Filt Rate 47 (L) >60 ml/min/1.73m2    Calcium 9.5 8.3 - 10.4 MG/DL   CBC with Auto Differential    Collection Time: 02/07/23  2:59 AM   Result Value Ref Range    WBC 6.6 4.3 - 11.1 K/uL    RBC 3.81 (L) 4.05 - 5.2 M/uL    Hemoglobin 11.5 (L) 11.7 - 15.4 g/dL    Hematocrit 35.5 (L) 35.8 - 46.3 %    MCV 93.2 82 - 102 FL    MCH 30.2 26.1 - 32.9 PG    MCHC 32.4 31.4 - 35.0 g/dL    RDW 14.6 11.9 - 14.6 %    Platelets 657 207 - 094 K/uL    MPV 11.0 9.4 - 12.3 FL    nRBC 0.00 0.0 - 0.2 K/uL    Differential Type AUTOMATED      Seg Neutrophils 61 43 - 78 %    Lymphocytes 29 13 - 44 %    Monocytes 7 4.0 - 12.0 %    Eosinophils % 1 0.5 - 7.8 %    Basophils 1 0.0 - 2.0 %    Immature Granulocytes 1 0.0 - 5.0 %    Segs Absolute 4.0 1.7 - 8.2 K/UL    Absolute Lymph # 1.9 0.5 - 4.6 K/UL    Absolute Mono # 0.5 0.1 - 1.3 K/UL    Absolute Eos # 0.1 0.0 - 0.8 K/UL    Basophils Absolute 0.0 0.0 - 0.2 K/UL    Absolute Immature Granulocyte 0.0 0.0 - 0.5 K/UL   POCT Glucose    Collection Time: 02/07/23  8:57 AM   Result Value Ref Range    POC Glucose 106 (H) 65 - 100 mg/dL    Performed by: Pedrito)    POCT Glucose    Collection Time: 02/07/23 10:58 AM   Result Value Ref Range    POC Glucose 86 65 - 100 mg/dL    Performed by: Pedrito)    POCT Glucose    Collection Time: 02/07/23 12:07 PM   Result Value Ref Range    POC Glucose 84 65 - 100 mg/dL    Performed by: Keena Sterling    POCT Glucose    Collection Time: 02/07/23  4:20 PM   Result Value Ref Range    POC Glucose 105 (H) 65 - 100 mg/dL    Performed by: VinhHAILEY    Respiratory Panel, Molecular, with COVID-19 (Restricted: peds pts or suitable admitted adults)    Collection Time: 02/07/23  5:47 PM    Specimen: Nasopharyngeal   Result Value Ref Range    Adenovirus by PCR NOT DETECTED NOTDET      Coronavirus 229E by PCR NOT DETECTED NOTDET      Coronavirus HKU1 by PCR NOT DETECTED NOTDET      Coronavirus NL63 by PCR NOT DETECTED NOTDET      Coronavirus OC43 by PCR NOT DETECTED NOTDET      SARS-CoV-2, PCR NOT DETECTED NOTDET      Human Metapneumovirus by PCR NOT DETECTED NOTDET      Rhinovirus Enterovirus PCR NOT DETECTED NOTDET      Influenza A by PCR NOT DETECTED NOTDET      Influenza B PCR NOT DETECTED NOTDET      Parainfluenza 1 PCR NOT DETECTED NOTDET      Parainfluenza 2 PCR NOT DETECTED NOTDET      Parainfluenza 3 PCR NOT DETECTED NOTDET      Parainfluenza 4 PCR NOT DETECTED NOTDET      Respiratory Syncytial Virus by PCR NOT DETECTED NOTDET      Bordetella parapertussis by PCR NOT DETECTED NOTDET      Bordetella pertussis by PCR NOT DETECTED NOTDET      Chlamydophila Pneumonia PCR NOT DETECTED NOTDET      Mycoplasma pneumo by PCR NOT DETECTED NOTDET     Lactic Acid    Collection Time: 02/07/23  6:19 PM   Result Value Ref Range    Lactic Acid, Plasma 1.2 0.4 - 2.0 MMOL/L   Procalcitonin    Collection Time: 02/07/23  6:19 PM   Result Value Ref Range    Procalcitonin 0.18 0.00 - 0.49 ng/mL   CBC with Auto Differential    Collection Time: 02/07/23  6:19 PM   Result Value Ref Range    WBC 6.2 4.3 - 11.1 K/uL    RBC 3.61 (L) 4.05 - 5.2 M/uL    Hemoglobin 10.7 (L) 11.7 - 15.4 g/dL    Hematocrit 33.9 (L) 35.8 - 46.3 %    MCV 93.9 82 - 102 FL    MCH 29.6 26.1 - 32.9 PG MCHC 31.6 31.4 - 35.0 g/dL    RDW 14.4 11.9 - 14.6 %    Platelets 844 384 - 638 K/uL    MPV 10.7 9.4 - 12.3 FL    nRBC 0.00 0.0 - 0.2 K/uL    Differential Type AUTOMATED      Seg Neutrophils 64 43 - 78 %    Lymphocytes 24 13 - 44 %    Monocytes 9 4.0 - 12.0 %    Eosinophils % 2 0.5 - 7.8 %    Basophils 1 0.0 - 2.0 %    Immature Granulocytes 0 0.0 - 5.0 %    Segs Absolute 4.0 1.7 - 8.2 K/UL    Absolute Lymph # 1.5 0.5 - 4.6 K/UL    Absolute Mono # 0.5 0.1 - 1.3 K/UL    Absolute Eos # 0.1 0.0 - 0.8 K/UL    Basophils Absolute 0.0 0.0 - 0.2 K/UL    Absolute Immature Granulocyte 0.0 0.0 - 0.5 K/UL   Urinalysis with Reflex to Culture    Collection Time: 02/08/23  3:27 AM    Specimen: Urine   Result Value Ref Range    Color, UA YELLOW/STRAW      Appearance CLEAR      Specific Gravity, UA 1.028 (H) 1.001 - 1.023      pH, Urine 5.0 5.0 - 9.0      Protein, UA Negative NEG mg/dL    Glucose, UA Negative mg/dL    Ketones, Urine Negative NEG mg/dL    Bilirubin Urine Negative NEG      Blood, Urine Negative NEG      Urobilinogen, Urine 0.2 0.2 - 1.0 EU/dL    Nitrite, Urine Negative NEG      Leukocyte Esterase, Urine Negative NEG      Urine Culture if Indicated CULTURE NOT INDICATED BY UA RESULT      WBC, UA 0-4 U4 /hpf    RBC, UA 0-5 U5 /hpf    BACTERIA, URINE Negative NEG /hpf    Epithelial Cells UA 0-5 U5 /hpf    Casts 2-5 (A) U2 /lpf    Mucus, UA 0 0 /lpf   Hemoglobin and Hematocrit    Collection Time: 02/08/23  4:57 AM   Result Value Ref Range    Hemoglobin 10.2 (L) 11.7 - 15.4 g/dL    Hematocrit 31.9 (L) 35.8 - 46.3 %   Basic Metabolic Panel w/ Reflex to MG    Collection Time: 02/08/23  4:57 AM   Result Value Ref Range    Sodium 138 133 - 143 mmol/L    Potassium 3.9 3.5 - 5.1 mmol/L    Chloride 105 101 - 110 mmol/L    CO2 30 21 - 32 mmol/L    Anion Gap 3 2 - 11 mmol/L    Glucose 108 (H) 65 - 100 mg/dL    BUN 17 8 - 23 MG/DL    Creatinine 0.90 0.6 - 1.0 MG/DL    Est, Glom Filt Rate >60 >60 ml/min/1.73m2    Calcium 9.6 8.3 - 10.4 MG/DL   POCT Glucose    Collection Time: 02/08/23  6:11 AM   Result Value Ref Range    POC Glucose 90 65 - 100 mg/dL    Performed by: Peggy    TYPE AND SCREEN    Collection Time: 02/08/23  6:22 AM   Result Value Ref Range    Crossmatch expiration date 02/11/2023,2359     ABO/Rh O POSITIVE     Antibody Screen NEG    POCT Glucose    Collection Time: 02/08/23 11:23 AM   Result Value Ref Range    POC Glucose 94 65 - 100 mg/dL    Performed by: Tiffani Logan    POCT Glucose    Collection Time: 02/08/23 12:46 PM   Result Value Ref Range    POC Glucose 113 (H) 65 - 100 mg/dL    Performed by: Geovanna Duval I have personally reviewed imaging studies:  Other Studies:  XR HIP LEFT (2-3 VIEWS)   Final Result   Status post left hip ORIF without evidence of acute abnormality. NC XR TECHNOLOGIST SERVICE   Final Result      XR CHEST PORTABLE   Final Result      No acute cardiopulmonary process. Shabana Roberts M.D.    2/7/2023 8:24:00 PM      CTA HEAD NECK W CONTRAST   Final Result      1. No occlusion, significant stenosis, or evidence of dissection in the cervical    carotid or vertebral arteries. 2. No large vessel occlusion or significant stenosis of the major intracranial    arteries. 3. 3 x 3 mm aneurysm or infundibulum at the origin of the left ophthalmic    artery. 2 mm infundibulum at the origin of the right ophthalmic artery. 4. Mild intracranial ICA atherosclerosis. Audelia Denise M.D.    2/7/2023 5:56:00 PM      CT HEAD WO CONTRAST   Final Result      1. Moderate to severe chronic small vessel ischemic changes. 2. Senescent changes roughly commensurate with patient age. 3. No acute intracranial abnormality. 4. Moderate to severe inflammatory mucosal thickening at the left aspect the    sphenoid sinus.       5. An usually large and coarse, 1.1 x 0.8 cm calcification at or immediately    adjacent to the choroid plexus of the right atria. Although it is possible this    is a simple choroid plexus calcification, the size of the structure at least    raises the possibility of other pathology such as a meningioma. An MRI brain    with and without contrast is suggested for further evaluation. Kahlil Rodriguez MD, PhD   Neuroradiologist         Thank you for this referral.  This exam was interpreted by a neuroradiologist    with 2 years of subspecialty training in neuroradiology, a Certificate of Added    Qualification [phone]) in neuroradiology, and a PhD in molecular neuroscience. If    the patient's healthcare provider has any questions, a [facility]    neuroradiologist can be reached directly at [phone] at any time. Tiffany Millan M.D.    2/7/2023 6:58:00 PM      MRI HIP LEFT WO CONTRAST   Final Result      1. Minimally displaced acute left femoral neck fracture. 2. Intramuscular edema within the left gluteus medius/minimus muscles on the   lateral left adductor musculature, likely reflecting muscle strains. 3. Mild bilateral hip chondrosis. Degeneration of the left superior anterior   acetabular labrum. 4. Partial-thickness tears of the bilateral gluteus minimus tendinous   insertions, worse on the right. Low-grade partial-thickness tears of the   bilateral gluteus medius tendon insertions. 5. Low-grade partial-thickness tears of the bilateral hamstring tendon origins. XR HIP LEFT (2-3 VIEWS)   Final Result   Degenerative change versus nondisplaced left femoral neck fracture. If high clinical concern, consider follow-up films or MRI evaluation.          XR CHEST PORTABLE   Final Result   No acute findings in the chest             Current Meds:  Current Facility-Administered Medications   Medication Dose Route Frequency    HYDROmorphone HCl PF (DILAUDID) injection 0.5 mg  0.5 mg IntraVENous Q2H PRN    sodium chloride flush 0.9 % injection 5-40 mL  5-40 mL IntraVENous 2 times per day    sodium chloride flush 0.9 % injection 5-40 mL  5-40 mL IntraVENous PRN    0.9 % sodium chloride infusion   IntraVENous PRN    ondansetron (ZOFRAN-ODT) disintegrating tablet 4 mg  4 mg Oral Q8H PRN    Or    ondansetron (ZOFRAN) injection 4 mg  4 mg IntraVENous Q6H PRN    [START ON 2/9/2023] aspirin EC tablet 325 mg  325 mg Oral Daily    HYDROmorphone (DILAUDID) tablet 1 mg  1 mg Oral Q4H PRN    glucose chewable tablet 16 g  4 tablet Oral PRN    dextrose bolus 10% 125 mL  125 mL IntraVENous PRN    Or    dextrose bolus 10% 250 mL  250 mL IntraVENous PRN    glucagon (rDNA) injection 1 mg  1 mg SubCUTAneous PRN    dextrose 10 % infusion   IntraVENous Continuous PRN    alcohol 62% (NOZIN) nasal  1 ampule  1 ampule Topical Q12H    Vitamin D (CHOLECALCIFEROL) tablet 2,000 Units  2,000 Units Oral Daily    doxycycline (VIBRAMYCIN) 100 mg in sodium chloride 0.9 % 100 mL IVPB (mini-bag)  100 mg IntraVENous Q12H    sodium chloride flush 0.9 % injection 10 mL  10 mL IntraVENous ONCE PRN    cefepime (MAXIPIME) 2,000 mg in sodium chloride 0.9 % 50 mL IVPB (mini-bag)  2,000 mg IntraVENous Q12H    amitriptyline (ELAVIL) tablet 10 mg  10 mg Oral Nightly    calcium carbonate (TUMS) chewable tablet 500 mg  1 tablet Oral Nightly    cetirizine (ZYRTEC) tablet 10 mg  10 mg Oral Daily PRN    dextromethorphan-guaiFENesin (MUCINEX DM)  MG per extended release tablet 1 tablet (Patient Supplied)  1 tablet Oral Q12H PRN    furosemide (LASIX) tablet 40 mg  40 mg Oral Every Other Day    gabapentin (NEURONTIN) capsule 600 mg  600 mg Oral BID    lisinopril (PRINIVIL;ZESTRIL) tablet 10 mg  10 mg Oral BID    insulin lispro (HUMALOG) injection vial 0-8 Units  0-8 Units SubCUTAneous TID WC    insulin lispro (HUMALOG) injection vial 0-4 Units  0-4 Units SubCUTAneous Nightly    metoprolol succinate (TOPROL XL) extended release tablet 50 mg  50 mg Oral Nightly    nitrofurantoin (MACRODANTIN) capsule 50 mg  50 mg Oral Daily potassium chloride (KLOR-CON M) extended release tablet 10 mEq  10 mEq Oral Daily with breakfast    sodium chloride flush 0.9 % injection 5-40 mL  5-40 mL IntraVENous 2 times per day    sodium chloride flush 0.9 % injection 5-40 mL  5-40 mL IntraVENous PRN    0.9 % sodium chloride infusion   IntraVENous PRN    ondansetron (ZOFRAN-ODT) disintegrating tablet 4 mg  4 mg Oral Q8H PRN    Or    ondansetron (ZOFRAN) injection 4 mg  4 mg IntraVENous Q6H PRN    polyethylene glycol (GLYCOLAX) packet 17 g  17 g Oral Daily PRN    acetaminophen (TYLENOL) tablet 650 mg  650 mg Oral Q6H PRN    Or    acetaminophen (TYLENOL) suppository 650 mg  650 mg Rectal Q6H PRN    tuberculin injection 5 Units  5 Units IntraDERmal Once    0.9 % sodium chloride infusion   IntraVENous Continuous    mirabegron (MYRBETRIQ) extended release tablet 25 mg (Patient Supplied)  25 mg Oral Daily    melatonin tablet 3 mg  3 mg Oral Nightly     Signed: Paresh Torres AGACNP-BC

## 2023-02-08 NOTE — ANESTHESIA POSTPROCEDURE EVALUATION
Department of Anesthesiology  Postprocedure Note    Patient: Anny Hill  MRN: [id number]  YOB: 1945  Date of evaluation: 2/8/2023      Procedure Summary     Date: 02/08/23 Room / Location: [facility] MAIN OR 08 / [facility] MAIN OR    Anesthesia Start: 0957 Anesthesia Stop: 7564    Procedure: HIP OPEN REDUCTION INTERNAL FIXATION (Left: Hip) Diagnosis:       Closed left hip fracture, initial encounter ([facility] [address]      ([facility] Brenda Mays)    Providers: Sancho Martinez MD Responsible Provider: Trina Garcia MD    Anesthesia Type: General ASA Status: 3          Anesthesia Type: General    Sharmila Phase I: Sharmila Score: 8    Sharmila Phase II:        Anesthesia Post Evaluation    Patient location during evaluation: PACU  Patient participation: complete - patient participated  Level of consciousness: awake and alert  Airway patency: patent  Nausea & Vomiting: no nausea and no vomiting  Complications: no  Cardiovascular status: hemodynamically stable  Respiratory status: acceptable, nonlabored ventilation and spontaneous ventilation  Hydration status: euvolemic  Comments: BP (!) 189/96   Pulse 76   Temp 98.3 °F (36.8 °C) (Infrared)   Resp 16   Ht 5' 5\" (1.651 m)   Wt 146 lb (66.2 kg)   SpO2 96%   BMI 24.30 kg/m²     Multimodal analgesia pain management approach

## 2023-02-08 NOTE — PLAN OF CARE
Problem: Discharge Planning  Goal: Discharge to home or other facility with appropriate resources  Outcome: Progressing  Flowsheets  Taken 2/8/2023 0228 by Osmar Soares RN  Discharge to home or other facility with appropriate resources: Identify barriers to discharge with patient and caregiver  Taken 2/7/2023 2030 by Osmar Soares RN  Discharge to home or other facility with appropriate resources: Identify barriers to discharge with patient and caregiver  Taken 2/7/2023 1626 by Kaiser Sosa RN  Discharge to home or other facility with appropriate resources: Identify barriers to discharge with patient and caregiver     Problem: Pain  Goal: Verbalizes/displays adequate comfort level or baseline comfort level  Outcome: Progressing     Problem: Skin/Tissue Integrity  Goal: Absence of new skin breakdown  Description: 1. Monitor for areas of redness and/or skin breakdown  2. Assess vascular access sites hourly  3. Every 4-6 hours minimum:  Change oxygen saturation probe site  4. Every 4-6 hours:  If on nasal continuous positive airway pressure, respiratory therapy assess nares and determine need for appliance change or resting period.   Outcome: Progressing     Problem: Safety - Adult  Goal: Free from fall injury  Outcome: Progressing  Flowsheets  Taken 2/7/2023 2157 by Osmar Soares RN  Free From Fall Injury: Instruct family/caregiver on patient safety  Taken 2/7/2023 1626 by Kaiser Sosa RN  Free From Fall Injury: Instruct family/caregiver on patient safety     Problem: ABCDS Injury Assessment  Goal: Absence of physical injury  Outcome: Progressing  Flowsheets  Taken 2/7/2023 2157 by Osmar Soares RN  Absence of Physical Injury: Implement safety measures based on patient assessment  Taken 2/7/2023 1626 by Kaiser Sosa RN  Absence of Physical Injury: Implement safety measures based on patient assessment     Problem: Chronic Conditions and Co-morbidities  Goal: Patient's chronic conditions and co-morbidity symptoms are monitored and maintained or improved  Outcome: Progressing  Flowsheets  Taken 2/7/2023 2030 by Mary Ann Bonilla 34 - Patient's Chronic Conditions and Co-Morbidity Symptoms are Monitored and Maintained or Improved: Monitor and assess patient's chronic conditions and comorbid symptoms for stability, deterioration, or improvement  Taken 2/7/2023 1626 by Mary Ann Mccoy 34 - Patient's Chronic Conditions and Co-Morbidity Symptoms are Monitored and Maintained or Improved: Monitor and assess patient's chronic conditions and comorbid symptoms for stability, deterioration, or improvement     Problem: Neurosensory - Adult  Goal: Achieves stable or improved neurological status  Outcome: Progressing  Flowsheets (Taken 2/7/2023 2030)  Achieves stable or improved neurological status: Assess for and report changes in neurological status  Goal: Achieves maximal functionality and self care  Outcome: Progressing  Flowsheets (Taken 2/7/2023 2030)  Achieves maximal functionality and self care: Monitor swallowing and airway patency with patient fatigue and changes in neurological status     Problem: Respiratory - Adult  Goal: Achieves optimal ventilation and oxygenation  Outcome: Progressing  Flowsheets (Taken 2/7/2023 2030)  Achieves optimal ventilation and oxygenation: Assess for changes in respiratory status     Problem: Cardiovascular - Adult  Goal: Maintains optimal cardiac output and hemodynamic stability  Outcome: Progressing  Flowsheets (Taken 2/7/2023 2030)  Maintains optimal cardiac output and hemodynamic stability: Monitor blood pressure and heart rate     Problem: Skin/Tissue Integrity - Adult  Goal: Skin integrity remains intact  Outcome: Progressing  Flowsheets  Taken 2/7/2023 2157 by Brooke Silva RN  Skin Integrity Remains Intact: Monitor for areas of redness and/or skin breakdown  Taken 2/7/2023 2030 by Brooke Silva RN  Skin Integrity Remains Intact: Monitor for areas of redness and/or skin breakdown  Taken 2/7/2023 1626 by Vicky Nolan RN  Skin Integrity Remains Intact: Monitor for areas of redness and/or skin breakdown  Goal: Incisions, wounds, or drain sites healing without S/S of infection  Outcome: Progressing  Flowsheets (Taken 2/7/2023 2030)  Incisions, Wounds, or Drain Sites Healing Without Sign and Symptoms of Infection: ADMISSION and DAILY: Assess and document risk factors for pressure ulcer development     Problem: Musculoskeletal - Adult  Goal: Return mobility to safest level of function  Outcome: Progressing  Flowsheets (Taken 2/7/2023 2030)  Return Mobility to Safest Level of Function: Assess patient stability and activity tolerance for standing, transferring and ambulating with or without assistive devices  Goal: Maintain proper alignment of affected body part  Outcome: Progressing  Flowsheets (Taken 2/7/2023 2030)  Maintain proper alignment of affected body part: Support and protect limb and body alignment per provider's orders  Goal: Return ADL status to a safe level of function  Outcome: Progressing  Flowsheets (Taken 2/7/2023 2030)  Return ADL Status to a Safe Level of Function: Administer medication as ordered     Problem: Gastrointestinal - Adult  Goal: Maintains or returns to baseline bowel function  Outcome: Progressing  Flowsheets (Taken 2/7/2023 2030)  Maintains or returns to baseline bowel function: Assess bowel function     Problem: Infection - Adult  Goal: Absence of infection at discharge  Outcome: Progressing  Flowsheets (Taken 2/7/2023 2030)  Absence of infection at discharge: Assess and monitor for signs and symptoms of infection  Goal: Absence of infection during hospitalization  Outcome: Progressing  Flowsheets (Taken 2/7/2023 2030)  Absence of infection during hospitalization: Assess and monitor for signs and symptoms of infection     Problem: Metabolic/Fluid and Electrolytes - Adult  Goal: Electrolytes maintained within normal limits  Outcome: Progressing  Flowsheets (Taken 2/7/2023 2030)  Electrolytes maintained within normal limits: Monitor labs and assess patient for signs and symptoms of electrolyte imbalances  Goal: Hemodynamic stability and optimal renal function maintained  Outcome: Progressing  Flowsheets (Taken 2/7/2023 2030)  Hemodynamic stability and optimal renal function maintained: Monitor labs and assess for signs and symptoms of volume excess or deficit  Goal: Glucose maintained within prescribed range  Outcome: Progressing  Flowsheets (Taken 2/7/2023 2030)  Glucose maintained within prescribed range: Monitor blood glucose as ordered     Problem: Hematologic - Adult  Goal: Maintains hematologic stability  Outcome: Progressing  Flowsheets (Taken 2/7/2023 2030)  Maintains hematologic stability: Assess for signs and symptoms of bleeding or hemorrhage     Problem: Genitourinary - Adult  Goal: Absence of urinary retention  Outcome: Progressing  Flowsheets (Taken 2/7/2023 2030)  Absence of urinary retention: Assess patients ability to void and empty bladder

## 2023-02-08 NOTE — PROGRESS NOTES
Physical Therapy Note:    Patient scheduled for surgical repair of left femoral neck fracture today with Dr. Amira Rivera. Will evaluate post operatively day #1 per orthopedic surgery.  Thank you,    Marjan Huggins, PT, DPT  2/8/23

## 2023-02-08 NOTE — PERIOP NOTE
TRANSFER - OUT REPORT:    Verbal report given to Primary, RN on Glen Wilson  being transferred to [address] [place] [address] for routine post-op       Report consisted of patients Situation, Background, Assessment and   Recommendations(SBAR). Information from the following report(s) Nurse Handoff Report, Intake/Output, MAR, Cardiac Rhythm SR, and Quality Measures was reviewed with the receiving nurse. Lines:   Peripheral IV 02/06/23 Right Antecubital (Active)   Site Assessment Clean, dry & intact 02/08/23 1040   [facility] and [facility] 02/08/23 1040   Phlebitis Assessment No symptoms 02/08/23 1040   Infiltration Assessment 0 02/08/23 1040   Alcohol Cap Used No 02/07/23 2157   Dressing Status Clean, dry & intact 02/08/23 1040   Dressing Type Transparent 02/08/23 1040   Dressing Intervention New 02/06/23 1203        Opportunity for questions and clarification was provided. Patient transported with:   O2 @ 4 liters  Tech    VTE prophylaxis orders have been written for Glen Wilson. Patient and family given floor number and nurses name. Family updated re: pt status after security code verified.

## 2023-02-08 NOTE — PROGRESS NOTES
TRANSFER - IN REPORT:    Verbal report received from [address], RN on Rubio Noland  being received from PACU for routine progression of patient care      Report consisted of patient's Situation, Background, Assessment and   Recommendations(SBAR). Information from the following report(s) Nurse Handoff Report was reviewed with the receiving nurse. Opportunity for questions and clarification was provided. Assessment completed upon patient's arrival to unit and care assumed.

## 2023-02-09 ENCOUNTER — APPOINTMENT (OUTPATIENT)
Dept: MRI IMAGING | Age: 78
End: 2023-02-09
Payer: MEDICARE

## 2023-02-09 LAB
ANION GAP SERPL CALC-SCNC: 9 MMOL/L (ref 2–11)
BUN SERPL-MCNC: 16 MG/DL (ref 8–23)
CALCIUM SERPL-MCNC: 9.8 MG/DL (ref 8.3–10.4)
CHLORIDE SERPL-SCNC: 96 MMOL/L (ref 101–110)
CO2 SERPL-SCNC: 32 MMOL/L (ref 21–32)
CREAT SERPL-MCNC: 1 MG/DL (ref 0.6–1)
GLUCOSE BLD STRIP.AUTO-MCNC: 112 MG/DL (ref 65–100)
GLUCOSE BLD STRIP.AUTO-MCNC: 121 MG/DL (ref 65–100)
GLUCOSE BLD STRIP.AUTO-MCNC: 127 MG/DL (ref 65–100)
GLUCOSE BLD STRIP.AUTO-MCNC: 97 MG/DL (ref 65–100)
GLUCOSE SERPL-MCNC: 158 MG/DL (ref 65–100)
HCT VFR BLD AUTO: 32.4 % (ref 35.8–46.3)
HGB BLD-MCNC: 10.6 G/DL (ref 11.7–15.4)
MM INDURATION, POC: 0 MM (ref 0–5)
POTASSIUM SERPL-SCNC: 3.6 MMOL/L (ref 3.5–5.1)
PPD, POC: NEGATIVE
SERVICE CMNT-IMP: ABNORMAL
SERVICE CMNT-IMP: NORMAL
SODIUM SERPL-SCNC: 137 MMOL/L (ref 133–143)

## 2023-02-09 PROCEDURE — 6370000000 HC RX 637 (ALT 250 FOR IP): Performed by: ORTHOPAEDIC SURGERY

## 2023-02-09 PROCEDURE — 6370000000 HC RX 637 (ALT 250 FOR IP): Performed by: STUDENT IN AN ORGANIZED HEALTH CARE EDUCATION/TRAINING PROGRAM

## 2023-02-09 PROCEDURE — 85014 HEMATOCRIT: CPT

## 2023-02-09 PROCEDURE — 1100000000 HC RM PRIVATE

## 2023-02-09 PROCEDURE — 2700000000 HC OXYGEN THERAPY PER DAY

## 2023-02-09 PROCEDURE — 36415 COLL VENOUS BLD VENIPUNCTURE: CPT

## 2023-02-09 PROCEDURE — 2580000003 HC RX 258: Performed by: ORTHOPAEDIC SURGERY

## 2023-02-09 PROCEDURE — 97162 PT EVAL MOD COMPLEX 30 MIN: CPT

## 2023-02-09 PROCEDURE — 6360000002 HC RX W HCPCS: Performed by: ORTHOPAEDIC SURGERY

## 2023-02-09 PROCEDURE — 80048 BASIC METABOLIC PNL TOTAL CA: CPT

## 2023-02-09 PROCEDURE — 97530 THERAPEUTIC ACTIVITIES: CPT

## 2023-02-09 PROCEDURE — 97165 OT EVAL LOW COMPLEX 30 MIN: CPT

## 2023-02-09 PROCEDURE — 82962 GLUCOSE BLOOD TEST: CPT

## 2023-02-09 PROCEDURE — 6360000002 HC RX W HCPCS: Performed by: FAMILY MEDICINE

## 2023-02-09 PROCEDURE — 76937 US GUIDE VASCULAR ACCESS: CPT

## 2023-02-09 PROCEDURE — 97535 SELF CARE MNGMENT TRAINING: CPT

## 2023-02-09 RX ORDER — HYDRALAZINE HYDROCHLORIDE 20 MG/ML
10 INJECTION INTRAMUSCULAR; INTRAVENOUS EVERY 6 HOURS PRN
Status: DISCONTINUED | OUTPATIENT
Start: 2023-02-09 | End: 2023-02-10 | Stop reason: HOSPADM

## 2023-02-09 RX ORDER — DOXYCYCLINE HYCLATE 100 MG/1
100 CAPSULE ORAL EVERY 12 HOURS SCHEDULED
Status: DISCONTINUED | OUTPATIENT
Start: 2023-02-09 | End: 2023-02-10

## 2023-02-09 RX ADMIN — POTASSIUM CHLORIDE 10 MEQ: 1500 TABLET, EXTENDED RELEASE ORAL at 10:06

## 2023-02-09 RX ADMIN — METOPROLOL SUCCINATE 50 MG: 50 TABLET, EXTENDED RELEASE ORAL at 21:21

## 2023-02-09 RX ADMIN — HYDROMORPHONE HYDROCHLORIDE 0.5 MG: 1 INJECTION, SOLUTION INTRAMUSCULAR; INTRAVENOUS; SUBCUTANEOUS at 21:21

## 2023-02-09 RX ADMIN — DOXYCYCLINE HYCLATE 100 MG: 100 CAPSULE ORAL at 21:20

## 2023-02-09 RX ADMIN — SODIUM CHLORIDE, PRESERVATIVE FREE 5 ML: 5 INJECTION INTRAVENOUS at 21:21

## 2023-02-09 RX ADMIN — ASPIRIN 325 MG: 325 TABLET, COATED ORAL at 09:56

## 2023-02-09 RX ADMIN — LISINOPRIL 10 MG: 5 TABLET ORAL at 21:21

## 2023-02-09 RX ADMIN — DOXYCYCLINE HYCLATE 100 MG: 100 CAPSULE ORAL at 13:28

## 2023-02-09 RX ADMIN — AMITRIPTYLINE HYDROCHLORIDE 10 MG: 10 TABLET, FILM COATED ORAL at 21:20

## 2023-02-09 RX ADMIN — CEFEPIME 2000 MG: 2 INJECTION, POWDER, FOR SOLUTION INTRAVENOUS at 11:16

## 2023-02-09 RX ADMIN — Medication 1 AMPULE: at 10:07

## 2023-02-09 RX ADMIN — HYDROMORPHONE HYDROCHLORIDE 1 MG: 2 TABLET ORAL at 13:28

## 2023-02-09 RX ADMIN — Medication 3 MG: at 21:21

## 2023-02-09 RX ADMIN — Medication 1 AMPULE: at 21:20

## 2023-02-09 RX ADMIN — CALCIUM CARBONATE (ANTACID) CHEW TAB 500 MG 500 MG: 500 CHEW TAB at 21:22

## 2023-02-09 RX ADMIN — GABAPENTIN 600 MG: 300 CAPSULE ORAL at 21:21

## 2023-02-09 RX ADMIN — CHOLECALCIFEROL TAB 25 MCG (1000 UNIT) 2000 UNITS: 25 TAB at 09:56

## 2023-02-09 RX ADMIN — HYDRALAZINE HYDROCHLORIDE 10 MG: 20 INJECTION INTRAMUSCULAR; INTRAVENOUS at 23:52

## 2023-02-09 RX ADMIN — HYDROMORPHONE HYDROCHLORIDE 0.5 MG: 1 INJECTION, SOLUTION INTRAMUSCULAR; INTRAVENOUS; SUBCUTANEOUS at 02:40

## 2023-02-09 RX ADMIN — HYDROMORPHONE HYDROCHLORIDE 1 MG: 2 TABLET ORAL at 06:28

## 2023-02-09 RX ADMIN — GABAPENTIN 600 MG: 300 CAPSULE ORAL at 09:57

## 2023-02-09 RX ADMIN — LISINOPRIL 10 MG: 5 TABLET ORAL at 09:56

## 2023-02-09 RX ADMIN — SODIUM CHLORIDE, PRESERVATIVE FREE 10 ML: 5 INJECTION INTRAVENOUS at 21:21

## 2023-02-09 RX ADMIN — NITROFURANTOIN MACROCRYSTALS 50 MG: 50 CAPSULE ORAL at 09:57

## 2023-02-09 RX ADMIN — CEFEPIME 2000 MG: 2 INJECTION, POWDER, FOR SOLUTION INTRAVENOUS at 23:53

## 2023-02-09 ASSESSMENT — PAIN DESCRIPTION - ONSET
ONSET: ON-GOING
ONSET: ON-GOING

## 2023-02-09 ASSESSMENT — PAIN - FUNCTIONAL ASSESSMENT
PAIN_FUNCTIONAL_ASSESSMENT: ACTIVITIES ARE NOT PREVENTED
PAIN_FUNCTIONAL_ASSESSMENT: ACTIVITIES ARE NOT PREVENTED

## 2023-02-09 ASSESSMENT — PAIN DESCRIPTION - LOCATION
LOCATION: HIP
LOCATION: LEG
LOCATION: HIP
LOCATION: LEG

## 2023-02-09 ASSESSMENT — PAIN DESCRIPTION - PAIN TYPE
TYPE: SURGICAL PAIN
TYPE: SURGICAL PAIN

## 2023-02-09 ASSESSMENT — PAIN SCALES - GENERAL
PAINLEVEL_OUTOF10: 5
PAINLEVEL_OUTOF10: 0
PAINLEVEL_OUTOF10: 6
PAINLEVEL_OUTOF10: 6
PAINLEVEL_OUTOF10: 10
PAINLEVEL_OUTOF10: 6

## 2023-02-09 ASSESSMENT — PAIN DESCRIPTION - DESCRIPTORS
DESCRIPTORS: ACHING
DESCRIPTORS: ACHING
DESCRIPTORS: ACHING;THROBBING

## 2023-02-09 ASSESSMENT — PAIN DESCRIPTION - ORIENTATION
ORIENTATION: LEFT

## 2023-02-09 ASSESSMENT — PAIN DESCRIPTION - FREQUENCY
FREQUENCY: CONTINUOUS
FREQUENCY: CONTINUOUS

## 2023-02-09 NOTE — PROGRESS NOTES
[facility]        February 9, 2023         Post Op day: 1 Day Post-Op Procedure(s) (LRB):  HIP OPEN REDUCTION INTERNAL FIXATION (Left)      Admit Date: 2/6/2023  Admit Diagnosis: Right hip pain [M25.551]  Closed fracture of left hip, initial encounter ([facility]) [S72.002A]  Acute pain of left hip [M25.552]       Principle Problem: Closed fracture of left hip ([address]            Subjective: Doing well, No complaints, No SOB, No Chest Pain, No Nausea or Vomiting     Objective:   Vital Signs are Stable, No Acute Distress, Alert,  Dressing is Dry,  Neurovascular exam is normal.     Assessment / Plan :  Patient Active Problem List   Diagnosis    Chronic periscapular pain on right side    Foraminal stenosis of cervical region    Statin intolerance    Dyslipidemia    Depression    Osteoarthritis    Peripheral sensory-motor axonal polyneuropathy    Osteopenia    Type 2 diabetes mellitus, controlled ([facility])    Muscle spasm    Recurrent occipital headache    Left leg weakness    Chemotherapy-induced neuropathy ([facility])    Stage 3a chronic kidney disease ([facility])    Arthropathy of cervical facet joint    Stem cells transplant status ([facility])    Dilated cardiomyopathy ([facility])    Vitamin D deficiency    Vitamin B12 deficiency    S/P cardiac cath    Essential hypertension    Chronic anemia    Palpitation    Primary insomnia    Intolerance of oral bisphosphonate therapy    Sjogren syndrome, unspecified ([facility])    Type 2 diabetes mellitus with chronic kidney disease    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    Chronic gout of right hand    Acute renal failure superimposed on stage 3a chronic kidney disease (HCC)    Chronic combined systolic and diastolic CHF (congestive heart failure) ([facility])    Left hip pain    Closed fracture of left hip ([facility])    Delirium    Toxic metabolic encephalopathy    Patient Vitals for the past 8 hrs:   BP Temp Temp src Pulse Resp SpO2   02/09/23 0750 (!) 140/70 97.4 °F (36.3 °C) Oral 71 17 98 %   23 0543 133/65 98.6 °F (37 °C) Oral 70 18 96 %   23 0240 -- -- -- -- 20 --    Temp (24hrs), Av.2 °F (36.8 °C), Min:95.3 °F (35.2 °C), Max:100 °F (37.8 °C)    Body mass index is 24.3 kg/m².     Lab Results   Component Value Date/Time    HGB 10.6 2023 04:38 AM          S/P Procedure(s) (LRB):  HIP OPEN REDUCTION INTERNAL FIXATION (Left)      Vit D 2000iu daily x 12 weeks   Medical Mgmt per hospitalist  Anticoagulation plan: ASA 325mg daily  Continue PT  Fall Precautions  DC disp: rehab placement  F/U: 2 weeks postop for wound check and staple removal        Signed By: TERESA Peterson, PA  2023,  9:42 AM

## 2023-02-09 NOTE — PROGRESS NOTES
ACUTE PHYSICAL THERAPY GOALS:   (Developed with and agreed upon by patient and/or caregiver. )    LTG:  (1.)Ms. Simone Rivera will move from supine to sit and sit to supine , scoot up and down, and roll side to side in bed with MINIMAL ASSIST within 7 treatment day(s). (2.)Ms. Simone Rivera will transfer from bed to chair and chair to bed with MINIMAL ASSIST using the least restrictive device within 7 treatment day(s). (3.)Ms. Simone Rivera will ambulate with MINIMAL ASSIST for 50+ feet with the least restrictive device within 7 treatment day(s). ________________________________________________________________________________________________      PHYSICAL THERAPY Initial Assessment, Daily Note, and AM  (Link to Caseload Tracking: PT Visit Days : 1  Acknowledge Orders  Time In/Out  PT Charge Capture  Rehab Caseload Tracker    Bessy Escalona is a 69 y.o. female   PRIMARY DIAGNOSIS: Closed fracture of left hip (HCC)  Right hip pain [M25.551]  Closed fracture of left hip, initial encounter ([address] [S72.002A]  Acute pain of left hip [M25.552]  Procedure(s) (LRB):  HIP OPEN REDUCTION INTERNAL FIXATION (Left)  1 Day Post-Op  Reason for Referral: Other abnormalities of gait and mobility (R26.89)  Inpatient: Payor: MEDICARE / Plan: MEDICARE PART A AND B / Product Type: *No Product type* /     ASSESSMENT:     REHAB RECOMMENDATIONS:   Recommendation to date pending progress:  Setting:  Inpatient Rehab Facility    Equipment:    To Be Determined     ASSESSMENT:  Ms. Simone Rivera was supine in bed and ready to get up. She required max Ax2 to transition to sit. Sitting balance good statically. She stood with mod Ax2 and then ambulated few feet to recliner with max cueing for sequencing and correct use of RW. Required cueing for hand placement during sit to /from stand transfers as well. Patient has declined in functional mobility.   Ms. Simone Rivera would benefit from skilled physical therapy while in acute care (medically necessary) to address her deficits and maximize her function. .     [facility] [address] [address] AM-PAC 6 Clicks Basic Mobility Inpatient Short Form  AM-PAC Basic Mobility - Inpatient   How much help is needed turning from your back to your side while in a flat bed without using bedrails?: A Lot  How much help is needed moving from lying on your back to sitting on the side of a flat bed without using bedrails?: A Lot  How much help is needed moving to and from a bed to a chair?: A Lot  How much help is needed standing up from a chair using your arms?: A Lot  How much help is needed walking in hospital room?: A Little  How much help is needed climbing 3-5 steps with a railing?: A Lot  AM-PAC Inpatient Mobility Raw Score : 13  AM-PAC Inpatient T-Scale Score : 36.74  Mobility Inpatient CMS 0-100% Score: 64.91  Mobility Inpatient CMS G-Code Modifier : CL    SUBJECTIVE:   Ms. Montilla Lizzy states, \"Are yall putting me back to bed? \"     Social/Functional Lives With: Spouse  Type of Home: House  Home Layout: One level  Home Access: Stairs to enter without rails  Entrance Stairs - Number of Steps: 2  ADL Assistance: Independent  Ambulation Assistance: Independent  Transfer Assistance: Independent    OBJECTIVE:     PAIN: VITALS / O2: PRECAUTION / Orpha Yuliet / DRAINS:   Pre Treatment:   Pain Assessment: None - Denies Pain      Post Treatment: 5, Rn notified Vitals        Oxygen      Purewick    RESTRICTIONS/PRECAUTIONS:  Restrictions/Precautions: Weight Bearing, Fall Risk  Left Lower Extremity Weight Bearing: Weight Bearing As Tolerated              GROSS EVALUATION: Intact Impaired (Comments):   AROM []  Geerally decreased, functional   PROM []    Strength []  Generally decreased, functional   Balance []     Posture [] Forward Head  Rounded Shoulders   Sensation []     Coordination []      Tone [x]     Edema []    Activity Tolerance []      []      COGNITION/  PERCEPTION: Intact Impaired (Comments):   Orientation []  Ox3, not to place   Vision [] Hearing [x]     Cognition  []  Follows commands     MOBILITY: I Mod I S SBA CGA Min Mod Max Total  NT x2 Comments:   Bed Mobility    Rolling [] [] [] [] [] [] [] [] [] [] []    Supine to Sit [] [] [] [] [] [] [] [x] [] [] [x]    Scooting [] [] [] [] [] [] [] [x] [] [] [x]    Sit to Supine [] [] [] [] [] [] [] [] [] [] []    Transfers    Sit to Stand [] [] [] [] [] [] [x] [] [] [] [x]    Bed to Chair [] [] [] [] [] [] [x] [] [] [] [x]    Stand to Sit [] [] [] [] [] [] [x] [] [] [] [x]     [] [] [] [] [] [] [] [] [] [] []    I=Independent, Mod I=Modified Independent, S=Supervision, SBA=Standby Assistance, CGA=Contact Guard Assistance,   Min=Minimal Assistance, Mod=Moderate Assistance, Max=Maximal Assistance, Total=Total Assistance, NT=Not Tested    GAIT: I Mod I S SBA CGA Min Mod Max Total  NT x2 Comments:   Level of Assistance [] [] [] [] [] [x] [] [] [] [] [x]    Distance 3 feet    DME Gait Belt and Rolling Walker    Gait Quality Decreased akil[place] , Decreased step clearance, Decreased step length, and Trunk flexion    Weightbearing Status Restrictions/Precautions  Restrictions/Precautions: Weight Bearing, Fall Risk  Lower Extremity Weight Bearing Restrictions  Left Lower Extremity Weight Bearing: Weight Bearing As Tolerated    Stairs      I=Independent, Mod I=Modified Independent, S=Supervision, SBA=Standby Assistance, CGA=Contact Guard Assistance,   Min=Minimal Assistance, Mod=Moderate Assistance, Max=Maximal Assistance, Total=Total Assistance, NT=Not Tested    PLAN:   FREQUENCY AND DURATION: BID for duration of hospital stay or until stated goals are met, whichever comes first.    THERAPY PROGNOSIS: Excellent    PROBLEM LIST:   (Skilled intervention is medically necessary to address:)  Decreased Activity Tolerance  Decreased AROM/PROM  Decreased Coordination  Decreased Gait Ability  Decreased Transfer Abilities  Increased Pain INTERVENTIONS PLANNED:   (Benefits and precautions of physical therapy have been discussed with the patient.)  Self Care Training  Therapeutic Activity  Therapeutic Exercise/HEP  Neuromuscular Re-education  Gait Training  Education       TREATMENT:   EVALUATION: MODERATE COMPLEXITY: (Untimed Charge)    TREATMENT:   Co-Treatment PT/OT necessary due to patient's decreased overall endurance/tolerance levels, as well as need for high level skilled assistance to complete functional transfers/mobility and functional tasks  Therapeutic Activity (24 Minutes): Therapeutic activity included Supine to Sit, Scooting, Transfer Training, Ambulation on level ground, Sitting balance , and Standing balance to improve functional Activity tolerance, Balance, Coordination, Mobility, and Strength. Educated patient in GRASSE status.     TREATMENT GRID:  N/A    AFTER TREATMENT PRECAUTIONS: Alarm Activated, Bed/Chair Locked, Call light within reach, Chair, and Needs within reach    INTERDISCIPLINARY COLLABORATION:  RN/ PCT, PT/ PTA, and OT/ CADENA    EDUCATION: Education Given To: Patient  Education Provided: Role of Therapy;Plan of Care;Transfer Training  Education Outcome: Continued education needed    TIME IN/OUT:  Time In: 0904  Time Out: 0940  Minutes: Via Earle Menendez PT

## 2023-02-09 NOTE — CARE COORDINATION
Therapy evals complete with the recommendation for acute inpatient rehab. Physiatrist consult placed. CM met with pt to update her on [facility]. She is in agreement. CM following. 46:  CM spoke with pt's spouse, at the bedside, and provided SNF list to review in the event subacute rehab is needed. Their first choice is [facility]. CM encouraged him to review the list for additional choices. CM will send referral to [facility] to check bed availability.

## 2023-02-09 NOTE — PROGRESS NOTES
ACUTE OCCUPATIONAL THERAPY GOALS:   (Developed with and agreed upon by patient and/or caregiver.)  1. Pt will complete LB ADL Mod I.  2. Pt will complete toileting Mod I.  3. Pt will complete UB ADL Mod I.  4. Pt will tolerate 25 minutes of OT treatment requiring 1-2 breaks as needed. 5. Pt will complete grooming tasks while standing at sink Mod I..  6. Pt will complete functional mobility Mod I via RW.  7. Pt will tolerate BUE exercises to increase strength for safe, functional transfers, ADL participation. 8. Pt will verbalize and/or demonstrate understanding of sternal precautions during functional activity. 9. Pt will verbalize and/or demonstrate understanding of mobilizing with WBAT for LLE during functional activity 100% of the time. Timeframe: 7 visits    OCCUPATIONAL THERAPY Initial Assessment, Daily Note, and AM       OT Visit Days: 1  Acknowledge Orders  Time  OT Charge Capture  Rehab Caseload Tracker      Loren Underwood is a 69 y.o. female   PRIMARY DIAGNOSIS: Closed fracture of left hip ([facility]  Right hip pain [M25.551]  [facility] fracture of left hip, [facility] encounter [facility] [S72.002A]  Acute pain of left hip [M25.552]  Procedure(s) (LRB):  HIP OPEN REDUCTION INTERNAL FIXATION (Left)  1 Day Post-Op  Reason for Referral: Generalized Muscle Weakness (M62.81)  Inpatient: Payor: MEDICARE / Plan: MEDICARE PART A AND B / Product Type: *No Product type* /     ASSESSMENT:     REHAB RECOMMENDATIONS:   Recommendation to date pending progress:  Setting:  Inpatient Rehab Facility    Equipment:    To Be Determined     ASSESSMENT:  Ms. Chapito Zavaleta is a 69 y F with a hx of GERD, DMII, recurrent UTI, HLD, OA of bilateral hands, HTN, depression. Pt was admitted for closed fracture of L hip with plate and screw fixation 2/8/23. Pt was received supine in bed on 2L O2 NC, 96%.  Pt required assistance for functional mobility and ADLs today due to generalized weakness, L hip pain, guidance in sequence of akilah with use of RW, guidance in use of AE for LBD. Pt tolerated the session very well today. Did replace 2L of O2 due to falling to 86% on RA but not SOB. Bilateral hand OA did not impact ability to perform ADLs today neither did her LUE lymphedema. Pt has deficits in strength, balance, activity tolerance, and performing ADLs. Pt requires continued skilled OT services due to performing functionally below baseline. Faith Jones Daily Activity Inpatient Short Form:    AM-PAC Daily Activity - Inpatient   How much help is needed for putting on and taking off regular lower body clothing?: A Lot  How much help is needed for bathing (which includes washing, rinsing, drying)?: A Lot  How much help is needed for toileting (which includes using toilet, bedpan, or urinal)?: A Lot  How much help is needed for putting on and taking off regular upper body clothing?: None  How much help is needed for taking care of personal grooming?: None  How much help for eating meals?: None  AM-[facility] Inpatient Daily Activity Raw Score: 18  AM-PAC Inpatient ADL T-Scale Score : 38.66  ADL Inpatient CMS 0-100% Score: 46.65  ADL Inpatient CMS G-Code Modifier : CK           SUBJECTIVE:     Ms. Sandi Cook states, \"I live near [facility]. \"     Social/Functional Lives With: Spouse  Type of Home: House  Home Layout: One level  Home Access: Stairs to enter without rails  Entrance Stairs - Number of Steps: 2  ADL Assistance: Independent  Ambulation Assistance: Independent  Transfer Assistance: Independent    OBJECTIVE:     LINES / Viveca Deist / Meryle Slocumb: IV and Purewick    RESTRICTIONS/PRECAUTIONS:  Restrictions/Precautions: Weight Bearing, Fall Risk  Left Lower Extremity Weight Bearing: Weight Bearing As Tolerated    PAIN: VITALS / O2:   Pre Treatment:   Pain Assessment: None - Denies Pain      Post Treatment: non-quantified L hip pain but became nauseous once up in chair. RN notified.         Vitals          Oxygen            GROSS EVALUATION: INTACT IMPAIRED   (See Comments)   UE AROM [x] []   UE PROM [] []   Strength []  Below baseline but functional     Posture / Balance [] Sitting - Dynamic:  (F/F+)  Standing - Dynamic: Fair   Sensation []     Coordination [x]       Tone [x]       Edema [] LUE lymphedema; hx of mastectomy    Activity Tolerance [] Patient limited by pain, Patient Tolerated treatment well     Hand Dominance R [x] L []      COGNITION/  PERCEPTION: INTACT IMPAIRED   (See Comments)   Orientation []  Oriented to all but place; seemed to be anesthesia wearing off   Vision []  glasses   Hearing [x]     Cognition  []  Slightly generally confused   Perception []       MOBILITY: I Mod I S SBA CGA Min Mod Max Total  NT x2 Comments:   Bed Mobility    Rolling [] [] [] [] [] [] [] [] [] [] []    Supine to Sit [] [] [] [] [] [] [] [x] [] [] [x]    Scooting [] [] [] [] [] [] [x] [x] [] [] []    Sit to Supine [] [] [] [] [] [] [] [] [] [] []    Transfers    Sit to Stand [] [] [] [] [] [] [x] [] [] [] [x]    Bed to Chair [] [] [] [] [] [x] [] [] [] [] [x]    Stand to Sit [] [] [] [] [] [x] [x] [] [] [] []    Tub/Shower [] [] [] [] [] [] [] [] [] [] []     Toilet [] [] [] [] [] [] [] [] [] [] []      [] [] [] [] [] [] [] [] [] [] []    I=Independent, Mod I=Modified Independent, S=Supervision/Setup, SBA=Standby Assistance, CGA=Contact Guard Assistance, Min=Minimal Assistance, Mod=Moderate Assistance, Max=Maximal Assistance, Total=Total Assistance, NT=Not Tested    ACTIVITIES OF DAILY LIVING: I Mod I S SBA CGA Min Mod Max Total NT Comments   BASIC ADLs:              Upper Body Bathing  [] [] [x] [] [] [] [] [] [] [] Seated in chair sink bath   Lower Body Bathing [] [] [] [x] [] [] [] [] [] [] Was able to reach to bilateral ankles to wash   Toileting [] [] [] [] [] [] [] [] [] []    Upper Body Dressing [] [] [x] [] [] [] [] [] [] [] Seated in chair gown   Lower Body Dressing [] [] [] [] [] [x] [x] [] [] [] was able to cross leg sitting EOB but could not bend over far enough due to pain therefore sock aid was used; Min- mod A for assistance with sock aid   Feeding [] [] [] [] [] [] [] [] [] []    Grooming [] [] [x] [] [] [] [] [] [] [] Seated in chair washing face   Personal Device Care [] [] [x] [] [] [] [] [] [] [] Seated in chair glasses set up only   Functional Mobility [] [] [] [] [] [x] [] [] [] [] X2 bed > chair via RW   I=Independent, Mod I=Modified Independent, S=Supervision/Setup, SBA=Standby Assistance, CGA=Contact Guard Assistance, Min=Minimal Assistance, Mod=Moderate Assistance, Max=Maximal Assistance, Total=Total Assistance, NT=Not Tested    PLAN:   [address] [place] [address] of Care: 5 times/week for duration of hospital stay or until stated goals are met, whichever comes first.    PROBLEM LIST:   (Skilled intervention is medically necessary to address:)  Decreased ADL/Functional Activities  Decreased Activity Tolerance  Decreased Balance  Decreased Cognition  Decreased Gait Ability  Decreased Strength  Decreased Transfer Abilities  Increased Pain   INTERVENTIONS PLANNED:  (Benefits and precautions of occupational therapy have been discussed with the patient.)  Self Care Training  Therapeutic Activity  Therapeutic Exercise/HEP  Neuromuscular Re-education  Gait Training  Manual Therapy  Education         TREATMENT:     EVALUATION: LOW COMPLEXITY: (Untimed Charge)    TREATMENT:   Co-Treatment PT/OT necessary due to patient's decreased overall endurance/tolerance levels, as well as need for high level skilled assistance to complete functional transfers/mobility and functional tasks  Self Care (28 minutes): Patient participated in upper body bathing, lower body bathing, upper body dressing, lower body dressing, personal device care, and grooming ADLs in supported sitting and unsupported sitting with moderate visual, verbal, manual, and tactile cueing to increase independence, decrease assistance required, and increase activity tolerance.  Patient also participated in functional mobility and adaptive equipment training to increase independence, decrease assistance required, increase activity tolerance, increase safety awareness, and maintain precautions. TREATMENT GRID:  N/A    AFTER TREATMENT PRECAUTIONS: Alarm Activated, Bed/Chair Locked, Call light within reach, Chair, Needs within reach, and RN notified    INTERDISCIPLINARY COLLABORATION:  RN/ PCT, PT/ PTA, and OT/ CADENA    EDUCATION:  Education Given To: Patient  Education Provided: Role of Therapy;Plan of Care;Precautions; ADL Adaptive Strategies;Transfer Training;Equipment; Fall Prevention Strategies  Barriers to Learning: Cognition  Education Outcome: Verbalized understanding;Continued education needed    TOTAL TREATMENT DURATION AND TIME:  Time In: 0903  Time Out: [facility]  Minutes: [address], OT

## 2023-02-09 NOTE — DISCHARGE INSTRUCTIONS
[address] [place] [address]      IF YOU HAVE ANY PROBLEMS ONCE YOU ARE AT HOME CALL THE FOLLOWING NUMBERS:   Main office number: [phone] ask for Ailin Russell (medical assistant with Dr. Calvin Pierre)  Office Address: [facility] [address] [address],[address], [address] [facility]      Patient Discharge Instructions    Angel Luis Tiffany / [id number] : 1945    Admitted 2023           To be given to [address] on Admission         Weight bearing status: As tolerated with walker and assistance    Activity  Continue Physical Therapy and Occupational Therapy   Fall precautions     Wound Care  Dry dressing changes using sterile technique every other day or more frequently if needed   Staples are to be left in and removed in our office 2 weeks postop    Diet  Resume regular or diabetic diet      Medications    Patient medications are listed on the medication reconciliation sheet. Follow up   Follow up in our office in 2 weeks postop   All patients are to be transported via stretcher unless they are able to independently get out of a chair and stand without assistance. Information obtained by :  I understand that if any problems occur once I am at home I am to contact my physician. I understand and acknowledge receipt of the instructions indicated above.                                                                                                                                            Physician's or R.N.'s Signature                                                                  Date/Time                                                                                                                                              Patient or Representative Signature                                                          Date/Time

## 2023-02-09 NOTE — PROGRESS NOTES
Hospitalist Progress Note   Admit Date:  2023 11:38 AM   Name:  Praneeth Pedersen   Age:  69 y.o. Sex:  female  :  1945   MRN:  [id number]   Room:  703/01    Presenting Complaint: Fall and Hip Pain     Reason(s) for Admission: Right hip pain [M25.551]  Closed fracture of left hip, initial encounter ([facility] [address] [S72.002A]  Acute pain of left hip [facility]     Hospital Course:   Patient is a 68 y/o female with medical history of GERD, DM II, recurrent UTI, CKD IIIa, hyperlipidemia, Sjogren's syndrome, OA of bilateral hands, osteopenia, polyneuropathy, vit D and B12 deficiency, breast cancer s/p mastectomy and chemotherapy, depression, dilated cardiomyopathy, hypertension who presented to ED s/p mechanical fall with impact to left hip. Denies hitting head or LOC. Patient does not take any blood thinners. ED workup: hypertensive to 191/98   Labs unremarkable, CXR with no acute findings  Hip XR with degenerative changes vs. nondisplaced left femur neck fracture. Hospitalist consulted for admission. MRI demonstrates minimally displaced acute left femoral neck fracture. Other noted findings include left gluteus medius/minimus muscle strains, bilateral hip chondrosis, partial-thickness tears of bilateral gluteus minimus/medius tendinous insertions, bilateral hamstring tendon origins. Orthopedic consultation. Patient underwent open treatment of left femoral neck fracture with plate and screw fixation with Dr. Soriano Em . Fever of 102.9 with associated AMS 2/7, noted rigors. Code S called due to word salad. Neurology evaluation, secondary to acute toxic metabolic event. CT head obtained and negative for acute pathology. 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 Infectious workup initiated, currently negative to date.  UA negative. Procal 0.18. No leukocytosis. RVP negative. Blood cultures obtained and pending. Continue broad-spectrum antibiotics. Mentation has normalized. CT head did show large calcification adjacent to choroid plexus, possible choroid plexus calcification vs meningioma due to size, recommendations for MRI brain w/wo contrast for further evaluation. MRI ordered, not yet obtained. PT/OT with rehab recommendations. [facility] consult placed. Subjective & 24hr Events (02/09/23): Patient is alert and oriented x 3 today. Patient denies chest pain, SOB, abdominal pain. Low grade fever to 100 overnight, continue broad spectrum antibiotics, no current source. No family present at bedside on rounding.     Assessment & Plan:     Closed left hip fracture, initial encounter [facility])  -s/p surgical repair of left femoral neck fracture with plate and screw fixation with Dr. Cathy Gibbons 2/8  -PT/OT with rehab recommendations, pending [facility] eval  -prn analgesia  -Noted history of osteopenia, did not tolerate alendronate, first Prolia  injection last August  -Cont Vit D supplementation 2000 u daily x 12 weeks    Sjogren syndrome, unspecified ([address]  -Follows Rheumatology, reviewed OV 1/19/23, no evidence of systemic involvement    Type 2 diabetes mellitus with chronic kidney disease  -Hold metformin, cont sliding scale insulin, carb controlled diet    Chronic gout of right hand / Bilateral Inflammatory Arthritis  -Follows Rheumatology, reviewed OV 1/19/23, noted BID colchicine but patient reports not taking, R first DIP joint mildly swollen, baseline per patient    Stage 3a chronic kidney disease ([address]  -Renal function stable at baseline    Essential hypertension  -Cont lisinopril, toprol-xl, furosemide  -BP control improving    Recurrent UTI  -Prophylactic nitrofurantoin  -UA negative    Depression  -Cont elavil    Dilated Cardiomyopathy  -Reviewed Echo 2018 with low-norm LVEF  -Cont Furosemide  -Strict I&O's, daily weights    Fever of unknown origin  -Work-up noted above, UA negative, CXR negative, RVP negative, Bcx pending, no leukocytosis, procal 0.18  -Low grade fever overnight to 100, continue cefepime/doxycyline, Bcx pending    Discharge planning: IRC vs STR    Diet:  ADULT DIET; Regular; 4 carb choices (60 gm/meal)  DVT PPx: Lovenox  Code status: Full Code    Hospital Problems:  Principal Problem:    Closed fracture of left hip ([facility] [address]  Active Problems:    Sjogren syndrome, unspecified ([facility] [address]    Type 2 diabetes mellitus with chronic kidney disease    Chronic gout of right hand    Left hip pain    Delirium    Toxic metabolic encephalopathy    Depression    Stage 3a chronic kidney disease (HCC)    Dilated cardiomyopathy ([facility] [address]    Essential hypertension  Resolved Problems:    * No resolved hospital problems. *      Objective:   Patient Vitals for the past 24 hrs:   Temp Pulse Resp BP SpO2   02/09/23 1328 -- -- 16 -- --   02/09/23 1119 97.3 °F (36.3 °C) 74 17 (!) 147/72 100 %   02/09/23 0956 -- -- -- (!) 140/70 --   02/09/23 0750 97.4 °F (36.3 °C) 71 17 (!) 140/70 98 %   02/09/23 0543 98.6 °F (37 °C) 70 18 133/65 96 %   02/09/23 0240 -- -- 20 -- --   02/08/23 2252 100 °F (37.8 °C) 89 18 (!) 186/81 97 %   02/08/23 2134 99.9 °F (37.7 °C) 90 18 (!) 190/90 96 %   02/08/23 2121 -- -- 18 -- --   02/08/23 1628 98.2 °F (36.8 °C) 79 18 (!) 170/90 98 %       Oxygen Therapy  SpO2: 100 %  Pulse via Oximetry: 76 beats per minute  Pulse Oximeter Device Mode: Continuous  O2 Device: Nasal cannula  O2 Flow Rate (L/min): 2.5 L/min    Estimated body mass index is 24.3 kg/m² as calculated from the following:    Height as of this encounter: 5' 5\" (1.651 m). Weight as of this encounter: 146 lb (66.2 kg).     Intake/Output Summary (Last 24 hours) at 2/9/2023 1435  Last data filed at 2/9/2023 0843  Gross per 24 hour   Intake 40 ml   Output 1100 ml   Net -1060 ml         Physical Exam:     Blood pressure (!) 147/72, pulse 74, temperature 97.3 °F (36.3 °C), temperature source Axillary, resp. rate 16, height 5' 5\" (1.651 m), weight 146 lb (66.2 kg), SpO2 100 %. General:    Well nourished. No acute distress. Alert. Head:  Normocephalic, atraumatic  Eyes:  Sclerae appear normal.  Pupils equally round. Glasses intact. ENT:  Nares appear normal.  Moist oral mucosa  Neck:  No restricted ROM. Trachea midline   CV:   RRR. No m/r/g. No jugular venous distension. Lungs:   CTAB anterior. No wheezing, rhonchi, or rales. Abdomen:   Soft, nontender, nondistended. Extremities: No cyanosis or clubbing. No peripheral edema. Left hip dressing C/D/I. Mild swelling to R first DIP/baseline  Skin:     No rashes and normal coloration. Warm and dry. No open wounds on feet or hands  Neuro:  CN II-XII grossly intact. A&O x 3. No focal deficits. Psych:  Normal mood and affect.       I have personally reviewed labs and tests:  Recent Labs:  Recent Results (from the past 48 hour(s))   POCT Glucose    Collection Time: 02/07/23  4:20 PM   Result Value Ref Range    POC Glucose 105 (H) 65 - 100 mg/dL    Performed by: [facility]    Respiratory Panel, Molecular, with COVID-19 (Restricted: peds pts or suitable admitted adults)    Collection Time: 02/07/23  5:47 PM    Specimen: Nasopharyngeal   Result Value Ref Range    Adenovirus by PCR NOT DETECTED NOTDET      Coronavirus 229E by PCR NOT DETECTED NOTDET      Coronavirus HKU1 by PCR NOT DETECTED NOTDET      Coronavirus NL63 by PCR NOT DETECTED NOTDET      Coronavirus OC43 by PCR NOT DETECTED NOTDET      SARS-CoV-2, PCR NOT DETECTED NOTDET      Human Metapneumovirus by PCR NOT DETECTED NOTDET      Rhinovirus Enterovirus PCR NOT DETECTED NOTDET      Influenza A by PCR NOT DETECTED NOTDET      Influenza B PCR NOT DETECTED NOTDET      Parainfluenza 1 PCR NOT DETECTED NOTDET      Parainfluenza 2 PCR NOT DETECTED NOTDET      Parainfluenza 3 PCR NOT DETECTED NOTDET      Parainfluenza 4 PCR NOT DETECTED NOTDET      Respiratory Syncytial Virus by PCR NOT DETECTED NOTDET      Bordetella parapertussis by PCR NOT DETECTED NOTDET      Bordetella pertussis by PCR NOT DETECTED NOTDET      Chlamydophila Pneumonia PCR NOT DETECTED NOTDET      Mycoplasma pneumo by PCR NOT DETECTED NOTDET     Lactic Acid    Collection Time: 02/07/23  6:19 PM   Result Value Ref Range    Lactic Acid, Plasma 1.2 0.4 - 2.0 MMOL/L   Procalcitonin    Collection Time: 02/07/23  6:19 PM   Result Value Ref Range    Procalcitonin 0.18 0.00 - 0.49 ng/mL   CBC with Auto Differential    Collection Time: 02/07/23  6:19 PM   Result Value Ref Range    WBC 6.2 4.3 - 11.1 K/uL    RBC 3.61 (L) 4.05 - 5.2 M/uL    Hemoglobin 10.7 (L) 11.7 - 15.4 g/dL    Hematocrit 33.9 (L) 35.8 - 46.3 %    MCV 93.9 82 - 102 FL    MCH 29.6 26.1 - 32.9 PG    MCHC 31.6 31.4 - 35.0 g/dL    RDW 14.4 11.9 - 14.6 %    Platelets 823 065 - 017 K/uL    MPV 10.7 9.4 - 12.3 FL    nRBC 0.00 0.0 - 0.2 K/uL    Differential Type AUTOMATED      Seg Neutrophils 64 43 - 78 %    Lymphocytes 24 13 - 44 %    Monocytes 9 4.0 - 12.0 %    Eosinophils % 2 0.5 - 7.8 %    Basophils 1 0.0 - 2.0 %    Immature Granulocytes 0 0.0 - 5.0 %    Segs Absolute 4.0 1.7 - 8.2 K/UL    Absolute Lymph # 1.5 0.5 - 4.6 K/UL    Absolute Mono # 0.5 0.1 - 1.3 K/UL    Absolute Eos # 0.1 0.0 - 0.8 K/UL    Basophils Absolute 0.0 0.0 - 0.2 K/UL    Absolute Immature Granulocyte 0.0 0.0 - 0.5 K/UL   Urinalysis with Reflex to Culture    Collection Time: 02/08/23  3:27 AM    Specimen: Urine   Result Value Ref Range    Color, UA YELLOW/STRAW      Appearance CLEAR      Specific Gravity, UA 1.028 (H) 1.001 - 1.023      pH, Urine 5.0 5.0 - 9.0      Protein, UA Negative NEG mg/dL    Glucose, UA Negative mg/dL    Ketones, Urine Negative NEG mg/dL    Bilirubin Urine Negative NEG      Blood, Urine Negative NEG      Urobilinogen, Urine 0.2 0.2 - 1.0 EU/dL    Nitrite, Urine Negative NEG      Leukocyte Esterase, Urine Negative NEG      Urine Culture if Indicated CULTURE NOT INDICATED BY UA RESULT      WBC, UA 0-4 U4 /hpf    RBC, UA 0-5 U5 /hpf    BACTERIA, URINE Negative NEG /hpf    Epithelial Cells UA 0-5 U5 /hpf    Casts 2-5 (A) U2 /lpf    Mucus, UA 0 0 /lpf   Hemoglobin and Hematocrit    Collection Time: 02/08/23  4:57 AM   Result Value Ref Range    Hemoglobin 10.2 (L) 11.7 - 15.4 g/dL    Hematocrit 31.9 (L) 35.8 - 46.3 %   Basic Metabolic Panel w/ Reflex to MG    Collection Time: 02/08/23  4:57 AM   Result Value Ref Range    Sodium 138 133 - 143 mmol/L    Potassium 3.9 3.5 - 5.1 mmol/L    Chloride 105 101 - 110 mmol/L    CO2 30 21 - 32 mmol/L    Anion Gap 3 2 - 11 mmol/L    Glucose 108 (H) 65 - 100 mg/dL    BUN 17 8 - 23 MG/DL    Creatinine 0.90 0.6 - 1.0 MG/DL    Est, Glom Filt Rate >60 >60 ml/min/1.73m2    Calcium 9.6 8.3 - 10.4 MG/DL   POCT Glucose    Collection Time: 02/08/23  6:11 AM   Result Value Ref Range    POC Glucose 90 65 - 100 mg/dL    Performed by: Peggy    TYPE AND SCREEN    Collection Time: 02/08/23  6:22 AM   Result Value Ref Range    Crossmatch expiration date 02/11/2023,2359     ABO/Rh O POSITIVE     Antibody Screen NEG    POCT Glucose    Collection Time: 02/08/23 11:23 AM   Result Value Ref Range    POC Glucose 94 65 - 100 mg/dL    Performed by: Myrick Barthel    POCT Glucose    Collection Time: 02/08/23 12:46 PM   Result Value Ref Range    POC Glucose 113 (H) 65 - 100 mg/dL    Performed by: Kari    POCT Glucose    Collection Time: 02/08/23  4:30 PM   Result Value Ref Range    POC Glucose 101 (H) 65 - 100 mg/dL    Performed by: Kari    POCT Glucose    Collection Time: 02/08/23 10:11 PM   Result Value Ref Range    POC Glucose 135 (H) 65 - 100 mg/dL    Performed by: Luiza Paige    Hemoglobin and Hematocrit    Collection Time: 02/09/23  4:38 AM   Result Value Ref Range    Hemoglobin 10.6 (L) 11.7 - 15.4 g/dL    Hematocrit 32.4 (L) 35.8 - 46.3 %   Basic Metabolic Panel w/ Reflex to MG    Collection Time: 02/09/23  4:38 AM   Result Value Ref Range    Sodium 137 133 - 143 mmol/L    Potassium 3.6 3.5 - 5.1 mmol/L    Chloride 96 (L) 101 - 110 mmol/L    CO2 32 21 - 32 mmol/L    Anion Gap 9 2 - 11 mmol/L    Glucose 158 (H) 65 - 100 mg/dL    BUN 16 8 - 23 MG/DL    Creatinine 1.00 0.6 - 1.0 MG/DL    Est, Glom Filt Rate 58 (L) >60 ml/min/1.73m2    Calcium 9.8 8.3 - 10.4 MG/DL   POCT Glucose    Collection Time: 02/09/23  6:49 AM   Result Value Ref Range    POC Glucose 121 (H) 65 - 100 mg/dL    Performed by: Kd Hinton    POCT Glucose    Collection Time: 02/09/23 11:22 AM   Result Value Ref Range    POC Glucose 112 (H) 65 - 100 mg/dL    Performed by: Dutch        I have personally reviewed imaging studies:  Other Studies:  XR HIP LEFT (2-3 VIEWS)   Final Result   Status post left hip ORIF without evidence of acute abnormality. [facility] XR TECHNOLOGIST SERVICE   Final Result      XR CHEST PORTABLE   Final Result      No acute cardiopulmonary process. Shabana Morales M.D.    2/7/2023 8:24:00 PM      CTA HEAD NECK W CONTRAST   Final Result      1. No occlusion, significant stenosis, or evidence of dissection in the cervical    carotid or vertebral arteries. 2. No large vessel occlusion or significant stenosis of the major intracranial    arteries. 3. 3 x 3 mm aneurysm or infundibulum at the origin of the left ophthalmic    artery. 2 mm infundibulum at the origin of the right ophthalmic artery. 4. Mild intracranial ICA atherosclerosis. Ranjeet Pierre M.D.    2/7/2023 5:56:00 PM      CT HEAD WO CONTRAST   Final Result      1. Moderate to severe chronic small vessel ischemic changes. 2. Senescent changes roughly commensurate with patient age. 3. No acute intracranial abnormality. 4. Moderate to severe inflammatory mucosal thickening at the left aspect the    sphenoid sinus.       5. An usually large and coarse, 1.1 x 0.8 cm calcification at or immediately    adjacent to the choroid plexus of the right atria. Although it is possible this    is a simple choroid plexus calcification, the size of the structure at least    raises the possibility of other pathology such as a meningioma. An MRI brain    with and without contrast is suggested for further evaluation. Margot Mckeon MD, PhD   Neuroradiologist         Thank you for this referral.  This exam was interpreted by a neuroradiologist    with 2 years of subspecialty training in neuroradiology, a Certificate of Added    Qualification [phone] in neuroradiology, and a PhD in molecular neuroscience. If    the patient's healthcare provider has any questions, a [employer]    neuroradiologist can be reached directly at [phone] at any time. Silva Hernandez M.D.    2/7/2023 6:58:00 PM      MRI HIP LEFT WO CONTRAST   Final Result      1. Minimally displaced acute left femoral neck fracture. 2. Intramuscular edema within the left gluteus medius/minimus muscles on the   lateral left adductor musculature, likely reflecting muscle strains. 3. Mild bilateral hip chondrosis. Degeneration of the left superior anterior   acetabular labrum. 4. Partial-thickness tears of the bilateral gluteus minimus tendinous   insertions, worse on the right. Low-grade partial-thickness tears of the   bilateral gluteus medius tendon insertions. 5. Low-grade partial-thickness tears of the bilateral hamstring tendon origins. XR HIP LEFT (2-3 VIEWS)   Final Result   Degenerative change versus nondisplaced left femoral neck fracture. If high clinical concern, consider follow-up films or MRI evaluation.          XR CHEST PORTABLE   Final Result   No acute findings in the chest         MRI BRAIN W WO CONTRAST    (Results Pending)       Current Meds:  Current Facility-Administered Medications   Medication Dose Route Frequency    hydrALAZINE (APRESOLINE) injection 10 mg  10 mg IntraVENous Q6H PRN    doxycycline hyclate (VIBRAMYCIN) capsule 100 mg  100 mg Oral 2 times per day    sodium chloride flush 0.9 % injection 5-40 mL  5-40 mL IntraVENous 2 times per day    sodium chloride flush 0.9 % injection 5-40 mL  5-40 mL IntraVENous PRN    0.9 % sodium chloride infusion   IntraVENous PRN    ondansetron (ZOFRAN-ODT) disintegrating tablet 4 mg  4 mg Oral Q8H PRN    Or    ondansetron (ZOFRAN) injection 4 mg  4 mg IntraVENous Q6H PRN    aspirin EC tablet 325 mg  325 mg Oral Daily    tuberculin injection 5 Units  5 Units IntraDERmal Once    HYDROmorphone HCl PF (DILAUDID) injection 0.5 mg  0.5 mg IntraVENous Q2H PRN    HYDROmorphone (DILAUDID) tablet 1 mg  1 mg Oral Q4H PRN    glucose chewable tablet 16 g  4 tablet Oral PRN    dextrose bolus 10% 125 mL  125 mL IntraVENous PRN    Or    dextrose bolus 10% 250 mL  250 mL IntraVENous PRN    glucagon (rDNA) injection 1 mg  1 mg SubCUTAneous PRN    dextrose 10 % infusion   IntraVENous Continuous PRN    alcohol 62% (NOZIN) nasal  1 ampule  1 ampule Topical Q12H    Vitamin D (CHOLECALCIFEROL) tablet 2,000 Units  2,000 Units Oral Daily    sodium chloride flush 0.9 % injection 10 mL  10 mL IntraVENous ONCE PRN    cefepime (MAXIPIME) 2,000 mg in sodium chloride 0.9 % 50 mL IVPB (mini-bag)  2,000 mg IntraVENous Q12H    amitriptyline (ELAVIL) tablet 10 mg  10 mg Oral Nightly    calcium carbonate (TUMS) chewable tablet 500 mg  1 tablet Oral Nightly    cetirizine (ZYRTEC) tablet 10 mg  10 mg Oral Daily PRN    dextromethorphan-guaiFENesin (MUCINEX DM)  MG per extended release tablet 1 tablet (Patient Supplied)  1 tablet Oral Q12H PRN    furosemide (LASIX) tablet 40 mg  40 mg Oral Every Other Day    gabapentin (NEURONTIN) capsule 600 mg  600 mg Oral BID    lisinopril (PRINIVIL;ZESTRIL) tablet 10 mg  10 mg Oral BID    insulin lispro (HUMALOG) injection vial 0-8 Units  0-8 Units SubCUTAneous TID WC    insulin lispro (HUMALOG) injection vial 0-4 Units  0-4 Units SubCUTAneous Nightly metoprolol succinate (TOPROL XL) extended release tablet 50 mg  50 mg Oral Nightly    nitrofurantoin (MACRODANTIN) capsule 50 mg  50 mg Oral Daily    potassium chloride (KLOR-CON M) extended release tablet 10 mEq  10 mEq Oral Daily with breakfast    sodium chloride flush 0.9 % injection 5-40 mL  5-40 mL IntraVENous 2 times per day    sodium chloride flush 0.9 % injection 5-40 mL  5-40 mL IntraVENous PRN    0.9 % sodium chloride infusion   IntraVENous PRN    polyethylene glycol (GLYCOLAX) packet 17 g  17 g Oral Daily PRN    acetaminophen (TYLENOL) tablet 650 mg  650 mg Oral Q6H PRN    Or    acetaminophen (TYLENOL) suppository 650 mg  650 mg Rectal Q6H PRN    0.9 % sodium chloride infusion   IntraVENous Continuous    mirabegron (MYRBETRIQ) extended release tablet 25 mg (Patient Supplied)  25 mg Oral Daily    melatonin tablet 3 mg  3 mg Oral Nightly     Signed: Prakash Hunter AGACNP-BC

## 2023-02-09 NOTE — PROGRESS NOTES
Physician Progress Note      Trevor Henry  CSN #:                  [id number]  :                       1945  ADMIT DATE:       2023 11:38 AM  [address] [place] DATE:  Adi Han  PROVIDER #:        Ladan Nunes MD          QUERY TEXT:    Pt admitted with an pathologic fracture of her L femur--sp surgical repair   this admission  . Pt noted to have to have a > 2gm drop in HGB . If possible,   please document in the progress notes and discharge summary if you are   evaluating and/or treating any of the following: The medical record reflects the following:  Risk Factors: Hip fracture sp surgery this admission. Clinical Indicators: hgb 12.9 dropping to 10.2 -surgical EBL--120cc's. Treatment: Monitoring of H/H, type and cross, fluids, labs, xray, essential   home meds. Options provided:  -- Acute blood loss anemia  -- Chronic blood loss anemia  -- Acute on chronic blood loss anemia  -- Iron deficiency anemia  -- Postoperative acute blood loss anemia  -- Dilutional anemia  -- Precipitous drop in Hemoglobin and Hematocrit  -- Other - I will add my own diagnosis  -- Disagree - Not applicable / Not valid  -- Disagree - Clinically unable to determine / Unknown  -- Refer to Clinical Documentation Reviewer    PROVIDER RESPONSE TEXT:    This patient has acute on chronic blood loss anemia.     Query created by: Kalani Ugarte on 2023 10:49 AM      Electronically signed by:  Ladan Nunes MD 2023 1:31 PM

## 2023-02-09 NOTE — PLAN OF CARE
Problem: Discharge Planning  Goal: Discharge to home or other facility with appropriate resources  2/9/2023 0304 by Kristal Calles RN  Outcome: Progressing  Flowsheets (Taken 2/8/2023 2045)  Discharge to home or other facility with appropriate resources: Identify barriers to discharge with patient and caregiver  2/8/2023 1652 by Kylie Massey RN  Outcome: Progressing  Flowsheets (Taken 2/8/2023 1248)  Discharge to home or other facility with appropriate resources: Identify barriers to discharge with patient and caregiver     Problem: Pain  Goal: Verbalizes/displays adequate comfort level or baseline comfort level  2/9/2023 0304 by Kristal Calles RN  Outcome: Progressing  Flowsheets (Taken 2/8/2023 2121)  Verbalizes/displays adequate comfort level or baseline comfort level:   Encourage patient to monitor pain and request assistance   Assess pain using appropriate pain scale   Administer analgesics based on type and severity of pain and evaluate response  2/8/2023 1652 by Kylie Massey RN  Outcome: Progressing     Problem: Skin/Tissue Integrity  Goal: Absence of new skin breakdown  Description: 1. Monitor for areas of redness and/or skin breakdown  2. Assess vascular access sites hourly  3. Every 4-6 hours minimum:  Change oxygen saturation probe site  4. Every 4-6 hours:  If on nasal continuous positive airway pressure, respiratory therapy assess nares and determine need for appliance change or resting period.   2/9/2023 0304 by Kristal Calles RN  Outcome: Progressing  2/8/2023 1652 by Kylie Massey RN  Outcome: Progressing     Problem: Safety - Adult  Goal: Free from fall injury  2/9/2023 0304 by Kristal Calles RN  Outcome: Progressing  Flowsheets (Taken 2/8/2023 2121)  Free From Fall Injury: Instruct family/caregiver on patient safety  2/8/2023 1652 by Kylie Massey RN  Outcome: Progressing  Flowsheets (Taken 2/8/2023 1248)  Free From Fall Injury: Instruct family/caregiver on patient safety     Problem: ABCDS Injury Assessment  Goal: Absence of physical injury  2/9/2023 0304 by Milana Meyer RN  Outcome: Progressing  Flowsheets (Taken 2/8/2023 2121)  Absence of Physical Injury: Implement safety measures based on patient assessment  2/8/2023 1652 by Graciela Nava RN  Outcome: Progressing  Flowsheets (Taken 2/8/2023 1248)  Absence of Physical Injury: Implement safety measures based on patient assessment     Problem: Chronic Conditions and Co-morbidities  Goal: Patient's chronic conditions and co-morbidity symptoms are monitored and maintained or improved  2/9/2023 0304 by Milana Meyer RN  Outcome: Progressing  Flowsheets (Taken 2/8/2023 2045)  Care Plan - Patient's Chronic Conditions and Co-Morbidity Symptoms are Monitored and Maintained or Improved: Monitor and assess patient's chronic conditions and comorbid symptoms for stability, deterioration, or improvement  2/8/2023 1652 by Graciela Nava RN  Outcome: Progressing  Flowsheets (Taken 2/8/2023 1248)  Care Plan - Patient's Chronic Conditions and Co-Morbidity Symptoms are Monitored and Maintained or Improved: Monitor and assess patient's chronic conditions and comorbid symptoms for stability, deterioration, or improvement     Problem: Neurosensory - Adult  Goal: Achieves stable or improved neurological status  2/9/2023 0304 by Milana Meyer RN  Outcome: Progressing  Flowsheets (Taken 2/8/2023 2045)  Achieves stable or improved neurological status: Assess for and report changes in neurological status  2/8/2023 1652 by Graciela Nava RN  Outcome: Progressing  Flowsheets (Taken 2/8/2023 1248)  Achieves stable or improved neurological status: Assess for and report changes in neurological status  Goal: Achieves maximal functionality and self care  2/9/2023 0304 by Milana Meyer RN  Outcome: Progressing  Flowsheets (Taken 2/8/2023 2045)  Achieves maximal functionality and self care: Monitor swallowing and airway patency with patient fatigue and changes in neurological status  2/8/2023 1652 by Mandy Knapp RN  Outcome: Progressing  Flowsheets (Taken 2/8/2023 1248)  Achieves maximal functionality and self care: Monitor swallowing and airway patency with patient fatigue and changes in neurological status     Problem: Respiratory - Adult  Goal: Achieves optimal ventilation and oxygenation  2/9/2023 0304 by Darnell Medrano RN  Outcome: Progressing  Flowsheets (Taken 2/8/2023 2045)  Achieves optimal ventilation and oxygenation: Assess for changes in respiratory status  2/8/2023 1652 by Mandy Knapp RN  Outcome: Progressing  Flowsheets (Taken 2/8/2023 1248)  Achieves optimal ventilation and oxygenation: Assess for changes in respiratory status     Problem: Cardiovascular - Adult  Goal: Maintains optimal cardiac output and hemodynamic stability  2/9/2023 0304 by Darnell Medrano RN  Outcome: Progressing  Flowsheets (Taken 2/8/2023 2045)  Maintains optimal cardiac output and hemodynamic stability: Monitor blood pressure and heart rate  2/8/2023 1652 by Mandy Knapp RN  Outcome: Progressing  Flowsheets (Taken 2/8/2023 1248)  Maintains optimal cardiac output and hemodynamic stability: Monitor blood pressure and heart rate     Problem: Skin/Tissue Integrity - Adult  Goal: Skin integrity remains intact  2/9/2023 0304 by Darnell Medrano RN  Outcome: Progressing  Flowsheets  Taken 2/8/2023 2121  Skin Integrity Remains Intact: Monitor for areas of redness and/or skin breakdown  Taken 2/8/2023 2045  Skin Integrity Remains Intact: Monitor for areas of redness and/or skin breakdown  2/8/2023 1652 by Mandy Knapp RN  Outcome: Progressing  Flowsheets (Taken 2/8/2023 1248)  Skin Integrity Remains Intact: Monitor for areas of redness and/or skin breakdown  Goal: Incisions, wounds, or drain sites healing without S/S of infection  2/9/2023 0304 by Darnell Medrano RN  Outcome: Progressing  Flowsheets  Taken 2/8/2023 2121  Incisions, Wounds, or Drain Sites Healing Without Sign and Symptoms of Infection: ADMISSION and DAILY: Assess and document risk factors for pressure ulcer development  Taken 2/8/2023 2045  Incisions, Wounds, or Drain Sites Healing Without Sign and Symptoms of Infection: ADMISSION and DAILY: Assess and document risk factors for pressure ulcer development  2/8/2023 1652 by Kaiser Sosa RN  Outcome: Progressing  Flowsheets (Taken 2/8/2023 1248)  Incisions, Wounds, or Drain Sites Healing Without Sign and Symptoms of Infection: ADMISSION and DAILY: Assess and document risk factors for pressure ulcer development     Problem: Musculoskeletal - Adult  Goal: Return mobility to safest level of function  2/9/2023 0304 by Osmar Soares RN  Outcome: Progressing  Flowsheets (Taken 2/8/2023 2045)  Return Mobility to Safest Level of Function: Assess patient stability and activity tolerance for standing, transferring and ambulating with or without assistive devices  2/8/2023 1652 by Kaiser Sosa RN  Outcome: Progressing  Flowsheets (Taken 2/8/2023 1248)  Return Mobility to Safest Level of Function: Assess patient stability and activity tolerance for standing, transferring and ambulating with or without assistive devices  Goal: Maintain proper alignment of affected body part  2/9/2023 0304 by Osmar Soares RN  Outcome: Progressing  Flowsheets (Taken 2/8/2023 2045)  Maintain proper alignment of affected body part: Support and protect limb and body alignment per provider's orders  2/8/2023 1652 by Kaiser Sosa RN  Outcome: Progressing  Flowsheets (Taken 2/8/2023 1248)  Maintain proper alignment of affected body part: Support and protect limb and body alignment per provider's orders  Goal: Return ADL status to a safe level of function  2/9/2023 0304 by Osmar Soares RN  Outcome: Progressing  Flowsheets (Taken 2/8/2023 2045)  Return ADL Status to a Safe Level of Function: Administer medication as ordered  2/8/2023 1652 by Leanord Mom, RN  Outcome: Progressing  Flowsheets (Taken 2/8/2023 1248)  Return ADL Status to a Safe Level of Function: Administer medication as ordered     Problem: Gastrointestinal - Adult  Goal: Maintains or returns to baseline bowel function  2/9/2023 0304 by Narciso Rinne, RN  Outcome: Progressing  Flowsheets (Taken 2/8/2023 2045)  Maintains or returns to baseline bowel function: Assess bowel function  2/8/2023 1652 by Vicky Nolan RN  Outcome: Progressing  Flowsheets (Taken 2/8/2023 1248)  Maintains or returns to baseline bowel function: Assess bowel function     Problem: Infection - Adult  Goal: Absence of infection at discharge  2/9/2023 0304 by Narciso Rinne, RN  Outcome: Progressing  Flowsheets (Taken 2/8/2023 2045)  Absence of infection at discharge: Assess and monitor for signs and symptoms of infection  2/8/2023 1652 by Vicky Nolan RN  Outcome: Progressing  Flowsheets (Taken 2/8/2023 1248)  Absence of infection at discharge: Assess and monitor for signs and symptoms of infection  Goal: Absence of infection during hospitalization  2/9/2023 0304 by Narciso Rinne, RN  Outcome: Progressing  Flowsheets (Taken 2/8/2023 2045)  Absence of infection during hospitalization: Assess and monitor for signs and symptoms of infection  2/8/2023 1652 by Vicky Nolan RN  Outcome: Progressing  Flowsheets (Taken 2/8/2023 1248)  Absence of infection during hospitalization: Assess and monitor for signs and symptoms of infection     Problem: Metabolic/Fluid and Electrolytes - Adult  Goal: Electrolytes maintained within normal limits  2/9/2023 0304 by Narciso Rinne, RN  Outcome: Progressing  Flowsheets (Taken 2/8/2023 2045)  Electrolytes maintained within normal limits: Monitor labs and assess patient for signs and symptoms of electrolyte imbalances  2/8/2023 1652 by Vicky Nolan RN  Outcome: Progressing  Flowsheets (Taken 2/8/2023 1248)  Electrolytes maintained within normal limits: Monitor labs and assess patient for signs and symptoms of electrolyte imbalances  Goal: Hemodynamic stability and optimal renal function maintained  2/9/2023 0304 by Hua Jc RN  Outcome: Progressing  Flowsheets (Taken 2/8/2023 2045)  Hemodynamic stability and optimal renal function maintained: Monitor labs and assess for signs and symptoms of volume excess or deficit  2/8/2023 1652 by Phil Mai RN  Outcome: Progressing  Flowsheets (Taken 2/8/2023 1248)  Hemodynamic stability and optimal renal function maintained: Monitor labs and assess for signs and symptoms of volume excess or deficit  Goal: Glucose maintained within prescribed range  2/9/2023 0304 by Hua Jc RN  Outcome: Progressing  Flowsheets (Taken 2/8/2023 2045)  Glucose maintained within prescribed range: Monitor blood glucose as ordered  2/8/2023 1652 by Phil Mai RN  Outcome: Progressing  Flowsheets (Taken 2/8/2023 1248)  Glucose maintained within prescribed range: Monitor blood glucose as ordered     Problem: Hematologic - Adult  Goal: Maintains hematologic stability  2/9/2023 0304 by Hua Jc RN  Outcome: Progressing  Flowsheets (Taken 2/8/2023 2045)  Maintains hematologic stability: Assess for signs and symptoms of bleeding or hemorrhage  2/8/2023 1652 by Phil Mai RN  Outcome: Progressing  Flowsheets (Taken 2/8/2023 1248)  Maintains hematologic stability: Assess for signs and symptoms of bleeding or hemorrhage     Problem: Genitourinary - Adult  Goal: Absence of urinary retention  2/9/2023 0304 by Hua Jc RN  Outcome: Progressing  Flowsheets (Taken 2/8/2023 2045)  Absence of urinary retention: Assess patients ability to void and empty bladder  2/8/2023 1652 by Phil Mai RN  Outcome: Progressing  Flowsheets (Taken 2/8/2023 1248)  Absence of urinary retention: Assess patients ability to void and empty bladder

## 2023-02-09 NOTE — PROGRESS NOTES
ACUTE PHYSICAL THERAPY GOALS:   (Developed with and agreed upon by patient and/or caregiver. )     LTG:  (1.)Ms. Lida Hernandez will move from supine to sit and sit to supine , scoot up and down, and roll side to side in bed with MINIMAL ASSIST within 7 treatment day(s). (2.)Ms. Lida Hernandez will transfer from bed to chair and chair to bed with MINIMAL ASSIST using the least restrictive device within 7 treatment day(s). (3.)Ms. Lida Hernandez will ambulate with MINIMAL ASSIST for 50+ feet with the least restrictive device within 7 treatment day(s). PHYSICAL THERAPY: Daily Note PM   (Link to Caseload Tracking: PT Visit Days : 1  Time In/Out PT Charge Capture  Rehab Caseload Tracker  Orders    Praneeth Pedersen is a 69 y.o. female   PRIMARY DIAGNOSIS: Closed fracture of left hip (HCC)  Right hip pain [M25.551]  Closed fracture of left hip, initial encounter ([facility] [address] [S72.002A]  Acute pain of left hip [M25.552]  Procedure(s) (LRB):  HIP OPEN REDUCTION INTERNAL FIXATION (Left)  1 Day Post-Op  Inpatient: Payor: Donnie Castro / Plan: MEDICARE PART A AND B / Product Type: *No Product type* /     ASSESSMENT:     REHAB RECOMMENDATIONS:   Recommendation to date pending progress:  Setting:  Inpatient Rehab Facility    Equipment:    To Be Determined     ASSESSMENT:  Ms. Lida Hernandez was sitting in recliner and eager to get back to bed. Spouse reports that every time she undergoes anesthesia or lots of medicine she becomes confused. She does know her location this afternoon. Patient continued to require mod Ax2 to come to stand and pivot to [facility]. Urinated then transferred to bed with mod Ax2 and lots of additional time and cueing. .  She sat with uncontrolled decent on the bed before safely close encough and would have fallen if 2 people were not holding onto her. .  Sit to supine with max Ax2. No progress noted this afternoon, however, patient did tolerate sitting in the recliner most of the day. Will continue working towards goals. SUBJECTIVE:   Ms. Luis E Bolaños states, Iram Tolliver said you'd be back at 2. \"     Social/Functional Lives With: Spouse  Type of Home: House  Home Layout: One level  Home Access: Stairs to enter without rails  Entrance Stairs - Number of Steps: 2  ADL Assistance: Independent  Ambulation Assistance: Independent  Transfer Assistance: Independent  OBJECTIVE:     PAIN: VITALS / O2: PRECAUTION / José Ly / DRAINS:   Pre Treatment:   Pain Level: 6      Post Treatment: 6 Vitals        Oxygen    IV and Purewick    RESTRICTIONS/PRECAUTIONS:  Restrictions/Precautions  Restrictions/Precautions: Weight Bearing, Fall Risk  Lower Extremity Weight Bearing Restrictions  Left Lower Extremity Weight Bearing: Weight Bearing As Tolerated  Restrictions/Precautions: Weight Bearing, Fall Risk     MOBILITY: I Mod I S SBA CGA Min Mod Max Total  NT x2 Comments:   Bed Mobility    Rolling [] [] [] [] [] [] [] [] [] [] []    Supine to Sit [] [] [] [] [] [] [] [] [] [] []    Scooting [] [] [] [] [] [] [] [] [] [] []    Sit to Supine [] [] [] [] [] [] [] [x] [] [] [x]    Transfers    Sit to Stand [] [] [] [] [] [] [x] [] [] [] [x]    Bed to Chair [] [] [] [] [] [] [x] [] [] [] [x]    Stand to Sit [] [] [] [] [] [] [x] [] [] [] [x]     [] [] [] [] [] [] [] [] [] [] []    I=Independent, Mod I=Modified Independent, S=Supervision, SBA=Standby Assistance, CGA=Contact Guard Assistance,   Min=Minimal Assistance, Mod=Moderate Assistance, Max=Maximal Assistance, Total=Total Assistance, NT=Not Tested    BALANCE: Good Fair+ Fair Fair- Poor NT Comments   Sitting Static [] [] [] [] [] []    Sitting Dynamic [] [] [] [] [] []              Standing Static [] [] [] [] [x] []    Standing Dynamic [] [] [] [] [x] []      GAIT: I Mod I S SBA CGA Min Mod Max Total  NT x2 Comments:   Level of Assistance [] [] [] [] [] [] [x] [] [] [] [x]    Distance 3 feet    DME Gait Belt and Rolling Walker    Gait Quality Decreased akilah , Decreased step clearance, Decreased step length, Shuffling , Step-to, and Trunk flexion    Weightbearing Status Left Lower Extremity Weight Bearing: Weight Bearing As Tolerated    Stairs      I=Independent, Mod I=Modified Independent, S=Supervision, SBA=Standby Assistance, CGA=Contact Guard Assistance,   Min=Minimal Assistance, Mod=Moderate Assistance, Max=Maximal Assistance, Total=Total Assistance, NT=Not Tested    PLAN:   FREQUENCY AND DURATION: BID for duration of hospital stay or until stated goals are met, whichever comes first.    TREATMENT:   TREATMENT:   Therapeutic Activity (25 Minutes): Therapeutic activity included Sit to Supine, Scooting, Transfer Training, Ambulation on level ground, Standing balance, and education in safe use of RW to improve functional Activity tolerance, Balance, Mobility, and Strength.     TREATMENT GRID:  N/A    AFTER TREATMENT PRECAUTIONS: Alarm Activated, Bed, Bed/Chair Locked, Call light within reach, Needs within reach, and Visitors at bedside    INTERDISCIPLINARY COLLABORATION:  RN/ PCT, PT/ PTA, RN Case Manager/  , and Rehab Attendant    EDUCATION: Education Given To: Patient  Education Provided: Role of Therapy;Plan of Care;Transfer Training  Education Outcome: Continued education needed    TIME IN/OUT:  Time In: 1400  Time Out: [address]  Minutes: [address], PT

## 2023-02-10 ENCOUNTER — APPOINTMENT (OUTPATIENT)
Dept: GENERAL RADIOLOGY | Age: 78
End: 2023-02-10
Payer: MEDICARE

## 2023-02-10 ENCOUNTER — HOSPITAL ENCOUNTER (INPATIENT)
Age: 78
DRG: 560 | End: 2023-02-10
Attending: PHYSICAL MEDICINE & REHABILITATION | Admitting: PHYSICAL MEDICINE & REHABILITATION
Payer: MEDICARE

## 2023-02-10 ENCOUNTER — APPOINTMENT (OUTPATIENT)
Dept: MRI IMAGING | Age: 78
End: 2023-02-10
Payer: MEDICARE

## 2023-02-10 VITALS
HEIGHT: 65 IN | OXYGEN SATURATION: 93 % | BODY MASS INDEX: 24.32 KG/M2 | SYSTOLIC BLOOD PRESSURE: 145 MMHG | TEMPERATURE: 99.4 F | WEIGHT: 146 LBS | DIASTOLIC BLOOD PRESSURE: 69 MMHG | RESPIRATION RATE: 19 BRPM | HEART RATE: 95 BPM

## 2023-02-10 DIAGNOSIS — S72.002A CLOSED LEFT HIP FRACTURE, INITIAL ENCOUNTER (HCC): Primary | ICD-10-CM

## 2023-02-10 PROBLEM — R93.89 ABNORMAL FINDING ON CT SCAN: Status: ACTIVE | Noted: 2023-02-10

## 2023-02-10 PROBLEM — M25.552 LEFT HIP PAIN: Status: RESOLVED | Noted: 2023-02-06 | Resolved: 2023-02-10

## 2023-02-10 PROBLEM — G92.8 TOXIC METABOLIC ENCEPHALOPATHY: Status: RESOLVED | Noted: 2023-02-07 | Resolved: 2023-02-10

## 2023-02-10 PROBLEM — R41.0 DELIRIUM: Status: RESOLVED | Noted: 2023-02-07 | Resolved: 2023-02-10

## 2023-02-10 LAB
ANION GAP SERPL CALC-SCNC: 11 MMOL/L (ref 2–11)
BASOPHILS # BLD: 0 K/UL (ref 0–0.2)
BASOPHILS NFR BLD: 0 % (ref 0–2)
BUN SERPL-MCNC: 22 MG/DL (ref 8–23)
CALCIUM SERPL-MCNC: 10.2 MG/DL (ref 8.3–10.4)
CHLORIDE SERPL-SCNC: 98 MMOL/L (ref 101–110)
CO2 SERPL-SCNC: 28 MMOL/L (ref 21–32)
CREAT SERPL-MCNC: 0.9 MG/DL (ref 0.6–1)
DIFFERENTIAL METHOD BLD: ABNORMAL
EOSINOPHIL # BLD: 0.2 K/UL (ref 0–0.8)
EOSINOPHIL NFR BLD: 3 % (ref 0.5–7.8)
ERYTHROCYTE [DISTWIDTH] IN BLOOD BY AUTOMATED COUNT: 14.2 % (ref 11.9–14.6)
GLUCOSE BLD STRIP.AUTO-MCNC: 100 MG/DL (ref 65–100)
GLUCOSE BLD STRIP.AUTO-MCNC: 104 MG/DL (ref 65–100)
GLUCOSE BLD STRIP.AUTO-MCNC: 131 MG/DL (ref 65–100)
GLUCOSE SERPL-MCNC: 116 MG/DL (ref 65–100)
HCT VFR BLD AUTO: 34.7 % (ref 35.8–46.3)
HGB BLD-MCNC: 11.2 G/DL (ref 11.7–15.4)
IMM GRANULOCYTES # BLD AUTO: 0 K/UL (ref 0–0.5)
IMM GRANULOCYTES NFR BLD AUTO: 1 % (ref 0–5)
LYMPHOCYTES # BLD: 1.4 K/UL (ref 0.5–4.6)
LYMPHOCYTES NFR BLD: 21 % (ref 13–44)
MCH RBC QN AUTO: 29.9 PG (ref 26.1–32.9)
MCHC RBC AUTO-ENTMCNC: 32.3 G/DL (ref 31.4–35)
MCV RBC AUTO: 92.5 FL (ref 82–102)
MONOCYTES # BLD: 0.6 K/UL (ref 0.1–1.3)
MONOCYTES NFR BLD: 9 % (ref 4–12)
NEUTS SEG # BLD: 4.3 K/UL (ref 1.7–8.2)
NEUTS SEG NFR BLD: 66 % (ref 43–78)
NRBC # BLD: 0 K/UL (ref 0–0.2)
PLATELET # BLD AUTO: 217 K/UL (ref 150–450)
PMV BLD AUTO: 11 FL (ref 9.4–12.3)
POTASSIUM SERPL-SCNC: 3.6 MMOL/L (ref 3.5–5.1)
PROCALCITONIN SERPL-MCNC: 0.33 NG/ML (ref 0–0.49)
RBC # BLD AUTO: 3.75 M/UL (ref 4.05–5.2)
SARS-COV-2 RDRP RESP QL NAA+PROBE: NOT DETECTED
SERVICE CMNT-IMP: ABNORMAL
SERVICE CMNT-IMP: ABNORMAL
SERVICE CMNT-IMP: NORMAL
SODIUM SERPL-SCNC: 137 MMOL/L (ref 133–143)
SOURCE: NORMAL
WBC # BLD AUTO: 6.4 K/UL (ref 4.3–11.1)

## 2023-02-10 PROCEDURE — 84145 PROCALCITONIN (PCT): CPT

## 2023-02-10 PROCEDURE — 6370000000 HC RX 637 (ALT 250 FOR IP): Performed by: PHYSICIAN ASSISTANT

## 2023-02-10 PROCEDURE — 97530 THERAPEUTIC ACTIVITIES: CPT

## 2023-02-10 PROCEDURE — 73620 X-RAY EXAM OF FOOT: CPT

## 2023-02-10 PROCEDURE — 6370000000 HC RX 637 (ALT 250 FOR IP): Performed by: ORTHOPAEDIC SURGERY

## 2023-02-10 PROCEDURE — 99223 1ST HOSP IP/OBS HIGH 75: CPT | Performed by: PHYSICIAN ASSISTANT

## 2023-02-10 PROCEDURE — 36415 COLL VENOUS BLD VENIPUNCTURE: CPT

## 2023-02-10 PROCEDURE — 6370000000 HC RX 637 (ALT 250 FOR IP): Performed by: STUDENT IN AN ORGANIZED HEALTH CARE EDUCATION/TRAINING PROGRAM

## 2023-02-10 PROCEDURE — 85025 COMPLETE CBC W/AUTO DIFF WBC: CPT

## 2023-02-10 PROCEDURE — 97535 SELF CARE MNGMENT TRAINING: CPT

## 2023-02-10 PROCEDURE — 87635 SARS-COV-2 COVID-19 AMP PRB: CPT

## 2023-02-10 PROCEDURE — 2580000003 HC RX 258: Performed by: ORTHOPAEDIC SURGERY

## 2023-02-10 PROCEDURE — 6360000002 HC RX W HCPCS: Performed by: ORTHOPAEDIC SURGERY

## 2023-02-10 PROCEDURE — 6360000002 HC RX W HCPCS: Performed by: FAMILY MEDICINE

## 2023-02-10 PROCEDURE — 97116 GAIT TRAINING THERAPY: CPT

## 2023-02-10 PROCEDURE — 80048 BASIC METABOLIC PNL TOTAL CA: CPT

## 2023-02-10 PROCEDURE — 97162 PT EVAL MOD COMPLEX 30 MIN: CPT

## 2023-02-10 PROCEDURE — 82962 GLUCOSE BLOOD TEST: CPT

## 2023-02-10 PROCEDURE — 1180000000 HC REHAB R&B

## 2023-02-10 RX ORDER — LANOLIN ALCOHOL/MO/W.PET/CERES
3 CREAM (GRAM) TOPICAL NIGHTLY
Status: CANCELLED | OUTPATIENT
Start: 2023-02-10

## 2023-02-10 RX ORDER — NITROFURANTOIN MACROCRYSTALS 50 MG/1
50 CAPSULE ORAL DAILY
Status: DISCONTINUED | OUTPATIENT
Start: 2023-02-11 | End: 2023-02-21 | Stop reason: HOSPADM

## 2023-02-10 RX ORDER — HYDROMORPHONE HYDROCHLORIDE 2 MG/1
1 TABLET ORAL EVERY 6 HOURS PRN
Qty: 6 TABLET | Refills: 0 | Status: ON HOLD
Start: 2023-02-10 | End: 2023-02-20 | Stop reason: HOSPADM

## 2023-02-10 RX ORDER — HYDRALAZINE HYDROCHLORIDE 50 MG/1
25 TABLET, FILM COATED ORAL EVERY 6 HOURS PRN
Status: CANCELLED | OUTPATIENT
Start: 2023-02-10

## 2023-02-10 RX ORDER — POLYETHYLENE GLYCOL 3350 17 G/17G
17 POWDER, FOR SOLUTION ORAL DAILY PRN
Status: CANCELLED | OUTPATIENT
Start: 2023-02-10

## 2023-02-10 RX ORDER — FUROSEMIDE 40 MG/1
40 TABLET ORAL EVERY OTHER DAY
Status: CANCELLED | OUTPATIENT
Start: 2023-02-12

## 2023-02-10 RX ORDER — INSULIN LISPRO 100 [IU]/ML
0-8 INJECTION, SOLUTION INTRAVENOUS; SUBCUTANEOUS
Status: CANCELLED | OUTPATIENT
Start: 2023-02-10

## 2023-02-10 RX ORDER — CALCIUM CARBONATE 200(500)MG
1 TABLET,CHEWABLE ORAL NIGHTLY
Status: DISCONTINUED | OUTPATIENT
Start: 2023-02-10 | End: 2023-02-21 | Stop reason: HOSPADM

## 2023-02-10 RX ORDER — CALCIUM CARBONATE 200(500)MG
1 TABLET,CHEWABLE ORAL NIGHTLY
Status: CANCELLED | OUTPATIENT
Start: 2023-02-10

## 2023-02-10 RX ORDER — NITROFURANTOIN MACROCRYSTALS 50 MG/1
50 CAPSULE ORAL DAILY
Status: CANCELLED | OUTPATIENT
Start: 2023-02-11

## 2023-02-10 RX ORDER — CHOLECALCIFEROL (VITAMIN D3) 50 MCG
2000 TABLET ORAL DAILY
Qty: 80 TABLET | Refills: 0 | Status: SHIPPED | OUTPATIENT
Start: 2023-02-11 | End: 2023-05-02

## 2023-02-10 RX ORDER — TROSPIUM CHLORIDE 20 MG/1
20 TABLET, FILM COATED ORAL NIGHTLY
Status: DISCONTINUED | OUTPATIENT
Start: 2023-02-10 | End: 2023-02-21 | Stop reason: HOSPADM

## 2023-02-10 RX ORDER — GABAPENTIN 300 MG/1
600 CAPSULE ORAL 2 TIMES DAILY
Status: CANCELLED | OUTPATIENT
Start: 2023-02-10

## 2023-02-10 RX ORDER — CETIRIZINE HYDROCHLORIDE 5 MG/1
10 TABLET ORAL DAILY PRN
Status: DISCONTINUED | OUTPATIENT
Start: 2023-02-10 | End: 2023-02-21 | Stop reason: HOSPADM

## 2023-02-10 RX ORDER — FUROSEMIDE 40 MG/1
40 TABLET ORAL EVERY OTHER DAY
Status: DISCONTINUED | OUTPATIENT
Start: 2023-02-12 | End: 2023-02-21 | Stop reason: HOSPADM

## 2023-02-10 RX ORDER — GABAPENTIN 300 MG/1
600 CAPSULE ORAL 2 TIMES DAILY
Status: DISCONTINUED | OUTPATIENT
Start: 2023-02-10 | End: 2023-02-21 | Stop reason: HOSPADM

## 2023-02-10 RX ORDER — METOPROLOL SUCCINATE 50 MG/1
50 TABLET, EXTENDED RELEASE ORAL NIGHTLY
Status: CANCELLED | OUTPATIENT
Start: 2023-02-10

## 2023-02-10 RX ORDER — VITAMIN B COMPLEX
2000 TABLET ORAL DAILY
Status: CANCELLED | OUTPATIENT
Start: 2023-02-11 | End: 2023-05-02

## 2023-02-10 RX ORDER — ACETAMINOPHEN 650 MG/1
650 SUPPOSITORY RECTAL EVERY 6 HOURS PRN
Status: DISCONTINUED | OUTPATIENT
Start: 2023-02-10 | End: 2023-02-21 | Stop reason: HOSPADM

## 2023-02-10 RX ORDER — AMITRIPTYLINE HYDROCHLORIDE 10 MG/1
10 TABLET, FILM COATED ORAL NIGHTLY
Status: CANCELLED | OUTPATIENT
Start: 2023-02-10

## 2023-02-10 RX ORDER — HYDROMORPHONE HYDROCHLORIDE 4 MG/1
2 TABLET ORAL EVERY 4 HOURS PRN
Status: CANCELLED | OUTPATIENT
Start: 2023-02-10

## 2023-02-10 RX ORDER — POLYETHYLENE GLYCOL 3350 17 G/17G
17 POWDER, FOR SOLUTION ORAL DAILY PRN
Status: DISCONTINUED | OUTPATIENT
Start: 2023-02-10 | End: 2023-02-21 | Stop reason: HOSPADM

## 2023-02-10 RX ORDER — ACETAMINOPHEN 650 MG/1
650 SUPPOSITORY RECTAL EVERY 6 HOURS PRN
Status: CANCELLED | OUTPATIENT
Start: 2023-02-10

## 2023-02-10 RX ORDER — CETIRIZINE HYDROCHLORIDE 10 MG/1
10 TABLET ORAL DAILY PRN
Status: CANCELLED | OUTPATIENT
Start: 2023-02-10

## 2023-02-10 RX ORDER — AMITRIPTYLINE HYDROCHLORIDE 10 MG/1
10 TABLET, FILM COATED ORAL NIGHTLY
Status: DISCONTINUED | OUTPATIENT
Start: 2023-02-10 | End: 2023-02-21 | Stop reason: HOSPADM

## 2023-02-10 RX ORDER — LISINOPRIL 5 MG/1
10 TABLET ORAL 2 TIMES DAILY
Status: CANCELLED | OUTPATIENT
Start: 2023-02-10

## 2023-02-10 RX ORDER — HYDROMORPHONE HYDROCHLORIDE 2 MG/1
1 TABLET ORAL EVERY 4 HOURS PRN
Status: DISCONTINUED | OUTPATIENT
Start: 2023-02-10 | End: 2023-02-21 | Stop reason: HOSPADM

## 2023-02-10 RX ORDER — INSULIN LISPRO 100 [IU]/ML
0-8 INJECTION, SOLUTION INTRAVENOUS; SUBCUTANEOUS
Status: DISCONTINUED | OUTPATIENT
Start: 2023-02-10 | End: 2023-02-11

## 2023-02-10 RX ORDER — INSULIN LISPRO 100 [IU]/ML
0-4 INJECTION, SOLUTION INTRAVENOUS; SUBCUTANEOUS NIGHTLY
Status: DISCONTINUED | OUTPATIENT
Start: 2023-02-10 | End: 2023-02-11

## 2023-02-10 RX ORDER — LANOLIN ALCOHOL/MO/W.PET/CERES
3 CREAM (GRAM) TOPICAL NIGHTLY
Status: DISCONTINUED | OUTPATIENT
Start: 2023-02-10 | End: 2023-02-21 | Stop reason: HOSPADM

## 2023-02-10 RX ORDER — HYDRALAZINE HYDROCHLORIDE 50 MG/1
25 TABLET, FILM COATED ORAL EVERY 6 HOURS PRN
Status: DISCONTINUED | OUTPATIENT
Start: 2023-02-10 | End: 2023-02-21 | Stop reason: HOSPADM

## 2023-02-10 RX ORDER — ACETAMINOPHEN 325 MG/1
650 TABLET ORAL EVERY 6 HOURS PRN
Status: CANCELLED | OUTPATIENT
Start: 2023-02-10

## 2023-02-10 RX ORDER — HYDROMORPHONE HYDROCHLORIDE 2 MG/1
1 TABLET ORAL EVERY 4 HOURS PRN
Status: CANCELLED | OUTPATIENT
Start: 2023-02-10

## 2023-02-10 RX ORDER — VITAMIN B COMPLEX
2000 TABLET ORAL DAILY
Status: DISCONTINUED | OUTPATIENT
Start: 2023-02-11 | End: 2023-02-21 | Stop reason: HOSPADM

## 2023-02-10 RX ORDER — INSULIN LISPRO 100 [IU]/ML
0-4 INJECTION, SOLUTION INTRAVENOUS; SUBCUTANEOUS NIGHTLY
Status: CANCELLED | OUTPATIENT
Start: 2023-02-10

## 2023-02-10 RX ORDER — LISINOPRIL 5 MG/1
10 TABLET ORAL 2 TIMES DAILY
Status: DISCONTINUED | OUTPATIENT
Start: 2023-02-10 | End: 2023-02-21 | Stop reason: HOSPADM

## 2023-02-10 RX ORDER — POTASSIUM CHLORIDE 20 MEQ/1
10 TABLET, EXTENDED RELEASE ORAL
Status: COMPLETED | OUTPATIENT
Start: 2023-02-11 | End: 2023-02-14

## 2023-02-10 RX ORDER — NALOXONE HYDROCHLORIDE 4 MG/.1ML
1 SPRAY NASAL PRN
Qty: 1 EACH | Refills: 0
Start: 2023-02-10

## 2023-02-10 RX ORDER — ACETAMINOPHEN 325 MG/1
650 TABLET ORAL EVERY 6 HOURS PRN
Status: DISCONTINUED | OUTPATIENT
Start: 2023-02-10 | End: 2023-02-21 | Stop reason: HOSPADM

## 2023-02-10 RX ORDER — GUAIFENESIN/DEXTROMETHORPHAN 100-10MG/5
5 SYRUP ORAL EVERY 4 HOURS
Status: DISCONTINUED | OUTPATIENT
Start: 2023-02-10 | End: 2023-02-21 | Stop reason: HOSPADM

## 2023-02-10 RX ORDER — HYDROMORPHONE HYDROCHLORIDE 2 MG/1
2 TABLET ORAL EVERY 4 HOURS PRN
Status: DISCONTINUED | OUTPATIENT
Start: 2023-02-10 | End: 2023-02-21 | Stop reason: HOSPADM

## 2023-02-10 RX ORDER — METOPROLOL SUCCINATE 50 MG/1
50 TABLET, EXTENDED RELEASE ORAL NIGHTLY
Status: DISCONTINUED | OUTPATIENT
Start: 2023-02-10 | End: 2023-02-21 | Stop reason: HOSPADM

## 2023-02-10 RX ORDER — POTASSIUM CHLORIDE 750 MG/1
10 TABLET, EXTENDED RELEASE ORAL
Status: CANCELLED | OUTPATIENT
Start: 2023-02-11 | End: 2023-02-15

## 2023-02-10 RX ADMIN — CALCIUM CARBONATE (ANTACID) CHEW TAB 500 MG 500 MG: 500 CHEW TAB at 20:39

## 2023-02-10 RX ADMIN — HYDROMORPHONE HYDROCHLORIDE 2 MG: 2 TABLET ORAL at 20:41

## 2023-02-10 RX ADMIN — HYDROMORPHONE HYDROCHLORIDE 1 MG: 2 TABLET ORAL at 09:18

## 2023-02-10 RX ADMIN — ASPIRIN 325 MG: 325 TABLET, COATED ORAL at 09:18

## 2023-02-10 RX ADMIN — METOPROLOL SUCCINATE 50 MG: 50 TABLET, EXTENDED RELEASE ORAL at 20:39

## 2023-02-10 RX ADMIN — FUROSEMIDE 40 MG: 40 TABLET ORAL at 09:25

## 2023-02-10 RX ADMIN — CHOLECALCIFEROL TAB 25 MCG (1000 UNIT) 2000 UNITS: 25 TAB at 09:17

## 2023-02-10 RX ADMIN — HYDROMORPHONE HYDROCHLORIDE 0.5 MG: 1 INJECTION, SOLUTION INTRAMUSCULAR; INTRAVENOUS; SUBCUTANEOUS at 11:48

## 2023-02-10 RX ADMIN — Medication 1 AMPULE: at 11:49

## 2023-02-10 RX ADMIN — HYDRALAZINE HYDROCHLORIDE 10 MG: 20 INJECTION INTRAMUSCULAR; INTRAVENOUS at 05:22

## 2023-02-10 RX ADMIN — DOXYCYCLINE HYCLATE 100 MG: 100 CAPSULE ORAL at 09:17

## 2023-02-10 RX ADMIN — LISINOPRIL 10 MG: 5 TABLET ORAL at 09:17

## 2023-02-10 RX ADMIN — NITROFURANTOIN MACROCRYSTALS 50 MG: 50 CAPSULE ORAL at 09:18

## 2023-02-10 RX ADMIN — ACETAMINOPHEN 650 MG: 325 TABLET ORAL at 20:38

## 2023-02-10 RX ADMIN — AMITRIPTYLINE HYDROCHLORIDE 10 MG: 10 TABLET, FILM COATED ORAL at 20:39

## 2023-02-10 RX ADMIN — TROSPIUM CHLORIDE 20 MG: 20 TABLET, FILM COATED ORAL at 20:40

## 2023-02-10 RX ADMIN — POTASSIUM CHLORIDE 10 MEQ: 1500 TABLET, EXTENDED RELEASE ORAL at 09:25

## 2023-02-10 RX ADMIN — HYDROMORPHONE HYDROCHLORIDE 0.5 MG: 1 INJECTION, SOLUTION INTRAMUSCULAR; INTRAVENOUS; SUBCUTANEOUS at 05:22

## 2023-02-10 RX ADMIN — SODIUM CHLORIDE, PRESERVATIVE FREE 10 ML: 5 INJECTION INTRAVENOUS at 09:21

## 2023-02-10 RX ADMIN — Medication 3 MG: at 20:40

## 2023-02-10 RX ADMIN — GABAPENTIN 600 MG: 300 CAPSULE ORAL at 20:40

## 2023-02-10 RX ADMIN — GABAPENTIN 600 MG: 300 CAPSULE ORAL at 09:17

## 2023-02-10 RX ADMIN — Medication 1 AMPULE: at 20:45

## 2023-02-10 RX ADMIN — LISINOPRIL 10 MG: 5 TABLET ORAL at 20:39

## 2023-02-10 ASSESSMENT — PAIN DESCRIPTION - FREQUENCY: FREQUENCY: CONTINUOUS

## 2023-02-10 ASSESSMENT — PAIN SCALES - GENERAL
PAINLEVEL_OUTOF10: 0
PAINLEVEL_OUTOF10: 9
PAINLEVEL_OUTOF10: 0
PAINLEVEL_OUTOF10: 5
PAINLEVEL_OUTOF10: 8
PAINLEVEL_OUTOF10: 0
PAINLEVEL_OUTOF10: 6
PAINLEVEL_OUTOF10: 10

## 2023-02-10 ASSESSMENT — PAIN DESCRIPTION - ONSET: ONSET: ON-GOING

## 2023-02-10 ASSESSMENT — PAIN DESCRIPTION - DESCRIPTORS
DESCRIPTORS: ACHING;THROBBING
DESCRIPTORS: ACHING;THROBBING
DESCRIPTORS: ACHING

## 2023-02-10 ASSESSMENT — PAIN DESCRIPTION - PAIN TYPE: TYPE: SURGICAL PAIN

## 2023-02-10 ASSESSMENT — PAIN DESCRIPTION - LOCATION
LOCATION: HIP;LEG
LOCATION: FOOT;HIP
LOCATION: LEG;HIP
LOCATION: HIP

## 2023-02-10 ASSESSMENT — PAIN - FUNCTIONAL ASSESSMENT: PAIN_FUNCTIONAL_ASSESSMENT: ACTIVITIES ARE NOT PREVENTED

## 2023-02-10 ASSESSMENT — PAIN DESCRIPTION - ORIENTATION
ORIENTATION: LEFT

## 2023-02-10 NOTE — PROGRESS NOTES
Mri was attempted - pt would not hold still and states she is unable to do mri today due to pain- unable to get any images after several attempts

## 2023-02-10 NOTE — PROGRESS NOTES
ACUTE PHYSICAL THERAPY GOALS:   (Developed with and agreed upon by patient and/or caregiver. )     LTG:  (1.)Ms. Giovanni Parks will move from supine to sit and sit to supine , scoot up and down, and roll side to side in bed with MINIMAL ASSIST within 7 treatment day(s). (2.)Ms. Giovanni Parks will transfer from bed to chair and chair to bed with MINIMAL ASSIST using the least restrictive device within 7 treatment day(s). (3.)Ms. Giovanni Parks will ambulate with MINIMAL ASSIST for 50+ feet with the least restrictive device within 7 treatment day(s). PHYSICAL THERAPY: Daily Note PM   (Link to Caseload Tracking: PT Visit Days : 2  Time In/Out PT Charge Capture  Rehab Caseload Tracker  Orders    Jensen Kraus is a 69 y.o. female   PRIMARY DIAGNOSIS: Closed fracture of left hip (HCC)  Right hip pain [M25.551]  Closed fracture of left hip, initial encounter ([facility] [address] [S72.002A]  Acute pain of left hip [M25.552]  Procedure(s) (LRB):  HIP OPEN REDUCTION INTERNAL FIXATION (Left)  2 Days Post-Op  Inpatient: Payor: MEDICARE / Plan: MEDICARE PART A AND B / Product Type: *No Product type* /     ASSESSMENT:     REHAB RECOMMENDATIONS:   Recommendation to date pending progress:  Setting:  Inpatient Rehab Facility    Equipment:    To Be Determined     ASSESSMENT:  Ms. Giovanni Parks was supine upon contact and agreeable to PT. Patient's LLE found in abduction, external rotation and knee/hip flexion. Educated patient on proper positioning of LLE. Patient verbalized understanding. Patient performed supine to sit and transfer to standing with min-mod assist, additional time, and cues for technique. Once standing patient ambulated 8' into bathroom with max cueing for walker negotiation and safety. Patient performed transfer on and off toilet with min-mod assist and cues for technique. Patient demonstrated fair standing balance during standing ADL activities.  Patient then ambulated an additional 15' with rolling walker, min assist, chair follow and cues for sequencing. Overall steady progress. Will continue PT efforts. SUBJECTIVE:   Ms. Adelina Friedman states, \"You're going to fix my leg? \"     Social/Functional Lives With: Spouse  Type of Home: House  Home Layout: One level  Home Access: Stairs to enter without rails  Entrance Stairs - Number of Steps: 3  ADL Assistance: Independent  Homemaking Assistance: Independent  Ambulation Assistance: Needs assistance  Transfer Assistance: Needs assistance  Occupation: Retired  OBJECTIVE:     PAIN: VITALS / O2: PRECAUTION / Anton Leahy / Leonardo Millardty:   Pre Treatment:   Pain Assessment: None - Denies Pain      Post Treatment: 0 Vitals        Oxygen    Purewick    RESTRICTIONS/PRECAUTIONS:  Restrictions/Precautions  Restrictions/Precautions: Weight Bearing, Fall Risk  Lower Extremity Weight Bearing Restrictions  Left Lower Extremity Weight Bearing: Weight Bearing As Tolerated  Restrictions/Precautions: Weight Bearing, Fall Risk     MOBILITY: I Mod I S SBA CGA Min Mod Max Total  NT x2 Comments:   Bed Mobility    Rolling [] [] [] [] [] [] [] [] [] [] []    Supine to Sit [] [] [] [] [] [x] [x] [] [] [] []    Scooting [] [] [] [] [] [] [] [] [] [] []    Sit to Supine [] [] [] [] [] [] [] [] [] [] []    Transfers    Sit to Stand [] [] [] [] [] [x] [x] [] [] [] []    Bed to Chair [] [] [] [] [] [x] [x] [] [] [] []    Stand to Sit [] [] [] [] [] [x] [x] [] [] [] []     [] [] [] [] [] [] [] [] [] [] []    I=Independent, Mod I=Modified Independent, S=Supervision, SBA=Standby Assistance, CGA=Contact Guard Assistance,   Min=Minimal Assistance, Mod=Moderate Assistance, Max=Maximal Assistance, Total=Total Assistance, NT=Not Tested    BALANCE: Good Fair+ Fair Fair- Poor NT Comments   Sitting Static [] [x] [] [] [] []    Sitting Dynamic [] [] [x] [] [] []              Standing Static [] [] [x] [] [] []    Standing Dynamic [] [] [] [x] [] []      GAIT: I Mod I S SBA CGA Min Mod Max Total  NT x2 Comments:   Level of Assistance [] [] [] [] [] [] [x] [] [] [] []    Distance   8' then 15'    DME Gait Belt and Rolling Walker    Gait Quality Decreased akilah , Decreased step clearance, Decreased step length, Shuffling , Step-to, and Trunk flexion    Weightbearing Status      Stairs      I=Independent, Mod I=Modified Independent, S=Supervision, SBA=Standby Assistance, CGA=Contact Guard Assistance,   Min=Minimal Assistance, Mod=Moderate Assistance, Max=Maximal Assistance, Total=Total Assistance, NT=Not Tested    PLAN:   FREQUENCY AND DURATION: BID for duration of hospital stay or until stated goals are met, whichever comes first.    TREATMENT:   TREATMENT:   Co-Treatment PT/OT necessary due to patient's decreased overall endurance/tolerance levels, as well as need for high level skilled assistance to complete functional transfers/mobility and functional tasks  Therapeutic Activity (15 Minutes): Therapeutic activity included Supine to Sit, Scooting, Transfer Training, Ambulation on level ground, Sitting balance , and Standing balance to improve functional Activity tolerance, Balance, Mobility, and Strength. Gait Training (10 Minutes): Gait training for 8' then 15 feet utilizing [facility]. Patient required Manual, Tactile, Verbal, and Visual cueing to improve Assistive Device Utilization, Dynamic Standing Balance, and Gait Mechanics. TREATMENT GRID:  N/A    AFTER TREATMENT PRECAUTIONS: Alarm Activated, Bed/Chair Locked, Call light within reach, Chair, Needs within reach, and RN notified    INTERDISCIPLINARY COLLABORATION:  RN/ PCT, PT/ PTA, and OT/ CADENA    EDUCATION:      TIME IN/OUT:  Time In: 1115  Time Out: [address]  Minutes: [address].  CARMEN Wheeler

## 2023-02-10 NOTE — H&P
[facility]      Admission History and Physical Exam  INPATIENT REHABILITATION CENTER       Admit Date: 2/10/2023  Primary Care Provider: LIU Dominguez - MALOU    Chief Complaint : \"I'm not so sure about this rehab. \"  Admitting Diagnosis:   Closed left hip fracture, initial encounter ([facility] [S72.002A]    Principal Problem:    Closed left hip fracture, initial encounter ([facility]  Active Problems:    Primary insomnia    Sjogren syndrome, unspecified ([facility]    Type 2 diabetes mellitus with chronic kidney disease    Chronic gout of right hand    Chronic combined systolic and diastolic CHF (congestive heart failure) (HCC)    Dyslipidemia    Depression    Osteoarthritis    Chemotherapy-induced neuropathy (HCC)    Essential hypertension    Chronic anemia  Resolved Problems:    * No resolved hospital problems. *      Acute Rehab Diagnoses:  Encounter for rehabilitation [Z51.89]   Abnormality of gait and mobility [R26.9]  Decreased independence for activities of daily living (ADL) [Z78.9]    Medical Dx:  Past Medical History:   Diagnosis Date    Ankle fracture 2014    Ankle osteomyelitis, left ([facility] 2014    Breast cancer ([facility]     Chemotherapy-induced neuropathy (HCC)     Chronic anemia     Chronic systolic CHF (congestive heart failure) (HCC)     CKD (chronic kidney disease) stage 3, GFR 30-59 ml/min (HCC)     Colon polyp 2020    Depression     Dilated cardiomyopathy ([address]     Dry eye syndrome of both eyes     Dyslipidemia     Essential hypertension     Fracture of right patella 2013    History of left breast cancer 2001    with mastectomy    Left leg weakness 9/8/2020    Non-ischemic cardiomyopathy ([facility] 2018    Echo 50-55%, Cath-minimal CAD.  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 Pod 2 ORIF doing well. C/o moderate pain. Controlled on PO meds. Ready to go home    Type 2 diabetes mellitus, controlled ([address]     BS am 104    UTI (urinary tract infection) 2020    Vaginal infection     Vitamin B12 deficiency        Date of Evaluation: February 10, 2023    Meera Best is a 69 y.o. white female patient at [facility] who was admitted to [address] [place] [address] on 2/10/2023 by Lucille Howell MD of Physical Medicine and Rehab service with below-mentioned medical history. HPI: 69 y.o. white female presented to the ED 2/6/2023 with left hip pain and inability to bear weight after mechanical fall at home. XR equivocal (degenerative change vs fracture); MRI confirmed nondisplaced left femoral neck fracture. ED labs and CXR unremarkable. Code S called 2/7 due to confusion and word salad. Fever to 102.9F, /105; empiric ABX started. Head CT without acute findings but did note large calcification adjacent to choroid plexus, follow-up MRI recommended. Infectious workup initiated, currently negative to date. Neurology opined acute toxic metabolic event. On 2/8, she was stable and underwent ORIF plate + screw fixation Rocky Browne MD). Mild post-op ABLA, Hgb carlos 10.2 (2/8). Left foot XR for new pain unrevealing; Ortho postulated plantar fascitis. PT/OT noted mobility and self-care deficits, recommended IRC. Once determined to be stable, patient was transferred to the [facility] for comprehensive inpatient rehabilitation program and close medical monitoring. Prior Functional status - independent with ADLs, functional mobility, and cognition.     Current Functional status  Per PT: min-mod A for bed mobility and transfers, ambulated 8' then 15' mod A with RW  Per OT: CGA-min A for toileting, supervision for grooming, UB dressing         Past Medical History:   Diagnosis Date    Ankle fracture 2014    Ankle osteomyelitis, left ([address] 2014    Breast cancer ([address]     Chemotherapy-induced neuropathy (HCC)     Chronic anemia     Chronic systolic CHF (congestive heart failure) (HCC)     CKD (chronic kidney disease) stage 3, GFR 30-59 ml/min (HCC)     Colon polyp 2020    Depression     Dilated cardiomyopathy ([address]     Dry eye syndrome of both eyes     Dyslipidemia     Essential hypertension     Fracture of right patella 2013    History of left breast cancer 2001    with mastectomy    Left leg weakness 9/8/2020    Non-ischemic cardiomyopathy ([address] 2018    Echo 50-55%, Cath-minimal CAD. 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 Pod 2 ORIF doing well. C/o moderate pain. Controlled on PO meds.   Ready to go home    Type 2 diabetes mellitus, controlled ([address]     BS am 104    UTI (urinary tract infection) 2020    Vaginal infection     Vitamin B12 deficiency       Past Surgical History:   Procedure Laterality Date    BREAST BIOPSY      left breast biopsy    BREAST BIOPSY  1968    left cyst removed    BREAST LUMPECTOMY Left 2001    BREAST REDUCTION SURGERY      Rt breast    CARDIAC CATHETERIZATION  2009    CARPAL TUNNEL RELEASE Bilateral     CHOLECYSTECTOMY, OPEN  1981    COLONOSCOPY  07/2014    CYST REMOVAL Right 11/2020    hand    HAND SURGERY Right 11/16/2022    Right index finger distal interphalangeal joint arthrodesis performed by Anais Cowart MD at [address] [place] [address]      Sinus Surgery    HEENT      RK procedure bilateral eyes    HIP FRACTURE SURGERY Left 2/8/2023    HIP OPEN REDUCTION INTERNAL FIXATION performed by Tricia Chavez MD at [facility] MAIN OR    HX OPEN REDUCTION INTERNAL FIXATION Left 2013    Ankle    HX OPEN REDUCTION INTERNAL FIXATION  08/2016    [place]    HYSTERECTOMY (CERVIX STATUS UNKNOWN)      LUMBAR LAMINECTOMY  1992    MASTECTOMY Left 2002    With Reconstruction    ORTHOPEDIC SURGERY Right 08/2013    Patellar fracture repair    OTHER SURGICAL HISTORY      [place] Cath Placed and Removed    OVARY REMOVAL      Unilateral    PARTIAL HYSTERECTOMY (CERVIX NOT REMOVED)  1974    TONSILLECTOMY      TONSILLECTOMY      TRANSPLANT  2002    Stem Cell Transplant    UVULOPALATOPHARYGOPLASTY  2004     Allergies   Allergen Reactions    Oxycodone Itching    Eucalyptus Oil Hives and Other (See Comments)     Burns mouth    Ezetimibe Myalgia    Morphine Other (See Comments)     Feels crazy  Other reaction(s): Hallucinations    Penicillins Hives    Pineapple Hives and Other (See Comments)     Burns mouth    Vancomycin Other (See Comments)     Kidney function worsened    Sulfa Antibiotics Nausea Only and Rash      Family History   Problem Relation Age of Onset    Cancer Mother     Diabetes Mother     Heart Disease Mother         MI at age 66    Heart Failure Mother         age 58    Stroke Sister     Cancer Brother     Heart Disease Brother     Heart Disease Father         MI age 55    Breast Cancer Neg Hx     Stomach Cancer Neg Hx       Social History     Tobacco Use    Smoking status: Never    Smokeless tobacco: Never    Tobacco comments:     never used tobacco   Substance Use Topics    Alcohol use: No      No current facility-administered medications for this encounter. Review of Systems:  CONSTITUTIONAL: Recent fever, chills before surgery. Appetite good, no weight loss. HEENT: No congestion, runny nose or sore throat. No headache or vision change. SKIN: No rash or itching. CARDIOVASCULAR: No chest pain or palpitations. RESPIRATORY: No shortness of breath, cough or sputum. GASTROINTESTINAL: Mild nausea during MRI attempt. No vomiting, reflux or anorexia. : No dysuria, hematuria or urinary retention.   NEUROLOGICAL: No seizure or stroke-like symptoms, no change in bowel or bladder control. HEMATOLOGIC: No excessive bleeding or bruising. PSYCHIATRIC: No current depression or anxiety. Objective:     Physical Exam:    Visit Vitals  BP (!) 162/80   Pulse 92   Temp 99.8 °F (37.7 °C) (Oral)   Resp 18   SpO2 91%       General: No acute distress, well developed, well nourished. HEENT: Normocephalic, edentulous, oral mucosa is moist. Extraocular movements are intact; visual fields full to finger confrontation. Neck: Supple, non-tender. Respiratory: Clear breath sounds bilaterally, respirations unlabored. Equal chest rise. Cardiovascular: Normal rate, regular underlying rhythm, harsh systolic murmur. Palpable pedal pulses, no cyanosis. ABD: Soft, non-tender, not distended, normoactive bowel sounds. Integumentary: Clean, no rash, no skin breakdown. Musculoskeletal: UE strength 4 to 4+/5 and mostly symmetric. Right index finger tenderness and chronic-appearing swelling. R LE strength grossly though patient participation variable. Left hip and LE exam limited by pain. Psychiatric: Appropriate mood & affect. Neuro: Alert, oriented to self / situation, speech and language intact. No focal deficits. Sensation grossly intact.       Recent Results (from the past 72 hour(s))   POCT Glucose    Collection Time: 02/07/23  4:20 PM   Result Value Ref Range    POC Glucose 105 (H) 65 - 100 mg/dL    Performed by: [facility]    Respiratory Panel, Molecular, with COVID-19 (Restricted: peds pts or suitable admitted adults)    Collection Time: 02/07/23  5:47 PM    Specimen: Nasopharyngeal   Result Value Ref Range    Adenovirus by PCR NOT DETECTED NOTDET      Coronavirus 229E by PCR NOT DETECTED NOTDET      Coronavirus HKU1 by PCR NOT DETECTED NOTDET      Coronavirus NL63 by PCR NOT DETECTED NOTDET      Coronavirus OC43 by PCR NOT DETECTED NOTDET      SARS-CoV-2, PCR NOT DETECTED NOTDET      Human Metapneumovirus by PCR NOT DETECTED NOTDET Rhinovirus Enterovirus PCR NOT DETECTED NOTDET      Influenza A by PCR NOT DETECTED NOTDET      Influenza B PCR NOT DETECTED NOTDET      Parainfluenza 1 PCR NOT DETECTED NOTDET      Parainfluenza 2 PCR NOT DETECTED NOTDET      Parainfluenza 3 PCR NOT DETECTED NOTDET      Parainfluenza 4 PCR NOT DETECTED NOTDET      Respiratory Syncytial Virus by PCR NOT DETECTED NOTDET      Bordetella parapertussis by PCR NOT DETECTED NOTDET      Bordetella pertussis by PCR NOT DETECTED NOTDET      Chlamydophila Pneumonia PCR NOT DETECTED NOTDET      Mycoplasma pneumo by PCR NOT DETECTED NOTDET     Culture, Blood 1    Collection Time: 02/07/23  6:19 PM    Specimen: Blood   Result Value Ref Range    Special Requests RIGHT  FOREARM        Culture NO GROWTH 3 DAYS     Lactic Acid    Collection Time: 02/07/23  6:19 PM   Result Value Ref Range    Lactic Acid, Plasma 1.2 0.4 - 2.0 MMOL/L   Procalcitonin    Collection Time: 02/07/23  6:19 PM   Result Value Ref Range    Procalcitonin 0.18 0.00 - 0.49 ng/mL   CBC with Auto Differential    Collection Time: 02/07/23  6:19 PM   Result Value Ref Range    WBC 6.2 4.3 - 11.1 K/uL    RBC 3.61 (L) 4.05 - 5.2 M/uL    Hemoglobin 10.7 (L) 11.7 - 15.4 g/dL    Hematocrit 33.9 (L) 35.8 - 46.3 %    MCV 93.9 82 - 102 FL    MCH 29.6 26.1 - 32.9 PG    MCHC 31.6 31.4 - 35.0 g/dL    RDW 14.4 11.9 - 14.6 %    Platelets 161 566 - 928 K/uL    MPV 10.7 9.4 - 12.3 FL    nRBC 0.00 0.0 - 0.2 K/uL    Differential Type AUTOMATED      Seg Neutrophils 64 43 - 78 %    Lymphocytes 24 13 - 44 %    Monocytes 9 4.0 - 12.0 %    Eosinophils % 2 0.5 - 7.8 %    Basophils 1 0.0 - 2.0 %    Immature Granulocytes 0 0.0 - 5.0 %    Segs Absolute 4.0 1.7 - 8.2 K/UL    Absolute Lymph # 1.5 0.5 - 4.6 K/UL    Absolute Mono # 0.5 0.1 - 1.3 K/UL    Absolute Eos # 0.1 0.0 - 0.8 K/UL    Basophils Absolute 0.0 0.0 - 0.2 K/UL    Absolute Immature Granulocyte 0.0 0.0 - 0.5 K/UL   Culture, Blood 1    Collection Time: 02/07/23  6:27 PM    Specimen: Blood   Result Value Ref Range    Special Requests RIGHT  HAND        Culture NO GROWTH 3 DAYS     Urinalysis with Reflex to Culture    Collection Time: 02/08/23  3:27 AM    Specimen: Urine   Result Value Ref Range    Color, UA YELLOW/STRAW      Appearance CLEAR      Specific Gravity, UA 1.028 (H) 1.001 - 1.023      pH, Urine 5.0 5.0 - 9.0      Protein, UA Negative NEG mg/dL    Glucose, UA Negative mg/dL    Ketones, Urine Negative NEG mg/dL    Bilirubin Urine Negative NEG      Blood, Urine Negative NEG      Urobilinogen, Urine 0.2 0.2 - 1.0 EU/dL    Nitrite, Urine Negative NEG      Leukocyte Esterase, Urine Negative NEG      Urine Culture if Indicated CULTURE NOT INDICATED BY UA RESULT      WBC, UA 0-4 U4 /hpf    RBC, UA 0-5 U5 /hpf    BACTERIA, URINE Negative NEG /hpf    Epithelial Cells UA 0-5 U5 /hpf    Casts 2-5 (A) U2 /lpf    Mucus, UA 0 0 /lpf   Hemoglobin and Hematocrit    Collection Time: 02/08/23  4:57 AM   Result Value Ref Range    Hemoglobin 10.2 (L) 11.7 - 15.4 g/dL    Hematocrit 31.9 (L) 35.8 - 46.3 %   Basic Metabolic Panel w/ Reflex to MG    Collection Time: 02/08/23  4:57 AM   Result Value Ref Range    Sodium 138 133 - 143 mmol/L    Potassium 3.9 3.5 - 5.1 mmol/L    Chloride 105 101 - 110 mmol/L    CO2 30 21 - 32 mmol/L    Anion Gap 3 2 - 11 mmol/L    Glucose 108 (H) 65 - 100 mg/dL    BUN 17 8 - 23 MG/DL    Creatinine 0.90 0.6 - 1.0 MG/DL    Est, Glom Filt Rate >60 >60 ml/min/1.73m2    Calcium 9.6 8.3 - 10.4 MG/DL   POCT Glucose    Collection Time: 02/08/23  6:11 AM   Result Value Ref Range    POC Glucose 90 65 - 100 mg/dL    Performed by: Peggy    TYPE AND SCREEN    Collection Time: 02/08/23  6:22 AM   Result Value Ref Range    Crossmatch expiration date 02/11/2023,2359     ABO/Rh O POSITIVE     Antibody Screen NEG    POCT Glucose    Collection Time: 02/08/23 11:23 AM   Result Value Ref Range    POC Glucose 94 65 - 100 mg/dL    Performed by: Inna Mckeon    POCT Glucose    Collection Time: 02/08/23 12:46 PM   Result Value Ref Range    POC Glucose 113 (H) 65 - 100 mg/dL    Performed by: Kari    POCT Glucose    Collection Time: 02/08/23  4:30 PM   Result Value Ref Range    POC Glucose 101 (H) 65 - 100 mg/dL    Performed by: HayderSRICARDO    POCT Glucose    Collection Time: 02/08/23 10:11 PM   Result Value Ref Range    POC Glucose 135 (H) 65 - 100 mg/dL    Performed by: Ashly Garcia PPD TEST IN 72 HRS    Collection Time: 02/09/23 12:00 AM   Result Value Ref Range    PPD, (POC) Negative Negative    mm Induration 0 0 - 5 mm   Hemoglobin and Hematocrit    Collection Time: 02/09/23  4:38 AM   Result Value Ref Range    Hemoglobin 10.6 (L) 11.7 - 15.4 g/dL    Hematocrit 32.4 (L) 35.8 - 46.3 %   Basic Metabolic Panel w/ Reflex to MG    Collection Time: 02/09/23  4:38 AM   Result Value Ref Range    Sodium 137 133 - 143 mmol/L    Potassium 3.6 3.5 - 5.1 mmol/L    Chloride 96 (L) 101 - 110 mmol/L    CO2 32 21 - 32 mmol/L    Anion Gap 9 2 - 11 mmol/L    Glucose 158 (H) 65 - 100 mg/dL    BUN 16 8 - 23 MG/DL    Creatinine 1.00 0.6 - 1.0 MG/DL    Est, Glom Filt Rate 58 (L) >60 ml/min/1.73m2    Calcium 9.8 8.3 - 10.4 MG/DL   POCT Glucose    Collection Time: 02/09/23  6:49 AM   Result Value Ref Range    POC Glucose 121 (H) 65 - 100 mg/dL    Performed by: Lanny Sanches    POCT Glucose    Collection Time: 02/09/23 11:22 AM   Result Value Ref Range    POC Glucose 112 (H) 65 - 100 mg/dL    Performed by: Dutch    POCT Glucose    Collection Time: 02/09/23  3:38 PM   Result Value Ref Range    POC Glucose 127 (H) 65 - 100 mg/dL    Performed by: AmberCT    POCT Glucose    Collection Time: 02/09/23  8:54 PM   Result Value Ref Range    POC Glucose 97 65 - 100 mg/dL    Performed by: [address]    CBC with Auto Differential    Collection Time: 02/10/23  4:11 AM   Result Value Ref Range    WBC 6.4 4.3 - 11.1 K/uL    RBC 3.75 (L) 4.05 - 5.2 M/uL    Hemoglobin 11.2 (L) 11.7 - 15.4 g/dL    Hematocrit 34.7 (L) 35.8 - 46.3 %    MCV 92.5 82 - 102 FL    MCH 29.9 26.1 - 32.9 PG    MCHC 32.3 31.4 - 35.0 g/dL    RDW 14.2 11.9 - 14.6 %    Platelets 326 682 - 378 K/uL    MPV 11.0 9.4 - 12.3 FL    nRBC 0.00 0.0 - 0.2 K/uL    Differential Type AUTOMATED      Seg Neutrophils 66 43 - 78 %    Lymphocytes 21 13 - 44 %    Monocytes 9 4.0 - 12.0 %    Eosinophils % 3 0.5 - 7.8 %    Basophils 0 0.0 - 2.0 %    Immature Granulocytes 1 0.0 - 5.0 %    Segs Absolute 4.3 1.7 - 8.2 K/UL    Absolute Lymph # 1.4 0.5 - 4.6 K/UL    Absolute Mono # 0.6 0.1 - 1.3 K/UL    Absolute Eos # 0.2 0.0 - 0.8 K/UL    Basophils Absolute 0.0 0.0 - 0.2 K/UL    Absolute Immature Granulocyte 0.0 0.0 - 0.5 K/UL   Basic Metabolic Panel w/ Reflex to MG    Collection Time: 02/10/23  4:11 AM   Result Value Ref Range    Sodium 137 133 - 143 mmol/L    Potassium 3.6 3.5 - 5.1 mmol/L    Chloride 98 (L) 101 - 110 mmol/L    CO2 28 21 - 32 mmol/L    Anion Gap 11 2 - 11 mmol/L    Glucose 116 (H) 65 - 100 mg/dL    BUN 22 8 - 23 MG/DL    Creatinine 0.90 0.6 - 1.0 MG/DL    Est, Glom Filt Rate >60 >60 ml/min/1.73m2    Calcium 10.2 8.3 - 10.4 MG/DL   Procalcitonin    Collection Time: 02/10/23  4:11 AM   Result Value Ref Range    Procalcitonin 0.33 0.00 - 0.49 ng/mL   POCT Glucose    Collection Time: 02/10/23  6:22 AM   Result Value Ref Range    POC Glucose 104 (H) 65 - 100 mg/dL    Performed by: [facility]    COVID-19, Rapid    Collection Time: 02/10/23  9:15 AM    Specimen: Nasopharyngeal   Result Value Ref Range    Source NASAL      SARS-CoV-2, Rapid Not detected NOTD     POCT Glucose    Collection Time: 02/10/23 11:55 AM   Result Value Ref Range    POC Glucose 131 (H) 65 - 100 mg/dL    Performed by: Dutch      Imaging:  XR HIP LEFT (2-3 VIEWS)  Result Date: 2/6/2023  Degenerative change versus nondisplaced left femoral neck fracture. If high clinical concern, consider follow-up films or MRI evaluation.       MRI HIP LEFT WO CONTRAST   Result Date: 2/7/2023  1. Minimally displaced acute left femoral neck fracture. Assessment and Plan      Daily physician / PA medical management:    Impression and Plan:  # Closed left hip fracture, initial encounter ([facility] [S72.002A] - interdisciplinary approach to rehabilitation including PT, OT, nursing and physiatry and disease specific education. # Pain management - currently with PRN meds APAP 650mg q6h (mild), dilaudid 1mg q4h (moderate), dilaudid 2mg q4h (severe); wean opioids as tolerated. # Hypertension - continue daily lisinopril 10mg and Toprol XL 50mg. # CHF, combined systolic / diastolic - continue furosemide 40mg QOD. # Anemia - acute on chronic; Hgb 11.2 at [facility] admission. # Osteoporosis - intolerance of oral bisphosphonate therapy; continue calcium, vitamin D. # Chemotherapy-induced neuropathy - continue home meds gabapentin 600mg BID and amitriptyline 10mg QHS. # Diabetes mellitus - HgbA1c 6.2% (6/2022). All [facility] glucose checks <140, cover with Humalog SSI and decrease to BID checks. # Chronic kidney disease - stable; avoid nephrotoxic agents. # Electrolyte management - K+ supplement 10mEq daily since patient taking furosemide. # DVT prophylaxis - ASA EC 325mg daily per Ortho    # Bladder program / urinary retention / neurogenic bladder - schedule voids q6-8h. Check post-void residual as needed; in-and-out catheter if post-void residual is more than 400ml. # Overactive bladder - continue trospium 20mg HS. Restart home med Myrbetriq at d/c. # UTI prophylaxis - continue nitrofurantoin 50mg daily. # Bowel program - at risk for constipation as a side effect of opioids, other medications, impaired mobility, etc. MiraLAX daily for regularity, Senokot-S for stool softener + laxative. PRN MOM, bisacodyl suppository or tablets for constipation. # Sjogren syndrome - following rheumatology outpatient.     # Sleep disruption - continue melatonin 3mg nightly. STATUS COMPARED TO PREADMISSION: There are no clinically significant differences between the patient's current status and the information described on the preadmission screening document.            Signed By: Bart Up PA-C    February 10, 2023    Physician Assistant with [facility]

## 2023-02-10 NOTE — CARE COORDINATION
CM met with patient to discuss d/c plan and needs. Patient alert/orient x4. Patient could state her name, [place] File and her situation. Patient verified demographic, no changes. Verified PCP (Aspen Alvarez) was last seen on 01/03/2023 and insurance (Medicare / [employer]). Patient states that she lives spouse in a one level home with spouse with two steps to enter into the home. Patient states prior to admission she was independent with her ADL's and have no DME's in the home. Patient states she has no hx with [facility] / rehab but have has outpatient therapy years ago. Patient states she has family support. States that she has a son that lives in [place] [facility] and will come if she him. Patient has no ACP on file. Patient states she doesn't has ACP in place and not interested in putting one in place at this time. CM made patient aware of the Wednesday, [employer]. Patient states that she will provide her family with an update on her progress, discharge date, services, DME's that may recommended. CM will continue to follow and remain available for any needs, concerns or questions that may arise. 02/10/23 8428   Service Assessment   Patient Orientation Alert and Oriented;Person;Place;Situation;Self   Cognition Alert   History Provided By Patient;Spouse; Child/Family;Medical Record   Primary Caregiver Spouse   Support Systems Spouse/Significant Other;Children;Family Members   Patient's Healthcare Decision Maker is: Legal Next of Linda 70   PCP Verified by CM Yes   Last Visit to PCP Within last 3 months   Prior Functional Level Independent in ADLs/IADLs   Current Functional Level Assistance with the following:;Bathing;Dressing; Toileting;Mobility   Can patient return to prior living arrangement Yes   Ability to make needs known: Good   Family able to assist with home care needs: Yes   Would you like for me to discuss the discharge plan with any other family members/significant others, and if so, who?  Yes Financial Resources Medicare   Social/Functional History   Lives With Spouse   Type of [address] One level   [address] to enter without rails   Entrance Stairs - Number of Steps 3   Bathroom Shower/Tub Tub/Shower unit   Bathroom Toilet Standard   Bathroom Accessibility Accessible   Receives Help From Family   ADL Assistance Needs assistance   [id number] assistance   Ambulation Assistance Needs assistance   Transfer Assistance Needs assistance   Active  No   Mode of Transportation Car   Occupation Retired   Discharge Planning   Living Arrangements Spouse/Significant Other   Current Services Prior To Admission None   Potential Assistance Needed N/A   DME Ordered? No   Potential Assistance Purchasing Medications No   Type of Home Care Services None   Patient expects to be discharged to: House   One/Two Story Residence Two story   Interior Rails Both   Lift Chair Available No   History of falls? [address] Provided?  No   Mode of Transport at Discharge Other (see comment)   Confirm Follow Up Transport Family   Condition of Participation: Discharge Planning   The Plan for Transition of Care is related to the following treatment goals: Acute inpatient rehab to improve pt's strength, mobility and functional abilities for a safe transition to home

## 2023-02-10 NOTE — PROGRESS NOTES
[facility]        February 10, 2023         Post Op day: 2 Days Post-Op Procedure(s) (LRB):  HIP OPEN REDUCTION INTERNAL FIXATION (Left)      Admit Date: 2/6/2023  Admit Diagnosis: Right hip pain [M25.551]  Closed fracture of left hip, initial encounter (formerly [facility]) [S72.002A]  Acute pain of left hip [M25.552]       Principle Problem: Closed fracture of left hip ([facility] [address] Subjective: Doing ok but says her left foot is hurting. This was not hurting as much before surgery. She says she has had surgery in the past on the left ankle and that the ankle surgery shortened her quite a bit. But she says her pain is on the sole of the foot and great toe. She wonders if she injured this when she fell. No complaints, No SOB, No Chest Pain, No Nausea or Vomiting     Objective:   Vital Signs are Stable, No Acute Distress, Alert,  Dressing is Dry,  Neurovascularl exam is normal. She is able to dorsiflex and plantar flex her left ankle. Mild tenderness on the plantar aspect of the foot.       Assessment / Plan :  Patient Active Problem List   Diagnosis    Chronic periscapular pain on right side    Foraminal stenosis of cervical region    Statin intolerance    Dyslipidemia    Depression    Osteoarthritis    Peripheral sensory-motor axonal polyneuropathy    Osteopenia    Type 2 diabetes mellitus, controlled (formerly [facility])    Muscle spasm    Recurrent occipital headache    Left leg weakness    Chemotherapy-induced neuropathy (formerly [facility])    Stage 3a chronic kidney disease (formerly [facility])    Arthropathy of cervical facet joint    Stem cells transplant status ([facility] [address]    Dilated cardiomyopathy (formerly [facility])    Vitamin D deficiency    Vitamin B12 deficiency    S/P cardiac cath    Essential hypertension    Chronic anemia    Palpitation    Primary insomnia    Intolerance of oral bisphosphonate therapy    Sjogren syndrome, unspecified (formerly [facility])    Type 2 diabetes mellitus with chronic kidney disease    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    Chronic gout of right hand    Acute renal failure superimposed on stage 3a chronic kidney disease (HCC)    Chronic combined systolic and diastolic CHF (congestive heart failure) (HCC)    Left hip pain    Closed fracture of left hip (HCC)    Delirium    Toxic metabolic encephalopathy    Patient Vitals for the past 8 hrs:   BP Temp Temp src Pulse Resp SpO2   02/10/23 0734 (!) 149/77 99 °F (37.2 °C) Oral 91 19 95 %   02/10/23 0552 -- -- -- -- 17 --   02/10/23 0452 (!) 182/79 99.5 °F (37.5 °C) Axillary 94 17 97 %    Temp (24hrs), Av.6 °F (37 °C), Min:97.3 °F (36.3 °C), Max:99.5 °F (37.5 °C)    Body mass index is 24.3 kg/m². Lab Results   Component Value Date/Time    HGB 11.2 02/10/2023 04:11 AM          S/P Procedure(s) (LRB):  HIP OPEN REDUCTION INTERNAL FIXATION (Left)    Left foot pain: possibly plantar fascitis.  Will xray     Vit D 2000iu daily x 12 weeks   Medical Mgmt per hospitalist  Anticoagulation plan: ASA 325mg daily  Continue PT  Fall Precautions  DC disp: rehab placement  F/U: 2 weeks postop for wound check and staple removal        Signed By: TERESA Avila, PA  2/10/2023,  9:06 AM

## 2023-02-10 NOTE — CARE COORDINATION
Pt has been accepted for admission to [facility]. Pt is medically cleared for dc and will transfer there today. Pt's spouse and family are at the bedside and aware of and in agreement with this plan. CM remains available to assist as needed. 02/10/23 0037   Service Assessment   Patient Orientation Alert and Oriented   Cognition Alert   History Provided By Patient;Medical Record;Spouse; Child/Family   Primary Caregiver Spouse   Support Systems Family Members; Children;Spouse/Significant Other   Patient's [address] is: Legal Next of Kin   PCP Verified by CM Yes   Last Visit to PCP Within last 3 months   Prior Functional Level Independent in ADLs/IADLs   Current Functional Level Assistance with the following:;Mobility; Bathing;Dressing; Toileting   Can patient return to prior living arrangement No   Ability to make needs known: Good   Family able to assist with home care needs: Yes   Would you like for me to discuss the discharge plan with any other family members/significant others, and if so, who? Yes  (spouse)   Financial Resources Medicare   Social/Functional History   Lives With Spouse   Type of [address] One level   [address] to enter without rails   Entrance Stairs - Number of Steps 3    [facility]   [facility] Assistance Needs assistance   Occupation Retired   Discharge Planning   Type of [address] Prior To Admission None   Potential Assistance Needed Other (Comment)  [facility])   DME Ordered? No   Potential Assistance Purchasing Medications No   Type of Home Care Services None   Patient expects to be discharged to: Acute rehab   History of falls? 1   Services At/After Discharge   Transition of Care Consult (CM Consult) Acute Rehab   Services At/After Discharge PT;OT;Nursing services; Inpatient rehab   [facility] Resource Information Provided? No   Mode of Transport at Discharge Other (see comment)  (family)   Confirm Follow Up Transport Family   Condition of Participation: Discharge Planning   The Plan for Transition of Care is related to the following treatment goals: Acute inpatient rehab to improve pt's strength, mobility and functional abilities for a safe transition to home   The Patient and/or Patient Representative was provided with a Choice of Provider? Patient   The Patient and/Or Patient Representative agree with the Discharge Plan? Yes   Freedom of Choice list was provided with basic dialogue that supports the patient's individualized plan of care/goals, treatment preferences, and shares the quality data associated with the providers?   Yes

## 2023-02-10 NOTE — PROGRESS NOTES
TRANSFER - OUT REPORT:    Verbal report given to Sarina Leonard on Jensen Kraus  being transferred to [facility] for routine progression of patient care       Report consisted of patient's Situation, Background, Assessment and   Recommendations(SBAR). Information from the following report(s) Nurse Handoff Report was reviewed with the receiving nurse. [place] Assessment: No data recorded  Lines:   Peripheral IV 02/09/23 Right Forearm (Active)   Site Assessment Clean, dry & intact 02/10/23 0734   Line Status Flushed 02/10/23 0734   Line Care Connections checked and tightened 02/10/23 0734   Phlebitis Assessment No symptoms 02/10/23 0734   Infiltration Assessment 0 02/10/23 0734   Alcohol Cap Used Yes 02/10/23 0734   Dressing Status Clean, dry & intact 02/10/23 0734   Dressing Type Transparent 02/10/23 0734        Opportunity for questions and clarification was provided.

## 2023-02-10 NOTE — PROGRESS NOTES
ACUTE PHYSICAL THERAPY GOALS:   (Developed with and agreed upon by patient and/or caregiver. )     LTG:  (1.)Ms. Rosa Owen will move from supine to sit and sit to supine , scoot up and down, and roll side to side in bed with MINIMAL ASSIST within 7 treatment day(s). (2.)Ms. Rosa Owen will transfer from bed to chair and chair to bed with MINIMAL ASSIST using the least restrictive device within 7 treatment day(s). (3.)Ms. Rosa Owen will ambulate with MINIMAL ASSIST for 50+ feet with the least restrictive device within 7 treatment day(s). PHYSICAL THERAPY: Daily Note PM   (Link to Caseload Tracking: PT Visit Days : 2  Time In/Out PT Charge Capture  Rehab Caseload Tracker  Orders    Joe Mcdonough is a 69 y.o. female   PRIMARY DIAGNOSIS: Closed fracture of left hip (HCC)  Right hip pain [M25.551]  Closed fracture of left hip, initial encounter ([facility] [address] [S72.002A]  Acute pain of left hip [M25.552]  Procedure(s) (LRB):  HIP OPEN REDUCTION INTERNAL FIXATION (Left)  2 Days Post-Op  Inpatient: Payor: MEDICARE / Plan: MEDICARE PART A AND B / Product Type: *No Product type* /     ASSESSMENT:     REHAB RECOMMENDATIONS:   Recommendation to date pending progress:  Setting:  Inpatient Rehab Facility    Equipment:    To Be Determined     ASSESSMENT:  Ms. Rosa Owen was sitting up in recliner chair upon contact and agreeable to PT. Patient participated in LE exercises seated in recliner chair with AAROM provided to LLE. Patient then transferred to  standing with min-mod assist, additional time, and cues for technique. Once standing patient ambulated 8' into bathroom with max cueing for walker negotiation and safety. Patient performed transfer on and off toilet with min-mod assist and cues for technique. Patient demonstrated fair standing balance during standing ADL activities. Patient then ambulated an additional 15' with rolling walker, min assist, chair follow and cues for sequencing.  Patient returned to EOB and to supine with min-mod assist. Overall steady progress. Will continue PT efforts. SUBJECTIVE:   Ms. Humza Leggett states, \"NYLN do you want me to do? \"     Social/Functional Lives With: Spouse  Type of Home: House  Home Layout: One level  Home Access: Stairs to enter without rails  Entrance Stairs - Number of Steps: 3  ADL Assistance: Independent  Homemaking Assistance: Independent  Ambulation Assistance: Needs assistance  Transfer Assistance: Needs assistance  Occupation: Retired  OBJECTIVE:     PAIN: VITALS / O2: PRECAUTION / Rock Jose Miguel / Sherill Cool:   Pre Treatment:   Pain Assessment: None - Denies Pain      Post Treatment: 0 Vitals        Oxygen    [place]    RESTRICTIONS/PRECAUTIONS:  Restrictions/Precautions  Restrictions/Precautions: Weight Bearing, Fall Risk  Lower Extremity Weight Bearing Restrictions  Left Lower Extremity Weight Bearing: Weight Bearing As Tolerated  Restrictions/Precautions: Weight Bearing, Fall Risk     MOBILITY: I Mod I S SBA CGA Min Mod Max Total  NT x2 Comments:   Bed Mobility    Rolling [] [] [] [] [] [] [] [] [] [] []    Supine to Sit [] [] [] [] [] [] [] [] [] [] []    Scooting [] [] [] [] [] [] [] [] [] [] []    Sit to Supine [] [] [] [] [] [x] [x] [] [] [] []    Transfers    Sit to Stand [] [] [] [] [] [x] [x] [] [] [] []    Bed to Chair [] [] [] [] [] [x] [x] [] [] [] []    Stand to Sit [] [] [] [] [] [x] [x] [] [] [] []     [] [] [] [] [] [] [] [] [] [] []    I=Independent, Mod I=Modified Independent, S=Supervision, SBA=Standby Assistance, CGA=Contact Guard Assistance,   Min=Minimal Assistance, Mod=Moderate Assistance, Max=Maximal Assistance, Total=Total Assistance, NT=Not Tested    BALANCE: Good Fair+ Fair Fair- Poor NT Comments   Sitting Static [] [x] [] [] [] []    Sitting Dynamic [] [] [x] [] [] []              Standing Static [] [] [x] [] [] []    Standing Dynamic [] [] [] [x] [] []      GAIT: I Mod I S SBA CGA Min Mod Max Total  NT x2 Comments:   Level of Assistance [] [] [] [] [] [] [x] [] [] [] []    Distance   8' then 15'    DME Gait Belt and Rolling Walker    Gait Quality Decreased [place] , Decreased step clearance, Decreased step length, Shuffling , Step-to, and Trunk flexion    Weightbearing Status      Stairs      I=Independent, Mod I=Modified Independent, S=Supervision, SBA=Standby Assistance, CGA=Contact Guard Assistance,   Min=Minimal Assistance, Mod=Moderate Assistance, Max=Maximal Assistance, Total=Total Assistance, NT=Not Tested    PLAN:   FREQUENCY AND DURATION: BID for duration of hospital stay or until stated goals are met, whichever comes first.    TREATMENT:   TREATMENT:   Therapeutic Activity (15 Minutes): Therapeutic activity included Sit to Supine, Scooting, Transfer Training, Ambulation on level ground, Sitting balance , Standing balance, and LE exercises to improve functional Activity tolerance, Balance, Mobility, and Strength. Gait Training (10 Minutes): Gait training for 8' then 15 feet utilizing 5 [facility]. Patient required Manual, Tactile, Verbal, and Visual cueing to improve Assistive Device Utilization, Dynamic Standing Balance, and Gait Mechanics. TREATMENT GRID:  N/A    AFTER TREATMENT PRECAUTIONS: Alarm Activated, Bed, Bed/Chair Locked, Call light within reach, Needs within reach, RN notified, Side rails x3, and Visitors at bedside    INTERDISCIPLINARY COLLABORATION:  RN/ PCT and PT/ PTA    EDUCATION:      TIME IN/OUT:  Time In: 1315  Time Out: 1411 [facility]  Minutes: Kiran Wheeler PTA

## 2023-02-10 NOTE — DISCHARGE SUMMARY
Hospitalist Discharge Summary   Admit Date:  2023 11:38 AM   DC Note date: 2/10/2023  Name:  Anny Hill   Age:  69 y.o. Sex:  female  :  1945   MRN:  [id number]   Room:  [facility]  PCP:  LIU Urias CNP    Presenting Complaint: Fall and Hip Pain     Initial Admission Diagnosis: Right hip pain [M25.551]  Closed fracture of left hip, initial encounter ([facility] [S72.002A]  Acute pain of left hip [M25.552]     Problem List for this Hospitalization (present on admission):    Principal Problem (Resolved):    Closed fracture of left hip ([facility]  Active Problems:    Sjogren syndrome, unspecified ([facility]    Type 2 diabetes mellitus with chronic kidney disease    Chronic gout of right hand    Abnormal finding on CT scan    Depression    Stage 3a chronic kidney disease ([facility]    Dilated cardiomyopathy ([facility]    Essential hypertension  Resolved Problems:    Left hip pain    Delirium    Toxic metabolic encephalopathy      Hospital Course:  Patient is a 68 y/o female with medical history of GERD, DM II, recurrent UTI, CKD IIIa, hyperlipidemia, Sjogren's syndrome, OA of bilateral hands, osteopenia, polyneuropathy, vit D and B12 deficiency, breast cancer s/p mastectomy and chemotherapy, depression, dilated cardiomyopathy, hypertension who presented to ED s/p mechanical fall with impact to left hip. Denies hitting head or LOC. Patient does not take any blood thinners. ED workup: hypertensive to 191/98   Labs unremarkable, CXR with no acute findings  Hip XR with degenerative changes vs. nondisplaced left femur neck fracture. Hospitalist consulted for admission. MRI demonstrates minimally displaced acute left femoral neck fracture. Other noted findings include left gluteus medius/minimus muscle strains, bilateral hip chondrosis, partial-thickness tears of bilateral gluteus minimus/medius tendinous insertions, bilateral hamstring tendon origins. Orthopedic consultation.  Patient underwent open treatment of left femoral neck fracture with plate and screw fixation with Dr. Ashley Mckeon 2/8. Okay to discharge per Ortho. Continue asa and Vitamin D on discharge. Outpatient follow-up in 2 weeks for wound check/staple removal.     Fever of 102.9 with associated AMS 2/7, noted rigors. Code S called due to word salad. Neurology evaluation, secondary to acute toxic metabolic event. CT head obtained and negative for acute pathology. 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 Infectious workup was initiated and patient was placed on broad spectrum antibiotics. UA negative. Procal 0.18. No leukocytosis. RVP negative. Blood cultures remain no growth to date. No infectious source identified. Discontinue antibiotics and continue to monitor off antibiotics. Continue prophylactic Macrobid (home medication). Mentation has normalized to baseline. CT head did show large calcification adjacent to choroid plexus, possible choroid plexus calcification vs meningioma due to size, recommendations for MRI brain w/wo contrast for further evaluation. MRI ordered but was not able to be obtained due to pain. I have placed referral [facility] Neuro[facility] for follow-up as well as MRI imaging to be obtained as an outpatient. 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 PT/OT with rehab recommendations. [facility] has accepted patient for admission. Patient is medically stable to discharge to [facility] today. Discussed plan of care with patient, family members, care team, [facility], and Dr. Av Camacho. All questions answered. Instructions given to call a physician or return if any concerns. Disposition: [facility]  Diet: ADULT DIET;  Regular; 4 carb choices (60 gm/meal)  Code Status: Full Code    Follow Ups:   Follow-up Information     Luann Longo [place]. Schedule an appointment as soon as possible for   a visit in 2 week(s). Specialties: Orthopedic Surgery, Physician Assistant  Why: For suture removal, For wound re-check  Contact information:  Satnam Lawson [address] [place] [address]             Benea Pratt Follow up. Specialty: Neurosurgery  Why: Referral placed for follow-up with recommended MRI brain w/wo   contrast for choroid plexus calcification vs meningioma  Contact information:  [place]rice [place] [id number] [phone]                     Time spent in patient discharge and coordination 45 minutes. Follow up labs/diagnostics: CBC / BMP 2/13    Current Discharge Medication List        START taking these medications    Details   HYDROmorphone (DILAUDID) 2 MG tablet Take 0.5 tablets by mouth every 6 hours as needed for Pain for up to 3 days. Max Daily Amount: 4 mg  Qty: 6 tablet, Refills: 0    Comments: Reduce doses taken as pain becomes manageable  Associated Diagnoses: Closed fracture of left hip, initial encounter ([facility] Closed left hip fracture, initial encounter ([facility])      naloxone (NARCAN) 4 MG/0.1ML LIQD nasal spray 1 spray by Nasal route as needed for Opioid Reversal  Qty: 1 each, Refills: 0      aspirin 325 MG EC tablet Take 1 tablet by mouth daily  Qty: 30 tablet, Refills: 0      mirabegron (MYRBETRIQ) 25 MG TB24 Take 1 tablet by mouth daily  Qty: 30 tablet, Refills: 0      Vitamin D (CHOLECALCIFEROL) 50 MCG (2000 UT) TABS tablet Take 1 tablet by mouth daily for 80 doses  Qty: 80 tablet, Refills: 0    Comments: Labeling may look different. 25 mcg=1000 Units. Please double check dosages.            CONTINUE these medications which have NOT CHANGED    Details   nitrofurantoin (MACRODANTIN) 50 MG capsule Take 1 capsule by mouth daily  Qty: 30 capsule, Refills: 11    Associated Diagnoses: Recurrent UTI (urinary tract infection)      valACYclovir (VALTREX) 1 g tablet Take 1,000 mg by mouth 2 times daily      melatonin 3 MG TABS tablet Take 3 mg by mouth daily      B-D 3CC LUER-KATY SYR 25GX1\" 25G X 1\" 3 ML MISC       nystatin-triamcinolone (MYCOLOG II) 247553-7.1 UNIT/GM-% cream Apply topically 2 times daily. Qty: 1 each, Refills: 1    Associated Diagnoses: Perineal rash in female      promethazine (PHENERGAN) 25 MG tablet Take 1 tablet by mouth daily as needed for Nausea  Qty: 90 tablet, Refills: 0    Comments: Pt requests 90 day supply  Associated Diagnoses: Nausea      metoprolol succinate (TOPROL XL) 50 MG extended release tablet Take 1 tablet by mouth at bedtime Once a day  Qty: 90 tablet, Refills: 3      lisinopril (PRINIVIL;ZESTRIL) 10 MG tablet Take 1 tablet by mouth 2 times daily  Qty: 180 tablet, Refills: 3      CRANBERRY PO Take by mouth daily      estradiol (ESTRACE VAGINAL) 0.1 MG/GM vaginal cream Insert 2g daily vaginally for two weeks. Then insert 1g three times a week. Qty: 1 each, Refills: 3    Associated Diagnoses: Recurrent UTI      fluticasone (FLONASE) 50 MCG/ACT nasal spray 2 times daily as needed      furosemide (LASIX) 40 MG tablet Take 1 tablet by mouth every other day  Qty: 45 tablet, Refills: 1    Associated Diagnoses: Dilated cardiomyopathy ([address] Essential hypertension      gabapentin (NEURONTIN) 600 MG tablet Take 1 tablet by mouth 2 times daily for 360 days. Qty: 180 tablet, Refills: 1    Associated Diagnoses: Controlled type 2 diabetes mellitus with stage 3 chronic kidney disease, without long-term current use of insulin ([address]  Chemotherapy-induced neuropathy (HCC)      metFORMIN (GLUCOPHAGE-XR) 500 mg extended release tablet Take 2 tablets by mouth 2 times daily  Qty: 360 tablet, Refills: 1    Associated Diagnoses: Controlled type 2 diabetes mellitus with stage 3 chronic kidney disease, without long-term current use of insulin (HCC)      calcium carbonate (TUMS) 500 MG chewable tablet Take 1 tablet by mouth at bedtime      dextromethorphan-guaiFENesin (MUCINEX DM)  MG per extended release tablet Take 1 tablet by mouth every 12 hours as needed      potassium chloride (KLOR-CON M) 20 MEQ extended release tablet TAKE 1 TABLET EVERY MORNING AND 2 TABLETS EVERY EVENING           STOP taking these medications       amitriptyline (ELAVIL) 10 MG tablet Comments:   Reason for Stopping:         colchicine (COLCRYS) 0.6 MG tablet Comments:   Reason for Stopping:         cetirizine (ZYRTEC) 10 MG tablet Comments:   Reason for Stopping:               Some medications may have been reported old/obsolete and marked \"stop taking\" by the system; in reality pt was already off these meds; defer to outpatient or prescribing providers. Procedures done this admission:  Procedure(s) with comments:  HIP OPEN REDUCTION INTERNAL FIXATION - left hip femoral neck system    Consults this admission:  IP CONSULT TO ORTHOPEDIC SURGERY  IP CONSULT TO PHYSICAL MEDICINE REHAB    Echocardiogram results:  No results found for this or any previous visit. Diagnostic Imaging/Tests:   XR HIP LEFT (2-3 VIEWS)    Result Date: 2/8/2023  Status post left hip ORIF without evidence of acute abnormality. XR HIP LEFT (2-3 VIEWS)    Result Date: 2/6/2023  Degenerative change versus nondisplaced left femoral neck fracture. If high clinical concern, consider follow-up films or MRI evaluation. XR FOOT LEFT (2 VIEWS)    Result Date: 2/10/2023  Postsurgical change. No acute abnormality. CT HEAD WO CONTRAST    Result Date: 2/7/2023  1. Moderate to severe chronic small vessel ischemic changes. 2. Senescent changes roughly commensurate with patient age. 3. No acute intracranial abnormality. 4. Moderate to severe inflammatory mucosal thickening at the left aspect the sphenoid sinus.  5. An usually large and coarse, 1.1 x 0.8 cm calcification at or immediately adjacent to the choroid plexus of the right atria. Although it is possible this is a simple choroid plexus calcification, the size of the structure at least raises the possibility of other pathology such as a meningioma. An MRI brain with and without contrast is suggested for further evaluation. Yana Mantilla MD, PhD Neuroradiologist Thank you for this referral.  This exam was interpreted by a neuroradiologist with 2 years of subspecialty training in neuroradiology, a Certificate of Added Qualification [phone]) in neuroradiology, and a PhD in molecular neuroscience. If the patient's healthcare provider has any questions, a [employer] neuroradiologist can be reached directly at [phone] at any time. Bertha Keen M.D. 2/7/2023 6:58:00 PM    XR FINGER RIGHT (MIN 2 VIEWS)    Result Date: 1/17/2023  See Progress note in the chart. XR CHEST PORTABLE    Result Date: 2/7/2023  No acute cardiopulmonary process. Slot . Patti Landau M.D. 2/7/2023 8:24:00 PM    XR CHEST PORTABLE    Result Date: 2/6/2023  No acute findings in the chest     MRI HIP LEFT WO CONTRAST    Result Date: 2/7/2023  1. Minimally displaced acute left femoral neck fracture. 2. Intramuscular edema within the left gluteus medius/minimus muscles on the lateral left adductor musculature, likely reflecting muscle strains. 3. Mild bilateral hip chondrosis. Degeneration of the left superior anterior acetabular labrum. 4. Partial-thickness tears of the bilateral gluteus minimus tendinous insertions, worse on the right. Low-grade partial-thickness tears of the bilateral gluteus medius tendon insertions. 5. Low-grade partial-thickness tears of the bilateral hamstring tendon origins. CTA HEAD NECK W CONTRAST    Result Date: 2/7/2023  1. No occlusion, significant stenosis, or evidence of dissection in the cervical  carotid or vertebral arteries. 2. No large vessel occlusion or significant stenosis of the major intracranial arteries.  3. 3 x 3 mm aneurysm or infundibulum at the origin of the left ophthalmic artery. 2 mm infundibulum at the origin of the right ophthalmic artery. 4. Mild intracranial ICA atherosclerosis. Malvin Romberg, M.D. 2/7/2023 5:56:00 PM       Labs: Results:       BMP, Mg, Phos Recent Labs     02/08/23  0457 02/09/23  0438 02/10/23  0411    137 137   K 3.9 3.6 3.6    96* 98*   CO2 30 32 28   ANIONGAP 3 9 11   BUN 17 16 22   CREATININE 0.90 1.00 0.90   LABGLOM >60 58* >60   CALCIUM 9.6 9.8 10.2   GLUCOSE 108* 158* 116*      CBC Recent Labs     02/07/23  1819 02/08/23  0457 02/09/23  0438 02/10/23  0411   WBC 6.2  --   --  6.4   RBC 3.61*  --   --  3.75*   HGB 10.7* 10.2* 10.6* 11.2*   HCT 33.9* 31.9* 32.4* 34.7*   MCV 93.9  --   --  92.5   MCH 29.6  --   --  29.9   MCHC 31.6  --   --  32.3   RDW 14.4  --   --  14.2     --   --  217   MPV 10.7  --   --  11.0   NRBC 0.00  --   --  0.00   SEGS 64  --   --  66   LYMPHOPCT 24  --   --  21   EOSRELPCT 2  --   --  3   MONOPCT 9  --   --  9   BASOPCT 1  --   --  0   IMMGRAN 0  --   --  1   SEGSABS 4.0  --   --  4.3   LYMPHSABS 1.5  --   --  1.4   EOSABS 0.1  --   --  0.2   MONOSABS 0.5  --   --  0.6   BASOSABS 0.0  --   --  0.0   ABSIMMGRAN 0.0  --   --  0.0      LFT No results for input(s): BILITOT, BILIDIR, ALKPHOS, AST, ALT, PROT, LABALBU, GLOB in the last 72 hours.    Cardiac  No results found for: NTPROBNP, TROPHS   Coags No results found for: PROTIME, INR, APTT   A1c Lab Results   Component Value Date/Time    LABA1C 6.2 06/06/2022 02:38 PM    LABA1C 5.9 02/24/2022 03:44 PM    LABA1C 5.8 10/12/2021 01:55 PM     06/06/2022 02:38 PM     02/24/2022 03:44 PM     10/12/2021 01:55 PM      Lipids Lab Results   Component Value Date/Time    CHOL 226 06/06/2022 02:38 PM    [address] Not calculated due to elevated triglyceride level 06/06/2022 02:38 PM    LABVLDL 90 06/06/2022 02:38 PM    HDL 52 06/06/2022 02:38 PM    CHOLHDLRATIO 4.3 06/06/2022 02:38 PM    TRIG 450 06/06/2022 02:38 PM      Thyroid  Lab Results   Component Value Date/Time    TSHELE 2.68 06/06/2022 02:38 PM        Most Recent UA Lab Results   Component Value Date/Time    COLORU YELLOW/STRAW 02/08/2023 03:27 AM    APPEARANCE CLEAR 02/08/2023 03:27 AM    SPECGRAV 1.028 02/08/2023 03:27 AM    LABPH 5.0 02/08/2023 03:27 AM    PROTEINU Negative 02/08/2023 03:27 AM    GLUCOSEU Negative 02/08/2023 03:27 AM    KETUA Negative 02/08/2023 03:27 AM    BILIRUBINUR Negative 02/08/2023 03:27 AM    BILIRUBINUR Negative 02/09/2021 02:48 PM    BLOODU Negative 02/08/2023 03:27 AM    UROBILINOGEN 0.2 02/08/2023 03:27 AM    NITRU Negative 02/08/2023 03:27 AM    LEUKOCYTESUR Negative 02/08/2023 03:27 AM    WBCUA 0-4 02/08/2023 03:27 AM    RBCUA 0-5 02/08/2023 03:27 AM    EPITHUA 0-5 02/08/2023 03:27 AM    BACTERIA Negative 02/08/2023 03:27 AM    LABCAST 2-5 02/08/2023 03:27 AM    MUCUS 0 02/08/2023 03:27 AM        Recent Labs     02/07/23  1827 02/07/23  1819   CULTURE NO GROWTH 3 DAYS NO GROWTH 3 DAYS       All Labs from Last 24 Hrs:  Recent Results (from the past 24 hour(s))   POCT Glucose    Collection Time: 02/09/23  3:38 PM   Result Value Ref Range    POC Glucose 127 (H) 65 - 100 mg/dL    Performed by: Dutch    POCT Glucose    Collection Time: 02/09/23  8:54 PM   Result Value Ref Range    POC Glucose 97 65 - 100 mg/dL    Performed by: [employer] 19    CBC with Auto Differential    Collection Time: 02/10/23  4:11 AM   Result Value Ref Range    WBC 6.4 4.3 - 11.1 K/uL    RBC 3.75 (L) 4.05 - 5.2 M/uL    Hemoglobin 11.2 (L) 11.7 - 15.4 g/dL    Hematocrit 34.7 (L) 35.8 - 46.3 %    MCV 92.5 82 - 102 FL    MCH 29.9 26.1 - 32.9 PG    MCHC 32.3 31.4 - 35.0 g/dL    RDW 14.2 11.9 - 14.6 %    Platelets 166 710 - 484 K/uL    MPV 11.0 9.4 - 12.3 FL    nRBC 0.00 0.0 - 0.2 K/uL    Differential Type AUTOMATED      Seg Neutrophils 66 43 - 78 %    Lymphocytes 21 13 - 44 %    Monocytes 9 4.0 - 12.0 %    Eosinophils % 3 0.5 - 7.8 %    Basophils 0 0.0 - 2.0 %    Immature Granulocytes 1 0.0 - 5.0 %    Segs Absolute 4.3 1.7 - 8.2 K/UL    Absolute Lymph # 1.4 0.5 - 4.6 K/UL    Absolute Mono # 0.6 0.1 - 1.3 K/UL    Absolute Eos # 0.2 0.0 - 0.8 K/UL    Basophils Absolute 0.0 0.0 - 0.2 K/UL    Absolute Immature Granulocyte 0.0 0.0 - 0.5 K/UL   Basic Metabolic Panel w/ Reflex to MG    Collection Time: 02/10/23  4:11 AM   Result Value Ref Range    Sodium 137 133 - 143 mmol/L    Potassium 3.6 3.5 - 5.1 mmol/L    Chloride 98 (L) 101 - 110 mmol/L    CO2 28 21 - 32 mmol/L    Anion Gap 11 2 - 11 mmol/L    Glucose 116 (H) 65 - 100 mg/dL    BUN 22 8 - 23 MG/DL    Creatinine 0.90 0.6 - 1.0 MG/DL    Est, Glom Filt Rate >60 >60 ml/min/1.73m2    Calcium 10.2 8.3 - 10.4 MG/DL   Procalcitonin    Collection Time: 02/10/23  4:11 AM   Result Value Ref Range    Procalcitonin 0.33 0.00 - 0.49 ng/mL   POCT Glucose    Collection Time: 02/10/23  6:22 AM   Result Value Ref Range    POC Glucose 104 (H) 65 - 100 mg/dL    Performed by: [facility] [employer] 19    COVID-19, Rapid    Collection Time: 02/10/23  9:15 AM    Specimen: Nasopharyngeal   Result Value Ref Range    Source NASAL      SARS-CoV-2, Rapid Not detected NOTD     POCT Glucose    Collection Time: 02/10/23 11:55 AM   Result Value Ref Range    POC Glucose 131 (H) 65 - 100 mg/dL    Performed by: Dutch        Allergies   Allergen Reactions    Oxycodone Itching    Eucalyptus Oil Hives and Other (See Comments)     Burns mouth    Ezetimibe Myalgia    Morphine Other (See Comments)     Feels crazy  Other reaction(s): Hallucinations    Penicillins Hives    Pineapple Hives and Other (See Comments)     Burns mouth    Vancomycin Other (See Comments)     Kidney function worsened    Sulfa Antibiotics Nausea Only and Rash     Immunization History   Administered Date(s) Administered    COVID-19, PFIZER PURPLE top, DILUTE for use, (age 15 y+), 30g/0.3mL 01/19/2021, 02/13/2021    Influenza Trivalent 09/18/2013, 10/06/2014    Influenza Virus Vaccine 09/24/2020 Influenza, FLUAD, (age 72 y+), Adjuvanted, 0.5mL 09/24/2020, 10/07/2021, 10/07/2021, 09/22/2022    Influenza, High Dose (Fluzone 65 yrs and older) 10/10/2016, 11/01/2017    Influenza, Triv, inactivated, subunit, adjuvanted, IM (Fluad 65 yrs and older) 10/16/2018, 09/26/2019    Influenza, Trivalent PF 09/28/2015    PPD Test 02/08/2023    Pneumococcal Conjugate 13-valent ([id number]) 12/30/2015    Pneumococcal Polysaccharide ([id number]) 08/22/2013    Tdap (Boostrix, Adacel) 03/18/2014    Zoster Live (Zostavax) 01/01/2015       Recent Vital Data:  Patient Vitals for the past 24 hrs:   Temp Pulse Resp BP SpO2   02/10/23 1153 99.4 °F (37.4 °C) 95 19 (!) 145/69 93 %   02/10/23 1148 -- -- 18 -- --   02/10/23 0917 -- -- -- (!) 149/77 --   02/10/23 0734 99 °F (37.2 °C) 91 19 (!) 149/77 95 %   02/10/23 0552 -- -- 17 -- --   02/10/23 0452 99.5 °F (37.5 °C) 94 17 (!) 182/79 97 %   02/09/23 2318 99 °F (37.2 °C) 90 17 (!) 181/84 97 %   02/09/23 2151 -- -- 17 -- --   02/09/23 1929 98.6 °F (37 °C) 81 17 (!) 156/69 98 %   02/09/23 1536 98.1 °F (36.7 °C) 77 17 122/64 94 %   02/09/23 1328 -- -- 16 -- --       Oxygen Therapy  SpO2: 93 %  Pulse via Oximetry: 76 beats per minute  Pulse Oximeter Device Mode: Continuous  O2 Device: None (Room air)  O2 Flow Rate (L/min): 1 L/min    Estimated body mass index is 24.3 kg/m² as calculated from the following:    Height as of this encounter: 5' 5\" (1.651 m). Weight as of this encounter: 146 lb (66.2 kg). Intake/Output Summary (Last 24 hours) at 2/10/2023 1309  Last data filed at 2/10/2023 0452  Gross per 24 hour   Intake --   Output 1070 ml   Net -1070 ml         Physical Exam:  General:          Well nourished. No acute distress. Alert. Head:               Normocephalic, atraumatic  Eyes:               Sclerae appear normal.  Pupils equally round. Glasses intact. ENT:                Nares appear normal.  Moist oral mucosa  Neck:               No restricted ROM.   Trachea midline   CV: RRR.  No m/r/g. No jugular venous distension. Lungs:             CTAB anterior. No wheezing, rhonchi, or rales. Abdomen:        Soft, nontender, nondistended. Extremities:     No cyanosis or clubbing. No peripheral edema. Left hip dressing C/D/I. Mild swelling to R first DIP/baseline  Skin:                No rashes and normal coloration. Warm and dry. Noted abrasion on L great toe  Neuro:             CN II-XII grossly intact. A&O x 3. No focal deficits. Psych:             Normal mood and affect.       Signed:  Darylene Found AGACNP-BC

## 2023-02-10 NOTE — PROGRESS NOTES
PHYSICAL THERAPY EXAMINATION    Time In: 1530  Time Out: 1506  Total Treatment Time:  35 Minutes         HPI:  Pt. Is a 68 y/o female adm s/p fall after slipping on some urine of the floor attempting to use the bathroom during the night. Workup revealed nondisplaced left femur neck fracture. Pt. S/p L hip ORIF 2/8/2023 & now transferred to [facility] for rehab. Past Medical History:   Past Medical History:   Diagnosis Date    Ankle fracture 2014    Ankle osteomyelitis, left ([address] 2014    Breast cancer ([address]     Chemotherapy-induced neuropathy (HCC)     Chronic anemia     Chronic systolic CHF (congestive heart failure) (HCC)     CKD (chronic kidney disease) stage 3, GFR 30-59 ml/min (HCC)     Colon polyp 2020    Depression     Dilated cardiomyopathy ([address]     Dry eye syndrome of both eyes     Dyslipidemia     Essential hypertension     Fracture of right patella 2013    History of left breast cancer 2001    with mastectomy    Left leg weakness 9/8/2020    Non-ischemic cardiomyopathy ([address] 2018    Echo 50-55%, Cath-minimal CAD. 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 Pod 2 ORIF doing well. C/o moderate pain. Controlled on PO meds. Ready to go home    Type 2 diabetes mellitus, controlled ([address]     BS am 104    UTI (urinary tract infection) 2020    Vaginal infection     Vitamin B12 deficiency        Pt's Goal:  Pt's goal to be able to walk @ home again.       Precautions:  Falls and WB: L LW    Impairments:    Decreased strength B LE  [x]     Decreased strength trunk/core  [x]     Decreased AROM   [x]     Decreased PROM  [x]    Decreased endurance  []     Decreased balance sitting  []     Decreased balance standing  [x]     Pain   [x]     Slow ambulation velocity  [x]    Decreased coordination  []    Decreased safety awareness  [x]      Functional Limitations:   Decreased independence with bed mobility  [x]     Decreased independence with functional transfers  [x]     Decreased independence with ambulation  [x]     Decreased independence with stair negotiation  [x]       Previous Functional Level: Pt. Reports she was independent w/ mobility. States that she was advised by her MD to utilize a RW, but had not begun using @ home. Endorses a h/o for frequent falls. Home Situation:      Environment   Living Situation Private Residence   Home Entrance 2 Stairs and no Rail(s)   Lives Alone No   Support Systems Spouse/Significant Other   DME Used none   DME Available Pt. Not sure if her 23 y/o RW & BSC are operational            Outcome Measures:     Pain level: no c/o pain @ rest  Pain Location:  L hip  Pain Interventions: repositioned after tx    Vital Signs: There were no vitals taken for this visit. Education:  role of PT, positioning in bed, safety during transfers    Interdisciplinary Communication: RN made aware of assistance required for mobility       Cognition: Pt. Alert & follows commands. [place]. Pt. Maryanne Gutierrez decreased insight into deficits, impulsivity, & decreased safety awareness.     Lower Extremity Function:     LLE RLE   ROM PROM knee -10 degrees extension, ankle x5 degrees df , knee w/ genu valgus WFL   Fine Motor Coordination Intact Intact   Tone Normal Normal   Sensation NT NT   Strength Generally decreased, but functional Generally decreased, but functional          PRIMARY MODE OF LOCOMOTION: Ambulation    Functional Assessment:    Balance  Comments   Static Sitting Good:  Pt. able to maintain balance w/o UE support;  exhibits some postural sway    Dynamic Sitting Good - accepts moderate challenge;  can maintain balance while picking object off the floor    Static Standing Fair:  Pt. requires UE support;  may need occasional min A    Dynamic Standing Poor - unable to accept challenge or move without LOB     Picking Up Object From Floor 88     Not attempted due to medical condition or safety concerns         BED MOBILITY & TRANSFERS Quality Indicator Score Comments   Rolling 88     Not attempted due to medical condition or safety concerns deferred secondary to hip pain post-op   Supine to Sit 3  Partial/moderate assistance    mod A for L LE as well as trunk righting   Sit to Supine 3  Partial/moderate assistance    mod A for LEs & trunk   Sit to Stand 3  Partial/moderate assistance    mod A   Bed to/from Chair 3  Partial/moderate assistance    mod A   Car Transfer 88     Not attempted due to medical condition or safety concerns         WHEELCHAIR MOBILITY/MANAGEMENT Initial Assessment/Quality Indicator Score Comments   Able to Propel 50' w/ 2 Turns 9     Not applicable     Able to Propel 725' 9     Not applicable     Wheelchair set up assistance required NT    Wheelchair management NT        AMBULATION Initial Assessment/Quality Indicator Score Comments   Assistive device No A/D    Walk 10' 88     Not attempted due to medical condition or safety concerns Pt. unable to attempt w/o A/D secondary to pain & weakness L LE post-op   Walk 48' with 2 Turns 88     Not attempted due to medical condition or safety concerns Pt. unable to attempt w/o A/D  secondary to pain & weakness L LE post-op   Walk 150' 5  Setup or clean-up assistance    Pt. unable to attempt w/o A/D  secondary to pain & weakness L LE post-op   Walking 10' on Unlevel Surfaces 88     Not attempted due to medical condition or safety concerns     Gait Training:  Pt. Utilized RW to amb. 10'x2 w/ min A.  Gait w/ step to gait pattern & tendency to place R LE outside the box of the walker. Pt. Also lacks full foot contact L LE secondary to old knee flexion contracture & genu valgus.     STEPS/STAIRS Initial Assessment/Quality Indicator Sore Comments   1 Curb Step 88     Not attempted due to medical condition or safety concerns     4 Steps 88     Not attempted due to medical condition or safety concerns     12 Steps 9     Not applicable     Curbs/Ramps NT        Pt. Was left supine in NAD, call bell in reach. PHYSICAL THERAPY PLAN OF CARE    Therapy Diagnosis:   Please see table above    Order received from MD for physical therapy services and chart reviewed. Pt to be seen at least 5 times per week for at least 1.5 hours of physical therapy per day for 10 days. Thank you for the referral.    Goals:    Long Term Goals:  Pt will demonstrate roll left & right & transition supine<>sit with Independent in 10 days  2. Pt. will transfer from bed to/from chair with Supervision/Standby Assist using the least restrictive device in 10 days  3. Pt. will ambulate with 150 feet with RW or LRAD and Supervision/Standby Assist in 10 days  4. Pt. Will ambulate up/down 4 steps with single railing and CGA in 10 days  5. Pt. Will perform HEP w/ supervision in 10 days. Pt would benefit from skilled physical therapy in order to improve independent functional mobility within the home. Interventions may include range of motion (AROM, PROM B LE/trunk), motor function (B LE/trunk strengthening/coordination), activity tolerance (vitals, oxygen saturation levels), bed mobility training, balance activities, gait training (progressive ambulation program), and functional transfer training. Please see IRC; Interdisciplinary Eval, Care Plan, and Patient Education for further information regarding physical therapy examination and plan of care.        Lila Meyer, PT  2/10/2023

## 2023-02-11 LAB
GLUCOSE BLD STRIP.AUTO-MCNC: 102 MG/DL (ref 65–100)
GLUCOSE BLD STRIP.AUTO-MCNC: 126 MG/DL (ref 65–100)
GLUCOSE BLD STRIP.AUTO-MCNC: 91 MG/DL (ref 65–100)
SERVICE CMNT-IMP: ABNORMAL
SERVICE CMNT-IMP: ABNORMAL
SERVICE CMNT-IMP: NORMAL

## 2023-02-11 PROCEDURE — 6370000000 HC RX 637 (ALT 250 FOR IP): Performed by: PHYSICIAN ASSISTANT

## 2023-02-11 PROCEDURE — 97535 SELF CARE MNGMENT TRAINING: CPT

## 2023-02-11 PROCEDURE — 99232 SBSQ HOSP IP/OBS MODERATE 35: CPT | Performed by: PHYSICIAN ASSISTANT

## 2023-02-11 PROCEDURE — 6370000000 HC RX 637 (ALT 250 FOR IP): Performed by: PHYSICAL MEDICINE & REHABILITATION

## 2023-02-11 PROCEDURE — 97530 THERAPEUTIC ACTIVITIES: CPT

## 2023-02-11 PROCEDURE — 97165 OT EVAL LOW COMPLEX 30 MIN: CPT

## 2023-02-11 PROCEDURE — 97116 GAIT TRAINING THERAPY: CPT

## 2023-02-11 PROCEDURE — 1180000000 HC REHAB R&B

## 2023-02-11 PROCEDURE — 97110 THERAPEUTIC EXERCISES: CPT

## 2023-02-11 PROCEDURE — 82962 GLUCOSE BLOOD TEST: CPT

## 2023-02-11 RX ORDER — INSULIN LISPRO 100 [IU]/ML
0-8 INJECTION, SOLUTION INTRAVENOUS; SUBCUTANEOUS 2 TIMES DAILY WITH MEALS
Status: DISCONTINUED | OUTPATIENT
Start: 2023-02-11 | End: 2023-02-21 | Stop reason: HOSPADM

## 2023-02-11 RX ORDER — SENNA AND DOCUSATE SODIUM 50; 8.6 MG/1; MG/1
2 TABLET, FILM COATED ORAL DAILY
Status: DISCONTINUED | OUTPATIENT
Start: 2023-02-11 | End: 2023-02-21 | Stop reason: HOSPADM

## 2023-02-11 RX ADMIN — HYDROMORPHONE HYDROCHLORIDE 1 MG: 2 TABLET ORAL at 19:57

## 2023-02-11 RX ADMIN — LISINOPRIL 10 MG: 5 TABLET ORAL at 22:12

## 2023-02-11 RX ADMIN — AMITRIPTYLINE HYDROCHLORIDE 10 MG: 10 TABLET, FILM COATED ORAL at 22:12

## 2023-02-11 RX ADMIN — Medication 1 AMPULE: at 08:39

## 2023-02-11 RX ADMIN — GABAPENTIN 600 MG: 300 CAPSULE ORAL at 08:36

## 2023-02-11 RX ADMIN — POTASSIUM CHLORIDE 10 MEQ: 20 TABLET, EXTENDED RELEASE ORAL at 08:36

## 2023-02-11 RX ADMIN — Medication 1 AMPULE: at 20:00

## 2023-02-11 RX ADMIN — Medication 3 MG: at 22:00

## 2023-02-11 RX ADMIN — GABAPENTIN 600 MG: 300 CAPSULE ORAL at 22:12

## 2023-02-11 RX ADMIN — GUAIFENESIN AND DEXTROMETHORPHAN 5 ML: 100; 10 SYRUP ORAL at 19:56

## 2023-02-11 RX ADMIN — CHOLECALCIFEROL TAB 25 MCG (1000 UNIT) 2000 UNITS: 25 TAB at 08:36

## 2023-02-11 RX ADMIN — CALCIUM CARBONATE (ANTACID) CHEW TAB 500 MG 500 MG: 500 CHEW TAB at 22:12

## 2023-02-11 RX ADMIN — METOPROLOL SUCCINATE 50 MG: 50 TABLET, EXTENDED RELEASE ORAL at 19:57

## 2023-02-11 RX ADMIN — TROSPIUM CHLORIDE 20 MG: 20 TABLET, FILM COATED ORAL at 22:12

## 2023-02-11 RX ADMIN — HYDROMORPHONE HYDROCHLORIDE 2 MG: 2 TABLET ORAL at 02:06

## 2023-02-11 RX ADMIN — NITROFURANTOIN MACROCRYSTALS 50 MG: 50 CAPSULE ORAL at 08:36

## 2023-02-11 RX ADMIN — HYDROMORPHONE HYDROCHLORIDE 2 MG: 2 TABLET ORAL at 08:48

## 2023-02-11 RX ADMIN — LISINOPRIL 10 MG: 5 TABLET ORAL at 08:36

## 2023-02-11 RX ADMIN — ASPIRIN 325 MG: 325 TABLET, COATED ORAL at 08:36

## 2023-02-11 RX ADMIN — SENNOSIDES AND DOCUSATE SODIUM 2 TABLET: 50; 8.6 TABLET ORAL at 11:56

## 2023-02-11 ASSESSMENT — PAIN DESCRIPTION - DESCRIPTORS
DESCRIPTORS: ACHING
DESCRIPTORS: ACHING

## 2023-02-11 ASSESSMENT — PAIN SCALES - GENERAL
PAINLEVEL_OUTOF10: 8
PAINLEVEL_OUTOF10: 6
PAINLEVEL_OUTOF10: 3
PAINLEVEL_OUTOF10: 6
PAINLEVEL_OUTOF10: 6

## 2023-02-11 ASSESSMENT — PAIN DESCRIPTION - LOCATION
LOCATION: HIP

## 2023-02-11 ASSESSMENT — PAIN DESCRIPTION - ORIENTATION
ORIENTATION: LEFT

## 2023-02-11 ASSESSMENT — PAIN DESCRIPTION - PAIN TYPE
TYPE: ACUTE PAIN
TYPE: ACUTE PAIN

## 2023-02-11 ASSESSMENT — PAIN - FUNCTIONAL ASSESSMENT: PAIN_FUNCTIONAL_ASSESSMENT: ACTIVITIES ARE NOT PREVENTED

## 2023-02-11 NOTE — PROGRESS NOTES
[facility] OCCUPATIONAL THERAPY INITIAL EVALUATION    Time In 0725   Time Out 0829     HPI (per MD report): \"HPI: 69 y.o. white female presented to the ED 2/6/2023 with left hip pain and inability to bear weight after mechanical fall at home. XR equivocal (degenerative change vs fracture); MRI confirmed nondisplaced left femoral neck fracture. ED labs and CXR unremarkable. Code S called 2/7 due to confusion and word salad. Fever to 102.9F, /105; empiric ABX started. Head CT without acute findings but did note large calcification adjacent to choroid plexus, follow-up MRI recommended. Infectious workup initiated, currently negative to date. Neurology opined acute toxic metabolic event. On 2/8, she was stable and underwent ORIF plate + screw fixation Jenifer Amaya MD). Mild post-op ABLA, Hgb carlos 10.2 (2/8). Left foot XR for new pain unrevealing; Ortho postulated plantar fascitis. PT/OT noted mobility and self-care deficits, recommended IRC. \"    Past Medical History:   Diagnosis Date    Ankle fracture 2014    Ankle osteomyelitis, left ([address] 2014    Breast cancer (HCC)     Chemotherapy-induced neuropathy (HCC)     Chronic anemia     Chronic systolic CHF (congestive heart failure) (HCC)     CKD (chronic kidney disease) stage 3, GFR 30-59 ml/min (HCC)     Colon polyp 2020    Depression     Dilated cardiomyopathy ([address]     Dry eye syndrome of both eyes     Dyslipidemia     Essential hypertension     Fracture of right patella 2013    History of left breast cancer 2001    with mastectomy    Left leg weakness 9/8/2020    Non-ischemic cardiomyopathy ([address] 2018    Echo 50-55%, Cath-minimal CAD. 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 Pod 2 ORIF doing well.   C/o moderate pain. Controlled on PO meds. Ready to go home    Type 2 diabetes mellitus, controlled ([address]     BS am 104    UTI (urinary tract infection) 2020    Vaginal infection     Vitamin B12 deficiency        Patient's Goal:   Pain: Patient reports 8/10 pain in L hip at start of session . Precautions: Falls and WBAT LLE    Prior Level of Function: Pt was driving and completing ADLs independently, reports difficulty walking but was not using AE w/ ambulation.  \"I don't get around good\"     LIVING SITUATION:    Environment   Living Alone No   Support System Spouse/Significant Other    Home Set Up 1 Level Home   Home Entrance 2 Steps to Enter   Bathroom Setup  Walk-In Shower and Tub Shower   Current DME Pt reports possibly having a walker and shower chair, needs to check w/       UPPER EXTREMITY ASSESSMENT:   TRISHA BRADEN   General Evaluation Generally Decreased, Functional Generally Decreased, Functional   FMC Intact Intact   GMC Intact Intact   Light Touch intact intact   Proprioception intact intact   Subluxation N/A N/A   Inattention/Neglect N/A N/A   Muscle Tone Normal Normal     STRENGTH: TRISHA BRADEN   Shoulder Flexion 4+/5 Completed full range of motion against gravity with moderate-maximum resistance  4-/5 Completes full range of motion against gravity with minimal-moderate resistance   Shoulder Abduction 4+/5 Completed full range of motion against gravity with moderate-maximum resistance  4-/5 Completes full range of motion against gravity with minimal-moderate resistance   Elbow Flexion 4+/5 Completed full range of motion against gravity with moderate-maximum resistance  4/5 Completes full range of motion against gravity with moderate resistance   Elbow Extension  4+/5 Completed full range of motion against gravity with moderate-maximum resistance  4/5 Completes full range of motion against gravity with moderate resistance    4+/5 Completed full range of motion against gravity with moderate-maximum resistance  4+/5 Completed full range of motion against gravity with moderate-maximum resistance      BALANCE:   Static Dynamic   Sitting Good: Able to maintain balance against moderate resistance Good: Able to sit unsupported and weight shift across midline moderately   Standing Fair-: Requires min A or UE support in order to stand without a LOB Poor: Able to stand with mod A and minimally reach ipsilaterally, unable to cross midline       FUNCTIONAL MOBILITY:    Score Comments   Rolling Supervision or touching assistance S   Supine to Sit Supervision or touching assistance S   Sit to Supine Supervision or touching assistance S   Sit to Stand Partial/moderate assistance MIN A W/ RW   Transfer Assist Partial/moderate assistance Min A w/ RW     ACTIVITIES OF DAILY LIVING:   Score Comments   Eating Independent I   Oral [place]ig[place] Setup or clean-up assistance S/U A seated at sink side   Bathing Partial/moderate assistance MIn A to maintain safety while standing   Upper Body  Dressing Setup or clean-up assistance S/U A seated EOB   Lower Body Dressing Partial/moderate assistance MIN A to don to waist in standing    Donning/[place] Footwear Substantial/maximal assistance Ind. to don sock on R foot, MAX A for L foot     Toilet Transfer Partial/moderate assistance S - [address] or touching assistance MIN A SPT w/ RW     BP (!) 167/68   Pulse 94   Temp 98.9 °F (37.2 °C) (Oral)   Resp 17   SpO2 91%      Cognition: Pt scored a 12/15 on the IRF-NABIL. Some STM deficits noted, pt reports occasionally getting confused and disoriented since being admitted. Vision/Perception: No changes from pre-morbid where pt wore glasses. Problem List: Activity Tolerance, Safety Awareness, Strength, Pain, and Standing Balance   Functional Limitations: ADL, IADL, Functional Transfers, and Functional Mobility   Session: Pt agreeable to OT evaluation.  Pt completed the following with details recorded above: MMT/ROM, ADLs, IRF-NABIL, and informal OT interview. Interdisciplinary Communication: Collaborated with PT regarding patient's current level of function, plan of care, and safety measures. Patient/Family Education: Patient educated On the role of OT, On POC, and On IRC expectations. GOALS:  Short Term Goals:  Time Frame for Short Term Goals : 10 days   STG 1: Patient will dress UB with [place] using AE/DME PRN. STG 2: Patient will dress LB with Setup Assistance using AE/DME PRN. STG 3: Patient will don footwear with Setup Assistance using AE/DME PRN. STG 4: Patient will bathe with Setup Assistance using AE/DME PRN. STG 5: Patient will complete grooming tasks standing sink side with Setup Assistance using AE/DME PRN. OT order received, chart reviewed, and OT orders acknowledged. Patient will benefit from skilled OT services to address deficits to maximize functional performance with self-care tasks and functional mobility. Treatment is likely to include ADL, balance, safety awareness, strength, coordination, functional mobility and transfer training. Patient will be seen for 1.5-2 hours of skilled OT services 5-6 days a week as appropriate. Initate POC.      Bharti Jordan OT   2/11/2023

## 2023-02-11 NOTE — PROGRESS NOTES
Physical Therapy  Facility/Department: [facility] INPATIENT REHAB UNIT  Daily Treatment Note  NAME: Bhumika Talley  : [birth year]  MRN: [id number]    Date of Service: 2023    Discharge Recommendations:           Patient Diagnosis(es): There were no encounter diagnoses. Assessment         Plan          Restrictions        Subjective          Objective   Vitals                         Goals       Education       Therapy Time   Individual Concurrent Group Co-treatment   Time In 1091         Time Out 0949         Minutes 55         Timed Code Treatment Minutes: 54 Minutes       Janel Castano PTA         PHYSICAL THERAPY DAILY NOTE  Time In:  8:54 am  Time Out:  9:49 am  Total Treatment Time:  54 Minutes  Pt.  Seen for: AM, Gait Training, Patient Education, Therapeutic Exercise, and Transfer Training     Subjective: \"My leg hurts when I put weight through it but pain is not too bad\"         Objective:  Precautions: Falls and WB: WBAT LLE    GROSS ASSESSMENT Daily Assessment           COGNITION Daily Assessment           BED/MAT MOBILITY Daily Assessment    Rolling Right: NT  Rolling Left: NT  Supine to Sit: NT  Sit to Supine: NT       TRANSFERS Daily Assessment    Sit to Stand: CGA  Stand to Sit: CGA  Transfer Type: Stand Pivot  Transfer Assistance: CGA  Car Transfers: NT         GAIT Daily Assessment   Additional 30ft using FWW Amount of Assistance: CGA  Distance (ft): 20  Assistive Device: RW  Surface: Level Surface       STEPS/STAIRS Daily Assessment    Steps Ambulated:  0  Level of Assistance:  NT  Railing:No Rails  Assistive Device: NT       BALANCE Daily Assessment    Static Sitting: Normal:  Pt. able to maintain balance w/o UE support  Dynamic Sitting: Good - accepts moderate challenge;  can maintain balance while picking object off the floor  Static Standing: Fair:  Pt. requires UE support;  may need occasional min A  Dynamic Standing: Poor - unable to accept challenge or move without LOB        WHEELCHAIR MOBILITY Daily Assessment    Able to Propel (ft): 0  Assistance: NT  Surface: NT  Wheelchair Set-up: NT       LOWER EXTREMITY EXERCISES Daily Assessment   Nu step 10 min level 1 continuous   Seated:  Marches x10  Hip abd x10  Hip add ball squeezes x10 3 sec holds  DF/PF      Pain level: 5/10  Pain Location:  L hip  Pain Interventions: pain medication    Vital Signs: WNL    Pt. Left in recliner chair in room call light and all needs in reach         Assessment: Pt requires seated rest breaks 2-3 min between gait and exercises to promote pacing techniques and keep L hip pain under control.  Improved activity tolerance during gait promoting WB LLE, decrease stance time LLE, increased WB BUE on walker         Plan of Care: Continue with current POC for improved functional mobility    Kaya Guerrero, CARMEN  2/11/2023

## 2023-02-11 NOTE — PROGRESS NOTES
INPATIENT REHAB CENTER PROGRESS NOTE    Zandra Heard  Admit Date: 2/10/2023  Admit Diagnosis:   Hip fracture ([facility] [S72.009A]  Closed left hip fracture, initial encounter ([facility] [address] [S72.002A]    Subjective     2/11/2023  observed ambulating in hallway with PT and RW, examined later in room. Reports pain managed with PRN meds, does not want APAP scheduled as \"it wires me up. \" BM x 1 since 2/6, requests stool softener but no MiraLAX. Self-manages voiding q2h due to OAB; continent.     Objective:     Current Facility-Administered Medications   Medication Dose Route Frequency    sennosides-docusate sodium (SENOKOT-S) 8.6-50 MG tablet 2 tablet  2 tablet Oral Daily    acetaminophen (TYLENOL) tablet 650 mg  650 mg Oral Q6H PRN    Or    acetaminophen (TYLENOL) suppository 650 mg  650 mg Rectal Q6H PRN    alcohol 62% (NOZIN) nasal  1 ampule  1 ampule Topical Q12H    amitriptyline (ELAVIL) tablet 10 mg  10 mg Oral Nightly    aspirin EC tablet 325 mg  325 mg Oral Daily    calcium carbonate (TUMS) chewable tablet 500 mg  1 tablet Oral Nightly    cetirizine (ZYRTEC) tablet 10 mg  10 mg Oral Daily PRN    [START ON 2/12/2023] furosemide (LASIX) tablet 40 mg  40 mg Oral Every Other Day    gabapentin (NEURONTIN) capsule 600 mg  600 mg Oral BID    glucagon (rDNA) injection 1 mg  1 mg SubCUTAneous PRN    glucose chewable tablet 16 g  4 tablet Oral PRN    hydrALAZINE (APRESOLINE) tablet 25 mg  25 mg Oral Q6H PRN    HYDROmorphone (DILAUDID) tablet 1 mg  1 mg Oral Q4H PRN    HYDROmorphone (DILAUDID) tablet 2 mg  2 mg Oral Q4H PRN    insulin lispro (HUMALOG) injection vial 0-4 Units  0-4 Units SubCUTAneous Nightly    insulin lispro (HUMALOG) injection vial 0-8 Units  0-8 Units SubCUTAneous TID WC    lisinopril (PRINIVIL;ZESTRIL) tablet 10 mg  10 mg Oral BID    melatonin tablet 3 mg  3 mg Oral Nightly    metoprolol succinate (TOPROL XL) extended release tablet 50 mg  50 mg Oral Nightly    nitrofurantoin (MACRODANTIN) capsule 50 mg  50 mg Oral Daily    polyethylene glycol (GLYCOLAX) packet 17 g  17 g Oral Daily PRN    potassium chloride (KLOR-CON M) extended release tablet 10 mEq  10 mEq Oral Daily with breakfast    Vitamin D (CHOLECALCIFEROL) tablet 2,000 Units  2,000 Units Oral Daily    guaiFENesin-dextromethorphan (ROBITUSSIN DM) 100-10 MG/5ML syrup 5 mL  5 mL Oral Q4H    trospium (SANCTURA) tablet 20 mg  20 mg Oral Nightly       Physical Exam:   Visit Vitals  BP (!) 139/105   Pulse 100   Temp 97.5 °F (36.4 °C) (Oral)   Resp 18   SpO2 92%         General: Alert, no acute distress, well developed, well nourished. Skin: left hip incisions not visualized this encounter  HEENT: Normocephalic, edentulous, oral mucosa is moist. Extraocular movements are intact. Neck: Supple, non-tender. Respiratory: Clear breath sounds bilaterally, respirations unlabored. Equal chest rise. Cardiovascular: Normal rate, regular underlying rhythm, harsh systolic murmur. Palpable pedal pulses, no cyanosis. ABD: Soft, non-tender, not distended, normoactive bowel sounds. Integumentary: Clean, no rash, no skin breakdown. Musculoskeletal: UE strength 4 to 4+/5 and mostly symmetric. Right index finger tenderness and chronic-appearing swelling. R LE strength grossly intact. Left hip and LE exam limited by pain. Psychiatric: Appropriate mood & affect. Neuro: Alert, oriented x 4, speech and language intact. No focal deficits. Sensation grossly intact.        Functional Assessment:    Per PT: CGA for sit<>stand, stand-pivot transfer;  GAIT Daily Assessment   Additional 30ft using FWW Amount of Assistance: CGA  Distance (ft): 20  Assistive Device: RW  Surface: Level Surface        Per OT:   ACTIVITIES OF DAILY LIVING:    Score Comments   Eating Independent I   Oral Hygiene Setup or clean-up assistance S/U A seated at sink side   Bathing Partial/moderate assistance Min A to maintain safety while standing   Upper Body  Dressing Setup or clean-up assistance S/U A seated EOB   Lower Body Dressing Partial/moderate assistance MIN A to don to waist in standing    Donning/[place] Footwear Substantial/maximal assistance Ind. to don sock on R foot, MAX A for L foot      Toilet Transfer Partial/moderate assistance S - [address] or touching assistance MIN A SPT w/ RW          Labs/Studies:  Recent Results (from the past 72 hour(s))   POCT Glucose    Collection Time: 02/08/23 11:23 AM   Result Value Ref Range    POC Glucose 94 65 - 100 mg/dL    Performed by: Lorie Garay    POCT Glucose    Collection Time: 02/08/23 12:46 PM   Result Value Ref Range    POC Glucose 113 (H) 65 - 100 mg/dL    Performed by: Kari    POCT Glucose    Collection Time: 02/08/23  4:30 PM   Result Value Ref Range    POC Glucose 101 (H) 65 - 100 mg/dL    Performed by: Kari    POCT Glucose    Collection Time: 02/08/23 10:11 PM   Result Value Ref Range    POC Glucose 135 (H) 65 - 100 mg/dL    Performed by: Marletta Needle PPD TEST IN 72 HRS    Collection Time: 02/09/23 12:00 AM   Result Value Ref Range    PPD, (POC) Negative Negative    mm Induration 0 0 - 5 mm   Hemoglobin and Hematocrit    Collection Time: 02/09/23  4:38 AM   Result Value Ref Range    Hemoglobin 10.6 (L) 11.7 - 15.4 g/dL    Hematocrit 32.4 (L) 35.8 - 46.3 %   Basic Metabolic Panel w/ Reflex to MG    Collection Time: 02/09/23  4:38 AM   Result Value Ref Range    Sodium 137 133 - 143 mmol/L    Potassium 3.6 3.5 - 5.1 mmol/L    Chloride 96 (L) 101 - 110 mmol/L    CO2 32 21 - 32 mmol/L    Anion Gap 9 2 - 11 mmol/L    Glucose 158 (H) 65 - 100 mg/dL    BUN 16 8 - 23 MG/DL    Creatinine 1.00 0.6 - 1.0 MG/DL    Est, Glom Filt Rate 58 (L) >60 ml/min/1.73m2    Calcium 9.8 8.3 - 10.4 MG/DL   POCT Glucose    Collection Time: 02/09/23  6:49 AM   Result Value Ref Range    POC Glucose 121 (H) 65 - 100 mg/dL    Performed by: Alexandra López    POCT Glucose    Collection Time: 02/09/23 11:22 AM   Result Value Ref Range    POC Glucose 112 (H) 65 - 100 mg/dL    Performed by: KassyrynPCT    POCT Glucose    Collection Time: 02/09/23  3:38 PM   Result Value Ref Range    POC Glucose 127 (H) 65 - 100 mg/dL    Performed by: Sitari PharmaceuticalsMaryamrynPCT    POCT Glucose    Collection Time: 02/09/23  8:54 PM   Result Value Ref Range    POC Glucose 97 65 - 100 mg/dL    Performed by: [employer]    CBC with Auto Differential    Collection Time: 02/10/23  4:11 AM   Result Value Ref Range    WBC 6.4 4.3 - 11.1 K/uL    RBC 3.75 (L) 4.05 - 5.2 M/uL    Hemoglobin 11.2 (L) 11.7 - 15.4 g/dL    Hematocrit 34.7 (L) 35.8 - 46.3 %    MCV 92.5 82 - 102 FL    MCH 29.9 26.1 - 32.9 PG    MCHC 32.3 31.4 - 35.0 g/dL    RDW 14.2 11.9 - 14.6 %    Platelets 303 785 - 225 K/uL    MPV 11.0 9.4 - 12.3 FL    nRBC 0.00 0.0 - 0.2 K/uL    Differential Type AUTOMATED      Seg Neutrophils 66 43 - 78 %    Lymphocytes 21 13 - 44 %    Monocytes 9 4.0 - 12.0 %    Eosinophils % 3 0.5 - 7.8 %    Basophils 0 0.0 - 2.0 %    Immature Granulocytes 1 0.0 - 5.0 %    Segs Absolute 4.3 1.7 - 8.2 K/UL    Absolute Lymph # 1.4 0.5 - 4.6 K/UL    Absolute Mono # 0.6 0.1 - 1.3 K/UL    Absolute Eos # 0.2 0.0 - 0.8 K/UL    Basophils Absolute 0.0 0.0 - 0.2 K/UL    Absolute Immature Granulocyte 0.0 0.0 - 0.5 K/UL   Basic Metabolic Panel w/ Reflex to MG    Collection Time: 02/10/23  4:11 AM   Result Value Ref Range    Sodium 137 133 - 143 mmol/L    Potassium 3.6 3.5 - 5.1 mmol/L    Chloride 98 (L) 101 - 110 mmol/L    CO2 28 21 - 32 mmol/L    Anion Gap 11 2 - 11 mmol/L    Glucose 116 (H) 65 - 100 mg/dL    BUN 22 8 - 23 MG/DL    Creatinine 0.90 0.6 - 1.0 MG/DL    Est, Glom Filt Rate >60 >60 ml/min/1.73m2    Calcium 10.2 8.3 - 10.4 MG/DL   Procalcitonin    Collection Time: 02/10/23  4:11 AM   Result Value Ref Range    Procalcitonin 0.33 0.00 - 0.49 ng/mL   POCT Glucose    Collection Time: 02/10/23  6:22 AM   Result Value Ref Range    POC Glucose 104 (H) 65 - 100 mg/dL    Performed by: [employer]    COVID-19, Rapid    Collection Time: 02/10/23  9:15 AM    Specimen: Nasopharyngeal   Result Value Ref Range    Source NASAL      SARS-CoV-2, Rapid Not detected NOTD     POCT Glucose    Collection Time: 02/10/23 11:55 AM   Result Value Ref Range    POC Glucose 131 (H) 65 - 100 mg/dL    Performed by: Dutch    POCT Glucose    Collection Time: 02/10/23  5:20 PM   Result Value Ref Range    POC Glucose 100 65 - 100 mg/dL    Performed by: Kellie    POCT Glucose    Collection Time: 02/11/23  6:09 AM   Result Value Ref Range    POC Glucose 102 (H) 65 - 100 mg/dL    Performed by: Nani Spain and Plan      Daily physician / PA medical management:    # Closed left hip fracture, initial encounter ([facility] [address] [S72.002A] - interdisciplinary approach to rehabilitation including PT, OT, nursing and physiatry and disease specific education. # Pain management - PRN meds APAP 650mg q6h (mild), dilaudid 1mg q4h (moderate), dilaudid 2mg q4h (severe); wean opioids as tolerated. # Hypertension - daily lisinopril 10mg and Toprol XL 50mg. # CHF, combined systolic / diastolic - continue furosemide 40mg QOD. # Anemia - acute on chronic; Hgb 11.2 at [facility] admission. # Osteoporosis - intolerance of oral bisphosphonate therapy; continue calcium, vitamin D. # Chemotherapy-induced neuropathy - continue home meds gabapentin 600mg BID and amitriptyline 10mg QHS. # Diabetes mellitus - HgbA1c 6.2% (6/2022). [facility], cover with Humalog SSI and decrease to BID checks. # Chronic kidney disease - stable; avoid nephrotoxic agents. # Electrolyte management - K+ supplement 10mEq daily since patient taking furosemide. # DVT prophylaxis - ASA EC 325mg daily per Ortho     # Bladder program / urinary retention / neurogenic bladder - schedule voids q6-8h.  Check post-void residual as needed; in-and-out catheter if post-void residual is more than 400ml. # Overactive bladder - continue trospium 20mg HS. Restart home med Myrbetriq at d/c. # UTI prophylaxis - continue nitrofurantoin 50mg daily. # Bowel program - at risk for constipation as a side effect of opioids, other medications, impaired mobility, etc. MiraLAX daily for regularity, Senokot-S for stool softener + laxative. PRN MOM, bisacodyl suppository or tablets for constipation. # Sjogren syndrome - following rheumatology outpatient. # Sleep disruption - continue melatonin 3mg nightly. Below are the active medical comorbidities / hospital conditions which will affect rehab course with plan for mitigation. Continue daily physician / PA medical management:  Principal Problem:    Closed left hip fracture, initial encounter [facility])  Active Problems:    Primary insomnia    Sjogren syndrome, unspecified ([address]    Type 2 diabetes mellitus with chronic kidney disease    Chronic gout of right hand    Chronic combined systolic and diastolic CHF (congestive heart failure) (HCC)    Dyslipidemia    Depression    Osteoarthritis    Chemotherapy-induced neuropathy (HCC)    Essential hypertension    Chronic anemia  Resolved Problems:    * No resolved hospital problems.  *          Signed By: Toro Elliott PA-C    February 11, 2023    Physician Assistant with [facility]

## 2023-02-12 LAB
BACTERIA SPEC CULT: NORMAL
BACTERIA SPEC CULT: NORMAL
GLUCOSE BLD STRIP.AUTO-MCNC: 118 MG/DL (ref 65–100)
GLUCOSE BLD STRIP.AUTO-MCNC: 132 MG/DL (ref 65–100)
SERVICE CMNT-IMP: ABNORMAL
SERVICE CMNT-IMP: ABNORMAL
SERVICE CMNT-IMP: NORMAL
SERVICE CMNT-IMP: NORMAL

## 2023-02-12 PROCEDURE — 51798 US URINE CAPACITY MEASURE: CPT

## 2023-02-12 PROCEDURE — 1180000000 HC REHAB R&B

## 2023-02-12 PROCEDURE — 6370000000 HC RX 637 (ALT 250 FOR IP): Performed by: PHYSICAL MEDICINE & REHABILITATION

## 2023-02-12 PROCEDURE — 82962 GLUCOSE BLOOD TEST: CPT

## 2023-02-12 PROCEDURE — 6370000000 HC RX 637 (ALT 250 FOR IP): Performed by: PHYSICIAN ASSISTANT

## 2023-02-12 RX ORDER — ACYCLOVIR 200 MG/1
200 CAPSULE ORAL
Status: COMPLETED | OUTPATIENT
Start: 2023-02-12 | End: 2023-02-14

## 2023-02-12 RX ADMIN — HYDROMORPHONE HYDROCHLORIDE 2 MG: 2 TABLET ORAL at 23:27

## 2023-02-12 RX ADMIN — TROSPIUM CHLORIDE 20 MG: 20 TABLET, FILM COATED ORAL at 20:55

## 2023-02-12 RX ADMIN — LISINOPRIL 10 MG: 5 TABLET ORAL at 23:26

## 2023-02-12 RX ADMIN — LISINOPRIL 10 MG: 5 TABLET ORAL at 08:53

## 2023-02-12 RX ADMIN — CHOLECALCIFEROL TAB 25 MCG (1000 UNIT) 2000 UNITS: 25 TAB at 08:53

## 2023-02-12 RX ADMIN — POTASSIUM CHLORIDE 10 MEQ: 20 TABLET, EXTENDED RELEASE ORAL at 08:53

## 2023-02-12 RX ADMIN — FUROSEMIDE 40 MG: 40 TABLET ORAL at 08:53

## 2023-02-12 RX ADMIN — ACYCLOVIR 200 MG: 200 CAPSULE ORAL at 17:06

## 2023-02-12 RX ADMIN — GABAPENTIN 600 MG: 300 CAPSULE ORAL at 08:53

## 2023-02-12 RX ADMIN — CALCIUM CARBONATE (ANTACID) CHEW TAB 500 MG 500 MG: 500 CHEW TAB at 20:56

## 2023-02-12 RX ADMIN — Medication 1 AMPULE: at 09:39

## 2023-02-12 RX ADMIN — GUAIFENESIN AND DEXTROMETHORPHAN 5 ML: 100; 10 SYRUP ORAL at 00:04

## 2023-02-12 RX ADMIN — HYDROMORPHONE HYDROCHLORIDE 2 MG: 2 TABLET ORAL at 17:07

## 2023-02-12 RX ADMIN — ASPIRIN 325 MG: 325 TABLET, COATED ORAL at 08:53

## 2023-02-12 RX ADMIN — GUAIFENESIN AND DEXTROMETHORPHAN 5 ML: 100; 10 SYRUP ORAL at 08:53

## 2023-02-12 RX ADMIN — NITROFURANTOIN MACROCRYSTALS 50 MG: 50 CAPSULE ORAL at 08:54

## 2023-02-12 RX ADMIN — Medication 3 MG: at 20:55

## 2023-02-12 RX ADMIN — GUAIFENESIN AND DEXTROMETHORPHAN 5 ML: 100; 10 SYRUP ORAL at 05:13

## 2023-02-12 RX ADMIN — HYDROMORPHONE HYDROCHLORIDE 2 MG: 2 TABLET ORAL at 10:21

## 2023-02-12 RX ADMIN — METOPROLOL SUCCINATE 50 MG: 50 TABLET, EXTENDED RELEASE ORAL at 20:55

## 2023-02-12 RX ADMIN — AMITRIPTYLINE HYDROCHLORIDE 10 MG: 10 TABLET, FILM COATED ORAL at 20:55

## 2023-02-12 RX ADMIN — ACYCLOVIR 200 MG: 200 CAPSULE ORAL at 20:55

## 2023-02-12 RX ADMIN — Medication 1 AMPULE: at 20:56

## 2023-02-12 RX ADMIN — GUAIFENESIN AND DEXTROMETHORPHAN 5 ML: 100; 10 SYRUP ORAL at 20:55

## 2023-02-12 RX ADMIN — HYDROMORPHONE HYDROCHLORIDE 2 MG: 2 TABLET ORAL at 00:04

## 2023-02-12 RX ADMIN — GABAPENTIN 600 MG: 300 CAPSULE ORAL at 20:55

## 2023-02-12 RX ADMIN — ACYCLOVIR 200 MG: 200 CAPSULE ORAL at 23:36

## 2023-02-12 ASSESSMENT — PAIN DESCRIPTION - DESCRIPTORS
DESCRIPTORS: ACHING;SORE
DESCRIPTORS: ACHING
DESCRIPTORS: ACHING

## 2023-02-12 ASSESSMENT — PAIN DESCRIPTION - ORIENTATION
ORIENTATION: LEFT

## 2023-02-12 ASSESSMENT — PAIN DESCRIPTION - LOCATION
LOCATION: HIP

## 2023-02-12 ASSESSMENT — PAIN SCALES - GENERAL
PAINLEVEL_OUTOF10: 7
PAINLEVEL_OUTOF10: 5
PAINLEVEL_OUTOF10: 8
PAINLEVEL_OUTOF10: 7
PAINLEVEL_OUTOF10: 7

## 2023-02-13 ENCOUNTER — CARE COORDINATION (OUTPATIENT)
Dept: CARE COORDINATION | Facility: CLINIC | Age: 78
End: 2023-02-13

## 2023-02-13 LAB
GLUCOSE BLD STRIP.AUTO-MCNC: 105 MG/DL (ref 65–100)
GLUCOSE BLD STRIP.AUTO-MCNC: 117 MG/DL (ref 65–100)
SERVICE CMNT-IMP: ABNORMAL
SERVICE CMNT-IMP: ABNORMAL

## 2023-02-13 PROCEDURE — 97530 THERAPEUTIC ACTIVITIES: CPT

## 2023-02-13 PROCEDURE — 82962 GLUCOSE BLOOD TEST: CPT

## 2023-02-13 PROCEDURE — 1180000000 HC REHAB R&B

## 2023-02-13 PROCEDURE — 6370000000 HC RX 637 (ALT 250 FOR IP): Performed by: PHYSICAL MEDICINE & REHABILITATION

## 2023-02-13 PROCEDURE — 97110 THERAPEUTIC EXERCISES: CPT

## 2023-02-13 PROCEDURE — 97116 GAIT TRAINING THERAPY: CPT

## 2023-02-13 PROCEDURE — 6370000000 HC RX 637 (ALT 250 FOR IP): Performed by: PHYSICIAN ASSISTANT

## 2023-02-13 PROCEDURE — 97535 SELF CARE MNGMENT TRAINING: CPT

## 2023-02-13 RX ADMIN — LISINOPRIL 10 MG: 5 TABLET ORAL at 09:02

## 2023-02-13 RX ADMIN — HYDROMORPHONE HYDROCHLORIDE 2 MG: 2 TABLET ORAL at 17:26

## 2023-02-13 RX ADMIN — GUAIFENESIN AND DEXTROMETHORPHAN 5 ML: 100; 10 SYRUP ORAL at 17:14

## 2023-02-13 RX ADMIN — TROSPIUM CHLORIDE 20 MG: 20 TABLET, FILM COATED ORAL at 20:09

## 2023-02-13 RX ADMIN — ACYCLOVIR 200 MG: 200 CAPSULE ORAL at 20:07

## 2023-02-13 RX ADMIN — ACYCLOVIR 200 MG: 200 CAPSULE ORAL at 06:20

## 2023-02-13 RX ADMIN — GABAPENTIN 600 MG: 300 CAPSULE ORAL at 20:09

## 2023-02-13 RX ADMIN — ACYCLOVIR 200 MG: 200 CAPSULE ORAL at 12:21

## 2023-02-13 RX ADMIN — GUAIFENESIN AND DEXTROMETHORPHAN 5 ML: 100; 10 SYRUP ORAL at 03:57

## 2023-02-13 RX ADMIN — GUAIFENESIN AND DEXTROMETHORPHAN 5 ML: 100; 10 SYRUP ORAL at 08:54

## 2023-02-13 RX ADMIN — GABAPENTIN 600 MG: 300 CAPSULE ORAL at 09:02

## 2023-02-13 RX ADMIN — HYDROMORPHONE HYDROCHLORIDE 2 MG: 2 TABLET ORAL at 22:20

## 2023-02-13 RX ADMIN — SENNOSIDES AND DOCUSATE SODIUM 2 TABLET: 50; 8.6 TABLET ORAL at 08:56

## 2023-02-13 RX ADMIN — CALCIUM CARBONATE (ANTACID) CHEW TAB 500 MG 500 MG: 500 CHEW TAB at 20:09

## 2023-02-13 RX ADMIN — HYDROMORPHONE HYDROCHLORIDE 2 MG: 2 TABLET ORAL at 03:56

## 2023-02-13 RX ADMIN — Medication 3 MG: at 20:09

## 2023-02-13 RX ADMIN — Medication 1 AMPULE: at 20:11

## 2023-02-13 RX ADMIN — NITROFURANTOIN MACROCRYSTALS 50 MG: 50 CAPSULE ORAL at 09:03

## 2023-02-13 RX ADMIN — HYDROMORPHONE HYDROCHLORIDE 2 MG: 2 TABLET ORAL at 10:38

## 2023-02-13 RX ADMIN — GUAIFENESIN AND DEXTROMETHORPHAN 5 ML: 100; 10 SYRUP ORAL at 12:21

## 2023-02-13 RX ADMIN — ACYCLOVIR 200 MG: 200 CAPSULE ORAL at 17:15

## 2023-02-13 RX ADMIN — AMITRIPTYLINE HYDROCHLORIDE 10 MG: 10 TABLET, FILM COATED ORAL at 20:08

## 2023-02-13 RX ADMIN — POTASSIUM CHLORIDE 10 MEQ: 20 TABLET, EXTENDED RELEASE ORAL at 08:56

## 2023-02-13 RX ADMIN — CHOLECALCIFEROL TAB 25 MCG (1000 UNIT) 2000 UNITS: 25 TAB at 09:00

## 2023-02-13 RX ADMIN — METOPROLOL SUCCINATE 50 MG: 50 TABLET, EXTENDED RELEASE ORAL at 20:08

## 2023-02-13 RX ADMIN — LISINOPRIL 10 MG: 5 TABLET ORAL at 20:08

## 2023-02-13 RX ADMIN — ASPIRIN 325 MG: 325 TABLET, COATED ORAL at 09:02

## 2023-02-13 ASSESSMENT — PAIN DESCRIPTION - DESCRIPTORS
DESCRIPTORS: ACHING

## 2023-02-13 ASSESSMENT — PAIN - FUNCTIONAL ASSESSMENT: PAIN_FUNCTIONAL_ASSESSMENT: ACTIVITIES ARE NOT PREVENTED

## 2023-02-13 ASSESSMENT — PAIN DESCRIPTION - LOCATION
LOCATION: HIP

## 2023-02-13 ASSESSMENT — PAIN SCALES - GENERAL
PAINLEVEL_OUTOF10: 10
PAINLEVEL_OUTOF10: 10
PAINLEVEL_OUTOF10: 0
PAINLEVEL_OUTOF10: 7
PAINLEVEL_OUTOF10: 7
PAINLEVEL_OUTOF10: 6

## 2023-02-13 ASSESSMENT — PAIN DESCRIPTION - ORIENTATION
ORIENTATION: LEFT

## 2023-02-13 ASSESSMENT — PAIN SCALES - WONG BAKER: WONGBAKER_NUMERICALRESPONSE: 4

## 2023-02-13 NOTE — PROGRESS NOTES
Physical Therapy  PHYSICAL THERAPY DAILY NOTE  Time In:  3429 AM  Time Out:  1121 AM  Total Treatment Time:  (P) 46 Minutes  Pt. Seen for: AM, Gait Training, Therapeutic Exercise, and Transfer Training     Subjective: \"My hip hurts mainly when I walk. \"         Objective:  Precautions: Falls and Poor Safety Awareness    GROSS ASSESSMENT Daily Assessment           COGNITION Daily Assessment           BED/MAT MOBILITY Daily Assessment    Rolling Right:   Rolling Left:   Supine to Sit: CGA  Sit to Supine: CGA       TRANSFERS Daily Assessment    Sit to Stand: Min A  Stand to Sit: Min A  Transfer Type: Stand Pivot and with rolling walker  Transfer Assistance: CGA  Car Transfers: NT         GAIT Daily Assessment    Amount of Assistance: Min A  Distance (ft): 30  Assistive Device: RW and Gait Belt  Surface: Level Surface       STEPS/STAIRS Daily Assessment    Steps Ambulated:    Level of Assistance:    Railing:  Assistive Device:        BALANCE Daily Assessment    Static Sitting:   Dynamic Sitting:   Static Standing:   Dynamic Standing:        WHEELCHAIR MOBILITY Daily Assessment    Able to Propel (ft):   Assistance:   Surface:   Wheelchair Set-up:        LOWER EXTREMITY EXERCISES Daily Assessment    SUPINE EXERCISES Sets Reps Comments   Ankle Pumps 2 10    Quad Sets 2 10    Glut Sets 2 10    Heel Slides 2 10    Hip Abduction 2 10    Short Arc Quad 2 10                 Pain level: Patient reported her pain as a 7 on pain scale. Pain Location:  left hip  Pain Interventions: pain meds given during the start of session. Vital Signs:  BP (!) 166/81   Pulse 79   Temp 98.2 °F (36.8 °C) (Oral)   Resp 18   Wt 145 lb 14.4 oz (66.2 kg)   SpO2 97%   BMI 24.28 kg/m²       Education:      Interdisciplinary Communication:     Pt. Left in wheelchair in gym for next OT session. Assessment: Patient seemed very motivated with session. Plan of Care: Continue with plan of care.     Rnee Nunes, PTA  2/13/2023

## 2023-02-13 NOTE — CARE COORDINATION
Patient needs are continue to be followed by  Dr. Madelyn Solis. Patient has no discharge date / plan at this time. CM will continue to follow / monitor for any needs, concerns or questions that may arise.

## 2023-02-13 NOTE — PROGRESS NOTES
INPATIENT REHAB CENTER PROGRESS NOTE    Mirtha Briones  Admit Date: 2/10/2023  Admit Diagnosis:   Hip fracture ([facility] [address] [S72.009A]  Closed left hip fracture, initial encounter ([facility] [address] [S72.002A]    Subjective     2/13/2023  seen today in her room. BP remains elevated but she has no related complaints. Participating well.      Objective:     Current Facility-Administered Medications   Medication Dose Route Frequency    acyclovir (ZOVIRAX) capsule 200 mg  200 mg Oral 5x Daily    sennosides-docusate sodium (SENOKOT-S) 8.6-50 MG tablet 2 tablet  2 tablet Oral Daily    insulin lispro (HUMALOG) injection vial 0-8 Units  0-8 Units SubCUTAneous BID WC    acetaminophen (TYLENOL) tablet 650 mg  650 mg Oral Q6H PRN    Or    acetaminophen (TYLENOL) suppository 650 mg  650 mg Rectal Q6H PRN    alcohol 62% (NOZIN) nasal  1 ampule  1 ampule Topical Q12H    amitriptyline (ELAVIL) tablet 10 mg  10 mg Oral Nightly    aspirin EC tablet 325 mg  325 mg Oral Daily    calcium carbonate (TUMS) chewable tablet 500 mg  1 tablet Oral Nightly    cetirizine (ZYRTEC) tablet 10 mg  10 mg Oral Daily PRN    furosemide (LASIX) tablet 40 mg  40 mg Oral Every Other Day    gabapentin (NEURONTIN) capsule 600 mg  600 mg Oral BID    glucagon (rDNA) injection 1 mg  1 mg SubCUTAneous PRN    glucose chewable tablet 16 g  4 tablet Oral PRN    hydrALAZINE (APRESOLINE) tablet 25 mg  25 mg Oral Q6H PRN    HYDROmorphone (DILAUDID) tablet 1 mg  1 mg Oral Q4H PRN    HYDROmorphone (DILAUDID) tablet 2 mg  2 mg Oral Q4H PRN    lisinopril (PRINIVIL;ZESTRIL) tablet 10 mg  10 mg Oral BID    melatonin tablet 3 mg  3 mg Oral Nightly    metoprolol succinate (TOPROL XL) extended release tablet 50 mg  50 mg Oral Nightly    nitrofurantoin (MACRODANTIN) capsule 50 mg  50 mg Oral Daily    polyethylene glycol (GLYCOLAX) packet 17 g  17 g Oral Daily PRN    potassium chloride (KLOR-CON M) extended release tablet 10 mEq  10 mEq Oral Daily with breakfast    Vitamin D (CHOLECALCIFEROL) tablet 2,000 Units  2,000 Units Oral Daily    guaiFENesin-dextromethorphan (ROBITUSSIN DM) 100-10 MG/5ML syrup 5 mL  5 mL Oral Q4H    trospium (SANCTURA) tablet 20 mg  20 mg Oral Nightly       Physical Exam:   Visit Vitals  BP (!) 166/81   Pulse 79   Temp 98.2 °F (36.8 °C) (Oral)   Resp 16   Wt 145 lb 14.4 oz (66.2 kg)   SpO2 97%   BMI 24.28 kg/m²         General: Alert, no acute distress, well developed, well nourished. Skin: left hip incisions not visualized this encounter  HEENT: Normocephalic, edentulous, oral mucosa is moist. Extraocular movements are intact. Neck: Supple, non-tender. Respiratory: respirations unlabored. Equal chest rise. Cardiovascular: Normal rate, no cyanosis. ABD: Soft, non-tender, not distended. Integumentary: Clean, no rash, no skin breakdown. Musculoskeletal: UE strength 4/5 and symmetric. R LE strength grossly intact. Left hip and LE exam limited by pain. Psychiatric: Appropriate mood & affect. Neuro: Alert, oriented x 4, speech and language intact. No focal deficits. Sensation grossly intact. Functional Assessment:   Patient demonstrated good participation in OT treatment to progress independence with self-care. Pt continues to benefit from skilled OT services to address remaining deficits and progress toward baseline level of independence and safety. Patient ended session seated in recliner with call bell and needs within reach and with legs elevated.          Labs/Studies:  Recent Results (from the past 72 hour(s))   POCT Glucose    Collection Time: 02/10/23 11:55 AM   Result Value Ref Range    POC Glucose 131 (H) 65 - 100 mg/dL    Performed by: Dutch    POCT Glucose    Collection Time: 02/10/23  5:20 PM   Result Value Ref Range    POC Glucose 100 65 - 100 mg/dL    Performed by: Kellie    POCT Glucose    Collection Time: 02/11/23  6:09 AM   Result Value Ref Range    POC Glucose 102 (H) 65 - 100 mg/dL    Performed by: Doyle Gutierrez POCT Glucose    Collection Time: 02/11/23 11:57 AM   Result Value Ref Range    POC Glucose 91 65 - 100 mg/dL    Performed by: Alix    POCT Glucose    Collection Time: 02/11/23  5:12 PM   Result Value Ref Range    POC Glucose 126 (H) 65 - 100 mg/dL    Performed by: Yaneth    POCT Glucose    Collection Time: 02/12/23  6:21 AM   Result Value Ref Range    POC Glucose 118 (H) 65 - 100 mg/dL    Performed by: Keyanna    POCT Glucose    Collection Time: 02/12/23  5:06 PM   Result Value Ref Range    POC Glucose 132 (H) 65 - 100 mg/dL    Performed by: Alix    POCT Glucose    Collection Time: 02/13/23  6:19 AM   Result Value Ref Range    POC Glucose 117 (H) 65 - 100 mg/dL    Performed by: Keyanna          Assessment and Plan      Daily physician / PA medical management:    # Closed left [facility] fracture, [facility] [S72.002A] - interdisciplinary approach to rehabilitation including PT, OT, nursing and physiatry and disease specific education. # Pain management - PRN meds APAP 650mg q6h (mild), dilaudid 1mg q4h (moderate), dilaudid 2mg q4h (severe); wean opioids as tolerated. # Hypertension - daily lisinopril 10mg and Toprol XL 50mg. # CHF, combined systolic / diastolic - continue furosemide 40mg QOD. # Anemia - acute on chronic; Hgb 11.2 at [facility] admission. # Osteoporosis - intolerance of oral bisphosphonate therapy; continue calcium, vitamin D. # Chemotherapy-induced neuropathy - continue home meds gabapentin 600mg BID and amitriptyline 10mg QHS. # Diabetes mellitus - HgbA1c 6.2% (6/2022). All [facility]CHS glucose checks <140, cover with Humalog SSI and decrease to BID checks. # Chronic kidney disease - stable; avoid nephrotoxic agents. # Electrolyte management - K+ supplement 10mEq daily since patient taking furosemide.      # DVT prophylaxis - ASA EC 325mg daily per Ortho     # Bladder program / urinary retention / neurogenic bladder - schedule voids q6-8h. Check post-void residual as needed; in-and-out catheter if post-void residual is more than 400ml. # Overactive bladder - continue trospium 20mg HS. Restart home med Myrbetriq at d/c. # UTI prophylaxis - continue nitrofurantoin 50mg daily. # Bowel program - at risk for constipation as a side effect of opioids, other medications, impaired mobility, etc. MiraLAX daily for regularity, Senokot-S for stool softener + laxative. PRN MOM, bisacodyl suppository or tablets for constipation. # Sjogren syndrome - following rheumatology outpatient. # Sleep disruption - continue melatonin 3mg nightly. Below are the active medical comorbidities / hospital conditions which will affect rehab course with plan for mitigation. Continue daily physician / PA medical management:  Principal Problem:    Closed left hip fracture, initial encounter [facility])  Active Problems:    Primary insomnia    Sjogren syndrome, unspecified ([facility] [address]    Type 2 diabetes mellitus with chronic kidney disease    Chronic gout of right hand    Chronic combined systolic and diastolic CHF (congestive heart failure) (HCC)    Dyslipidemia    Depression    Osteoarthritis    Chemotherapy-induced neuropathy (HCC)    Essential hypertension    Chronic anemia  Resolved Problems:    * No resolved hospital problems.  *          Signed By: Tee Sorto MD    February 13, 2023

## 2023-02-13 NOTE — CARE COORDINATION
Transition of care outreach postponed for 7 days due to patient's discharge to inpatient rehab facility.  Patient d/c to [facility] AT [facility] 2/10

## 2023-02-13 NOTE — PROGRESS NOTES
OT DAILY NOTE  Time In 1117      Time Out 1202        Subjective: \"I do everything for myself. \" Pt agreeable to treatment. Pain:  Pt endorsed L hip pain, denied need for pain medication. Pt educated on premedicating prior to next PT session . Interdisciplinary Communication: Collaborated with PTA-handoff communication. Precautions: Falls and WBAT LLE    BP (!) 166/81   Pulse 79   Temp 98.2 °F (36.8 °C) (Oral)   Resp 18   Wt 145 lb 14.4 oz (66.2 kg)   SpO2 97%   BMI 24.28 kg/m²       - Activity Tolerance - Strengthening   Pt completed 13.5 minutes on the ergometer, 6 forwards and 7.5 backwards, with mild to moderate resistance to increase UB strength and activity tolerance for integration into functional tasks. - Cognition - Coordination   Patient completed medication management simulation to sort \"pills\" (beads) from 6 medication bottles into pillboxes (AM and PM respectively) according to printed instructions on pill bottles. Patient completed 3/6 medications accurately, required min-mod cues to problem solve and identify errors. Pt reports she needs to be able to complete her own medication management at d/c, reports he  will not be able to assist her. Patient completed [facility] task to replace beads into medication bottles according to bead color upon completion of task. Education   Benefits of OT     Assessment: Patient demonstrated good participation in OT treatment, demonstrates some cognitive deficits impacting medication management. Discussed findings with SLP. Pt continues to benefit from skilled OT services to address remaining deficits and progress toward baseline level of independence and safety. Patient ended session seated in w/c with call bell and needs within reach. Plan: Continue OT POC.      Jong Cruz OT   2/13/2023

## 2023-02-13 NOTE — PROGRESS NOTES
OT DAILY NOTE    Time In 0709      Time Out 0841        Subjective: \"He can't pull on me. He needs knee replacements. \" Pt reports he spouse can take care of himself at home but can not physically assist her. Pt agreeable to treatment. Pain:  Pt endorses L hip pain, reports she took pain meds already this morning . Precautions: Falls and WBAT LLE    BP (!) 166/81   Pulse 79   Temp 98.2 °F (36.8 °C) (Oral)   Resp 16   Wt 145 lb 14.4 oz (66.2 kg)   SpO2 97%   BMI 24.28 kg/m²      MOBILITY:   Score Comments   Sit to Stand Supervision or Touching Assistance SBA-CGA, cues for hand placement    Transfer Assist Supervision or Touching Assistance CGA with RW   Ambulation Supervision or Touching Assistance CGA with RW to retrieve clothing from wardrobe and ambulate into/out of bathroom     ACTIVITIES OF DAILY LIVING:   Score Comments   Eating Setup or clean-up assistance S/U to manage juice packaging   Oral Hygiene Setup or clean-up assistance S/U seated at sink   Bathing Supervision or touching assistance SBA in stance, S seated on tub transfer bench. Cueing for S/U   Upper Body  Dressing Supervision or touching assistance S/U   Lower Body Dressing Supervision or touching assistance SBA in stance at RW   Donning/[place] Footwear Supervision or touching assistance S/U to don slip on shoes   Toilet Transfer Supervision or touching assistance CGA SPT with RW, cues for hand placement/safety   [facility] or touching assistance SBA in stance at RW/R grab bar   Education Benefits of OT, Functional Transfer Training, Rolling Walker Management, and Safety Awareness, the benefits of sitting up in recliner rather than returning to supine after session     Assessment: Patient demonstrated good participation in OT treatment to progress independence with self-care. Pt continues to benefit from skilled OT services to address remaining deficits and progress toward baseline level of independence and safety. Patient ended session seated in recliner with call bell and needs within reach and with legs elevated. Plan: Continue OT POC.      Jong Cruz, ORLIN   2/13/2023

## 2023-02-13 NOTE — PROGRESS NOTES
Physical Therapy  PHYSICAL THERAPY DAILY NOTE  Time In:  0685 PM  Time Out:  1436 PM  Total Treatment Time:  (P) 47 Minutes  Pt. Seen for: PM, Gait Training, Therapeutic Exercise, and Transfer Training     Subjective: \" I am hurting a little more but I am ready to work. \"         Objective:  Precautions: Falls, Poor Safety Awareness, and Impulsive     GROSS ASSESSMENT Daily Assessment           COGNITION Daily Assessment           BED/MAT MOBILITY Daily Assessment    Rolling Right:   Rolling Left:   Supine to Sit: Min A  Sit to Supine: Min A       TRANSFERS Daily Assessment    Sit to Stand: CGA  Stand to Sit: CGA  Transfer Type: Stand Pivot and with rolling walker  Transfer Assistance: CGA  Car Transfers: NT         GAIT Daily Assessment   Patient has step to gait . Amount of Assistance: Min A  Distance (ft): 70  Assistive Device: RW and Gait Belt  Surface: Level Surface       STEPS/STAIRS Daily Assessment    Steps Ambulated:    Level of Assistance:    Railing:  Assistive Device:        BALANCE Daily Assessment    Static Sitting:   Dynamic Sitting:   Static Standing:   Dynamic Standing:        WHEELCHAIR MOBILITY Daily Assessment    Able to Propel (ft):   Assistance:   Surface:   Wheelchair Set-up:        LOWER EXTREMITY EXERCISES Daily Assessment    SUPINE EXERCISES Sets Reps Comments   Ankle Pumps 1 15    Quad Sets 1 15    Glut Sets 1 15    Heel Slides 1 15    Hip Abduction 1 15    Short Arc Quad 1 15                 Pain level: pain reported as a 8 on pain scale  Pain Location:  hip  Pain Interventions:     Vital Signs:  BP (!) 159/71   Pulse 79   Temp 98.2 °F (36.8 °C) (Oral)   Resp 14   Wt 145 lb 14.4 oz (66.2 kg)   SpO2 97%   BMI 24.28 kg/m²       Education:      Interdisciplinary Communication:     Pt. Left in bed with call bell at reach and alarm on. Assessment: Patient making progress with all mobility. Plan of Care: Continue with plan of care.     Tamra Navarro, PTA  2/13/2023

## 2023-02-14 LAB
GLUCOSE BLD STRIP.AUTO-MCNC: 114 MG/DL (ref 65–100)
GLUCOSE BLD STRIP.AUTO-MCNC: 136 MG/DL (ref 65–100)
SERVICE CMNT-IMP: ABNORMAL
SERVICE CMNT-IMP: ABNORMAL

## 2023-02-14 PROCEDURE — 97110 THERAPEUTIC EXERCISES: CPT

## 2023-02-14 PROCEDURE — 97535 SELF CARE MNGMENT TRAINING: CPT

## 2023-02-14 PROCEDURE — 6370000000 HC RX 637 (ALT 250 FOR IP): Performed by: PHYSICIAN ASSISTANT

## 2023-02-14 PROCEDURE — 6370000000 HC RX 637 (ALT 250 FOR IP): Performed by: PHYSICAL MEDICINE & REHABILITATION

## 2023-02-14 PROCEDURE — 97116 GAIT TRAINING THERAPY: CPT

## 2023-02-14 PROCEDURE — 82962 GLUCOSE BLOOD TEST: CPT

## 2023-02-14 PROCEDURE — 97530 THERAPEUTIC ACTIVITIES: CPT

## 2023-02-14 PROCEDURE — 1180000000 HC REHAB R&B

## 2023-02-14 RX ADMIN — HYDROMORPHONE HYDROCHLORIDE 2 MG: 2 TABLET ORAL at 08:21

## 2023-02-14 RX ADMIN — ASPIRIN 325 MG: 325 TABLET, COATED ORAL at 08:07

## 2023-02-14 RX ADMIN — ACYCLOVIR 200 MG: 200 CAPSULE ORAL at 06:15

## 2023-02-14 RX ADMIN — CALCIUM CARBONATE (ANTACID) CHEW TAB 500 MG 500 MG: 500 CHEW TAB at 19:21

## 2023-02-14 RX ADMIN — LISINOPRIL 10 MG: 5 TABLET ORAL at 19:21

## 2023-02-14 RX ADMIN — Medication 3 MG: at 19:21

## 2023-02-14 RX ADMIN — TROSPIUM CHLORIDE 20 MG: 20 TABLET, FILM COATED ORAL at 19:21

## 2023-02-14 RX ADMIN — SENNOSIDES AND DOCUSATE SODIUM 2 TABLET: 50; 8.6 TABLET ORAL at 08:05

## 2023-02-14 RX ADMIN — ACYCLOVIR 200 MG: 200 CAPSULE ORAL at 12:05

## 2023-02-14 RX ADMIN — GABAPENTIN 600 MG: 300 CAPSULE ORAL at 08:05

## 2023-02-14 RX ADMIN — POTASSIUM CHLORIDE 10 MEQ: 20 TABLET, EXTENDED RELEASE ORAL at 08:06

## 2023-02-14 RX ADMIN — ACYCLOVIR 200 MG: 200 CAPSULE ORAL at 00:08

## 2023-02-14 RX ADMIN — NITROFURANTOIN MACROCRYSTALS 50 MG: 50 CAPSULE ORAL at 08:06

## 2023-02-14 RX ADMIN — METOPROLOL SUCCINATE 50 MG: 50 TABLET, EXTENDED RELEASE ORAL at 19:21

## 2023-02-14 RX ADMIN — LISINOPRIL 10 MG: 5 TABLET ORAL at 08:07

## 2023-02-14 RX ADMIN — GUAIFENESIN AND DEXTROMETHORPHAN 5 ML: 100; 10 SYRUP ORAL at 19:21

## 2023-02-14 RX ADMIN — GUAIFENESIN AND DEXTROMETHORPHAN 5 ML: 100; 10 SYRUP ORAL at 12:05

## 2023-02-14 RX ADMIN — CHOLECALCIFEROL TAB 25 MCG (1000 UNIT) 2000 UNITS: 25 TAB at 08:07

## 2023-02-14 RX ADMIN — HYDROMORPHONE HYDROCHLORIDE 2 MG: 2 TABLET ORAL at 19:27

## 2023-02-14 RX ADMIN — FUROSEMIDE 40 MG: 40 TABLET ORAL at 08:06

## 2023-02-14 RX ADMIN — Medication 1 AMPULE: at 19:34

## 2023-02-14 RX ADMIN — AMITRIPTYLINE HYDROCHLORIDE 10 MG: 10 TABLET, FILM COATED ORAL at 19:21

## 2023-02-14 RX ADMIN — GUAIFENESIN AND DEXTROMETHORPHAN 5 ML: 100; 10 SYRUP ORAL at 17:09

## 2023-02-14 RX ADMIN — GUAIFENESIN AND DEXTROMETHORPHAN 5 ML: 100; 10 SYRUP ORAL at 08:07

## 2023-02-14 RX ADMIN — Medication 1 AMPULE: at 08:16

## 2023-02-14 RX ADMIN — GABAPENTIN 600 MG: 300 CAPSULE ORAL at 19:21

## 2023-02-14 ASSESSMENT — PAIN SCALES - GENERAL
PAINLEVEL_OUTOF10: 0
PAINLEVEL_OUTOF10: 8
PAINLEVEL_OUTOF10: 5
PAINLEVEL_OUTOF10: 7

## 2023-02-14 ASSESSMENT — PAIN DESCRIPTION - LOCATION
LOCATION: HIP
LOCATION: HIP

## 2023-02-14 ASSESSMENT — PAIN SCALES - WONG BAKER: WONGBAKER_NUMERICALRESPONSE: 4

## 2023-02-14 ASSESSMENT — PAIN DESCRIPTION - DESCRIPTORS
DESCRIPTORS: ACHING
DESCRIPTORS: ACHING

## 2023-02-14 ASSESSMENT — PAIN - FUNCTIONAL ASSESSMENT
PAIN_FUNCTIONAL_ASSESSMENT: PREVENTS OR INTERFERES SOME ACTIVE ACTIVITIES AND ADLS
PAIN_FUNCTIONAL_ASSESSMENT: ACTIVITIES ARE NOT PREVENTED

## 2023-02-14 ASSESSMENT — PAIN DESCRIPTION - ORIENTATION
ORIENTATION: LEFT
ORIENTATION: LEFT

## 2023-02-14 NOTE — PROGRESS NOTES
OT DAILY NOTE  Time In 0915   Time Out 1002     Subjective: Pt agreeable to treatment. Pain:  Pt reports some intermittent pain in L hip . Precautions: Falls    BP (!) 181/91   Pulse 74   Temp 97.7 °F (36.5 °C) (Oral)   Resp 16   Wt 145 lb 14.4 oz (66.2 kg)   SpO2 96%   BMI 24.28 kg/m²      FUNCTIONAL MOBILITY:    Score Comments   Sit to Supine Supervision or touching assistance S   Sit to Stand Supervision or touching assistance SBA w/ RW   Transfer Assist Supervision or touching assistance SBA SPT w/ RW        - Strengthening   Pt completed the following exercises with 5 lb nancy to promote UB strength, activity tolerance, and shoulder stabilization for integration into functional tasks:  Exercise Reps Comments   Protraction/Retraction 10    Abduction/Adduction 10    Circles 20 1/2 CW, 1/2 CCW   Vs 10            - Cognition - Strengthening   Pt completed visual perceptual, reaching, strengthening, activity tolerance, and bilateral hand coordination task utilizing Rubber Band Board while following visual design. Pt with good coordination skills noted while placing rubber bands onto design at midline. Pt with x0 errors in design. Pt completed activity in seated position at table top. - Activity Tolerance - Balance   Pt engaged in activity while standing at table top to address standing tolerance, balance, and coordination. Pt paired containers to their lids and placed them into box on left side. Pt completed w/ SBA, demonstrating good balance and coordination throughout. Pt slightly limited by pain in L hip. Education   Energy Conservation, Pacing, Functional Transfer Training, and Safety Awareness     Assessment: Patient progressing w/ standing tolerance, balance, strength and functional t/fs. Continues to be slightly limited by pain and hip discomfort but is able to participate fully in activities. Pt demonstrated good participation in OT treatment.  Pt continues to benefit from skilled OT services to address remaining deficits and progress toward baseline level of independence and safety. Patient ended session supine in bed with call bell and needs within reach. Plan: Continue OT POC.      Maine Hargrove OT   2/14/2023

## 2023-02-14 NOTE — PROGRESS NOTES
INPATIENT REHAB CENTER PROGRESS NOTE    Mirtha Lozano  Admit Date: 2/10/2023  Admit Diagnosis:   Hip fracture ([facility] [address] [S72.009A]  Closed left hip fracture, initial encounter ([facility] [address] [S72.002A]    Subjective     2/14/2023  c/o mild hip pain controlled with current regimen. Had BM yesterday. No new complaints.    Objective:     Current Facility-Administered Medications   Medication Dose Route Frequency    acyclovir (ZOVIRAX) capsule 200 mg  200 mg Oral 5x Daily    sennosides-docusate sodium (SENOKOT-S) 8.6-50 MG tablet 2 tablet  2 tablet Oral Daily    insulin lispro (HUMALOG) injection vial 0-8 Units  0-8 Units SubCUTAneous BID WC    acetaminophen (TYLENOL) tablet 650 mg  650 mg Oral Q6H PRN    Or    acetaminophen (TYLENOL) suppository 650 mg  650 mg Rectal Q6H PRN    alcohol 62% (NOZIN) nasal  1 ampule  1 ampule Topical Q12H    amitriptyline (ELAVIL) tablet 10 mg  10 mg Oral Nightly    aspirin EC tablet 325 mg  325 mg Oral Daily    calcium carbonate (TUMS) chewable tablet 500 mg  1 tablet Oral Nightly    cetirizine (ZYRTEC) tablet 10 mg  10 mg Oral Daily PRN    furosemide (LASIX) tablet 40 mg  40 mg Oral Every Other Day    gabapentin (NEURONTIN) capsule 600 mg  600 mg Oral BID    glucagon (rDNA) injection 1 mg  1 mg SubCUTAneous PRN    glucose chewable tablet 16 g  4 tablet Oral PRN    hydrALAZINE (APRESOLINE) tablet 25 mg  25 mg Oral Q6H PRN    HYDROmorphone (DILAUDID) tablet 1 mg  1 mg Oral Q4H PRN    HYDROmorphone (DILAUDID) tablet 2 mg  2 mg Oral Q4H PRN    lisinopril (PRINIVIL;ZESTRIL) tablet 10 mg  10 mg Oral BID    melatonin tablet 3 mg  3 mg Oral Nightly    metoprolol succinate (TOPROL XL) extended release tablet 50 mg  50 mg Oral Nightly    nitrofurantoin (MACRODANTIN) capsule 50 mg  50 mg Oral Daily    polyethylene glycol (GLYCOLAX) packet 17 g  17 g Oral Daily PRN    Vitamin D (CHOLECALCIFEROL) tablet 2,000 Units  2,000 Units Oral Daily    guaiFENesin-dextromethorphan (ROBITUSSIN DM) 100-10 MG/5ML syrup 5 mL  5 mL Oral Q4H    trospium (SANCTURA) tablet 20 mg  20 mg Oral Nightly       Physical Exam:   Visit Vitals  BP (!) 181/91   Pulse 74   Temp 97.7 °F (36.5 °C) (Oral)   Resp 16   Wt 145 lb 14.4 oz (66.2 kg)   SpO2 96%   BMI 24.28 kg/m²         General: Alert, no acute distress, well developed, well nourished. Skin: left hip incisions not visualized this encounter  HEENT: Normocephalic, edentulous, oral mucosa is moist. Extraocular movements are intact. Neck: Supple, non-tender. Respiratory: respirations unlabored. Equal chest rise. Cardiovascular: Normal rate, no cyanosis. ABD: Soft, non-tender, not distended. Integumentary: Clean, no rash, no skin breakdown. Surgical incision L hip CDI with staples. Surrounding hematoma. No drainage. Musculoskeletal: UE strength 4/5 and symmetric. R LE strength grossly intact. Left hip and LE exam limited by pain. Psychiatric: Appropriate mood & affect. Neuro: Alert, oriented x 4, speech and language intact. No focal deficits. Sensation grossly intact. Functional Assessment:   Pt progressing w/ ADL requiring mainly S/U A throughout morning routine. Pt w/ complaints of pain, but is not limited by pain. Pt requires MIN VC's for safety awareness w/ RW management. Pt demonstrated good participation in OT treatment. Labs/Studies:  Recent Results (from the past 72 hour(s))   POCT Glucose    Collection Time: 02/11/23 11:57 AM   Result Value Ref Range    POC Glucose 91 65 - 100 mg/dL    Performed by: Alix    POCT Glucose    Collection Time: 02/11/23  5:12 PM   Result Value Ref Range    POC Glucose 126 (H) 65 - 100 mg/dL    Performed by: Yaneth    POCT Glucose    Collection Time: 02/12/23  6:21 AM   Result Value Ref Range    POC Glucose 118 (H) 65 - 100 mg/dL    Performed by:  Keyanna    POCT Glucose    Collection Time: 02/12/23  5:06 PM   Result Value Ref Range    POC Glucose 132 (H) 65 - 100 mg/dL    Performed by: Alix    POCT Glucose    Collection Time: 02/13/23  6:19 AM   Result Value Ref Range    POC Glucose 117 (H) 65 - 100 mg/dL    Performed by: Keyanna    POCT Glucose    Collection Time: 02/13/23  5:04 PM   Result Value Ref Range    POC Glucose 105 (H) 65 - 100 mg/dL    Performed by: Modi ([place])    POCT Glucose    Collection Time: 02/14/23  6:16 AM   Result Value Ref Range    POC Glucose 114 (H) 65 - 100 mg/dL    Performed by: Mihaela Tellez and Plan      Daily physician / PA medical management:    # Closed left hip fracture, initial encounter ([facility] [address] [S72.002A] - interdisciplinary approach to rehabilitation including PT, OT, nursing and physiatry and disease specific education. # Pain management - PRN meds APAP 650mg q6h (mild), dilaudid 1mg q4h (moderate), dilaudid 2mg q4h (severe); wean opioids as tolerated. # Hypertension - daily lisinopril 10mg and Toprol XL 50mg. # CHF, combined systolic / diastolic - continue furosemide 40mg QOD. # Anemia - acute on chronic; Hgb 11.2 at [facility] admission. # Osteoporosis - intolerance of oral bisphosphonate therapy; continue calcium, vitamin D. # Chemotherapy-induced neuropathy - continue home meds gabapentin 600mg BID and amitriptyline 10mg QHS. # Diabetes mellitus - HgbA1c 6.2% (6/2022). [facility], cover with Humalog SSI and decrease to BID checks. # Chronic kidney disease - stable; avoid nephrotoxic agents. # Electrolyte management - K+ supplement 10mEq daily since patient taking furosemide. # DVT prophylaxis - ASA EC 325mg daily per Ortho     # Bladder program / urinary retention / neurogenic bladder - schedule voids q6-8h. Check post-void residual as needed; in-and-out catheter if post-void residual is more than 400ml. # Overactive bladder - continue trospium 20mg HS. Restart home med Myrbetriq at d/c. # UTI prophylaxis - continue nitrofurantoin 50mg daily. # Bowel program - at risk for constipation as a side effect of opioids, other medications, impaired mobility, etc. MiraLAX daily for regularity, Senokot-S for stool softener + laxative. PRN MOM, bisacodyl suppository or tablets for constipation. # Sjogren syndrome - following rheumatology outpatient. # Sleep disruption - continue melatonin 3mg nightly. Below are the active medical comorbidities / hospital conditions which will affect rehab course with plan for mitigation. Continue daily physician / PA medical management:  Principal Problem:    Closed left hip fracture, initial encounter [facility])  Active Problems:    Primary insomnia    Sjogren syndrome, unspecified ([facility] [address]    Type 2 diabetes mellitus with chronic kidney disease    Chronic gout of right hand    Chronic combined systolic and diastolic CHF (congestive heart failure) (HCC)    Dyslipidemia    Depression    Osteoarthritis    Chemotherapy-induced neuropathy (HCC)    Essential hypertension    Chronic anemia  Resolved Problems:    * No resolved hospital problems.  *          Signed By: Alex Shelton MD    February 14, 2023

## 2023-02-14 NOTE — PROGRESS NOTES
OT DAILY NOTE    Time In 0703   Time Out 0741     Subjective: \"I think a shower will help me wake up. \" Pt agreeable to treatment. Pain: Patient reports 7/10 pain in L hip . Precautions: Poor Safety Awareness    BP (!) 181/91   Pulse 74   Temp 97.7 °F (36.5 °C)   Resp 17   Wt 145 lb 14.4 oz (66.2 kg)   SpO2 96%   BMI 24.28 kg/m²      MOBILITY:   Score Comments   Rolling Supervision or touching assistance S   Supine to Sit Independent I   Sit to Supine Supervision or touching assistance S   Sit to Stand Supervision or touching assistance SBA w/ RW   Transfer Assist Supervision or touching assistance SBA SPT w/ RW     ACTIVITIES OF DAILY LIVING:   Score Comments   Bathing Supervision or touching assistance SBA in standing   Upper Body  Dressing Setup or clean-up assistance S/U A seated EOB   Lower Body Dressing Supervision or touching assistance SBA in standing   Donning/[place] Footwear Setup or clean-up assistance S/U A   Education Rolling Walker Management and Safety Awareness     Assessment: Patient supine in bed upon arrival, easily woken up. Pt progressing w/ ADL requiring mainly S/U A throughout morning routine. Pt w/ complaints of pain, but is not limited by pain. Pt requires MIN VC's for safety awareness w/ RW management. Pt demonstrated good participation in OT treatment. Pt continues to benefit from skilled OT services to address remaining deficits and progress toward baseline level of independence and safety. Patient ended session seated in recliner with call bell and needs within reach. Plan: Continue OT POC.      Jacquie Borden OT   2/14/2023

## 2023-02-14 NOTE — PROGRESS NOTES
Physical Therapy  PHYSICAL THERAPY DAILY NOTE  Time In:  9957 PM  Time Out:  9255 PM  Total Treatment Time:  (P) 43 Minutes  Pt. Seen for: PM, Gait Training, Therapeutic Exercise, and Transfer Training     Subjective: \"I Have had to go to the bathroom all day because of that medicine. \"         Objective:  Precautions: Falls and Poor Safety Awareness    GROSS ASSESSMENT Daily Assessment           COGNITION Daily Assessment    intact       BED/MAT MOBILITY Daily Assessment    Rolling Right:   Rolling Left:   Supine to Sit: CGA  Sit to Supine: CGA       TRANSFERS Daily Assessment    Sit to Stand: Min A  Stand to Sit: Min A  Transfer Type: Stand Pivot and with rolling walker  Transfer Assistance: Min A  Car Transfers: NT         GAIT Daily Assessment   Step to gait pattern secondary to pain. Will start attempting step thru gait in AM. Amount of Assistance: CGA  Distance (ft): 80 x 3  Assistive Device: RW  Surface: Level Surface       STEPS/STAIRS Daily Assessment    Steps Ambulated:    Level of Assistance:    Railing:  Assistive Device:        BALANCE Daily Assessment    Static Sitting:   Dynamic Sitting:   Static Standing:   Dynamic Standing:        WHEELCHAIR MOBILITY Daily Assessment    Able to Propel (ft):   Assistance:   Surface:   Wheelchair Set-up:        LOWER EXTREMITY EXERCISES Daily Assessment   Worked on sit to stand . Pain level: pain reported as a 4 on pain scale  Pain Location:  hip  Pain Interventions:     Vital Signs:  BP (!) 181/91   Pulse 74   Temp 97.7 °F (36.5 °C) (Oral)   Resp 16   Wt 145 lb 14.4 oz (66.2 kg)   SpO2 96%   BMI 24.28 kg/m²       Education:      Interdisciplinary Communication:     Pt. Left in bed with call bell at reach. Assessment: Patient making progress with mobility and pain seems to be managed. Plan of Care: Continue with plan of care.     Rene Nunes, PTA  2/14/2023

## 2023-02-14 NOTE — PROGRESS NOTES
Physical Therapy  PHYSICAL THERAPY DAILY NOTE  Time In:  832 AM  Time Out:  916 AM  Total Treatment Time:  (P) 44 Minutes  Pt. Seen for: AM, Gait Training, Therapeutic Exercise, and Transfer Training     Subjective: \"My hip is hurting but I am ok. \"         Objective:  Precautions: Falls and Poor Safety Awareness    GROSS ASSESSMENT Daily Assessment           COGNITION Daily Assessment    intact       BED/MAT MOBILITY Daily Assessment    Rolling Right:   Rolling Left:   Supine to Sit: CGA  Sit to Supine: CGA       TRANSFERS Daily Assessment    Sit to Stand: CGA  Stand to Sit: CGA  Transfer Type: Stand Pivot and with rolling walker  Transfer Assistance: CGA  Car Transfers: NT         GAIT Daily Assessment    Amount of Assistance: Min A  Distance (ft): 70  Assistive Device: RW and Gait Belt  Surface: Level Surface       STEPS/STAIRS Daily Assessment    Steps Ambulated:    Level of Assistance:    Railing:  Assistive Device:        BALANCE Daily Assessment    Static Sitting:   Dynamic Sitting:   Static Standing:   Dynamic Standing:        WHEELCHAIR MOBILITY Daily Assessment    Able to Propel (ft):   Assistance:   Surface:   Wheelchair Set-up:        LOWER EXTREMITY EXERCISES Daily Assessment         Pain level: pain reported as a 7 on pain scale  Pain Location:  hip  Pain Interventions: pain meds given prior to therapy    Vital Signs:  BP (!) 181/91   Pulse 74   Temp 97.7 °F (36.5 °C) (Oral)   Resp 16   Wt 145 lb 14.4 oz (66.2 kg)   SpO2 96%   BMI 24.28 kg/m²       Education:      Interdisciplinary Communication:     Pt. Left in wheelchair for next session for OT. Assessment: Patient making good progress. Plan of Care: Continue with plan of care.     Mary Oliver, PTA  2/14/2023

## 2023-02-15 ENCOUNTER — APPOINTMENT (OUTPATIENT)
Dept: GENERAL RADIOLOGY | Age: 78
DRG: 560 | End: 2023-02-15
Attending: PHYSICAL MEDICINE & REHABILITATION
Payer: MEDICARE

## 2023-02-15 LAB
ANION GAP SERPL CALC-SCNC: 7 MMOL/L (ref 2–11)
APPEARANCE UR: CLEAR
BACTERIA URNS QL MICRO: NEGATIVE /HPF
BASOPHILS # BLD: 0 K/UL (ref 0–0.2)
BASOPHILS NFR BLD: 1 % (ref 0–2)
BILIRUB UR QL: NEGATIVE
BUN SERPL-MCNC: 18 MG/DL (ref 8–23)
CALCIUM SERPL-MCNC: 9.9 MG/DL (ref 8.3–10.4)
CASTS URNS QL MICRO: NORMAL /LPF
CHLORIDE SERPL-SCNC: 98 MMOL/L (ref 101–110)
CO2 SERPL-SCNC: 31 MMOL/L (ref 21–32)
COLOR UR: NORMAL
CREAT SERPL-MCNC: 1.2 MG/DL (ref 0.6–1)
DIFFERENTIAL METHOD BLD: ABNORMAL
EOSINOPHIL # BLD: 0.1 K/UL (ref 0–0.8)
EOSINOPHIL NFR BLD: 1 % (ref 0.5–7.8)
EPI CELLS #/AREA URNS HPF: NORMAL /HPF
ERYTHROCYTE [DISTWIDTH] IN BLOOD BY AUTOMATED COUNT: 14.5 % (ref 11.9–14.6)
FLUAV RNA SPEC QL NAA+PROBE: DETECTED
FLUBV RNA SPEC QL NAA+PROBE: NOT DETECTED
GLUCOSE BLD STRIP.AUTO-MCNC: 126 MG/DL (ref 65–100)
GLUCOSE BLD STRIP.AUTO-MCNC: 97 MG/DL (ref 65–100)
GLUCOSE SERPL-MCNC: 119 MG/DL (ref 65–100)
GLUCOSE UR STRIP.AUTO-MCNC: NEGATIVE MG/DL
HCT VFR BLD AUTO: 32.1 % (ref 35.8–46.3)
HGB BLD-MCNC: 10.6 G/DL (ref 11.7–15.4)
HGB UR QL STRIP: NEGATIVE
IMM GRANULOCYTES # BLD AUTO: 0.1 K/UL (ref 0–0.5)
IMM GRANULOCYTES NFR BLD AUTO: 1 % (ref 0–5)
KETONES UR QL STRIP.AUTO: NEGATIVE MG/DL
LACTATE SERPL-SCNC: 1.3 MMOL/L (ref 0.4–2)
LEUKOCYTE ESTERASE UR QL STRIP.AUTO: NEGATIVE
LYMPHOCYTES # BLD: 1 K/UL (ref 0.5–4.6)
LYMPHOCYTES NFR BLD: 20 % (ref 13–44)
MCH RBC QN AUTO: 30.4 PG (ref 26.1–32.9)
MCHC RBC AUTO-ENTMCNC: 33 G/DL (ref 31.4–35)
MCV RBC AUTO: 92 FL (ref 82–102)
MONOCYTES # BLD: 0.6 K/UL (ref 0.1–1.3)
MONOCYTES NFR BLD: 13 % (ref 4–12)
MUCOUS THREADS URNS QL MICRO: 0 /LPF
NEUTS SEG # BLD: 3.1 K/UL (ref 1.7–8.2)
NEUTS SEG NFR BLD: 64 % (ref 43–78)
NITRITE UR QL STRIP.AUTO: NEGATIVE
NRBC # BLD: 0 K/UL (ref 0–0.2)
PH UR STRIP: 6.5 (ref 5–9)
PLATELET # BLD AUTO: 309 K/UL (ref 150–450)
PMV BLD AUTO: 10.5 FL (ref 9.4–12.3)
POTASSIUM SERPL-SCNC: 3.5 MMOL/L (ref 3.5–5.1)
PROCALCITONIN SERPL-MCNC: 0.06 NG/ML (ref 0–0.49)
PROT UR STRIP-MCNC: NEGATIVE MG/DL
RBC # BLD AUTO: 3.49 M/UL (ref 4.05–5.2)
RBC #/AREA URNS HPF: NORMAL /HPF
SARS-COV-2 RDRP RESP QL NAA+PROBE: NOT DETECTED
SERVICE CMNT-IMP: ABNORMAL
SERVICE CMNT-IMP: NORMAL
SODIUM SERPL-SCNC: 136 MMOL/L (ref 133–143)
SOURCE: NORMAL
SP GR UR REFRACTOMETRY: 1.01 (ref 1–1.02)
URINE CULTURE IF INDICATED: NORMAL
UROBILINOGEN UR QL STRIP.AUTO: 0.2 EU/DL (ref 0.2–1)
WBC # BLD AUTO: 4.9 K/UL (ref 4.3–11.1)
WBC URNS QL MICRO: NORMAL /HPF

## 2023-02-15 PROCEDURE — 87502 INFLUENZA DNA AMP PROBE: CPT

## 2023-02-15 PROCEDURE — 99232 SBSQ HOSP IP/OBS MODERATE 35: CPT | Performed by: PHYSICIAN ASSISTANT

## 2023-02-15 PROCEDURE — 36415 COLL VENOUS BLD VENIPUNCTURE: CPT

## 2023-02-15 PROCEDURE — 6370000000 HC RX 637 (ALT 250 FOR IP): Performed by: PHYSICIAN ASSISTANT

## 2023-02-15 PROCEDURE — 71045 X-RAY EXAM CHEST 1 VIEW: CPT

## 2023-02-15 PROCEDURE — 97530 THERAPEUTIC ACTIVITIES: CPT

## 2023-02-15 PROCEDURE — 97110 THERAPEUTIC EXERCISES: CPT

## 2023-02-15 PROCEDURE — 6370000000 HC RX 637 (ALT 250 FOR IP): Performed by: HOSPITALIST

## 2023-02-15 PROCEDURE — 80048 BASIC METABOLIC PNL TOTAL CA: CPT

## 2023-02-15 PROCEDURE — 82962 GLUCOSE BLOOD TEST: CPT

## 2023-02-15 PROCEDURE — 1180000000 HC REHAB R&B

## 2023-02-15 PROCEDURE — 84145 PROCALCITONIN (PCT): CPT

## 2023-02-15 PROCEDURE — 81001 URINALYSIS AUTO W/SCOPE: CPT

## 2023-02-15 PROCEDURE — 6370000000 HC RX 637 (ALT 250 FOR IP): Performed by: PHYSICAL MEDICINE & REHABILITATION

## 2023-02-15 PROCEDURE — 85025 COMPLETE CBC W/AUTO DIFF WBC: CPT

## 2023-02-15 PROCEDURE — 97116 GAIT TRAINING THERAPY: CPT

## 2023-02-15 PROCEDURE — 87635 SARS-COV-2 COVID-19 AMP PRB: CPT

## 2023-02-15 PROCEDURE — 83605 ASSAY OF LACTIC ACID: CPT

## 2023-02-15 PROCEDURE — 87040 BLOOD CULTURE FOR BACTERIA: CPT

## 2023-02-15 RX ORDER — NAPROXEN 250 MG/1
500 TABLET ORAL ONCE
Status: COMPLETED | OUTPATIENT
Start: 2023-02-15 | End: 2023-02-15

## 2023-02-15 RX ADMIN — Medication 1 AMPULE: at 11:53

## 2023-02-15 RX ADMIN — HYDRALAZINE HYDROCHLORIDE 25 MG: 50 TABLET, FILM COATED ORAL at 06:08

## 2023-02-15 RX ADMIN — CALCIUM CARBONATE (ANTACID) CHEW TAB 500 MG 500 MG: 500 CHEW TAB at 19:16

## 2023-02-15 RX ADMIN — HYDROMORPHONE HYDROCHLORIDE 2 MG: 2 TABLET ORAL at 23:11

## 2023-02-15 RX ADMIN — ASPIRIN 325 MG: 325 TABLET, COATED ORAL at 08:29

## 2023-02-15 RX ADMIN — AMITRIPTYLINE HYDROCHLORIDE 10 MG: 10 TABLET, FILM COATED ORAL at 19:16

## 2023-02-15 RX ADMIN — SENNOSIDES AND DOCUSATE SODIUM 2 TABLET: 50; 8.6 TABLET ORAL at 08:26

## 2023-02-15 RX ADMIN — CHOLECALCIFEROL TAB 25 MCG (1000 UNIT) 2000 UNITS: 25 TAB at 08:26

## 2023-02-15 RX ADMIN — GUAIFENESIN AND DEXTROMETHORPHAN 5 ML: 100; 10 SYRUP ORAL at 23:11

## 2023-02-15 RX ADMIN — HYDRALAZINE HYDROCHLORIDE 25 MG: 50 TABLET, FILM COATED ORAL at 21:36

## 2023-02-15 RX ADMIN — GUAIFENESIN AND DEXTROMETHORPHAN 5 ML: 100; 10 SYRUP ORAL at 05:16

## 2023-02-15 RX ADMIN — GUAIFENESIN AND DEXTROMETHORPHAN 5 ML: 100; 10 SYRUP ORAL at 11:50

## 2023-02-15 RX ADMIN — GABAPENTIN 600 MG: 300 CAPSULE ORAL at 21:36

## 2023-02-15 RX ADMIN — ACETAMINOPHEN 650 MG: 325 TABLET ORAL at 20:36

## 2023-02-15 RX ADMIN — METOPROLOL SUCCINATE 50 MG: 50 TABLET, EXTENDED RELEASE ORAL at 19:16

## 2023-02-15 RX ADMIN — Medication 3 MG: at 19:19

## 2023-02-15 RX ADMIN — NITROFURANTOIN MACROCRYSTALS 50 MG: 50 CAPSULE ORAL at 08:26

## 2023-02-15 RX ADMIN — GUAIFENESIN AND DEXTROMETHORPHAN 5 ML: 100; 10 SYRUP ORAL at 16:03

## 2023-02-15 RX ADMIN — TROSPIUM CHLORIDE 20 MG: 20 TABLET, FILM COATED ORAL at 19:15

## 2023-02-15 RX ADMIN — ACETAMINOPHEN 650 MG: 325 TABLET ORAL at 08:27

## 2023-02-15 RX ADMIN — NAPROXEN 500 MG: 250 TABLET ORAL at 21:50

## 2023-02-15 RX ADMIN — GUAIFENESIN AND DEXTROMETHORPHAN 5 ML: 100; 10 SYRUP ORAL at 21:36

## 2023-02-15 RX ADMIN — LISINOPRIL 10 MG: 5 TABLET ORAL at 19:16

## 2023-02-15 RX ADMIN — LISINOPRIL 10 MG: 5 TABLET ORAL at 08:29

## 2023-02-15 RX ADMIN — Medication 1 AMPULE: at 22:23

## 2023-02-15 RX ADMIN — GUAIFENESIN AND DEXTROMETHORPHAN 5 ML: 100; 10 SYRUP ORAL at 08:29

## 2023-02-15 RX ADMIN — GABAPENTIN 600 MG: 300 CAPSULE ORAL at 08:27

## 2023-02-15 ASSESSMENT — PAIN SCALES - GENERAL
PAINLEVEL_OUTOF10: 7
PAINLEVEL_OUTOF10: 0

## 2023-02-15 ASSESSMENT — PAIN DESCRIPTION - LOCATION: LOCATION: HIP

## 2023-02-15 ASSESSMENT — PAIN DESCRIPTION - DESCRIPTORS: DESCRIPTORS: ACHING

## 2023-02-15 ASSESSMENT — PAIN - FUNCTIONAL ASSESSMENT: PAIN_FUNCTIONAL_ASSESSMENT: ACTIVITIES ARE NOT PREVENTED

## 2023-02-15 ASSESSMENT — PAIN DESCRIPTION - ORIENTATION: ORIENTATION: LEFT

## 2023-02-15 NOTE — PROGRESS NOTES
Physical Therapy  Patient declined therapy this morning from not feeling well. She did seem congested. Notified nurse Eduardo Manriquez and TERESA Au. Patient did agree to attempt therapy this afternoon.  Fara Keller, PTA

## 2023-02-15 NOTE — PROGRESS NOTES
OT DAILY NOTE  Time In 1318   Time Out 1345     Subjective: \"I forgot to check w/ my  about my cat. \" Pt agreeable to treatment. Pain: No pain expressed. Interdisciplinary Communication: Collaborated with PA and RN regarding medical status and UA results. Precautions: Falls    BP (!) 171/98   Pulse 97   Temp 99.5 °F (37.5 °C)   Resp 18   Wt 144 lb 11.2 oz (65.6 kg)   SpO2 (!) 89%   BMI 24.08 kg/m²      FUNCTIONAL MOBILITY:    Score Comments   Rolling Supervision or touching assistance S   Supine to Sit Independent I difficulty maintaining sitting balance EOB noted. Sitting balance improved once in the gym EOM    Sit to Supine Supervision or touching assistance S   Sit to Stand Supervision or touching assistance SBA w/ RW   Transfer Assist Supervision or touching assistance SBA SPT w/ RW   Ambulation Supervision or Touching Assistance SBA w/ RW          - Activity Tolerance - Strengthening   Pt engaged in BUE exercises to target strength for integration into ADLs and functional mobility. Pt completed 2x10 bicep, shoulder flexion, and overhead shoulder flexion exercises w/ 3lb isrrael, taking rest breaks after each exercise. Pt then completed balloon taps while sitting unsupported on EOM demonstrating good sitting balance and righting reactions when moving outside base of support. Vitals checked during session and were WNL. - Activity Tolerance - Coordination   Pt ambulated to the gym w/ RW, and SBA-CGA requiring one standing rest break to rest L hip. Education   Functional Transfer Training and Safety Awareness     Assessment: Patient subdued this session, but reports feeling better than in AM. Pt progressing w/ activity tolerance, functional mobility, and overall strength. Pt w/ some difficulty w/ supine to sit t/f and maintaining a safe seated position. Pt demonstrated good participation in OT treatment.  Pt continues to benefit from skilled OT services to address remaining deficits and progress toward baseline level of independence and safety. Patient ended session seated in gym with PT. Plan: Continue OT POC.      Mati Thomas OT   2/15/2023

## 2023-02-15 NOTE — PROGRESS NOTES
Does not feel well this morning  temp 101.7  says she feels like her sinus are acting up  diminished breath sounds with expiratory wheeze   incentive spirometer in use   Pitt PA notified about temp

## 2023-02-15 NOTE — PROGRESS NOTES
INPATIENT REHAB CENTER PROGRESS NOTE    Mirtha Messina  Admit Date: 2/10/2023  Admit Diagnosis:   Hip fracture ([facility] [address] [S72.009A]  Closed left hip fracture, initial encounter ([facility] [address] [S72.002A]    Subjective     2/15/2023  evaluated patient after she declined ADLs stating she didn't feel good; T 101.7F. Patient states mild malaise started yesterday afternoon with nasal congestion, post-nasal drainage and nausea. Denies F/C, HA, cough, rhinorrhea, vomiting, abdominal cramping, dysuria, hematuria. Does not appear acutely ill, has appetite. Says she wants a shower this AM \"but not so early. \" APAP given. 2/14/2023  c/o mild hip pain controlled with current regimen. Had BM yesterday. No new complaints. 2/13/2023  seen today in her room. BP remains elevated but she has no related complaints. Participating well.     2/11/2023  observed ambulating in hallway with PT and RW, examined later in room. Reports pain managed with PRN meds, does not want APAP scheduled as \"it wires me up. \" BM x 1 since 2/6, requests stool softener but no MiraLAX. Self-manages voiding q2h due to OAB; continent.     Objective:     Current Facility-Administered Medications   Medication Dose Route Frequency    sennosides-docusate sodium (SENOKOT-S) 8.6-50 MG tablet 2 tablet  2 tablet Oral Daily    insulin lispro (HUMALOG) injection vial 0-8 Units  0-8 Units SubCUTAneous BID WC    acetaminophen (TYLENOL) tablet 650 mg  650 mg Oral Q6H PRN    Or    acetaminophen (TYLENOL) suppository 650 mg  650 mg Rectal Q6H PRN    alcohol 62% (NOZIN) nasal  1 ampule  1 ampule Topical Q12H    amitriptyline (ELAVIL) tablet 10 mg  10 mg Oral Nightly    aspirin EC tablet 325 mg  325 mg Oral Daily    calcium carbonate (TUMS) chewable tablet 500 mg  1 tablet Oral Nightly    cetirizine (ZYRTEC) tablet 10 mg  10 mg Oral Daily PRN    furosemide (LASIX) tablet 40 mg  40 mg Oral Every Other Day    gabapentin (NEURONTIN) capsule 600 mg  600 mg Oral BID glucagon (rDNA) injection 1 mg  1 mg SubCUTAneous PRN    glucose chewable tablet 16 g  4 tablet Oral PRN    hydrALAZINE (APRESOLINE) tablet 25 mg  25 mg Oral Q6H PRN    HYDROmorphone (DILAUDID) tablet 1 mg  1 mg Oral Q4H PRN    HYDROmorphone (DILAUDID) tablet 2 mg  2 mg Oral Q4H PRN    lisinopril (PRINIVIL;ZESTRIL) tablet 10 mg  10 mg Oral BID    melatonin tablet 3 mg  3 mg Oral Nightly    metoprolol succinate (TOPROL XL) extended release tablet 50 mg  50 mg Oral Nightly    nitrofurantoin (MACRODANTIN) capsule 50 mg  50 mg Oral Daily    polyethylene glycol (GLYCOLAX) packet 17 g  17 g Oral Daily PRN    Vitamin D (CHOLECALCIFEROL) tablet 2,000 Units  2,000 Units Oral Daily    guaiFENesin-dextromethorphan (ROBITUSSIN DM) 100-10 MG/5ML syrup 5 mL  5 mL Oral Q4H    trospium (SANCTURA) tablet 20 mg  20 mg Oral Nightly          Physical Exam:   Visit Vitals  BP (!) 171/98   Pulse 97   Temp (!) 101.7 °F (38.7 °C) (Oral)   Resp 18   Wt 144 lb 11.2 oz (65.6 kg)   SpO2 (!) 89%   BMI 24.08 kg/m²         General: No acute distress, well developed, well nourished. HEENT: Normocephalic, oral mucosa is moist. Extraocular movements are intact. Neck: Supple, non-tender. Respiratory: Clear breath sounds bilaterally, good air movement even at bases when prompted. No wheeze / crackles / rhonchi. Respirations are non-labored. Equal chest rise. Cardiovascular: Normal rate, regular underlying rhythm, harsh systolic murmur. Palpable pedal pulses, no cyanosis. ABD: Soft, non-tender, not distended, normoactive bowel sounds. Integumentary: Clean, no rash, no skin breakdown. Left hip incision intact with staples, surrounding mild hematoma. Musculoskeletal: UE strength 4 to 4+/5 and mostly symmetric. Right index finger tenderness and chronic-appearing swelling. R LE strength grossly intact. Left LE movement greatly improved. Psychiatric: Appropriate mood & affect. Neuro: Alert, oriented; speech and language intact. No focal deficits. Sensation grossly intact. Functional Assessment:    Per PT: min A sit<>stand, stand-pivot transfer with RW  GAIT Daily Assessment   Step to gait pattern secondary to pain. Will start attempting step thru gait in AM. Amount of Assistance: CGA  Distance (ft): 80 x 3  Assistive Device: RW  Surface: Level Surface     Per OT:    - Activity Tolerance - Balance   Pt engaged in activity while standing at table top to address standing tolerance, balance, and coordination. Pt paired containers to their lids and placed them into box on left side. Pt completed w/ SBA, demonstrating good balance and coordination throughout. Pt slightly limited by pain in L hip.       - Strengthening   Pt completed the following exercises with 5 lb nancy to promote UB strength, activity tolerance, and shoulder stabilization for integration into functional tasks:  Exercise Reps Comments   Protraction/Retraction 10     Abduction/Adduction 10     Circles 20 1/2 CW, 1/2 CCW   Vs 10               Labs/Studies:  Recent Results (from the past 72 hour(s))   POCT Glucose    Collection Time: 02/12/23  5:06 PM   Result Value Ref Range    POC Glucose 132 (H) 65 - 100 mg/dL    Performed by: Alix    POCT Glucose    Collection Time: 02/13/23  6:19 AM   Result Value Ref Range    POC Glucose 117 (H) 65 - 100 mg/dL    Performed by: Keyanna    POCT Glucose    Collection Time: 02/13/23  5:04 PM   Result Value Ref Range    POC Glucose 105 (H) 65 - 100 mg/dL    Performed by: Modi ([place])    POCT Glucose    Collection Time: 02/14/23  6:16 AM   Result Value Ref Range    POC Glucose 114 (H) 65 - 100 mg/dL    Performed by: Juan Manuel    POCT Glucose    Collection Time: 02/14/23  4:57 PM   Result Value Ref Range    POC Glucose 136 (H) 65 - 100 mg/dL    Performed by: Bladimir    POCT Glucose    Collection Time: 02/15/23  6:13 AM   Result Value Ref Range    POC Glucose 126 (H) 65 - 100 mg/dL    Performed by:  Nathaly Hendricks Assessment and Plan      Daily physician / PA medical management:     # Closed left hip fracture, initial encounter ([facility] [address] [S72.002A] - interdisciplinary approach to rehabilitation including PT, OT, nursing and physiatry and disease specific education. # Pain management - PRN meds APAP 650mg q6h (mild), dilaudid 1mg q4h (moderate), dilaudid 2mg q4h (severe); wean opioids as tolerated. # Hypertension - daily lisinopril 10mg and Toprol XL 50mg. # CHF, combined systolic / diastolic - continue furosemide 40mg QOD. # Anemia - acute on chronic; Hgb 11.2 at [facility] admission. # Osteoporosis - intolerance of oral bisphosphonate therapy; continue calcium, vitamin D. # Chemotherapy-induced neuropathy - continue home meds gabapentin 600mg BID and amitriptyline 10mg QHS. # Diabetes mellitus - HgbA1c 6.2% (6/2022). [facility]140, cover with Humalog SSI and decrease to BID checks. # Chronic kidney disease - stable; avoid nephrotoxic agents. # Electrolyte management - K+ supplement 10mEq daily since patient taking furosemide. # DVT prophylaxis - ASA EC 325mg daily per Ortho     # Bladder program / urinary retention / neurogenic bladder - schedule voids q6-8h. Check post-void residual as needed; in-and-out catheter if post-void residual is more than 400ml. # Overactive bladder - continue trospium 20mg HS. Restart home med Myrbetriq at d/c. # UTI prophylaxis - continue nitrofurantoin 50mg daily. # Bowel program - at risk for constipation as a side effect of opioids, other medications, impaired mobility, etc. MiraLAX daily for regularity, Senokot-S for stool softener + laxative. PRN MOM, bisacodyl suppository or tablets for constipation. # Sjogren syndrome - following rheumatology outpatient. # Sleep disruption - continue melatonin 3mg nightly.         Below are the active medical comorbidities / hospital conditions which will affect rehab course with plan for mitigation. Continue daily physician / PA medical management:  Principal Problem:    Closed left hip fracture, initial encounter [facility])  Active Problems:    Primary insomnia    Sjogren syndrome, unspecified ([address]    Type 2 diabetes mellitus with chronic kidney disease    Chronic gout of right hand    Chronic combined systolic and diastolic CHF (congestive heart failure) (HCC)    Dyslipidemia    Depression    Osteoarthritis    Chemotherapy-induced neuropathy (HCC)    Essential hypertension    Chronic anemia  Resolved Problems:    * No resolved hospital problems.  *          Signed By: TERESA Aguilar     February 15, 2023

## 2023-02-15 NOTE — CARE COORDINATION
Interdisciplinary Wednesday, Team Conference Meeting Notes    Interdisciplinary team conference meeting completed to discuss plan of care. Estimated D/C Date: 02/17/23    Recommended Follow-Up Therapy: Staff has recommended HH. Patient needs no DME's, ?family training. Communication with family/caregivers: CM met with patient and discussed recommendations. Patient states she needs to think about [facility] and has no one she wanted CM to make contact with to discuss Team Conference. Staff ? Family training at this time. CM will continue to follow.

## 2023-02-15 NOTE — PROGRESS NOTES
Physical Therapy  PHYSICAL THERAPY DAILY NOTE  Time In:  8734 PM  Time Out:  1434 PM  Total Treatment Time:  (P) 40 Minutes  Pt. Seen for: PM, Gait Training, Therapeutic Exercise, and Transfer Training     Subjective: \" I still dont feel good but a little better than this morning. \"         Objective:  Precautions: Falls and Poor Safety Awareness    GROSS ASSESSMENT Daily Assessment           COGNITION Daily Assessment           BED/MAT MOBILITY Daily Assessment    Rolling Right:   Rolling Left:   Supine to Sit: Supervision/Standby Assist  Sit to Supine: Supervision/Standby Assist       TRANSFERS Daily Assessment    Sit to Stand: Supervision/Standby Assist  Stand to Sit: Supervision/Standby Assist  Transfer Type: Stand Pivot and with rolling walker  Transfer Assistance: Supervision/Standby Assist  Car Transfers: NT         GAIT Daily Assessment   Patient had more of a step thru gait pattern today. Amount of Assistance: Supervision/Standby Assist  Distance (ft): 125  Assistive Device: RW  Surface: Level Surface       STEPS/STAIRS Daily Assessment    Steps Ambulated:    Level of Assistance:    Railing:  Assistive Device:        BALANCE Daily Assessment    Static Sitting:   Dynamic Sitting:   Static Standing:   Dynamic Standing:        WHEELCHAIR MOBILITY Daily Assessment    Able to Propel (ft):   Assistance:   Surface:   Wheelchair Set-up:        LOWER EXTREMITY EXERCISES Daily Assessment    SUPINE EXERCISES Sets Reps Comments   Ankle Pumps 2 10    Quad Sets 2 10    Glut Sets 2 10    Heel Slides 2 10    Hip Abduction 2 10    Short Arc Quad 2 10                 Pain level: no pain noted. Pain Location:    Pain Interventions:     Vital Signs:  BP (!) 158/93   Pulse 89   Temp 98.4 °F (36.9 °C)   Resp 14   Wt 144 lb 11.2 oz (65.6 kg)   SpO2 95%   BMI 24.08 kg/m²       Education:      Interdisciplinary Communication:     Pt.  Left in bed with call bell at reach         Assessment: Patient making good progress with mobility. No complaints of pain today during session. Plan of Care: Continue with plan of care.     Osiris Ko, PTA  2/15/2023

## 2023-02-15 NOTE — PROGRESS NOTES
Pt was supine in bed and asleep, when woken up reported not feeling well and refused occupational therapy services stating she needed to keep sleeping. Discussed w/ RN who reported pt had complaints of not feeling well last night. Will attempt to see pt again at a later time.

## 2023-02-15 NOTE — PROGRESS NOTES
Physician Progress Note      PATIENT:               Shane Hernandes  CSN #:                  [id number]  :                       1945  ADMIT DATE:       2023 11:38 AM  DISCH DATE:        2/10/2023 2:55 PM  RESPONDING  PROVIDER #:        Braxton Holguin NP          QUERY TEXT:    Patient admitted with hip fracture sp surgical repair this admission . Noted   documentation of toxic metabolic encephalopathy. . In order to support the   diagnosis of toxic metabolic encephalopathy, please include additional   clinical indicators in your documentation. Or please document if the   diagnosis of toxic metabolic encephalopathy  has been ruled out after further   study. The medical record reflects the following:  Risk Factors: hip fracture sp surgical repair, CKD 3, DM, HTN  Clinical Indicators: Per notes--Fever of 102.9 with associated AMS 2/7, noted   rigors. Code S called due to word salad. Neurology evaluation, secondary to   acute toxic metabolic event. CT head obtained and negative for acute   pathology. All work up negative. Pt's mentation returned to normal.  Treatment: CTA, neuro consult, fluids, to rehab, essential home meds. Options provided:  -- Toxic metabolic encephalopathy  present as evidenced by, Please document   evidence. -- Toxic metabolic encephalopathy  was ruled out  -- Other - I will add my own diagnosis  -- Disagree - Not applicable / Not valid  -- Disagree - Clinically unable to determine / Unknown  -- Refer to Clinical Documentation Reviewer    PROVIDER RESPONSE TEXT:    Toxic metabolic encephalopathy is present as evidenced by Fever of 102.9 with   associated AMS 2/7, noted rigors. Neurology evaluation, secondary to acute   toxic metabolic event. Infectious work-up negative.     Query created by: Kellee Mendoza on 2/15/2023 10:30 AM      Electronically signed by:  Braxton Holguin NP 2/15/2023 5:39 PM

## 2023-02-16 LAB
ALBUMIN SERPL-MCNC: 3 G/DL (ref 3.2–4.6)
ALBUMIN/GLOB SERPL: 0.7 (ref 0.4–1.6)
ALP SERPL-CCNC: 83 U/L (ref 50–136)
ALT SERPL-CCNC: 19 U/L (ref 12–65)
ANION GAP SERPL CALC-SCNC: 3 MMOL/L (ref 2–11)
APPEARANCE UR: CLEAR
AST SERPL-CCNC: 42 U/L (ref 15–37)
BACTERIA URNS QL MICRO: 0 /HPF
BASOPHILS # BLD: 0 K/UL (ref 0–0.2)
BASOPHILS NFR BLD: 1 % (ref 0–2)
BILIRUB SERPL-MCNC: 0.5 MG/DL (ref 0.2–1.1)
BILIRUB UR QL: NEGATIVE
BUN SERPL-MCNC: 25 MG/DL (ref 8–23)
CALCIUM SERPL-MCNC: 9.5 MG/DL (ref 8.3–10.4)
CASTS URNS QL MICRO: ABNORMAL /LPF
CHLORIDE SERPL-SCNC: 99 MMOL/L (ref 101–110)
CO2 SERPL-SCNC: 32 MMOL/L (ref 21–32)
COLOR UR: ABNORMAL
CREAT SERPL-MCNC: 1.5 MG/DL (ref 0.6–1)
DIFFERENTIAL METHOD BLD: ABNORMAL
EOSINOPHIL # BLD: 0 K/UL (ref 0–0.8)
EOSINOPHIL NFR BLD: 1 % (ref 0.5–7.8)
EPI CELLS #/AREA URNS HPF: ABNORMAL /HPF
ERYTHROCYTE [DISTWIDTH] IN BLOOD BY AUTOMATED COUNT: 14.5 % (ref 11.9–14.6)
GLOBULIN SER CALC-MCNC: 4.2 G/DL (ref 2.8–4.5)
GLUCOSE BLD STRIP.AUTO-MCNC: 106 MG/DL (ref 65–100)
GLUCOSE BLD STRIP.AUTO-MCNC: 112 MG/DL (ref 65–100)
GLUCOSE SERPL-MCNC: 111 MG/DL (ref 65–100)
GLUCOSE UR STRIP.AUTO-MCNC: NEGATIVE MG/DL
HCT VFR BLD AUTO: 31.9 % (ref 35.8–46.3)
HGB BLD-MCNC: 10.3 G/DL (ref 11.7–15.4)
HGB UR QL STRIP: NEGATIVE
IMM GRANULOCYTES # BLD AUTO: 0.1 K/UL (ref 0–0.5)
IMM GRANULOCYTES NFR BLD AUTO: 2 % (ref 0–5)
KETONES UR QL STRIP.AUTO: NEGATIVE MG/DL
LEUKOCYTE ESTERASE UR QL STRIP.AUTO: NEGATIVE
LYMPHOCYTES # BLD: 1 K/UL (ref 0.5–4.6)
LYMPHOCYTES NFR BLD: 25 % (ref 13–44)
MAGNESIUM SERPL-MCNC: 2 MG/DL (ref 1.8–2.4)
MCH RBC QN AUTO: 30.7 PG (ref 26.1–32.9)
MCHC RBC AUTO-ENTMCNC: 32.3 G/DL (ref 31.4–35)
MCV RBC AUTO: 95.2 FL (ref 82–102)
MONOCYTES # BLD: 0.5 K/UL (ref 0.1–1.3)
MONOCYTES NFR BLD: 12 % (ref 4–12)
NEUTS SEG # BLD: 2.3 K/UL (ref 1.7–8.2)
NEUTS SEG NFR BLD: 59 % (ref 43–78)
NITRITE UR QL STRIP.AUTO: NEGATIVE
NRBC # BLD: 0 K/UL (ref 0–0.2)
OTHER OBSERVATIONS: ABNORMAL
PH UR STRIP: 5.5 (ref 5–9)
PHOSPHATE SERPL-MCNC: 6 MG/DL (ref 2.3–3.7)
PLATELET # BLD AUTO: 320 K/UL (ref 150–450)
PMV BLD AUTO: 11.1 FL (ref 9.4–12.3)
POTASSIUM SERPL-SCNC: 4.2 MMOL/L (ref 3.5–5.1)
PROT SERPL-MCNC: 7.2 G/DL (ref 6.3–8.2)
PROT UR STRIP-MCNC: ABNORMAL MG/DL
RBC # BLD AUTO: 3.35 M/UL (ref 4.05–5.2)
SERVICE CMNT-IMP: ABNORMAL
SERVICE CMNT-IMP: ABNORMAL
SODIUM SERPL-SCNC: 134 MMOL/L (ref 133–143)
SP GR UR REFRACTOMETRY: 1.02 (ref 1–1.02)
UROBILINOGEN UR QL STRIP.AUTO: 0.2 EU/DL (ref 0.2–1)
WBC # BLD AUTO: 3.9 K/UL (ref 4.3–11.1)

## 2023-02-16 PROCEDURE — 84100 ASSAY OF PHOSPHORUS: CPT

## 2023-02-16 PROCEDURE — 97110 THERAPEUTIC EXERCISES: CPT

## 2023-02-16 PROCEDURE — 81001 URINALYSIS AUTO W/SCOPE: CPT

## 2023-02-16 PROCEDURE — 6370000000 HC RX 637 (ALT 250 FOR IP): Performed by: PHYSICIAN ASSISTANT

## 2023-02-16 PROCEDURE — 97116 GAIT TRAINING THERAPY: CPT

## 2023-02-16 PROCEDURE — 36415 COLL VENOUS BLD VENIPUNCTURE: CPT

## 2023-02-16 PROCEDURE — 97530 THERAPEUTIC ACTIVITIES: CPT

## 2023-02-16 PROCEDURE — 82962 GLUCOSE BLOOD TEST: CPT

## 2023-02-16 PROCEDURE — 85025 COMPLETE CBC W/AUTO DIFF WBC: CPT

## 2023-02-16 PROCEDURE — 1180000000 HC REHAB R&B

## 2023-02-16 PROCEDURE — 97535 SELF CARE MNGMENT TRAINING: CPT

## 2023-02-16 PROCEDURE — 6370000000 HC RX 637 (ALT 250 FOR IP): Performed by: STUDENT IN AN ORGANIZED HEALTH CARE EDUCATION/TRAINING PROGRAM

## 2023-02-16 PROCEDURE — 2580000003 HC RX 258: Performed by: STUDENT IN AN ORGANIZED HEALTH CARE EDUCATION/TRAINING PROGRAM

## 2023-02-16 PROCEDURE — 83735 ASSAY OF MAGNESIUM: CPT

## 2023-02-16 PROCEDURE — 80053 COMPREHEN METABOLIC PANEL: CPT

## 2023-02-16 PROCEDURE — 6370000000 HC RX 637 (ALT 250 FOR IP): Performed by: HOSPITALIST

## 2023-02-16 PROCEDURE — 6370000000 HC RX 637 (ALT 250 FOR IP): Performed by: PHYSICAL MEDICINE & REHABILITATION

## 2023-02-16 RX ORDER — SODIUM CHLORIDE 9 MG/ML
INJECTION, SOLUTION INTRAVENOUS CONTINUOUS
Status: ACTIVE | OUTPATIENT
Start: 2023-02-16 | End: 2023-02-17

## 2023-02-16 RX ORDER — CALCIUM ACETATE 667 MG/1
1 TABLET ORAL
Status: DISCONTINUED | OUTPATIENT
Start: 2023-02-16 | End: 2023-02-21 | Stop reason: HOSPADM

## 2023-02-16 RX ORDER — OSELTAMIVIR PHOSPHATE 30 MG/1
30 CAPSULE ORAL 2 TIMES DAILY
Status: DISCONTINUED | OUTPATIENT
Start: 2023-02-16 | End: 2023-02-20

## 2023-02-16 RX ADMIN — OSELTAMIVIR PHOSPHATE 30 MG: 30 CAPSULE ORAL at 20:39

## 2023-02-16 RX ADMIN — GUAIFENESIN AND DEXTROMETHORPHAN 5 ML: 100; 10 SYRUP ORAL at 08:20

## 2023-02-16 RX ADMIN — Medication 667 MG: at 11:48

## 2023-02-16 RX ADMIN — CALCIUM CARBONATE (ANTACID) CHEW TAB 500 MG 500 MG: 500 CHEW TAB at 20:39

## 2023-02-16 RX ADMIN — SODIUM CHLORIDE: 9 INJECTION, SOLUTION INTRAVENOUS at 08:33

## 2023-02-16 RX ADMIN — AMITRIPTYLINE HYDROCHLORIDE 10 MG: 10 TABLET, FILM COATED ORAL at 20:39

## 2023-02-16 RX ADMIN — GUAIFENESIN AND DEXTROMETHORPHAN 5 ML: 100; 10 SYRUP ORAL at 20:39

## 2023-02-16 RX ADMIN — OSELTAMIVIR PHOSPHATE 30 MG: 30 CAPSULE ORAL at 08:32

## 2023-02-16 RX ADMIN — TROSPIUM CHLORIDE 20 MG: 20 TABLET, FILM COATED ORAL at 20:39

## 2023-02-16 RX ADMIN — Medication 1 AMPULE: at 20:42

## 2023-02-16 RX ADMIN — GABAPENTIN 600 MG: 300 CAPSULE ORAL at 08:21

## 2023-02-16 RX ADMIN — LISINOPRIL 10 MG: 5 TABLET ORAL at 08:20

## 2023-02-16 RX ADMIN — OSELTAMIVIR PHOSPHATE 30 MG: 30 CAPSULE ORAL at 02:59

## 2023-02-16 RX ADMIN — Medication 667 MG: at 17:05

## 2023-02-16 RX ADMIN — GUAIFENESIN AND DEXTROMETHORPHAN 5 ML: 100; 10 SYRUP ORAL at 17:05

## 2023-02-16 RX ADMIN — NITROFURANTOIN MACROCRYSTALS 50 MG: 50 CAPSULE ORAL at 08:20

## 2023-02-16 RX ADMIN — GUAIFENESIN AND DEXTROMETHORPHAN 5 ML: 100; 10 SYRUP ORAL at 23:13

## 2023-02-16 RX ADMIN — Medication 3 MG: at 20:39

## 2023-02-16 RX ADMIN — FUROSEMIDE 40 MG: 40 TABLET ORAL at 08:20

## 2023-02-16 RX ADMIN — LISINOPRIL 10 MG: 5 TABLET ORAL at 20:39

## 2023-02-16 RX ADMIN — GUAIFENESIN AND DEXTROMETHORPHAN 5 ML: 100; 10 SYRUP ORAL at 04:45

## 2023-02-16 RX ADMIN — GABAPENTIN 600 MG: 300 CAPSULE ORAL at 20:38

## 2023-02-16 RX ADMIN — METOPROLOL SUCCINATE 50 MG: 50 TABLET, EXTENDED RELEASE ORAL at 20:38

## 2023-02-16 RX ADMIN — CHOLECALCIFEROL TAB 25 MCG (1000 UNIT) 2000 UNITS: 25 TAB at 08:20

## 2023-02-16 RX ADMIN — ASPIRIN 325 MG: 325 TABLET, COATED ORAL at 08:21

## 2023-02-16 RX ADMIN — HYDROMORPHONE HYDROCHLORIDE 2 MG: 2 TABLET ORAL at 22:40

## 2023-02-16 RX ADMIN — SENNOSIDES AND DOCUSATE SODIUM 2 TABLET: 50; 8.6 TABLET ORAL at 08:21

## 2023-02-16 RX ADMIN — GUAIFENESIN AND DEXTROMETHORPHAN 5 ML: 100; 10 SYRUP ORAL at 11:48

## 2023-02-16 RX ADMIN — Medication 1 AMPULE: at 08:25

## 2023-02-16 ASSESSMENT — PAIN SCALES - WONG BAKER: WONGBAKER_NUMERICALRESPONSE: 0

## 2023-02-16 ASSESSMENT — PAIN DESCRIPTION - PAIN TYPE: TYPE: SURGICAL PAIN

## 2023-02-16 ASSESSMENT — PAIN DESCRIPTION - ORIENTATION
ORIENTATION: LEFT
ORIENTATION: LEFT

## 2023-02-16 ASSESSMENT — PAIN DESCRIPTION - DESCRIPTORS
DESCRIPTORS: ACHING
DESCRIPTORS: ACHING

## 2023-02-16 ASSESSMENT — PAIN DESCRIPTION - LOCATION
LOCATION: HIP
LOCATION: HIP

## 2023-02-16 ASSESSMENT — PAIN DESCRIPTION - FREQUENCY: FREQUENCY: INTERMITTENT

## 2023-02-16 ASSESSMENT — PAIN SCALES - GENERAL
PAINLEVEL_OUTOF10: 0
PAINLEVEL_OUTOF10: 7

## 2023-02-16 ASSESSMENT — PAIN DESCRIPTION - ONSET: ONSET: GRADUAL

## 2023-02-16 NOTE — PROGRESS NOTES
Hospitalist Progress Note   Admit Date:  2/10/2023  3:09 PM   Name:  Kunal Hinson   Age:  69 y.o. Sex:  female  :  1945   MRN:  [id number]   Room:      Presenting Complaint: No chief complaint on file. Reason(s) for Admission: Hip fracture ([facility] [address] [S72.009A]  Closed left hip fracture, initial encounter [facility]) [id number] Critical access hospital Course:   Been [place] 69years old female with history of GERD, diabetes type 2, recurrent UTI, CKD stage III, Sjogren's syndrome, hyperlipidemia osteopenia, polyneuropathy, vitamin D deficiency, history of breast cancer status postmastectomy and chemotherapy, history of depression, history of dilated cardiomyopathy initially presented after mechanical fall, have nondisplaced left femoral neck fracture, patient underwent ORIF of left femoral neck with plate and screw fixation by Dr. Hazel Paz on , eventually discharged to rehab on 2/10. Consult was called from inpatient physical therapy team due to fever. Patient is comfortable in bed, not in acute distress, denies any redness or swelling over the surgical site, patient reports of mild cough and nasal congestion but denies any body aches. Subjective & 24hr Events (23): Patient was seen and examined at the bedside. She was sitting comfortably on the chair. She spiked fever 102F yesterday. She denies chest pain, shortness of breath, cough, nausea, vomiting. Assessment & Plan:   FLU positive   Pt is saturating well on RA. Febrile 102.7F  Blood cultures negative, procalcitonin is 0.18   urinalysis and chest x-ray was negative. lactic acid normal, pro calcitonin negative, blood cultures ngtd    Diabetes type 2: Blood sugars are well controlled   continue current regimen. Hypertension: Continue on home medications. Peripheral neuropathy: Continue gabapentin. Chronic combined congestive heart failure: continue beta-blockers, ACE inhibitors.      History of recurrent UTI: urinalysis negative          DVT prophylaxis and pain management as per primary team    PT/OT evals and PPD needed/ordered? Diet:  ADULT DIET; Regular; 4 carb choices (60 gm/meal)  VTE prophylaxis: SCD  Code status: Full Code    Hospital Problems:  Principal Problem:    Closed left hip fracture, initial encounter ([facility]  Active Problems:    Primary insomnia    [facility]jogren syndrome, unspecified [facility]    Type 2 diabetes mellitus with chronic kidney disease    Chronic gout of right hand    Chronic combined systolic and diastolic CHF (congestive heart failure) (HCC)    Dyslipidemia    Depression    Osteoarthritis    Chemotherapy-induced neuropathy (HCC)    Essential hypertension    Chronic anemia  Resolved Problems:    * No resolved hospital problems. *      Objective:   Patient Vitals for the past 24 hrs:   Temp Pulse Resp BP SpO2   02/16/23 0727 97.7 °F (36.5 °C) 67 16 (!) 113/95 (!) 89 %   02/16/23 0300 97.5 °F (36.4 °C) 79 18 113/64 --   02/15/23 2245 100.3 °F (37.9 °C) 98 17 (!) 170/72 --   02/15/23 2130 -- -- -- (!) 181/86 --   02/15/23 2015 (!) 102.7 °F (39.3 °C) (!) 120 -- (!) 191/84 91 %   02/15/23 1605 98.4 °F (36.9 °C) 89 14 (!) 158/93 95 %       Oxygen Therapy  SpO2: (!) 89 %  Pulse Oximetry Type: Intermittent  O2 Device: None (Room air)    Estimated body mass index is 24.08 kg/m² as calculated from the following:    Height as of 2/6/23: 5' 5\" (1.651 m). Weight as of this encounter: 144 lb 11.2 oz (65.6 kg). Intake/Output Summary (Last 24 hours) at 2/16/2023 1427  Last data filed at 2/15/2023 1821  Gross per 24 hour   Intake 240 ml   Output --   Net 240 ml         Physical Exam:     Blood pressure (!) 113/95, pulse 67, temperature 97.7 °F (36.5 °C), resp. rate 16, weight 144 lb 11.2 oz (65.6 kg), SpO2 (!) 89 %. General:    Well nourished. Head:  Normocephalic, atraumatic  Eyes:  Sclerae appear normal.  Pupils equally round. ENT:  Nares appear normal.  Moist oral mucosa  Neck:  No restricted ROM. Trachea midline   CV:   RRR. No m/r/g. No jugular venous distension. Lungs:   CTAB. No wheezing, rhonchi, or rales. Symmetric expansion. Abdomen:   Soft, nontender, nondistended. Extremities: No cyanosis or clubbing. No edema  Skin:     No rashes and normal coloration. Warm and dry. Neuro:  CN II-XII grossly intact. Psych:  Normal mood and affect. I have personally reviewed labs and tests:  Recent Labs:  Recent Results (from the past 48 hour(s))   POCT Glucose    Collection Time: 02/14/23  4:57 PM   Result Value Ref Range    POC Glucose 136 (H) 65 - 100 mg/dL    Performed by: Bladimir    POCT Glucose    Collection Time: 02/15/23  6:13 AM   Result Value Ref Range    POC Glucose 126 (H) 65 - 100 mg/dL    Performed by:  Keyanna    CBC with Auto Differential    Collection Time: 02/15/23 10:35 AM   Result Value Ref Range    WBC 4.9 4.3 - 11.1 K/uL    RBC 3.49 (L) 4.05 - 5.2 M/uL    Hemoglobin 10.6 (L) 11.7 - 15.4 g/dL    Hematocrit 32.1 (L) 35.8 - 46.3 %    MCV 92.0 82 - 102 FL    MCH 30.4 26.1 - 32.9 PG    MCHC 33.0 31.4 - 35.0 g/dL    RDW 14.5 11.9 - 14.6 %    Platelets 744 742 - 315 K/uL    MPV 10.5 9.4 - 12.3 FL    nRBC 0.00 0.0 - 0.2 K/uL    Differential Type AUTOMATED      Seg Neutrophils 64 43 - 78 %    Lymphocytes 20 13 - 44 %    Monocytes 13 (H) 4.0 - 12.0 %    Eosinophils % 1 0.5 - 7.8 %    Basophils 1 0.0 - 2.0 %    Immature Granulocytes 1 0.0 - 5.0 %    Segs Absolute 3.1 1.7 - 8.2 K/UL    Absolute Lymph # 1.0 0.5 - 4.6 K/UL    Absolute Mono # 0.6 0.1 - 1.3 K/UL    Absolute Eos # 0.1 0.0 - 0.8 K/UL    Basophils Absolute 0.0 0.0 - 0.2 K/UL    Absolute Immature Granulocyte 0.1 0.0 - 0.5 K/UL   Basic Metabolic Panel    Collection Time: 02/15/23 10:35 AM   Result Value Ref Range    Sodium 136 133 - 143 mmol/L    Potassium 3.5 3.5 - 5.1 mmol/L    Chloride 98 (L) 101 - 110 mmol/L    CO2 31 21 - 32 mmol/L    Anion Gap 7 2 - 11 mmol/L    Glucose 119 (H) 65 - 100 mg/dL    BUN 18 8 - 23 MG/DL    Creatinine 1.20 (H) 0.6 - 1.0 MG/DL    Est, Glom Filt Rate 47 (L) >60 ml/min/1.73m2    Calcium 9.9 8.3 - 10.4 MG/DL   Urinalysis with Reflex to Culture    Collection Time: 02/15/23 12:02 PM    Specimen: Urine   Result Value Ref Range    Color, UA YELLOW/STRAW      Appearance CLEAR      Specific Gravity, UA 1.013 1.001 - 1.023      pH, Urine 6.5 5.0 - 9.0      Protein, UA Negative NEG mg/dL    Glucose, UA Negative mg/dL    Ketones, Urine Negative NEG mg/dL    Bilirubin Urine Negative NEG      Blood, Urine Negative NEG      Urobilinogen, Urine 0.2 0.2 - 1.0 EU/dL    Nitrite, Urine Negative NEG      Leukocyte Esterase, Urine Negative NEG      Urine Culture if Indicated CULTURE NOT INDICATED BY UA RESULT      WBC, UA 0-4 U4 /hpf    RBC, UA 0-5 U5 /hpf    BACTERIA, URINE Negative NEG /hpf    Epithelial Cells UA 0-5 U5 /hpf    Casts 0-2 U2 /lpf    Mucus, UA 0 0 /lpf   POCT Glucose    Collection Time: 02/15/23  4:27 PM   Result Value Ref Range    POC Glucose 97 65 - 100 mg/dL    Performed by: [facility]    Lactic Acid    Collection Time: 02/15/23  9:48 PM   Result Value Ref Range    Lactic Acid, Plasma 1.3 0.4 - 2.0 MMOL/L   Procalcitonin    Collection Time: 02/15/23  9:54 PM   Result Value Ref Range    Procalcitonin 0.06 0.00 - 0.49 ng/mL   COVID-19, Rapid    Collection Time: 02/15/23 10:42 PM    Specimen: Nasopharyngeal   Result Value Ref Range    Source NASAL      SARS-CoV-2, Rapid Not detected NOTD     Influenza A/B, Molecular    Collection Time: 02/15/23 10:45 PM    Specimen: Not Specified   Result Value Ref Range    Influenza A, VIK Detected (A) NOTD      Influenza B, VIK Not detected NOTD     CBC with Auto Differential    Collection Time: 02/16/23  5:18 AM   Result Value Ref Range    WBC 3.9 (L) 4.3 - 11.1 K/uL    RBC 3.35 (L) 4.05 - 5.2 M/uL    Hemoglobin 10.3 (L) 11.7 - 15.4 g/dL    Hematocrit 31.9 (L) 35.8 - 46.3 %    MCV 95.2 82 - 102 FL    MCH 30.7 26.1 - 32.9 PG    MCHC 32.3 31.4 - 35.0 g/dL    RDW 14.5 11.9 - 14.6 %    Platelets 798 470 - 022 K/uL    MPV 11.1 9.4 - 12.3 FL    nRBC 0.00 0.0 - 0.2 K/uL    Differential Type AUTOMATED      Seg Neutrophils 59 43 - 78 %    Lymphocytes 25 13 - 44 %    Monocytes 12 4.0 - 12.0 %    Eosinophils % 1 0.5 - 7.8 %    Basophils 1 0.0 - 2.0 %    Immature Granulocytes 2 0.0 - 5.0 %    Segs Absolute 2.3 1.7 - 8.2 K/UL    Absolute Lymph # 1.0 0.5 - 4.6 K/UL    Absolute Mono # 0.5 0.1 - 1.3 K/UL    Absolute Eos # 0.0 0.0 - 0.8 K/UL    Basophils Absolute 0.0 0.0 - 0.2 K/UL    Absolute Immature Granulocyte 0.1 0.0 - 0.5 K/UL   Comprehensive Metabolic Panel    Collection Time: 02/16/23  5:18 AM   Result Value Ref Range    Sodium 134 133 - 143 mmol/L    Potassium 4.2 3.5 - 5.1 mmol/L    Chloride 99 (L) 101 - 110 mmol/L    CO2 32 21 - 32 mmol/L    Anion Gap 3 2 - 11 mmol/L    Glucose 111 (H) 65 - 100 mg/dL    BUN 25 (H) 8 - 23 MG/DL    Creatinine 1.50 (H) 0.6 - 1.0 MG/DL    Est, Glom Filt Rate 36 (L) >60 ml/min/1.73m2    Calcium 9.5 8.3 - 10.4 MG/DL    Total Bilirubin 0.5 0.2 - 1.1 MG/DL    ALT 19 12 - 65 U/L    AST 42 (H) 15 - 37 U/L    Alk Phosphatase 83 50 - 136 U/L    Total Protein 7.2 6.3 - 8.2 g/dL    Albumin 3.0 (L) 3.2 - 4.6 g/dL    Globulin 4.2 2.8 - 4.5 g/dL    Albumin/Globulin Ratio 0.7 0.4 - 1.6     Magnesium    Collection Time: 02/16/23  5:18 AM   Result Value Ref Range    Magnesium 2.0 1.8 - 2.4 mg/dL   Phosphorus    Collection Time: 02/16/23  5:18 AM   Result Value Ref Range    Phosphorus 6.0 (H) 2.3 - 3.7 MG/DL   POCT Glucose    Collection Time: 02/16/23  6:09 AM   Result Value Ref Range    POC Glucose 106 (H) 65 - 100 mg/dL    Performed by: Olvin Cha    Urinalysis w rflx microscopic    Collection Time: 02/16/23  9:30 AM   Result Value Ref Range    Color, UA YELLOW/STRAW      Appearance CLEAR      Specific Gravity, UA 1.017 1.001 - 1.023      pH, Urine 5.5 5.0 - 9.0      Protein, UA TRACE (A) NEG mg/dL    Glucose, UA Negative mg/dL    Ketones, Urine Negative NEG mg/dL    Bilirubin Urine Negative NEG      Blood, Urine Negative NEG      Urobilinogen, Urine 0.2 0.2 - 1.0 EU/dL    Nitrite, Urine Negative NEG      Leukocyte Esterase, Urine Negative NEG      Epithelial Cells UA 3-5 0 /hpf    BACTERIA, URINE 0 0 /hpf    Casts 5-10 0 /lpf    Other observations RESULTS VERIFIED MANUALLY         I have personally reviewed imaging studies:  Other Studies:  XR CHEST PORTABLE   Final Result      1. No pulmonary consolidation. Thank you for the referral of this patient. This exam was interpreted by an    [address] [employer] certified diagnostic radiologist with    fellowship training. If there are any questions regarding this exam please feel    free to contact us directly at [phone].       [address]       Irl Duty, D.O.    2/15/2023 10:36:00 PM          Current Meds:  Current Facility-Administered Medications   Medication Dose Route Frequency    oseltamivir (TAMIFLU) capsule 30 mg  30 mg Oral BID    calcium acetate (PHOSLO) tablet 667 mg  1 tablet Oral TID WC    0.9 % sodium chloride infusion   IntraVENous Continuous    sennosides-docusate sodium (SENOKOT-S) 8.6-50 MG tablet 2 tablet  2 tablet Oral Daily    insulin lispro (HUMALOG) injection vial 0-8 Units  0-8 Units SubCUTAneous BID WC    acetaminophen (TYLENOL) tablet 650 mg  650 mg Oral Q6H PRN    Or    acetaminophen (TYLENOL) suppository 650 mg  650 mg Rectal Q6H PRN    alcohol 62% (NOZIN) nasal  1 ampule  1 ampule Topical Q12H    amitriptyline (ELAVIL) tablet 10 mg  10 mg Oral Nightly    aspirin EC tablet 325 mg  325 mg Oral Daily    calcium carbonate (TUMS) chewable tablet 500 mg  1 tablet Oral Nightly    cetirizine (ZYRTEC) tablet 10 mg  10 mg Oral Daily PRN    furosemide (LASIX) tablet 40 mg  40 mg Oral Every Other Day    gabapentin (NEURONTIN) capsule 600 mg  600 mg Oral BID    glucagon (rDNA) injection 1 mg  1 mg SubCUTAneous PRN    glucose chewable tablet 16 g  4 tablet Oral PRN    hydrALAZINE (APRESOLINE) tablet 25 mg  25 mg Oral Q6H PRN    HYDROmorphone (DILAUDID) tablet 1 mg  1 mg Oral Q4H PRN    HYDROmorphone (DILAUDID) tablet 2 mg  2 mg Oral Q4H PRN    lisinopril (PRINIVIL;ZESTRIL) tablet 10 mg  10 mg Oral BID    melatonin tablet 3 mg  3 mg Oral Nightly    metoprolol succinate (TOPROL XL) extended release tablet 50 mg  50 mg Oral Nightly    nitrofurantoin (MACRODANTIN) capsule 50 mg  50 mg Oral Daily    polyethylene glycol (GLYCOLAX) packet 17 g  17 g Oral Daily PRN    Vitamin D (CHOLECALCIFEROL) tablet 2,000 Units  2,000 Units Oral Daily    guaiFENesin-dextromethorphan (ROBITUSSIN DM) 100-10 MG/5ML syrup 5 mL  5 mL Oral Q4H    trospium (SANCTURA) tablet 20 mg  20 mg Oral Nightly       Signed:  Saritha Frye MD    Part of this note may have been written by using a voice dictation software. The note has been proof read but may still contain some grammatical/other typographical errors.

## 2023-02-16 NOTE — PROGRESS NOTES
OT DAILY NOTE  Time In 1118   Time Out 1142     Subjective: \"I still don't feel great. \" Pt agreeable to treatment. Pain: No pain expressed. Interdisciplinary Communication: Collaborated with PA and RN regarding pt receiving fluids w/ previous CHF, RN explained they are being administered at slow rate, relayed information to pt. Precautions: Falls    BP (!) 113/95   Pulse 67   Temp 97.7 °F (36.5 °C)   Resp 16   Wt 144 lb 11.2 oz (65.6 kg)   SpO2 (!) 89%   BMI 24.08 kg/m²      FUNCTIONAL MOBILITY:    Score Comments   Supine to Sit Independent I   Transfer Assist Supervision or touching assistance SBA W/ RW         - Strengthening   Patient educated regarding Yessy Encarnaciona for BUE in seated position. Pt issued  RED TheraBand. Pt completed 1 set x 10 reps of the following exercises: scapular retraction, chest press, elbow extension, and elbow flexion. Education   Safety Awareness     Assessment: Patient currently limited by influenza diagnosis and not feeling well. Pt w/ low energy levels and generalized weakness, but maintains progress w/ functional mobility. Pt demonstrated good participation in OT treatment. Pt continues to benefit from skilled OT services to address remaining deficits and progress toward baseline level of independence and safety. Patient ended session supine in bed with call bell and needs within reach. Plan: Continue OT POC.      David Began, OT   2/16/2023

## 2023-02-16 NOTE — CONSULTS
[facility] Hospitalist Consult   Admit Date:  2/10/2023  3:09 PM   Name:  Paola Santana   Age:  69 y.o. Sex:  female  :  1945   MRN:  [id number]   Room:      Presenting Complaint: No chief complaint on file. Reason(s) for Admission: Hip fracture ([facility] [S72.009A]  Closed left hip fracture, initial encounter ([facility] [place] [facility] [S72.002A]     Hospitalists consulted by Osiris Corona MD for:     History of Presenting Illness:       Been [place] 69years old female with history of GERD, diabetes type 2, recurrent UTI, CKD stage III, Sjogren's syndrome, hyperlipidemia osteopenia, polyneuropathy, vitamin D deficiency, history of breast cancer status postmastectomy and chemotherapy, history of depression, history of dilated cardiomyopathy initially presented after mechanical fall, have nondisplaced left femoral neck fracture, patient underwent ORIF of left femoral neck with plate and screw fixation by Dr. Charla Victor on , eventually discharged to rehab on 2/10. Consult was called from inpatient physical therapy team due to fever. Patient is comfortable in bed, not in acute distress, denies any redness or swelling over the surgical site, patient reports of mild cough and nasal congestion but denies any body aches. Assessment & Plan:     Fever: Unclear etiology, during hospital stay patient also had fever at that time patient was given antibiotics, cultures came negative, procalcitonin is 0.18 at that time, urinalysis and chest x-ray was negative, antibiotics were eventually discharged, patient started experiencing fever again, will obtain septic work-up including lactic acid, procalcitonin, blood cultures, chest x-ray, urinalysis. We will check for COVID and influenza. Diabetes type 2: Blood sugars are reasonable at this time, continue current regimen. Hypertension: Continue on home medications. Peripheral neuropathy: Continue gabapentin. Chronic combined congestive heart failure:  We will continue beta-blockers, ACE inhibitors. History of recurrent UTI: We will obtain urinalysis. DVT prophylaxis and pain management as per primary team    Principal Problem:    Closed left hip fracture, initial encounter [facility])  Active Problems:    Primary insomnia    Sjogren syndrome, unspecified ([address]    Type 2 diabetes mellitus with chronic kidney disease    Chronic gout of right hand    Chronic combined systolic and diastolic CHF (congestive heart failure) (HCC)    Dyslipidemia    Depression    Osteoarthritis    Chemotherapy-induced neuropathy (HCC)    Essential hypertension    Chronic anemia  Resolved Problems:    * No resolved hospital problems. *      Past History:  Past Medical History:   Diagnosis Date    Ankle fracture 2014    Ankle osteomyelitis, left ([address] 2014    Breast cancer ([address]     Chemotherapy-induced neuropathy (HCC)     Chronic anemia     Chronic systolic CHF (congestive heart failure) (HCC)     CKD (chronic kidney disease) stage 3, GFR 30-59 ml/min (HCC)     Colon polyp 2020    Depression     Dilated cardiomyopathy ([address]     Dry eye syndrome of both eyes     Dyslipidemia     Essential hypertension     Fracture of right patella 2013    History of left breast cancer 2001    with mastectomy    Left leg weakness 9/8/2020    Non-ischemic cardiomyopathy ([address] 2018    Echo 50-55%, Cath-minimal CAD. EF normalized    Osteoarthritis     Osteopenia     Peripheral sensory-motor axonal polyneuropathy 10/17/2020    S/P cardiac cath 2009    Statin intolerance     Tibial fracture 2016    Tibial plateau fracture 9/7/1244    Last Assessment & Plan:  Formatting of this note might be different from the original. Pod 2 ORIF doing well. C/o moderate pain. Controlled on PO meds.   Ready to go home    Type 2 diabetes mellitus, controlled ([address]     BS am 104    UTI (urinary tract infection) 2020    Vaginal infection     Vitamin B12 deficiency        Past Surgical History:   Procedure Laterality Date BREAST BIOPSY      left breast biopsy    BREAST BIOPSY  1968    left cyst removed    BREAST LUMPECTOMY Left 2001    BREAST REDUCTION SURGERY      Rt breast    CARDIAC CATHETERIZATION  2009    CARPAL TUNNEL RELEASE Bilateral     CHOLECYSTECTOMY, OPEN  1981    COLONOSCOPY  07/2014    CYST REMOVAL Right 11/2020    hand    HAND SURGERY Right 11/16/2022    Right index finger distal interphalangeal joint arthrodesis performed by Brennan Bello MD at [address] [place] [address]      Sinus Surgery    HEENT      RK procedure bilateral eyes    HIP FRACTURE SURGERY Left 2/8/2023    HIP OPEN REDUCTION INTERNAL FIXATION performed by Lance Apple MD at [facility] MAIN OR    HX OPEN REDUCTION INTERNAL FIXATION Left 2013    Ankle    HX OPEN REDUCTION INTERNAL FIXATION  08/2016    [place]    HYSTERECTOMY (CERVIX STATUS UNKNOWN)      LUMBAR LAMINECTOMY  1992    MASTECTOMY Left 2002    With Reconstruction    ORTHOPEDIC SURGERY Right 08/2013    Patellar fracture repair    OTHER SURGICAL HISTORY      [place] Cath Placed and Removed    OVARY REMOVAL      Unilateral    PARTIAL HYSTERECTOMY (CERVIX NOT REMOVED)  1974    TONSILLECTOMY      TONSILLECTOMY      TRANSPLANT  2002    Stem Cell Transplant    UVULOPALATOPHARYGOPLASTY  2004        Social History     Tobacco Use    Smoking status: Never    Smokeless tobacco: Never    Tobacco comments:     never used tobacco   Substance Use Topics    Alcohol use: No      Social History     Substance and Sexual Activity   Drug Use No       Family History   Problem Relation Age of Onset    Cancer Mother     Diabetes Mother     Heart Disease Mother         MI at age 66    Heart Failure Mother         age 58    Stroke Sister     Cancer Brother     Heart Disease Brother     Heart Disease Father         MI age 55    Breast Cancer Neg Hx     Stomach Cancer Neg Hx         Immunization History   Administered Date(s) Administered    COVID-19, PFIZER PURPLE top, DILUTE for use, (age 15 y+), 30mcg/0.3mL 01/19/2021, 02/13/2021    Influenza Trivalent 09/18/2013, 10/06/2014    Influenza Virus Vaccine 09/24/2020    Influenza, FLUAD, (age 72 y+), Adjuvanted, 0.5mL 09/24/2020, 10/07/2021, 10/07/2021, 09/22/2022    Influenza, High Dose (Fluzone 65 yrs and older) 10/10/2016, 11/01/2017    Influenza, Triv, inactivated, subunit, adjuvanted, IM (Fluad 65 yrs and older) 10/16/2018, 09/26/2019    Influenza, Trivalent PF 09/28/2015    PPD Test 02/08/2023    Pneumococcal Conjugate 13-valent (Fiomxbi31) 12/30/2015    Pneumococcal Polysaccharide (Gfcvazral51) 08/22/2013    Tdap (Boostrix, Adacel) 03/18/2014    Zoster Live (Zostavax) 01/01/2015     Allergies   Allergen Reactions    Oxycodone Itching    Eucalyptus Oil Hives and Other (See Comments)     Burns mouth    Ezetimibe Myalgia    Morphine Other (See Comments)     Feels crazy  Other reaction(s): Hallucinations    Penicillins Hives    Pineapple Hives and Other (See Comments)     Burns mouth    Vancomycin Other (See Comments)     Kidney function worsened    Sulfa Antibiotics Nausea Only and Rash      Current Facility-Administered Medications   Medication Dose Route Frequency    naproxen (NAPROSYN) tablet 500 mg  500 mg Oral Once    sennosides-docusate sodium (SENOKOT-S) 8.6-50 MG tablet 2 tablet  2 tablet Oral Daily    insulin lispro (HUMALOG) injection vial 0-8 Units  0-8 Units SubCUTAneous BID WC    acetaminophen (TYLENOL) tablet 650 mg  650 mg Oral Q6H PRN    Or    acetaminophen (TYLENOL) suppository 650 mg  650 mg Rectal Q6H PRN    alcohol 62% (NOZIN) nasal  1 ampule  1 ampule Topical Q12H    amitriptyline (ELAVIL) tablet 10 mg  10 mg Oral Nightly    aspirin EC tablet 325 mg  325 mg Oral Daily    calcium carbonate (TUMS) chewable tablet 500 mg  1 tablet Oral Nightly    cetirizine (ZYRTEC) tablet 10 mg  10 mg Oral Daily PRN    furosemide (LASIX) tablet 40 mg  40 mg Oral Every Other Day    gabapentin (NEURONTIN) capsule 600 mg  600 mg Oral BID    glucagon (rDNA) injection 1 mg  1 mg SubCUTAneous PRN    glucose chewable tablet 16 g  4 tablet Oral PRN    hydrALAZINE (APRESOLINE) tablet 25 mg  25 mg Oral Q6H PRN    HYDROmorphone (DILAUDID) tablet 1 mg  1 mg Oral Q4H PRN    HYDROmorphone (DILAUDID) tablet 2 mg  2 mg Oral Q4H PRN    lisinopril (PRINIVIL;ZESTRIL) tablet 10 mg  10 mg Oral BID    melatonin tablet 3 mg  3 mg Oral Nightly    metoprolol succinate (TOPROL XL) extended release tablet 50 mg  50 mg Oral Nightly    nitrofurantoin (MACRODANTIN) capsule 50 mg  50 mg Oral Daily    polyethylene glycol (GLYCOLAX) packet 17 g  17 g Oral Daily PRN    Vitamin D (CHOLECALCIFEROL) tablet 2,000 Units  2,000 Units Oral Daily    guaiFENesin-dextromethorphan (ROBITUSSIN DM) 100-10 MG/5ML syrup 5 mL  5 mL Oral Q4H    trospium (SANCTURA) tablet 20 mg  20 mg Oral Nightly       Objective:   Patient Vitals for the past 24 hrs:   Temp Pulse Resp BP SpO2   02/15/23 2130 -- -- -- (!) 181/86 --   02/15/23 2015 (!) 102.7 °F (39.3 °C) (!) 120 -- (!) 191/84 91 %   02/15/23 1605 98.4 °F (36.9 °C) 89 14 (!) 158/93 95 %   02/15/23 1200 99.5 °F (37.5 °C) -- -- -- --   02/15/23 0813 (!) 101.7 °F (38.7 °C) 97 18 (!) 171/98 (!) 89 %   02/15/23 0602 99.2 °F (37.3 °C) 91 18 (!) 184/84 95 %       Oxygen Therapy  SpO2: 91 %  Pulse Oximetry Type: Intermittent  O2 Device: None (Room air)    Estimated body mass index is 24.08 kg/m² as calculated from the following:    Height as of 2/6/23: 5' 5\" (1.651 m). Weight as of this encounter: 144 lb 11.2 oz (65.6 kg). Intake/Output Summary (Last 24 hours) at 2/15/2023 2147  Last data filed at 2/15/2023 1821  Gross per 24 hour   Intake 360 ml   Output --   Net 360 ml         Physical Exam:    Blood pressure (!) 181/86, pulse (!) 120, temperature (!) 102.7 °F (39.3 °C), resp. rate 14, weight 144 lb 11.2 oz (65.6 kg), SpO2 91 %. General:    Well nourished. Head:  Normocephalic, atraumatic  Eyes:  Sclerae appear normal.  Pupils equally round.   ENT:  Nares appear normal.  Moist oral mucosa  Neck:  No restricted ROM. Trachea midline   CV:   RRR. No m/r/g. No jugular venous distension. Lungs:   CTAB. No wheezing, rhonchi, or rales. Symmetric expansion. Abdomen:   Soft, nontender, nondistended. Extremities: No cyanosis or clubbing. No edema  Skin:     Surgical site looks clean, no redness. Neuro:  CN II-XII grossly intact. Sensation intact. Psych:  Normal mood and affect. I have personally reviewed labs and tests showing:  Recent Labs:  Recent Results (from the past 24 hour(s))   POCT Glucose    Collection Time: 02/15/23  6:13 AM   Result Value Ref Range    POC Glucose 126 (H) 65 - 100 mg/dL    Performed by:  Keyanna    CBC with Auto Differential    Collection Time: 02/15/23 10:35 AM   Result Value Ref Range    WBC 4.9 4.3 - 11.1 K/uL    RBC 3.49 (L) 4.05 - 5.2 M/uL    Hemoglobin 10.6 (L) 11.7 - 15.4 g/dL    Hematocrit 32.1 (L) 35.8 - 46.3 %    MCV 92.0 82 - 102 FL    MCH 30.4 26.1 - 32.9 PG    MCHC 33.0 31.4 - 35.0 g/dL    RDW 14.5 11.9 - 14.6 %    Platelets 794 053 - 723 K/uL    MPV 10.5 9.4 - 12.3 FL    nRBC 0.00 0.0 - 0.2 K/uL    Differential Type AUTOMATED      Seg Neutrophils 64 43 - 78 %    Lymphocytes 20 13 - 44 %    Monocytes 13 (H) 4.0 - 12.0 %    Eosinophils % 1 0.5 - 7.8 %    Basophils 1 0.0 - 2.0 %    Immature Granulocytes 1 0.0 - 5.0 %    Segs Absolute 3.1 1.7 - 8.2 K/UL    Absolute Lymph # 1.0 0.5 - 4.6 K/UL    Absolute Mono # 0.6 0.1 - 1.3 K/UL    Absolute Eos # 0.1 0.0 - 0.8 K/UL    Basophils Absolute 0.0 0.0 - 0.2 K/UL    Absolute Immature Granulocyte 0.1 0.0 - 0.5 K/UL   Basic Metabolic Panel    Collection Time: 02/15/23 10:35 AM   Result Value Ref Range    Sodium 136 133 - 143 mmol/L    Potassium 3.5 3.5 - 5.1 mmol/L    Chloride 98 (L) 101 - 110 mmol/L    CO2 31 21 - 32 mmol/L    Anion Gap 7 2 - 11 mmol/L    Glucose 119 (H) 65 - 100 mg/dL    BUN 18 8 - 23 MG/DL    Creatinine 1.20 (H) 0.6 - 1.0 MG/DL    Est, Glom Filt Rate 47 (L) >60 ml/min/1.73m2 Calcium 9.9 8.3 - 10.4 MG/DL   Urinalysis with Reflex to Culture    Collection Time: 02/15/23 12:02 PM    Specimen: Urine   Result Value Ref Range    Color, UA YELLOW/STRAW      Appearance CLEAR      Specific Gravity, UA 1.013 1.001 - 1.023      pH, Urine 6.5 5.0 - 9.0      Protein, UA Negative NEG mg/dL    Glucose, UA Negative mg/dL    Ketones, Urine Negative NEG mg/dL    Bilirubin Urine Negative NEG      Blood, Urine Negative NEG      Urobilinogen, Urine 0.2 0.2 - 1.0 EU/dL    Nitrite, Urine Negative NEG      Leukocyte Esterase, Urine Negative NEG      Urine Culture if Indicated CULTURE NOT INDICATED BY UA RESULT      WBC, UA 0-4 U4 /hpf    RBC, UA 0-5 U5 /hpf    BACTERIA, URINE Negative NEG /hpf    Epithelial Cells UA 0-5 U5 /hpf    Casts 0-2 U2 /lpf    Mucus, UA 0 0 /lpf   POCT Glucose    Collection Time: 02/15/23  4:27 PM   Result Value Ref Range    POC Glucose 97 65 - 100 mg/dL    Performed by: Modi ([place])        I have personally reviewed imaging studies showing:  No results found. Echocardiogram:  No results found for this or any previous visit. Signed:  Dalton Bach MD    Part of this note may have been written by using a voice dictation software. The note has been proof read but may still contain some grammatical/other typographical errors.

## 2023-02-16 NOTE — PROGRESS NOTES
OT DAILY NOTE    Time In 0837   Time Out 0917     Subjective: \"I think I feel a little better than yesterday. \" Pt agreeable to treatment. Pain: No pain expressed. Interdisciplinary Communication: Collaborated with RN regarding pt's medical status. Precautions: Falls    BP (!) 113/95   Pulse 67   Temp 97.7 °F (36.5 °C)   Resp 16   Wt 144 lb 11.2 oz (65.6 kg)   SpO2 (!) 89%   BMI 24.08 kg/m²      MOBILITY:   Score Comments   Rolling Supervision or touching assistance S   Supine to Sit Independent I   Sit to Supine Supervision or touching assistance S   Sit to Stand Supervision or touching assistance SBA w/ RW   Transfer Assist Supervision or touching assistance SBA W/ RW     ACTIVITIES OF DAILY LIVING:   Score Comments   Bathing Supervision or touching assistance SBA   Upper Body  Dressing Independent Independent, retrieved from closet w/ RW   Lower Body Dressing Supervision or touching assistance supervision in standing   Donning/[place] Footwear Setup or clean-up assistance S/U A   Toilet Transfer Supervision or touching assistance SBA w/ RW   Toileting Hygiene Supervision or touching assistance Supervision   Education Rolling Walker Management     Assessment: Patient limited by influenza diagnosis and not feeling well. Pt completing ADLs w/ supervision. Pt demonstrated good participation in OT treatment. Pt continues to benefit from skilled OT services to address remaining deficits and progress toward baseline level of independence and safety. Patient ended session seated in recliner with call bell and needs within reach. Plan: Continue OT POC.      Isela Henry, OT   2/16/2023

## 2023-02-16 NOTE — PROGRESS NOTES
INPATIENT REHAB CENTER PROGRESS NOTE    Mirtha Briones  Admit Date: 2/10/2023  Admit Diagnosis:   Hip fracture ([facility] [address] [S72.009A]  [facility], [facility] [S72.002A]    Subjective     2/16/2023  Pt had fever on 2/14 and again on 2/15, workup initially negative. Last night she had episode of mild cough and nasal congestion and sundowning. She was seen by the internist after hours . Workup was positive for influenza A. She was started on Tamiflu. This AM she c/o general malaise but states she is feeling better than last night. Still has mild nasal congestion.    Objective:     Current Facility-Administered Medications   Medication Dose Route Frequency    oseltamivir (TAMIFLU) capsule 30 mg  30 mg Oral BID    calcium acetate (PHOSLO) tablet 667 mg  1 tablet Oral TID WC    0.9 % sodium chloride infusion   IntraVENous Continuous    sennosides-docusate sodium (SENOKOT-S) 8.6-50 MG tablet 2 tablet  2 tablet Oral Daily    insulin lispro (HUMALOG) injection vial 0-8 Units  0-8 Units SubCUTAneous BID WC    acetaminophen (TYLENOL) tablet 650 mg  650 mg Oral Q6H PRN    Or    acetaminophen (TYLENOL) suppository 650 mg  650 mg Rectal Q6H PRN    alcohol 62% (NOZIN) nasal  1 ampule  1 ampule Topical Q12H    amitriptyline (ELAVIL) tablet 10 mg  10 mg Oral Nightly    aspirin EC tablet 325 mg  325 mg Oral Daily    calcium carbonate (TUMS) chewable tablet 500 mg  1 tablet Oral Nightly    cetirizine (ZYRTEC) tablet 10 mg  10 mg Oral Daily PRN    furosemide (LASIX) tablet 40 mg  40 mg Oral Every Other Day    gabapentin (NEURONTIN) capsule 600 mg  600 mg Oral BID    glucagon (rDNA) injection 1 mg  1 mg SubCUTAneous PRN    glucose chewable tablet 16 g  4 tablet Oral PRN    hydrALAZINE (APRESOLINE) tablet 25 mg  25 mg Oral Q6H PRN    HYDROmorphone (DILAUDID) tablet 1 mg  1 mg Oral Q4H PRN    HYDROmorphone (DILAUDID) tablet 2 mg  2 mg Oral Q4H PRN    lisinopril (PRINIVIL;ZESTRIL) tablet 10 mg 10 mg Oral BID    melatonin tablet 3 mg  3 mg Oral Nightly    metoprolol succinate (TOPROL XL) extended release tablet 50 mg  50 mg Oral Nightly    nitrofurantoin (MACRODANTIN) capsule 50 mg  50 mg Oral Daily    polyethylene glycol (GLYCOLAX) packet 17 g  17 g Oral Daily PRN    Vitamin D (CHOLECALCIFEROL) tablet 2,000 Units  2,000 Units Oral Daily    guaiFENesin-dextromethorphan (ROBITUSSIN DM) 100-10 MG/5ML syrup 5 mL  5 mL Oral Q4H    trospium (SANCTURA) tablet 20 mg  20 mg Oral Nightly       Physical Exam:   Visit Vitals  BP (!) 113/95   Pulse 67   Temp 97.7 °F (36.5 °C)   Resp 16   Wt 144 lb 11.2 oz (65.6 kg)   SpO2 (!) 89%   BMI 24.08 kg/m²         General: Alert, no acute distress, well developed, well nourished. Skin: left hip incisions not visualized this encounter  HEENT: Normocephalic, edentulous, oral mucosa is moist. Extraocular movements are intact. Neck: Supple, non-tender. Respiratory: respirations unlabored. Equal chest rise. Cardiovascular: Normal rate, no cyanosis. ABD: Soft, non-tender, not distended. Integumentary: Clean, no rash, no skin breakdown. Surgical incision L hip CDI with staples. Surrounding hematoma. No drainage. Musculoskeletal: UE strength 4/5 and symmetric. R LE strength grossly intact. Left hip and LE exam limited by pain. Psychiatric: Appropriate mood & affect. Neuro: Alert, oriented x 4, speech and language intact. No focal deficits. Sensation grossly intact.        Functional Assessment:   Amount of Assistance: CGA  Distance (ft): 100  Assistive Device: RW  Surface: Level Surface    Sit to Stand: Supervision/Standby Assist  Stand to Sit: Supervision/Standby Assist  Transfer Type: Stand Pivot and with rolling walker  Transfer Assistance: Supervision/Standby Assist    Rolling Right: Independent  Rolling Left: Independent  Supine to Sit: Supervision/Standby Assist  Sit to Supine: Supervision/Standby Assist    Labs/Studies:  Recent Results (from the past 72 hour(s)) POCT Glucose    Collection Time: 02/13/23  5:04 PM   Result Value Ref Range    POC Glucose 105 (H) 65 - 100 mg/dL    Performed by: Modi (Hammonds)    POCT Glucose    Collection Time: 02/14/23  6:16 AM   Result Value Ref Range    POC Glucose 114 (H) 65 - 100 mg/dL    Performed by: Juan Manuel    POCT Glucose    Collection Time: 02/14/23  4:57 PM   Result Value Ref Range    POC Glucose 136 (H) 65 - 100 mg/dL    Performed by: Bladimir    POCT Glucose    Collection Time: 02/15/23  6:13 AM   Result Value Ref Range    POC Glucose 126 (H) 65 - 100 mg/dL    Performed by:  Keyanna    CBC with Auto Differential    Collection Time: 02/15/23 10:35 AM   Result Value Ref Range    WBC 4.9 4.3 - 11.1 K/uL    RBC 3.49 (L) 4.05 - 5.2 M/uL    Hemoglobin 10.6 (L) 11.7 - 15.4 g/dL    Hematocrit 32.1 (L) 35.8 - 46.3 %    MCV 92.0 82 - 102 FL    MCH 30.4 26.1 - 32.9 PG    MCHC 33.0 31.4 - 35.0 g/dL    RDW 14.5 11.9 - 14.6 %    Platelets 089 290 - 244 K/uL    MPV 10.5 9.4 - 12.3 FL    nRBC 0.00 0.0 - 0.2 K/uL    Differential Type AUTOMATED      Seg Neutrophils 64 43 - 78 %    Lymphocytes 20 13 - 44 %    Monocytes 13 (H) 4.0 - 12.0 %    Eosinophils % 1 0.5 - 7.8 %    Basophils 1 0.0 - 2.0 %    Immature Granulocytes 1 0.0 - 5.0 %    Segs Absolute 3.1 1.7 - 8.2 K/UL    Absolute Lymph # 1.0 0.5 - 4.6 K/UL    Absolute Mono # 0.6 0.1 - 1.3 K/UL    Absolute Eos # 0.1 0.0 - 0.8 K/UL    Basophils Absolute 0.0 0.0 - 0.2 K/UL    Absolute Immature Granulocyte 0.1 0.0 - 0.5 K/UL   Basic Metabolic Panel    Collection Time: 02/15/23 10:35 AM   Result Value Ref Range    Sodium 136 133 - 143 mmol/L    Potassium 3.5 3.5 - 5.1 mmol/L    Chloride 98 (L) 101 - 110 mmol/L    CO2 31 21 - 32 mmol/L    Anion Gap 7 2 - 11 mmol/L    Glucose 119 (H) 65 - 100 mg/dL    BUN 18 8 - 23 MG/DL    Creatinine 1.20 (H) 0.6 - 1.0 MG/DL    Est, Glom Filt Rate 47 (L) >60 ml/min/1.73m2    Calcium 9.9 8.3 - 10.4 MG/DL   Urinalysis with Reflex to Culture    Collection Time: 02/15/23 12:02 PM    Specimen: Urine   Result Value Ref Range    Color, UA YELLOW/STRAW      Appearance CLEAR      Specific Gravity, UA 1.013 1.001 - 1.023      pH, Urine 6.5 5.0 - 9.0      Protein, UA Negative NEG mg/dL    Glucose, UA Negative mg/dL    Ketones, Urine Negative NEG mg/dL    Bilirubin Urine Negative NEG      Blood, Urine Negative NEG      Urobilinogen, Urine 0.2 0.2 - 1.0 EU/dL    Nitrite, Urine Negative NEG      Leukocyte Esterase, Urine Negative NEG      Urine Culture if Indicated CULTURE NOT INDICATED BY UA RESULT      WBC, UA 0-4 U4 /hpf    RBC, UA 0-5 U5 /hpf    BACTERIA, URINE Negative NEG /hpf    Epithelial Cells UA 0-5 U5 /hpf    Casts 0-2 U2 /lpf    Mucus, UA 0 0 /lpf   POCT Glucose    Collection Time: 02/15/23  4:27 PM   Result Value Ref Range    POC Glucose 97 65 - 100 mg/dL    Performed by: Bañuelos (Hammonds)CNA    Lactic Acid    Collection Time: 02/15/23  9:48 PM   Result Value Ref Range    Lactic Acid, Plasma 1.3 0.4 - 2.0 MMOL/L   Procalcitonin    Collection Time: 02/15/23  9:54 PM   Result Value Ref Range    Procalcitonin 0.06 0.00 - 0.49 ng/mL   COVID-19, Rapid    Collection Time: 02/15/23 10:42 PM    Specimen: Nasopharyngeal   Result Value Ref Range    Source NASAL      SARS-CoV-2, Rapid Not detected NOTD     Influenza A/B, Molecular    Collection Time: 02/15/23 10:45 PM    Specimen: Not Specified   Result Value Ref Range    Influenza A, VIK Detected (A) NOTD      Influenza B, VIK Not detected NOTD     CBC with Auto Differential    Collection Time: 02/16/23  5:18 AM   Result Value Ref Range    WBC 3.9 (L) 4.3 - 11.1 K/uL    RBC 3.35 (L) 4.05 - 5.2 M/uL    Hemoglobin 10.3 (L) 11.7 - 15.4 g/dL    Hematocrit 31.9 (L) 35.8 - 46.3 %    MCV 95.2 82 - 102 FL    MCH 30.7 26.1 - 32.9 PG    MCHC 32.3 31.4 - 35.0 g/dL    RDW 14.5 11.9 - 14.6 %    Platelets 180 102 - 091 K/uL    MPV 11.1 9.4 - 12.3 FL    nRBC 0.00 0.0 - 0.2 K/uL    Differential Type AUTOMATED Seg Neutrophils 59 43 - 78 %    Lymphocytes 25 13 - 44 %    Monocytes 12 4.0 - 12.0 %    Eosinophils % 1 0.5 - 7.8 %    Basophils 1 0.0 - 2.0 %    Immature Granulocytes 2 0.0 - 5.0 %    Segs Absolute 2.3 1.7 - 8.2 K/UL    Absolute Lymph # 1.0 0.5 - 4.6 K/UL    Absolute Mono # 0.5 0.1 - 1.3 K/UL    Absolute Eos # 0.0 0.0 - 0.8 K/UL    Basophils Absolute 0.0 0.0 - 0.2 K/UL    Absolute Immature Granulocyte 0.1 0.0 - 0.5 K/UL   Comprehensive Metabolic Panel    Collection Time: 02/16/23  5:18 AM   Result Value Ref Range    Sodium 134 133 - 143 mmol/L    Potassium 4.2 3.5 - 5.1 mmol/L    Chloride 99 (L) 101 - 110 mmol/L    CO2 32 21 - 32 mmol/L    Anion Gap 3 2 - 11 mmol/L    Glucose 111 (H) 65 - 100 mg/dL    BUN 25 (H) 8 - 23 MG/DL    Creatinine 1.50 (H) 0.6 - 1.0 MG/DL    Est, Glom Filt Rate 36 (L) >60 ml/min/1.73m2    Calcium 9.5 8.3 - 10.4 MG/DL    Total Bilirubin 0.5 0.2 - 1.1 MG/DL    ALT 19 12 - 65 U/L    AST 42 (H) 15 - 37 U/L    Alk Phosphatase 83 50 - 136 U/L    Total Protein 7.2 6.3 - 8.2 g/dL    Albumin 3.0 (L) 3.2 - 4.6 g/dL    Globulin 4.2 2.8 - 4.5 g/dL    Albumin/Globulin Ratio 0.7 0.4 - 1.6     Magnesium    Collection Time: 02/16/23  5:18 AM   Result Value Ref Range    Magnesium 2.0 1.8 - 2.4 mg/dL   Phosphorus    Collection Time: 02/16/23  5:18 AM   Result Value Ref Range    Phosphorus 6.0 (H) 2.3 - 3.7 MG/DL   POCT Glucose    Collection Time: 02/16/23  6:09 AM   Result Value Ref Range    POC Glucose 106 (H) 65 - 100 mg/dL    Performed by: Vale Liz    Urinalysis w rflx microscopic    Collection Time: 02/16/23  9:30 AM   Result Value Ref Range    Color, UA YELLOW/STRAW      Appearance CLEAR      Specific Gravity, UA 1.017 1.001 - 1.023      pH, Urine 5.5 5.0 - 9.0      Protein, UA TRACE (A) NEG mg/dL    Glucose, UA Negative mg/dL    Ketones, Urine Negative NEG mg/dL    Bilirubin Urine Negative NEG      Blood, Urine Negative NEG      Urobilinogen, Urine 0.2 0.2 - 1.0 EU/dL    Nitrite, Urine Negative NEG      Leukocyte Esterase, Urine Negative NEG      Epithelial Cells UA 3-5 0 /hpf    BACTERIA, URINE 0 0 /hpf    Casts 5-10 0 /lpf    Other observations RESULTS VERIFIED MANUALLY           Assessment and Plan      Daily physician / PA medical management:    # Closed left hip fracture, initial encounter ([facility] [address] [S72.002A] - interdisciplinary approach to rehabilitation including PT, OT, nursing and physiatry and disease specific education. # Pain management - PRN meds APAP 650mg q6h (mild), dilaudid 1mg q4h (moderate), dilaudid 2mg q4h (severe); wean opioids as tolerated. # Hypertension - daily lisinopril 10mg and Toprol XL 50mg. # CHF, combined systolic / diastolic - continue furosemide 40mg QOD. # Anemia - acute on chronic; Hgb 11.2 at [facility] admission. # Osteoporosis - intolerance of oral bisphosphonate therapy; continue calcium, vitamin D. # Chemotherapy-induced neuropathy - continue home meds gabapentin 600mg BID and amitriptyline 10mg QHS. # Diabetes mellitus - HgbA1c 6.2% (6/2022). [facility], cover with Humalog SSI and decrease to BID checks. # Chronic kidney disease - stable; avoid nephrotoxic agents. # Electrolyte management - K+ supplement 10mEq daily since patient taking furosemide. # DVT prophylaxis - ASA EC 325mg daily per Ortho     # Bladder program / urinary retention / neurogenic bladder - schedule voids q6-8h. Check post-void residual as needed; in-and-out catheter if post-void residual is more than 400ml. # Overactive bladder - continue trospium 20mg HS. Restart home med Myrbetriq at d/c. # UTI prophylaxis - continue nitrofurantoin 50mg daily. # Bowel program - at risk for constipation as a side effect of opioids, other medications, impaired mobility, etc. MiraLAX daily for regularity, Senokot-S for stool softener + laxative. PRN MOM, bisacodyl suppository or tablets for constipation.      # Sjogren syndrome - following rheumatology outpatient. # Sleep disruption - continue melatonin 3mg nightly. #Influenza A - Tamiflu to end 2/20      Below are the active medical comorbidities / hospital conditions which will affect rehab course with plan for mitigation. Continue daily physician / PA medical management:  Principal Problem:    Closed left hip fracture, initial encounter [facility])  Active Problems:    Primary insomnia    Sjogren syndrome, unspecified ([facility] [address]    Type 2 diabetes mellitus with chronic kidney disease    Chronic gout of right hand    Chronic combined systolic and diastolic CHF (congestive heart failure) (HCC)    Dyslipidemia    Depression    Osteoarthritis    Chemotherapy-induced neuropathy (HCC)    Essential hypertension    Chronic anemia  Resolved Problems:    * No resolved hospital problems. *     Discharge held due to current medical condition.  Planned discharge for 2/21/23     Signed By: Amparo Tran MD    February 16, 2023

## 2023-02-16 NOTE — PROGRESS NOTES
Patients flu swab is positive for influenza A. Patient placed on Droplet Precautions. Hospitalist notified of result. Orders noted.

## 2023-02-16 NOTE — PROGRESS NOTES
Physical Therapy  PHYSICAL THERAPY DAILY NOTE  Time In:  7315 PM  Time Out:  1351 PM  Total Treatment Time:  (P) 49 Minutes  Pt. Seen for: PM, Gait Training, Therapeutic Exercise, and Transfer Training     Subjective: \" I just feel sore all over but willing to exercise and walk. \"         Objective:  Precautions: Falls and Poor Safety Awareness    GROSS ASSESSMENT Daily Assessment           COGNITION Daily Assessment    intact       BED/MAT MOBILITY Daily Assessment    Rolling Right:   Rolling Left:   Supine to Sit: Supervision/Standby Assist  Sit to Supine: Supervision/Standby Assist       TRANSFERS Daily Assessment    Sit to Stand: Supervision/Standby Assist  Stand to Sit: Supervision/Standby Assist  Transfer Type: Stand Pivot and with rolling walker  Transfer Assistance: Supervision/Standby Assist  Car Transfers: NT         GAIT Daily Assessment    Amount of Assistance: Supervision/Standby Assist  Distance (ft): 125  Assistive Device: RW  Surface: Level Surface       STEPS/STAIRS Daily Assessment    Steps Ambulated:    Level of Assistance:    Railing:  Assistive Device:        BALANCE Daily Assessment    Static Sitting:   Dynamic Sitting:   Static Standing:   Dynamic Standing:        WHEELCHAIR MOBILITY Daily Assessment    Able to Propel (ft):   Assistance:   Surface:   Wheelchair Set-up:        LOWER EXTREMITY EXERCISES Daily Assessment    SUPINE EXERCISES Sets Reps Comments   Ankle Pumps 2 10    Quad Sets 2 10    Glut Sets 2 10    Heel Slides 2 10    Hip Abduction 2 10    Short Arc Quad 2 10                 Pain level: pain reported as a 7 on pain scale  Pain Location:  hip  Pain Interventions:     Vital Signs:  BP (!) 113/95   Pulse 67   Temp 97.7 °F (36.5 °C)   Resp 16   Wt 144 lb 11.2 oz (65.6 kg)   SpO2 (!) 89%   BMI 24.08 kg/m²       Education:      Interdisciplinary Communication:     Pt. Left in bed with call bell at reach. Assessment: Patient making good progress with all mobility . Plan of Care: Continue with plan of care.     Asim Vee, PTA  2/16/2023

## 2023-02-16 NOTE — FLOWSHEET NOTE
Patient noted to have elevated temperature, elevated heart rate, elevated blood pressure and decreased O2 saturation. Confusion also noted. Tylenol and blood pressure medications administered, Lien nurse notified. Hospitalist consult placed. Orders received. 2130 patient is now oriented x 4.

## 2023-02-16 NOTE — PROGRESS NOTES
Physical Therapy  PHYSICAL THERAPY DAILY NOTE  Time In:  945 AM  Time Out:  1009 AM  Total Treatment Time:  (P) 24 Minutes  Pt. Seen for: AM, Gait Training, Therapeutic Exercise, and Transfer Training     Subjective: \" I told you guys I was sick. I have the flu. \"  \" I dont want to go out to gym to get everyone sick. \"         Objective:  Precautions: Falls and Poor Safety Awareness    GROSS ASSESSMENT Daily Assessment           COGNITION Daily Assessment    intact       BED/MAT MOBILITY Daily Assessment    Rolling Right: Independent  Rolling Left: Independent  Supine to Sit: Supervision/Standby Assist  Sit to Supine: Supervision/Standby Assist       TRANSFERS Daily Assessment    Sit to Stand: Supervision/Standby Assist  Stand to Sit: Supervision/Standby Assist  Transfer Type: Stand Pivot and with rolling walker  Transfer Assistance: Supervision/Standby Assist  Car Transfers: NT         GAIT Daily Assessment    Amount of Assistance: CGA  Distance (ft): 100  Assistive Device: RW  Surface: Level Surface       STEPS/STAIRS Daily Assessment    Steps Ambulated:    Level of Assistance:    Railing:  Assistive Device:        BALANCE Daily Assessment    Static Sitting:   Dynamic Sitting:   Static Standing:   Dynamic Standing:        WHEELCHAIR MOBILITY Daily Assessment    Able to Propel (ft):   Assistance:   Surface:   Wheelchair Set-up:        LOWER EXTREMITY EXERCISES Daily Assessment   SEATED EXERCISES Sets Reps Comments   Ankle Pumps 2 10    Hip Flexion 2 10    Long Arc Quads 2 10    Hip Adduction/Ball Squeeze 2 10                              Pain level: no pain noted. Pain Location:    Pain Interventions:     Vital Signs:  BP (!) 113/95   Pulse 67   Temp 97.7 °F (36.5 °C)   Resp 16   Wt 144 lb 11.2 oz (65.6 kg)   SpO2 (!) 89%   BMI 24.08 kg/m²       Education:      Interdisciplinary Communication:     Pt. Left in bed with call bell at reach. Assessment: Patient making progress but does not feel well. Plan of Care: Continue with plan of care.     Aurora Burn, PTA  2/16/2023

## 2023-02-17 LAB
GLUCOSE BLD STRIP.AUTO-MCNC: 106 MG/DL (ref 65–100)
GLUCOSE BLD STRIP.AUTO-MCNC: 128 MG/DL (ref 65–100)
SERVICE CMNT-IMP: ABNORMAL
SERVICE CMNT-IMP: ABNORMAL

## 2023-02-17 PROCEDURE — 82962 GLUCOSE BLOOD TEST: CPT

## 2023-02-17 PROCEDURE — 6370000000 HC RX 637 (ALT 250 FOR IP): Performed by: PHYSICAL MEDICINE & REHABILITATION

## 2023-02-17 PROCEDURE — 6370000000 HC RX 637 (ALT 250 FOR IP): Performed by: STUDENT IN AN ORGANIZED HEALTH CARE EDUCATION/TRAINING PROGRAM

## 2023-02-17 PROCEDURE — 1180000000 HC REHAB R&B

## 2023-02-17 PROCEDURE — 97110 THERAPEUTIC EXERCISES: CPT

## 2023-02-17 PROCEDURE — 97530 THERAPEUTIC ACTIVITIES: CPT

## 2023-02-17 PROCEDURE — 97116 GAIT TRAINING THERAPY: CPT

## 2023-02-17 PROCEDURE — 6370000000 HC RX 637 (ALT 250 FOR IP): Performed by: PHYSICIAN ASSISTANT

## 2023-02-17 PROCEDURE — 97535 SELF CARE MNGMENT TRAINING: CPT

## 2023-02-17 PROCEDURE — 6370000000 HC RX 637 (ALT 250 FOR IP): Performed by: HOSPITALIST

## 2023-02-17 RX ADMIN — GUAIFENESIN AND DEXTROMETHORPHAN 5 ML: 100; 10 SYRUP ORAL at 03:56

## 2023-02-17 RX ADMIN — CHOLECALCIFEROL TAB 25 MCG (1000 UNIT) 2000 UNITS: 25 TAB at 08:27

## 2023-02-17 RX ADMIN — Medication 1 AMPULE: at 08:28

## 2023-02-17 RX ADMIN — ASPIRIN 325 MG: 325 TABLET, COATED ORAL at 08:27

## 2023-02-17 RX ADMIN — LISINOPRIL 10 MG: 5 TABLET ORAL at 22:15

## 2023-02-17 RX ADMIN — OSELTAMIVIR PHOSPHATE 30 MG: 30 CAPSULE ORAL at 08:27

## 2023-02-17 RX ADMIN — METOPROLOL SUCCINATE 50 MG: 50 TABLET, EXTENDED RELEASE ORAL at 22:15

## 2023-02-17 RX ADMIN — TROSPIUM CHLORIDE 20 MG: 20 TABLET, FILM COATED ORAL at 22:15

## 2023-02-17 RX ADMIN — GABAPENTIN 300 MG: 300 CAPSULE ORAL at 22:48

## 2023-02-17 RX ADMIN — Medication 667 MG: at 18:43

## 2023-02-17 RX ADMIN — GUAIFENESIN AND DEXTROMETHORPHAN 5 ML: 100; 10 SYRUP ORAL at 12:06

## 2023-02-17 RX ADMIN — SENNOSIDES AND DOCUSATE SODIUM 2 TABLET: 50; 8.6 TABLET ORAL at 08:26

## 2023-02-17 RX ADMIN — NITROFURANTOIN MACROCRYSTALS 50 MG: 50 CAPSULE ORAL at 08:27

## 2023-02-17 RX ADMIN — GUAIFENESIN AND DEXTROMETHORPHAN 5 ML: 100; 10 SYRUP ORAL at 18:43

## 2023-02-17 RX ADMIN — LISINOPRIL 10 MG: 5 TABLET ORAL at 08:28

## 2023-02-17 RX ADMIN — OSELTAMIVIR PHOSPHATE 30 MG: 30 CAPSULE ORAL at 22:15

## 2023-02-17 RX ADMIN — Medication 667 MG: at 08:27

## 2023-02-17 RX ADMIN — GUAIFENESIN AND DEXTROMETHORPHAN 5 ML: 100; 10 SYRUP ORAL at 22:47

## 2023-02-17 RX ADMIN — Medication 667 MG: at 12:06

## 2023-02-17 RX ADMIN — GABAPENTIN 600 MG: 300 CAPSULE ORAL at 08:27

## 2023-02-17 RX ADMIN — Medication 1 AMPULE: at 22:21

## 2023-02-17 RX ADMIN — CALCIUM CARBONATE (ANTACID) CHEW TAB 500 MG 500 MG: 500 CHEW TAB at 22:15

## 2023-02-17 RX ADMIN — HYDROMORPHONE HYDROCHLORIDE 2 MG: 2 TABLET ORAL at 22:47

## 2023-02-17 RX ADMIN — HYDROMORPHONE HYDROCHLORIDE 2 MG: 2 TABLET ORAL at 14:05

## 2023-02-17 RX ADMIN — HYDROMORPHONE HYDROCHLORIDE 2 MG: 2 TABLET ORAL at 03:55

## 2023-02-17 RX ADMIN — AMITRIPTYLINE HYDROCHLORIDE 10 MG: 10 TABLET, FILM COATED ORAL at 22:15

## 2023-02-17 RX ADMIN — HYDROMORPHONE HYDROCHLORIDE 2 MG: 2 TABLET ORAL at 08:28

## 2023-02-17 RX ADMIN — Medication 3 MG: at 22:15

## 2023-02-17 RX ADMIN — GUAIFENESIN AND DEXTROMETHORPHAN 5 ML: 100; 10 SYRUP ORAL at 08:28

## 2023-02-17 ASSESSMENT — PAIN DESCRIPTION - DESCRIPTORS
DESCRIPTORS: ACHING;SORE
DESCRIPTORS: ACHING
DESCRIPTORS: ACHING
DESCRIPTORS: ACHING;SORE

## 2023-02-17 ASSESSMENT — PAIN DESCRIPTION - LOCATION
LOCATION: HIP

## 2023-02-17 ASSESSMENT — PAIN DESCRIPTION - PAIN TYPE: TYPE: ACUTE PAIN

## 2023-02-17 ASSESSMENT — PAIN SCALES - GENERAL
PAINLEVEL_OUTOF10: 10
PAINLEVEL_OUTOF10: 5
PAINLEVEL_OUTOF10: 3
PAINLEVEL_OUTOF10: 7
PAINLEVEL_OUTOF10: 8
PAINLEVEL_OUTOF10: 7

## 2023-02-17 ASSESSMENT — PAIN DESCRIPTION - ORIENTATION
ORIENTATION: LEFT

## 2023-02-17 NOTE — PROGRESS NOTES
PHYSICAL WEEKLY PROGRESS NOTE    TIME IN: 56  TIME OUT: 1115  Total Treatment Time: 45 Minutes    Subjective: Pt reports not feeling well overall due to flu. Participates well with session today. Objective:     Precautions:   Falls and WB: WBAT L LE      Vital Signs:/83   Pulse 79   Temp 99.5 °F (37.5 °C) (Oral)   Resp 18   Wt 145 lb 6.4 oz (66 kg)   SpO2 90%   BMI 24.20 kg/m²       Pain level: 2/10  Pain location: left hip  Pain interventions: repositioned, rest     Patient education: transfer training, bed mobility training, gait training including curb step and uneven surface, stair training     Interdisciplinary Communication: discussed with OT after session who notes continued cognitive deficits     Cognition: alert, follows commands. Impulsive at times.     Lower Extremity Function:      LLE RLE   ROM WFL functionally WFL   Fine Motor Coordination Intact Intact   Tone Normal Normal   Sensation Light Touch Intact Light Touch Intact   Strength Generally decreased, but functional Generally decreased, but functional           Functional Assessment:    Balance  Comments   Static Sitting Good:  Pt. able to maintain balance w/o UE support;  exhibits some postural sway    Dynamic Sitting Good - accepts moderate challenge;  can maintain balance while picking object off the floor    Static Standing Fair:  Pt. requires UE support;  may need occasional min A    Dynamic Standing Fair - accepts minimal challenge;  can maintain balance while turning head/trunk                   BED MOBILITY &TRANSFERS Initial Assessment   Weekly Assessment Comments   Rolling  88  Independent   6  Independent   independent    Supine to Sit 3  Partial/moderate assistance   6  Independent   independent   Sit to to Supine 3  Partial/moderate assistance   4  Supervision or touching assistance   supervision   Sit to Stand 3  Partial/moderate assistance   4  Supervision or touching assistance   SBA   Chair To/From Bed 3  Partial/moderate assistance   4  Supervision or touching assistance   SBA w/RW   Car Transfer 88      88                WHEELCHAIR MOBILITY/MANAGEMENT Initial Assessment Weekly Assessment Comments   Able to Propel 48' w/ 2 Turns 9      9          Able to Propel 150' 9    9          Wheelchair set up assistance required  NT    Wheelchair management  NT          AMBULATION Initial Assessment Weekly Assessment Comments   Assistive device  RW    Walk 10' 88     4  Supervision or touching assistance   SBA w/RW   Walk 50' with 2 Turns 88     4  Supervision or touching assistance   SBA w/RW   Walk 150' 88   4  Supervision or touching assistance   SBA w/RW   Walking 10' on Unlevel Surface 88     3  Partial/moderate assistance   mod assist w/RW         STEPS/STAIRS Initial Assessment Weekly Assessment Comments   1 Curb Step 88   3  Partial/moderate assistance   min A w/RW   4 Steps 88     3  Partial/moderate assistance   min A w/RW, handrails   12 Steps 9       88          Curbs/Ramps  Mod A        Treatment Interventions: treatment today included bed mobility (rolling, supine<->sit), transfer training with RW and cues for hand placement, gait training with RW including uneven surfaces and up/down 1 curb step, stair training, and gosia med x 10 minutes, level 2    Pt. Left in wheelchair beginning OT session           Assessment: Pt has made great progress from initial therapy evaluation; pain seems to be improving and she has been able to greatly increase activity level, gait distance, and independence. Bed mobility performed at mod I level, transfers at SBA, and gait with RW/CGA with verbal cues for safety and mechanics. Was able to initiate stair training today and did well with cues for sequencing. She has met LTG 1 and 2, progressing very well towards LTG 3-5 and has good potential to reach these by discharge. Plan of Care:  Continue with POC and progress as tolerated.          Goals:  Long Term Goals:  Pt will demonstrate roll left & right & transition supine<>sit with Independent in 10 days (MET)  2. Pt. will transfer from bed to/from chair with Supervision/Standby Assist using the least restrictive device in 10 days (MET)  3. Pt. will ambulate with 150 feet with RW or LRAD and Supervision/Standby Assist in 10 days (Progressing, currently at [facility] level)  4. Pt. Will ambulate up/down 4 steps with single railing and CGA in 10 days (progressing, currently at min assist level)  5. Pt. Will perform HEP w/ supervision in 10 days.  (Progressing)                Collin Tyson, PT  2/17/2023

## 2023-02-17 NOTE — PROGRESS NOTES
OT WEEKLY PROGRESS NOTE    Time In 0836      Time Out 0913        GOALS:  Short Term Goals:  Time Frame for Short Term Goals : 10 days   STG 1: Patient will dress UB with [place] using AE/DME PRN. 2/17/23 Downgrade to Setup. STG 2: Patient will dress LB with Setup Assistance using AE/DME PRN. 2/17/23 Progressing. STG 3: Patient will don footwear with Setup Assistance using AE/DME PRN. 2/17/23 Goal met. STG 4: Patient will bathe with Setup Assistance using AE/DME PRN. 2/17/23 Progressing. STG 5: Patient will complete grooming tasks standing sink side with Setup Assistance using AE/DME PRN. 2/17/23 Downgrade to Supervision. Subjective: Patient agreeable to therapy. Pain: No pain expressed.   Precautions: Falls, Impulsive, and WBAT LLE, Droplet    MOBILITY:   Current Score Current Comments   Supine to Sit Supervision or Touching Assistance S to [place]   Sit to Stand Supervision or Touching Assistance SBA-S   Transfer Assist Supervision or Touching Assistance SBA with RW   Ambulation Supervision or Touching Assistance CGA-SBA with RW, cues for safety and RW management      ACTIVITIES OF DAILY LIVING:    Initial Score Current Score   Eating Independent  I Independent  I-[place]   Oral [place] Setup or clean-up assistance  S/U A seated at sink side Independent  SBA in stance vs I seated at sink   Bathing Partial/moderate assistance  MIn A to maintain safety while standing Supervision or touching assistance  S, cues for safety/to sit to dry BLE rather than performing in one leg stance   Upper Body  Dressing Setup or clean-up assistance  S/U A seated EOB Setup or clean-up assistance  S/U   Lower Body Dressing Partial/moderate assistance  MIN A to don to waist in standing  Supervision or touching assistance  S progressing toward S/U    Donning/ [place] Footwear Substantial/maximal assistance   Ind. to don sock on R foot, MAX A for L foot Setup or clean-up assistance  S/U   Toilet Transfer Partial/moderate assistance  MIN A SPT w/ RW Supervision or touching assistance  S SPT with [address] [place] or touching assistance  S - CGA Supervision or touching assistance  S in stance, progressing toward S/U   Initial Score: Patient's lowest score within first 72 hrs of stay as gathered by OT. Current Score: Patient's average performance level over the last 48 hours. Family Training: To be completed closer to discharge. Summary of Progress: Patient demonstrates steady progress with Activity Tolerance, Strength, Pain, and Standing Balance for performance of ADL and functional mobility, see above for details. Patient continues to require skilled OT services to address deficits, provide education, and progress towards goals as stated in POC. Summary of Session: Focus of session was on ADL routine and progress assessment. Education: Adaptive ADL Techniques, Rolling Walker Management, and Safety Awareness  Plan: Continue OT POC to address ADL skills, functional mobility, safety training, strength, balance, and activity tolerance to maximize safety and independence. Patient ended session seated in recliner with call bell and needs within reach.     Vamshi Gupta, OT  2/17/2023

## 2023-02-17 NOTE — PROGRESS NOTES
INPATIENT REHAB CENTER PROGRESS NOTE    Mirtha Morley  Admit Date: 2/10/2023  Admit Diagnosis:   Hip fracture ([facility] [address] [S72.009A]  Closed left hip fracture, initial encounter ([facility] [address] [S72.002A]    Subjective     2/17/2023  congestion improved. Still c/o mild cough. Pain controlled. Feeling a bit better. Completed IV hydration. Demonstrated proper use of IS. Tolerating therapy.      Objective:     Current Facility-Administered Medications   Medication Dose Route Frequency    oseltamivir (TAMIFLU) capsule 30 mg  30 mg Oral BID    calcium acetate (PHOSLO) tablet 667 mg  1 tablet Oral TID WC    sennosides-docusate sodium (SENOKOT-S) 8.6-50 MG tablet 2 tablet  2 tablet Oral Daily    insulin lispro (HUMALOG) injection vial 0-8 Units  0-8 Units SubCUTAneous BID WC    acetaminophen (TYLENOL) tablet 650 mg  650 mg Oral Q6H PRN    Or    acetaminophen (TYLENOL) suppository 650 mg  650 mg Rectal Q6H PRN    alcohol 62% (NOZIN) nasal  1 ampule  1 ampule Topical Q12H    amitriptyline (ELAVIL) tablet 10 mg  10 mg Oral Nightly    aspirin EC tablet 325 mg  325 mg Oral Daily    calcium carbonate (TUMS) chewable tablet 500 mg  1 tablet Oral Nightly    cetirizine (ZYRTEC) tablet 10 mg  10 mg Oral Daily PRN    furosemide (LASIX) tablet 40 mg  40 mg Oral Every Other Day    gabapentin (NEURONTIN) capsule 600 mg  600 mg Oral BID    glucagon (rDNA) injection 1 mg  1 mg SubCUTAneous PRN    glucose chewable tablet 16 g  4 tablet Oral PRN    hydrALAZINE (APRESOLINE) tablet 25 mg  25 mg Oral Q6H PRN    HYDROmorphone (DILAUDID) tablet 1 mg  1 mg Oral Q4H PRN    HYDROmorphone (DILAUDID) tablet 2 mg  2 mg Oral Q4H PRN    lisinopril (PRINIVIL;ZESTRIL) tablet 10 mg  10 mg Oral BID    melatonin tablet 3 mg  3 mg Oral Nightly    metoprolol succinate (TOPROL XL) extended release tablet 50 mg  50 mg Oral Nightly    nitrofurantoin (MACRODANTIN) capsule 50 mg  50 mg Oral Daily    polyethylene glycol (GLYCOLAX) packet 17 g  17 g Oral Daily PRN    Vitamin D (CHOLECALCIFEROL) tablet 2,000 Units  2,000 Units Oral Daily    guaiFENesin-dextromethorphan (ROBITUSSIN DM) 100-10 MG/5ML syrup 5 mL  5 mL Oral Q4H    trospium (SANCTURA) tablet 20 mg  20 mg Oral Nightly       Physical Exam:   Visit Vitals  /83   Pulse 79   Temp 99.5 °F (37.5 °C) (Oral)   Resp 19   Wt 145 lb 6.4 oz (66 kg)   SpO2 90%   BMI 24.20 kg/m²         General: Alert, no acute distress, well developed, well nourished. Skin: left hip incisions not visualized this encounter  HEENT: Normocephalic, edentulous, oral mucosa is moist. Extraocular movements are intact. Neck: Supple, non-tender. Respiratory: respirations unlabored. Equal chest rise. Cardiovascular: Normal rate, no cyanosis. ABD: Soft, non-tender, not distended. Integumentary: Clean, no rash, no skin breakdown. Surgical incision L hip CDI with staples. Surrounding hematoma. No drainage. Musculoskeletal: UE strength 4/5 and symmetric. R LE strength grossly intact. Left hip and LE exam limited by pain. Psychiatric: Appropriate mood & affect. Neuro: Alert, oriented x 4, speech and language intact. No focal deficits. Sensation grossly intact. Functional Assessment:   Patient currently limited by influenza diagnosis and not feeling well. Pt w/ low energy levels and generalized weakness, but maintains progress w/ functional mobility. Pt demonstrated good participation in OT treatment. Pt continues to benefit from skilled OT services to address remaining deficits and progress toward baseline level of independence and safety. Labs/Studies:  Recent Results (from the past 72 hour(s))   POCT Glucose    Collection Time: 02/14/23  4:57 PM   Result Value Ref Range    POC Glucose 136 (H) 65 - 100 mg/dL    Performed by: Bladimir    POCT Glucose    Collection Time: 02/15/23  6:13 AM   Result Value Ref Range    POC Glucose 126 (H) 65 - 100 mg/dL    Performed by:  Keyanna    CBC with Auto Differential Collection Time: 02/15/23 10:35 AM   Result Value Ref Range    WBC 4.9 4.3 - 11.1 K/uL    RBC 3.49 (L) 4.05 - 5.2 M/uL    Hemoglobin 10.6 (L) 11.7 - 15.4 g/dL    Hematocrit 32.1 (L) 35.8 - 46.3 %    MCV 92.0 82 - 102 FL    MCH 30.4 26.1 - 32.9 PG    MCHC 33.0 31.4 - 35.0 g/dL    RDW 14.5 11.9 - 14.6 %    Platelets 801 044 - 823 K/uL    MPV 10.5 9.4 - 12.3 FL    nRBC 0.00 0.0 - 0.2 K/uL    Differential Type AUTOMATED      Seg Neutrophils 64 43 - 78 %    Lymphocytes 20 13 - 44 %    Monocytes 13 (H) 4.0 - 12.0 %    Eosinophils % 1 0.5 - 7.8 %    Basophils 1 0.0 - 2.0 %    Immature Granulocytes 1 0.0 - 5.0 %    Segs Absolute 3.1 1.7 - 8.2 K/UL    Absolute Lymph # 1.0 0.5 - 4.6 K/UL    Absolute Mono # 0.6 0.1 - 1.3 K/UL    Absolute Eos # 0.1 0.0 - 0.8 K/UL    Basophils Absolute 0.0 0.0 - 0.2 K/UL    Absolute Immature Granulocyte 0.1 0.0 - 0.5 K/UL   Basic Metabolic Panel    Collection Time: 02/15/23 10:35 AM   Result Value Ref Range    Sodium 136 133 - 143 mmol/L    Potassium 3.5 3.5 - 5.1 mmol/L    Chloride 98 (L) 101 - 110 mmol/L    CO2 31 21 - 32 mmol/L    Anion Gap 7 2 - 11 mmol/L    Glucose 119 (H) 65 - 100 mg/dL    BUN 18 8 - 23 MG/DL    Creatinine 1.20 (H) 0.6 - 1.0 MG/DL    Est, Glom Filt Rate 47 (L) >60 ml/min/1.73m2    Calcium 9.9 8.3 - 10.4 MG/DL   Urinalysis with Reflex to Culture    Collection Time: 02/15/23 12:02 PM    Specimen: Urine   Result Value Ref Range    Color, UA YELLOW/STRAW      Appearance CLEAR      Specific Gravity, UA 1.013 1.001 - 1.023      pH, Urine 6.5 5.0 - 9.0      Protein, UA Negative NEG mg/dL    Glucose, UA Negative mg/dL    Ketones, Urine Negative NEG mg/dL    Bilirubin Urine Negative NEG      Blood, Urine Negative NEG      Urobilinogen, Urine 0.2 0.2 - 1.0 EU/dL    Nitrite, Urine Negative NEG      Leukocyte Esterase, Urine Negative NEG      Urine Culture if Indicated CULTURE NOT INDICATED BY UA RESULT      WBC, UA 0-4 U4 /hpf    RBC, UA 0-5 U5 /hpf    BACTERIA, URINE Negative NEG /hpf    Epithelial Cells UA 0-5 U5 /hpf    Casts 0-2 U2 /lpf    Mucus, UA 0 0 /lpf   POCT Glucose    Collection Time: 02/15/23  4:27 PM   Result Value Ref Range    POC Glucose 97 65 - 100 mg/dL    Performed by: Corey)CNA    Culture, Blood 1    Collection Time: 02/15/23  9:48 PM    Specimen: Blood   Result Value Ref Range    Special Requests RIGHT  FOREARM        Culture NO GROWTH 2 DAYS     Culture, Blood 1    Collection Time: 02/15/23  9:48 PM    Specimen: Blood   Result Value Ref Range    Special Requests RIGHT  Antecubital        Culture NO GROWTH 2 DAYS     Lactic Acid    Collection Time: 02/15/23  9:48 PM   Result Value Ref Range    Lactic Acid, Plasma 1.3 0.4 - 2.0 MMOL/L   Procalcitonin    Collection Time: 02/15/23  9:54 PM   Result Value Ref Range    Procalcitonin 0.06 0.00 - 0.49 ng/mL   COVID-19, Rapid    Collection Time: 02/15/23 10:42 PM    Specimen: Nasopharyngeal   Result Value Ref Range    Source NASAL      SARS-CoV-2, Rapid Not detected NOTD     Influenza A/B, Molecular    Collection Time: 02/15/23 10:45 PM    Specimen: Not Specified   Result Value Ref Range    Influenza A, VIK Detected (A) NOTD      Influenza B, VIK Not detected NOTD     CBC with Auto Differential    Collection Time: 02/16/23  5:18 AM   Result Value Ref Range    WBC 3.9 (L) 4.3 - 11.1 K/uL    RBC 3.35 (L) 4.05 - 5.2 M/uL    Hemoglobin 10.3 (L) 11.7 - 15.4 g/dL    Hematocrit 31.9 (L) 35.8 - 46.3 %    MCV 95.2 82 - 102 FL    MCH 30.7 26.1 - 32.9 PG    MCHC 32.3 31.4 - 35.0 g/dL    RDW 14.5 11.9 - 14.6 %    Platelets 339 180 - 634 K/uL    MPV 11.1 9.4 - 12.3 FL    nRBC 0.00 0.0 - 0.2 K/uL    Differential Type AUTOMATED      Seg Neutrophils 59 43 - 78 %    Lymphocytes 25 13 - 44 %    Monocytes 12 4.0 - 12.0 %    Eosinophils % 1 0.5 - 7.8 %    Basophils 1 0.0 - 2.0 %    Immature Granulocytes 2 0.0 - 5.0 %    Segs Absolute 2.3 1.7 - 8.2 K/UL    Absolute Lymph # 1.0 0.5 - 4.6 K/UL    Absolute Mono # 0.5 0.1 - 1.3 K/UL Absolute Eos # 0.0 0.0 - 0.8 K/UL    Basophils Absolute 0.0 0.0 - 0.2 K/UL    Absolute Immature Granulocyte 0.1 0.0 - 0.5 K/UL   Comprehensive Metabolic Panel    Collection Time: 02/16/23  5:18 AM   Result Value Ref Range    Sodium 134 133 - 143 mmol/L    Potassium 4.2 3.5 - 5.1 mmol/L    Chloride 99 (L) 101 - 110 mmol/L    CO2 32 21 - 32 mmol/L    Anion Gap 3 2 - 11 mmol/L    Glucose 111 (H) 65 - 100 mg/dL    BUN 25 (H) 8 - 23 MG/DL    Creatinine 1.50 (H) 0.6 - 1.0 MG/DL    Est, Glom Filt Rate 36 (L) >60 ml/min/1.73m2    Calcium 9.5 8.3 - 10.4 MG/DL    Total Bilirubin 0.5 0.2 - 1.1 MG/DL    ALT 19 12 - 65 U/L    AST 42 (H) 15 - 37 U/L    Alk Phosphatase 83 50 - 136 U/L    Total Protein 7.2 6.3 - 8.2 g/dL    Albumin 3.0 (L) 3.2 - 4.6 g/dL    Globulin 4.2 2.8 - 4.5 g/dL    Albumin/Globulin Ratio 0.7 0.4 - 1.6     Magnesium    Collection Time: 02/16/23  5:18 AM   Result Value Ref Range    Magnesium 2.0 1.8 - 2.4 mg/dL   Phosphorus    Collection Time: 02/16/23  5:18 AM   Result Value Ref Range    Phosphorus 6.0 (H) 2.3 - 3.7 MG/DL   POCT Glucose    Collection Time: 02/16/23  6:09 AM   Result Value Ref Range    POC Glucose 106 (H) 65 - 100 mg/dL    Performed by: Blanka Cates    Urinalysis w rflx microscopic    Collection Time: 02/16/23  9:30 AM   Result Value Ref Range    Color, UA YELLOW/STRAW      Appearance CLEAR      Specific Gravity, UA 1.017 1.001 - 1.023      pH, Urine 5.5 5.0 - 9.0      Protein, UA TRACE (A) NEG mg/dL    Glucose, UA Negative mg/dL    Ketones, Urine Negative NEG mg/dL    Bilirubin Urine Negative NEG      Blood, Urine Negative NEG      Urobilinogen, Urine 0.2 0.2 - 1.0 EU/dL    Nitrite, Urine Negative NEG      Leukocyte Esterase, Urine Negative NEG      Epithelial Cells UA 3-5 0 /hpf    BACTERIA, URINE 0 0 /hpf    Casts 5-10 0 /lpf    Other observations RESULTS VERIFIED MANUALLY     POCT Glucose    Collection Time: 02/16/23  4:31 PM   Result Value Ref Range    POC Glucose 112 (H) 65 - 100 mg/dL    Performed by: Tania    POCT Glucose    Collection Time: 02/17/23  6:13 AM   Result Value Ref Range    POC Glucose 106 (H) 65 - 100 mg/dL    Performed by: Keyanna          Assessment and Plan      Daily physician / PA medical management:    # Closed left hip fracture, initial encounter ([facility] [address] [S72.002A] - interdisciplinary approach to rehabilitation including PT, OT, nursing and physiatry and disease specific education. # Pain management - PRN meds APAP 650mg q6h (mild), dilaudid 1mg q4h (moderate), dilaudid 2mg q4h (severe); wean opioids as tolerated. # Hypertension - daily lisinopril 10mg and Toprol XL 50mg. # CHF, combined systolic / diastolic - continue furosemide 40mg QOD. # Anemia - acute on chronic; Hgb 11.2 at [facility] admission. # Osteoporosis - intolerance of oral bisphosphonate therapy; continue calcium, vitamin D. # Chemotherapy-induced neuropathy - continue home meds gabapentin 600mg BID and amitriptyline 10mg QHS. # Diabetes mellitus - HgbA1c 6.2% (6/2022). [facility] with Humalog SSI and decrease to BID checks. # Chronic kidney disease - stable; avoid nephrotoxic agents. # Electrolyte management - K+ supplement 10mEq daily since patient taking furosemide. # DVT prophylaxis - ASA EC 325mg daily per Orth[facility]     # Bladder program / urinary retention / neurogenic bladder - schedule voids q6-8h. Check post-void residual as needed; in-and-out catheter if post-void residual is more than 400ml. # Overactive bladder - continue trospium 20mg HS. Restart home med Myrbetriq at d/c. # UTI prophylaxis - continue nitrofurantoin 50mg daily. # Bowel program - at risk for constipation as a side effect of opioids, other medications, impaired mobility, etc. MiraLAX daily for regularity, Senokot-S for stool softener + laxative. PRN MOM, bisacodyl suppository or tablets for constipation.      # Sjogren syndrome - following rheumatology outpatient. # Sleep disruption - continue melatonin 3mg nightly. #Influenza A - Tamiflu to end 2/20      Below are the active medical comorbidities / hospital conditions which will affect rehab course with plan for mitigation. Continue daily physician / PA medical management:  Principal Problem:    Closed left hip fracture, initial encounter [facility])  Active Problems:    Primary insomnia    Sjogren syndrome, unspecified ([facility] [address]    Type 2 diabetes mellitus with chronic kidney disease    Chronic gout of right hand    Chronic combined systolic and diastolic CHF (congestive heart failure) (HCC)    Dyslipidemia    Depression    Osteoarthritis    Chemotherapy-induced neuropathy (HCC)    Essential hypertension    Chronic anemia  Resolved Problems:    * No resolved hospital problems. *     Discharge held due to current medical condition.  Planned discharge for 2/21/23     Signed By: Asaf Xavier MD    February 17, 2023

## 2023-02-17 NOTE — PROGRESS NOTES
Hospitalist Progress Note   Admit Date:  2/10/2023  3:09 PM   Name:  Lynda Espinoza   Age:  69 y.o. Sex:  female  :  1945   MRN:  [id number]   Room:      Presenting Complaint: No chief complaint on file. Reason(s) for Admission: Hip fracture ([facility] [address] [S72.009A]  Closed left hip fracture, initial encounter [facility]) [id number] [facility] Course:   Been [place] 69years old female with history of GERD, diabetes type 2, recurrent UTI, CKD stage III, Sjogren's syndrome, hyperlipidemia osteopenia, polyneuropathy, vitamin D deficiency, history of breast cancer status postmastectomy and chemotherapy, history of depression, history of dilated cardiomyopathy initially presented after mechanical fall, have nondisplaced left femoral neck fracture, patient underwent ORIF of left femoral neck with plate and screw fixation by Dr. Nam Fischer on , eventually discharged to rehab on 2/10. Consult was called from inpatient physical therapy team due to fever. Patient is comfortable in bed, not in acute distress, denies any redness or swelling over the surgical site, patient reports of mild cough and nasal congestion but denies any body aches. Subjective & 24hr Events (23): Patient was seen and examined at the bedside. She was laying comfortably on the bed. .  She was afebrile. She denies chest pain, shortness of breath, cough, nausea, vomiting. Assessment & Plan:   FLU positive   Pt is saturating well on RA. Afebrile  Blood cultures negative, procalcitonin is 0.18   urinalysis and chest x-ray was negative. lactic acid normal, pro calcitonin negative,    Diabetes type 2: Blood sugars are well controlled   continue current regimen. Hypertension: Continue on home medications. Peripheral neuropathy: Continue gabapentin. Chronic combined congestive heart failure: continue beta-blockers, ACE inhibitors.      History of recurrent UTI:  urinalysis negative          DVT prophylaxis and pain management as per primary team    PT/OT evals and PPD needed/ordered? Diet:  ADULT DIET; Regular; 4 carb choices (60 gm/meal)  VTE prophylaxis: SCD  Code status: Full Code    Hospital Problems:  Principal Problem:    Closed left hip fracture, initial encounter ([facility] [address]  Active Problems:    Primary insomnia    Sjogren syndrome, unspecified ([facility] [address]    Type 2 diabetes mellitus with chronic kidney disease    Chronic gout of right hand    Chronic combined systolic and diastolic CHF (congestive heart failure) (HCC)    Dyslipidemia    Depression    Osteoarthritis    Chemotherapy-induced neuropathy (HCC)    Essential hypertension    Chronic anemia  Resolved Problems:    * No resolved hospital problems. *      Objective:   Patient Vitals for the past 24 hrs:   Temp Pulse Resp BP SpO2   02/17/23 1544 98.2 °F (36.8 °C) 72 17 (!) 129/58 92 %   02/17/23 1435 -- -- 18 -- --   02/17/23 0858 -- -- 18 -- --   02/17/23 0746 99.5 °F (37.5 °C) 79 19 109/83 90 %   02/16/23 2240 -- -- -- -- 91 %   02/16/23 2038 -- (!) 101 -- (!) 144/78 --   02/16/23 1910 99.9 °F (37.7 °C) (!) 101 17 (!) 144/78 90 %       Oxygen Therapy  SpO2: 92 %  Pulse Oximetry Type: Intermittent  O2 Device: None (Room air)    Estimated body mass index is 24.2 kg/m² as calculated from the following:    Height as of 2/6/23: 5' 5\" (1.651 m). Weight as of this encounter: 145 lb 6.4 oz (66 kg). Intake/Output Summary (Last 24 hours) at 2/17/2023 1601  Last data filed at 2/17/2023 0641  Gross per 24 hour   Intake 1453 ml   Output --   Net 1453 ml         Physical Exam:     Blood pressure (!) 129/58, pulse 72, temperature 98.2 °F (36.8 °C), resp. rate 17, weight 145 lb 6.4 oz (66 kg), SpO2 92 %. General:    Well nourished. Head:  Normocephalic, atraumatic  Eyes:  Sclerae appear normal.  Pupils equally round. ENT:  Nares appear normal.  Moist oral mucosa  Neck:  No restricted ROM. Trachea midline   CV:   RRR. No m/r/g.   No jugular venous distension. Lungs:   CTAB. No wheezing, rhonchi, or rales. Symmetric expansion. Abdomen:   Soft, nontender, nondistended. Extremities: No cyanosis or clubbing. No edema  Skin:     No rashes and normal coloration. Warm and dry. Neuro:  CN II-XII grossly intact. Psych:  Normal mood and affect.       I have personally reviewed labs and tests:  Recent Labs:  Recent Results (from the past 48 hour(s))   POCT Glucose    Collection Time: 02/15/23  4:27 PM   Result Value Ref Range    POC Glucose 97 65 - 100 mg/dL    Performed by: Bañuelos ([place])CNA    Culture, Blood 1    Collection Time: 02/15/23  9:48 PM    Specimen: Blood   Result Value Ref Range    Special Requests RIGHT  FOREARM        Culture NO GROWTH 2 DAYS     Culture, Blood 1    Collection Time: 02/15/23  9:48 PM    Specimen: Blood   Result Value Ref Range    Special Requests RIGHT  Antecubital        Culture NO GROWTH 2 DAYS     Lactic Acid    Collection Time: 02/15/23  9:48 PM   Result Value Ref Range    Lactic Acid, Plasma 1.3 0.4 - 2.0 MMOL/L   Procalcitonin    Collection Time: 02/15/23  9:54 PM   Result Value Ref Range    Procalcitonin 0.06 0.00 - 0.49 ng/mL   COVID-19, Rapid    Collection Time: 02/15/23 10:42 PM    Specimen: Nasopharyngeal   Result Value Ref Range    Source NASAL      SARS-CoV-2, Rapid Not detected NOTD     Influenza A/B, Molecular    Collection Time: 02/15/23 10:45 PM    Specimen: Not Specified   Result Value Ref Range    Influenza A, VIK Detected (A) NOTD      Influenza B, VIK Not detected NOTD     CBC with Auto Differential    Collection Time: 02/16/23  5:18 AM   Result Value Ref Range    WBC 3.9 (L) 4.3 - 11.1 K/uL    RBC 3.35 (L) 4.05 - 5.2 M/uL    Hemoglobin 10.3 (L) 11.7 - 15.4 g/dL    Hematocrit 31.9 (L) 35.8 - 46.3 %    MCV 95.2 82 - 102 FL    MCH 30.7 26.1 - 32.9 PG    MCHC 32.3 31.4 - 35.0 g/dL    RDW 14.5 11.9 - 14.6 %    Platelets 153 770 - 216 K/uL    MPV 11.1 9.4 - 12.3 FL    nRBC 0.00 0.0 - 0.2 K/uL Differential Type AUTOMATED      Seg Neutrophils 59 43 - 78 %    Lymphocytes 25 13 - 44 %    Monocytes 12 4.0 - 12.0 %    Eosinophils % 1 0.5 - 7.8 %    Basophils 1 0.0 - 2.0 %    Immature Granulocytes 2 0.0 - 5.0 %    Segs Absolute 2.3 1.7 - 8.2 K/UL    Absolute Lymph # 1.0 0.5 - 4.6 K/UL    Absolute Mono # 0.5 0.1 - 1.3 K/UL    Absolute Eos # 0.0 0.0 - 0.8 K/UL    Basophils Absolute 0.0 0.0 - 0.2 K/UL    Absolute Immature Granulocyte 0.1 0.0 - 0.5 K/UL   Comprehensive Metabolic Panel    Collection Time: 02/16/23  5:18 AM   Result Value Ref Range    Sodium 134 133 - 143 mmol/L    Potassium 4.2 3.5 - 5.1 mmol/L    Chloride 99 (L) 101 - 110 mmol/L    CO2 32 21 - 32 mmol/L    Anion Gap 3 2 - 11 mmol/L    Glucose 111 (H) 65 - 100 mg/dL    BUN 25 (H) 8 - 23 MG/DL    Creatinine 1.50 (H) 0.6 - 1.0 MG/DL    Est, Glom Filt Rate 36 (L) >60 ml/min/1.73m2    Calcium 9.5 8.3 - 10.4 MG/DL    Total Bilirubin 0.5 0.2 - 1.1 MG/DL    ALT 19 12 - 65 U/L    AST 42 (H) 15 - 37 U/L    Alk Phosphatase 83 50 - 136 U/L    Total Protein 7.2 6.3 - 8.2 g/dL    Albumin 3.0 (L) 3.2 - 4.6 g/dL    Globulin 4.2 2.8 - 4.5 g/dL    Albumin/Globulin Ratio 0.7 0.4 - 1.6     Magnesium    Collection Time: 02/16/23  5:18 AM   Result Value Ref Range    Magnesium 2.0 1.8 - 2.4 mg/dL   Phosphorus    Collection Time: 02/16/23  5:18 AM   Result Value Ref Range    Phosphorus 6.0 (H) 2.3 - 3.7 MG/DL   POCT Glucose    Collection Time: 02/16/23  6:09 AM   Result Value Ref Range    POC Glucose 106 (H) 65 - 100 mg/dL    Performed by: Marily Wiseman    Urinalysis w rflx microscopic    Collection Time: 02/16/23  9:30 AM   Result Value Ref Range    Color, UA YELLOW/STRAW      Appearance CLEAR      Specific Gravity, UA 1.017 1.001 - 1.023      pH, Urine 5.5 5.0 - 9.0      Protein, UA TRACE (A) NEG mg/dL    Glucose, UA Negative mg/dL    Ketones, Urine Negative NEG mg/dL    Bilirubin Urine Negative NEG      Blood, Urine Negative NEG      Urobilinogen, Urine 0.2 0.2 - 1.0 EU/dL    Nitrite, Urine Negative NEG      Leukocyte Esterase, Urine Negative NEG      Epithelial Cells UA 3-5 0 /hpf    BACTERIA, URINE 0 0 /hpf    Casts 5-10 0 /lpf    Other observations RESULTS VERIFIED MANUALLY     POCT Glucose    Collection Time: 02/16/23  4:31 PM   Result Value Ref Range    POC Glucose 112 (H) 65 - 100 mg/dL    Performed by: Tania    POCT Glucose    Collection Time: 02/17/23  6:13 AM   Result Value Ref Range    POC Glucose 106 (H) 65 - 100 mg/dL    Performed by: Keyanna        I have personally reviewed imaging studies:  Other Studies:  XR CHEST PORTABLE   Final Result      1. No pulmonary consolidation. Thank you for the referral of this patient. This exam was interpreted by an    [address] [employer] certified diagnostic radiologist with    fellowship training. If there are any questions regarding this exam please feel    free to contact us directly at [phone].       [address]       Franco Hercules D.O.    2/15/2023 10:36:00 PM          Current Meds:  Current Facility-Administered Medications   Medication Dose Route Frequency    oseltamivir (TAMIFLU) capsule 30 mg  30 mg Oral BID    calcium acetate (PHOSLO) tablet 667 mg  1 tablet Oral TID WC    sennosides-docusate sodium (SENOKOT-S) 8.6-50 MG tablet 2 tablet  2 tablet Oral Daily    insulin lispro (HUMALOG) injection vial 0-8 Units  0-8 Units SubCUTAneous BID WC    acetaminophen (TYLENOL) tablet 650 mg  650 mg Oral Q6H PRN    Or    acetaminophen (TYLENOL) suppository 650 mg  650 mg Rectal Q6H PRN    alcohol 62% (NOZIN) nasal  1 ampule  1 ampule Topical Q12H    amitriptyline (ELAVIL) tablet 10 mg  10 mg Oral Nightly    aspirin EC tablet 325 mg  325 mg Oral Daily    calcium carbonate (TUMS) chewable tablet 500 mg  1 tablet Oral Nightly    cetirizine (ZYRTEC) tablet 10 mg  10 mg Oral Daily PRN    furosemide (LASIX) tablet 40 mg  40 mg Oral Every Other Day    gabapentin (NEURONTIN) capsule 600 mg  600 mg Oral BID    glucagon (rDNA) injection 1 mg  1 mg SubCUTAneous PRN    glucose chewable tablet 16 g  4 tablet Oral PRN    hydrALAZINE (APRESOLINE) tablet 25 mg  25 mg Oral Q6H PRN    HYDROmorphone (DILAUDID) tablet 1 mg  1 mg Oral Q4H PRN    HYDROmorphone (DILAUDID) tablet 2 mg  2 mg Oral Q4H PRN    lisinopril (PRINIVIL;ZESTRIL) tablet 10 mg  10 mg Oral BID    melatonin tablet 3 mg  3 mg Oral Nightly    metoprolol succinate (TOPROL XL) extended release tablet 50 mg  50 mg Oral Nightly    nitrofurantoin (MACRODANTIN) capsule 50 mg  50 mg Oral Daily    polyethylene glycol (GLYCOLAX) packet 17 g  17 g Oral Daily PRN    Vitamin D (CHOLECALCIFEROL) tablet 2,000 Units  2,000 Units Oral Daily    guaiFENesin-dextromethorphan (ROBITUSSIN DM) 100-10 MG/5ML syrup 5 mL  5 mL Oral Q4H    trospium (SANCTURA) tablet 20 mg  20 mg Oral Nightly       Signed:  Joann Wu MD    Part of this note may have been written by using a voice dictation software. The note has been proof read but may still contain some grammatical/other typographical errors.

## 2023-02-17 NOTE — PROGRESS NOTES
OT DAILY NOTE  Time In 1117      Time Out 1203        Subjective: \"I feel hot. \" Pt agreeable to treatment. Pain:  Pt endorsed L hip pain, instructed to call for assist .  Interdisciplinary Communication: Collaborated with PT regarding pt performance. Precautions: Falls, Impulsive, WBAT LLE, and decreased safety awareness , Droplet    /83   Pulse 79   Temp 99.5 °F (37.5 °C) (Oral)   Resp 18   Wt 145 lb 6.4 oz (66 kg)   SpO2 90%   BMI 24.20 kg/m²      FUNCTIONAL MOBILITY:    Score Comments   Sit to Stand Supervision or Touching Assistance SBA   Transfer Assist Supervision or Touching Assistance SBA with RW, cues for safety    Ambulation Supervision or Touching Assistance CGA with cueing for safety. Pt initiation ambulation with RW while holding phone, cued to set down phone prior to ambulation. Pt continued despite cueing, dropped phone, then went to pick in up. CGA and cueing for safety, pt instructed not to  item from floor.       - Activity Tolerance - Balance - Cognition - Visual-Perceptual    Pt completed visual perceptual, reaching, strengthening, activity tolerance, and cognition task utilizing Rubber Band Board while following visual design. Pt attempted task in stance at raised table, required mod to max cueing and visual aid to follow simple to mildly complex design. Task graded down, pt participated in task from seated position. Pt able to complete two simple designs with supervision and 30% of a mildly complex design with supervision while seated. Task graded back up due to improvement in pt performance. Pt unable to complete in stance, required max A and unable to self-identify any errors despite visual aid and cues for problem solving to identify errors. Pt returned to sitting, still required Mod-MaxA, unable to finish task/identify errors. Discussed findings with SLP, requested consult due to cognition as pt manages her medications at home.  Pt with cognitive deficits including safety and insight that will impact safety with IADLs and mobility related ADLs at d/c.        Education   Benefits of OT, Functional Transfer Training, Rolling Walker Management, and Safety Awareness     Assessment: Patient with difficulty completing cognitive task this session. Pt with cognitive deficits including safety and insight that will impact safety with IADLs and mobility related ADLs at d/c. Pt continues to benefit from skilled OT services to address remaining deficits and progress toward baseline level of independence and safety. Patient ended session seated in recliner with call bell and needs within reach and with legs elevated. Plan: Continue OT POC.      Darrick Garcias OT   2/17/2023

## 2023-02-17 NOTE — PROGRESS NOTES
PHYSICAL THERAPY DAILY NOTE  Time In:  2882  Time Out:  1345  Total Treatment Time:  40 Minutes  Pt.  Seen for: PM, Balance Training, Gait Training, Patient Education, Therapeutic Exercise, and Transfer Training     Subjective: Pt pleasant and agreeable to therapy; has no complaints         Objective:  Precautions: Falls    GROSS ASSESSMENT Daily Assessment           COGNITION Daily Assessment    Alert, cooperative, follows commands well       BED/MAT MOBILITY Daily Assessment    Rolling Right: NT  Rolling Left: NT  Supine to Sit: NT  Sit to Supine: NT       TRANSFERS Daily Assessment    Sit to Stand: Supervision/Standby Assist  Stand to Sit: Supervision/Standby Assist  Transfer Type: Stand Pivot  Transfer Assistance: Supervision/Standby Assist  Car Transfers: NT         GAIT Daily Assessment    Amount of Assistance: CGA  Distance (ft): 120 ft, 150 ft  Assistive Device: RW  Surface: Level Surface       STEPS/STAIRS Daily Assessment    NT       BALANCE Daily Assessment    Static Sitting: Good:  Pt. able to maintain balance w/o UE support;  exhibits some postural sway  Dynamic Sitting: Good - accepts moderate challenge;  can maintain balance while picking object off the floor  Static Standing: Fair:  Pt. requires UE support;  may need occasional min A  Dynamic Standing: Fair - accepts minimal challenge;  can maintain balance while turning head/trunk       WHEELCHAIR MOBILITY Daily Assessment           LOWER EXTREMITY EXERCISES Daily Assessment    SEATED EXERCISES Sets Reps Comments   Ankle Pumps 2 10    Hip Flexion 2 10    Long Arc Quads 2 10    Hip Adduction/Ball Squeeze 2 10    Hip Abduction with red Theraband 2 10    Hamstring Curls with red Theraband 2 10    Hip Extension with red Theraband 2 10           Pain level: 2/10  Pain Location:  Left hip  Pain Interventions: rest, repositioned    Vital Signs:  /83   Pulse 79   Temp 99.5 °F (37.5 °C) (Oral)   Resp 18   Wt 145 lb 6.4 oz (66 kg)   SpO2 90%   BMI 24.20 kg/m²       Education:  transfer and gait training, HEP training    Interdisciplinary Communication:     Pt. Left in recliner with all needs in reach         Assessment: Worked on seated exercises per HEP and gait training with RW this afternoon. Pt participates well, c/o mild left hip soreness. Plan of Care: Continue with POC and progress as tolerated.        Daryl Marcial, PT  2/17/2023

## 2023-02-18 LAB
GLUCOSE BLD STRIP.AUTO-MCNC: 100 MG/DL (ref 65–100)
GLUCOSE BLD STRIP.AUTO-MCNC: 114 MG/DL (ref 65–100)
SERVICE CMNT-IMP: ABNORMAL
SERVICE CMNT-IMP: NORMAL

## 2023-02-18 PROCEDURE — 6370000000 HC RX 637 (ALT 250 FOR IP): Performed by: PHYSICAL MEDICINE & REHABILITATION

## 2023-02-18 PROCEDURE — 6370000000 HC RX 637 (ALT 250 FOR IP): Performed by: STUDENT IN AN ORGANIZED HEALTH CARE EDUCATION/TRAINING PROGRAM

## 2023-02-18 PROCEDURE — 97116 GAIT TRAINING THERAPY: CPT

## 2023-02-18 PROCEDURE — 6370000000 HC RX 637 (ALT 250 FOR IP): Performed by: HOSPITALIST

## 2023-02-18 PROCEDURE — 1180000000 HC REHAB R&B

## 2023-02-18 PROCEDURE — 6370000000 HC RX 637 (ALT 250 FOR IP): Performed by: PHYSICIAN ASSISTANT

## 2023-02-18 PROCEDURE — 97110 THERAPEUTIC EXERCISES: CPT

## 2023-02-18 PROCEDURE — 82962 GLUCOSE BLOOD TEST: CPT

## 2023-02-18 PROCEDURE — 97535 SELF CARE MNGMENT TRAINING: CPT

## 2023-02-18 RX ORDER — MAGNESIUM HYDROXIDE/ALUMINUM HYDROXICE/SIMETHICONE 120; 1200; 1200 MG/30ML; MG/30ML; MG/30ML
30 SUSPENSION ORAL EVERY 6 HOURS PRN
Status: DISCONTINUED | OUTPATIENT
Start: 2023-02-18 | End: 2023-02-21 | Stop reason: HOSPADM

## 2023-02-18 RX ADMIN — ALUMINUM HYDROXIDE, MAGNESIUM HYDROXIDE, AND SIMETHICONE 30 ML: 200; 200; 20 SUSPENSION ORAL at 18:07

## 2023-02-18 RX ADMIN — HYDROMORPHONE HYDROCHLORIDE 2 MG: 2 TABLET ORAL at 06:43

## 2023-02-18 RX ADMIN — ASPIRIN 325 MG: 325 TABLET, COATED ORAL at 09:04

## 2023-02-18 RX ADMIN — SENNOSIDES AND DOCUSATE SODIUM 2 TABLET: 50; 8.6 TABLET ORAL at 09:03

## 2023-02-18 RX ADMIN — GUAIFENESIN AND DEXTROMETHORPHAN 5 ML: 100; 10 SYRUP ORAL at 17:47

## 2023-02-18 RX ADMIN — Medication 1 AMPULE: at 21:26

## 2023-02-18 RX ADMIN — GABAPENTIN 600 MG: 300 CAPSULE ORAL at 11:41

## 2023-02-18 RX ADMIN — LISINOPRIL 10 MG: 5 TABLET ORAL at 09:03

## 2023-02-18 RX ADMIN — GUAIFENESIN AND DEXTROMETHORPHAN 5 ML: 100; 10 SYRUP ORAL at 06:21

## 2023-02-18 RX ADMIN — CHOLECALCIFEROL TAB 25 MCG (1000 UNIT) 2000 UNITS: 25 TAB at 09:03

## 2023-02-18 RX ADMIN — METOPROLOL SUCCINATE 50 MG: 50 TABLET, EXTENDED RELEASE ORAL at 21:26

## 2023-02-18 RX ADMIN — HYDROMORPHONE HYDROCHLORIDE 2 MG: 2 TABLET ORAL at 21:27

## 2023-02-18 RX ADMIN — OSELTAMIVIR PHOSPHATE 30 MG: 30 CAPSULE ORAL at 09:04

## 2023-02-18 RX ADMIN — Medication 3 MG: at 21:26

## 2023-02-18 RX ADMIN — TROSPIUM CHLORIDE 20 MG: 20 TABLET, FILM COATED ORAL at 21:26

## 2023-02-18 RX ADMIN — FUROSEMIDE 40 MG: 40 TABLET ORAL at 09:03

## 2023-02-18 RX ADMIN — AMITRIPTYLINE HYDROCHLORIDE 10 MG: 10 TABLET, FILM COATED ORAL at 21:27

## 2023-02-18 RX ADMIN — LISINOPRIL 10 MG: 5 TABLET ORAL at 21:26

## 2023-02-18 RX ADMIN — Medication 1 AMPULE: at 09:02

## 2023-02-18 RX ADMIN — GUAIFENESIN AND DEXTROMETHORPHAN 5 ML: 100; 10 SYRUP ORAL at 21:27

## 2023-02-18 RX ADMIN — Medication 667 MG: at 11:41

## 2023-02-18 RX ADMIN — HYDROMORPHONE HYDROCHLORIDE 2 MG: 2 TABLET ORAL at 11:45

## 2023-02-18 RX ADMIN — GABAPENTIN 600 MG: 300 CAPSULE ORAL at 21:26

## 2023-02-18 RX ADMIN — NITROFURANTOIN MACROCRYSTALS 50 MG: 50 CAPSULE ORAL at 09:04

## 2023-02-18 RX ADMIN — Medication 667 MG: at 09:04

## 2023-02-18 RX ADMIN — OSELTAMIVIR PHOSPHATE 30 MG: 30 CAPSULE ORAL at 21:26

## 2023-02-18 RX ADMIN — CALCIUM CARBONATE (ANTACID) CHEW TAB 500 MG 500 MG: 500 CHEW TAB at 17:50

## 2023-02-18 RX ADMIN — GUAIFENESIN AND DEXTROMETHORPHAN 5 ML: 100; 10 SYRUP ORAL at 11:41

## 2023-02-18 ASSESSMENT — PAIN DESCRIPTION - FREQUENCY: FREQUENCY: INTERMITTENT

## 2023-02-18 ASSESSMENT — PAIN - FUNCTIONAL ASSESSMENT
PAIN_FUNCTIONAL_ASSESSMENT: ACTIVITIES ARE NOT PREVENTED

## 2023-02-18 ASSESSMENT — PAIN DESCRIPTION - DESCRIPTORS
DESCRIPTORS: ACHING;SORE
DESCRIPTORS: ACHING
DESCRIPTORS: ACHING

## 2023-02-18 ASSESSMENT — PAIN SCALES - GENERAL
PAINLEVEL_OUTOF10: 9
PAINLEVEL_OUTOF10: 7
PAINLEVEL_OUTOF10: 8
PAINLEVEL_OUTOF10: 7

## 2023-02-18 ASSESSMENT — PAIN DESCRIPTION - LOCATION
LOCATION: HIP

## 2023-02-18 ASSESSMENT — PAIN DESCRIPTION - ORIENTATION
ORIENTATION: LEFT

## 2023-02-18 ASSESSMENT — PAIN DESCRIPTION - PAIN TYPE: TYPE: SURGICAL PAIN

## 2023-02-18 ASSESSMENT — PAIN DESCRIPTION - ONSET: ONSET: GRADUAL

## 2023-02-18 NOTE — PROGRESS NOTES
Physical Therapy  Facility/Department: [facility] INPATIENT REHAB UNIT  Daily Treatment Note  NAME: Lincoln Mccabe  : [id number]  MRN: [id number]    Date of Service: 2023    Discharge Recommendations:           Patient Diagnosis(es): There were no encounter diagnoses. Assessment         Plan          Restrictions        Subjective          Objective   Vitals                         Goals       Education       Therapy Time   Individual Concurrent Group Co-treatment   Time In 926         Time Out 9969         Minutes 25         Timed Code Treatment Minutes: 25 Minutes       Britney Manjarrez PTA         PHYSICAL THERAPY DAILY NOTE  Time In:  9:26  Time Out:  9:51  Total Treatment Time:  25 Minutes  Pt.  Seen for: AM, Gait Training, Patient Education, Therapeutic Exercise, and Transfer Training     Subjective: \"I'm going home soon, doing much better\"         Objective:  Precautions: Falls    GROSS ASSESSMENT Daily Assessment           COGNITION Daily Assessment           BED/MAT MOBILITY Daily Assessment    Rolling Right: NT  Rolling Left: NT  Supine to Sit: NT  Sit to Supine: NT       TRANSFERS Daily Assessment    Sit to Stand: Supervision/Standby Assist  Stand to Sit: Supervision/Standby Assist  Transfer Type: Stand Pivot  Transfer Assistance: Supervision/Standby Assist  Car Transfers: NT         GAIT Daily Assessment   Additional 150ft using FWW for support Amount of Assistance: Supervision/Standby Assist  Distance (ft): 150  Assistive Device: RW  Surface: Level Surface       STEPS/STAIRS Daily Assessment    Steps Ambulated:  4  Level of Assistance:  Supervision/Standby Assist  Railing:Bilateral Rails  Assistive Device: No A/D       BALANCE Daily Assessment    Static Sitting: Normal:  Pt. able to maintain balance w/o UE support  Dynamic Sitting: Normal - accepts maximal challenge & can shift weight easily fully in all directions  Static Standing: Fair:  Pt. requires UE support;  may need occasional min A  Dynamic Standing: Poor - unable to accept challenge or move without LOB        WHEELCHAIR MOBILITY Daily Assessment    Able to Propel (ft): 0  Assistance: NT  Surface: NT  Wheelchair Set-up: NT       LOWER EXTREMITY EXERCISES Daily Assessment   Nu Step 10 min level 3 continuous          Pain level: 2/10  Pain Location:  L hip  Pain Interventions: rest, pain medication    Vital Signs: WNL    Pt. Left in room call light in reach, pt in bed         Assessment: pt displays improved stride and step length LLE, LLE improved stance time.  Pt continues to make progress in gait mechanics and endurance using FWW for support         Plan of Care: Continue with current [facility], PTA  2/18/2023

## 2023-02-18 NOTE — PROGRESS NOTES
Patient is a 79-year-old female with history of GERD, DM2, recurrent UTI, CKD stage III, Sjogren syndrome, hyperlipidemia, osteopenia, polyneuropathy, vitamin D deficiency, history of breast cancer status postmastectomy and chemotherapy, depression, cardiomyopathy admitted for nondisplaced left femoral neck fracture and discharged to inpatient rehab on 2/10/2023. Patient was tested positive for influenza on 2/15. Chest x-ray was unremarkable for any consolidation or pneumonia. Blood cultures have been negative, lactic acid was normal and procalcitonin was negative. Patient was started on Tamiflu, 30 mg p.o. twice daily, EOT 2/20/2023. Patient is hemodynamically stable, has been afebrile for more than 48 hours now. Currently on room air. Will require ambulatory pulse ox prior to discharge to assess for home O2 assessment. We will order for ambulatory pulse ox today. Patient not charged for this visit.

## 2023-02-18 NOTE — PROGRESS NOTES
OT DAILY NOTE    Time In: 1025  Time Out: 1055      Eating Current Functional Level   Score (P) Independent   Comments (P) mod-I     Oral Hygiene  Current Functional Level   Score (P) Setup or clean-up assistance   Comments (P) SBA for standing balance at sink. Toothbrushing I     Bathing Current Functional Level   Score (P) Supervision or touching assistance   Comments (P) Pt needs cues for safety awareness:  Sit while drying     Upper Body   Dressing Current Functional Level   Score (P) Supervision or touching assistance   Comments (P) cues for safety. Sit while doning/doffing     Lower Body   Dressing Current Functional Level     Functional Level (P) Supervision or touching assistance   Comments (P) Cues for safety, sit while donning/doffing clothe     Donning/[place]  Footwear Current Functional Level     Functional Level (P) Setup or clean-up assistance   Comments (P) set up     Toilet transfer Current Functional Level   Functional Level (P) Supervision or touching assistance   Comments (P) supervision for SPT transfer     Toilet Hygiene   Current Functional Level   Score (P) Supervision or touching assistance   Comments (P) S     Summary of Session: Pt requested shower. Gathered own clothes and oral care. See above for ADL scores. Cues need for safety while donning/doffing clothes to sit vs standing . Plan: Continue OT POC with focus on ADL/IADL skills, functional transfers, functional mobility, coordination, strength, static and dynamic balance, and activity tolerance to maximize safety and independence with ADLs and functional transfers. Left pt in recliner with all essentials within reach.          ZHANE Mckenzie  2/18/2023

## 2023-02-18 NOTE — CARE COORDINATION
Patient needs are continue to be followed by Dr. Sebas Dhillon. Patient has discharge date / plan at this time has been delayed. Patient positive for the Flu. Patient wasn't ready to make a decision for [facility]. No DME's needed for d/c. CM will continue to follow / monitor for any needs, concerns or questions that may arise.

## 2023-02-19 VITALS
WEIGHT: 146 LBS | HEART RATE: 72 BPM | RESPIRATION RATE: 18 BRPM | BODY MASS INDEX: 24.32 KG/M2 | DIASTOLIC BLOOD PRESSURE: 73 MMHG | OXYGEN SATURATION: 93 % | TEMPERATURE: 98.4 F | HEIGHT: 65 IN | SYSTOLIC BLOOD PRESSURE: 167 MMHG

## 2023-02-19 LAB
BACTERIA SPEC CULT: NORMAL
BACTERIA SPEC CULT: NORMAL
GLUCOSE BLD STRIP.AUTO-MCNC: 138 MG/DL (ref 65–100)
GLUCOSE BLD STRIP.AUTO-MCNC: 87 MG/DL (ref 65–100)
SERVICE CMNT-IMP: ABNORMAL
SERVICE CMNT-IMP: NORMAL

## 2023-02-19 PROCEDURE — 6370000000 HC RX 637 (ALT 250 FOR IP): Performed by: STUDENT IN AN ORGANIZED HEALTH CARE EDUCATION/TRAINING PROGRAM

## 2023-02-19 PROCEDURE — 1180000000 HC REHAB R&B

## 2023-02-19 PROCEDURE — 6370000000 HC RX 637 (ALT 250 FOR IP): Performed by: PHYSICAL MEDICINE & REHABILITATION

## 2023-02-19 PROCEDURE — 6370000000 HC RX 637 (ALT 250 FOR IP): Performed by: HOSPITALIST

## 2023-02-19 PROCEDURE — 82962 GLUCOSE BLOOD TEST: CPT

## 2023-02-19 PROCEDURE — 6370000000 HC RX 637 (ALT 250 FOR IP): Performed by: PHYSICIAN ASSISTANT

## 2023-02-19 RX ADMIN — HYDROMORPHONE HYDROCHLORIDE 2 MG: 2 TABLET ORAL at 21:53

## 2023-02-19 RX ADMIN — Medication 667 MG: at 08:43

## 2023-02-19 RX ADMIN — Medication 1 AMPULE: at 08:47

## 2023-02-19 RX ADMIN — LISINOPRIL 10 MG: 5 TABLET ORAL at 08:43

## 2023-02-19 RX ADMIN — SENNOSIDES AND DOCUSATE SODIUM 2 TABLET: 50; 8.6 TABLET ORAL at 08:43

## 2023-02-19 RX ADMIN — Medication 3 MG: at 21:46

## 2023-02-19 RX ADMIN — GUAIFENESIN AND DEXTROMETHORPHAN 5 ML: 100; 10 SYRUP ORAL at 12:44

## 2023-02-19 RX ADMIN — GABAPENTIN 600 MG: 300 CAPSULE ORAL at 08:43

## 2023-02-19 RX ADMIN — AMITRIPTYLINE HYDROCHLORIDE 10 MG: 10 TABLET, FILM COATED ORAL at 21:46

## 2023-02-19 RX ADMIN — CALCIUM CARBONATE (ANTACID) CHEW TAB 500 MG 500 MG: 500 CHEW TAB at 21:46

## 2023-02-19 RX ADMIN — Medication 667 MG: at 12:44

## 2023-02-19 RX ADMIN — METOPROLOL SUCCINATE 50 MG: 50 TABLET, EXTENDED RELEASE ORAL at 21:46

## 2023-02-19 RX ADMIN — CHOLECALCIFEROL TAB 25 MCG (1000 UNIT) 2000 UNITS: 25 TAB at 08:43

## 2023-02-19 RX ADMIN — GUAIFENESIN AND DEXTROMETHORPHAN 5 ML: 100; 10 SYRUP ORAL at 17:42

## 2023-02-19 RX ADMIN — NITROFURANTOIN MACROCRYSTALS 50 MG: 50 CAPSULE ORAL at 08:43

## 2023-02-19 RX ADMIN — GABAPENTIN 600 MG: 300 CAPSULE ORAL at 21:50

## 2023-02-19 RX ADMIN — GUAIFENESIN AND DEXTROMETHORPHAN 5 ML: 100; 10 SYRUP ORAL at 01:05

## 2023-02-19 RX ADMIN — GUAIFENESIN AND DEXTROMETHORPHAN 5 ML: 100; 10 SYRUP ORAL at 21:45

## 2023-02-19 RX ADMIN — OSELTAMIVIR PHOSPHATE 30 MG: 30 CAPSULE ORAL at 08:42

## 2023-02-19 RX ADMIN — Medication 667 MG: at 17:42

## 2023-02-19 RX ADMIN — Medication 1 AMPULE: at 21:53

## 2023-02-19 RX ADMIN — OSELTAMIVIR PHOSPHATE 30 MG: 30 CAPSULE ORAL at 21:30

## 2023-02-19 RX ADMIN — ASPIRIN 325 MG: 325 TABLET, COATED ORAL at 08:43

## 2023-02-19 RX ADMIN — TROSPIUM CHLORIDE 20 MG: 20 TABLET, FILM COATED ORAL at 21:46

## 2023-02-19 RX ADMIN — LISINOPRIL 10 MG: 5 TABLET ORAL at 21:46

## 2023-02-19 RX ADMIN — GUAIFENESIN AND DEXTROMETHORPHAN 5 ML: 100; 10 SYRUP ORAL at 08:42

## 2023-02-19 ASSESSMENT — PAIN DESCRIPTION - ORIENTATION: ORIENTATION: LEFT

## 2023-02-19 ASSESSMENT — PAIN SCALES - GENERAL
PAINLEVEL_OUTOF10: 2
PAINLEVEL_OUTOF10: 7

## 2023-02-19 ASSESSMENT — PAIN DESCRIPTION - DESCRIPTORS: DESCRIPTORS: ACHING

## 2023-02-19 ASSESSMENT — PAIN SCALES - WONG BAKER
WONGBAKER_NUMERICALRESPONSE: 2
WONGBAKER_NUMERICALRESPONSE: 2

## 2023-02-19 ASSESSMENT — PAIN DESCRIPTION - LOCATION: LOCATION: HIP;LEG;ANKLE

## 2023-02-20 ENCOUNTER — HOME HEALTH ADMISSION (OUTPATIENT)
Dept: HOME HEALTH SERVICES | Facility: HOME HEALTH | Age: 78
End: 2023-02-20
Payer: MEDICARE

## 2023-02-20 LAB
BACTERIA SPEC CULT: NORMAL
BACTERIA SPEC CULT: NORMAL
GLUCOSE BLD STRIP.AUTO-MCNC: 111 MG/DL (ref 65–100)
GLUCOSE BLD STRIP.AUTO-MCNC: 136 MG/DL (ref 65–100)
SERVICE CMNT-IMP: ABNORMAL
SERVICE CMNT-IMP: ABNORMAL
SERVICE CMNT-IMP: NORMAL
SERVICE CMNT-IMP: NORMAL

## 2023-02-20 PROCEDURE — 6370000000 HC RX 637 (ALT 250 FOR IP): Performed by: PHYSICAL MEDICINE & REHABILITATION

## 2023-02-20 PROCEDURE — 97530 THERAPEUTIC ACTIVITIES: CPT

## 2023-02-20 PROCEDURE — 97116 GAIT TRAINING THERAPY: CPT

## 2023-02-20 PROCEDURE — 97110 THERAPEUTIC EXERCISES: CPT

## 2023-02-20 PROCEDURE — 6370000000 HC RX 637 (ALT 250 FOR IP): Performed by: STUDENT IN AN ORGANIZED HEALTH CARE EDUCATION/TRAINING PROGRAM

## 2023-02-20 PROCEDURE — 97535 SELF CARE MNGMENT TRAINING: CPT

## 2023-02-20 PROCEDURE — 6370000000 HC RX 637 (ALT 250 FOR IP): Performed by: PHYSICIAN ASSISTANT

## 2023-02-20 PROCEDURE — 1180000000 HC REHAB R&B

## 2023-02-20 PROCEDURE — 82962 GLUCOSE BLOOD TEST: CPT

## 2023-02-20 RX ORDER — OSELTAMIVIR PHOSPHATE 30 MG/1
30 CAPSULE ORAL EVERY 24 HOURS
Status: COMPLETED | OUTPATIENT
Start: 2023-02-20 | End: 2023-02-20

## 2023-02-20 RX ORDER — HYDROMORPHONE HYDROCHLORIDE 2 MG/1
1-2 TABLET ORAL EVERY 4 HOURS PRN
Qty: 28 TABLET | Refills: 0 | Status: SHIPPED | OUTPATIENT
Start: 2023-02-20 | End: 2023-02-25

## 2023-02-20 RX ADMIN — GUAIFENESIN AND DEXTROMETHORPHAN 5 ML: 100; 10 SYRUP ORAL at 08:41

## 2023-02-20 RX ADMIN — ASPIRIN 325 MG: 325 TABLET, COATED ORAL at 08:42

## 2023-02-20 RX ADMIN — METOPROLOL SUCCINATE 50 MG: 50 TABLET, EXTENDED RELEASE ORAL at 20:27

## 2023-02-20 RX ADMIN — Medication 667 MG: at 08:42

## 2023-02-20 RX ADMIN — GUAIFENESIN AND DEXTROMETHORPHAN 5 ML: 100; 10 SYRUP ORAL at 17:11

## 2023-02-20 RX ADMIN — LISINOPRIL 10 MG: 5 TABLET ORAL at 08:42

## 2023-02-20 RX ADMIN — SENNOSIDES AND DOCUSATE SODIUM 2 TABLET: 50; 8.6 TABLET ORAL at 08:42

## 2023-02-20 RX ADMIN — LISINOPRIL 10 MG: 5 TABLET ORAL at 20:27

## 2023-02-20 RX ADMIN — TROSPIUM CHLORIDE 20 MG: 20 TABLET, FILM COATED ORAL at 20:26

## 2023-02-20 RX ADMIN — FUROSEMIDE 40 MG: 40 TABLET ORAL at 08:42

## 2023-02-20 RX ADMIN — CALCIUM CARBONATE (ANTACID) CHEW TAB 500 MG 500 MG: 500 CHEW TAB at 20:26

## 2023-02-20 RX ADMIN — GUAIFENESIN AND DEXTROMETHORPHAN 5 ML: 100; 10 SYRUP ORAL at 12:22

## 2023-02-20 RX ADMIN — AMITRIPTYLINE HYDROCHLORIDE 10 MG: 10 TABLET, FILM COATED ORAL at 20:27

## 2023-02-20 RX ADMIN — Medication 1 AMPULE: at 20:28

## 2023-02-20 RX ADMIN — Medication 1 AMPULE: at 11:32

## 2023-02-20 RX ADMIN — GABAPENTIN 600 MG: 300 CAPSULE ORAL at 08:42

## 2023-02-20 RX ADMIN — Medication 667 MG: at 17:11

## 2023-02-20 RX ADMIN — HYDROMORPHONE HYDROCHLORIDE 2 MG: 2 TABLET ORAL at 09:39

## 2023-02-20 RX ADMIN — GUAIFENESIN AND DEXTROMETHORPHAN 5 ML: 100; 10 SYRUP ORAL at 20:29

## 2023-02-20 RX ADMIN — Medication 667 MG: at 12:23

## 2023-02-20 RX ADMIN — HYDROMORPHONE HYDROCHLORIDE 2 MG: 2 TABLET ORAL at 20:26

## 2023-02-20 RX ADMIN — GABAPENTIN 600 MG: 300 CAPSULE ORAL at 20:26

## 2023-02-20 RX ADMIN — Medication 3 MG: at 20:26

## 2023-02-20 RX ADMIN — CHOLECALCIFEROL TAB 25 MCG (1000 UNIT) 2000 UNITS: 25 TAB at 08:42

## 2023-02-20 RX ADMIN — NITROFURANTOIN MACROCRYSTALS 50 MG: 50 CAPSULE ORAL at 08:42

## 2023-02-20 RX ADMIN — OSELTAMIVIR PHOSPHATE 30 MG: 30 CAPSULE ORAL at 20:27

## 2023-02-20 ASSESSMENT — PAIN DESCRIPTION - DESCRIPTORS: DESCRIPTORS: ACHING

## 2023-02-20 ASSESSMENT — PAIN SCALES - GENERAL: PAINLEVEL_OUTOF10: 8

## 2023-02-20 ASSESSMENT — PAIN DESCRIPTION - LOCATION: LOCATION: BACK

## 2023-02-20 NOTE — PROGRESS NOTES
INPATIENT REHAB CENTER PROGRESS NOTE    Mirtha De Paz  Admit Date: 2/10/2023  Admit Diagnosis:   Hip fracture ([facility] [S72.009A]  Closed left hip fracture, initial encounter ([facility] [S72.002A]    Subjective     2/20/2023  feeling better today. Cough is improved. No SOB. Pain controlled. She is looking forward to her upcoming discharge.      Objective:     Current Facility-Administered Medications   Medication Dose Route Frequency    oseltamivir (TAMIFLU) capsule 30 mg  30 mg Oral Q24H    aluminum & magnesium hydroxide-simethicone (MAALOX) 200-200-20 MG/5ML suspension 30 mL  30 mL Oral Q6H PRN    calcium acetate (PHOSLO) tablet 667 mg  1 tablet Oral TID WC    sennosides-docusate sodium (SENOKOT-S) 8.6-50 MG tablet 2 tablet  2 tablet Oral Daily    insulin lispro (HUMALOG) injection vial 0-8 Units  0-8 Units SubCUTAneous BID WC    acetaminophen (TYLENOL) tablet 650 mg  650 mg Oral Q6H PRN    Or    acetaminophen (TYLENOL) suppository 650 mg  650 mg Rectal Q6H PRN    alcohol 62% (NOZIN) nasal  1 ampule  1 ampule Topical Q12H    amitriptyline (ELAVIL) tablet 10 mg  10 mg Oral Nightly    aspirin EC tablet 325 mg  325 mg Oral Daily    calcium carbonate (TUMS) chewable tablet 500 mg  1 tablet Oral Nightly    cetirizine (ZYRTEC) tablet 10 mg  10 mg Oral Daily PRN    furosemide (LASIX) tablet 40 mg  40 mg Oral Every Other Day    gabapentin (NEURONTIN) capsule 600 mg  600 mg Oral BID    glucagon (rDNA) injection 1 mg  1 mg SubCUTAneous PRN    glucose chewable tablet 16 g  4 tablet Oral PRN    hydrALAZINE (APRESOLINE) tablet 25 mg  25 mg Oral Q6H PRN    HYDROmorphone (DILAUDID) tablet 1 mg  1 mg Oral Q4H PRN    HYDROmorphone (DILAUDID) tablet 2 mg  2 mg Oral Q4H PRN    lisinopril (PRINIVIL;ZESTRIL) tablet 10 mg  10 mg Oral BID    melatonin tablet 3 mg  3 mg Oral Nightly    metoprolol succinate (TOPROL XL) extended release tablet 50 mg  50 mg Oral Nightly    nitrofurantoin (MACRODANTIN) capsule 50 mg  50 mg Oral Daily    polyethylene glycol (GLYCOLAX) packet 17 g  17 g Oral Daily PRN    Vitamin D (CHOLECALCIFEROL) tablet 2,000 Units  2,000 Units Oral Daily    guaiFENesin-dextromethorphan (ROBITUSSIN DM) 100-10 MG/5ML syrup 5 mL  5 mL Oral Q4H    trospium (SANCTURA) tablet 20 mg  20 mg Oral Nightly       Physical Exam:   Visit Vitals  /68   Pulse 58   Temp 98.1 °F (36.7 °C)   Resp 18   Ht 5' 5\" (1.651 m)   Wt 146 lb (66.2 kg)   SpO2 94%   BMI 24.30 kg/m²         General: Alert, no acute distress, well developed, well nourished. Skin: left hip incisions not visualized this encounter  HEENT: Normocephalic, edentulous, oral mucosa is moist. Extraocular movements are intact. Neck: Supple, non-tender. Respiratory: respirations unlabored. Equal chest rise. Cardiovascular: Normal rate, no cyanosis. ABD: Soft, non-tender, not distended. Integumentary: Clean, no rash, no skin breakdown. Surgical incision L hip CDI with staples. Surrounding healing hematoma. No drainage. Musculoskeletal: UE strength 5/5 and symmetric. RLE 5/5, LLE 4/5. Psychiatric: Appropriate mood & affect. Neuro: Alert, oriented x 4, speech and language intact. No focal deficits. Sensation grossly intact. Functional Assessment:     pt displays improved stride and step length LLE, LLE improved stance time. Pt continues to make progress in gait mechanics and endurance using FWW for support    ACTIVITIES OF DAILY LIVING:     Initial Score Current Score   Eating Independent  I Independent  I-Almas, additional time prn to manage packages   Oral Hygiene Setup or clean-up assistance  S/U A seated at sink side Independent  I seated at sink   Bathing Partial/moderate assistance  MIn A to maintain safety while standing Supervision or touching assistance  S seated on tub transfer bench. Cues prn for safety.    Upper Body  Dressing Setup or clean-up assistance  S/U A seated EOB Setup or clean-up assistance  S/U vs SBA to retrieve clothing using RW Lower Body Dressing Partial/moderate assistance  MIN A to don to waist in standing  Supervision or touching assistance  S progressing toward S/U. Pt able to retrieve clothing with RW/SBA, cues for safety. Donning/ [place] [employer] Substantial/maximal assistance   Ind. to don sock on R foot, MAX A for L foot Setup or clean-up assistance  S/U   Toilet Transfer Partial/moderate assistance  MIN A SPT w/ RW Supervision or touching assistance  S SPT with [address] [place] or touching assistance  S - CGA Independent  Almas seated on toilet/standing at RW and R grab bar     Labs/Studies:  Recent Results (from the past 72 hour(s))   POCT Glucose    Collection Time: 02/17/23  5:07 PM   Result Value Ref Range    POC Glucose 128 (H) 65 - 100 mg/dL    Performed by: Yaneth    POCT Glucose    Collection Time: 02/18/23  6:14 AM   Result Value Ref Range    POC Glucose 114 (H) 65 - 100 mg/dL    Performed by: Santiago    POCT Glucose    Collection Time: 02/18/23  5:08 PM   Result Value Ref Range    POC Glucose 100 65 - 100 mg/dL    Performed by: Mustapha Li    POCT Glucose    Collection Time: 02/19/23  6:40 AM   Result Value Ref Range    POC Glucose 87 65 - 100 mg/dL    Performed by: Alfonso    POCT Glucose    Collection Time: 02/19/23  5:15 PM   Result Value Ref Range    POC Glucose 138 (H) 65 - 100 mg/dL    Performed by: Mustapha Li    POCT Glucose    Collection Time: 02/20/23  6:11 AM   Result Value Ref Range    POC Glucose 111 (H) 65 - 100 mg/dL    Performed by: SherleyCompanion          Assessment and Plan      Daily physician / PA medical management:    # Closed left hip fracture, initial encounter ([facility] [S72.002A] - interdisciplinary approach to rehabilitation including PT, OT, nursing and physiatry and disease specific education. # Pain management - PRN meds APAP 650mg q6h (mild), dilaudid 1mg q4h (moderate), dilaudid 2mg q4h (severe); wean opioids as tolerated.      # Hypertension - daily lisinopril 10mg and Toprol XL 50mg. # CHF, combined systolic / diastolic - continue furosemide 40mg QOD. # Anemia - acute on chronic; Hgb 11.2 at [facility] admission. # Osteoporosis - intolerance of oral bisphosphonate therapy; continue calcium, vitamin D. # Chemotherapy-induced neuropathy - continue home meds gabapentin 600mg BID and amitriptyline 10mg QHS. # Diabetes mellitus - HgbA1c 6.2% (6/2022). [facility] checks <140, cover with Humalog SSI and decrease to BID checks. # Chronic kidney disease - stable; avoid nephrotoxic agents. # Electrolyte management - K+ supplement 10mEq daily since patient taking furosemide. # DVT prophylaxis - ASA EC 325mg daily per Ortho     # Bladder program / urinary retention / neurogenic bladder - schedule voids q6-8h. Check post-void residual as needed; in-and-out catheter if post-void residual is more than 400ml. # Overactive bladder - continue trospium 20mg HS. Restart home med Myrbetriq at d/c. # UTI prophylaxis - continue nitrofurantoin 50mg daily. # Bowel program - at risk for constipation as a side effect of opioids, other medications, impaired mobility, etc. MiraLAX daily for regularity, Senokot-S for stool softener + laxative. PRN MOM, bisacodyl suppository or tablets for constipation. # Sjogren syndrome - following rheumatology outpatient. # Sleep disruption - continue melatonin 3mg nightly. #Influenza A - completed Tamiflu 2/20      Below are the active medical comorbidities / hospital conditions which will affect rehab course with plan for mitigation.  Continue daily physician / PA medical management:  Principal Problem:    Closed left hip fracture, initial encounter [facility])  Active Problems:    Primary insomnia    Sjogren syndrome, unspecified ([facility]    Type 2 diabetes mellitus with chronic kidney disease    Chronic gout of right hand    Chronic combined systolic and diastolic CHF (congestive heart failure) ([facility]    Dyslipidemia    Depression    Osteoarthritis    Chemotherapy-induced neuropathy (HCC)    Essential hypertension    Chronic anemia  Resolved Problems:    * No resolved hospital problems.  *     Planned discharge for 2/21/23     Signed By: Marcelo Sinclair MD    February 20, 2023

## 2023-02-20 NOTE — PROGRESS NOTES
OT DAILY NOTE  Time In 1120      Time Out 1202        Subjective: \"I'm ready to go home. \" Pt agreeable to treatment. Pain: No pain expressed. Interdisciplinary Communication: Collaborated with PA regarding pt question when staples will be removed. Precautions: Falls and WBAT LLE    /68   Pulse 58   Temp 97.5 °F (36.4 °C) (Oral)   Resp 18   Ht 5' 5\" (1.651 m)   Wt 146 lb (66.2 kg)   SpO2 94%   BMI 24.30 kg/m²      FUNCTIONAL MOBILITY:    Score Comments   Sit to Stand Roland I - Almas   Transfer Assist Supervision or Touching Assistance S   Ambulation Supervision or Touching Assistance SBA-S, cues not to  RW      - Self-Care    Score Comments   Toilet Transfer Supervision or touching assistance S SPT with [address] [place] or touching assistance S in stance for safety     Pt completed oral hygiene in stance at sink with S.       - Activity Tolerance - Cognition   Pt completed visual perceptual/cognitive, reaching, and bilateral hand coordination task utilizing pipe tree while following visual designs. Pt completed activity in seated position at table top with good accuracy. Education   Benefits of OT, Rolling Walker Management, and Safety Awareness     Assessment: Patient demonstrated good participation in OT treatment. Agreeable to HHOT at d/c. Pt continues to benefit from skilled OT services to address remaining deficits and progress toward baseline level of independence and safety. Patient ended session seated in recliner with call bell and needs within reach. Plan: Continue OT POC.      Farrell Romberg, OT   2/20/2023

## 2023-02-20 NOTE — PROGRESS NOTES
PHYSICAL THERAPY DISCHARGE SUMMARY    TIME IN: 0929  TIME OUT: 1006  Total Treatment Time:  37 Minutes      Precautions at discharge:   Falls and WB: WBAT L LE      Problem List:    Decreased strength B LE  [x]     Decreased strength trunk/core  []     Decreased AROM   []     Decreased PROM  []     Decreased balance sitting  []     Decreased balance standing  [x]     Decreased endurance  []     Pain  [x]        Functional Limitations:   Decreased independence with bed mobility  []     Decreased independence with functional transfers  [x]     Decreased independence with ambulation  [x]     Decreased independence with stair negotiation  [x]               Objective:     Vital Signs:/68   Pulse 58   Temp 97.5 °F (36.4 °C) (Oral)   Resp 18   Ht 5' 5\" (1.651 m)   Wt 146 lb (66.2 kg)   SpO2 94%   BMI 24.30 kg/m²      Pain level: 8/10  Pain location: L hip  Pain interventions: RN contacted & provided pain medication     Patient education: reviewed how to manage a single curb step as well as how to perform a car transfer     Interdisciplinary Communication: Collaborated w/ OT regarding pt's progress. Cognition: Pt. Alert & follows commands. Exhibits decreased safety awareness.     Lower Extremity Function:     LLE RLE   ROM AROM WFL AROM WFL   Fine Motor Coordination Intact Intact   Tone Normal Normal   Sensation NT NT   Strength Generally decreased, but functional Generally decreased, but functional          PRIMARY MODE OF LOCOMOTION: Ambulation      Functional Assessment:    Balance  Comments   Static Sitting Good:  Pt. able to maintain balance w/o UE support;  exhibits some postural sway    Dynamic Sitting Good - accepts moderate challenge;  can maintain balance while picking object off the floor    Static Standing Fair:  Pt. requires UE support;  may need occasional min A    Dynamic Standing Fair - accepts minimal challenge;  can maintain balance while turning head/trunk    Picking Up Object From Floor 5  Setup or clean-up assistance    using RW & reacher                  BED MOBILITY &TRANSFERS Initial Assessment   Discharge Assessment Comments   Rolling  88   88     Not attempted due to medical condition or safety concerns deferred secondary to pain   Supine to Sit 3   6  Independent        Sit to to Supine 3   6  Independent        Sit to Stand 3   6  Independent        Chair To/From Bed 3      Supervision or touching assistance    supervision for safety   Car Transfer 88   5  Setup or clean-up assistance              [facility] MOBILITY/MANAGEMENT Initial Assessment Discharge Assessment Comments   Able to Propel 50' w/ 2 Turns 9   9     Not applicable     Able to Propel 150' 9 9     Not applicable     Wheelchair set up assistance required  NT    Wheelchair management  NT          AMBULATION Initial Assessment Discharge Assessment Comments   Assistive device  RW    Walk 10' 88     4  Supervision or touching assistance    supervision for safety using RW   Walk 50' with 2 Turns 88   4  Supervision or touching assistance    supervision w/ RW   Walk 150' 5   4  Supervision or touching assistance    supervision w/ RW   Walking 10' on Unlevel Surface 88   4  Supervision or touching assistance    supervision w/ RW         STEPS/STAIRS Initial Assessment Discharge Assessment Comments   1 Curb Step 88   4  Supervision or touching assistance    supervision w/ RW   4 Steps 88   4  Supervision or touching assistance    supervision w/ B rails   12 Steps 9    9     Not applicable     Ramp  Supervision/Standby Assist  Using RW      Pt. Left up in recliner in NAD, call bell in reach. PHYSICAL THERAPY PLAN OF CARE     LTGs:  Pt will demonstrate roll left & right & transition supine<>sit with Independent in 10 days (MET)  2. Pt. will transfer from bed to/from chair with Supervision/Standby Assist using the least restrictive device in 10 days (MET)   3.    Pt. will ambulate with 150 feet with RW or LRAD and Supervision/Standby Assist in 10 days (Progressing, currently at [facility] level)   2/20/2023 MET  4. Pt. Will ambulate up/down 4 steps with single railing and CGA in 10 days (progressing, currently at min assist level)   2/20/2023 MET  5. Pt. Will perform HEP w/ supervision in 10 days. (Progressing)   2/20/2023 MET     Pt would benefit from continued skilled physical therapy in order to improve independent functional mobility within the home with use of least restrictive device. Interventions may include range of motion (AROM, PROM B LE/trunk), motor function (B LE/trunk strengthening/coordination), activity tolerance (vitals, oxygen saturation levels), bed mobility training, balance activities, gait training (progressive ambulation program), and functional transfer training. HEP handout: to be issued this PM.      Pt to be discharged home with . Therapy Recommendations upon discharge:   HHPT   Equipment needs at discharge:  RW     Please see IRC; Interdisciplinary Eval, Care Plan, and Patient Education for further information regarding physical therapy discharge summary and plan of care.        German Ni, PT  2/20/2023

## 2023-02-20 NOTE — PROGRESS NOTES
Physical Therapy  PHYSICAL THERAPY DAILY NOTE  Time In:  1300 PM  Time Out:  1348 PM  Total Treatment Time:  (P) 48 Minutes  Pt. Seen for: PM, Gait Training, Therapeutic Exercise, and Transfer Training     Subjective: \" I am ready to go home. \"         Objective:  Precautions: Falls and Poor Safety Awareness    GROSS ASSESSMENT Daily Assessment           COGNITION Daily Assessment    intact       BED/MAT MOBILITY Daily Assessment    Rolling Right:   Rolling Left:   Supine to Sit: Supervision/Standby Assist  Sit to Supine: Supervision/Standby Assist       TRANSFERS Daily Assessment    Sit to Stand: Supervision/Standby Assist  Stand to Sit: Supervision/Standby Assist  Transfer Type: Stand Pivot and with rolling walker  Transfer Assistance: Supervision/Standby Assist  Car Transfers: NT         GAIT Daily Assessment   Patient will let go of walker at times in room. Amount of Assistance: Supervision/Standby Assist  Distance (ft): 200  Assistive Device: RW  Surface: Level Surface       STEPS/STAIRS Daily Assessment    Steps Ambulated:    Level of Assistance:    Railing:  Assistive Device:        BALANCE Daily Assessment    Static Sitting:   Dynamic Sitting:   Static Standing:   Dynamic Standing:        WHEELCHAIR MOBILITY Daily Assessment    Able to Propel (ft):   Assistance:   Surface:   Wheelchair Set-up:        LOWER EXTREMITY EXERCISES Daily Assessment         Pain level: no pain noted during this session  Pain Location:    Pain Interventions:     Vital Signs:  /68   Pulse 58   Temp 97.5 °F (36.4 °C) (Oral)   Resp 18   Ht 5' 5\" (1.651 m)   Wt 146 lb (66.2 kg)   SpO2 94%   BMI 24.30 kg/m²       Education:      Interdisciplinary Communication:     Pt. Left in bed with call bell at reach. Assessment: Patient making progress but would benefit from supervision at home to prevent another fall. Plan of Care: Continue with plan of care.     Mary Loyd, CARMEN  2/20/2023

## 2023-02-20 NOTE — PROGRESS NOTES
OT DISCHARGE REPORT    Time In 0712      Time Out 6177        GOALS:  Short Term Goals:  Time Frame for Short Term Goals : 10 days   STG 1: Patient will dress UB with Virgil using AE/DME PRN. 2/17/23 Downgrade to Setup. 2/20/23 Goal met. STG 2: Patient will dress LB with Setup Assistance using AE/DME PRN. 2/17/23 Progressing. 2/20/23 Goal not met. STG 3: Patient will don footwear with Setup Assistance using AE/DME PRN. 2/17/23 Goal met. STG 4: Patient will bathe with Setup Assistance using AE/DME PRN. 2/17/23 Progressing. 2/20/23 Goal not met. STG 5: Patient will complete grooming tasks standing sink side with Setup Assistance using AE/DME PRN. 2/17/23 Downgrade to Supervision. 2/20/23 Goal met. Subjective: \"I was doing his meds until I went a little blank. \" Pt agreeable to treatment. Pain: No pain expressed. Interdisciplinary Communication: Collaborated with  CM to request [facility] for pt to use as seat in shower at d/c, with PT pt question about obtaining a new RW. Precautions at Discharge: Falls and WBAT LLE    /68   Pulse 58   Temp 98.1 °F (36.7 °C)   Resp 18   Ht 5' 5\" (1.651 m)   Wt 146 lb (66.2 kg)   SpO2 94%   BMI 24.30 kg/m²      MOBILITY:   Current Score Current Comments   Supine to Sit Virgil    Sit to Supine NT     Sit to Stand Virgil  Almas   Transfer Assist Supervision or Touching Assistance S SPTs, SBA-CGA shower transfer with cues for safety and RW positioning   Ambulation for ADLs Supervision or Touching Assistance SBA with cues for safety and RW management     ACTIVITIES OF DAILY LIVING:    Initial Score Current Score   Eating Independent  I Independent  I-Almas, additional time prn to manage packages   Oral Hygiene Setup or clean-up assistance  S/U A seated at sink side Independent  I seated at sink   Bathing Partial/moderate assistance  MIn A to maintain safety while standing Supervision or touching assistance  S seated on tub transfer bench.  Cues prn for safety. Upper Body  Dressing Setup or clean-up assistance  S/U A seated EOB Setup or clean-up assistance  S/U vs SBA to retrieve clothing using RW   Lower Body Dressing Partial/moderate assistance  MIN A to don to waist in standing  Supervision or touching assistance  S progressing toward S/U. Pt able to retrieve clothing with RW/SBA, cues for safety. Donning/ [place] [employer] Substantial/maximal assistance   Ind. to don sock on R foot, MAX A for L foot Setup or clean-up assistance  S/U   Toilet Transfer Partial/moderate assistance  MIN A SPT w/ RW Supervision or touching assistance  S SPT with [address] [place] or touching assistance  S - CGA Independent  Almas seated on toilet/standing at RW and R grab bar   Initial Score: Patient's lowest score within first 72 hrs of stay as gathered by OT. Current Score: Patient's average performance level over the last 48 hours. Discharge Location: Home  Recommended Continued Therapy/Supervision: HHOT, 24 Hour Supervision  Re[place] [facility]  Safety/Functional Level: Pt completes ADLs with Supervision to Set up using Rolling Walker, Grab Bars, and [employer]. Pt requires SBA-S for ambulation with RW and mobility related ADLs. Pt will require assist with IADLs such as home management, cooking and cleaning, and medication management due to cognitive and safety awareness deficits. Family Training: N/A. Residual Deficits: Decreased safety awareness, cognition, strength, RW management. Progress Over LOS: Patient demonstrates good progress with balance, ROM, activity tolerance, and functional mobility for ADL and functional transfers, see above for details. Patient continues to require skilled [address] [place] [address] services to address remaining deficits stated above. Summary of Session:   Focus of session was on morning ADL routine and discharge assessment. SpO2 96%, HR 64 on room air.      Patient ended session seated in recliner with call bell and needs within reach.      Bonnie Sellers, OT   2/20/2023

## 2023-02-21 VITALS
TEMPERATURE: 98.4 F | DIASTOLIC BLOOD PRESSURE: 85 MMHG | OXYGEN SATURATION: 95 % | RESPIRATION RATE: 16 BRPM | BODY MASS INDEX: 24.26 KG/M2 | SYSTOLIC BLOOD PRESSURE: 148 MMHG | HEART RATE: 65 BPM | WEIGHT: 145.6 LBS | HEIGHT: 65 IN

## 2023-02-21 PROBLEM — Z74.09 IMPAIRED MOBILITY AND ADLS: Status: ACTIVE | Noted: 2023-02-10

## 2023-02-21 PROBLEM — Z78.9 IMPAIRED MOBILITY AND ADLS: Status: ACTIVE | Noted: 2023-02-10

## 2023-02-21 LAB
GLUCOSE BLD STRIP.AUTO-MCNC: 93 MG/DL (ref 65–100)
SERVICE CMNT-IMP: NORMAL

## 2023-02-21 PROCEDURE — 6370000000 HC RX 637 (ALT 250 FOR IP): Performed by: PHYSICIAN ASSISTANT

## 2023-02-21 PROCEDURE — 6370000000 HC RX 637 (ALT 250 FOR IP): Performed by: PHYSICAL MEDICINE & REHABILITATION

## 2023-02-21 PROCEDURE — 99239 HOSP IP/OBS DSCHRG MGMT >30: CPT | Performed by: PHYSICIAN ASSISTANT

## 2023-02-21 PROCEDURE — 82962 GLUCOSE BLOOD TEST: CPT

## 2023-02-21 PROCEDURE — 6370000000 HC RX 637 (ALT 250 FOR IP): Performed by: STUDENT IN AN ORGANIZED HEALTH CARE EDUCATION/TRAINING PROGRAM

## 2023-02-21 RX ADMIN — NITROFURANTOIN MACROCRYSTALS 50 MG: 50 CAPSULE ORAL at 07:48

## 2023-02-21 RX ADMIN — LISINOPRIL 10 MG: 5 TABLET ORAL at 07:46

## 2023-02-21 RX ADMIN — ASPIRIN 325 MG: 325 TABLET, COATED ORAL at 07:46

## 2023-02-21 RX ADMIN — GABAPENTIN 600 MG: 300 CAPSULE ORAL at 07:47

## 2023-02-21 RX ADMIN — Medication 667 MG: at 07:46

## 2023-02-21 RX ADMIN — SENNOSIDES AND DOCUSATE SODIUM 2 TABLET: 50; 8.6 TABLET ORAL at 07:47

## 2023-02-21 RX ADMIN — CHOLECALCIFEROL TAB 25 MCG (1000 UNIT) 2000 UNITS: 25 TAB at 07:46

## 2023-02-21 RX ADMIN — GUAIFENESIN AND DEXTROMETHORPHAN 5 ML: 100; 10 SYRUP ORAL at 06:29

## 2023-02-21 NOTE — PROGRESS NOTES
D/c instructions reviewed with pt and  at bedside, verbalized understanding and all questions answered. Transported to car by RN.

## 2023-02-21 NOTE — DISCHARGE SUMMARY
[facility] [address]  Inpatient Rehab Program      PHYSICAL MEDICINE & REHABILITATION DISCHARGE SUMMARY     Date: 2/21/2023  Admission Date: 2/10/2023  Discharge Date: 2/21/2023    Primary Care Provider: LIU Rodriges CNP     Discharged Condition: stable    Principal Problem:    Closed left hip fracture, initial encounter [facility])  Active Problems:    Primary insomnia    Sjogren syndrome, unspecified ([facility]    Type 2 diabetes mellitus with chronic kidney disease    Chronic gout of right hand    Chronic combined systolic and diastolic CHF (congestive heart failure) (HCC)    Impaired mobility and ADLs    Dyslipidemia    Depression    Osteoarthritis    Chemotherapy-induced neuropathy (HCC)    Essential hypertension    Chronic anemia  Resolved Problems:    * No resolved hospital problems. *      Hospital Course:   Alvaro Bruce is a 69 y. o.white female patient at [facility] who was admitted to [address] [place] [address] on 2/10/2023 by Marcelo Sinclair MD of Physical Medicine and Rehab service with below-mentioned medical history. HPI: 75yo WF presented to the ED 2/6/2023 with left hip pain and inability to bear weight after mechanical fall at home. XR equivocal (degenerative change vs fracture); MRI confirmed nondisplaced left femoral neck fracture. ED labs and CXR unremarkable. Code S called 2/7 due to confusion and word salad. Fever to 102.9F, /105; empiric ABX started. Head CT without acute findings but did note large calcification adjacent to choroid plexus, follow-up MRI recommended. Infectious workup initiated, currently negative to date. Neurology opined acute toxic metabolic event. On 2/8, she was stable and underwent ORIF plate + screw fixation Janny Ferguson MD). Mild post-op ABLA, Hgb carlos 10.2 (2/8). Left foot XR for new pain unrevealing; Ortho postulated plantar fascitis. PT/OT noted mobility and self-care deficits, recommended IRC.  Once determined stable, patient was transferred to the [facility] for comprehensive inpatient rehabilitation program and close medical monitoring. REHABILITATION COURSE:   Assessment and Plan    Daily physician / PA medical management:     # Closed left hip fracture, initial encounter ([facility] [S72.002A] - interdisciplinary approach to rehabilitation including PT, OT, nursing and physiatry and disease specific education. # Pain management - PRN meds APAP 650mg q6h (mild), dilaudid 1mg q4h (moderate), dilaudid 2mg q4h (severe); wean opioids as tolerated. # Hypertension - daily lisinopril 10mg and Toprol XL 50mg. # CHF, combined systolic / diastolic - continue furosemide 40mg QOD. # Anemia - acute on chronic; Hgb 11.2 at [facility] admission. # Osteoporosis - intolerance of oral bisphosphonate therapy; continue calcium, vitamin D. # Chemotherapy-induced neuropathy - continue home meds gabapentin 600mg BID and amitriptyline 10mg QHS. # Diabetes mellitus - HgbA1c 6.2% (6/2022). [facility]140, cover with Humalog SSI and decrease to BID checks. # Chronic kidney disease - stable; avoid nephrotoxic agents. # Electrolyte management - K+ supplement 10mEq daily since patient taking furosemide. # DVT prophylaxis - ASA EC 325mg daily per Ortho     # Bladder program / urinary retention / neurogenic bladder - schedule voids q6-8h. Check post-void residual as needed; in-and-out catheter if post-void residual is more than 400ml. # Overactive bladder - continue trospium 20mg HS. Restart home med Myrbetriq at d/c. # UTI prophylaxis - continue nitrofurantoin 50mg daily. # Bowel program - at risk for constipation as a side effect of opioids, other medications, impaired mobility, etc. MiraLAX daily for regularity, Senokot-S for stool softener + laxative. PRN MOM, bisacodyl suppository or tablets for constipation. # Sjogren syndrome - following rheumatology outpatient.      # Sleep disruption - continue melatonin 3mg nightly. # Influenza A - completed Tamiflu 2/20 2/20/2023  feeling better today. Cough is improved. No SOB. Pain controlled. She is looking forward to her upcoming discharge. progressed well with therapy toward rehab goals and was discharged home with OP services. 2/17/2023  congestion improved. Still c/o mild cough. Pain controlled. Feeling a bit better. Completed IV hydration. Demonstrated proper use of IS. Tolerating therapy. 2/16/2023  had fever on 2/14 and again on 2/15, workup initially negative. Last night she had episode of mild cough and nasal congestion and sundowning. She was seen by the internist after hours. Workup was positive for influenza A. She was started on Tamiflu. This AM she c/o general malaise but states she is feeling better than last night. Still has mild nasal congestion. 2/15/2023  evaluated patient after she declined ADLs stating she didn't feel good; T 101.7F. Patient states mild malaise started yesterday afternoon with nasal congestion, post-nasal drainage and nausea. Denies F/C, HA, cough, rhinorrhea, vomiting, abdominal cramping, dysuria, hematuria. Does not appear acutely ill, has appetite. Says she wants a shower this AM \"but not so early. \" APAP given. 2/14/2023  c/o mild hip pain controlled with current regimen. Had BM yesterday. No new complaints. 2/13/2023  seen today in her room. BP remains elevated but she has no related complaints. Participating well.      2/11/2023  observed ambulating in hallway with PT and RW, examined later in room. Reports pain managed with PRN meds, does not want APAP scheduled as \"it wires me up. \" BM x 1 since 2/6, requests stool softener but no MiraLAX. Self-manages voiding q2h due to OAB; continent.        FUNCTIONAL LEVEL AT DISCHARGE:    PHYSICAL THERAPY DISCHARGE SUMMARY  TIME IN: 0929  TIME OUT: 1006  Total Treatment Time:  37 Minutes     Precautions at discharge:   Falls and WB: WBAT L LE       Problem List:    Decreased strength B LE  [x]     Decreased strength trunk/core  []     Decreased AROM   []     Decreased PROM  []     Decreased balance sitting  []     Decreased balance standing  [x]     Decreased endurance  []     Pain  [x]        Functional Limitations:   Decreased independence with bed mobility  []     Decreased independence with functional transfers  [x]     Decreased independence with ambulation  [x]     Decreased independence with stair negotiation  [x]               Objective:      Vital Signs:/68   Pulse 58   Temp 97.5 °F (36.4 °C) (Oral)   Resp 18   Ht 5' 5\" (1.651 m)   Wt 146 lb (66.2 kg)   SpO2 94%   BMI 24.30 kg/m²       Pain level: 8/10  Pain location: L hip  Pain interventions: RN contacted & provided pain medication     Patient education: reviewed how to manage a single curb step as well as how to perform a car transfer     Interdisciplinary Communication: Collaborated w/ OT regarding pt's progress. Cognition: Pt. Alert & follows commands. Exhibits decreased safety awareness.      Lower Extremity Function:       LLE RLE   ROM AROM WFL AROM WFL   Fine Motor Coordination Intact Intact   Tone Normal Normal   Sensation NT NT   Strength Generally decreased, but functional Generally decreased, but functional          PRIMARY MODE OF LOCOMOTION: Ambulation       Functional Assessment:     Balance   Comments   Static Sitting Good:  Pt. able to maintain balance w/o UE support;  exhibits some postural sway     Dynamic Sitting Good - accepts moderate challenge;  can maintain balance while picking object off the floor     Static Standing Fair:  Pt. requires UE support;  may need occasional min A     Dynamic Standing Fair - accepts minimal challenge;  can maintain balance while turning head/trunk     Picking Up Object From Floor 5  Setup or clean-up assistance   using RW & reacher         BED MOBILITY &TRANSFERS Initial Assessment    Discharge Assessment Comments Rolling  88    88  Not attempted due to medical condition or safety concerns deferred secondary to pain   Supine to Sit 3    6  Independent      Sit to Supine 3    6  Independent      Sit to Stand 3    6  Independent      Chair To/From Bed 3    Supervision or touching assistance   supervision for safety   Car Transfer 88    5  Setup or clean-up assistance            [facility] MOBILITY/MANAGEMENT Initial Assessment Discharge Assessment Comments   Able to Propel 50' w/ 2 Turns 9    9  Not applicable    Able to Propel 392' 9 9  Not applicable    Wheelchair set up assistance required   NT     Wheelchair management   NT           AMBULATION Initial Assessment Discharge Assessment Comments   Assistive device   RW     Walk 10' 88       4  Supervision or touching assistance   supervision for safety using RW   Walk 50' with 2 Turns 88    4  Supervision or touching assistance   supervision w/ RW   Walk 150' 5    4  Supervision or touching assistance   supervision w/ RW   Walking 10' on Unlevel Surface 88    4  Supervision or touching assistance   supervision w/ RW         STEPS/STAIRS Initial Assessment Discharge Assessment Comments   1 Curb Step 88    4  Supervision or touching assistance   supervision w/ RW   4 Steps 88    4  Supervision or touching assistance   supervision w/ B rails   12 Steps 9     9  Not applicable    Ramp   Supervision/Standby Assist  Using RW      Pt. Left up in recliner in NAD, call bell in reach. PHYSICAL THERAPY PLAN OF CARE     LTGs:  Pt will demonstrate roll left & right & transition supine<>sit with Independent in 10 days (MET)  2. Pt. will transfer from bed to/from chair with Supervision/Standby Assist using the least restrictive device in 10 days (MET)          3. Pt. will ambulate with 150 feet with RW or LRAD and Supervision/Standby Assist in 10 days (Progressing, currently at [place] [address] level)             2/20/2023 MET  4.    Pt. Will ambulate up/down 4 steps with single railing and CGA in 10 days (progressing, currently at min assist level)             2/20/2023 MET  5. Pt. Will perform HEP w/ supervision in 10 days. (Progressing)             2/20/2023 MET     Pt would benefit from continued skilled physical therapy in order to improve independent functional mobility within the home with use of least restrictive device. Interventions may include range of motion (AROM, PROM B LE/trunk), motor function (B LE/trunk strengthening/coordination), activity tolerance (vitals, oxygen saturation levels), bed mobility training, balance activities, gait training (progressive ambulation program), and functional transfer training. HEP handout: to be issued this PM.      Pt to be discharged home with . Therapy Recommendations upon discharge:   HHPT   Equipment needs at discharge:  RW     Please see IRC; Interdisciplinary Eval, Care Plan, and Patient Education for further information regarding physical therapy discharge summary and plan of care. Kitty Yoko, PT  2/20/2023      OT DISCHARGE REPORT     Time In 0712      Time Out 0749         GOALS:  Short Term Goals:  Time Frame for Short Term Goals : 10 days   STG 1: Patient will dress UB with Walker using AE/DME PRN. 2/17/23 Downgrade to Setup. 2/20/23 Goal met. STG 2: Patient will dress LB with Setup Assistance using AE/DME PRN. 2/17/23 Progressing. 2/20/23 Goal not met. STG 3: Patient will don footwear with Setup Assistance using AE/DME PRN. 2/17/23 Goal met. STG 4: Patient will bathe with Setup Assistance using AE/DME PRN. 2/17/23 Progressing. 2/20/23 Goal not met. STG 5: Patient will complete grooming tasks standing sink side with Setup Assistance using AE/DME PRN. 2/17/23 Downgrade to Supervision. 2/20/23 Goal met. Subjective: \"I was doing his meds until I went a little blank. \" Pt agreeable to treatment. Pain: No pain expressed.   Interdisciplinary Communication: Collaborated with  CM to request [facility] for pt to use as seat in shower at d/c, with PT pt question about obtaining a new RW. Precautions at Discharge: Falls and WBAT LLE     /68   Pulse 58   Temp 98.1 °F (36.7 °C)   Resp 18   Ht 5' 5\" (1.651 m)   Wt 146 lb (66.2 kg)   SpO2 94%   BMI 24.30 kg/m²       MOBILITY:    Current Score Current Comments   Supine to Sit [place]     Sit to Supine NT     Sit to Stand [place]  [place]   Transfer Assist Supervision or Touching Assistance S SPTs, SBA-CGA shower transfer with cues for safety and RW positioning   Ambulation for ADLs Supervision or Touching Assistance SBA with cues for safety and RW management      ACTIVITIES OF DAILY LIVING:     Initial Score Current Score   Eating Independent  I Independent  I-[place], additional time prn to manage packages   Oral Hygiene Setup or clean-up assistance  S/U A seated at sink side Independent  I seated at sink   Bathing Partial/moderate assistance  Min A to maintain safety while standing Supervision or touching assistance  S seated on tub transfer bench. Cues prn for safety. Upper Body  Dressing Setup or clean-up assistance  S/U A seated EOB Setup or clean-up assistance  S/U vs SBA to retrieve clothing using RW   Lower Body Dressing Partial/moderate assistance  MIN A to don to waist in standing  Supervision or touching assistance  S progressing toward S/U. Pt able to retrieve clothing with RW/SBA, cues for safety. Donning/ [place] [employer]wear Substantial/maximal assistance   Ind. to don sock on R foot, MAX A for L foot Setup or clean-up assistance  S/U   Toilet Transfer Partial/moderate assistance  MIN A SPT w/ RW Supervision or touching assistance  S SPT with [address] [place] or touching assistance  S - CGA Independent  [place] seated on toilet/standing at RW and R grab bar   Initial Score: Patient's lowest score within first 72 hrs of stay as gathered by OT. Current Score: Patient's average performance level over the last 48 hours. Discharge Location: Home  Recommended Continued Therapy/Supervision:  OT, 24 Hour Supervision  Recommended Equipment: [facility]  Safety/Functional Level: Pt completes ADLs with Supervision to Set up using Rolling Walker, Grab Bars, and Tub Transfer Bench. Pt requires SBA-S for ambulation with RW and mobility related ADLs. Pt will require assist with IADLs such as home management, cooking and cleaning, and medication management due to cognitive and safety awareness deficits. Family Training: N/A. Residual Deficits: Decreased safety awareness, cognition, strength, RW management. Progress Over LOS: Patient demonstrates good progress with balance, ROM, activity tolerance, and functional mobility for ADL and functional transfers, see above for details. Patient continues to require skilled [facility] OT services to address remaining deficits stated above. Summary of Session:   Focus of session was on morning ADL routine and discharge assessment. SpO2 96%, HR 64 on room air. Patient ended session seated in recliner with call bell and needs within reach. Ramirez Cao OT   2/20/2023        Past Medical History:   Diagnosis Date    Ankle fracture 2014    Ankle osteomyelitis, left ([address] 2014    Breast cancer (HCC)     Chemotherapy-induced neuropathy (HCC)     Chronic anemia     Chronic systolic CHF (congestive heart failure) (HCC)     CKD (chronic kidney disease) stage 3, GFR 30-59 ml/min (HCC)     Colon polyp 2020    Depression     Dilated cardiomyopathy ([address]     Dry eye syndrome of both eyes     Dyslipidemia     Essential hypertension     Fracture of right patella 2013    History of left breast cancer 2001    with mastectomy    Left leg weakness 9/8/2020    Non-ischemic cardiomyopathy ([address] 2018    Echo 50-55%, Cath-minimal CAD.  EF normalized    Osteoarthritis     Osteopenia     Peripheral sensory-motor axonal polyneuropathy 10/17/2020    S/P cardiac cath 2009    Statin intolerance     Tibial fracture 2016    Tibial plateau fracture 1/1/0320    Last Assessment & Plan:  Formatting of this note might be different from the original. Pod 2 ORIF doing well. C/o moderate pain. Controlled on PO meds.   Ready to go home    Type 2 diabetes mellitus, controlled ([address]     BS am 104    UTI (urinary tract infection) 2020    Vaginal infection     Vitamin B12 deficiency       Past Surgical History:   Procedure Laterality Date    BREAST BIOPSY      left breast biopsy    BREAST BIOPSY  1968    left cyst removed    BREAST LUMPECTOMY Left 2001    BREAST REDUCTION SURGERY      Rt breast    CARDIAC CATHETERIZATION  2009    CARPAL TUNNEL RELEASE Bilateral     CHOLECYSTECTOMY, OPEN  1981    COLONOSCOPY  07/2014    CYST REMOVAL Right 11/2020    hand    HAND SURGERY Right 11/16/2022    Right index finger distal interphalangeal joint arthrodesis performed by Misha Nguyen MD at [address] [place] [address]      Sinus Surgery    HEENT      RK procedure bilateral eyes    HIP FRACTURE SURGERY Left 2/8/2023    HIP OPEN REDUCTION INTERNAL FIXATION performed by Karan Christian MD at [facility] MAIN OR    HX OPEN REDUCTION INTERNAL FIXATION Left 2013    Ankle    HX OPEN REDUCTION INTERNAL FIXATION  08/2016    [place]    HYSTERECTOMY (CERVIX STATUS UNKNOWN)      LUMBAR LAMINECTOMY  1992    MASTECTOMY Left 2002    With Reconstruction    ORTHOPEDIC SURGERY Right 08/2013    Patellar fracture repair    OTHER SURGICAL HISTORY      [place] Cath Placed and Removed    OVARY REMOVAL      Unilateral    PARTIAL HYSTERECTOMY (CERVIX NOT REMOVED)  1974    TONSILLECTOMY      TONSILLECTOMY      TRANSPLANT  2002    Stem Cell Transplant    UVULOPALATOPHARYGOPLASTY  2004      Family History   Problem Relation Age of Onset    Cancer Mother     Diabetes Mother     Heart Disease Mother         MI at age 66    Heart Failure Mother         age 58    Stroke Sister     Cancer Brother     Heart Disease Brother     Heart Disease Father         MI age 55    Breast Cancer Neg Hx     Stomach Cancer Neg Hx       Social History     Tobacco Use    Smoking status: Never    Smokeless tobacco: Never    Tobacco comments:     never used tobacco   Substance Use Topics    Alcohol use: No     Allergies   Allergen Reactions    Oxycodone Itching    Eucalyptus Oil Hives and Other (See Comments)     Burns mouth    Ezetimibe Myalgia    Morphine Other (See Comments)     Feels crazy  Other reaction(s): Hallucinations    Penicillins Hives    Pineapple Hives and Other (See Comments)     Burns mouth    Vancomycin Other (See Comments)     Kidney function worsened    Sulfa Antibiotics Nausea Only and Rash      Prior to Admission medications    Medication Sig Start Date End Date Taking? Authorizing Provider   HYDROmorphone (DILAUDID) 2 MG tablet Take 0.5-1 tablets by mouth every 4 hours as needed (for moderate to severe pain) for up to 5 days.  Max Daily Amount: 12 mg 2/20/23 2/25/23 Yes TERESA Macario   naloxone [facility]) 4 MG/0.1ML LIQD nasal spray 1 spray by Nasal route as needed for Opioid Reversal 2/10/23   LIU Beyer CNP   aspirin 325 MG EC tablet Take 1 tablet by mouth daily 2/11/23 3/13/23  ILU Beyer CNP   mirabegron (MYRBETRIQ) 25 MG TB24 Take 1 tablet by mouth daily 2/11/23 3/13/23  LIU Beyer CNP   Vitamin D (CHOLECALCIFEROL) 50 MCG (2000 UT) TABS tablet Take 1 tablet by mouth daily for 80 doses 2/11/23 5/2/23  LIU Beyer CNP   nitrofurantoin (MACRODANTIN) 50 MG capsule Take 1 capsule by mouth daily 1/23/23   Moreen Jeans, MD   valACYclovir (VALTREX) 1 g tablet Take 1,000 mg by mouth 2 times daily    Historical Provider, MD   melatonin 3 MG TABS tablet Take 3 mg by mouth daily    Historical Provider, MD SIMPSON 3CC LUER-KATY SYR 25GX1\" 25G X 1\" 3 ML MISC  11/16/22   Historical Provider, MD   metoprolol succinate (TOPROL XL) 50 MG extended release tablet Take 1 tablet by mouth at bedtime Once a day 11/17/22   Sarah Poe DO   lisinopril (PRINIVIL;ZESTRIL) 10 MG tablet Take 1 tablet by mouth 2 times daily 11/17/22   Ben Hutchison DO   CRANBERRY PO Take by mouth daily    Historical Provider, MD   estradiol (ESTRACE VAGINAL) 0.1 MG/GM vaginal cream Insert 2g daily vaginally for two weeks. Then insert 1g three times a week. 11/4/22   LIU Mcgraw CNP   fluticasone [facility]) 50 MCG/ACT nasal spray 2 times daily as needed 9/8/22   Historical Provider, MD   furosemide (LASIX) 40 MG tablet Take 1 tablet by mouth every other day 6/6/22   LIU Mcgraw CNP   gabapentin (NEURONTIN) 600 MG tablet Take 1 tablet by mouth 2 times daily for 360 days. 6/6/22 6/1/23  LIU Mcgraw CNP   metFORMIN (GLUCOPHAGE-XR) 500 mg extended release tablet Take 2 tablets by mouth 2 times daily 6/6/22   LIU Mcgraw CNP   calcium carbonate (TUMS) 500 MG chewable tablet Take 1 tablet by mouth at bedtime    Historical Provider, MD   dextromethorphan-guaiFENesin ([address] DM)  MG per extended release tablet Take 1 tablet by mouth every 12 hours as needed    Historical Provider, MD       Lab Review:   Recent Results (from the past 72 hour(s))   POCT Glucose    Collection Time: 02/18/23  5:08 PM   Result Value Ref Range    POC Glucose 100 65 - 100 mg/dL    Performed by: OmerDaliaPCT    POCT Glucose    Collection Time: 02/19/23  6:40 AM   Result Value Ref Range    POC Glucose 87 65 - 100 mg/dL    Performed by: MargaritaricaPCT    POCT Glucose    Collection Time: 02/19/23  5:15 PM   Result Value Ref Range    POC Glucose 138 (H) 65 - 100 mg/dL    Performed by: SilviaaliaPCT    POCT Glucose    Collection Time: 02/20/23  6:11 AM   Result Value Ref Range    POC Glucose 111 (H) 65 - 100 mg/dL    Performed by: Yoel    POCT Glucose    Collection Time: 02/20/23  4:26 PM   Result Value Ref Range    POC Glucose 136 (H) 65 - 100 mg/dL    Performed by:  Dana    POCT Glucose    Collection Time: 02/21/23  6:29 AM   Result Value Ref Range POC Glucose 93 65 - 100 mg/dL    Performed by: Keyanna          Discharge Physical Exam:    Visit Vitals  BP (!) 148/85   Pulse 65   Temp 98.4 °F (36.9 °C) (Oral)   Resp 16   Ht 5' 5\" (1.651 m)   Wt 145 lb 9.6 oz (66 kg)   SpO2 95%   BMI 24.23 kg/m²      General: No acute distress, well developed, well nourished. HEENT: Normocephalic, oral mucosa is moist. Extraocular movements are intact. Neck: Supple, non-tender. Respiratory: Clear breath sounds bilaterally, good air movement even at bases when prompted. No wheeze / crackles / rhonchi. Respirations are non-labored. Equal chest rise. Cardiovascular: Normal rate, regular underlying rhythm, harsh systolic murmur. Palpable pedal pulses, no cyanosis. ABD: Soft, non-tender, not distended, normoactive bowel sounds. Integumentary: Clean, no rash, no skin breakdown. Left hip incision intact with staples, surrounding mild hematoma. Musculoskeletal: UE strength 5/5 and symmetric. Right index finger tenderness and chronic-appearing swelling. R LE strength grossly 5/5. Left LE strength 4/5, movement greatly improved. Psychiatric: Appropriate mood & affect. Neuro: Alert, oriented; speech and language intact. No focal deficits. Sensation grossly intact. DISCHARGE INSTRUCTIONS:     1. Diet:  regular adult diabetic diet (4 carb choices, 60g/meal)  2. Activity: as tolerated per Kiran Luther PT, OT. Current Discharge Medication List     CONTINUE these medications which have CHANGED    Details   HYDROmorphone (DILAUDID) 2 MG tablet Take 0.5-1 tablets by mouth every 4 hours as needed (for moderate to severe pain) for up to 5 days.  Max Daily Amount: 12 mg  Qty: 28 tablet, Refills: 0    Comments: Reduce doses taken as pain becomes manageable  Associated Diagnoses: Closed left [facility] fracture[facility]           CONTINUE these medications which have NOT CHANGED    Details   naloxone (NARCAN) 4 MG/0.1ML LIQD nasal spray 1 spray by Nasal route as needed for Opioid Reversal  Qty: 1 each, Refills: 0      aspirin 325 MG EC tablet Take 1 tablet by mouth daily  Qty: 30 tablet, Refills: 0      mirabegron (MYRBETRIQ) 25 MG TB24 Take 1 tablet by mouth daily  Qty: 30 tablet, Refills: 0      Vitamin D (CHOLECALCIFEROL) 50 MCG (2000 UT) TABS tablet Take 1 tablet by mouth daily for 80 doses  Qty: 80 tablet, Refills: 0    Comments: Labeling may look different. 25 mcg=1000 Units. Please double check dosages. nitrofurantoin (MACRODANTIN) 50 MG capsule Take 1 capsule by mouth daily  Qty: 30 capsule, Refills: 11    Associated Diagnoses: Recurrent UTI (urinary tract infection)      valACYclovir (VALTREX) 1 g tablet Take 1,000 mg by mouth 2 times daily      melatonin 3 MG TABS tablet Take 3 mg by mouth daily      B-D 3CC LUER-KATY SYR 25GX1\" 25G X 1\" 3 ML MISC       metoprolol succinate (TOPROL XL) 50 MG extended release tablet Take 1 tablet by mouth at bedtime Once a day  Qty: 90 tablet, Refills: 3      lisinopril (PRINIVIL;ZESTRIL) 10 MG tablet Take 1 tablet by mouth 2 times daily  Qty: 180 tablet, Refills: 3      CRANBERRY PO Take by mouth daily      estradiol (ESTRACE VAGINAL) 0.1 MG/GM vaginal cream Insert 2g daily vaginally for two weeks. Then insert 1g three times a week. Qty: 1 each, Refills: 3    Associated Diagnoses: Recurrent UTI      fluticasone (FLONASE) 50 MCG/ACT nasal spray 2 times daily as needed      furosemide (LASIX) 40 MG tablet Take 1 tablet by mouth every other day  Qty: 45 tablet, Refills: 1    Associated Diagnoses: Dilated cardiomyopathy ([address] Essential hypertension      gabapentin (NEURONTIN) 600 MG tablet Take 1 tablet by mouth 2 times daily for 360 days. Qty: 180 tablet, Refills: 1    Associated Diagnoses: Controlled type 2 diabetes mellitus with stage 3 chronic kidney disease, without long-term current use of insulin ([address]  Chemotherapy-induced neuropathy (HCC)      metFORMIN (GLUCOPHAGE-XR) 500 mg extended release tablet Take 2 tablets by mouth 2 times daily  Qty: 360 tablet, Refills: 1    Associated Diagnoses: Controlled type 2 diabetes mellitus with stage 3 chronic kidney disease, without long-term current use of insulin (HCC)      calcium carbonate (TUMS) 500 MG chewable tablet Take 1 tablet by mouth at bedtime      dextromethorphan-guaiFENesin (MUCINEX DM)  MG per extended release tablet Take 1 tablet by mouth every 12 hours as needed           STOP taking these medications       nystatin-triamcinolone (MYCOLOG II) 810250-2.1 UNIT/GM-% cream Comments:   Reason for Stopping:         promethazine (PHENERGAN) 25 MG tablet Comments:   Reason for Stopping:         potassium chloride (KLOR-CON M) 20 MEQ extended release tablet Comments:   Reason for Stopping:             I have reviewed the patients controlled substance prescription history as maintained in the [address] [place] [address] prescription drug monitoring program (PDMP) so that prescription(s) for controlled substance, if any provided, could be given. DISPOSITION: discharged home with family assist.       OUTPATIENT FOLLOW-UP:  Christen Escalona, LIU - CNP  [address] [facility] [address] [place] [address]  [phone]  Schedule an appointment as soon as possible for a visit in 5 day(s) for follow up with PCP after hospitalization, hip fracture repair and rehab      TERESA Ballard  [address] [place] [id number] [address]  [phone]  Go on 2/22/2023 at 10:30 AM for recheck with Ortho Surgery PA after hip fracture repair      DO Magdi Mcnamara [address] [facility]  [phone]  Go on 2/24/2023 at 3:15 PM for recheck with Cardiologist         Time spent in patient discharge planning and coordination: >35 minutes.       Signed By: Kely Pike PA-C    February 21, 2023      Physician Assistant with [facility]

## 2023-02-21 NOTE — PROGRESS NOTES
Inpatient Rehab Program  [facility], [address] [facility]  Tel: [phone]     2/21/2023    Kayla Fitzgerald  Admit Date: 2/10/2023  Admit Diagnosis:   Hip fracture ([facility] Reji Schultz  Closed left hip fracture, initial encounter ([facility] [S72.002A]  Impaired mobility and ADLs [Z74.09]      Statement of Medical Necessity - Rolling Walker and 3-in-1 Bedside Commode  Kayla Fitzgerald has been evaluated face-to-face on a daily basis by the Physiatry MD / PA for the above-noted diagnoses since admission to our rehabilitation program. It is medically necessary for this patient to have a rolling walker for home and community use at discharge from inpatient rehab. She is s/p ORIF left femoral neck fracture with plate and screw fixation 2/8/2023. As such, she has significantly impaired ability to perform any ADLs and requires the rolling walker for safe ambulation at household distances. The rolling walker will facilitate her mobility, enhance her safety and decrease the caregivers' burden of care. The patient will also require a 3-in-1 bedside commode. This will allow her to perform urgent toileting without having to ambulate the distance to her bathroom following hip fracture surgery. More importantly, this is necessary for use in the shower as a tub transfer bench since she is unsafe standing in a shower / bathtub while bathing.        Alyse Byrnes PA-C  Physician Assistant with [facility]

## 2023-02-21 NOTE — CARE COORDINATION
Patient has discharge orders for today. Patient has a referral made to [facility]. Referral completed to [facility]. Information placed on follow-up for discharge information. Patient received C / [address] [facility] from [address] [facility] faxed over to [facility].  Family will transport patient home. Patient has met all treatment goals / milestones. CM will continue to follow and remain available for any needs, concerns or questions that may arise.           ASSESSMENT NOTE    Attending Physician: Elaine Rodarte MD  Admit Problem: Hip fracture ([facility] Gaurav Guzman  Closed left hip fracture, initial encounter ([facility] [S72.002A]  Date/Time of Admission: 2/10/2023  3:09 PM  Problem List:  Patient Active Problem List   Diagnosis    Chronic periscapular pain on right side    Foraminal stenosis of cervical region    Statin intolerance    Dyslipidemia    Depression    Osteoarthritis    Peripheral sensory-motor axonal polyneuropathy    Osteopenia    Type 2 diabetes mellitus, controlled (HCC)    Muscle spasm    Recurrent occipital headache    Left leg weakness    Chemotherapy-induced neuropathy (HCC)    Stage 3a chronic kidney disease (HCC)    Arthropathy of cervical facet joint    Stem cells transplant status ([facility]    Dilated cardiomyopathy (HCC)    Vitamin D deficiency    Vitamin B12 deficiency    S/P cardiac cath    Essential hypertension    Chronic anemia    Palpitation    Primary insomnia    Intolerance of oral bisphosphonate therapy    Sjogren syndrome, unspecified (HCC)    Type 2 diabetes mellitus with chronic kidney disease    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    Chronic gout of right hand    Acute renal failure superimposed on stage 3a chronic kidney disease (HCC)    Chronic combined systolic and diastolic CHF (congestive heart failure) (HCC)    Abnormal finding on CT scan    Closed left hip fracture, initial encounter [facility])       Service Assessment  Patient Orientation Alert and Oriented, Person, Place, Situation, Self   Cognition Alert   History Provided By Patient, Spouse, Child/Family, Medical Record   Primary Caregiver Spouse   Accompanied By/Relationship     Support Systems Spouse/Significant Other, Children, Family Members   Patient's Healthcare Decision Maker is: Legal Next of Lnida Retana   PCP Verified by CM Yes   Last Visit to PCP Within last 3 months   Prior Functional Level Independent in ADLs/IADLs   Current Functional Level Assistance with the following:, Bathing, Dressing, Toileting, Mobility   Can patient return to prior living arrangement Yes   Ability to make needs known: Good   Family able to assist with home care needs: Yes   Would you like for me to discuss the discharge plan with any other family members/significant others, and if so, who?  Yes   Financial Resources Medicare   Community Resources     CM/SW Referral       Social/Functional History  Lives With Spouse   Type of [address] [facility] One level   Home Access Stairs to enter without rails   Entrance Stairs - Number of Steps 3   Entrance Stairs - Rails     Bathroom Shower/Tub Tub/Shower unit   Bathroom Toilet Standard   Bathroom Equipment     Bathroom Accessibility Accessible   Home Equipment     Receives Help From Family   ADL Assistance Needs assistance   Bath     Dressing     Grooming     Feeding     [address] [facility] Work     Driving     Shopping          Other (Comment)     [address] [facility] Paying/Finance [address] [facility] Management     Other (Comment)     Ambulation Assistance Needs assistance   Transfer Assistance Needs assistance   Active  No   Patient's  Info     Mode of Transportation Car   Education Occupation Retired   Type of Occupation       Discharge [address] Spouse/Significant Other, Children, Family Members   Current Services Prior To Admission None   Potential Assistance Needed N/A   DME     DME     DME Ordered? No   Potential Assistance Purchasing Medications No   Meds-to-Beds: Does the patient want to have any new prescriptions delivered to bedside prior to discharge? Type of Home Care Services None   Patient expects to be discharged to: House   Follow Up Appointment: Best Day/Time     One/Two Story Residence: Two story   # of Interior Steps     Height of Each Step (in)     [employer] Both   Lift Chair Available No   History of Falls? Yes     Services At/After Discharge  Transition of Care Consult (CM Consult): Internal Home Health     Internal Hospice     Reason Outside Agency [address]     Partner SNF     Reason Why Partner SNF Not Chosen     Internal Comfort Care     Reason Outside [address] Discharge     [address] Provided? No   Mode of Transport at Discharge Other (see comment)   Hospital Transport Time of Discharge     Confirm Follow Up Transport Family     Condition of Participation: Discharge Planning  The plan for Transition of Care is related to the following treatment goals: Acute inpatient rehab to improve pt's strength, mobility and functional abilities for a safe transition to home   The Patient and/or Patient Representative was provided with a Choice of Provider? Name of the Patient Representative who was provided with the Choice of Provider and agrees with the Discharge Plan? The Patient and/or Patient Representative Agree with the Discharge Plan?      Freedom of Choice list was provided with basic dialogue that supports the individualized plan of care/goals, treatment preferences, and shares the quality data associated with the providers?        Documentation for Discharge Appeal  Discharge Appealed by     Date notified by QIO of appeal request:     Time notified by QIO of appeal request:     Detailed Notice of Discharge given to:     Date Notice of Discharge given:     Time Notice of Discharge given:     Date records sent to QIO     Time records sent to Alfredo Rosa     Date Notified of Outcome     Time Notified of Outcome     Outcome of appeal           ASHUTOSH Tyson 02/20/23 10:15 PM

## 2023-02-22 ENCOUNTER — HOME CARE VISIT (OUTPATIENT)
Dept: SCHEDULING | Facility: HOME HEALTH | Age: 78
End: 2023-02-22

## 2023-02-22 ENCOUNTER — CARE COORDINATION (OUTPATIENT)
Dept: CARE COORDINATION | Facility: CLINIC | Age: 78
End: 2023-02-22

## 2023-02-22 ENCOUNTER — OFFICE VISIT (OUTPATIENT)
Dept: ORTHOPEDIC SURGERY | Age: 78
End: 2023-02-22

## 2023-02-22 ENCOUNTER — TELEPHONE (OUTPATIENT)
Dept: INTERNAL MEDICINE CLINIC | Facility: CLINIC | Age: 78
End: 2023-02-22

## 2023-02-22 VITALS
RESPIRATION RATE: 18 BRPM | OXYGEN SATURATION: 96 % | HEART RATE: 62 BPM | SYSTOLIC BLOOD PRESSURE: 124 MMHG | TEMPERATURE: 97.4 F | DIASTOLIC BLOOD PRESSURE: 74 MMHG

## 2023-02-22 DIAGNOSIS — S72.002D CLOSED HIP FRACTURE REQUIRING OPERATIVE REPAIR WITH ROUTINE HEALING, LEFT: Primary | ICD-10-CM

## 2023-02-22 PROCEDURE — G0151 HHCP-SERV OF PT,EA 15 MIN: HCPCS

## 2023-02-22 PROCEDURE — 99024 POSTOP FOLLOW-UP VISIT: CPT | Performed by: PHYSICIAN ASSISTANT

## 2023-02-22 PROCEDURE — 0221000100 HH NO PAY CLAIM PROCEDURE

## 2023-02-22 ASSESSMENT — ENCOUNTER SYMPTOMS: PAIN LOCATION - PAIN QUALITY: STRONG ACHE

## 2023-02-22 NOTE — TELEPHONE ENCOUNTER
----- Message from Deepthi Bonner sent at 2/21/2023  3:43 PM EST -----  Subject: Hospital Follow Up    QUESTIONS  What hospital was the Patient Dis[facility]  Date of Discharge? 2023-02-21  Discharge Location? Home  Reason for hospitalization as patient stated? broke hip  What question does the patient have, if applicable?   ---------------------------------------------------------------------------  --------------  CALL BACK INFO  What is the best way for the office to contact you? OK to leave message on   voicemail  Preferred Call Back Phone Number? [phone]  ---------------------------------------------------------------------------  --------------  SCRIPT ANSWERS  Relationship to Patient?  Self

## 2023-02-22 NOTE — PROGRESS NOTES
[facility]            Patient ID:  Name: Placido Moon  AGE/Gender: 69 y.o. female  MRN: [id number]  : 1945    Date of Service: 2023          ALLERGIES:   Allergies   Allergen Reactions    Oxycodone Itching    Eucalyptus Oil Hives and Other (See Comments)     Burns mouth    Ezetimibe Myalgia    Morphine Other (See Comments)     Feels crazy  Other reaction(s): Hallucinations    Penicillins Hives    Pineapple Hives and Other (See Comments)     Burns mouth    Vancomycin Other (See Comments)     Kidney function worsened    Sulfa Antibiotics Nausea Only and Rash          History:  The patient is seen today for follow-up. The patient sustained  a left Hip fracture and underwent ORIF at [facility].  They are doing well with regard to the hip,  having very little discomfort or pain. They have no other complaints or concerns: The patient has been progressing with physical therapy. Physical Exam:       General:  On exam the patient is a pleasant 69 y.o. female in no acute distress, A&O x 3. Hip: This incision is healing there is swelling consistent with the postoperative time frame. There is no drainage. ROM not assessed. The calf is soft and non-tender. Assessment and Plan:   The incision is healing. I removed the staples and applied steri strips. OK to bathe. WBAT with walker. The patient was advised to notify us if drainage returns. We will follow up in 4 weeks or sooner if needed.        Electronically signed by:   TERESA Gonzales  2023,  10:31 AM

## 2023-02-23 NOTE — CARE COORDINATION
Admitted to [facility] on 2023-2/10/2023 for a Closed fracture of left hip, discharged to IRF from 2/10/2023-2023. [facility] Care Transitions Initial Follow Up Call    Call within 2 business days of discharge: Yes    Patient Current Location:  Home: [address] [phone]    Care Transition Nurse contacted the patient by telephone to perform post hospital discharge assessment. Verified name and  with patient as identifiers. Provided introduction to self, and explanation of the Care Transition Nurse role. Patient: Adlo General Patient : [birth year]   MRN: [id number]  Reason for Admission: Hip fracture  Discharge Date: 23 RARS: Readmission Risk Score: 16.4      Last Discharge [address]       Date Complaint Diagnosis Description Type Department Provider    2/10/23  Closed left hip fracture, initial encounter [facility]) Admission (Discharged) Trena Cardozo MD            Was this an external facility discharge? No Discharge Facility: N/A    Challenges to be reviewed by the provider   Additional needs identified to be addressed with provider: No  none               Method of communication with provider: none. Patient reports that she is doing ok, she is tired today. No concerns or questions at this time. Care Transition Nurse reviewed discharge instructions, medical action plan, and red flags with patient who verbalized understanding. The patient was given an opportunity to ask questions and does not have any further questions or concerns at this time. Were discharge instructions available to patient? Yes. Reviewed appropriate site of care based on symptoms and resources available to patient including: PCP  Specialist  Urgent care clinics  When to call 911. The patient agrees to contact the PCP office for questions related to their healthcare. Advance Care Planning:   Does patient have an Advance Directive: reviewed and current.     Medication reconciliation was performed with patient, who verbalizes understanding of administration of home medications. Medications reviewed, 1111F entered: yes    Was patient discharged with a pulse oximeter? no    Non-face-to-face services provided:  Obtained and reviewed discharge summary and/or continuity of care documents    Offered patient enrollment in the Remote Patient Monitoring (RPM) program for in-home monitoring: NA.    Care Transitions 24 Hour Call    Do you have all of your prescriptions and are they filled?: Yes  Do you have support at home?: Partner/Spouse/SO  Are you an active caregiver in your home?: No  Care Transitions Interventions         Discussed follow-up appointments. If no appointment was previously scheduled, appointment scheduling offered: Yes. Is follow up appointment scheduled within 7 days of discharge? Yes. Follow Up  Future Appointments   Date Time Provider Department Center   2/24/2023 To Be Determined Gurjit Self, PTA [address]   2/27/2023 To Be Determined Gurjit Self, PTA [address]   3/2/2023  3:20 PM Radha Reaves APRN - CNP [address]  [facility] AMB   3/3/2023 To Be Determined Gurjit Self, PTA [address]   3/7/2023 To Be Determined Gurjit Self, PTA [address]   3/9/2023 To Be Determined Gurjit Self, PTA [address]   3/14/2023 To Be Determined Gurjit Self, PTA [address]   3/16/2023 To Be Determined Carey Meuse, PT [address]   3/27/2023  9:20 AM Radha Reaves, APRN - CNP [address] [facility]   3/28/2023 11:00 AM Farhan Hough MD [facility] [facility]   4/24/2023  2:15 PM Marj Zavaleta APRN - CNP [id number] [facility] [facility]   7/20/2023  2:00 PM Levi Renee MD [facility]       Care Transition Nurse provided contact information. Plan for follow-up call in 5-7 days based on severity of symptoms and risk factors.   Plan for next call: self management-recent hip fracture, home health services, provider follow viry.    Maico Perez RN

## 2023-02-24 ENCOUNTER — HOME CARE VISIT (OUTPATIENT)
Dept: SCHEDULING | Facility: HOME HEALTH | Age: 78
End: 2023-02-24

## 2023-02-24 VITALS
SYSTOLIC BLOOD PRESSURE: 150 MMHG | RESPIRATION RATE: 17 BRPM | DIASTOLIC BLOOD PRESSURE: 80 MMHG | HEART RATE: 67 BPM | TEMPERATURE: 99.1 F | OXYGEN SATURATION: 94 %

## 2023-02-24 VITALS
HEART RATE: 67 BPM | TEMPERATURE: 99.1 F | RESPIRATION RATE: 17 BRPM | OXYGEN SATURATION: 94 % | SYSTOLIC BLOOD PRESSURE: 138 MMHG | DIASTOLIC BLOOD PRESSURE: 78 MMHG

## 2023-02-24 PROCEDURE — G0152 HHCP-SERV OF OT,EA 15 MIN: HCPCS

## 2023-02-24 PROCEDURE — G0157 HHC PT ASSISTANT EA 15: HCPCS

## 2023-02-27 ENCOUNTER — HOME CARE VISIT (OUTPATIENT)
Dept: SCHEDULING | Facility: HOME HEALTH | Age: 78
End: 2023-02-27
Payer: MEDICARE

## 2023-02-27 VITALS — TEMPERATURE: 98 F | HEART RATE: 80 BPM

## 2023-02-27 PROCEDURE — G0157 HHC PT ASSISTANT EA 15: HCPCS

## 2023-02-27 ASSESSMENT — ENCOUNTER SYMPTOMS: PAIN LOCATION - PAIN QUALITY: ACHES

## 2023-03-01 ENCOUNTER — CARE COORDINATION (OUTPATIENT)
Dept: CARE COORDINATION | Facility: CLINIC | Age: 78
End: 2023-03-01

## 2023-03-01 SDOH — ECONOMIC STABILITY: FOOD INSECURITY: WITHIN THE PAST 12 MONTHS, THE FOOD YOU BOUGHT JUST DIDN'T LAST AND YOU DIDN'T HAVE MONEY TO GET MORE.: NEVER TRUE

## 2023-03-01 SDOH — ECONOMIC STABILITY: HOUSING INSECURITY
IN THE LAST 12 MONTHS, WAS THERE A TIME WHEN YOU DID NOT HAVE A STEADY PLACE TO SLEEP OR SLEPT IN A SHELTER (INCLUDING NOW)?: NO

## 2023-03-01 SDOH — ECONOMIC STABILITY: TRANSPORTATION INSECURITY
IN THE PAST 12 MONTHS, HAS LACK OF TRANSPORTATION KEPT YOU FROM MEETINGS, WORK, OR FROM GETTING THINGS NEEDED FOR DAILY LIVING?: NO

## 2023-03-01 SDOH — ECONOMIC STABILITY: FOOD INSECURITY: WITHIN THE PAST 12 MONTHS, YOU WORRIED THAT YOUR FOOD WOULD RUN OUT BEFORE YOU GOT MONEY TO BUY MORE.: NEVER TRUE

## 2023-03-01 SDOH — ECONOMIC STABILITY: INCOME INSECURITY: HOW HARD IS IT FOR YOU TO PAY FOR THE VERY BASICS LIKE FOOD, HOUSING, MEDICAL CARE, AND HEATING?: NOT HARD AT ALL

## 2023-03-01 ASSESSMENT — ENCOUNTER SYMPTOMS: DYSPNEA ACTIVITY LEVEL: AFTER AMBULATING 10 - 20 FT

## 2023-03-02 ENCOUNTER — OFFICE VISIT (OUTPATIENT)
Dept: INTERNAL MEDICINE CLINIC | Facility: CLINIC | Age: 78
End: 2023-03-02

## 2023-03-02 VITALS
BODY MASS INDEX: 23.96 KG/M2 | HEIGHT: 65 IN | WEIGHT: 143.8 LBS | TEMPERATURE: 97.1 F | SYSTOLIC BLOOD PRESSURE: 167 MMHG | OXYGEN SATURATION: 94 % | HEART RATE: 60 BPM | DIASTOLIC BLOOD PRESSURE: 80 MMHG

## 2023-03-02 DIAGNOSIS — Z09 HOSPITAL DISCHARGE FOLLOW-UP: Primary | ICD-10-CM

## 2023-03-02 DIAGNOSIS — R90.89 ABNORMAL CT OF BRAIN: ICD-10-CM

## 2023-03-02 DIAGNOSIS — M85.89 OSTEOPENIA OF MULTIPLE SITES: ICD-10-CM

## 2023-03-02 DIAGNOSIS — S72.002D CLOSED FRACTURE OF LEFT HIP WITH ROUTINE HEALING, SUBSEQUENT ENCOUNTER: ICD-10-CM

## 2023-03-02 DIAGNOSIS — N39.0 FREQUENT UTI: ICD-10-CM

## 2023-03-02 DIAGNOSIS — Z78.9 INTOLERANCE OF ORAL BISPHOSPHONATE THERAPY: ICD-10-CM

## 2023-03-02 DIAGNOSIS — F32.89 OTHER DEPRESSION: ICD-10-CM

## 2023-03-02 PROBLEM — S72.002A CLOSED LEFT HIP FRACTURE, INITIAL ENCOUNTER (HCC): Status: RESOLVED | Noted: 2023-02-10 | Resolved: 2023-03-02

## 2023-03-02 LAB
BILIRUBIN, URINE, POC: ABNORMAL
BLOOD URINE, POC: NEGATIVE
GLUCOSE URINE, POC: NEGATIVE
KETONES, URINE, POC: NEGATIVE
LEUKOCYTE ESTERASE, URINE, POC: NEGATIVE
NITRITE, URINE, POC: NEGATIVE
PH, URINE, POC: 5.5 (ref 4.6–8)
PROTEIN,URINE, POC: 100
SPECIFIC GRAVITY, URINE, POC: >=1.03 (ref 1–1.03)
URINALYSIS CLARITY, POC: ABNORMAL
URINALYSIS COLOR, POC: ABNORMAL
UROBILINOGEN, POC: NORMAL

## 2023-03-02 RX ORDER — CITALOPRAM 10 MG/1
10 TABLET ORAL DAILY
Qty: 30 TABLET | Refills: 1 | Status: SHIPPED | OUTPATIENT
Start: 2023-03-02

## 2023-03-02 ASSESSMENT — ANXIETY QUESTIONNAIRES
5. BEING SO RESTLESS THAT IT IS HARD TO SIT STILL: 0
3. WORRYING TOO MUCH ABOUT DIFFERENT THINGS: 0
6. BECOMING EASILY ANNOYED OR IRRITABLE: 0
GAD7 TOTAL SCORE: 0
2. NOT BEING ABLE TO STOP OR CONTROL WORRYING: 0
1. FEELING NERVOUS, ANXIOUS, OR ON EDGE: 0
4. TROUBLE RELAXING: 0
7. FEELING AFRAID AS IF SOMETHING AWFUL MIGHT HAPPEN: 0
IF YOU CHECKED OFF ANY PROBLEMS ON THIS QUESTIONNAIRE, HOW DIFFICULT HAVE THESE PROBLEMS MADE IT FOR YOU TO DO YOUR WORK, TAKE CARE OF THINGS AT HOME, OR GET ALONG WITH OTHER PEOPLE: NOT DIFFICULT AT ALL

## 2023-03-02 ASSESSMENT — PATIENT HEALTH QUESTIONNAIRE - PHQ9
7. TROUBLE CONCENTRATING ON THINGS, SUCH AS READING THE NEWSPAPER OR WATCHING TELEVISION: 0
SUM OF ALL RESPONSES TO PHQ QUESTIONS 1-9: 0
SUM OF ALL RESPONSES TO PHQ QUESTIONS 1-9: 0
6. FEELING BAD ABOUT YOURSELF - OR THAT YOU ARE A FAILURE OR HAVE LET YOURSELF OR YOUR FAMILY DOWN: 0
SUM OF ALL RESPONSES TO PHQ QUESTIONS 1-9: 0
5. POOR APPETITE OR OVEREATING: 0
SUM OF ALL RESPONSES TO PHQ QUESTIONS 1-9: 0
4. FEELING TIRED OR HAVING LITTLE ENERGY: 0
10. IF YOU CHECKED OFF ANY PROBLEMS, HOW DIFFICULT HAVE THESE PROBLEMS MADE IT FOR YOU TO DO YOUR WORK, TAKE CARE OF THINGS AT HOME, OR GET ALONG WITH OTHER PEOPLE: 0
1. LITTLE INTEREST OR PLEASURE IN DOING THINGS: 0
SUM OF ALL RESPONSES TO PHQ9 QUESTIONS 1 & 2: 0
8. MOVING OR SPEAKING SO SLOWLY THAT OTHER PEOPLE COULD HAVE NOTICED. OR THE OPPOSITE, BEING SO FIGETY OR RESTLESS THAT YOU HAVE BEEN MOVING AROUND A LOT MORE THAN USUAL: 0
9. THOUGHTS THAT YOU WOULD BE BETTER OFF DEAD, OR OF HURTING YOURSELF: 0
2. FEELING DOWN, DEPRESSED OR HOPELESS: 0
3. TROUBLE FALLING OR STAYING ASLEEP: 0

## 2023-03-02 ASSESSMENT — ENCOUNTER SYMPTOMS
VOMITING: 0
ABDOMINAL PAIN: 0
WHEEZING: 0
SHORTNESS OF BREATH: 0
COUGH: 0
NAUSEA: 0
DIARRHEA: 0

## 2023-03-02 NOTE — CARE COORDINATION
Care Transitions Outreach Attempt    Call within 2 business days of discharge: Yes   Attempted to reach patient for transitions of care follow up. Unable to reach patient. Patient: Greg Records Patient : 1945 MRN: [id number]    Last Discharge [address]       Date Complaint Diagnosis Description Type Department Provider    2/10/23  Closed left hip fracture, initial encounter [facility]) Admission (Discharged) Bhupinder Tolliver MD              Was this an external facility discharge?  No Discharge Facility: N/A    Noted following upcoming appointments from discharge chart review:   [facility] follow up appointment(s):   Future Appointments   Date Time Provider Susana Hutchins   3/2/2023  3:20 PM Maryl Prader, APRN - CNP [facility]   3/3/2023 To Be Determined Melanie Caraballo, PTA [address]   3/7/2023 To Be [facility], PTA [address]   3/9/2023 To Be [facility], PTA [address]   3/14/2023 To Be [facility], PTA [address]   3/16/2023 To Be Determined Irving Reed, PT [address]   3/27/2023  9:20 AM Maryl Prader, APRN - CNP [facility] AMB   3/28/2023 11:00 AM [facility]   2023  2:15 PM LIU Casey CNP [id number] [facility] AMB   2023  2:00 PM Jagdish Espinoza MD [facility] [address] [place] [address]     Non-[facility] follow up appointment(s): none noted
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
## 2023-03-03 ENCOUNTER — HOME CARE VISIT (OUTPATIENT)
Dept: SCHEDULING | Facility: HOME HEALTH | Age: 78
End: 2023-03-03
Payer: MEDICARE

## 2023-03-03 VITALS
SYSTOLIC BLOOD PRESSURE: 170 MMHG | TEMPERATURE: 100.4 F | HEART RATE: 84 BPM | DIASTOLIC BLOOD PRESSURE: 80 MMHG | OXYGEN SATURATION: 94 % | RESPIRATION RATE: 16 BRPM

## 2023-03-03 DIAGNOSIS — N39.0 FREQUENT UTI: Primary | ICD-10-CM

## 2023-03-03 DIAGNOSIS — N39.0 FREQUENT UTI: ICD-10-CM

## 2023-03-03 DIAGNOSIS — R30.0 DYSURIA: Primary | ICD-10-CM

## 2023-03-03 PROCEDURE — G0157 HHC PT ASSISTANT EA 15: HCPCS

## 2023-03-03 RX ORDER — CEPHALEXIN 500 MG/1
500 CAPSULE ORAL 2 TIMES DAILY
Qty: 14 CAPSULE | Refills: 0 | Status: SHIPPED | OUTPATIENT
Start: 2023-03-03 | End: 2023-03-10

## 2023-03-03 NOTE — PROGRESS NOTES
After hours call from pt's home health PT who is at her house today. BP elevated (asymptomatic), and low grade temp 100.4F. Pt with urinary frequency and discomfort. Urinalysis yesterday negative for nitrites and leuks, culture pending. Will go ahead and send Keflex to treat; Discussed risks/benefits, alternatives, potential side effects, proper administration of medication. Advised of symptoms that would require reevaluation, or that would require immediate medical attention in the ER; expresses understanding. Precautions given to go to ER if symptoms worsen or if BP remains elevated.

## 2023-03-05 LAB
BACTERIA SPEC CULT: ABNORMAL
SERVICE CMNT-IMP: ABNORMAL

## 2023-03-07 ENCOUNTER — HOME CARE VISIT (OUTPATIENT)
Dept: SCHEDULING | Facility: HOME HEALTH | Age: 78
End: 2023-03-07
Payer: MEDICARE

## 2023-03-07 VITALS
DIASTOLIC BLOOD PRESSURE: 76 MMHG | TEMPERATURE: 97.5 F | RESPIRATION RATE: 16 BRPM | OXYGEN SATURATION: 96 % | HEART RATE: 65 BPM | SYSTOLIC BLOOD PRESSURE: 136 MMHG

## 2023-03-07 VITALS
RESPIRATION RATE: 19 BRPM | TEMPERATURE: 97.9 F | WEIGHT: 138 LBS | SYSTOLIC BLOOD PRESSURE: 136 MMHG | HEART RATE: 64 BPM | OXYGEN SATURATION: 98 % | BODY MASS INDEX: 22.96 KG/M2 | DIASTOLIC BLOOD PRESSURE: 72 MMHG

## 2023-03-07 DIAGNOSIS — N30.00 ACUTE CYSTITIS WITHOUT HEMATURIA: Primary | ICD-10-CM

## 2023-03-07 PROCEDURE — G0299 HHS/HOSPICE OF RN EA 15 MIN: HCPCS

## 2023-03-07 PROCEDURE — G0157 HHC PT ASSISTANT EA 15: HCPCS

## 2023-03-07 RX ORDER — NITROFURANTOIN 25; 75 MG/1; MG/1
100 CAPSULE ORAL 2 TIMES DAILY
Qty: 14 CAPSULE | Refills: 0 | Status: SHIPPED | OUTPATIENT
Start: 2023-03-07 | End: 2023-03-14

## 2023-03-07 ASSESSMENT — ENCOUNTER SYMPTOMS
DYSPNEA ACTIVITY LEVEL: AFTER AMBULATING MORE THAN 20 FT
PAIN LOCATION - PAIN QUALITY: ACHING
STOOL DESCRIPTION: FORMED

## 2023-03-08 ENCOUNTER — CARE COORDINATION (OUTPATIENT)
Dept: CARE COORDINATION | Facility: CLINIC | Age: 78
End: 2023-03-08

## 2023-03-08 ENCOUNTER — TELEPHONE (OUTPATIENT)
Dept: CARDIOLOGY CLINIC | Age: 78
End: 2023-03-08

## 2023-03-08 RX ORDER — LISINOPRIL 20 MG/1
20 TABLET ORAL 2 TIMES DAILY
Qty: 180 TABLET | Refills: 3 | Status: SHIPPED | OUTPATIENT
Start: 2023-03-08

## 2023-03-08 NOTE — TELEPHONE ENCOUNTER
I spoke w/pt. and she reports SBP is consistently elevated greater than 140. I told her to increase Lisinopril to 20mg bid and sent in new prescription as below  Requested Prescriptions     Signed Prescriptions Disp Refills    lisinopril (PRINIVIL;ZESTRIL) 20 MG tablet 180 tablet 3     Sig: Take 1 tablet by mouth 2 times daily     Authorizing Provider: Ania Chavez     Ordering User: REGGIE GABRIEL     She missed her 6 month check up due to fall /hip surgery .  booked through April. Is it ok to schedule end of April or May? Jayesh Felix

## 2023-03-08 NOTE — TELEPHONE ENCOUNTER
Russell Venegas, DO Hussain Mora, IRINEO  Please investigate. If SBP is above 140 more consistently, we need to increase the Ace to 20 BID. Focus on low salt diet. See me as planned.    Thanks

## 2023-03-08 NOTE — CARE COORDINATION
[facility] Care Transitions Follow Up Call    Patient Current Location:  Home: [place]  [id number]    Care Transition Nurse contacted the patient by telephone to follow up after admission on 2023. Verified name and  with patient as identifiers. Patient: Meera Best  Patient : 1945   MRN: [id number]  Reason for Admission: Closed L hip fracture  Discharge Date: 23 RARS: Readmission Risk Score: 16.4      Needs to be reviewed by the provider   Additional needs identified to be addressed with provider: No  none             Method of communication with provider: none. Patient reports that she is doing well, she does not have any concerns or questions at this time. Continues to participate in home health. Addressed changes since last contact:  none  Discussed follow-up appointments. If no appointment was previously scheduled, appointment scheduling offered: Yes. Is follow up appointment scheduled within 7 days of discharge? completed.     Follow Up  Future Appointments   Date Time Provider Susana Hutchins   3/9/2023 To Be Determined Wilberto Gupta RN [place] [address]   3/9/2023  3:30 PM [facility]   3/14/2023 To Be Determined [facility]   3/14/2023 To Be Determined Vashti Galindo RN [place] [address]   3/16/2023 To Be Determined May Browne, PT [place] [address]   3/16/2023 To Be Determined [facility]   3/21/2023 To Be Determined Vashti Galindo RN [place] [address]   3/24/2023  2:15 PM Abena Hinton DO [facility]   3/27/2023  9:20 AM Kevon Herrmann APRN - CNP [address] [facility]   3/28/2023 To Be Determined Vashti Galindo RN [place] [address]   3/28/2023 11:00 AM Artis Riedel, MD [facility]   2023  2:15 PM Lyle Hunter APRN - CNP [id number] [facility]   2023  2:00 PM Sherry Wagoner  [address], [address] [place] [address]     Non-[facility] follow up appointment(s): none noted    Care Transition Nurse reviewed discharge instructions, medical action plan, and red flags with patient and discussed any barriers to care and/or understanding of plan of care after discharge. Discussed appropriate site of care based on symptoms and resources available to patient including: PCP  Specialist  Home health  When to call [address] The patient agrees to contact the PCP office for questions related to their healthcare. Advance Care Planning:   reviewed and current. Patients top risk factors for readmission: medical condition-recent hip fracture  Interventions to address risk factors: telephonic support, home health therapy  Offered patient enrollment in the Remote Patient Monitoring (RPM) program for in-home monitoring: NA.     Care Transitions Subsequent and Final Call    Subsequent and Final Calls  Care Transitions Interventions  Other Interventions:             Care Transition Nurse provided contact information for future needs. Plan for follow-up call in 10-14 days based on severity of symptoms and risk factors.   Plan for next call:  close with goals met    Javy Cohen RN

## 2023-03-09 ENCOUNTER — HOME CARE VISIT (OUTPATIENT)
Dept: SCHEDULING | Facility: HOME HEALTH | Age: 78
End: 2023-03-09
Payer: MEDICARE

## 2023-03-09 VITALS
TEMPERATURE: 97.9 F | HEART RATE: 79 BPM | OXYGEN SATURATION: 97 % | SYSTOLIC BLOOD PRESSURE: 138 MMHG | RESPIRATION RATE: 17 BRPM | DIASTOLIC BLOOD PRESSURE: 72 MMHG

## 2023-03-09 VITALS
OXYGEN SATURATION: 97 % | SYSTOLIC BLOOD PRESSURE: 140 MMHG | TEMPERATURE: 97.8 F | HEART RATE: 72 BPM | DIASTOLIC BLOOD PRESSURE: 80 MMHG | RESPIRATION RATE: 16 BRPM

## 2023-03-09 PROCEDURE — G0299 HHS/HOSPICE OF RN EA 15 MIN: HCPCS

## 2023-03-09 PROCEDURE — G0157 HHC PT ASSISTANT EA 15: HCPCS

## 2023-03-09 ASSESSMENT — ENCOUNTER SYMPTOMS
COUGH CHARACTERISTICS: NON-PRODUCTIVE
PAIN LOCATION - PAIN QUALITY: SORE
COUGH: 1
STOOL DESCRIPTION: FORMED
DYSPNEA ACTIVITY LEVEL: AFTER AMBULATING 10 - 20 FT

## 2023-03-14 ENCOUNTER — HOME CARE VISIT (OUTPATIENT)
Dept: HOME HEALTH SERVICES | Facility: HOME HEALTH | Age: 78
End: 2023-03-14
Payer: MEDICARE

## 2023-03-14 ENCOUNTER — HOME CARE VISIT (OUTPATIENT)
Dept: SCHEDULING | Facility: HOME HEALTH | Age: 78
End: 2023-03-14
Payer: MEDICARE

## 2023-03-14 VITALS
DIASTOLIC BLOOD PRESSURE: 78 MMHG | TEMPERATURE: 98.5 F | OXYGEN SATURATION: 95 % | HEART RATE: 63 BPM | RESPIRATION RATE: 17 BRPM | SYSTOLIC BLOOD PRESSURE: 140 MMHG

## 2023-03-14 VITALS
DIASTOLIC BLOOD PRESSURE: 70 MMHG | TEMPERATURE: 98.2 F | BODY MASS INDEX: 23.8 KG/M2 | OXYGEN SATURATION: 98 % | SYSTOLIC BLOOD PRESSURE: 150 MMHG | WEIGHT: 143 LBS | HEART RATE: 63 BPM | RESPIRATION RATE: 16 BRPM

## 2023-03-14 PROCEDURE — G0157 HHC PT ASSISTANT EA 15: HCPCS

## 2023-03-14 PROCEDURE — G0299 HHS/HOSPICE OF RN EA 15 MIN: HCPCS

## 2023-03-14 ASSESSMENT — ENCOUNTER SYMPTOMS
STOOL DESCRIPTION: SOFT FORMED
PAIN LOCATION - PAIN QUALITY: ACHING

## 2023-03-14 NOTE — Clinical Note
Patient reportd pain level 7/10 at assessment. Does not appear to be in distress. /70; patient is asymptomatic; all other vitals stable.

## 2023-03-16 ENCOUNTER — HOME CARE VISIT (OUTPATIENT)
Dept: SCHEDULING | Facility: HOME HEALTH | Age: 78
End: 2023-03-16
Payer: MEDICARE

## 2023-03-16 VITALS
RESPIRATION RATE: 16 BRPM | WEIGHT: 143.5 LBS | OXYGEN SATURATION: 97 % | HEART RATE: 60 BPM | TEMPERATURE: 96.7 F | BODY MASS INDEX: 23.88 KG/M2 | DIASTOLIC BLOOD PRESSURE: 78 MMHG | SYSTOLIC BLOOD PRESSURE: 164 MMHG

## 2023-03-16 VITALS
SYSTOLIC BLOOD PRESSURE: 158 MMHG | DIASTOLIC BLOOD PRESSURE: 64 MMHG | TEMPERATURE: 97.5 F | HEART RATE: 62 BPM | OXYGEN SATURATION: 98 % | RESPIRATION RATE: 16 BRPM

## 2023-03-16 PROCEDURE — G0151 HHCP-SERV OF PT,EA 15 MIN: HCPCS

## 2023-03-16 PROCEDURE — G0299 HHS/HOSPICE OF RN EA 15 MIN: HCPCS

## 2023-03-16 ASSESSMENT — ENCOUNTER SYMPTOMS
PAIN LOCATION - PAIN QUALITY: ACHING
DYSPNEA ACTIVITY LEVEL: AFTER AMBULATING MORE THAN 20 FT
PAIN LOCATION - PAIN QUALITY: ACHING
STOOL DESCRIPTION: SOFT FORMED

## 2023-03-16 NOTE — CASE COMMUNICATION
I have called the patient this morning, she stated that the nurse was there and her BP was still elevated at 164/78. Pain level is 7/10. She stated that it is in her legs. She appreciated me calling her this morning and checking on her.

## 2023-03-16 NOTE — CASE COMMUNICATION
Patient's bp 164/78 this visit. Patient deneis dizziness, headache, SOB, or chest pains. Patient denies complaints this time. Pain educated.

## 2023-03-17 ENCOUNTER — CLINICAL DOCUMENTATION (OUTPATIENT)
Dept: CARDIOLOGY CLINIC | Age: 78
End: 2023-03-17
Payer: MEDICARE

## 2023-03-17 ENCOUNTER — TELEPHONE (OUTPATIENT)
Dept: CARDIOLOGY CLINIC | Age: 78
End: 2023-03-17

## 2023-03-17 DIAGNOSIS — I10 PRIMARY HYPERTENSION: Primary | ICD-10-CM

## 2023-03-17 PROCEDURE — 99442 PR PHYS/QHP TELEPHONE EVALUATION 11-20 MIN: CPT | Performed by: INTERNAL MEDICINE

## 2023-03-17 RX ORDER — METOPROLOL SUCCINATE 50 MG/1
50 TABLET, EXTENDED RELEASE ORAL 2 TIMES DAILY
Qty: 180 TABLET | Refills: 3 | Status: SHIPPED | OUTPATIENT
Start: 2023-03-17

## 2023-03-17 NOTE — TELEPHONE ENCOUNTER
Chart reviewed, Bps reviewed. I would remain on other meds and double the BB to 50 BID. Try to walk and be active, call for angina. If needed, work on stress reduction and lifestyle modification. See me as planned, bring in home monitor and readings.

## 2023-03-17 NOTE — PROGRESS NOTES
Patient initiated call: reviewed with nurse. Time: > 11min    Chart reviewed, Bps reviewed. I would remain on other meds and double the BB to 50 BID. Try to walk and be active, call for angina. If needed, work on stress reduction and lifestyle modification. See me as planned, bring in home monitor and readings.       Bettina Donaldson

## 2023-03-17 NOTE — TELEPHONE ENCOUNTER
rec'd notification that her BP was high. I called and spoke w/pt. she said she is getting 170/78 and ,164/78,150/70. She broke her hip and is in a lot of pain. She feels like her heart pounds at night. Has a fu apt.3/24 w . HR in 60's. She is on Lisinopril 20mg bid,Toprol xl 50mg qhs,Lasix 40mg every other day. Any other changes needed prior to appt. next week?

## 2023-03-17 NOTE — TELEPHONE ENCOUNTER
I called and informed pt.of 's response and she v/u. Med escribed as below  Requested Prescriptions     Signed Prescriptions Disp Refills    metoprolol succinate (TOPROL XL) 50 MG extended release tablet 180 tablet 3     Sig: Take 1 tablet by mouth in the morning and at bedtime Once a day     Authorizing Provider: Ivon Benavides     Ordering User: REGGIE GABRIEL

## 2023-03-21 ENCOUNTER — HOME CARE VISIT (OUTPATIENT)
Dept: SCHEDULING | Facility: HOME HEALTH | Age: 78
End: 2023-03-21
Payer: MEDICARE

## 2023-03-21 PROCEDURE — G0299 HHS/HOSPICE OF RN EA 15 MIN: HCPCS

## 2023-03-22 VITALS
HEART RATE: 64 BPM | TEMPERATURE: 97.9 F | DIASTOLIC BLOOD PRESSURE: 70 MMHG | SYSTOLIC BLOOD PRESSURE: 122 MMHG | OXYGEN SATURATION: 97 % | RESPIRATION RATE: 18 BRPM

## 2023-03-22 ASSESSMENT — ENCOUNTER SYMPTOMS
STOOL DESCRIPTION: FORMED
SPUTUM CONSISTENCY: THIN
PAIN LOCATION - PAIN QUALITY: ACHING
SPUTUM COLOR: YELLOW
SPUTUM PRODUCTION: 1
COUGH: 1
SPUTUM AMOUNT: SCANT
COUGH CHARACTERISTICS: PRODUCTIVE

## 2023-03-24 ENCOUNTER — OFFICE VISIT (OUTPATIENT)
Dept: CARDIOLOGY CLINIC | Age: 78
End: 2023-03-24
Payer: MEDICARE

## 2023-03-24 VITALS
SYSTOLIC BLOOD PRESSURE: 130 MMHG | WEIGHT: 143 LBS | DIASTOLIC BLOOD PRESSURE: 60 MMHG | HEART RATE: 60 BPM | HEIGHT: 65 IN | BODY MASS INDEX: 23.82 KG/M2

## 2023-03-24 DIAGNOSIS — I50.42 CHRONIC COMBINED SYSTOLIC AND DIASTOLIC CHF (CONGESTIVE HEART FAILURE) (HCC): Primary | Chronic | ICD-10-CM

## 2023-03-24 DIAGNOSIS — E78.5 DYSLIPIDEMIA: Chronic | ICD-10-CM

## 2023-03-24 DIAGNOSIS — N18.31 STAGE 3A CHRONIC KIDNEY DISEASE (HCC): ICD-10-CM

## 2023-03-24 DIAGNOSIS — I10 ESSENTIAL HYPERTENSION: Chronic | ICD-10-CM

## 2023-03-24 DIAGNOSIS — R00.2 PALPITATION: ICD-10-CM

## 2023-03-24 DIAGNOSIS — I42.0 DILATED CARDIOMYOPATHY (HCC): ICD-10-CM

## 2023-03-24 DIAGNOSIS — I42.0 DILATED CARDIOMYOPATHY (HCC): Chronic | ICD-10-CM

## 2023-03-24 PROCEDURE — 1036F TOBACCO NON-USER: CPT | Performed by: INTERNAL MEDICINE

## 2023-03-24 PROCEDURE — 1090F PRES/ABSN URINE INCON ASSESS: CPT | Performed by: INTERNAL MEDICINE

## 2023-03-24 PROCEDURE — 99214 OFFICE O/P EST MOD 30 MIN: CPT | Performed by: INTERNAL MEDICINE

## 2023-03-24 PROCEDURE — 1123F ACP DISCUSS/DSCN MKR DOCD: CPT | Performed by: INTERNAL MEDICINE

## 2023-03-24 PROCEDURE — 3075F SYST BP GE 130 - 139MM HG: CPT | Performed by: INTERNAL MEDICINE

## 2023-03-24 PROCEDURE — G8420 CALC BMI NORM PARAMETERS: HCPCS | Performed by: INTERNAL MEDICINE

## 2023-03-24 PROCEDURE — G8428 CUR MEDS NOT DOCUMENT: HCPCS | Performed by: INTERNAL MEDICINE

## 2023-03-24 PROCEDURE — G8484 FLU IMMUNIZE NO ADMIN: HCPCS | Performed by: INTERNAL MEDICINE

## 2023-03-24 PROCEDURE — 3078F DIAST BP <80 MM HG: CPT | Performed by: INTERNAL MEDICINE

## 2023-03-24 PROCEDURE — G8399 PT W/DXA RESULTS DOCUMENT: HCPCS | Performed by: INTERNAL MEDICINE

## 2023-03-24 RX ORDER — PROMETHAZINE HYDROCHLORIDE 25 MG/1
25 TABLET ORAL EVERY 6 HOURS PRN
COMMUNITY

## 2023-03-24 RX ORDER — FUROSEMIDE 40 MG/1
40 TABLET ORAL DAILY
Qty: 90 TABLET | Refills: 1 | Status: SHIPPED | OUTPATIENT
Start: 2023-03-24

## 2023-03-24 RX ORDER — GLIPIZIDE 5 MG/1
5 TABLET ORAL PRN
COMMUNITY

## 2023-03-24 RX ORDER — TRAMADOL HYDROCHLORIDE 50 MG/1
50 TABLET ORAL EVERY 6 HOURS PRN
COMMUNITY

## 2023-03-24 RX ORDER — DIPHENHYDRAMINE HCL 25 MG
25 TABLET ORAL EVERY 6 HOURS PRN
COMMUNITY

## 2023-03-24 NOTE — PROGRESS NOTES
tablet 3    citalopram (CELEXA) 10 MG tablet Take 1 tablet by mouth daily 30 tablet 1    naloxone (NARCAN) 4 MG/0.1ML LIQD nasal spray 1 spray by Nasal route as needed for Opioid Reversal 1 each 0    Vitamin D (CHOLECALCIFEROL) 50 MCG (2000 UT) TABS tablet Take 1 tablet by mouth daily for 80 doses 80 tablet 0    nitrofurantoin (MACRODANTIN) 50 MG capsule Take 1 capsule by mouth daily 30 capsule 11    valACYclovir (VALTREX) 1 g tablet Take 1,000 mg by mouth 2 times daily      melatonin 3 MG TABS tablet Take 3 mg by mouth daily      B-D 3CC LUER-KATY SYR 25GX1\" 25G X 1\" 3 ML MISC       CRANBERRY PO Take by mouth daily      estradiol (ESTRACE VAGINAL) 0.1 MG/GM vaginal cream Insert 2g daily vaginally for two weeks. Then insert 1g three times a week. 1 each 3    fluticasone (FLONASE) 50 MCG/ACT nasal spray 2 sprays by Nasal route 2 times daily as needed for Allergies      furosemide (LASIX) 40 MG tablet Take 1 tablet by mouth every other day 45 tablet 1    gabapentin (NEURONTIN) 600 MG tablet Take 1 tablet by mouth 2 times daily for 360 days. 180 tablet 1    metFORMIN (GLUCOPHAGE-XR) 500 mg extended release tablet Take 2 tablets by mouth 2 times daily 360 tablet 1    calcium carbonate (TUMS) 500 MG chewable tablet Take 1 tablet by mouth at bedtime      dextromethorphan-guaiFENesin (MUCINEX DM)  MG per extended release tablet Take 1 tablet by mouth every 12 hours as needed for Congestion      aspirin 325 MG EC tablet Take 1 tablet by mouth daily 30 tablet 0     No current facility-administered medications for this visit.         Allergies   Allergen Reactions    Oxycodone Itching    Eucalyptus Oil Hives and Other (See Comments)     Burns mouth    Ezetimibe Myalgia    Morphine Other (See Comments)     Feels crazy  Other reaction(s): Hallucinations    Penicillins Hives    Pineapple Hives and Other (See Comments)     Burns mouth    Vancomycin Other (See Comments)     Kidney function worsened    Sulfa Antibiotics Nausea

## 2023-03-27 ENCOUNTER — OFFICE VISIT (OUTPATIENT)
Dept: INTERNAL MEDICINE CLINIC | Facility: CLINIC | Age: 78
End: 2023-03-27
Payer: MEDICARE

## 2023-03-27 VITALS
WEIGHT: 145 LBS | DIASTOLIC BLOOD PRESSURE: 83 MMHG | HEART RATE: 54 BPM | BODY MASS INDEX: 24.16 KG/M2 | SYSTOLIC BLOOD PRESSURE: 181 MMHG | HEIGHT: 65 IN | OXYGEN SATURATION: 95 % | TEMPERATURE: 97.5 F

## 2023-03-27 DIAGNOSIS — M35.00 SJOGREN SYNDROME, UNSPECIFIED (HCC): ICD-10-CM

## 2023-03-27 DIAGNOSIS — F51.01 PRIMARY INSOMNIA: ICD-10-CM

## 2023-03-27 DIAGNOSIS — M85.89 OSTEOPENIA OF MULTIPLE SITES: ICD-10-CM

## 2023-03-27 DIAGNOSIS — R30.0 DYSURIA: ICD-10-CM

## 2023-03-27 DIAGNOSIS — I42.0 DILATED CARDIOMYOPATHY (HCC): ICD-10-CM

## 2023-03-27 DIAGNOSIS — N30.01 ACUTE CYSTITIS WITH HEMATURIA: ICD-10-CM

## 2023-03-27 DIAGNOSIS — M15.9 PRIMARY OSTEOARTHRITIS INVOLVING MULTIPLE JOINTS: ICD-10-CM

## 2023-03-27 DIAGNOSIS — T45.1X5A CHEMOTHERAPY-INDUCED NEUROPATHY (HCC): ICD-10-CM

## 2023-03-27 DIAGNOSIS — N18.31 TYPE 2 DIABETES MELLITUS WITH STAGE 3A CHRONIC KIDNEY DISEASE, WITHOUT LONG-TERM CURRENT USE OF INSULIN (HCC): ICD-10-CM

## 2023-03-27 DIAGNOSIS — G62.0 CHEMOTHERAPY-INDUCED NEUROPATHY (HCC): ICD-10-CM

## 2023-03-27 DIAGNOSIS — Z00.00 MEDICARE ANNUAL WELLNESS VISIT, SUBSEQUENT: Primary | ICD-10-CM

## 2023-03-27 DIAGNOSIS — E11.22 TYPE 2 DIABETES MELLITUS WITH STAGE 3A CHRONIC KIDNEY DISEASE, WITHOUT LONG-TERM CURRENT USE OF INSULIN (HCC): ICD-10-CM

## 2023-03-27 DIAGNOSIS — S72.002D CLOSED FRACTURE OF LEFT HIP WITH ROUTINE HEALING, SUBSEQUENT ENCOUNTER: ICD-10-CM

## 2023-03-27 DIAGNOSIS — E78.5 DYSLIPIDEMIA: ICD-10-CM

## 2023-03-27 DIAGNOSIS — F32.A DEPRESSION, UNSPECIFIED DEPRESSION TYPE: ICD-10-CM

## 2023-03-27 DIAGNOSIS — N18.31 STAGE 3A CHRONIC KIDNEY DISEASE (HCC): ICD-10-CM

## 2023-03-27 DIAGNOSIS — R11.0 NAUSEA: ICD-10-CM

## 2023-03-27 DIAGNOSIS — I10 ESSENTIAL HYPERTENSION: ICD-10-CM

## 2023-03-27 LAB
ALBUMIN SERPL-MCNC: 3.6 G/DL (ref 3.2–4.6)
ALBUMIN/GLOB SERPL: 0.9 (ref 0.4–1.6)
ALP SERPL-CCNC: 73 U/L (ref 50–136)
ALT SERPL-CCNC: 13 U/L (ref 12–65)
ANION GAP SERPL CALC-SCNC: 4 MMOL/L (ref 2–11)
AST SERPL-CCNC: 18 U/L (ref 15–37)
BASOPHILS # BLD: 0 K/UL (ref 0–0.2)
BASOPHILS NFR BLD: 1 % (ref 0–2)
BILIRUB SERPL-MCNC: 0.3 MG/DL (ref 0.2–1.1)
BILIRUBIN, URINE, POC: NEGATIVE
BLOOD URINE, POC: ABNORMAL
BUN SERPL-MCNC: 13 MG/DL (ref 8–23)
CALCIUM SERPL-MCNC: 9.8 MG/DL (ref 8.3–10.4)
CHLORIDE SERPL-SCNC: 103 MMOL/L (ref 101–110)
CHOLEST SERPL-MCNC: 257 MG/DL
CO2 SERPL-SCNC: 28 MMOL/L (ref 21–32)
CREAT SERPL-MCNC: 1 MG/DL (ref 0.6–1)
DIFFERENTIAL METHOD BLD: ABNORMAL
EOSINOPHIL # BLD: 0.2 K/UL (ref 0–0.8)
EOSINOPHIL NFR BLD: 3 % (ref 0.5–7.8)
ERYTHROCYTE [DISTWIDTH] IN BLOOD BY AUTOMATED COUNT: 14.4 % (ref 11.9–14.6)
EST. AVERAGE GLUCOSE BLD GHB EST-MCNC: 108 MG/DL
GLOBULIN SER CALC-MCNC: 3.8 G/DL (ref 2.8–4.5)
GLUCOSE SERPL-MCNC: 104 MG/DL (ref 65–100)
GLUCOSE URINE, POC: NEGATIVE
HBA1C MFR BLD: 5.4 % (ref 4.8–5.6)
HCT VFR BLD AUTO: 34.7 % (ref 35.8–46.3)
HDLC SERPL-MCNC: 57 MG/DL (ref 40–60)
HDLC SERPL: 4.5
HGB BLD-MCNC: 11.2 G/DL (ref 11.7–15.4)
IMM GRANULOCYTES # BLD AUTO: 0 K/UL (ref 0–0.5)
IMM GRANULOCYTES NFR BLD AUTO: 0 % (ref 0–5)
KETONES, URINE, POC: NEGATIVE
LDLC SERPL CALC-MCNC: 145.6 MG/DL
LEUKOCYTE ESTERASE, URINE, POC: ABNORMAL
LYMPHOCYTES # BLD: 1.9 K/UL (ref 0.5–4.6)
LYMPHOCYTES NFR BLD: 28 % (ref 13–44)
MCH RBC QN AUTO: 30.4 PG (ref 26.1–32.9)
MCHC RBC AUTO-ENTMCNC: 32.3 G/DL (ref 31.4–35)
MCV RBC AUTO: 94 FL (ref 82–102)
MONOCYTES # BLD: 0.5 K/UL (ref 0.1–1.3)
MONOCYTES NFR BLD: 8 % (ref 4–12)
NEUTS SEG # BLD: 4 K/UL (ref 1.7–8.2)
NEUTS SEG NFR BLD: 60 % (ref 43–78)
NITRITE, URINE, POC: NEGATIVE
NRBC # BLD: 0 K/UL (ref 0–0.2)
PH, URINE, POC: 7 (ref 4.6–8)
PLATELET # BLD AUTO: 282 K/UL (ref 150–450)
PMV BLD AUTO: 10.3 FL (ref 9.4–12.3)
POTASSIUM SERPL-SCNC: 4.1 MMOL/L (ref 3.5–5.1)
PROT SERPL-MCNC: 7.4 G/DL (ref 6.3–8.2)
PROTEIN,URINE, POC: 100
RBC # BLD AUTO: 3.69 M/UL (ref 4.05–5.2)
SODIUM SERPL-SCNC: 135 MMOL/L (ref 133–143)
SPECIFIC GRAVITY, URINE, POC: 1.01 (ref 1–1.03)
TRIGL SERPL-MCNC: 272 MG/DL (ref 35–150)
URINALYSIS CLARITY, POC: ABNORMAL
URINALYSIS COLOR, POC: ABNORMAL
UROBILINOGEN, POC: NORMAL
VLDLC SERPL CALC-MCNC: 54.4 MG/DL (ref 6–23)
WBC # BLD AUTO: 6.7 K/UL (ref 4.3–11.1)

## 2023-03-27 PROCEDURE — 1123F ACP DISCUSS/DSCN MKR DOCD: CPT | Performed by: NURSE PRACTITIONER

## 2023-03-27 PROCEDURE — G8427 DOCREV CUR MEDS BY ELIG CLIN: HCPCS | Performed by: NURSE PRACTITIONER

## 2023-03-27 PROCEDURE — 1036F TOBACCO NON-USER: CPT | Performed by: NURSE PRACTITIONER

## 2023-03-27 PROCEDURE — 1090F PRES/ABSN URINE INCON ASSESS: CPT | Performed by: NURSE PRACTITIONER

## 2023-03-27 PROCEDURE — G0439 PPPS, SUBSEQ VISIT: HCPCS | Performed by: NURSE PRACTITIONER

## 2023-03-27 PROCEDURE — G8399 PT W/DXA RESULTS DOCUMENT: HCPCS | Performed by: NURSE PRACTITIONER

## 2023-03-27 PROCEDURE — 81003 URINALYSIS AUTO W/O SCOPE: CPT | Performed by: NURSE PRACTITIONER

## 2023-03-27 PROCEDURE — G8484 FLU IMMUNIZE NO ADMIN: HCPCS | Performed by: NURSE PRACTITIONER

## 2023-03-27 PROCEDURE — 3077F SYST BP >= 140 MM HG: CPT | Performed by: NURSE PRACTITIONER

## 2023-03-27 PROCEDURE — 3078F DIAST BP <80 MM HG: CPT | Performed by: NURSE PRACTITIONER

## 2023-03-27 PROCEDURE — 99214 OFFICE O/P EST MOD 30 MIN: CPT | Performed by: NURSE PRACTITIONER

## 2023-03-27 PROCEDURE — G8420 CALC BMI NORM PARAMETERS: HCPCS | Performed by: NURSE PRACTITIONER

## 2023-03-27 RX ORDER — TRAMADOL HYDROCHLORIDE 50 MG/1
50 TABLET ORAL 2 TIMES DAILY PRN
Qty: 20 TABLET | Refills: 0 | Status: SHIPPED | OUTPATIENT
Start: 2023-03-27 | End: 2023-04-06

## 2023-03-27 RX ORDER — CEPHALEXIN 500 MG/1
500 CAPSULE ORAL 2 TIMES DAILY
Qty: 14 CAPSULE | Refills: 0 | Status: SHIPPED | OUTPATIENT
Start: 2023-03-27 | End: 2023-04-03

## 2023-03-27 RX ORDER — PROMETHAZINE HYDROCHLORIDE 25 MG/1
25 TABLET ORAL DAILY PRN
Qty: 30 TABLET | Refills: 1 | Status: SHIPPED | OUTPATIENT
Start: 2023-03-27

## 2023-03-27 RX ORDER — POTASSIUM CHLORIDE 750 MG/1
10 TABLET, EXTENDED RELEASE ORAL DAILY
Qty: 90 TABLET | Refills: 1 | Status: SHIPPED | OUTPATIENT
Start: 2023-03-27

## 2023-03-27 ASSESSMENT — PATIENT HEALTH QUESTIONNAIRE - PHQ9
9. THOUGHTS THAT YOU WOULD BE BETTER OFF DEAD, OR OF HURTING YOURSELF: 0
4. FEELING TIRED OR HAVING LITTLE ENERGY: 0
1. LITTLE INTEREST OR PLEASURE IN DOING THINGS: 0
10. IF YOU CHECKED OFF ANY PROBLEMS, HOW DIFFICULT HAVE THESE PROBLEMS MADE IT FOR YOU TO DO YOUR WORK, TAKE CARE OF THINGS AT HOME, OR GET ALONG WITH OTHER PEOPLE: 0
3. TROUBLE FALLING OR STAYING ASLEEP: 0
8. MOVING OR SPEAKING SO SLOWLY THAT OTHER PEOPLE COULD HAVE NOTICED. OR THE OPPOSITE, BEING SO FIGETY OR RESTLESS THAT YOU HAVE BEEN MOVING AROUND A LOT MORE THAN USUAL: 0
7. TROUBLE CONCENTRATING ON THINGS, SUCH AS READING THE NEWSPAPER OR WATCHING TELEVISION: 0
SUM OF ALL RESPONSES TO PHQ QUESTIONS 1-9: 0
6. FEELING BAD ABOUT YOURSELF - OR THAT YOU ARE A FAILURE OR HAVE LET YOURSELF OR YOUR FAMILY DOWN: 0
SUM OF ALL RESPONSES TO PHQ QUESTIONS 1-9: 0
SUM OF ALL RESPONSES TO PHQ9 QUESTIONS 1 & 2: 0
SUM OF ALL RESPONSES TO PHQ QUESTIONS 1-9: 0
5. POOR APPETITE OR OVEREATING: 0
SUM OF ALL RESPONSES TO PHQ QUESTIONS 1-9: 0
2. FEELING DOWN, DEPRESSED OR HOPELESS: 0

## 2023-03-27 ASSESSMENT — ENCOUNTER SYMPTOMS
SHORTNESS OF BREATH: 0
COUGH: 0
TROUBLE SWALLOWING: 0
WHEEZING: 0

## 2023-03-27 ASSESSMENT — LIFESTYLE VARIABLES: HOW MANY STANDARD DRINKS CONTAINING ALCOHOL DO YOU HAVE ON A TYPICAL DAY: PATIENT DOES NOT DRINK

## 2023-03-27 NOTE — PATIENT INSTRUCTIONS
Please arrive 20 minutes prior to your scheduled time to see the provider to allow sufficient time for check-in and rooming. Please bring ALL medications to the appointment for review and refill. Learning About Dental Care for Older Adults  Dental care for older adults: Overview  Dental care for older people is much the same as for younger adults. But older adults do have concerns that younger adults do not. Older adults may have problems with gum disease and decay on the roots of their teeth. They may need missing teeth replaced or broken fillings fixed. Or they may have dentures that need to be cared for. Some older adults may have trouble holding a toothbrush. You can help remind the person you are caring for to brush and floss their teeth or to clean their dentures. In some cases, you may need to do the brushing and other dental care tasks. People who have trouble using their hands or who have dementia may need this extra help. How can you help with dental care? Normal dental care  To keep the teeth and gums healthy:  Brush the teeth with fluoride toothpaste twice a day--in the morning and at night--and floss at least once a day. Plaque can quickly build up on the teeth of older adults. Watch for the signs of gum disease. These signs include gums that bleed after brushing or after eating hard foods, such as apples. See a dentist regularly. Many experts recommend checkups every 6 months. Keep the dentist up to date on any new medications the person is taking. Encourage a balanced diet that includes whole grains, vegetables, and fruits, and that is low in saturated fat and sodium. Encourage the person you're caring for not to use tobacco products. They can affect dental and general health. Many older adults have a fixed income and feel that they can't afford dental care. But most [facility] and [facility] have programs in which dentists help older adults by lowering fees.  Contact your area's public health

## 2023-03-27 NOTE — PROGRESS NOTES
years is provided to the patient in written form: see Patient Instructions/AVS.     Return in about 3 months (around 6/27/2023), or if symptoms worsen or fail to improve, for Follow up. Subjective     Patient's complete Health Risk Assessment and screening values have been reviewed and are found in Flowsheets. The following problems were reviewed today and where indicated follow up appointments were made and/or referrals ordered. Positive Risk Factor Screenings with Interventions:         Dentist Screen:  Have you seen the dentist within the past year?: (!) No    Intervention:  Advise to schedule dental exam.     Vision Screen:  Do you have difficulty driving, watching TV, or doing any of your daily activities because of your eyesight?: No  Have you had an eye exam within the past year?: (!) No  No results found. Interventions:    Advise to schedule eye exam.                 Objective   Vitals:    03/27/23 0851 03/27/23 0859   BP: (!) 182/71 (!) 181/83   Site: Right Upper Arm Right Upper Arm   Position: Sitting Sitting   Cuff Size: Large Adult Small Adult   Pulse: 60 54   Temp: 97.5 °F (36.4 °C)    TempSrc: Temporal    SpO2: 95%    Weight: 145 lb (65.8 kg)    Height: 5' 5\" (1.651 m)       Body mass index is 24.13 kg/m². Allergies   Allergen Reactions    Oxycodone Itching    Eucalyptus Oil Hives and Other (See Comments)     Burns mouth    Ezetimibe Myalgia    Morphine Other (See Comments)     Feels crazy  Other reaction(s): Hallucinations    Penicillins Hives    Pineapple Hives and Other (See Comments)     Burns mouth    Vancomycin Other (See Comments)     Kidney function worsened    Sulfa Antibiotics Nausea Only and Rash     Prior to Visit Medications    Medication Sig Taking?  Authorizing Provider   cephALEXin (KEFLEX) 500 MG capsule Take 1 capsule by mouth 2 times daily for 7 days Yes LIU Shepherd CNP   traMADol (ULTRAM) 50 MG tablet Take 1 tablet by mouth 2 times daily as needed for

## 2023-03-28 ENCOUNTER — OFFICE VISIT (OUTPATIENT)
Dept: ORTHOPEDIC SURGERY | Age: 78
End: 2023-03-28

## 2023-03-28 DIAGNOSIS — S72.002D CLOSED HIP FRACTURE REQUIRING OPERATIVE REPAIR WITH ROUTINE HEALING, LEFT: Primary | ICD-10-CM

## 2023-03-28 PROCEDURE — 99024 POSTOP FOLLOW-UP VISIT: CPT | Performed by: ORTHOPAEDIC SURGERY

## 2023-03-28 NOTE — PROGRESS NOTES
Progress Note    Patient: Sharif Mccrary MRN: [id number]  SSN: [id number]    YOB: 1945  Age: 69 y.o. Sex: female        3/28/2023      Subjective:     Patient is approximately 6 weeks out from plate and screw fixation of a valgus impacted left femoral neck fracture. She seems to be doing just okay. She is walking with a cane but she says she still feels that she has a good bit of pain in her left hip. It is low bit worse with weightbearing. She is able however to get around with a cane pretty well. She also says she feels like the alignment from her ankle and knee sort of affect this leg and she attributes some of her pain to that as well    Objective: There were no vitals filed for this visit. Physical Exam:     Skin - incision is well healed with no redness or drainage  Motor and sensory function intact in LEFT LOWER extremity  Pulses palpable in LEFT LOWER extremity     XRAY FINDINGS:  [id number] left femoral neck fracture, findings-true AP and lateral views of the left hip shows the left femoral neck fracture shows significant healing at the primary fracture line. The plate and screw device is intact with no evidence of loosening or failure and is very well position. The overall length of the femoral neck appears to be pretty well-maintained impression-healing left femoral neck fracture    Assessment:     Healing left femoral neck fracture    Plan:     I think for now just give it some more time.   Since she is still having some pain I think it would be wise to see her back for 1 final visit so I will like to see her again in about 6 weeks with AP and lateral left hip on return she will be about 3 months out at that time until then she can essentially be activity as tolerated    Signed By: Lance Apple MD     March 28, 2023

## 2023-03-29 ENCOUNTER — CARE COORDINATION (OUTPATIENT)
Dept: CARE COORDINATION | Facility: CLINIC | Age: 78
End: 2023-03-29

## 2023-03-29 ENCOUNTER — HOME CARE VISIT (OUTPATIENT)
Dept: SCHEDULING | Facility: HOME HEALTH | Age: 78
End: 2023-03-29
Payer: MEDICARE

## 2023-03-29 VITALS
BODY MASS INDEX: 23.96 KG/M2 | WEIGHT: 144 LBS | OXYGEN SATURATION: 95 % | TEMPERATURE: 97.9 F | DIASTOLIC BLOOD PRESSURE: 86 MMHG | SYSTOLIC BLOOD PRESSURE: 136 MMHG | RESPIRATION RATE: 19 BRPM

## 2023-03-29 LAB
BACTERIA SPEC CULT: ABNORMAL
BACTERIA SPEC CULT: ABNORMAL
SERVICE CMNT-IMP: ABNORMAL

## 2023-03-29 PROCEDURE — G0299 HHS/HOSPICE OF RN EA 15 MIN: HCPCS

## 2023-03-29 ASSESSMENT — ENCOUNTER SYMPTOMS
STOOL DESCRIPTION: FORMED
PAIN LOCATION - PAIN QUALITY: ACHY
DYSPNEA ACTIVITY LEVEL: AFTER AMBULATING MORE THAN 20 FT

## 2023-03-29 NOTE — CARE COORDINATION
Care Transitions Outreach Attempt    Call within 2 business days of discharge: Yes   Attempted to reach patient for transitions of care follow up. Unable to reach patient. Patient: Dg Flower Patient : 1945 MRN: [id number]    Last Discharge [address]       Date Complaint Diagnosis Description Type Department Provider    2/10/23  Closed left hip fracture, initial encounter [facility]) Admission (Discharged) Lisbeth León MD              Was this an external facility discharge?  No Discharge Facility: N/A    Noted following upcoming appointments from discharge chart review:   [facility] follow up appointment(s):   Future Appointments   Date Time Provider Susana Hutchins   2023  5:45 PM S[facility] MRI UNIT 1 [facility]   2023  2:15 PM LIU Salinas CNP [id number] [facility]   2023 11:00 AM Bonnie Gamboa MD [facility]   2023  2:00 PM LIU Alejandre CNP [address] [facility]   2023  4:15 PM Markell Peterson DO [facility]   2023  2:00 PM Roly Villa MD [facility]   3/28/2024  9:20 AM LIU Alejandre CNP [address] [facility] AMB     Non-[facility] follow up appointment(s): none noted

## 2023-04-03 ENCOUNTER — TELEPHONE (OUTPATIENT)
Dept: INTERNAL MEDICINE CLINIC | Facility: CLINIC | Age: 78
End: 2023-04-03

## 2023-04-03 DIAGNOSIS — G62.0 CHEMOTHERAPY-INDUCED NEUROPATHY (HCC): Chronic | ICD-10-CM

## 2023-04-03 DIAGNOSIS — F41.9 ANXIETY: ICD-10-CM

## 2023-04-03 DIAGNOSIS — T45.1X5A CHEMOTHERAPY-INDUCED NEUROPATHY (HCC): Chronic | ICD-10-CM

## 2023-04-03 DIAGNOSIS — E11.22 CONTROLLED TYPE 2 DIABETES MELLITUS WITH STAGE 3 CHRONIC KIDNEY DISEASE, WITHOUT LONG-TERM CURRENT USE OF INSULIN (HCC): Chronic | ICD-10-CM

## 2023-04-03 DIAGNOSIS — N18.30 CONTROLLED TYPE 2 DIABETES MELLITUS WITH STAGE 3 CHRONIC KIDNEY DISEASE, WITHOUT LONG-TERM CURRENT USE OF INSULIN (HCC): Chronic | ICD-10-CM

## 2023-04-03 DIAGNOSIS — R90.89 ABNORMAL CT OF BRAIN: Primary | ICD-10-CM

## 2023-04-03 RX ORDER — GABAPENTIN 600 MG/1
600 TABLET ORAL 2 TIMES DAILY
Qty: 180 TABLET | Refills: 1 | Status: SHIPPED | OUTPATIENT
Start: 2023-04-03 | End: 2024-03-28

## 2023-04-03 RX ORDER — LORAZEPAM 0.5 MG/1
.5-1 TABLET ORAL ONCE
Qty: 2 TABLET | Refills: 0 | Status: SHIPPED | OUTPATIENT
Start: 2023-04-03 | End: 2023-04-03

## 2023-04-03 RX ORDER — METFORMIN HYDROCHLORIDE 500 MG/1
1000 TABLET, EXTENDED RELEASE ORAL 2 TIMES DAILY
Qty: 360 TABLET | Refills: 1 | Status: SHIPPED | OUTPATIENT
Start: 2023-04-03

## 2023-04-04 NOTE — TELEPHONE ENCOUNTER
I sent Ativan to her pharmacy to take prior to MRI (1-2 tablets as needed    Spoke to patient, above message was relayed and understanding expressed.

## 2023-04-07 ENCOUNTER — HOSPITAL ENCOUNTER (OUTPATIENT)
Dept: MRI IMAGING | Age: 78
End: 2023-04-07
Payer: MEDICARE

## 2023-04-07 ENCOUNTER — CARE COORDINATION (OUTPATIENT)
Dept: CARE COORDINATION | Facility: CLINIC | Age: 78
End: 2023-04-07

## 2023-04-07 DIAGNOSIS — R93.89 ABNORMAL FINDING ON CT SCAN: ICD-10-CM

## 2023-04-07 PROCEDURE — 70551 MRI BRAIN STEM W/O DYE: CPT

## 2023-04-07 NOTE — CARE COORDINATION
risk factors for readmission: medical condition-recent hip fracture  Interventions to address risk factors: telephonic support, home health therapy    Offered patient enrollment in the Remote Patient Monitoring (RPM) program for in-home monitoring: NA.     Care Transitions Subsequent and Final Call    Subsequent and Final Calls  Do you have any ongoing symptoms?: No  Have your medications changed?: No  Do you have any questions related to your medications?: No  Do you currently have any active services?: No  Do you have any needs or concerns that I can assist you with?: No  Identified Barriers: None  Care Transitions Interventions  No Identified Needs  Other Interventions:             LPN Care Coordinator provided contact information for future needs. No further follow-up call indicated based on severity of symptoms and risk factors.       Uziel Potter LPN

## 2023-04-07 NOTE — PROGRESS NOTES
When patient arrived we tried 5x for IV access from 3 different people and couldn't obtain access. We completed the scan w/o contrast for the time being and we will get pt back to do the contrast portion during the day when we have more staff to get the IV.

## 2023-04-14 ENCOUNTER — HOSPITAL ENCOUNTER (OUTPATIENT)
Dept: MRI IMAGING | Age: 78
Discharge: HOME | End: 2023-04-17
Payer: MEDICARE

## 2023-04-14 DIAGNOSIS — R93.89 ABNORMAL FINDING ON CT SCAN: ICD-10-CM

## 2023-04-14 PROCEDURE — A9579 GAD-BASE MR CONTRAST NOS,1ML: HCPCS | Performed by: STUDENT IN AN ORGANIZED HEALTH CARE EDUCATION/TRAINING PROGRAM

## 2023-04-14 PROCEDURE — 70553 MRI BRAIN STEM W/O & W/DYE: CPT

## 2023-04-14 PROCEDURE — 6360000004 HC RX CONTRAST MEDICATION: Performed by: STUDENT IN AN ORGANIZED HEALTH CARE EDUCATION/TRAINING PROGRAM

## 2023-04-14 RX ADMIN — GADOTERIDOL 13 ML: 279.3 INJECTION, SOLUTION INTRAVENOUS at 10:59

## 2023-04-24 ENCOUNTER — OFFICE VISIT (OUTPATIENT)
Dept: UROLOGY | Age: 78
End: 2023-04-24
Payer: MEDICARE

## 2023-04-24 DIAGNOSIS — N39.0 RECURRENT UTI (URINARY TRACT INFECTION): Primary | ICD-10-CM

## 2023-04-24 DIAGNOSIS — R19.7 DIARRHEA, UNSPECIFIED TYPE: ICD-10-CM

## 2023-04-24 DIAGNOSIS — R10.9 LEFT FLANK PAIN: ICD-10-CM

## 2023-04-24 PROCEDURE — G8399 PT W/DXA RESULTS DOCUMENT: HCPCS | Performed by: NURSE PRACTITIONER

## 2023-04-24 PROCEDURE — 1123F ACP DISCUSS/DSCN MKR DOCD: CPT | Performed by: NURSE PRACTITIONER

## 2023-04-24 PROCEDURE — G8420 CALC BMI NORM PARAMETERS: HCPCS | Performed by: NURSE PRACTITIONER

## 2023-04-24 PROCEDURE — 1090F PRES/ABSN URINE INCON ASSESS: CPT | Performed by: NURSE PRACTITIONER

## 2023-04-24 PROCEDURE — G8427 DOCREV CUR MEDS BY ELIG CLIN: HCPCS | Performed by: NURSE PRACTITIONER

## 2023-04-24 PROCEDURE — 99214 OFFICE O/P EST MOD 30 MIN: CPT | Performed by: NURSE PRACTITIONER

## 2023-04-24 PROCEDURE — 1036F TOBACCO NON-USER: CPT | Performed by: NURSE PRACTITIONER

## 2023-04-24 RX ORDER — CEPHALEXIN 250 MG/1
250 CAPSULE ORAL DAILY
Qty: 90 CAPSULE | Refills: 1 | Status: SHIPPED | OUTPATIENT
Start: 2023-04-24

## 2023-04-24 NOTE — PROGRESS NOTES
[facility] Urology  [address] [place] [address] [address], [address] [facility]  [phone]          Denise Nichole  : 1945    Chief Complaint   Patient presents with    Follow-up     UTI    Urinary Tract Infection          HPI     Denise Nichole is a 69 y.o. female referred in regards to recurrent UTI's. She states this began after a colonoscopy 3 yrs ago. Has had int diarrhea since colonoscopy. Has not rerutned back to GI for fu. Sx include UU, dysuria, and UI. She as admitted for 2 days in  with ARF. Upper tract imaging at that time showed normal urianry tract. With fluids, her serum cr returned to normal prior to discharge  She is on cranberry supplementation po. Macrodantin suppression was started in  by Dr. Verito Zapata. Stopped estrace cream after breast CA dx. She has had 2 positive urine cultures while on suppression, one of those being proteus. No hx of stones. Has recently noticed left flank pain. PVR: 13 ml  Cr 1.0. She could not provide a voided specimen today. Past Medical History:   Diagnosis Date    Ankle fracture     Ankle osteomyelitis, left ([address]     Breast cancer (HCC)     Chemotherapy-induced neuropathy (HCC)     Chronic anemia     Chronic systolic CHF (congestive heart failure) (HCC)     CKD (chronic kidney disease) stage 3, GFR 30-59 ml/min (HCC)     Colon polyp     Depression     Dilated cardiomyopathy ([address]     Dry eye syndrome of both eyes     Dyslipidemia     Essential hypertension     Fracture of right patella     History of left breast cancer 2001    with mastectomy    Left leg weakness 2020    Non-ischemic cardiomyopathy ([address] 2018    Echo 50-55%, Cath-minimal CAD.  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

## 2023-04-27 ENCOUNTER — HOSPITAL ENCOUNTER (OUTPATIENT)
Dept: ULTRASOUND IMAGING | Age: 78
Discharge: HOME | End: 2023-04-27
Payer: MEDICARE

## 2023-04-27 DIAGNOSIS — F32.89 OTHER DEPRESSION: ICD-10-CM

## 2023-04-27 DIAGNOSIS — N39.0 RECURRENT UTI (URINARY TRACT INFECTION): ICD-10-CM

## 2023-04-27 DIAGNOSIS — R10.9 LEFT FLANK PAIN: ICD-10-CM

## 2023-04-27 PROCEDURE — 76770 US EXAM ABDO BACK WALL COMP: CPT

## 2023-04-27 RX ORDER — CITALOPRAM 10 MG/1
10 TABLET ORAL DAILY
Qty: 90 TABLET | Refills: 1 | Status: SHIPPED | OUTPATIENT
Start: 2023-04-27

## 2023-04-28 ENCOUNTER — TELEPHONE (OUTPATIENT)
Dept: UROLOGY | Age: 78
End: 2023-04-28

## 2023-05-02 ENCOUNTER — TELEPHONE (OUTPATIENT)
Dept: UROLOGY | Age: 78
End: 2023-05-02

## 2023-06-05 DIAGNOSIS — I10 ESSENTIAL HYPERTENSION: Chronic | ICD-10-CM

## 2023-06-05 DIAGNOSIS — I42.0 DILATED CARDIOMYOPATHY (HCC): Chronic | ICD-10-CM

## 2023-06-05 RX ORDER — FUROSEMIDE 40 MG/1
40 TABLET ORAL DAILY
Qty: 90 TABLET | Refills: 1 | Status: SHIPPED | OUTPATIENT
Start: 2023-06-05

## 2023-06-06 ENCOUNTER — TELEPHONE (OUTPATIENT)
Dept: GASTROENTEROLOGY | Age: 78
End: 2023-06-06

## 2023-06-06 NOTE — TELEPHONE ENCOUNTER
Call placed to see if patient can arrive earlier than scheduled appointment time . Patient verbalized that she can not due to other constrictions. Patient agreeable to cancelling today's appointment and I will contact her as soon as an afternoon appointment becomes available.

## 2023-06-07 ENCOUNTER — OFFICE VISIT (OUTPATIENT)
Dept: GASTROENTEROLOGY | Age: 78
End: 2023-06-07
Payer: MEDICARE

## 2023-06-07 VITALS
HEIGHT: 65 IN | RESPIRATION RATE: 16 BRPM | WEIGHT: 138.8 LBS | HEART RATE: 60 BPM | BODY MASS INDEX: 23.13 KG/M2 | SYSTOLIC BLOOD PRESSURE: 150 MMHG | TEMPERATURE: 96.5 F | DIASTOLIC BLOOD PRESSURE: 71 MMHG | OXYGEN SATURATION: 93 %

## 2023-06-07 DIAGNOSIS — Z86.010 HISTORY OF ADENOMATOUS POLYP OF COLON: ICD-10-CM

## 2023-06-07 DIAGNOSIS — K21.9 GERD WITHOUT ESOPHAGITIS: ICD-10-CM

## 2023-06-07 DIAGNOSIS — K52.9 CHRONIC DIARRHEA: Primary | ICD-10-CM

## 2023-06-07 PROCEDURE — 1036F TOBACCO NON-USER: CPT | Performed by: INTERNAL MEDICINE

## 2023-06-07 PROCEDURE — 1123F ACP DISCUSS/DSCN MKR DOCD: CPT | Performed by: INTERNAL MEDICINE

## 2023-06-07 PROCEDURE — 1090F PRES/ABSN URINE INCON ASSESS: CPT | Performed by: INTERNAL MEDICINE

## 2023-06-07 PROCEDURE — G8420 CALC BMI NORM PARAMETERS: HCPCS | Performed by: INTERNAL MEDICINE

## 2023-06-07 PROCEDURE — 99204 OFFICE O/P NEW MOD 45 MIN: CPT | Performed by: INTERNAL MEDICINE

## 2023-06-07 PROCEDURE — 3078F DIAST BP <80 MM HG: CPT | Performed by: INTERNAL MEDICINE

## 2023-06-07 PROCEDURE — 3077F SYST BP >= 140 MM HG: CPT | Performed by: INTERNAL MEDICINE

## 2023-06-07 PROCEDURE — G8399 PT W/DXA RESULTS DOCUMENT: HCPCS | Performed by: INTERNAL MEDICINE

## 2023-06-07 PROCEDURE — G8428 CUR MEDS NOT DOCUMENT: HCPCS | Performed by: INTERNAL MEDICINE

## 2023-06-07 RX ORDER — POLYETHYLENE GLYCOL 3350, SODIUM CHLORIDE, POTASSIUM CHLORIDE, SODIUM BICARBONATE, AND SODIUM SULFATE 240; 5.84; 2.98; 6.72; 22.72 G/4L; G/4L; G/4L; G/4L; G/4L
4000 POWDER, FOR SOLUTION ORAL ONCE
Qty: 4000 ML | Refills: 0 | Status: SHIPPED | OUTPATIENT
Start: 2023-06-07 | End: 2023-06-07

## 2023-06-07 ASSESSMENT — ENCOUNTER SYMPTOMS
DIARRHEA: 1
NAUSEA: 1

## 2023-06-07 NOTE — PROGRESS NOTES
Luzma Barreto (:  1945) is a 69 y.o. female, new patient here for evaluation of the following chief complaint(s):Diarrhea  Diarrhea         ASSESSMENT/PLAN:  1. Chronic diarrhea  2. GERD without esophagitis  3. History of adenomatous polyp of colon  The following orders have not been finalized:  -     polyethylene glycol-electrolytes (COLYTE) 240 g SOLR solution    EGD/ Colonoscopy       Subjective   SUBJECTIVE/OBJECTIVE  Patient presents with a 3-year history of chronic diarrhea started after her colonoscopy according to the patient she was noted to have stool adenomatous colon polyp and was tubulovilous in the rectum 2020, she has multiple family members with colon cancer including her mother and uncle and an aunt and a grandmother. She has loose bowel movement once or twice weekly which she takes Imodium with good relief. Denied rectal bleeding. Denied weight loss. Has longstanding history of reflux which she takes Tums denied dysphagia. No prior upper endoscopic evaluation she wants to have an upper endoscopy. Past Medical History:   Diagnosis Date    Ankle fracture     Ankle osteomyelitis, left ([address]     Breast cancer (HCC)     Chemotherapy-induced neuropathy (HCC)     Chronic anemia     Chronic systolic CHF (congestive heart failure) (HCC)     CKD (chronic kidney disease) stage 3, GFR 30-59 ml/min (HCC)     Colon polyp     Depression     Dilated cardiomyopathy ([address]     Dry eye syndrome of both eyes     Dyslipidemia     Essential hypertension     Fracture of right patella     History of left breast cancer 2001    with mastectomy    Left leg weakness 2020    Non-ischemic cardiomyopathy ([address] 2018    Echo 50-55%, Cath-minimal CAD.  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

## 2023-06-08 ENCOUNTER — PREP FOR PROCEDURE (OUTPATIENT)
Dept: GASTROENTEROLOGY | Age: 78
End: 2023-06-08

## 2023-06-08 PROBLEM — Z86.010 HISTORY OF ADENOMATOUS POLYP OF COLON: Status: ACTIVE | Noted: 2023-06-08

## 2023-06-08 PROBLEM — Z86.0101 HISTORY OF ADENOMATOUS POLYP OF COLON: Status: ACTIVE | Noted: 2023-06-08

## 2023-06-08 PROBLEM — K21.9 GERD WITHOUT ESOPHAGITIS: Status: ACTIVE | Noted: 2023-06-08

## 2023-06-08 PROBLEM — K52.9 CHRONIC DIARRHEA: Status: ACTIVE | Noted: 2023-06-08

## 2023-06-08 RX ORDER — SODIUM CHLORIDE 0.9 % (FLUSH) 0.9 %
5-40 SYRINGE (ML) INJECTION PRN
Status: CANCELLED | OUTPATIENT
Start: 2023-06-08

## 2023-06-08 RX ORDER — SODIUM CHLORIDE 9 MG/ML
25 INJECTION, SOLUTION INTRAVENOUS PRN
Status: CANCELLED | OUTPATIENT
Start: 2023-06-08

## 2023-06-08 RX ORDER — SODIUM CHLORIDE 0.9 % (FLUSH) 0.9 %
5-40 SYRINGE (ML) INJECTION EVERY 12 HOURS SCHEDULED
Status: CANCELLED | OUTPATIENT
Start: 2023-06-08

## 2023-06-26 ASSESSMENT — PATIENT HEALTH QUESTIONNAIRE - PHQ9
8. MOVING OR SPEAKING SO SLOWLY THAT OTHER PEOPLE COULD HAVE NOTICED. OR THE OPPOSITE, BEING SO FIGETY OR RESTLESS THAT YOU HAVE BEEN MOVING AROUND A LOT MORE THAN USUAL: 1
8. MOVING OR SPEAKING SO SLOWLY THAT OTHER PEOPLE COULD HAVE NOTICED. OR THE OPPOSITE - BEING SO FIDGETY OR RESTLESS THAT YOU HAVE BEEN MOVING AROUND A LOT MORE THAN USUAL: SEVERAL DAYS
SUM OF ALL RESPONSES TO PHQ QUESTIONS 1-9: 10
4. FEELING TIRED OR HAVING LITTLE ENERGY: 1
9. THOUGHTS THAT YOU WOULD BE BETTER OFF DEAD, OR OF HURTING YOURSELF: 0
SUM OF ALL RESPONSES TO PHQ9 QUESTIONS 1 & 2: 4
SUM OF ALL RESPONSES TO PHQ QUESTIONS 1-9: 10
10. IF YOU CHECKED OFF ANY PROBLEMS, HOW DIFFICULT HAVE THESE PROBLEMS MADE IT FOR YOU TO DO YOUR WORK, TAKE CARE OF THINGS AT HOME, OR GET ALONG WITH OTHER PEOPLE: 1
9. THOUGHTS THAT YOU WOULD BE BETTER OFF DEAD, OR OF HURTING YOURSELF: NOT AT ALL
6. FEELING BAD ABOUT YOURSELF - OR THAT YOU ARE A FAILURE OR HAVE LET YOURSELF OR YOUR FAMILY DOWN: 0
SUM OF ALL RESPONSES TO PHQ QUESTIONS 1-9: 10
1. LITTLE INTEREST OR PLEASURE IN DOING THINGS: NEARLY EVERY DAY
3. TROUBLE FALLING OR STAYING ASLEEP: 3
SUM OF ALL RESPONSES TO PHQ QUESTIONS 1-9: 10
4. FEELING TIRED OR HAVING LITTLE ENERGY: SEVERAL DAYS
3. TROUBLE FALLING OR STAYING ASLEEP: NEARLY EVERY DAY
5. POOR APPETITE OR OVEREATING: 0
2. FEELING DOWN, DEPRESSED OR HOPELESS: SEVERAL DAYS
10. IF YOU CHECKED OFF ANY PROBLEMS, HOW DIFFICULT HAVE THESE PROBLEMS MADE IT FOR YOU TO DO YOUR WORK, TAKE CARE OF THINGS AT HOME, OR GET ALONG WITH OTHER PEOPLE: SOMEWHAT DIFFICULT
7. TROUBLE CONCENTRATING ON THINGS, SUCH AS READING THE NEWSPAPER OR WATCHING TELEVISION: SEVERAL DAYS
5. POOR APPETITE OR OVEREATING: NOT AT ALL
7. TROUBLE CONCENTRATING ON THINGS, SUCH AS READING THE NEWSPAPER OR WATCHING TELEVISION: 1
SUM OF ALL RESPONSES TO PHQ QUESTIONS 1-9: 10
1. LITTLE INTEREST OR PLEASURE IN DOING THINGS: 3
6. FEELING BAD ABOUT YOURSELF - OR THAT YOU ARE A FAILURE OR HAVE LET YOURSELF OR YOUR FAMILY DOWN: NOT AT ALL
2. FEELING DOWN, DEPRESSED OR HOPELESS: 1

## 2023-06-28 ENCOUNTER — OFFICE VISIT (OUTPATIENT)
Dept: INTERNAL MEDICINE CLINIC | Facility: CLINIC | Age: 78
End: 2023-06-28
Payer: MEDICARE

## 2023-06-28 VITALS
HEART RATE: 58 BPM | TEMPERATURE: 97.7 F | OXYGEN SATURATION: 97 % | WEIGHT: 157.6 LBS | BODY MASS INDEX: 26.26 KG/M2 | HEIGHT: 65 IN | SYSTOLIC BLOOD PRESSURE: 178 MMHG | DIASTOLIC BLOOD PRESSURE: 79 MMHG

## 2023-06-28 DIAGNOSIS — M85.89 OSTEOPENIA OF MULTIPLE SITES: ICD-10-CM

## 2023-06-28 DIAGNOSIS — N18.31 STAGE 3A CHRONIC KIDNEY DISEASE (HCC): ICD-10-CM

## 2023-06-28 DIAGNOSIS — N18.31 TYPE 2 DIABETES MELLITUS WITH STAGE 3A CHRONIC KIDNEY DISEASE, WITHOUT LONG-TERM CURRENT USE OF INSULIN (HCC): Chronic | ICD-10-CM

## 2023-06-28 DIAGNOSIS — E53.8 VITAMIN B12 DEFICIENCY: ICD-10-CM

## 2023-06-28 DIAGNOSIS — I10 ESSENTIAL HYPERTENSION: Chronic | ICD-10-CM

## 2023-06-28 DIAGNOSIS — M1A.0410 IDIOPATHIC CHRONIC GOUT OF RIGHT HAND WITHOUT TOPHUS: ICD-10-CM

## 2023-06-28 DIAGNOSIS — M19.90 ARTHRITIS: ICD-10-CM

## 2023-06-28 DIAGNOSIS — K52.9 CHRONIC DIARRHEA: ICD-10-CM

## 2023-06-28 DIAGNOSIS — I42.0 DILATED CARDIOMYOPATHY (HCC): ICD-10-CM

## 2023-06-28 DIAGNOSIS — Z91.09 ENVIRONMENTAL ALLERGIES: ICD-10-CM

## 2023-06-28 DIAGNOSIS — E55.9 VITAMIN D DEFICIENCY: ICD-10-CM

## 2023-06-28 DIAGNOSIS — E11.22 TYPE 2 DIABETES MELLITUS WITH STAGE 3A CHRONIC KIDNEY DISEASE, WITHOUT LONG-TERM CURRENT USE OF INSULIN (HCC): Primary | Chronic | ICD-10-CM

## 2023-06-28 DIAGNOSIS — F32.89 OTHER DEPRESSION: ICD-10-CM

## 2023-06-28 DIAGNOSIS — M35.00 SJOGREN SYNDROME, UNSPECIFIED (HCC): ICD-10-CM

## 2023-06-28 DIAGNOSIS — E78.5 DYSLIPIDEMIA: Chronic | ICD-10-CM

## 2023-06-28 DIAGNOSIS — N18.31 TYPE 2 DIABETES MELLITUS WITH STAGE 3A CHRONIC KIDNEY DISEASE, WITHOUT LONG-TERM CURRENT USE OF INSULIN (HCC): Primary | Chronic | ICD-10-CM

## 2023-06-28 DIAGNOSIS — E11.22 TYPE 2 DIABETES MELLITUS WITH STAGE 3A CHRONIC KIDNEY DISEASE, WITHOUT LONG-TERM CURRENT USE OF INSULIN (HCC): Chronic | ICD-10-CM

## 2023-06-28 DIAGNOSIS — Z78.9 STATIN INTOLERANCE: ICD-10-CM

## 2023-06-28 DIAGNOSIS — Z23 NEED FOR PROPHYLACTIC VACCINATION AGAINST STREPTOCOCCUS PNEUMONIAE (PNEUMOCOCCUS): ICD-10-CM

## 2023-06-28 DIAGNOSIS — F51.01 PRIMARY INSOMNIA: ICD-10-CM

## 2023-06-28 DIAGNOSIS — G62.0 CHEMOTHERAPY-INDUCED NEUROPATHY (HCC): ICD-10-CM

## 2023-06-28 DIAGNOSIS — Z94.84 STEM CELLS TRANSPLANT STATUS (HCC): ICD-10-CM

## 2023-06-28 DIAGNOSIS — T45.1X5A CHEMOTHERAPY-INDUCED NEUROPATHY (HCC): ICD-10-CM

## 2023-06-28 PROBLEM — R29.898 LEFT LEG WEAKNESS: Status: RESOLVED | Noted: 2020-09-08 | Resolved: 2023-06-28

## 2023-06-28 PROBLEM — N17.9 ACUTE RENAL FAILURE SUPERIMPOSED ON STAGE 3A CHRONIC KIDNEY DISEASE (HCC): Status: RESOLVED | Noted: 2022-12-19 | Resolved: 2023-06-28

## 2023-06-28 PROBLEM — R00.2 PALPITATION: Status: RESOLVED | Noted: 2020-11-05 | Resolved: 2023-06-28

## 2023-06-28 PROBLEM — R93.89 ABNORMAL FINDING ON CT SCAN: Status: RESOLVED | Noted: 2023-02-10 | Resolved: 2023-06-28

## 2023-06-28 PROBLEM — M62.838 MUSCLE SPASM: Status: RESOLVED | Noted: 2019-12-17 | Resolved: 2023-06-28

## 2023-06-28 LAB
ANION GAP SERPL CALC-SCNC: 6 MMOL/L (ref 2–11)
BASOPHILS # BLD: 0.1 K/UL (ref 0–0.2)
BASOPHILS NFR BLD: 1 % (ref 0–2)
BUN SERPL-MCNC: 17 MG/DL (ref 8–23)
CALCIUM SERPL-MCNC: 9.9 MG/DL (ref 8.3–10.4)
CHLORIDE SERPL-SCNC: 100 MMOL/L (ref 101–110)
CO2 SERPL-SCNC: 27 MMOL/L (ref 21–32)
CREAT SERPL-MCNC: 1.2 MG/DL (ref 0.6–1)
DIFFERENTIAL METHOD BLD: ABNORMAL
EOSINOPHIL # BLD: 0.2 K/UL (ref 0–0.8)
EOSINOPHIL NFR BLD: 4 % (ref 0.5–7.8)
ERYTHROCYTE [DISTWIDTH] IN BLOOD BY AUTOMATED COUNT: 14.3 % (ref 11.9–14.6)
GLUCOSE SERPL-MCNC: 84 MG/DL (ref 65–100)
HCT VFR BLD AUTO: 37.5 % (ref 35.8–46.3)
HGB BLD-MCNC: 12.1 G/DL (ref 11.7–15.4)
IMM GRANULOCYTES # BLD AUTO: 0 K/UL (ref 0–0.5)
IMM GRANULOCYTES NFR BLD AUTO: 1 % (ref 0–5)
LYMPHOCYTES # BLD: 2.4 K/UL (ref 0.5–4.6)
LYMPHOCYTES NFR BLD: 38 % (ref 13–44)
MCH RBC QN AUTO: 30.4 PG (ref 26.1–32.9)
MCHC RBC AUTO-ENTMCNC: 32.3 G/DL (ref 31.4–35)
MCV RBC AUTO: 94.2 FL (ref 82–102)
MONOCYTES # BLD: 0.6 K/UL (ref 0.1–1.3)
MONOCYTES NFR BLD: 9 % (ref 4–12)
NEUTS SEG # BLD: 3.1 K/UL (ref 1.7–8.2)
NEUTS SEG NFR BLD: 47 % (ref 43–78)
NRBC # BLD: 0 K/UL (ref 0–0.2)
PLATELET # BLD AUTO: 264 K/UL (ref 150–450)
PMV BLD AUTO: 10.6 FL (ref 9.4–12.3)
POTASSIUM SERPL-SCNC: 3.9 MMOL/L (ref 3.5–5.1)
RBC # BLD AUTO: 3.98 M/UL (ref 4.05–5.2)
SODIUM SERPL-SCNC: 133 MMOL/L (ref 133–143)
TSH W FREE THYROID IF ABNORMAL: 2.36 UIU/ML (ref 0.36–3.74)
URATE SERPL-MCNC: 9.2 MG/DL (ref 2.6–6)
VIT B12 SERPL-MCNC: 534 PG/ML (ref 193–986)
WBC # BLD AUTO: 6.4 K/UL (ref 4.3–11.1)

## 2023-06-28 PROCEDURE — G0009 ADMIN PNEUMOCOCCAL VACCINE: HCPCS | Performed by: NURSE PRACTITIONER

## 2023-06-28 PROCEDURE — 3078F DIAST BP <80 MM HG: CPT | Performed by: NURSE PRACTITIONER

## 2023-06-28 PROCEDURE — 3044F HG A1C LEVEL LT 7.0%: CPT | Performed by: NURSE PRACTITIONER

## 2023-06-28 PROCEDURE — 1090F PRES/ABSN URINE INCON ASSESS: CPT | Performed by: NURSE PRACTITIONER

## 2023-06-28 PROCEDURE — 3077F SYST BP >= 140 MM HG: CPT | Performed by: NURSE PRACTITIONER

## 2023-06-28 PROCEDURE — G8417 CALC BMI ABV UP PARAM F/U: HCPCS | Performed by: NURSE PRACTITIONER

## 2023-06-28 PROCEDURE — 1123F ACP DISCUSS/DSCN MKR DOCD: CPT | Performed by: NURSE PRACTITIONER

## 2023-06-28 PROCEDURE — G8427 DOCREV CUR MEDS BY ELIG CLIN: HCPCS | Performed by: NURSE PRACTITIONER

## 2023-06-28 PROCEDURE — 90677 PCV20 VACCINE IM: CPT | Performed by: NURSE PRACTITIONER

## 2023-06-28 PROCEDURE — G8399 PT W/DXA RESULTS DOCUMENT: HCPCS | Performed by: NURSE PRACTITIONER

## 2023-06-28 PROCEDURE — 99214 OFFICE O/P EST MOD 30 MIN: CPT | Performed by: NURSE PRACTITIONER

## 2023-06-28 PROCEDURE — 1036F TOBACCO NON-USER: CPT | Performed by: NURSE PRACTITIONER

## 2023-06-28 RX ORDER — AMITRIPTYLINE HYDROCHLORIDE 10 MG/1
10 TABLET, FILM COATED ORAL NIGHTLY PRN
Qty: 30 TABLET | Refills: 1 | Status: SHIPPED | OUTPATIENT
Start: 2023-06-28

## 2023-06-28 RX ORDER — CITALOPRAM 10 MG/1
10 TABLET ORAL DAILY
Qty: 90 TABLET | Refills: 1 | Status: CANCELLED | OUTPATIENT
Start: 2023-06-28

## 2023-06-28 RX ORDER — FLUTICASONE PROPIONATE 50 MCG
2 SPRAY, SUSPENSION (ML) NASAL 2 TIMES DAILY PRN
Qty: 16 G | Refills: 5 | Status: SHIPPED | OUTPATIENT
Start: 2023-06-28

## 2023-06-28 RX ORDER — TRAMADOL HYDROCHLORIDE 50 MG/1
50 TABLET ORAL DAILY PRN
Qty: 30 TABLET | Refills: 0 | Status: SHIPPED | OUTPATIENT
Start: 2023-06-28 | End: 2023-07-28

## 2023-06-28 ASSESSMENT — ENCOUNTER SYMPTOMS
WHEEZING: 0
SHORTNESS OF BREATH: 0
DIARRHEA: 1
ABDOMINAL PAIN: 0
COUGH: 0
NAUSEA: 0
VOMITING: 0

## 2023-06-30 LAB — 25(OH)D3 SERPL-MCNC: 53.3 NG/ML (ref 30–100)

## 2023-07-19 ENCOUNTER — OFFICE VISIT (OUTPATIENT)
Age: 78
End: 2023-07-19
Payer: MEDICARE

## 2023-07-19 VITALS
HEART RATE: 72 BPM | SYSTOLIC BLOOD PRESSURE: 114 MMHG | BODY MASS INDEX: 25.89 KG/M2 | HEIGHT: 65 IN | WEIGHT: 155.4 LBS | DIASTOLIC BLOOD PRESSURE: 62 MMHG

## 2023-07-19 DIAGNOSIS — I10 ESSENTIAL HYPERTENSION: Chronic | ICD-10-CM

## 2023-07-19 DIAGNOSIS — I42.0 DILATED CARDIOMYOPATHY (HCC): Primary | ICD-10-CM

## 2023-07-19 DIAGNOSIS — I50.42 CHRONIC COMBINED SYSTOLIC AND DIASTOLIC CHF (CONGESTIVE HEART FAILURE) (HCC): Chronic | ICD-10-CM

## 2023-07-19 DIAGNOSIS — N18.31 STAGE 3A CHRONIC KIDNEY DISEASE (HCC): ICD-10-CM

## 2023-07-19 PROCEDURE — 1036F TOBACCO NON-USER: CPT | Performed by: INTERNAL MEDICINE

## 2023-07-19 PROCEDURE — G8428 CUR MEDS NOT DOCUMENT: HCPCS | Performed by: INTERNAL MEDICINE

## 2023-07-19 PROCEDURE — G8399 PT W/DXA RESULTS DOCUMENT: HCPCS | Performed by: INTERNAL MEDICINE

## 2023-07-19 PROCEDURE — 99214 OFFICE O/P EST MOD 30 MIN: CPT | Performed by: INTERNAL MEDICINE

## 2023-07-19 PROCEDURE — 1123F ACP DISCUSS/DSCN MKR DOCD: CPT | Performed by: INTERNAL MEDICINE

## 2023-07-19 PROCEDURE — 1090F PRES/ABSN URINE INCON ASSESS: CPT | Performed by: INTERNAL MEDICINE

## 2023-07-19 PROCEDURE — G8417 CALC BMI ABV UP PARAM F/U: HCPCS | Performed by: INTERNAL MEDICINE

## 2023-07-19 PROCEDURE — 3078F DIAST BP <80 MM HG: CPT | Performed by: INTERNAL MEDICINE

## 2023-07-19 PROCEDURE — 3074F SYST BP LT 130 MM HG: CPT | Performed by: INTERNAL MEDICINE

## 2023-07-19 NOTE — PROGRESS NOTES
06/28/2023 02:54 PM     06/28/2023 02:54 PM    CO2 27 06/28/2023 02:54 PM    BUN 17 06/28/2023 02:54 PM    CREATININE 1.20 06/28/2023 02:54 PM    GLUCOSE 84 06/28/2023 02:54 PM    CALCIUM 9.9 06/28/2023 02:54 PM        Lab Results   Component Value Date    WBC 6.4 06/28/2023    HGB 12.1 06/28/2023    HCT 37.5 06/28/2023    MCV 94.2 06/28/2023     06/28/2023       Lab Results   Component Value Date    TSH 1.690 06/08/2021       Lab Results   Component Value Date    LABA1C 5.4 03/27/2023     Lab Results   Component Value Date     03/27/2023       Lab Results   Component Value Date    CHOL 257 (H) 03/27/2023    CHOL 226 (H) 06/06/2022    CHOL 191 06/08/2021     Lab Results   Component Value Date    TRIG 272 (H) 03/27/2023    TRIG 450 (H) 06/06/2022    TRIG 293 (H) 06/08/2021     Lab Results   Component Value Date    HDL 57 03/27/2023    HDL 52 06/06/2022    HDL 53 06/08/2021     Lab Results   Component Value Date    LDLCALC 145.6 (H) 03/27/2023    [facility] Not calculated due to elevated triglyceride level 06/06/2022    LDLCALC 90 06/08/2021     Lab Results   Component Value Date    LABVLDL 54.4 (H) 03/27/2023    LABVLDL 90 (H) 06/06/2022    LABVLDL 43 (H) 09/26/2019    VLDL 48 (H) 06/08/2021    VLDL 43 (H) 10/13/2020     Lab Results   Component Value Date    CHOLHDLRATIO 4.5 03/27/2023    CHOLHDLRATIO 4.3 06/06/2022           I have Independently reviewed prior care notes, any ER records available, cardiac testing, labs and results with the patient and before seeing the patient today. Also independently reviewed outside records when available. ASSESSMENT:    Marilia Aceves was seen today for congestive heart failure. Diagnoses and all orders for this visit:    Dilated cardiomyopathy ([address])  -     Transthoracic echocardiogram (TTE) complete with contrast, bubble, strain, and 3D PRN;  Future    Chronic combined systolic and diastolic CHF (congestive heart failure) (HCC)  -     Transthoracic

## 2023-07-20 ENCOUNTER — OFFICE VISIT (OUTPATIENT)
Dept: RHEUMATOLOGY | Age: 78
End: 2023-07-20
Payer: MEDICARE

## 2023-07-20 VITALS
DIASTOLIC BLOOD PRESSURE: 74 MMHG | SYSTOLIC BLOOD PRESSURE: 138 MMHG | RESPIRATION RATE: 18 BRPM | HEART RATE: 84 BPM | HEIGHT: 65 IN | BODY MASS INDEX: 25.66 KG/M2 | WEIGHT: 154 LBS

## 2023-07-20 DIAGNOSIS — M19.042 PRIMARY OSTEOARTHRITIS OF BOTH HANDS: Primary | ICD-10-CM

## 2023-07-20 DIAGNOSIS — M19.041 PRIMARY OSTEOARTHRITIS OF BOTH HANDS: Primary | ICD-10-CM

## 2023-07-20 PROCEDURE — G8417 CALC BMI ABV UP PARAM F/U: HCPCS | Performed by: INTERNAL MEDICINE

## 2023-07-20 PROCEDURE — 99214 OFFICE O/P EST MOD 30 MIN: CPT | Performed by: INTERNAL MEDICINE

## 2023-07-20 PROCEDURE — 1123F ACP DISCUSS/DSCN MKR DOCD: CPT | Performed by: INTERNAL MEDICINE

## 2023-07-20 PROCEDURE — G8399 PT W/DXA RESULTS DOCUMENT: HCPCS | Performed by: INTERNAL MEDICINE

## 2023-07-20 PROCEDURE — 1090F PRES/ABSN URINE INCON ASSESS: CPT | Performed by: INTERNAL MEDICINE

## 2023-07-20 PROCEDURE — 3078F DIAST BP <80 MM HG: CPT | Performed by: INTERNAL MEDICINE

## 2023-07-20 PROCEDURE — 3075F SYST BP GE 130 - 139MM HG: CPT | Performed by: INTERNAL MEDICINE

## 2023-07-20 PROCEDURE — G8427 DOCREV CUR MEDS BY ELIG CLIN: HCPCS | Performed by: INTERNAL MEDICINE

## 2023-07-20 PROCEDURE — 1036F TOBACCO NON-USER: CPT | Performed by: INTERNAL MEDICINE

## 2023-07-20 NOTE — PROGRESS NOTES
as needed for Sleep. Allergies   Allergen Reactions    Oxycodone Itching    Eucalyptus Hives and Other (comments)     Burns mouth      Morphine Vertigo     Feels crazy  Other reaction(s): Hallucinations    Pcn [Penicillins] Hives    Pineapple Hives and Other (comments)     Burns mouth      Statins-Hmg-Coa Reductase Inhibitors Other (comments)    Sulfa (Sulfonamide Antibiotics) Rash and Nausea Only    Vancomycin Unknown (comments)     Kidney function worsened    Zetia [Ezetimibe] Unknown (comments)     Social History     Tobacco Use    Smoking status: Never Smoker    Smokeless tobacco: Never Used   Substance Use Topics    Alcohol use: No    Drug use: No     [phone] (home)   Past Surgical History:   Procedure Laterality Date    HX BREAST BIOPSY      left breast biopsy    HX BREAST BIOPSY  1968    left cyst removed    HX BREAST LUMPECTOMY Left 2001    HX BREAST REDUCTION      Rt breast    HX CARPAL TUNNEL RELEASE Bilateral     HX COLONOSCOPY  07/2014    HX CYST REMOVAL Right 11/2020    hand    HX HEART CATHETERIZATION  2009    HX HEENT      Sinus Surgery    HX HEENT      RK procedure bilateral eyes    HX HYSTERECTOMY      HX LUMBAR LAMINECTOMY  1992    HX MASTECTOMY Left 2002    With Reconstruction    HX OOPHORECTOMY      Unilateral    HX OPEN CHOLECYSTECTOMY  1981    HX OPEN REDUCTION INTERNAL FIXATION Left 2013    Ankle    HX OPEN REDUCTION INTERNAL FIXATION  08/2016    [place]    HX ORTHOPAEDIC Right 08/2013    Patellar fracture repair    HX OTHER SURGICAL      Chetan Cath Placed and Removed    HX TONSILLECTOMY      HX TONSILLECTOMY      HX TRANSPLANT  2002    Stem Cell Transplant    HX UVULOPALATOPHARYNGOPLASTY  2004         ROS:    Gen.: no fever or significant change in appetite or weight  Pulmonary: No cough or shortness of breath  EENT: There are no sicca symptoms today          MSK:  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 She does have fairly scattered Heberden's and Maura's nodes.   The left second DIP joint does

## 2023-07-21 RX ORDER — CETIRIZINE HYDROCHLORIDE 10 MG/1
10 TABLET ORAL DAILY PRN
COMMUNITY

## 2023-07-21 RX ORDER — AMITRIPTYLINE HYDROCHLORIDE 10 MG/1
10 TABLET, FILM COATED ORAL NIGHTLY PRN
COMMUNITY

## 2023-07-21 RX ORDER — ALLOPURINOL 100 MG/1
100 TABLET ORAL DAILY
COMMUNITY

## 2023-07-21 NOTE — PRE-PROCEDURE INSTRUCTIONS
Patient verified name, , and procedure. Type: 1a; abbreviated assessment per anesthesia guidelines    Labs per anesthesia: POC Glucose DOS. Instructed pt that they will be notified the day before their procedure by the GI Lab for time of arrival if their procedure is [facility] and Pre-op for [facility] cases. Arrival times should be called by 5 pm. If no phone is received the patient should contact their respective hospital. The GI lab telephone number is [phone] and ES Pre-op is [phone]. Follow diet and prep instructions per office including NPO status. If patient has NOT received instructions from office patient is advised to call surgeon office, verbalizes understanding. Bath or shower the night before and the am of surgery with non-moisturizing soap. No lotions, oils, powders, cologne on skin. No make up, eye make up or jewelry. Wear loose fitting comfortable, clean clothing. Must have adult present in building the entire time . Medication instructions for day before and day of procedure given per Dr. Gail Lerner. The following discharge instructions reviewed with patient: medication given during procedure may cause drowsiness for several hours, therefore, do not drive or operate machinery for remainder of the day. You may not drink alcohol on the day of your procedure, please resume regular diet and activity unless otherwise directed. You may experience abdominal distention for several hours that is relieved by the passage of gas. Contact your physician if you have any of the following: fever or chills, severe abdominal pain or excessive amount of bleeding or a large amount when having a bowel movement.  Occasional specks of blood with bowel movement would not be unusual.

## 2023-07-25 ENCOUNTER — ANESTHESIA EVENT (OUTPATIENT)
Dept: ENDOSCOPY | Age: 78
End: 2023-07-25
Payer: MEDICARE

## 2023-07-25 RX ORDER — LIDOCAINE HYDROCHLORIDE 10 MG/ML
1 INJECTION, SOLUTION INFILTRATION; PERINEURAL
Status: CANCELLED | OUTPATIENT
Start: 2023-07-25 | End: 2023-07-26

## 2023-07-26 ENCOUNTER — HOSPITAL ENCOUNTER (OUTPATIENT)
Age: 78
Setting detail: OUTPATIENT SURGERY
Discharge: HOME | End: 2023-07-26
Attending: INTERNAL MEDICINE | Admitting: INTERNAL MEDICINE
Payer: MEDICARE

## 2023-07-26 ENCOUNTER — ANESTHESIA (OUTPATIENT)
Dept: ENDOSCOPY | Age: 78
End: 2023-07-26
Payer: MEDICARE

## 2023-07-26 VITALS
HEIGHT: 65 IN | DIASTOLIC BLOOD PRESSURE: 73 MMHG | BODY MASS INDEX: 25.66 KG/M2 | WEIGHT: 154 LBS | HEART RATE: 57 BPM | OXYGEN SATURATION: 96 % | RESPIRATION RATE: 16 BRPM | TEMPERATURE: 98.6 F | SYSTOLIC BLOOD PRESSURE: 157 MMHG

## 2023-07-26 DIAGNOSIS — K21.9 GERD WITHOUT ESOPHAGITIS: ICD-10-CM

## 2023-07-26 DIAGNOSIS — K52.9 CHRONIC DIARRHEA: ICD-10-CM

## 2023-07-26 DIAGNOSIS — Z86.010 HISTORY OF ADENOMATOUS POLYP OF COLON: ICD-10-CM

## 2023-07-26 LAB
GLUCOSE BLD STRIP.AUTO-MCNC: 108 MG/DL (ref 65–100)
SERVICE CMNT-IMP: ABNORMAL

## 2023-07-26 PROCEDURE — 3700000000 HC ANESTHESIA ATTENDED CARE: Performed by: INTERNAL MEDICINE

## 2023-07-26 PROCEDURE — 2709999900 HC NON-CHARGEABLE SUPPLY: Performed by: INTERNAL MEDICINE

## 2023-07-26 PROCEDURE — 88305 TISSUE EXAM BY PATHOLOGIST: CPT

## 2023-07-26 PROCEDURE — 6370000000 HC RX 637 (ALT 250 FOR IP)

## 2023-07-26 PROCEDURE — 82962 GLUCOSE BLOOD TEST: CPT

## 2023-07-26 PROCEDURE — 2500000003 HC RX 250 WO HCPCS

## 2023-07-26 PROCEDURE — 3609012400 HC EGD TRANSORAL BIOPSY SINGLE/MULTIPLE: Performed by: INTERNAL MEDICINE

## 2023-07-26 PROCEDURE — 6360000002 HC RX W HCPCS

## 2023-07-26 PROCEDURE — 3700000001 HC ADD 15 MINUTES (ANESTHESIA): Performed by: INTERNAL MEDICINE

## 2023-07-26 PROCEDURE — 3609010600 HC COLONOSCOPY POLYPECTOMY SNARE/COLD BIOPSY: Performed by: INTERNAL MEDICINE

## 2023-07-26 PROCEDURE — 7100000010 HC PHASE II RECOVERY - FIRST 15 MIN: Performed by: INTERNAL MEDICINE

## 2023-07-26 PROCEDURE — 2580000003 HC RX 258: Performed by: ANESTHESIOLOGY

## 2023-07-26 PROCEDURE — 88312 SPECIAL STAINS GROUP 1: CPT

## 2023-07-26 PROCEDURE — 7100000011 HC PHASE II RECOVERY - ADDTL 15 MIN: Performed by: INTERNAL MEDICINE

## 2023-07-26 PROCEDURE — C1726 CATH, BAL DIL, NON-VASCULAR: HCPCS | Performed by: INTERNAL MEDICINE

## 2023-07-26 RX ORDER — LIDOCAINE HYDROCHLORIDE 20 MG/ML
INJECTION, SOLUTION EPIDURAL; INFILTRATION; INTRACAUDAL; PERINEURAL PRN
Status: DISCONTINUED | OUTPATIENT
Start: 2023-07-26 | End: 2023-07-26 | Stop reason: SDUPTHER

## 2023-07-26 RX ORDER — SODIUM CHLORIDE, SODIUM LACTATE, POTASSIUM CHLORIDE, CALCIUM CHLORIDE 600; 310; 30; 20 MG/100ML; MG/100ML; MG/100ML; MG/100ML
INJECTION, SOLUTION INTRAVENOUS CONTINUOUS
Status: DISCONTINUED | OUTPATIENT
Start: 2023-07-26 | End: 2023-07-26 | Stop reason: HOSPADM

## 2023-07-26 RX ORDER — SODIUM CHLORIDE 0.9 % (FLUSH) 0.9 %
5-40 SYRINGE (ML) INJECTION EVERY 12 HOURS SCHEDULED
Status: DISCONTINUED | OUTPATIENT
Start: 2023-07-26 | End: 2023-07-26 | Stop reason: HOSPADM

## 2023-07-26 RX ORDER — SODIUM CHLORIDE 0.9 % (FLUSH) 0.9 %
5-40 SYRINGE (ML) INJECTION PRN
Status: DISCONTINUED | OUTPATIENT
Start: 2023-07-26 | End: 2023-07-26 | Stop reason: HOSPADM

## 2023-07-26 RX ORDER — PROPOFOL 10 MG/ML
INJECTION, EMULSION INTRAVENOUS PRN
Status: DISCONTINUED | OUTPATIENT
Start: 2023-07-26 | End: 2023-07-26 | Stop reason: SDUPTHER

## 2023-07-26 RX ORDER — SODIUM CHLORIDE 9 MG/ML
INJECTION, SOLUTION INTRAVENOUS PRN
Status: DISCONTINUED | OUTPATIENT
Start: 2023-07-26 | End: 2023-07-26 | Stop reason: HOSPADM

## 2023-07-26 RX ORDER — SODIUM CHLORIDE 9 MG/ML
25 INJECTION, SOLUTION INTRAVENOUS PRN
Status: DISCONTINUED | OUTPATIENT
Start: 2023-07-26 | End: 2023-07-26 | Stop reason: HOSPADM

## 2023-07-26 RX ADMIN — PROPOFOL 140 MCG/KG/MIN: 10 INJECTION, EMULSION INTRAVENOUS at 07:01

## 2023-07-26 RX ADMIN — SODIUM CHLORIDE, POTASSIUM CHLORIDE, SODIUM LACTATE AND CALCIUM CHLORIDE: 600; 310; 30; 20 INJECTION, SOLUTION INTRAVENOUS at 06:45

## 2023-07-26 RX ADMIN — PROPOFOL 40 MG: 10 INJECTION, EMULSION INTRAVENOUS at 07:00

## 2023-07-26 RX ADMIN — LIDOCAINE HYDROCHLORIDE 40 MG: 20 INJECTION, SOLUTION EPIDURAL; INFILTRATION; INTRACAUDAL; PERINEURAL at 07:00

## 2023-07-26 RX ADMIN — BENZOCAINE 1 EACH: 220 SPRAY, METERED PERIODONTAL at 06:59

## 2023-07-26 ASSESSMENT — PAIN - FUNCTIONAL ASSESSMENT: PAIN_FUNCTIONAL_ASSESSMENT: 0-10

## 2023-07-26 ASSESSMENT — ENCOUNTER SYMPTOMS
NAUSEA: 1
DIARRHEA: 1

## 2023-07-26 NOTE — H&P
cath 2009    Statin intolerance     Tibial fracture 2016    Tibial plateau fracture 8/0/3163    Last Assessment & Plan:  Formatting of this note might be different from the original. Pod 2 ORIF doing well. C/o moderate pain. Controlled on PO meds.   Ready to go home    Type 2 diabetes mellitus, controlled ([address])     BS am 104    UTI (urinary tract infection) 2020    Vaginal infection     Vitamin B12 deficiency        Past Surgical History:   Procedure Laterality Date    BREAST BIOPSY      left breast biopsy    BREAST BIOPSY  1968    left cyst removed    BREAST LUMPECTOMY Left 2001    BREAST REDUCTION SURGERY      Rt breast    CARDIAC CATHETERIZATION  2009    CARPAL TUNNEL RELEASE Bilateral     CHOLECYSTECTOMY, OPEN  1981    COLONOSCOPY  07/2014    CYST REMOVAL Right 11/2020    hand    HAND SURGERY Right 11/16/2022    Right index finger distal interphalangeal joint arthrodesis performed by Jude Diamond MD at [address] [place] [address]      Sinus Surgery    HEENT      RK procedure bilateral eyes    HIP FRACTURE SURGERY Left 2/8/2023    HIP OPEN REDUCTION INTERNAL FIXATION performed by Kym Polk MD at [facility] MAIN OR    HX OPEN REDUCTION INTERNAL FIXATION Left 2013    Ankle    HX OPEN REDUCTION INTERNAL FIXATION  08/2016    [place]    HYSTERECTOMY (CERVIX STATUS UNKNOWN)      LUMBAR LAMINECTOMY  1992    MASTECTOMY Left 2002    With Reconstruction    ORTHOPEDIC SURGERY Right 08/2013    Patellar fracture repair    OTHER SURGICAL HISTORY      Chetan Cath Placed and Removed    OVARY REMOVAL      Unilateral    PARTIAL HYSTERECTOMY (CERVIX NOT REMOVED)  1974    TONSILLECTOMY      TONSILLECTOMY      TRANSPLANT  2002    Stem Cell Transplant    UVULOPALATOPHARYGOPLASTY  2004             Allergies   Allergen Reactions    Oxycodone Itching    Eucalyptus Oil Hives and Other (See Comments)     Burns mouth    Ezetimibe Myalgia    Morphine Other (See Comments)     Feels crazy  Other reaction(s): Hallucinations    Penicillins Hives

## 2023-07-26 NOTE — PROCEDURES
Operative Report    Patient: Azam Blount MRN: [id number]      YOB: 1945  Age: 69 y.o. Sex: female            Indications: Chronic diarrhea. History of [email or web address]     Preoperative Evaluation: The patient was evaluated prior to the procedure in the GI lab admission area, the patient ASA was recorded . Consent was obtained from the patient with the risk of perforation bleeding and aspiration. Anesthesia: SILVINA-per anesthesia    Complications: None; patient tolerated the procedure well. EBL -insignificant      Procedure: The patient was sedated in the left lateral decubitus position. Scope was advanced from the rectum to the cecum. Evaluation was performed to the cecum twice. The scope was withdrawn to the rectum, retroflexed view was performed. The rectal exam was normal.  Preparation was good [place] score of 2/3/3/:8 . Findings:   Exam to the cecum. Normal cecum ascending mucosa. 2 mm transverse polyp removed with cold biopsy. Normal descending sigmoid and rectum: Normal vascular pattern. Random right left-sided colon biopsies were taken. Circumferential narrowing noted at the dentate line      Postoperative Diagnosis: 1-transverse colon polyp. No sign of    Colitis follow on  the biopsy to rule out the possibility of microscopic colitis    02384 Colonoscopy, Flexible; with biopsy, single or multiple      Recommendations:   - Await pathology.       Signed By:  Lou Boyd MD     July 26, 2023

## 2023-07-26 NOTE — ANESTHESIA POSTPROCEDURE EVALUATION
Department of Anesthesiology  Postprocedure Note    Patient: Kayode Sabillon  MRN: [id number]  YOB: 1945  Date of evaluation: 7/26/2023      Procedure Summary     Date: 07/26/23 Room / Location: [facility] ENDO 03 / [facility] ENDOSCOPY    Anesthesia Start: 9202 Anesthesia Stop: 0730    Procedures:       EGD BIOPSY and balloon dilation (Upper GI Region)      COLONOSCOPY POLYPECTOMY and BIOPSY (Lower GI Region) Diagnosis:       Chronic diarrhea      GERD without esophagitis      History of adenomatous polyp of colon      (Chronic diarrhea [K52.9])      (GERD without esophagitis [K21.9])      (History of adenomatous polyp of colon [Z86.010])    Surgeons: Arianna Quick MD Responsible Provider: Karla Bravo DO    Anesthesia Type: TIVA ASA Status: 3          Anesthesia Type: No value filed.     Sharmila Phase I: Sharmila Score: 10    Sharmila Phase II: Sharmila Score: 10      Anesthesia Post Evaluation    Patient location during evaluation: PACU  Level of consciousness: awake and alert  Airway patency: patent  Nausea & Vomiting: no nausea  Complications: no  Cardiovascular status: hemodynamically stable  Respiratory status: acceptable  Hydration status: euvolemic

## 2023-07-26 NOTE — PROCEDURES
Endoscopy Note          Operative Report    Patient: Isabel Brandon MRN: [id number]      YOB: 1945  Age: 69 y.o. Sex: female            Indications:   GERD    Preoperative Evaluation: The patient was evaluated prior to the procedure in the GI lab admission area, the patient ASA was recorded . Consent was obtained from the patient with the risk of perforation bleeding and aspiration. Anesthesia: SILVINA-per anesthesia    Complication: None; patient tolerated the procedure well. Estimated blood loss: insignificant    Procedure: The patient was sedated into the left lateral decubitus position. Scope was advanced from the mouth to the third portion of duodenum. Scope was withdrawn to the stomach and retroflexed view was performed. Esophageal examination was performed. Findings: Normal upper and mid esophageal mucosa. Ulcerated grade 2 reflux esophagitis with a stricture at the GE junction dilated using 20 mm through-the-scope balloon. 1 cm hiatal hernia. Mild generalized gastritis biopsy from the antrum was taken. Open pylorus. Normal descending duodenum mucosa and villi. Postoperative Diagnosis: 1-reflux status. 2-hiatal hernia. 3-benign esophageal stricture.  4-chronic gastritis    56929 Esophagogastroduodenoscopy, Flexible, transoral; biopsy; single or multiple and 66895 Esophagogastroduodenoscopy, Flexible, transoral; transendoscopic balloon dilation of esophagus      Recommendations: Await Pathology      Signed By:  Marlo Garcia MD     July 26, 2023

## 2023-08-03 ENCOUNTER — OFFICE VISIT (OUTPATIENT)
Dept: GASTROENTEROLOGY | Age: 78
End: 2023-08-03
Payer: MEDICARE

## 2023-08-03 VITALS
WEIGHT: 155 LBS | HEIGHT: 65 IN | OXYGEN SATURATION: 96 % | BODY MASS INDEX: 25.83 KG/M2 | HEART RATE: 62 BPM | DIASTOLIC BLOOD PRESSURE: 96 MMHG | TEMPERATURE: 97 F | SYSTOLIC BLOOD PRESSURE: 173 MMHG | RESPIRATION RATE: 16 BRPM

## 2023-08-03 DIAGNOSIS — K58.0 IRRITABLE BOWEL SYNDROME WITH DIARRHEA: ICD-10-CM

## 2023-08-03 DIAGNOSIS — K22.2 BENIGN ESOPHAGEAL STRICTURE: ICD-10-CM

## 2023-08-03 DIAGNOSIS — K21.00 GASTROESOPHAGEAL REFLUX DISEASE WITH ESOPHAGITIS WITHOUT HEMORRHAGE: ICD-10-CM

## 2023-08-03 DIAGNOSIS — K63.5 HYPERPLASTIC POLYP OF TRANSVERSE COLON: Primary | ICD-10-CM

## 2023-08-03 DIAGNOSIS — K57.30 DIVERTICULOSIS OF COLON: ICD-10-CM

## 2023-08-03 PROCEDURE — G8428 CUR MEDS NOT DOCUMENT: HCPCS | Performed by: INTERNAL MEDICINE

## 2023-08-03 PROCEDURE — G8417 CALC BMI ABV UP PARAM F/U: HCPCS | Performed by: INTERNAL MEDICINE

## 2023-08-03 PROCEDURE — 1036F TOBACCO NON-USER: CPT | Performed by: INTERNAL MEDICINE

## 2023-08-03 PROCEDURE — 1123F ACP DISCUSS/DSCN MKR DOCD: CPT | Performed by: INTERNAL MEDICINE

## 2023-08-03 PROCEDURE — 99214 OFFICE O/P EST MOD 30 MIN: CPT | Performed by: INTERNAL MEDICINE

## 2023-08-03 PROCEDURE — 3077F SYST BP >= 140 MM HG: CPT | Performed by: INTERNAL MEDICINE

## 2023-08-03 PROCEDURE — 3080F DIAST BP >= 90 MM HG: CPT | Performed by: INTERNAL MEDICINE

## 2023-08-03 PROCEDURE — G8399 PT W/DXA RESULTS DOCUMENT: HCPCS | Performed by: INTERNAL MEDICINE

## 2023-08-03 PROCEDURE — 1090F PRES/ABSN URINE INCON ASSESS: CPT | Performed by: INTERNAL MEDICINE

## 2023-08-03 ASSESSMENT — ENCOUNTER SYMPTOMS
DIARRHEA: 1
NAUSEA: 1

## 2023-08-03 NOTE — PROGRESS NOTES
Azam Blount (:  1945) is a 69 y.o. female,established patient here for evaluation of the following chief complaint(s):  Follow-up         ASSESSMENT/PLAN:  1. Hyperplastic polyp of transverse colon  2. Gastroesophageal reflux disease with esophagitis without hemorrhage  3. Benign esophageal stricture  4. Diverticulosis of colon  5. Irritable bowel syndrome with diarrhea        Colonoscopy in 3 years , secondary to strong fam history. For reflux disease that she was told to take the Nexium on a daily assist.  She was taking it as an needed basis. For her diarrhea she can use Imodium       Subjective   SUBJECTIVE/OBJECTIVE  Colonoscopy showed a transverse colon polyp that was hyperplastic. Random colon biopsies failed to show any microscopic colitis. Her upper endoscopy showed mild chronic gastritis with no H. Pylori, she also had a hiatal hernia and reflux esophagitis with ulceration and a stricture that  was dilated. She complained of residual reflux symptoms when questioned about her Nexium she only takes it as needed. Denies any dysphagia she still has loose bowel movement, her weight is stable and appetite is good  She had a CT scan in 2022 that was unremarkable    Review of Systems   Gastrointestinal:  Positive for diarrhea and nausea. Objective     Physical Exam  HENT:      Head: Normocephalic. Mouth/Throat:      Mouth: Mucous membranes are moist.   Eyes:      General: No scleral icterus. Cardiovascular:      Rate and Rhythm: Normal rate and regular rhythm. Pulmonary:      Effort: No respiratory distress. Abdominal:      General: There is no distension. Tenderness: There is no abdominal tenderness. There is no rebound. Lymphadenopathy:      Cervical: No cervical adenopathy. Skin:     Coloration: Skin is not jaundiced. Findings: No bruising. Neurological:      General: No focal deficit present. Mental Status: She is alert.             Return

## 2023-08-16 ENCOUNTER — PROCEDURE VISIT (OUTPATIENT)
Dept: UROLOGY | Age: 78
End: 2023-08-16
Payer: MEDICARE

## 2023-08-16 DIAGNOSIS — N39.0 RECURRENT UTI (URINARY TRACT INFECTION): Primary | ICD-10-CM

## 2023-08-16 LAB
BILIRUBIN, URINE, POC: NEGATIVE
BLOOD URINE, POC: NEGATIVE
GLUCOSE URINE, POC: NEGATIVE
KETONES, URINE, POC: NEGATIVE
LEUKOCYTE ESTERASE, URINE, POC: NEGATIVE
NITRITE, URINE, POC: NEGATIVE
PH, URINE, POC: 5.5 (ref 4.6–8)
PROTEIN,URINE, POC: NEGATIVE
SPECIFIC GRAVITY, URINE, POC: 1.01 (ref 1–1.03)
URINALYSIS CLARITY, POC: NORMAL
URINALYSIS COLOR, POC: NORMAL
UROBILINOGEN, POC: NORMAL

## 2023-08-16 PROCEDURE — 81003 URINALYSIS AUTO W/O SCOPE: CPT | Performed by: UROLOGY

## 2023-08-16 PROCEDURE — 52000 CYSTOURETHROSCOPY: CPT | Performed by: UROLOGY

## 2023-08-16 RX ORDER — CEPHALEXIN 250 MG/1
250 CAPSULE ORAL DAILY
Qty: 30 CAPSULE | Refills: 3 | Status: SHIPPED | OUTPATIENT
Start: 2023-08-16

## 2023-08-16 NOTE — PROGRESS NOTES
tablet by mouth at bedtime      dextromethorphan-guaiFENesin (MUCINEX DM)  MG per extended release tablet Take 1 tablet by mouth every 12 hours as needed for Congestion      Vitamin D (CHOLECALCIFEROL) 50 MCG (2000 UT) TABS tablet Take 1 tablet by mouth daily for 80 doses (Patient taking differently: Take 2 tablets by mouth daily) 80 tablet 0     No current facility-administered medications for this visit.      Allergies   Allergen Reactions    Oxycodone Itching    Eucalyptus Oil Hives and Other (See Comments)     Burns mouth    Ezetimibe Myalgia    Morphine Other (See Comments)     Feels crazy  Other reaction(s): Hallucinations    Penicillins Hives    Pineapple Hives and Other (See Comments)     Burns mouth    Vancomycin Other (See Comments)     Kidney function worsened    Sulfa Antibiotics Nausea Only and Rash     Social History     Socioeconomic History    Marital status:      Spouse name: Not on file    Number of children: Not on file    Years of education: Not on file    Highest education level: Not on file   Occupational History    Not on file   Tobacco Use    Smoking status: Never    Smokeless tobacco: Never    Tobacco comments:     never used tobacco   Vaping Use    Vaping Use: Never used   Substance and Sexual Activity    Alcohol use: No    Drug use: No    Sexual activity: Not Currently     Partners: Male   Other Topics Concern    Not on file   Social History Narrative    Not on file     Social Determinants of Health     Financial Resource Strain: Not on file   Food Insecurity: Not on file   Transportation Needs: Not on file   Physical Activity: Insufficiently Active    Days of Exercise per Week: 3 days    Minutes of Exercise per Session: 30 min   Stress: Not on file   Social Connections: Not on file   Intimate Partner Violence: Not on file   Housing Stability: Not on file     Family History   Problem Relation Age of Onset    Cancer Mother     Diabetes Mother     Heart Disease Mother         MI

## 2023-08-17 RX ORDER — ALLOPURINOL 100 MG/1
100 TABLET ORAL DAILY
Qty: 90 TABLET | Refills: 1 | Status: SHIPPED | OUTPATIENT
Start: 2023-08-17

## 2023-08-17 NOTE — TELEPHONE ENCOUNTER
Patient requests allopurinol refill  last prescribed 7-21-23  Patient last seen 6-28-23              Next OV 9-27-23

## 2023-09-18 ENCOUNTER — TELEPHONE (OUTPATIENT)
Dept: INTERNAL MEDICINE CLINIC | Facility: CLINIC | Age: 78
End: 2023-09-18

## 2023-09-18 DIAGNOSIS — R11.0 NAUSEA: ICD-10-CM

## 2023-09-18 DIAGNOSIS — I42.0 DILATED CARDIOMYOPATHY (HCC): Chronic | ICD-10-CM

## 2023-09-18 DIAGNOSIS — T45.1X5A CHEMOTHERAPY-INDUCED NEUROPATHY (HCC): Chronic | ICD-10-CM

## 2023-09-18 DIAGNOSIS — N18.30 CONTROLLED TYPE 2 DIABETES MELLITUS WITH STAGE 3 CHRONIC KIDNEY DISEASE, WITHOUT LONG-TERM CURRENT USE OF INSULIN (HCC): Chronic | ICD-10-CM

## 2023-09-18 DIAGNOSIS — Z91.09 ENVIRONMENTAL ALLERGIES: ICD-10-CM

## 2023-09-18 DIAGNOSIS — G62.0 CHEMOTHERAPY-INDUCED NEUROPATHY (HCC): Chronic | ICD-10-CM

## 2023-09-18 DIAGNOSIS — E11.22 CONTROLLED TYPE 2 DIABETES MELLITUS WITH STAGE 3 CHRONIC KIDNEY DISEASE, WITHOUT LONG-TERM CURRENT USE OF INSULIN (HCC): Chronic | ICD-10-CM

## 2023-09-18 DIAGNOSIS — I10 ESSENTIAL HYPERTENSION: Chronic | ICD-10-CM

## 2023-09-18 NOTE — TELEPHONE ENCOUNTER
----- Message from Streling Zapata sent at 9/18/2023  4:05 PM EDT -----  Subject: Message to Provider    QUESTIONS  Information for Provider? Patient has been dealing with a sore throat and   a cough for 3 days. Patient DOES NOT want an appt but only wants something   called in. PLEASE CALL patient for further info. [email or web address] Sites ---------------------------------------------------------------------------  --------------  Kiara Edmonds [facility]  [phone]; OK to leave message on voicemail  ---------------------------------------------------------------------------  --------------  SCRIPT ANSWERS  Relationship to Patient?  Self

## 2023-09-19 RX ORDER — FLUTICASONE PROPIONATE 50 MCG
2 SPRAY, SUSPENSION (ML) NASAL 2 TIMES DAILY PRN
Qty: 16 G | Refills: 5 | Status: SHIPPED | OUTPATIENT
Start: 2023-09-19

## 2023-09-19 RX ORDER — PROMETHAZINE HYDROCHLORIDE 25 MG/1
25 TABLET ORAL DAILY PRN
Qty: 30 TABLET | Refills: 1 | Status: SHIPPED | OUTPATIENT
Start: 2023-09-19

## 2023-09-19 RX ORDER — GABAPENTIN 600 MG/1
600 TABLET ORAL 2 TIMES DAILY
Qty: 180 TABLET | Refills: 1 | Status: SHIPPED | OUTPATIENT
Start: 2023-09-19 | End: 2024-09-13

## 2023-09-19 RX ORDER — METOPROLOL SUCCINATE 50 MG/1
50 TABLET, EXTENDED RELEASE ORAL 2 TIMES DAILY
Qty: 180 TABLET | Refills: 3 | Status: SHIPPED | OUTPATIENT
Start: 2023-09-19

## 2023-09-19 RX ORDER — FUROSEMIDE 40 MG/1
40 TABLET ORAL DAILY
Qty: 90 TABLET | Refills: 3 | Status: SHIPPED | OUTPATIENT
Start: 2023-09-19

## 2023-09-19 RX ORDER — METFORMIN HYDROCHLORIDE 500 MG/1
1000 TABLET, EXTENDED RELEASE ORAL 2 TIMES DAILY
Qty: 360 TABLET | Refills: 1 | Status: SHIPPED | OUTPATIENT
Start: 2023-09-19

## 2023-09-19 NOTE — TELEPHONE ENCOUNTER
Requested Prescriptions     Pending Prescriptions Disp Refills    metoprolol succinate (TOPROL XL) 50 MG extended release tablet 180 tablet 3     Sig: Take 1 tablet by mouth in the morning and at bedtime Once a day    furosemide (LASIX) 40 MG tablet 90 tablet 3     Sig: Take 1 tablet by mouth daily

## 2023-09-19 NOTE — TELEPHONE ENCOUNTER
Patient is needing a refill on     Flonase: last sent in 6/28/2023 for 16g and 5 refills     Gabapentin: last sent in 4/10/2023 for 180 tabs and 1 refill     Metformin: last sent in 4/10/2023 for 360 tabs and 1 refill     Promethazine: last sent in 3/27/2023 for 30 tabs and 1 refill     Next appointment: 9/27/2023

## 2023-09-19 NOTE — TELEPHONE ENCOUNTER
I have called the patient this morning, the patient is aware that she would need to come in and be evaluated. She stated that she would see if she could another day and see if it clears up and if not she will call and let us know and schedule an appointment.

## 2023-09-24 ASSESSMENT — PATIENT HEALTH QUESTIONNAIRE - PHQ9
2. FEELING DOWN, DEPRESSED OR HOPELESS: NOT AT ALL
4. FEELING TIRED OR HAVING LITTLE ENERGY: NOT AT ALL
SUM OF ALL RESPONSES TO PHQ QUESTIONS 1-9: 3
8. MOVING OR SPEAKING SO SLOWLY THAT OTHER PEOPLE COULD HAVE NOTICED. OR THE OPPOSITE, BEING SO FIGETY OR RESTLESS THAT YOU HAVE BEEN MOVING AROUND A LOT MORE THAN USUAL: 0
6. FEELING BAD ABOUT YOURSELF - OR THAT YOU ARE A FAILURE OR HAVE LET YOURSELF OR YOUR FAMILY DOWN: NOT AT ALL
4. FEELING TIRED OR HAVING LITTLE ENERGY: 0
SUM OF ALL RESPONSES TO PHQ9 QUESTIONS 1 & 2: 0
6. FEELING BAD ABOUT YOURSELF - OR THAT YOU ARE A FAILURE OR HAVE LET YOURSELF OR YOUR FAMILY DOWN: 0
9. THOUGHTS THAT YOU WOULD BE BETTER OFF DEAD, OR OF HURTING YOURSELF: 0
3. TROUBLE FALLING OR STAYING ASLEEP: 3
SUM OF ALL RESPONSES TO PHQ QUESTIONS 1-9: 3
2. FEELING DOWN, DEPRESSED OR HOPELESS: 0
3. TROUBLE FALLING OR STAYING ASLEEP: NEARLY EVERY DAY
8. MOVING OR SPEAKING SO SLOWLY THAT OTHER PEOPLE COULD HAVE NOTICED. OR THE OPPOSITE - BEING SO FIDGETY OR RESTLESS THAT YOU HAVE BEEN MOVING AROUND A LOT MORE THAN USUAL: NOT AT ALL
SUM OF ALL RESPONSES TO PHQ QUESTIONS 1-9: 3
SUM OF ALL RESPONSES TO PHQ QUESTIONS 1-9: 3
10. IF YOU CHECKED OFF ANY PROBLEMS, HOW DIFFICULT HAVE THESE PROBLEMS MADE IT FOR YOU TO DO YOUR WORK, TAKE CARE OF THINGS AT HOME, OR GET ALONG WITH OTHER PEOPLE: 0
1. LITTLE INTEREST OR PLEASURE IN DOING THINGS: 0
SUM OF ALL RESPONSES TO PHQ QUESTIONS 1-9: 3
9. THOUGHTS THAT YOU WOULD BE BETTER OFF DEAD, OR OF HURTING YOURSELF: NOT AT ALL
7. TROUBLE CONCENTRATING ON THINGS, SUCH AS READING THE NEWSPAPER OR WATCHING TELEVISION: 0
1. LITTLE INTEREST OR PLEASURE IN DOING THINGS: NOT AT ALL
5. POOR APPETITE OR OVEREATING: NOT AT ALL
5. POOR APPETITE OR OVEREATING: 0
10. IF YOU CHECKED OFF ANY PROBLEMS, HOW DIFFICULT HAVE THESE PROBLEMS MADE IT FOR YOU TO DO YOUR WORK, TAKE CARE OF THINGS AT HOME, OR GET ALONG WITH OTHER PEOPLE: NOT DIFFICULT AT ALL
7. TROUBLE CONCENTRATING ON THINGS, SUCH AS READING THE NEWSPAPER OR WATCHING TELEVISION: NOT AT ALL

## 2023-09-27 ENCOUNTER — OFFICE VISIT (OUTPATIENT)
Dept: INTERNAL MEDICINE CLINIC | Facility: CLINIC | Age: 78
End: 2023-09-27
Payer: MEDICARE

## 2023-09-27 VITALS
OXYGEN SATURATION: 99 % | HEART RATE: 60 BPM | BODY MASS INDEX: 26.59 KG/M2 | DIASTOLIC BLOOD PRESSURE: 78 MMHG | WEIGHT: 159.6 LBS | HEIGHT: 65 IN | TEMPERATURE: 97.4 F | SYSTOLIC BLOOD PRESSURE: 132 MMHG

## 2023-09-27 DIAGNOSIS — N18.31 TYPE 2 DIABETES MELLITUS WITH STAGE 3A CHRONIC KIDNEY DISEASE, WITHOUT LONG-TERM CURRENT USE OF INSULIN (HCC): Primary | Chronic | ICD-10-CM

## 2023-09-27 DIAGNOSIS — E78.5 DYSLIPIDEMIA: Chronic | ICD-10-CM

## 2023-09-27 DIAGNOSIS — K21.9 GERD WITHOUT ESOPHAGITIS: ICD-10-CM

## 2023-09-27 DIAGNOSIS — F51.01 PRIMARY INSOMNIA: ICD-10-CM

## 2023-09-27 DIAGNOSIS — I50.42 CHRONIC COMBINED SYSTOLIC AND DIASTOLIC CHF (CONGESTIVE HEART FAILURE) (HCC): Chronic | ICD-10-CM

## 2023-09-27 DIAGNOSIS — N18.31 STAGE 3A CHRONIC KIDNEY DISEASE (HCC): ICD-10-CM

## 2023-09-27 DIAGNOSIS — E53.8 VITAMIN B12 DEFICIENCY: ICD-10-CM

## 2023-09-27 DIAGNOSIS — E11.22 TYPE 2 DIABETES MELLITUS WITH STAGE 3A CHRONIC KIDNEY DISEASE, WITHOUT LONG-TERM CURRENT USE OF INSULIN (HCC): Primary | Chronic | ICD-10-CM

## 2023-09-27 DIAGNOSIS — T45.1X5A CHEMOTHERAPY-INDUCED NEUROPATHY (HCC): ICD-10-CM

## 2023-09-27 DIAGNOSIS — F32.89 OTHER DEPRESSION: ICD-10-CM

## 2023-09-27 DIAGNOSIS — E79.0 ELEVATED BLOOD URIC ACID LEVEL: ICD-10-CM

## 2023-09-27 DIAGNOSIS — Z23 NEEDS FLU SHOT: ICD-10-CM

## 2023-09-27 DIAGNOSIS — I10 ESSENTIAL HYPERTENSION: Chronic | ICD-10-CM

## 2023-09-27 DIAGNOSIS — M19.042 PRIMARY OSTEOARTHRITIS OF BOTH HANDS: ICD-10-CM

## 2023-09-27 DIAGNOSIS — M85.89 OSTEOPENIA OF MULTIPLE SITES: ICD-10-CM

## 2023-09-27 DIAGNOSIS — M19.041 PRIMARY OSTEOARTHRITIS OF BOTH HANDS: ICD-10-CM

## 2023-09-27 DIAGNOSIS — E55.9 VITAMIN D DEFICIENCY: ICD-10-CM

## 2023-09-27 DIAGNOSIS — M35.00 SJOGREN SYNDROME, UNSPECIFIED (HCC): ICD-10-CM

## 2023-09-27 DIAGNOSIS — G62.0 CHEMOTHERAPY-INDUCED NEUROPATHY (HCC): ICD-10-CM

## 2023-09-27 LAB
ANION GAP SERPL CALC-SCNC: 7 MMOL/L (ref 2–11)
BUN SERPL-MCNC: 21 MG/DL (ref 8–23)
CALCIUM SERPL-MCNC: 9.4 MG/DL (ref 8.3–10.4)
CHLORIDE SERPL-SCNC: 99 MMOL/L (ref 101–110)
CHOLEST SERPL-MCNC: 266 MG/DL
CO2 SERPL-SCNC: 28 MMOL/L (ref 21–32)
CREAT SERPL-MCNC: 1.4 MG/DL (ref 0.6–1)
GLUCOSE SERPL-MCNC: 107 MG/DL (ref 65–100)
HDLC SERPL-MCNC: 57 MG/DL (ref 40–60)
HDLC SERPL: 4.7
LDLC SERPL CALC-MCNC: 131.8 MG/DL
POTASSIUM SERPL-SCNC: 4 MMOL/L (ref 3.5–5.1)
SODIUM SERPL-SCNC: 134 MMOL/L (ref 133–143)
TRIGL SERPL-MCNC: 386 MG/DL (ref 35–150)
URATE SERPL-MCNC: 7.4 MG/DL (ref 2.6–6)
VLDLC SERPL CALC-MCNC: 77.2 MG/DL (ref 6–23)

## 2023-09-27 PROCEDURE — G8417 CALC BMI ABV UP PARAM F/U: HCPCS | Performed by: NURSE PRACTITIONER

## 2023-09-27 PROCEDURE — 3075F SYST BP GE 130 - 139MM HG: CPT | Performed by: NURSE PRACTITIONER

## 2023-09-27 PROCEDURE — 3044F HG A1C LEVEL LT 7.0%: CPT | Performed by: NURSE PRACTITIONER

## 2023-09-27 PROCEDURE — 90694 VACC AIIV4 NO PRSRV 0.5ML IM: CPT | Performed by: NURSE PRACTITIONER

## 2023-09-27 PROCEDURE — G8399 PT W/DXA RESULTS DOCUMENT: HCPCS | Performed by: NURSE PRACTITIONER

## 2023-09-27 PROCEDURE — 99214 OFFICE O/P EST MOD 30 MIN: CPT | Performed by: NURSE PRACTITIONER

## 2023-09-27 PROCEDURE — 1090F PRES/ABSN URINE INCON ASSESS: CPT | Performed by: NURSE PRACTITIONER

## 2023-09-27 PROCEDURE — 1123F ACP DISCUSS/DSCN MKR DOCD: CPT | Performed by: NURSE PRACTITIONER

## 2023-09-27 PROCEDURE — 1036F TOBACCO NON-USER: CPT | Performed by: NURSE PRACTITIONER

## 2023-09-27 PROCEDURE — G0008 ADMIN INFLUENZA VIRUS VAC: HCPCS | Performed by: NURSE PRACTITIONER

## 2023-09-27 PROCEDURE — 3078F DIAST BP <80 MM HG: CPT | Performed by: NURSE PRACTITIONER

## 2023-09-27 PROCEDURE — G8427 DOCREV CUR MEDS BY ELIG CLIN: HCPCS | Performed by: NURSE PRACTITIONER

## 2023-09-27 RX ORDER — TRAMADOL HYDROCHLORIDE 50 MG/1
50 TABLET ORAL DAILY PRN
Qty: 20 TABLET | Refills: 0 | Status: SHIPPED | OUTPATIENT
Start: 2023-09-27 | End: 2023-10-17

## 2023-09-27 ASSESSMENT — ENCOUNTER SYMPTOMS
SHORTNESS OF BREATH: 0
WHEEZING: 0
COUGH: 0

## 2023-09-27 NOTE — PROGRESS NOTES
[facility]  Office Visit Note    Subjective:  Chief Complaint   Patient presents with    Hypertension       Patient ID: Kandy Jacobs is a 69 y.o. female presenting to the office for the above. 69year old female for follow up. Unaccompanied in office.  ( is Horace Castillo, retired  and ). They have one son, two grandchildren, and two great-grandchildren. Have a [facility] at [facility]. Has a Pug dog and a cat. Left hip fracture--  She was hospitalized at [facility] 2/10/[facility]-2/21/23 with left hip fracture. Was also treated for influenza A during hospitalization. Completed rehab after discharge, is now home with home health and PT. She did not tolerate bisphosphonates; is on Prolia (first injection August 2022). 3/27/23: She has orthopedics follow up tomorrow. CT head done at hospital showed \"An usually large and coarse, 1.1 x 0.8 cm calcification at or immediately adjacent to the choroid plexus of the right atria. Although it is possible this is a simple choroid plexus calcification, the size of the structure at least raises the possibility of other pathology such as a meningioma. An MRI brain with and without contrast is suggested for further evaluation. \"  MRI was ordered at hospital; advised them to notify office if they do not receive a phone call to schedule. 3/27/23: States today that she has not heard to schedule the MRI; provided her with the contact info to call to schedule. 6/28/23: MRI completed April 2023 was unremarkable. Depression--  Previous HPI:   Per PTA home visit note 2/27/23, there is concern about patient being depressed, not eating as much. Her  is concerned at her lack of motivation to participate in PT exercises. States she prefers to stay in bed much of the day, and then is awake much of the night. She was previously on Celexa, and family felt she was doing better while on this medication.    3/27/23:   She

## 2023-09-28 DIAGNOSIS — E79.0 ELEVATED URIC ACID IN BLOOD: Primary | ICD-10-CM

## 2023-09-28 DIAGNOSIS — M1A.0410 CHRONIC GOUT OF RIGHT HAND, UNSPECIFIED CAUSE: ICD-10-CM

## 2023-09-28 LAB
EST. AVERAGE GLUCOSE BLD GHB EST-MCNC: 134 MG/DL
HBA1C MFR BLD: 6.3 % (ref 4.8–5.6)

## 2023-09-28 RX ORDER — ALLOPURINOL 100 MG/1
200 TABLET ORAL DAILY
Qty: 180 TABLET | Refills: 1 | Status: SHIPPED | OUTPATIENT
Start: 2023-09-28

## 2023-10-20 ENCOUNTER — TELEPHONE (OUTPATIENT)
Age: 78
End: 2023-10-20

## 2023-10-20 NOTE — TELEPHONE ENCOUNTER
----- Message from Almaz Leonard DO sent at 10/20/2023  8:35 AM EDT -----  Please call the patient. ECHO was ok, nothing major or concerning. If having more angina (worsening RUFFIN, CP, SOB), please let us know. Will review more at follow up appointment and get plan for the future.     Thanks,   Delmar Villalpando

## 2023-10-23 ENCOUNTER — OFFICE VISIT (OUTPATIENT)
Age: 78
End: 2023-10-23
Payer: MEDICARE

## 2023-10-23 VITALS
WEIGHT: 161 LBS | HEIGHT: 66 IN | HEART RATE: 64 BPM | SYSTOLIC BLOOD PRESSURE: 130 MMHG | DIASTOLIC BLOOD PRESSURE: 72 MMHG | BODY MASS INDEX: 25.88 KG/M2

## 2023-10-23 DIAGNOSIS — I50.42 CHRONIC COMBINED SYSTOLIC AND DIASTOLIC CHF (CONGESTIVE HEART FAILURE) (HCC): Chronic | ICD-10-CM

## 2023-10-23 DIAGNOSIS — N18.31 STAGE 3A CHRONIC KIDNEY DISEASE (HCC): ICD-10-CM

## 2023-10-23 DIAGNOSIS — I42.0 DILATED CARDIOMYOPATHY (HCC): Primary | ICD-10-CM

## 2023-10-23 DIAGNOSIS — I10 ESSENTIAL HYPERTENSION: Chronic | ICD-10-CM

## 2023-10-23 PROCEDURE — 3078F DIAST BP <80 MM HG: CPT | Performed by: INTERNAL MEDICINE

## 2023-10-23 PROCEDURE — 99214 OFFICE O/P EST MOD 30 MIN: CPT | Performed by: INTERNAL MEDICINE

## 2023-10-23 PROCEDURE — G8399 PT W/DXA RESULTS DOCUMENT: HCPCS | Performed by: INTERNAL MEDICINE

## 2023-10-23 PROCEDURE — G8484 FLU IMMUNIZE NO ADMIN: HCPCS | Performed by: INTERNAL MEDICINE

## 2023-10-23 PROCEDURE — 1090F PRES/ABSN URINE INCON ASSESS: CPT | Performed by: INTERNAL MEDICINE

## 2023-10-23 PROCEDURE — 1036F TOBACCO NON-USER: CPT | Performed by: INTERNAL MEDICINE

## 2023-10-23 PROCEDURE — G8417 CALC BMI ABV UP PARAM F/U: HCPCS | Performed by: INTERNAL MEDICINE

## 2023-10-23 PROCEDURE — G8428 CUR MEDS NOT DOCUMENT: HCPCS | Performed by: INTERNAL MEDICINE

## 2023-10-23 PROCEDURE — 1123F ACP DISCUSS/DSCN MKR DOCD: CPT | Performed by: INTERNAL MEDICINE

## 2023-10-23 PROCEDURE — 3075F SYST BP GE 130 - 139MM HG: CPT | Performed by: INTERNAL MEDICINE

## 2023-11-02 ENCOUNTER — OFFICE VISIT (OUTPATIENT)
Dept: ORTHOPEDIC SURGERY | Age: 78
End: 2023-11-02

## 2023-11-02 DIAGNOSIS — S72.002D CLOSED HIP FRACTURE REQUIRING OPERATIVE REPAIR WITH ROUTINE HEALING, LEFT: Primary | ICD-10-CM

## 2023-11-02 DIAGNOSIS — M54.40 LOW BACK PAIN WITH SCIATICA, SCIATICA LATERALITY UNSPECIFIED, UNSPECIFIED BACK PAIN LATERALITY, UNSPECIFIED CHRONICITY: ICD-10-CM

## 2023-11-02 RX ORDER — HYDROCODONE BITARTRATE AND ACETAMINOPHEN 5; 325 MG/1; MG/1
2 TABLET ORAL EVERY 6 HOURS PRN
Qty: 30 TABLET | Refills: 0 | Status: SHIPPED | OUTPATIENT
Start: 2023-11-02 | End: 2023-11-07

## 2023-11-02 NOTE — PROGRESS NOTES
arthropathy at multiple levels. On the AP she has a pretty significant degenerative scoliosis as well. Do not see any obvious acute fracture. 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 A lot of her symptoms seem to be very consistent with a pelvic ring injury so I think she could easily have a nondisplaced pelvic ring injury or just some thing that we cannot really visualized that well on plain film x-ray. Obviously knowing this would help us to tell her little bit better what to expect and hopefully allow us to sort of direct her physical therapy little bit better. I do not think we would have to operate on something like this. She is encouraged by this so we will get the CT scan of her pelvis and I would like it to include both sides not to be just the CT of the left hip and I would like it to go through the plate in her proximal femur on the left side. I will see her back after the CT scan is done.   I also will try to give her some medication to help her feel little bit better    I have reviewed the patient's history of controlled substance prescriptions in the [facility] prescription drug monitoring program.    Signed By: Obdulia Schaffer MD     November 2, 2023

## 2023-11-03 ENCOUNTER — HOSPITAL ENCOUNTER (OUTPATIENT)
Dept: CT IMAGING | Age: 78
End: 2023-11-03
Attending: ORTHOPAEDIC SURGERY
Payer: MEDICARE

## 2023-11-03 DIAGNOSIS — M54.40 LOW BACK PAIN WITH SCIATICA, SCIATICA LATERALITY UNSPECIFIED, UNSPECIFIED BACK PAIN LATERALITY, UNSPECIFIED CHRONICITY: ICD-10-CM

## 2023-11-03 DIAGNOSIS — S72.002D CLOSED HIP FRACTURE REQUIRING OPERATIVE REPAIR WITH ROUTINE HEALING, LEFT: ICD-10-CM

## 2023-11-03 PROCEDURE — 72192 CT PELVIS W/O DYE: CPT

## 2023-11-09 ENCOUNTER — TELEPHONE (OUTPATIENT)
Dept: ORTHOPEDIC SURGERY | Age: 78
End: 2023-11-09

## 2023-11-10 NOTE — TELEPHONE ENCOUNTER
Returned call to Pt and advised Dr. Sheyla Presley was in Surgery and will get back to her with any change of Plans Re: Results of CT , but that the impression on Report was :Fixation hardware in the left hip. No displaced fracture. Pt voiced complete understanding.  [address]

## 2023-11-15 ENCOUNTER — OFFICE VISIT (OUTPATIENT)
Dept: UROLOGY | Age: 78
End: 2023-11-15
Payer: MEDICARE

## 2023-11-15 DIAGNOSIS — K58.0 IRRITABLE BOWEL SYNDROME WITH DIARRHEA: ICD-10-CM

## 2023-11-15 DIAGNOSIS — N39.0 RECURRENT UTI: Primary | ICD-10-CM

## 2023-11-15 PROCEDURE — 99214 OFFICE O/P EST MOD 30 MIN: CPT | Performed by: NURSE PRACTITIONER

## 2023-11-15 PROCEDURE — G8399 PT W/DXA RESULTS DOCUMENT: HCPCS | Performed by: NURSE PRACTITIONER

## 2023-11-15 PROCEDURE — 1036F TOBACCO NON-USER: CPT | Performed by: NURSE PRACTITIONER

## 2023-11-15 PROCEDURE — G8417 CALC BMI ABV UP PARAM F/U: HCPCS | Performed by: NURSE PRACTITIONER

## 2023-11-15 PROCEDURE — 1123F ACP DISCUSS/DSCN MKR DOCD: CPT | Performed by: NURSE PRACTITIONER

## 2023-11-15 PROCEDURE — 1090F PRES/ABSN URINE INCON ASSESS: CPT | Performed by: NURSE PRACTITIONER

## 2023-11-15 PROCEDURE — G8484 FLU IMMUNIZE NO ADMIN: HCPCS | Performed by: NURSE PRACTITIONER

## 2023-11-15 PROCEDURE — G8427 DOCREV CUR MEDS BY ELIG CLIN: HCPCS | Performed by: NURSE PRACTITIONER

## 2023-11-15 RX ORDER — CEPHALEXIN 250 MG/1
250 CAPSULE ORAL DAILY
Qty: 90 CAPSULE | Refills: 3 | Status: SHIPPED | OUTPATIENT
Start: 2023-11-15

## 2023-11-15 ASSESSMENT — ENCOUNTER SYMPTOMS: BACK PAIN: 0

## 2023-11-15 NOTE — PROGRESS NOTES
[facility]  [address], [address]  [facility]  [phone]          Mitch Kaufman  : 1945    Chief Complaint   Patient presents with    Follow-up    Urinary Tract Infection          HPI     Mitch Kaufman is a 69 y.o. female  referred in regards to recurrent UTI's. She states this began after a colonoscopy 3 yrs ago. Has had int diarrhea since colonoscopy. Has not rerutned back to GI for fu. Sx include UU, dysuria, and UI. She as admitted for 2 days in  with ARF. Upper tract imaging at that time showed normal urianry tract. With fluids, her serum cr returned to normal prior to discharge She is on cranberry supplementation po. Macrodantin suppression was started in  by Dr. Marie Dahl. Stopped estrace cream after breast CA dx. She has had 2 positive urine cultures while on suppression, one of those being proteus. No hx of stones. Has recently noticed left flank pain. She was changed from macrodantin to keflex 250 mg suppression. No UTI since. Normal cysto by Dr. Marie Dahl in Aug 23. Seen by GI. Dx w IBS-D. Advised to try imodium. Diarrhea improving. PVR: 13 ml  Cr 1.0. Could not give a voided specimen today.          Past Medical History:   Diagnosis Date    Ankle fracture     Ankle osteomyelitis, left ([address])     Breast cancer (HCC)     Chemotherapy-induced neuropathy (HCC)     Chronic anemia     Chronic systolic CHF (congestive heart failure) (HCC)     CKD (chronic kidney disease) stage 3, GFR 30-59 ml/min (HCC)     Colon polyp     Depression     Dilated cardiomyopathy (HCC)     Dry eye syndrome of both eyes     Dyslipidemia     Elevated blood uric acid level     Essential hypertension     Fracture of right patella     History of left breast cancer 2001    with mastectomy    [place] (hard of hearing)     wears hearing aids    Hyperlipidemia     Left leg weakness 2020    Non-ischemic cardiomyopathy ([address]) 2018    Echo 50-55%,

## 2023-11-29 NOTE — CARE COORDINATION
11/29/23                            Mai СВЕТЛАНА Harris  [address] [address]    To Whom It May Concern:    This is to certify Mai СВЕТЛАНА Hilla was evaluated with ABIMAEL Colin on 11/29/23 and can return to school on 11/30/23.     Thank you,       ABIMAEL Colin        [facility] Care Transitions Follow Up Call    LPN Care Coordinator contacted the patient by telephone to follow up after admission on 2022. Verified name and  with patient as identifiers. Patient: Kayla Fitzgerald  Patient : 1945   MRN: [id number]  Reason for Admission: UTI  Discharge Date: 22 RARS: Readmission Risk Score: 12.9      Needs to be reviewed by the provider   Additional needs identified to be addressed with provider: No  none             Method of communication with provider: none. Addressed changes since last contact:  none  Discussed follow-up appointments. If no appointment was previously scheduled, appointment scheduling offered: Yes. Is follow up appointment scheduled within 7 days of discharge? No.    Follow Up  Future Appointments   Date Time Provider Susana Hutchins   2023  1:15 PM [facility]   2023  2:30 PM Jay Flowers MD [facility]   2023  2:00 PM Lanna Dandy, MD [facility] AM[facility]   2023  2:00 PM Marci Varghese MD [id number]   2023  3:15 PM Santos Hazel DO [facility]   3/27/2023  9:20 AM LIU Olson - CNP [address] [facility]     Non-[facility] follow up appointment(s): tyler    N Care Coordinator reviewed discharge instructions, medical action plan, and red flags with patient and discussed any barriers to care and/or understanding of plan of care after discharge. Discussed appropriate site of care based on symptoms and resources available to patient including: PCP  Specialist  Urgent care clinics  When to call [address] The patient agrees to contact the PCP office for questions related to their healthcare. Advance Care Planning:   patient declined education.      Patients top risk factors for readmission: medical condition-s/d/CHF, HTN, Cardiomyopathy, DM, UTI, CKD, HLD, Gout right hand, Breast CA, Left Mastectomy  Interventions to address risk factors: Obtained and reviewed discharge summary and/or continuity of care documents, Education of patient/family/caregiver/guardian to support self-management-Alcira Guerrero LPN -[id number], and all scheduled appointments,     Offered patient enrollment in the Remote Patient Monitoring (RPM) program for in-home monitoring: NA.     Care Transitions Subsequent and Final Call    Subsequent and Final Calls  Do you have any ongoing symptoms?: No  Have your medications changed?: No  Do you have any questions related to your medications?: No  Do you currently have any active services?: No  Do you have any needs or concerns that I can assist you with?: No  Identified Barriers: None  Care Transitions Interventions  Other Interventions:             LPN Care Coordinator provided contact information for future needs. Plan for follow-up call in 7-10 days based on severity of symptoms and risk factors. Plan for next call: self management-diet, hydration, exercise, daily weight, follow up appointments.      Ron Minor LPN

## 2023-12-07 ENCOUNTER — TRANSCRIBE ORDERS (OUTPATIENT)
Dept: SCHEDULING | Age: 78
End: 2023-12-07

## 2023-12-07 DIAGNOSIS — Z12.31 VISIT FOR SCREENING MAMMOGRAM: Primary | ICD-10-CM

## 2024-01-12 ENCOUNTER — HOSPITAL ENCOUNTER (OUTPATIENT)
Dept: MAMMOGRAPHY | Age: 79
End: 2024-01-12
Payer: MEDICARE

## 2024-01-12 DIAGNOSIS — Z12.31 VISIT FOR SCREENING MAMMOGRAM: ICD-10-CM

## 2024-01-12 PROCEDURE — 77063 BREAST TOMOSYNTHESIS BI: CPT

## 2024-01-15 DIAGNOSIS — I10 ESSENTIAL HYPERTENSION: Chronic | ICD-10-CM

## 2024-01-15 DIAGNOSIS — N18.30 CONTROLLED TYPE 2 DIABETES MELLITUS WITH STAGE 3 CHRONIC KIDNEY DISEASE, WITHOUT LONG-TERM CURRENT USE OF INSULIN (HCC): Chronic | ICD-10-CM

## 2024-01-15 DIAGNOSIS — T45.1X5A CHEMOTHERAPY-INDUCED NEUROPATHY (HCC): Chronic | ICD-10-CM

## 2024-01-15 DIAGNOSIS — I42.0 DILATED CARDIOMYOPATHY (HCC): Chronic | ICD-10-CM

## 2024-01-15 DIAGNOSIS — G62.0 CHEMOTHERAPY-INDUCED NEUROPATHY (HCC): Chronic | ICD-10-CM

## 2024-01-15 DIAGNOSIS — R11.0 NAUSEA: ICD-10-CM

## 2024-01-15 DIAGNOSIS — E11.22 CONTROLLED TYPE 2 DIABETES MELLITUS WITH STAGE 3 CHRONIC KIDNEY DISEASE, WITHOUT LONG-TERM CURRENT USE OF INSULIN (HCC): Chronic | ICD-10-CM

## 2024-01-15 DIAGNOSIS — F32.89 OTHER DEPRESSION: ICD-10-CM

## 2024-01-16 RX ORDER — METFORMIN HYDROCHLORIDE 500 MG/1
1000 TABLET, EXTENDED RELEASE ORAL 2 TIMES DAILY
Qty: 360 TABLET | Refills: 1 | Status: SHIPPED | OUTPATIENT
Start: 2024-01-16

## 2024-01-16 RX ORDER — GABAPENTIN 600 MG/1
600 TABLET ORAL 2 TIMES DAILY
Qty: 180 TABLET | Refills: 1 | Status: SHIPPED | OUTPATIENT
Start: 2024-01-16 | End: 2025-01-10

## 2024-01-16 RX ORDER — FUROSEMIDE 40 MG/1
40 TABLET ORAL DAILY
Qty: 90 TABLET | Refills: 3 | Status: SHIPPED | OUTPATIENT
Start: 2024-01-16

## 2024-01-16 RX ORDER — CITALOPRAM HYDROBROMIDE 10 MG/1
10 TABLET ORAL DAILY
Qty: 90 TABLET | Refills: 1 | Status: SHIPPED | OUTPATIENT
Start: 2024-01-16

## 2024-01-16 RX ORDER — METOPROLOL SUCCINATE 50 MG/1
50 TABLET, EXTENDED RELEASE ORAL 2 TIMES DAILY
Qty: 180 TABLET | Refills: 3 | Status: SHIPPED | OUTPATIENT
Start: 2024-01-16

## 2024-01-16 RX ORDER — PROMETHAZINE HYDROCHLORIDE 25 MG/1
25 TABLET ORAL DAILY PRN
Qty: 30 TABLET | Refills: 1 | Status: SHIPPED | OUTPATIENT
Start: 2024-01-16

## 2024-01-31 DIAGNOSIS — R11.0 NAUSEA: ICD-10-CM

## 2024-01-31 RX ORDER — PROMETHAZINE HYDROCHLORIDE 25 MG/1
25 TABLET ORAL DAILY PRN
Qty: 30 TABLET | Refills: 1 | OUTPATIENT
Start: 2024-01-31

## 2024-02-06 ENCOUNTER — OFFICE VISIT (OUTPATIENT)
Dept: INTERNAL MEDICINE CLINIC | Facility: CLINIC | Age: 79
End: 2024-02-06
Payer: MEDICARE

## 2024-02-06 VITALS
SYSTOLIC BLOOD PRESSURE: 142 MMHG | BODY MASS INDEX: 26.52 KG/M2 | WEIGHT: 159.2 LBS | TEMPERATURE: 97.4 F | OXYGEN SATURATION: 98 % | DIASTOLIC BLOOD PRESSURE: 70 MMHG | HEART RATE: 59 BPM | HEIGHT: 65 IN

## 2024-02-06 DIAGNOSIS — M19.041 PRIMARY OSTEOARTHRITIS OF BOTH HANDS: ICD-10-CM

## 2024-02-06 DIAGNOSIS — N18.31 STAGE 3A CHRONIC KIDNEY DISEASE (HCC): ICD-10-CM

## 2024-02-06 DIAGNOSIS — T45.1X5A CHEMOTHERAPY-INDUCED NEUROPATHY (HCC): ICD-10-CM

## 2024-02-06 DIAGNOSIS — M35.00 SJOGREN SYNDROME, UNSPECIFIED (HCC): ICD-10-CM

## 2024-02-06 DIAGNOSIS — Z78.9 INTOLERANCE OF ORAL BISPHOSPHONATE THERAPY: ICD-10-CM

## 2024-02-06 DIAGNOSIS — Z78.9 STATIN INTOLERANCE: ICD-10-CM

## 2024-02-06 DIAGNOSIS — N18.31 TYPE 2 DIABETES MELLITUS WITH STAGE 3A CHRONIC KIDNEY DISEASE, WITHOUT LONG-TERM CURRENT USE OF INSULIN (HCC): Primary | Chronic | ICD-10-CM

## 2024-02-06 DIAGNOSIS — E11.22 TYPE 2 DIABETES MELLITUS WITH STAGE 3A CHRONIC KIDNEY DISEASE, WITHOUT LONG-TERM CURRENT USE OF INSULIN (HCC): Primary | Chronic | ICD-10-CM

## 2024-02-06 DIAGNOSIS — M19.042 PRIMARY OSTEOARTHRITIS OF BOTH HANDS: ICD-10-CM

## 2024-02-06 DIAGNOSIS — I42.0 DILATED CARDIOMYOPATHY (HCC): ICD-10-CM

## 2024-02-06 DIAGNOSIS — M85.89 OSTEOPENIA OF MULTIPLE SITES: ICD-10-CM

## 2024-02-06 DIAGNOSIS — G89.29 OTHER CHRONIC PAIN: ICD-10-CM

## 2024-02-06 DIAGNOSIS — G62.0 CHEMOTHERAPY-INDUCED NEUROPATHY (HCC): ICD-10-CM

## 2024-02-06 DIAGNOSIS — E78.5 DYSLIPIDEMIA: Chronic | ICD-10-CM

## 2024-02-06 DIAGNOSIS — I10 ESSENTIAL HYPERTENSION: Chronic | ICD-10-CM

## 2024-02-06 DIAGNOSIS — F51.01 PRIMARY INSOMNIA: ICD-10-CM

## 2024-02-06 DIAGNOSIS — Z94.84 STEM CELLS TRANSPLANT STATUS (HCC): ICD-10-CM

## 2024-02-06 PROBLEM — K52.9 CHRONIC DIARRHEA: Status: RESOLVED | Noted: 2023-06-08 | Resolved: 2024-02-06

## 2024-02-06 LAB
ALBUMIN SERPL-MCNC: 3.7 G/DL (ref 3.2–4.6)
ALBUMIN/GLOB SERPL: 1 (ref 0.4–1.6)
ALP SERPL-CCNC: 73 U/L (ref 50–136)
ALT SERPL-CCNC: 22 U/L (ref 12–65)
ANION GAP SERPL CALC-SCNC: 5 MMOL/L (ref 2–11)
AST SERPL-CCNC: 17 U/L (ref 15–37)
BASOPHILS # BLD: 0.1 K/UL (ref 0–0.2)
BASOPHILS NFR BLD: 1 % (ref 0–2)
BILIRUB SERPL-MCNC: 0.4 MG/DL (ref 0.2–1.1)
BUN SERPL-MCNC: 27 MG/DL (ref 8–23)
CALCIUM SERPL-MCNC: 9.3 MG/DL (ref 8.3–10.4)
CHLORIDE SERPL-SCNC: 106 MMOL/L (ref 103–113)
CO2 SERPL-SCNC: 27 MMOL/L (ref 21–32)
CREAT SERPL-MCNC: 1.9 MG/DL (ref 0.6–1)
CREAT UR-MCNC: 227 MG/DL
DIFFERENTIAL METHOD BLD: ABNORMAL
EOSINOPHIL # BLD: 0.2 K/UL (ref 0–0.8)
EOSINOPHIL NFR BLD: 3 % (ref 0.5–7.8)
ERYTHROCYTE [DISTWIDTH] IN BLOOD BY AUTOMATED COUNT: 15.1 % (ref 11.9–14.6)
GLOBULIN SER CALC-MCNC: 3.6 G/DL (ref 2.8–4.5)
GLUCOSE SERPL-MCNC: 110 MG/DL (ref 65–100)
HCT VFR BLD AUTO: 37.1 % (ref 35.8–46.3)
HGB BLD-MCNC: 11.8 G/DL (ref 11.7–15.4)
IMM GRANULOCYTES # BLD AUTO: 0 K/UL (ref 0–0.5)
IMM GRANULOCYTES NFR BLD AUTO: 0 % (ref 0–5)
LYMPHOCYTES # BLD: 3 K/UL (ref 0.5–4.6)
LYMPHOCYTES NFR BLD: 45 % (ref 13–44)
MCH RBC QN AUTO: 31.6 PG (ref 26.1–32.9)
MCHC RBC AUTO-ENTMCNC: 31.8 G/DL (ref 31.4–35)
MCV RBC AUTO: 99.5 FL (ref 82–102)
MICROALBUMIN UR-MCNC: 2.26 MG/DL
MICROALBUMIN/CREAT UR-RTO: 10 MG/G (ref 0–30)
MONOCYTES # BLD: 0.6 K/UL (ref 0.1–1.3)
MONOCYTES NFR BLD: 8 % (ref 4–12)
NEUTS SEG # BLD: 3 K/UL (ref 1.7–8.2)
NEUTS SEG NFR BLD: 43 % (ref 43–78)
NRBC # BLD: 0 K/UL (ref 0–0.2)
PHOSPHATE SERPL-MCNC: 4.6 MG/DL (ref 2.3–3.7)
PLATELET # BLD AUTO: 268 K/UL (ref 150–450)
PMV BLD AUTO: 10.5 FL (ref 9.4–12.3)
POTASSIUM SERPL-SCNC: 4.2 MMOL/L (ref 3.5–5.1)
PROT SERPL-MCNC: 7.3 G/DL (ref 6.3–8.2)
RBC # BLD AUTO: 3.73 M/UL (ref 4.05–5.2)
SODIUM SERPL-SCNC: 138 MMOL/L (ref 136–146)
WBC # BLD AUTO: 6.8 K/UL (ref 4.3–11.1)

## 2024-02-06 PROCEDURE — G8484 FLU IMMUNIZE NO ADMIN: HCPCS | Performed by: NURSE PRACTITIONER

## 2024-02-06 PROCEDURE — 99214 OFFICE O/P EST MOD 30 MIN: CPT | Performed by: NURSE PRACTITIONER

## 2024-02-06 PROCEDURE — G8417 CALC BMI ABV UP PARAM F/U: HCPCS | Performed by: NURSE PRACTITIONER

## 2024-02-06 PROCEDURE — 1123F ACP DISCUSS/DSCN MKR DOCD: CPT | Performed by: NURSE PRACTITIONER

## 2024-02-06 PROCEDURE — 1036F TOBACCO NON-USER: CPT | Performed by: NURSE PRACTITIONER

## 2024-02-06 PROCEDURE — 3078F DIAST BP <80 MM HG: CPT | Performed by: NURSE PRACTITIONER

## 2024-02-06 PROCEDURE — 1090F PRES/ABSN URINE INCON ASSESS: CPT | Performed by: NURSE PRACTITIONER

## 2024-02-06 PROCEDURE — 3077F SYST BP >= 140 MM HG: CPT | Performed by: NURSE PRACTITIONER

## 2024-02-06 PROCEDURE — G8399 PT W/DXA RESULTS DOCUMENT: HCPCS | Performed by: NURSE PRACTITIONER

## 2024-02-06 PROCEDURE — G8427 DOCREV CUR MEDS BY ELIG CLIN: HCPCS | Performed by: NURSE PRACTITIONER

## 2024-02-06 RX ORDER — SODIUM CHLORIDE 9 MG/ML
INJECTION, SOLUTION INTRAVENOUS CONTINUOUS
OUTPATIENT
Start: 2024-02-06

## 2024-02-06 RX ORDER — ALBUTEROL SULFATE 90 UG/1
4 AEROSOL, METERED RESPIRATORY (INHALATION) PRN
OUTPATIENT
Start: 2024-02-06

## 2024-02-06 RX ORDER — ONDANSETRON 2 MG/ML
8 INJECTION INTRAMUSCULAR; INTRAVENOUS
OUTPATIENT
Start: 2024-02-06

## 2024-02-06 RX ORDER — ACETAMINOPHEN 325 MG/1
650 TABLET ORAL
OUTPATIENT
Start: 2024-02-06

## 2024-02-06 RX ORDER — EPINEPHRINE 1 MG/ML
0.3 INJECTION, SOLUTION, CONCENTRATE INTRAVENOUS PRN
OUTPATIENT
Start: 2024-02-06

## 2024-02-06 RX ORDER — DIPHENHYDRAMINE HYDROCHLORIDE 50 MG/ML
50 INJECTION INTRAMUSCULAR; INTRAVENOUS
OUTPATIENT
Start: 2024-02-06

## 2024-02-06 RX ORDER — TRAMADOL HYDROCHLORIDE 50 MG/1
50 TABLET ORAL DAILY PRN
Qty: 20 TABLET | Refills: 0 | Status: SHIPPED | OUTPATIENT
Start: 2024-02-06 | End: 2024-03-07

## 2024-02-06 RX ORDER — FAMOTIDINE 10 MG/ML
20 INJECTION, SOLUTION INTRAVENOUS
OUTPATIENT
Start: 2024-02-06

## 2024-02-06 ASSESSMENT — PATIENT HEALTH QUESTIONNAIRE - PHQ9
5. POOR APPETITE OR OVEREATING: 0
10. IF YOU CHECKED OFF ANY PROBLEMS, HOW DIFFICULT HAVE THESE PROBLEMS MADE IT FOR YOU TO DO YOUR WORK, TAKE CARE OF THINGS AT HOME, OR GET ALONG WITH OTHER PEOPLE: 0
6. FEELING BAD ABOUT YOURSELF - OR THAT YOU ARE A FAILURE OR HAVE LET YOURSELF OR YOUR FAMILY DOWN: 0
9. THOUGHTS THAT YOU WOULD BE BETTER OFF DEAD, OR OF HURTING YOURSELF: 0
SUM OF ALL RESPONSES TO PHQ QUESTIONS 1-9: 0
8. MOVING OR SPEAKING SO SLOWLY THAT OTHER PEOPLE COULD HAVE NOTICED. OR THE OPPOSITE, BEING SO FIGETY OR RESTLESS THAT YOU HAVE BEEN MOVING AROUND A LOT MORE THAN USUAL: 0
SUM OF ALL RESPONSES TO PHQ QUESTIONS 1-9: 0
1. LITTLE INTEREST OR PLEASURE IN DOING THINGS: 0
3. TROUBLE FALLING OR STAYING ASLEEP: 0
SUM OF ALL RESPONSES TO PHQ QUESTIONS 1-9: 0
SUM OF ALL RESPONSES TO PHQ QUESTIONS 1-9: 0
7. TROUBLE CONCENTRATING ON THINGS, SUCH AS READING THE NEWSPAPER OR WATCHING TELEVISION: 0
SUM OF ALL RESPONSES TO PHQ9 QUESTIONS 1 & 2: 0
2. FEELING DOWN, DEPRESSED OR HOPELESS: 0
4. FEELING TIRED OR HAVING LITTLE ENERGY: 0

## 2024-02-06 ASSESSMENT — ENCOUNTER SYMPTOMS
NAUSEA: 0
BACK PAIN: 1
ABDOMINAL PAIN: 0
COUGH: 0
WHEEZING: 0
VOMITING: 0
DIARRHEA: 0
SHORTNESS OF BREATH: 0

## 2024-02-06 NOTE — PROGRESS NOTES
[facility]  Office Visit Note    Subjective:  Chief Complaint   Patient presents with    Diabetes     Patient is in f/u for DM, HTN, Vit D and Vit B12 deficiency. Patient too BP medication this morning. Patient want to add some pain pills.       Patient ID: Mirtha Posada is a 78 y.o. female presenting to the office for the above.    78 year old female for follow up.  Unaccompanied in office.    ( is Mychal Posada, retired  and ).  They have one son, two grandchildren, and two great-grandchildren.  Have a home at [facility].  Has a Pug dog and a cat.      Left hip fracture--  She was hospitalized at [facility] 2/10/23-2/21/23 with left hip fracture.  Was also treated for influenza A during hospitalization. Completed rehab after discharge, is now home with home health and PT.    She did not tolerate bisphosphonates; is on Prolia (first injection August 2022).   3/27/23: She has orthopedics follow up tomorrow.   CT head done at hospital showed \"An usually large and coarse, 1.1 x 0.8 cm calcification at or immediately adjacent to the choroid plexus of the right atria. Although it is possible this is a simple choroid plexus calcification, the size of the structure at least raises the possibility of other pathology such as a meningioma. An MRI brain with and without contrast is suggested for further evaluation.\"  MRI was ordered at hospital; advised them to notify office if they do not receive a phone call to schedule.   3/27/23: States today that she has not heard to schedule the MRI; provided her with the contact info to call to schedule.   6/28/23: MRI completed April 2023 was unremarkable.   2/6/24: She is having significant pain in her back and left leg. Advise to follow up with Dr. Chen her orthopedist; she is agreeable. Will provide refill of tramadol for prn use.      Depression--  Previous HPI:   Per PTA home visit note 2/27/23, there is concern about

## 2024-02-07 DIAGNOSIS — N18.31 TYPE 2 DIABETES MELLITUS WITH STAGE 3A CHRONIC KIDNEY DISEASE, WITHOUT LONG-TERM CURRENT USE OF INSULIN (HCC): Primary | Chronic | ICD-10-CM

## 2024-02-07 DIAGNOSIS — N18.31 STAGE 3A CHRONIC KIDNEY DISEASE (HCC): ICD-10-CM

## 2024-02-07 DIAGNOSIS — E11.22 TYPE 2 DIABETES MELLITUS WITH STAGE 3A CHRONIC KIDNEY DISEASE, WITHOUT LONG-TERM CURRENT USE OF INSULIN (HCC): Primary | Chronic | ICD-10-CM

## 2024-02-07 LAB
EST. AVERAGE GLUCOSE BLD GHB EST-MCNC: 128 MG/DL
HBA1C MFR BLD: 6.1 % (ref 4.8–5.6)

## 2024-02-08 ENCOUNTER — NURSE ONLY (OUTPATIENT)
Dept: INTERNAL MEDICINE CLINIC | Facility: CLINIC | Age: 79
End: 2024-02-08

## 2024-02-08 DIAGNOSIS — N18.31 STAGE 3A CHRONIC KIDNEY DISEASE (HCC): ICD-10-CM

## 2024-02-08 DIAGNOSIS — N18.31 TYPE 2 DIABETES MELLITUS WITH STAGE 3A CHRONIC KIDNEY DISEASE, WITHOUT LONG-TERM CURRENT USE OF INSULIN (HCC): Chronic | ICD-10-CM

## 2024-02-08 DIAGNOSIS — E11.22 TYPE 2 DIABETES MELLITUS WITH STAGE 3A CHRONIC KIDNEY DISEASE, WITHOUT LONG-TERM CURRENT USE OF INSULIN (HCC): Chronic | ICD-10-CM

## 2024-02-08 LAB
ANION GAP SERPL CALC-SCNC: 3 MMOL/L (ref 2–11)
APPEARANCE UR: CLEAR
BACTERIA URNS QL MICRO: NEGATIVE /HPF
BILIRUB UR QL: NEGATIVE
BUN SERPL-MCNC: 23 MG/DL (ref 8–23)
CALCIUM SERPL-MCNC: 10.3 MG/DL (ref 8.3–10.4)
CASTS URNS QL MICRO: ABNORMAL /LPF (ref 0–2)
CHLORIDE SERPL-SCNC: 101 MMOL/L (ref 103–113)
CO2 SERPL-SCNC: 31 MMOL/L (ref 21–32)
COLOR UR: ABNORMAL
CREAT SERPL-MCNC: 1.2 MG/DL (ref 0.6–1)
EPI CELLS #/AREA URNS HPF: ABNORMAL /HPF (ref 0–5)
GLUCOSE SERPL-MCNC: 138 MG/DL (ref 65–100)
GLUCOSE UR STRIP.AUTO-MCNC: NEGATIVE MG/DL
HGB UR QL STRIP: NEGATIVE
KETONES UR QL STRIP.AUTO: NEGATIVE MG/DL
LEUKOCYTE ESTERASE UR QL STRIP.AUTO: NEGATIVE
MUCOUS THREADS URNS QL MICRO: 0 /LPF
NITRITE UR QL STRIP.AUTO: NEGATIVE
PH UR STRIP: 5 (ref 5–9)
POTASSIUM SERPL-SCNC: 4 MMOL/L (ref 3.5–5.1)
PROT UR STRIP-MCNC: ABNORMAL MG/DL
RBC #/AREA URNS HPF: ABNORMAL /HPF (ref 0–5)
SODIUM SERPL-SCNC: 135 MMOL/L (ref 136–146)
SP GR UR REFRACTOMETRY: 1.01 (ref 1–1.02)
URINE CULTURE IF INDICATED: ABNORMAL
UROBILINOGEN UR QL STRIP.AUTO: 0.2 EU/DL (ref 0.2–1)
WBC URNS QL MICRO: ABNORMAL /HPF (ref 0–4)

## 2024-02-19 ENCOUNTER — HOSPITAL ENCOUNTER (OUTPATIENT)
Dept: INFUSION THERAPY | Age: 79
Discharge: HOME | End: 2024-02-19
Payer: MEDICARE

## 2024-02-19 ENCOUNTER — HOSPITAL ENCOUNTER (OUTPATIENT)
Dept: LAB | Age: 79
Discharge: HOME | End: 2024-02-22

## 2024-02-19 VITALS
HEART RATE: 70 BPM | RESPIRATION RATE: 16 BRPM | SYSTOLIC BLOOD PRESSURE: 145 MMHG | DIASTOLIC BLOOD PRESSURE: 69 MMHG | TEMPERATURE: 97.6 F | OXYGEN SATURATION: 96 %

## 2024-02-19 DIAGNOSIS — M85.89 OSTEOPENIA OF MULTIPLE SITES: Primary | ICD-10-CM

## 2024-02-19 PROCEDURE — 6360000002 HC RX W HCPCS: Performed by: NURSE PRACTITIONER

## 2024-02-19 PROCEDURE — 96372 THER/PROPH/DIAG INJ SC/IM: CPT

## 2024-02-19 RX ORDER — ONDANSETRON 2 MG/ML
8 INJECTION INTRAMUSCULAR; INTRAVENOUS
OUTPATIENT
Start: 2024-08-06

## 2024-02-19 RX ORDER — ALBUTEROL SULFATE 90 UG/1
4 AEROSOL, METERED RESPIRATORY (INHALATION) PRN
OUTPATIENT
Start: 2024-08-06

## 2024-02-19 RX ORDER — ACETAMINOPHEN 325 MG/1
650 TABLET ORAL
OUTPATIENT
Start: 2024-08-06

## 2024-02-19 RX ORDER — SODIUM CHLORIDE 9 MG/ML
INJECTION, SOLUTION INTRAVENOUS CONTINUOUS
OUTPATIENT
Start: 2024-08-06

## 2024-02-19 RX ORDER — DIPHENHYDRAMINE HYDROCHLORIDE 50 MG/ML
50 INJECTION INTRAMUSCULAR; INTRAVENOUS
OUTPATIENT
Start: 2024-08-06

## 2024-02-19 RX ORDER — EPINEPHRINE 1 MG/ML
0.3 INJECTION, SOLUTION, CONCENTRATE INTRAVENOUS PRN
OUTPATIENT
Start: 2024-08-06

## 2024-02-19 RX ADMIN — DENOSUMAB 60 MG: 60 INJECTION SUBCUTANEOUS at 15:19

## 2024-02-19 NOTE — PROGRESS NOTES
Arrived to the Infusion Center.  Prolia completed.   Provided education on purpose of injection    Patient instructed to report any side affects to ordering provider.  Patient tolerated without problems.   Any issues or concerns during appointment: no.  Patient aware of next infusion appointment on 8/21/24 (date) at 1600, patient V/U to arrive at 1530 for labs.  Discharged ambulatory.

## 2024-03-07 ENCOUNTER — TRANSCRIBE ORDERS (OUTPATIENT)
Dept: SCHEDULING | Age: 79
End: 2024-03-07

## 2024-03-07 DIAGNOSIS — I12.9 RENAL HYPERTENSION: ICD-10-CM

## 2024-03-07 DIAGNOSIS — N18.31 CHRONIC KIDNEY DISEASE, STAGE 3A (HCC): Primary | ICD-10-CM

## 2024-03-08 ENCOUNTER — HOSPITAL ENCOUNTER (OUTPATIENT)
Dept: ULTRASOUND IMAGING | Age: 79
End: 2024-03-08
Attending: INTERNAL MEDICINE
Payer: MEDICARE

## 2024-03-08 DIAGNOSIS — I12.9 RENAL HYPERTENSION: ICD-10-CM

## 2024-03-08 DIAGNOSIS — N18.31 CHRONIC KIDNEY DISEASE, STAGE 3A (HCC): ICD-10-CM

## 2024-03-08 PROCEDURE — 76770 US EXAM ABDO BACK WALL COMP: CPT

## 2024-03-12 DIAGNOSIS — I42.0 DILATED CARDIOMYOPATHY (HCC): Chronic | ICD-10-CM

## 2024-03-12 DIAGNOSIS — N18.30 CONTROLLED TYPE 2 DIABETES MELLITUS WITH STAGE 3 CHRONIC KIDNEY DISEASE, WITHOUT LONG-TERM CURRENT USE OF INSULIN (HCC): Chronic | ICD-10-CM

## 2024-03-12 DIAGNOSIS — R11.0 NAUSEA: ICD-10-CM

## 2024-03-12 DIAGNOSIS — E11.22 CONTROLLED TYPE 2 DIABETES MELLITUS WITH STAGE 3 CHRONIC KIDNEY DISEASE, WITHOUT LONG-TERM CURRENT USE OF INSULIN (HCC): Chronic | ICD-10-CM

## 2024-03-12 DIAGNOSIS — I10 ESSENTIAL HYPERTENSION: Chronic | ICD-10-CM

## 2024-03-12 RX ORDER — METFORMIN HYDROCHLORIDE 500 MG/1
1000 TABLET, EXTENDED RELEASE ORAL 2 TIMES DAILY
Qty: 360 TABLET | Refills: 1 | OUTPATIENT
Start: 2024-03-12

## 2024-03-12 RX ORDER — LISINOPRIL 20 MG/1
20 TABLET ORAL 2 TIMES DAILY
Qty: 180 TABLET | Refills: 3 | Status: SHIPPED | OUTPATIENT
Start: 2024-03-12

## 2024-03-12 RX ORDER — PROMETHAZINE HYDROCHLORIDE 25 MG/1
25 TABLET ORAL DAILY PRN
Qty: 30 TABLET | Refills: 1 | OUTPATIENT
Start: 2024-03-12

## 2024-03-12 RX ORDER — FUROSEMIDE 40 MG/1
40 TABLET ORAL DAILY
Qty: 90 TABLET | Refills: 3 | Status: SHIPPED | OUTPATIENT
Start: 2024-03-12

## 2024-03-12 NOTE — TELEPHONE ENCOUNTER
Promethazine was last sent in 1/16/2024 for 30 days and 1 refill       Metformin was last sent in 1/16/2024 for 90 days and 1 refill

## 2024-03-13 DIAGNOSIS — G89.29 OTHER CHRONIC PAIN: ICD-10-CM

## 2024-03-13 RX ORDER — TRAMADOL HYDROCHLORIDE 50 MG/1
50 TABLET ORAL DAILY PRN
Qty: 20 TABLET | Refills: 0 | Status: SHIPPED | OUTPATIENT
Start: 2024-03-13 | End: 2024-04-12

## 2024-03-13 NOTE — TELEPHONE ENCOUNTER
Pt is requesting a refill on Tramadol. Last sent 2/6/24 for #20, with an intended d/s of 3 days.    Does she need an appt or can this be refilled?

## 2024-04-15 DIAGNOSIS — M1A.0410 CHRONIC GOUT OF RIGHT HAND, UNSPECIFIED CAUSE: ICD-10-CM

## 2024-04-15 DIAGNOSIS — G62.0 CHEMOTHERAPY-INDUCED NEUROPATHY (HCC): Chronic | ICD-10-CM

## 2024-04-15 DIAGNOSIS — E79.0 ELEVATED URIC ACID IN BLOOD: ICD-10-CM

## 2024-04-15 DIAGNOSIS — T45.1X5A CHEMOTHERAPY-INDUCED NEUROPATHY (HCC): Chronic | ICD-10-CM

## 2024-04-15 DIAGNOSIS — E11.22 CONTROLLED TYPE 2 DIABETES MELLITUS WITH STAGE 3 CHRONIC KIDNEY DISEASE, WITHOUT LONG-TERM CURRENT USE OF INSULIN (HCC): Chronic | ICD-10-CM

## 2024-04-15 DIAGNOSIS — N18.30 CONTROLLED TYPE 2 DIABETES MELLITUS WITH STAGE 3 CHRONIC KIDNEY DISEASE, WITHOUT LONG-TERM CURRENT USE OF INSULIN (HCC): Chronic | ICD-10-CM

## 2024-04-15 RX ORDER — METOPROLOL SUCCINATE 50 MG/1
50 TABLET, EXTENDED RELEASE ORAL 2 TIMES DAILY
Qty: 180 TABLET | Refills: 3 | OUTPATIENT
Start: 2024-04-15

## 2024-04-15 RX ORDER — METFORMIN HYDROCHLORIDE 500 MG/1
1000 TABLET, EXTENDED RELEASE ORAL 2 TIMES DAILY
Qty: 360 TABLET | Refills: 1 | Status: SHIPPED | OUTPATIENT
Start: 2024-04-15

## 2024-04-15 RX ORDER — GABAPENTIN 600 MG/1
600 TABLET ORAL 2 TIMES DAILY
Qty: 180 TABLET | Refills: 1 | Status: SHIPPED | OUTPATIENT
Start: 2024-04-15 | End: 2025-04-10

## 2024-04-15 RX ORDER — ALLOPURINOL 100 MG/1
200 TABLET ORAL DAILY
Qty: 180 TABLET | Refills: 1 | Status: SHIPPED | OUTPATIENT
Start: 2024-04-15

## 2024-04-15 NOTE — TELEPHONE ENCOUNTER
Allopurinol was last sent in 9/28/2023 for 180 and 1 refill    Gabapentin was last sent in 1/16/2024 for 180 and 1 refill     Metformin was last sent in 1/16/2024 for 360 and 1 refill     Next appointment: 6/10/2024

## 2024-04-23 ENCOUNTER — OFFICE VISIT (OUTPATIENT)
Age: 79
End: 2024-04-23
Payer: MEDICARE

## 2024-04-23 VITALS
DIASTOLIC BLOOD PRESSURE: 70 MMHG | SYSTOLIC BLOOD PRESSURE: 130 MMHG | WEIGHT: 157.4 LBS | HEIGHT: 66 IN | BODY MASS INDEX: 25.3 KG/M2 | HEART RATE: 62 BPM

## 2024-04-23 DIAGNOSIS — I50.42 CHRONIC COMBINED SYSTOLIC AND DIASTOLIC CHF (CONGESTIVE HEART FAILURE) (HCC): Primary | ICD-10-CM

## 2024-04-23 DIAGNOSIS — I42.0 DILATED CARDIOMYOPATHY (HCC): ICD-10-CM

## 2024-04-23 DIAGNOSIS — I10 ESSENTIAL HYPERTENSION: Chronic | ICD-10-CM

## 2024-04-23 DIAGNOSIS — N18.31 STAGE 3A CHRONIC KIDNEY DISEASE (HCC): ICD-10-CM

## 2024-04-23 PROCEDURE — 99214 OFFICE O/P EST MOD 30 MIN: CPT | Performed by: INTERNAL MEDICINE

## 2024-04-23 PROCEDURE — G8399 PT W/DXA RESULTS DOCUMENT: HCPCS | Performed by: INTERNAL MEDICINE

## 2024-04-23 PROCEDURE — 1123F ACP DISCUSS/DSCN MKR DOCD: CPT | Performed by: INTERNAL MEDICINE

## 2024-04-23 PROCEDURE — 1036F TOBACCO NON-USER: CPT | Performed by: INTERNAL MEDICINE

## 2024-04-23 PROCEDURE — G8428 CUR MEDS NOT DOCUMENT: HCPCS | Performed by: INTERNAL MEDICINE

## 2024-04-23 PROCEDURE — 3078F DIAST BP <80 MM HG: CPT | Performed by: INTERNAL MEDICINE

## 2024-04-23 PROCEDURE — 93000 ELECTROCARDIOGRAM COMPLETE: CPT | Performed by: INTERNAL MEDICINE

## 2024-04-23 PROCEDURE — 3075F SYST BP GE 130 - 139MM HG: CPT | Performed by: INTERNAL MEDICINE

## 2024-04-23 PROCEDURE — G8417 CALC BMI ABV UP PARAM F/U: HCPCS | Performed by: INTERNAL MEDICINE

## 2024-04-23 PROCEDURE — 1090F PRES/ABSN URINE INCON ASSESS: CPT | Performed by: INTERNAL MEDICINE

## 2024-04-23 RX ORDER — TRAMADOL HYDROCHLORIDE 50 MG/1
50 TABLET ORAL PRN
COMMUNITY

## 2024-04-23 NOTE — PROGRESS NOTES
motor deficits  Psychiatric:   normal mood and affect      EKG Today and independently reviewed by me: sinus rhythm, normal intervals and non-specific ST/T wave changes.        Lab Results   Component Value Date/Time     02/08/2024 10:01 AM    K 4.0 02/08/2024 10:01 AM     02/08/2024 10:01 AM    CO2 31 02/08/2024 10:01 AM    BUN 23 02/08/2024 10:01 AM    CREATININE 1.20 02/08/2024 10:01 AM    GLUCOSE 138 02/08/2024 10:01 AM    CALCIUM 10.3 02/08/2024 10:01 AM        Lab Results   Component Value Date    WBC 6.8 02/06/2024    HGB 11.8 02/06/2024    HCT 37.1 02/06/2024    MCV 99.5 02/06/2024     02/06/2024       Lab Results   Component Value Date    TSH 1.690 06/08/2021       Lab Results   Component Value Date    LABA1C 6.1 (H) 02/06/2024     Lab Results   Component Value Date     02/06/2024       Lab Results   Component Value Date    CHOL 266 (H) 09/27/2023    CHOL 257 (H) 03/27/2023    CHOL 226 (H) 06/06/2022     Lab Results   Component Value Date    TRIG 386 (H) 09/27/2023    TRIG 272 (H) 03/27/2023    TRIG 450 (H) 06/06/2022     Lab Results   Component Value Date    HDL 57 09/27/2023    HDL 57 03/27/2023    HDL 52 06/06/2022     Lab Results   Component Value Date    LDLCALC 131.8 (H) 09/27/2023    LDLCALC 145.6 (H) 03/27/2023    LDLCALC Not calculated due to elevated triglyceride level 06/06/2022     Lab Results   Component Value Date    VLDL 48 (H) 06/08/2021    VLDL 43 (H) 10/13/2020     Lab Results   Component Value Date    CHOLHDLRATIO 4.7 09/27/2023    CHOLHDLRATIO 4.5 03/27/2023    CHOLHDLRATIO 4.3 06/06/2022             I have Independently reviewed prior care notes, any ER records available, cardiac testing, labs and results with the patient and before seeing the patient today.  Also independently reviewed outside records when available.       ASSESSMENT:    Mirtha was seen today for cardiomyopathy.    Diagnoses and all orders for this visit:    Chronic combined systolic and

## 2024-05-06 ENCOUNTER — TELEPHONE (OUTPATIENT)
Dept: UROLOGY | Age: 79
End: 2024-05-06

## 2024-05-16 ENCOUNTER — OFFICE VISIT (OUTPATIENT)
Dept: UROLOGY | Age: 79
End: 2024-05-16
Payer: MEDICARE

## 2024-05-16 DIAGNOSIS — N39.0 RECURRENT UTI: Primary | ICD-10-CM

## 2024-05-16 DIAGNOSIS — R11.0 NAUSEA: ICD-10-CM

## 2024-05-16 PROCEDURE — 99213 OFFICE O/P EST LOW 20 MIN: CPT | Performed by: NURSE PRACTITIONER

## 2024-05-16 PROCEDURE — 1090F PRES/ABSN URINE INCON ASSESS: CPT | Performed by: NURSE PRACTITIONER

## 2024-05-16 PROCEDURE — G8417 CALC BMI ABV UP PARAM F/U: HCPCS | Performed by: NURSE PRACTITIONER

## 2024-05-16 PROCEDURE — G8399 PT W/DXA RESULTS DOCUMENT: HCPCS | Performed by: NURSE PRACTITIONER

## 2024-05-16 PROCEDURE — 1036F TOBACCO NON-USER: CPT | Performed by: NURSE PRACTITIONER

## 2024-05-16 PROCEDURE — G8427 DOCREV CUR MEDS BY ELIG CLIN: HCPCS | Performed by: NURSE PRACTITIONER

## 2024-05-16 PROCEDURE — 1123F ACP DISCUSS/DSCN MKR DOCD: CPT | Performed by: NURSE PRACTITIONER

## 2024-05-16 RX ORDER — PROMETHAZINE HYDROCHLORIDE 25 MG/1
25 TABLET ORAL DAILY PRN
Qty: 15 TABLET | Refills: 0 | Status: SHIPPED | OUTPATIENT
Start: 2024-05-16

## 2024-05-16 RX ORDER — GINSENG 100 MG
1 CAPSULE ORAL DAILY
Qty: 90 CAPSULE | Refills: 3 | Status: SHIPPED | OUTPATIENT
Start: 2024-05-16

## 2024-05-16 ASSESSMENT — ENCOUNTER SYMPTOMS
NAUSEA: 0
BACK PAIN: 0

## 2024-05-16 NOTE — PROGRESS NOTES
or isolation: Never   Intimate Partner Violence: Not on file   Housing Stability: Not on file     Family History   Problem Relation Age of Onset    Cancer Mother     Diabetes Mother     Heart Disease Mother         MI at age 78    Heart Failure Mother         age 62    Colon Cancer Mother     High Blood Pressure Mother     Stroke Sister     Cancer Brother     Heart Disease Brother     Heart Disease Father          at 46    Alcohol Abuse Father     Vision Loss Sister     Breast Cancer Neg Hx     Stomach Cancer Neg Hx        Review of Systems  Constitutional:   Negative for fever.  GI:  Negative for nausea.  Musculoskeletal:  Negative for back pain.      Urinalysis  UA - Dipstick  Results for orders placed or performed in visit on 23   AMB POC URINALYSIS DIP STICK AUTO W/O MICRO   Result Value Ref Range    Color (UA POC)      Clarity (UA POC)      Glucose, Urine, POC Negative Negative    Bilirubin, Urine, POC Negative Negative    KETONES, Urine, POC Negative Negative    Specific Gravity, Urine, POC 1.010 1.001 - 1.035    Blood (UA POC) Negative Negative    pH, Urine, POC 5.5 4.6 - 8.0    Protein, Urine, POC Negative Negative    Urobilinogen, POC 0.2 mg/dL     Nitrite, Urine, POC Negative Negative    Leukocyte Esterase, Urine, POC Negative Negative   Results for orders placed or performed in visit on 23   AMB POC URINALYSIS DIP STICK AUTO W/O MICRO   Result Value Ref Range    Color, Urine, POC Light Yellow     Clarity, Urine, POC Cloudy     Glucose, Urine, POC Negative Negative    Bilirubin, Urine, POC Negative Negative    Ketones, Urine, POC Negative Negative    Specific Gravity, Urine, POC 1.015 1.001 - 1.035    Blood, Urine, POC Moderate Negative    pH, Urine, POC 7.0 4.6 - 8.0    Protein, Urine,  Negative    Urobilinogen, POC Normal     Nitrite, Urine, POC Negative Negative    Leukocyte Esterase, Urine, POC Large Negative       UA - Micro  WBC - 0  RBC - 0  Bacteria - 0  Epith -

## 2024-05-16 NOTE — TELEPHONE ENCOUNTER
Promethazine was last sent in 1/16/2024 for 30 days and 1 refill     Next appointment is 6/10/2024    Sending to covering provider, , since Marva Xavier is out of the office

## 2024-05-16 NOTE — PROGRESS NOTES
Alcohol use: No    Drug use: No    Sexual activity: Not Currently     Partners: Male   Other Topics Concern    Not on file   Social History Narrative    Not on file     Social Determinants of Health     Financial Resource Strain: Low Risk  (2022)    Overall Financial Resource Strain (CARDIA)     Difficulty of Paying Living Expenses: Not hard at all   Food Insecurity: Not on file (3/1/2023)   Transportation Needs: No Transportation Needs (3/29/2023)    OASIS : Transportation     Lack of Transportation (Medical): No     Lack of Transportation (Non-Medical): No     Patient Unable or Declines to Respond: No   Physical Activity: Insufficiently Active (3/27/2023)    Exercise Vital Sign     Days of Exercise per Week: 3 days     Minutes of Exercise per Session: 30 min   Stress: Not on file   Social Connections: Feeling Socially Integrated (3/29/2023)    OASIS : Social Isolation     Frequency of experiencing loneliness or isolation: Never   Intimate Partner Violence: Not on file   Housing Stability: Not on file     Family History   Problem Relation Age of Onset    Cancer Mother     Diabetes Mother     Heart Disease Mother         MI at age 78    Heart Failure Mother         age 62    Colon Cancer Mother     High Blood Pressure Mother     Stroke Sister     Cancer Brother     Heart Disease Brother     Heart Disease Father          at 46    Alcohol Abuse Father     Vision Loss Sister     Breast Cancer Neg Hx     Stomach Cancer Neg Hx        Review of Systems  Constitutional:   Negative for fever.  Genitourinary:  Negative for hematuria.  Musculoskeletal:  Negative for back pain.      Urinalysis  UA - Dipstick  Results for orders placed or performed in visit on 23   AMB POC URINALYSIS DIP STICK AUTO W/O MICRO   Result Value Ref Range    Color (UA POC)      Clarity (UA POC)      Glucose, Urine, POC Negative Negative    Bilirubin, Urine, POC Negative Negative    KETONES, Urine, POC Negative Negative

## 2024-05-28 ENCOUNTER — OFFICE VISIT (OUTPATIENT)
Dept: ORTHOPEDIC SURGERY | Age: 79
End: 2024-05-28
Payer: MEDICARE

## 2024-05-28 DIAGNOSIS — M70.62 GREATER TROCHANTERIC BURSITIS OF LEFT HIP: ICD-10-CM

## 2024-05-28 DIAGNOSIS — M25.552 LEFT HIP PAIN: ICD-10-CM

## 2024-05-28 DIAGNOSIS — S72.002D CLOSED HIP FRACTURE REQUIRING OPERATIVE REPAIR WITH ROUTINE HEALING, LEFT: Primary | ICD-10-CM

## 2024-05-28 PROCEDURE — 99213 OFFICE O/P EST LOW 20 MIN: CPT | Performed by: ORTHOPAEDIC SURGERY

## 2024-05-28 PROCEDURE — 20610 DRAIN/INJ JOINT/BURSA W/O US: CPT | Performed by: ORTHOPAEDIC SURGERY

## 2024-05-28 PROCEDURE — 1036F TOBACCO NON-USER: CPT | Performed by: ORTHOPAEDIC SURGERY

## 2024-05-28 PROCEDURE — G8399 PT W/DXA RESULTS DOCUMENT: HCPCS | Performed by: ORTHOPAEDIC SURGERY

## 2024-05-28 PROCEDURE — 1123F ACP DISCUSS/DSCN MKR DOCD: CPT | Performed by: ORTHOPAEDIC SURGERY

## 2024-05-28 PROCEDURE — G8428 CUR MEDS NOT DOCUMENT: HCPCS | Performed by: ORTHOPAEDIC SURGERY

## 2024-05-28 PROCEDURE — G8417 CALC BMI ABV UP PARAM F/U: HCPCS | Performed by: ORTHOPAEDIC SURGERY

## 2024-05-28 PROCEDURE — 1090F PRES/ABSN URINE INCON ASSESS: CPT | Performed by: ORTHOPAEDIC SURGERY

## 2024-05-28 RX ORDER — METHYLPREDNISOLONE ACETATE 80 MG/ML
80 INJECTION, SUSPENSION INTRA-ARTICULAR; INTRALESIONAL; INTRAMUSCULAR; SOFT TISSUE ONCE
Status: COMPLETED | OUTPATIENT
Start: 2024-05-28 | End: 2024-05-28

## 2024-05-28 RX ADMIN — METHYLPREDNISOLONE ACETATE 80 MG: 80 INJECTION, SUSPENSION INTRA-ARTICULAR; INTRALESIONAL; INTRAMUSCULAR; SOFT TISSUE at 11:29

## 2024-05-28 NOTE — PROGRESS NOTES
Progress Note    Patient: Mirtha Posada MRN: [id number]  SSN: [id number]    YOB: 1945  Age: 79 y.o.  Sex: female        5/28/2024      Subjective:     Patient is here today complaining of increasing pain in her left hip.  She describes this is more over her lateral aspect of her hip and posterior lateral sort of around her right side that radiates down her leg.  She says she does not really have much groin pain.  She is doing well for quite some time and this is sort of gotten to the point where it is really been giving her a lot of trouble over the last several weeks.  No history of any trauma or falls    Objective:     There were no vitals filed for this visit.       Physical Exam:     Skin - incision is well healed with no redness or drainage  Motor and sensory function intact in LEFT LOWER extremity  Pulses palpable in LEFT LOWER extremity   She does have significant tenderness to palpation over her left greater trochanter  XRAY FINDINGS:  [id number] left femoral neck fracture, findings-true AP and lateral views of the left hip shows the left femoral neck fracture appears to have healed healed in reasonable position.  Her femoral neck system is in place with no evidence of loosening or failure.  I have spent some time looking at her femoral head and it looks like it is well-preserved there is no evidence of any obvious collapse on the lateral or the AP view in her femoral head itself.  Impression # healed left femoral neck fracture    Assessment:     #1 healed left femoral neck fracture #2 left hip trochanteric bursitis    Plan:     It does seem that her symptoms are much more consistent with trochanteric bursitis.  She says she does not really have a lot of groin pain so I told her I think that that is encouraging.  I do think this is something that hopefully we can treat with some more simple things such as first while trying an injection and I talked to her about what an

## 2024-06-08 SDOH — ECONOMIC STABILITY: INCOME INSECURITY: HOW HARD IS IT FOR YOU TO PAY FOR THE VERY BASICS LIKE FOOD, HOUSING, MEDICAL CARE, AND HEATING?: NOT HARD AT ALL

## 2024-06-08 SDOH — ECONOMIC STABILITY: FOOD INSECURITY: WITHIN THE PAST 12 MONTHS, YOU WORRIED THAT YOUR FOOD WOULD RUN OUT BEFORE YOU GOT MONEY TO BUY MORE.: NEVER TRUE

## 2024-06-08 SDOH — HEALTH STABILITY: PHYSICAL HEALTH: ON AVERAGE, HOW MANY MINUTES DO YOU ENGAGE IN EXERCISE AT THIS LEVEL?: 10 MIN

## 2024-06-08 SDOH — ECONOMIC STABILITY: FOOD INSECURITY: WITHIN THE PAST 12 MONTHS, THE FOOD YOU BOUGHT JUST DIDN'T LAST AND YOU DIDN'T HAVE MONEY TO GET MORE.: NEVER TRUE

## 2024-06-08 SDOH — HEALTH STABILITY: PHYSICAL HEALTH: ON AVERAGE, HOW MANY DAYS PER WEEK DO YOU ENGAGE IN MODERATE TO STRENUOUS EXERCISE (LIKE A BRISK WALK)?: 0 DAYS

## 2024-06-08 ASSESSMENT — PATIENT HEALTH QUESTIONNAIRE - PHQ9
2. FEELING DOWN, DEPRESSED OR HOPELESS: NOT AT ALL
SUM OF ALL RESPONSES TO PHQ QUESTIONS 1-9: 0
SUM OF ALL RESPONSES TO PHQ9 QUESTIONS 1 & 2: 0
SUM OF ALL RESPONSES TO PHQ QUESTIONS 1-9: 0
1. LITTLE INTEREST OR PLEASURE IN DOING THINGS: NOT AT ALL
SUM OF ALL RESPONSES TO PHQ QUESTIONS 1-9: 0
SUM OF ALL RESPONSES TO PHQ QUESTIONS 1-9: 0

## 2024-06-08 ASSESSMENT — LIFESTYLE VARIABLES
HOW OFTEN DO YOU HAVE A DRINK CONTAINING ALCOHOL: 1
HOW OFTEN DO YOU HAVE A DRINK CONTAINING ALCOHOL: NEVER
HOW MANY STANDARD DRINKS CONTAINING ALCOHOL DO YOU HAVE ON A TYPICAL DAY: PATIENT DOES NOT DRINK
HOW MANY STANDARD DRINKS CONTAINING ALCOHOL DO YOU HAVE ON A TYPICAL DAY: 0
HOW OFTEN DO YOU HAVE SIX OR MORE DRINKS ON ONE OCCASION: 1

## 2024-06-10 ENCOUNTER — OFFICE VISIT (OUTPATIENT)
Dept: INTERNAL MEDICINE CLINIC | Facility: CLINIC | Age: 79
End: 2024-06-10
Payer: MEDICARE

## 2024-06-10 VITALS
BODY MASS INDEX: 25.83 KG/M2 | DIASTOLIC BLOOD PRESSURE: 65 MMHG | HEIGHT: 65 IN | HEART RATE: 56 BPM | OXYGEN SATURATION: 100 % | SYSTOLIC BLOOD PRESSURE: 139 MMHG | WEIGHT: 155 LBS | TEMPERATURE: 97.6 F

## 2024-06-10 DIAGNOSIS — Z00.00 MEDICARE ANNUAL WELLNESS VISIT, SUBSEQUENT: Primary | ICD-10-CM

## 2024-06-10 DIAGNOSIS — N64.4 BREAST PAIN, LEFT: ICD-10-CM

## 2024-06-10 DIAGNOSIS — E11.22 CONTROLLED TYPE 2 DIABETES MELLITUS WITH STAGE 3 CHRONIC KIDNEY DISEASE, WITHOUT LONG-TERM CURRENT USE OF INSULIN (HCC): Chronic | ICD-10-CM

## 2024-06-10 DIAGNOSIS — E11.22 TYPE 2 DIABETES MELLITUS WITH STAGE 3A CHRONIC KIDNEY DISEASE, WITHOUT LONG-TERM CURRENT USE OF INSULIN (HCC): Chronic | ICD-10-CM

## 2024-06-10 DIAGNOSIS — E79.0 ELEVATED URIC ACID IN BLOOD: ICD-10-CM

## 2024-06-10 DIAGNOSIS — M35.00 SJOGREN SYNDROME, UNSPECIFIED (HCC): ICD-10-CM

## 2024-06-10 DIAGNOSIS — M19.042 PRIMARY OSTEOARTHRITIS OF BOTH HANDS: ICD-10-CM

## 2024-06-10 DIAGNOSIS — I42.0 DILATED CARDIOMYOPATHY (HCC): ICD-10-CM

## 2024-06-10 DIAGNOSIS — N18.30 CONTROLLED TYPE 2 DIABETES MELLITUS WITH STAGE 3 CHRONIC KIDNEY DISEASE, WITHOUT LONG-TERM CURRENT USE OF INSULIN (HCC): Chronic | ICD-10-CM

## 2024-06-10 DIAGNOSIS — I10 ESSENTIAL HYPERTENSION: Chronic | ICD-10-CM

## 2024-06-10 DIAGNOSIS — E53.8 VITAMIN B12 DEFICIENCY: ICD-10-CM

## 2024-06-10 DIAGNOSIS — G62.0 CHEMOTHERAPY-INDUCED NEUROPATHY (HCC): Chronic | ICD-10-CM

## 2024-06-10 DIAGNOSIS — M1A.0410 CHRONIC GOUT OF RIGHT HAND, UNSPECIFIED CAUSE: ICD-10-CM

## 2024-06-10 DIAGNOSIS — T45.1X5A CHEMOTHERAPY-INDUCED NEUROPATHY (HCC): Chronic | ICD-10-CM

## 2024-06-10 DIAGNOSIS — E55.9 VITAMIN D DEFICIENCY: ICD-10-CM

## 2024-06-10 DIAGNOSIS — N63.20 MASS OF LEFT BREAST, UNSPECIFIED QUADRANT: ICD-10-CM

## 2024-06-10 DIAGNOSIS — F51.01 PRIMARY INSOMNIA: ICD-10-CM

## 2024-06-10 DIAGNOSIS — N18.31 STAGE 3A CHRONIC KIDNEY DISEASE (HCC): ICD-10-CM

## 2024-06-10 DIAGNOSIS — E78.5 DYSLIPIDEMIA: Chronic | ICD-10-CM

## 2024-06-10 DIAGNOSIS — G89.4 CHRONIC PAIN SYNDROME: ICD-10-CM

## 2024-06-10 DIAGNOSIS — Z85.3 HISTORY OF BREAST CANCER: ICD-10-CM

## 2024-06-10 DIAGNOSIS — I50.42 CHRONIC COMBINED SYSTOLIC AND DIASTOLIC CHF (CONGESTIVE HEART FAILURE) (HCC): Chronic | ICD-10-CM

## 2024-06-10 DIAGNOSIS — N18.31 TYPE 2 DIABETES MELLITUS WITH STAGE 3A CHRONIC KIDNEY DISEASE, WITHOUT LONG-TERM CURRENT USE OF INSULIN (HCC): Chronic | ICD-10-CM

## 2024-06-10 DIAGNOSIS — Z94.84 STEM CELLS TRANSPLANT STATUS (HCC): ICD-10-CM

## 2024-06-10 DIAGNOSIS — M19.041 PRIMARY OSTEOARTHRITIS OF BOTH HANDS: ICD-10-CM

## 2024-06-10 DIAGNOSIS — Z00.00 MEDICARE ANNUAL WELLNESS VISIT, SUBSEQUENT: ICD-10-CM

## 2024-06-10 DIAGNOSIS — R11.0 NAUSEA: ICD-10-CM

## 2024-06-10 LAB
25(OH)D3 SERPL-MCNC: 54.9 NG/ML (ref 30–100)
ANION GAP SERPL CALC-SCNC: 13 MMOL/L (ref 9–18)
APPEARANCE UR: CLEAR
BACTERIA URNS QL MICRO: NEGATIVE /HPF
BILIRUB UR QL: NEGATIVE
BUN SERPL-MCNC: 20 MG/DL (ref 8–23)
CALCIUM SERPL-MCNC: 9.3 MG/DL (ref 8.8–10.2)
CASTS URNS QL MICRO: ABNORMAL /LPF (ref 0–2)
CHLORIDE SERPL-SCNC: 99 MMOL/L (ref 98–107)
CHOLEST SERPL-MCNC: 223 MG/DL (ref 0–200)
CO2 SERPL-SCNC: 27 MMOL/L (ref 20–28)
COLOR UR: ABNORMAL
CREAT SERPL-MCNC: 1.18 MG/DL (ref 0.6–1.1)
EPI CELLS #/AREA URNS HPF: ABNORMAL /HPF (ref 0–5)
EST. AVERAGE GLUCOSE BLD GHB EST-MCNC: 133 MG/DL
GLUCOSE SERPL-MCNC: 101 MG/DL (ref 70–99)
GLUCOSE UR STRIP.AUTO-MCNC: NEGATIVE MG/DL
HBA1C MFR BLD: 6.3 % (ref 0–5.6)
HDLC SERPL-MCNC: 54 MG/DL (ref 40–60)
HDLC SERPL: 4.1 (ref 0–5)
HGB UR QL STRIP: NEGATIVE
KETONES UR QL STRIP.AUTO: NEGATIVE MG/DL
LDLC SERPL CALC-MCNC: 112 MG/DL (ref 0–100)
LEUKOCYTE ESTERASE UR QL STRIP.AUTO: NEGATIVE
MUCOUS THREADS URNS QL MICRO: 0 /LPF
NITRITE UR QL STRIP.AUTO: NEGATIVE
PH UR STRIP: 5 (ref 5–9)
POTASSIUM SERPL-SCNC: 3.4 MMOL/L (ref 3.5–5.1)
PROT UR STRIP-MCNC: NEGATIVE MG/DL
RBC #/AREA URNS HPF: ABNORMAL /HPF (ref 0–5)
SODIUM SERPL-SCNC: 140 MMOL/L (ref 136–145)
SP GR UR REFRACTOMETRY: 1.01 (ref 1–1.02)
TRIGL SERPL-MCNC: 283 MG/DL (ref 0–150)
TSH W FREE THYROID IF ABNORMAL: 3.38 UIU/ML (ref 0.27–4.2)
URINE CULTURE IF INDICATED: ABNORMAL
UROBILINOGEN UR QL STRIP.AUTO: 0.2 EU/DL (ref 0.2–1)
VIT B12 SERPL-MCNC: 1461 PG/ML (ref 193–986)
VLDLC SERPL CALC-MCNC: 57 MG/DL (ref 6–23)
WBC URNS QL MICRO: ABNORMAL /HPF (ref 0–4)

## 2024-06-10 PROCEDURE — G0439 PPPS, SUBSEQ VISIT: HCPCS | Performed by: NURSE PRACTITIONER

## 2024-06-10 PROCEDURE — 1036F TOBACCO NON-USER: CPT | Performed by: NURSE PRACTITIONER

## 2024-06-10 PROCEDURE — G8399 PT W/DXA RESULTS DOCUMENT: HCPCS | Performed by: NURSE PRACTITIONER

## 2024-06-10 PROCEDURE — 3044F HG A1C LEVEL LT 7.0%: CPT | Performed by: NURSE PRACTITIONER

## 2024-06-10 PROCEDURE — G8427 DOCREV CUR MEDS BY ELIG CLIN: HCPCS | Performed by: NURSE PRACTITIONER

## 2024-06-10 PROCEDURE — G8417 CALC BMI ABV UP PARAM F/U: HCPCS | Performed by: NURSE PRACTITIONER

## 2024-06-10 PROCEDURE — 99214 OFFICE O/P EST MOD 30 MIN: CPT | Performed by: NURSE PRACTITIONER

## 2024-06-10 PROCEDURE — 1090F PRES/ABSN URINE INCON ASSESS: CPT | Performed by: NURSE PRACTITIONER

## 2024-06-10 PROCEDURE — 3078F DIAST BP <80 MM HG: CPT | Performed by: NURSE PRACTITIONER

## 2024-06-10 PROCEDURE — 1123F ACP DISCUSS/DSCN MKR DOCD: CPT | Performed by: NURSE PRACTITIONER

## 2024-06-10 PROCEDURE — 3075F SYST BP GE 130 - 139MM HG: CPT | Performed by: NURSE PRACTITIONER

## 2024-06-10 RX ORDER — AMLODIPINE BESYLATE 5 MG/1
TABLET ORAL
COMMUNITY
Start: 2024-05-28

## 2024-06-10 RX ORDER — ALLOPURINOL 100 MG/1
200 TABLET ORAL DAILY
Qty: 180 TABLET | Refills: 1 | Status: SHIPPED | OUTPATIENT
Start: 2024-06-10

## 2024-06-10 RX ORDER — PROMETHAZINE HYDROCHLORIDE 25 MG/1
25 TABLET ORAL DAILY PRN
Qty: 15 TABLET | Refills: 1 | Status: SHIPPED | OUTPATIENT
Start: 2024-06-10

## 2024-06-10 RX ORDER — DOXEPIN HYDROCHLORIDE 10 MG/1
10 CAPSULE ORAL NIGHTLY
Qty: 30 CAPSULE | Refills: 1 | Status: SHIPPED | OUTPATIENT
Start: 2024-06-10

## 2024-06-10 RX ORDER — METFORMIN HYDROCHLORIDE 500 MG/1
1000 TABLET, EXTENDED RELEASE ORAL 2 TIMES DAILY
Qty: 360 TABLET | Refills: 1 | Status: SHIPPED | OUTPATIENT
Start: 2024-06-10

## 2024-06-10 RX ORDER — GABAPENTIN 600 MG/1
600 TABLET ORAL 2 TIMES DAILY
Qty: 180 TABLET | Refills: 1 | Status: SHIPPED | OUTPATIENT
Start: 2024-06-10 | End: 2025-06-05

## 2024-06-10 RX ORDER — TRAMADOL HYDROCHLORIDE 50 MG/1
50 TABLET ORAL 2 TIMES DAILY PRN
Qty: 30 TABLET | Refills: 0 | Status: SHIPPED | OUTPATIENT
Start: 2024-06-10 | End: 2024-06-25

## 2024-06-10 ASSESSMENT — ENCOUNTER SYMPTOMS
NAUSEA: 0
DIARRHEA: 0
WHEEZING: 0
VOMITING: 0
SHORTNESS OF BREATH: 0
COUGH: 0
ABDOMINAL PAIN: 0

## 2024-06-10 NOTE — PROGRESS NOTES
[facility]  Office Visit Note    Subjective:  Chief Complaint   Patient presents with    Medicare AWV       Patient ID: Mirtha Posada is a 79 y.o. female presenting to the office for the above.    78 year old female for follow up.  Unaccompanied in office.    ( is Mychal Posada, retired  and ).  They have one son, two grandchildren, and two great-grandchildren.  Have a home at [facility].  Has a Pug dog and a cat.      Left hip fracture--  She was hospitalized at [facility] 2/10/23-2/21/23 with left hip fracture.  Was also treated for influenza A during hospitalization. Completed rehab after discharge, is now home with home health and PT.    She did not tolerate bisphosphonates; is on Prolia (first injection August 2022).   3/27/23: She has orthopedics follow up tomorrow.   CT head done at hospital showed \"An usually large and coarse, 1.1 x 0.8 cm calcification at or immediately adjacent to the choroid plexus of the right atria. Although it is possible this is a simple choroid plexus calcification, the size of the structure at least raises the possibility of other pathology such as a meningioma. An MRI brain with and without contrast is suggested for further evaluation.\"  MRI was ordered at hospital; advised them to notify office if they do not receive a phone call to schedule.   3/27/23: States today that she has not heard to schedule the MRI; provided her with the contact info to call to schedule.   6/28/23: MRI completed April 2023 was unremarkable.   2/6/24: She is having significant pain in her back and left leg. Advise to follow up with Dr. Chen her orthopedist; she is agreeable. Will provide refill of tramadol for prn use.   6/10/24: Will provide refill of tramadol for prn use.  Scripts checked and appropriate. She requests referral to pain management; has chronic joint pains; advised patient to contact office if they do not hear from the referral in the

## 2024-06-14 DIAGNOSIS — M25.552 CHRONIC HIP PAIN, BILATERAL: ICD-10-CM

## 2024-06-14 DIAGNOSIS — G89.29 CHRONIC HIP PAIN, BILATERAL: ICD-10-CM

## 2024-06-14 DIAGNOSIS — G89.29 CHRONIC BILATERAL LOW BACK PAIN WITHOUT SCIATICA: ICD-10-CM

## 2024-06-14 DIAGNOSIS — M25.551 CHRONIC HIP PAIN, BILATERAL: ICD-10-CM

## 2024-06-14 DIAGNOSIS — M54.50 CHRONIC BILATERAL LOW BACK PAIN WITHOUT SCIATICA: ICD-10-CM

## 2024-06-14 DIAGNOSIS — M35.00 SJOGREN SYNDROME, UNSPECIFIED (HCC): Primary | ICD-10-CM

## 2024-07-10 ENCOUNTER — HOSPITAL ENCOUNTER (OUTPATIENT)
Dept: MAMMOGRAPHY | Age: 79
Discharge: HOME | End: 2024-07-13
Payer: MEDICARE

## 2024-07-10 DIAGNOSIS — N64.4 BREAST PAIN, LEFT: ICD-10-CM

## 2024-07-10 DIAGNOSIS — Z85.3 HISTORY OF BREAST CANCER: ICD-10-CM

## 2024-07-10 DIAGNOSIS — N63.20 MASS OF LEFT BREAST, UNSPECIFIED QUADRANT: ICD-10-CM

## 2024-07-10 PROCEDURE — G0279 TOMOSYNTHESIS, MAMMO: HCPCS

## 2024-07-10 PROCEDURE — 76642 ULTRASOUND BREAST LIMITED: CPT

## 2024-07-19 ENCOUNTER — HOSPITAL ENCOUNTER (OUTPATIENT)
Dept: MAMMOGRAPHY | Age: 79
End: 2024-07-19
Payer: MEDICARE

## 2024-07-19 DIAGNOSIS — R92.8 ABNORMAL MAMMOGRAM: ICD-10-CM

## 2024-07-19 PROCEDURE — A4217 STERILE WATER/SALINE, 500 ML: HCPCS | Performed by: NURSE PRACTITIONER

## 2024-07-19 PROCEDURE — 88305 TISSUE EXAM BY PATHOLOGIST: CPT

## 2024-07-19 PROCEDURE — 2580000003 HC RX 258: Performed by: NURSE PRACTITIONER

## 2024-07-19 PROCEDURE — 2500000003 HC RX 250 WO HCPCS: Performed by: NURSE PRACTITIONER

## 2024-07-19 PROCEDURE — 19081 BX BREAST 1ST LESION STRTCTC: CPT

## 2024-07-19 PROCEDURE — 77065 DX MAMMO INCL CAD UNI: CPT

## 2024-07-19 PROCEDURE — 76098 X-RAY EXAM SURGICAL SPECIMEN: CPT

## 2024-07-19 RX ORDER — LIDOCAINE HYDROCHLORIDE AND EPINEPHRINE 10; 10 MG/ML; UG/ML
20 INJECTION, SOLUTION INFILTRATION; PERINEURAL ONCE
Status: COMPLETED | OUTPATIENT
Start: 2024-07-19 | End: 2024-07-19

## 2024-07-19 RX ORDER — LIDOCAINE HYDROCHLORIDE 10 MG/ML
5 INJECTION, SOLUTION INFILTRATION; PERINEURAL ONCE
Status: COMPLETED | OUTPATIENT
Start: 2024-07-19 | End: 2024-07-19

## 2024-07-19 RX ORDER — MAGNESIUM HYDROXIDE 1200 MG/15ML
250 LIQUID ORAL ONCE
Status: COMPLETED | OUTPATIENT
Start: 2024-07-19 | End: 2024-07-19

## 2024-07-19 RX ADMIN — LIDOCAINE HYDROCHLORIDE 5 ML: 10 INJECTION, SOLUTION INFILTRATION; PERINEURAL at 11:59

## 2024-07-19 RX ADMIN — SODIUM CHLORIDE 250 ML: 900 IRRIGANT IRRIGATION at 12:00

## 2024-07-19 RX ADMIN — LIDOCAINE HYDROCHLORIDE,EPINEPHRINE BITARTRATE 15 ML: 10; .01 INJECTION, SOLUTION INFILTRATION; PERINEURAL at 12:00

## 2024-07-19 ASSESSMENT — PAIN - FUNCTIONAL ASSESSMENT: PAIN_FUNCTIONAL_ASSESSMENT: 0-10

## 2024-07-22 ENCOUNTER — TELEPHONE (OUTPATIENT)
Dept: MAMMOGRAPHY | Age: 79
End: 2024-07-22

## 2024-07-22 DIAGNOSIS — R11.0 NAUSEA: ICD-10-CM

## 2024-07-22 DIAGNOSIS — F51.01 PRIMARY INSOMNIA: ICD-10-CM

## 2024-07-22 RX ORDER — PROMETHAZINE HYDROCHLORIDE 25 MG/1
25 TABLET ORAL DAILY PRN
Qty: 15 TABLET | Refills: 1 | Status: SHIPPED | OUTPATIENT
Start: 2024-07-22

## 2024-07-22 RX ORDER — DOXEPIN HYDROCHLORIDE 10 MG/1
10 CAPSULE ORAL NIGHTLY
Qty: 90 CAPSULE | Refills: 1 | Status: SHIPPED | OUTPATIENT
Start: 2024-07-22

## 2024-08-14 ENCOUNTER — OFFICE VISIT (OUTPATIENT)
Dept: UROLOGY | Age: 79
End: 2024-08-14
Payer: MEDICARE

## 2024-08-14 DIAGNOSIS — N39.0 RECURRENT UTI: Primary | ICD-10-CM

## 2024-08-14 LAB
BILIRUBIN, URINE, POC: NEGATIVE
BLOOD URINE, POC: NORMAL
GLUCOSE URINE, POC: NEGATIVE
KETONES, URINE, POC: NEGATIVE
LEUKOCYTE ESTERASE, URINE, POC: NORMAL
NITRITE, URINE, POC: NEGATIVE
PH, URINE, POC: 6 (ref 4.6–8)
PROTEIN,URINE, POC: NORMAL
SPECIFIC GRAVITY, URINE, POC: 1.01 (ref 1–1.03)
URINALYSIS CLARITY, POC: NORMAL
URINALYSIS COLOR, POC: NORMAL
UROBILINOGEN, POC: NORMAL

## 2024-08-14 PROCEDURE — 1123F ACP DISCUSS/DSCN MKR DOCD: CPT | Performed by: NURSE PRACTITIONER

## 2024-08-14 PROCEDURE — G8427 DOCREV CUR MEDS BY ELIG CLIN: HCPCS | Performed by: NURSE PRACTITIONER

## 2024-08-14 PROCEDURE — 81003 URINALYSIS AUTO W/O SCOPE: CPT | Performed by: NURSE PRACTITIONER

## 2024-08-14 PROCEDURE — 1036F TOBACCO NON-USER: CPT | Performed by: NURSE PRACTITIONER

## 2024-08-14 PROCEDURE — 99213 OFFICE O/P EST LOW 20 MIN: CPT | Performed by: NURSE PRACTITIONER

## 2024-08-14 PROCEDURE — G8417 CALC BMI ABV UP PARAM F/U: HCPCS | Performed by: NURSE PRACTITIONER

## 2024-08-14 PROCEDURE — G8399 PT W/DXA RESULTS DOCUMENT: HCPCS | Performed by: NURSE PRACTITIONER

## 2024-08-14 PROCEDURE — 1090F PRES/ABSN URINE INCON ASSESS: CPT | Performed by: NURSE PRACTITIONER

## 2024-08-14 ASSESSMENT — ENCOUNTER SYMPTOMS: BACK PAIN: 0

## 2024-08-14 NOTE — PROGRESS NOTES
weakness 9/8/2020    Non-ischemic cardiomyopathy (HCC) 2018    Echo 50-55%, Cath-minimal CAD. 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 Pod 2 ORIF doing well.  C/o moderate pain.  Controlled on PO meds.  Ready to go home    Type 2 diabetes mellitus, controlled ([facility])     BS am 104    Urinary incontinence     UTI (urinary tract infection) 2020    Vaginal infection     Vitamin B12 deficiency      Past Surgical History:   Procedure Laterality Date    APPENDECTOMY      BREAST BIOPSY      left breast biopsy    BREAST BIOPSY  1968    left cyst removed    BREAST LUMPECTOMY Left 2001    BREAST REDUCTION SURGERY      Rt breast    CARDIAC CATHETERIZATION  2009    CARPAL TUNNEL RELEASE Bilateral     CHOLECYSTECTOMY, OPEN  1981    COLONOSCOPY  07/2014    COLONOSCOPY N/A 07/26/2023    COLONOSCOPY POLYPECTOMY and BIOPSY performed by Iqra Pedro MD at [facility] ENDOSCOPY    CYST REMOVAL Right 11/2020    hand    FRACTURE SURGERY      HAND SURGERY Right 11/16/2022    Right index finger distal interphalangeal joint arthrodesis performed by Kerry Johnson MD at [facility] OPC    HEENT      Sinus Surgery    HEENT      RK procedure bilateral eyes    HIP FRACTURE SURGERY Left 02/08/2023    HIP OPEN REDUCTION INTERNAL FIXATION performed by Irwin Chen MD at [facility] MAIN OR    HX OPEN REDUCTION INTERNAL FIXATION Left 2013    Ankle    HX OPEN REDUCTION INTERNAL FIXATION  08/2016    Tibia    HYSTERECTOMY (CERVIX STATUS UNKNOWN)      HYSTERECTOMY, VAGINAL      LUMBAR LAMINECTOMY  1992    BRIAN STEROTACTIC LOC BREAST BIOPSY RIGHT Right 7/19/2024    BRIAN STEROTACTIC LOC BREAST BIOPSY RIGHT 7/19/2024 Eula Carlin MD

## 2024-08-21 ENCOUNTER — HOSPITAL ENCOUNTER (OUTPATIENT)
Dept: INFUSION THERAPY | Age: 79
Setting detail: INFUSION SERIES
Discharge: HOME | End: 2024-08-21
Payer: MEDICARE

## 2024-08-21 ENCOUNTER — HOSPITAL ENCOUNTER (OUTPATIENT)
Dept: LAB | Age: 79
Discharge: HOME | End: 2024-08-24
Payer: MEDICARE

## 2024-08-21 VITALS
HEART RATE: 60 BPM | TEMPERATURE: 97.9 F | SYSTOLIC BLOOD PRESSURE: 145 MMHG | OXYGEN SATURATION: 95 % | RESPIRATION RATE: 16 BRPM | DIASTOLIC BLOOD PRESSURE: 80 MMHG

## 2024-08-21 DIAGNOSIS — M85.89 OSTEOPENIA OF MULTIPLE SITES: Primary | ICD-10-CM

## 2024-08-21 LAB
ALBUMIN SERPL-MCNC: 3.6 G/DL (ref 3.2–4.6)
ALBUMIN/GLOB SERPL: 1 (ref 1–1.9)
ALP SERPL-CCNC: 64 U/L (ref 35–104)
ALT SERPL-CCNC: 29 U/L (ref 12–65)
ANION GAP SERPL CALC-SCNC: 13 MMOL/L (ref 9–18)
AST SERPL-CCNC: 39 U/L (ref 15–37)
BILIRUB SERPL-MCNC: 0.4 MG/DL (ref 0–1.2)
BUN SERPL-MCNC: 12 MG/DL (ref 8–23)
CALCIUM SERPL-MCNC: 9.4 MG/DL (ref 8.8–10.2)
CHLORIDE SERPL-SCNC: 102 MMOL/L (ref 98–107)
CO2 SERPL-SCNC: 24 MMOL/L (ref 20–28)
CREAT SERPL-MCNC: 1.05 MG/DL (ref 0.6–1.1)
GLOBULIN SER CALC-MCNC: 3.5 G/DL (ref 2.3–3.5)
GLUCOSE SERPL-MCNC: 143 MG/DL (ref 70–99)
PHOSPHATE SERPL-MCNC: 3.5 MG/DL (ref 2.5–4.5)
POTASSIUM SERPL-SCNC: 3.7 MMOL/L (ref 3.5–5.1)
PROT SERPL-MCNC: 7.1 G/DL (ref 6.3–8.2)
SODIUM SERPL-SCNC: 139 MMOL/L (ref 136–145)

## 2024-08-21 PROCEDURE — 96372 THER/PROPH/DIAG INJ SC/IM: CPT

## 2024-08-21 PROCEDURE — 6360000002 HC RX W HCPCS: Performed by: NURSE PRACTITIONER

## 2024-08-21 PROCEDURE — 84100 ASSAY OF PHOSPHORUS: CPT

## 2024-08-21 PROCEDURE — 80053 COMPREHEN METABOLIC PANEL: CPT

## 2024-08-21 PROCEDURE — 36415 COLL VENOUS BLD VENIPUNCTURE: CPT

## 2024-08-21 RX ORDER — ALBUTEROL SULFATE 90 UG/1
4 AEROSOL, METERED RESPIRATORY (INHALATION) PRN
OUTPATIENT
Start: 2025-02-02

## 2024-08-21 RX ORDER — ONDANSETRON 2 MG/ML
8 INJECTION INTRAMUSCULAR; INTRAVENOUS
OUTPATIENT
Start: 2025-02-02

## 2024-08-21 RX ORDER — DIPHENHYDRAMINE HYDROCHLORIDE 50 MG/ML
50 INJECTION INTRAMUSCULAR; INTRAVENOUS
OUTPATIENT
Start: 2025-02-02

## 2024-08-21 RX ORDER — SODIUM CHLORIDE 9 MG/ML
INJECTION, SOLUTION INTRAVENOUS CONTINUOUS
OUTPATIENT
Start: 2025-02-02

## 2024-08-21 RX ORDER — ACETAMINOPHEN 325 MG/1
650 TABLET ORAL
OUTPATIENT
Start: 2025-02-02

## 2024-08-21 RX ORDER — EPINEPHRINE 1 MG/ML
0.3 INJECTION, SOLUTION, CONCENTRATE INTRAVENOUS PRN
OUTPATIENT
Start: 2025-02-02

## 2024-08-21 RX ADMIN — DENOSUMAB 60 MG: 60 INJECTION SUBCUTANEOUS at 16:35

## 2024-08-21 NOTE — PROGRESS NOTES
were transcribed utilizing voice recognition software. Transcription errors may be present.    Jacobo Garsia M.D.

## 2024-08-21 NOTE — PROGRESS NOTES
Arrived to the Infusion Center. Orders and labs reviewed; Prolia injection completed. Patient tolerated well.   Any issues or concerns during appointment: none.  Patient aware of next infusion appointment on  2/24/2025(date) at 1500 (time).  Patient instructed to call provider with temperature of 100.4 or greater or nausea/vomiting/ diarrhea or pain not controlled by medications  Discharged ambulatory.

## 2024-08-22 ENCOUNTER — OFFICE VISIT (OUTPATIENT)
Age: 79
End: 2024-08-22

## 2024-08-22 DIAGNOSIS — M47.817 LUMBOSACRAL SPONDYLOSIS WITHOUT MYELOPATHY: ICD-10-CM

## 2024-08-22 DIAGNOSIS — Z51.81 ENCOUNTER FOR THERAPEUTIC DRUG MONITORING: ICD-10-CM

## 2024-08-22 DIAGNOSIS — Z51.81 ENCOUNTER FOR THERAPEUTIC DRUG MONITORING: Primary | ICD-10-CM

## 2024-08-22 RX ORDER — TRAMADOL HYDROCHLORIDE 50 MG/1
50 TABLET ORAL DAILY
Qty: 30 TABLET | Refills: 0 | Status: SHIPPED | OUTPATIENT
Start: 2024-08-22 | End: 2024-09-21

## 2024-08-30 ENCOUNTER — HOSPITAL ENCOUNTER (INPATIENT)
Age: 79
LOS: 3 days | Discharge: HOME | DRG: 177 | End: 2024-09-02
Attending: EMERGENCY MEDICINE | Admitting: INTERNAL MEDICINE
Payer: MEDICARE

## 2024-08-30 ENCOUNTER — APPOINTMENT (OUTPATIENT)
Dept: GENERAL RADIOLOGY | Age: 79
DRG: 177 | End: 2024-08-30
Payer: MEDICARE

## 2024-08-30 DIAGNOSIS — J96.01 ACUTE RESPIRATORY FAILURE WITH HYPOXIA (HCC): Primary | ICD-10-CM

## 2024-08-30 DIAGNOSIS — I50.9 CONGESTIVE HEART FAILURE, UNSPECIFIED HF CHRONICITY, UNSPECIFIED HEART FAILURE TYPE (HCC): ICD-10-CM

## 2024-08-30 DIAGNOSIS — I50.43 ACUTE ON CHRONIC COMBINED SYSTOLIC AND DIASTOLIC CONGESTIVE HEART FAILURE (HCC): ICD-10-CM

## 2024-08-30 DIAGNOSIS — I10 ESSENTIAL HYPERTENSION: Chronic | ICD-10-CM

## 2024-08-30 DIAGNOSIS — I42.0 DILATED CARDIOMYOPATHY (HCC): Chronic | ICD-10-CM

## 2024-08-30 DIAGNOSIS — U07.1 COVID: ICD-10-CM

## 2024-08-30 PROBLEM — I50.1 ACUTE PULMONARY EDEMA WITH CONGESTIVE HEART FAILURE (HCC): Status: ACTIVE | Noted: 2024-08-30

## 2024-08-30 LAB
ALBUMIN SERPL-MCNC: 4.2 G/DL (ref 3.2–4.6)
ALBUMIN/GLOB SERPL: 1 (ref 1–1.9)
ALP SERPL-CCNC: 72 U/L (ref 35–104)
ALT SERPL-CCNC: 47 U/L (ref 12–65)
ANION GAP SERPL CALC-SCNC: 18 MMOL/L (ref 9–18)
AST SERPL-CCNC: 76 U/L (ref 15–37)
BASOPHILS # BLD: 0 K/UL (ref 0–0.2)
BASOPHILS NFR BLD: 0 % (ref 0–2)
BILIRUB SERPL-MCNC: 0.6 MG/DL (ref 0–1.2)
BUN SERPL-MCNC: 13 MG/DL (ref 8–23)
CALCIUM SERPL-MCNC: 8.7 MG/DL (ref 8.8–10.2)
CHLORIDE SERPL-SCNC: 96 MMOL/L (ref 98–107)
CO2 SERPL-SCNC: 25 MMOL/L (ref 20–28)
CREAT SERPL-MCNC: 1.05 MG/DL (ref 0.6–1.1)
DIFFERENTIAL METHOD BLD: ABNORMAL
EKG ATRIAL RATE: 98 BPM
EKG DIAGNOSIS: NORMAL
EKG P AXIS: 40 DEGREES
EKG P-R INTERVAL: 198 MS
EKG Q-T INTERVAL: 387 MS
EKG QRS DURATION: 95 MS
EKG QTC CALCULATION (BAZETT): 495 MS
EKG R AXIS: 96 DEGREES
EKG T AXIS: 72 DEGREES
EKG VENTRICULAR RATE: 98 BPM
EOSINOPHIL # BLD: 0 K/UL (ref 0–0.8)
EOSINOPHIL NFR BLD: 0 % (ref 0.5–7.8)
ERYTHROCYTE [DISTWIDTH] IN BLOOD BY AUTOMATED COUNT: 14.4 % (ref 11.9–14.6)
GLOBULIN SER CALC-MCNC: 4.2 G/DL (ref 2.3–3.5)
GLUCOSE BLD STRIP.AUTO-MCNC: 171 MG/DL (ref 65–100)
GLUCOSE SERPL-MCNC: 158 MG/DL (ref 70–99)
HCT VFR BLD AUTO: 41.6 % (ref 35.8–46.3)
HGB BLD-MCNC: 13.6 G/DL (ref 11.7–15.4)
IMM GRANULOCYTES # BLD AUTO: 0.1 K/UL (ref 0–0.5)
IMM GRANULOCYTES NFR BLD AUTO: 1 % (ref 0–5)
LACTATE SERPL-SCNC: 2.5 MMOL/L (ref 0.5–2)
LACTATE SERPL-SCNC: 2.7 MMOL/L (ref 0.5–2)
LIPASE SERPL-CCNC: 27 U/L (ref 13–60)
LYMPHOCYTES # BLD: 0.7 K/UL (ref 0.5–4.6)
LYMPHOCYTES NFR BLD: 11 % (ref 13–44)
MCH RBC QN AUTO: 31.1 PG (ref 26.1–32.9)
MCHC RBC AUTO-ENTMCNC: 32.7 G/DL (ref 31.4–35)
MCV RBC AUTO: 95 FL (ref 82–102)
MONOCYTES # BLD: 0.7 K/UL (ref 0.1–1.3)
MONOCYTES NFR BLD: 11 % (ref 4–12)
NEUTS SEG # BLD: 5 K/UL (ref 1.7–8.2)
NEUTS SEG NFR BLD: 77 % (ref 43–78)
NRBC # BLD: 0 K/UL (ref 0–0.2)
NT PRO BNP: 2800 PG/ML (ref 0–450)
PLATELET # BLD AUTO: 276 K/UL (ref 150–450)
PMV BLD AUTO: 10.1 FL (ref 9.4–12.3)
POTASSIUM SERPL-SCNC: 4 MMOL/L (ref 3.5–5.1)
PROT SERPL-MCNC: 8.4 G/DL (ref 6.3–8.2)
RBC # BLD AUTO: 4.38 M/UL (ref 4.05–5.2)
SARS-COV-2 RDRP RESP QL NAA+PROBE: DETECTED
SERVICE CMNT-IMP: ABNORMAL
SODIUM SERPL-SCNC: 138 MMOL/L (ref 136–145)
SOURCE: ABNORMAL
TROPONIN T SERPL HS-MCNC: 13 NG/L (ref 0–14)
WBC # BLD AUTO: 6.5 K/UL (ref 4.3–11.1)

## 2024-08-30 PROCEDURE — 93010 ELECTROCARDIOGRAM REPORT: CPT | Performed by: INTERNAL MEDICINE

## 2024-08-30 PROCEDURE — 99285 EMERGENCY DEPT VISIT HI MDM: CPT

## 2024-08-30 PROCEDURE — 87040 BLOOD CULTURE FOR BACTERIA: CPT

## 2024-08-30 PROCEDURE — 96375 TX/PRO/DX INJ NEW DRUG ADDON: CPT

## 2024-08-30 PROCEDURE — 93005 ELECTROCARDIOGRAM TRACING: CPT | Performed by: EMERGENCY MEDICINE

## 2024-08-30 PROCEDURE — 6360000002 HC RX W HCPCS: Performed by: EMERGENCY MEDICINE

## 2024-08-30 PROCEDURE — 71045 X-RAY EXAM CHEST 1 VIEW: CPT

## 2024-08-30 PROCEDURE — 84484 ASSAY OF TROPONIN QUANT: CPT

## 2024-08-30 PROCEDURE — 2060000000 HC ICU INTERMEDIATE R&B

## 2024-08-30 PROCEDURE — 85025 COMPLETE CBC W/AUTO DIFF WBC: CPT

## 2024-08-30 PROCEDURE — 6370000000 HC RX 637 (ALT 250 FOR IP): Performed by: INTERNAL MEDICINE

## 2024-08-30 PROCEDURE — 80053 COMPREHEN METABOLIC PANEL: CPT

## 2024-08-30 PROCEDURE — 96365 THER/PROPH/DIAG IV INF INIT: CPT

## 2024-08-30 PROCEDURE — 6370000000 HC RX 637 (ALT 250 FOR IP): Performed by: EMERGENCY MEDICINE

## 2024-08-30 PROCEDURE — 82962 GLUCOSE BLOOD TEST: CPT

## 2024-08-30 PROCEDURE — 6360000002 HC RX W HCPCS: Performed by: INTERNAL MEDICINE

## 2024-08-30 PROCEDURE — 87635 SARS-COV-2 COVID-19 AMP PRB: CPT

## 2024-08-30 PROCEDURE — 2580000003 HC RX 258: Performed by: INTERNAL MEDICINE

## 2024-08-30 PROCEDURE — 83690 ASSAY OF LIPASE: CPT

## 2024-08-30 PROCEDURE — 83605 ASSAY OF LACTIC ACID: CPT

## 2024-08-30 PROCEDURE — 83880 ASSAY OF NATRIURETIC PEPTIDE: CPT

## 2024-08-30 PROCEDURE — 94640 AIRWAY INHALATION TREATMENT: CPT

## 2024-08-30 RX ORDER — POLYETHYLENE GLYCOL 3350 17 G/17G
17 POWDER, FOR SOLUTION ORAL DAILY PRN
Status: DISCONTINUED | OUTPATIENT
Start: 2024-08-30 | End: 2024-09-02 | Stop reason: HOSPADM

## 2024-08-30 RX ORDER — INSULIN LISPRO 100 [IU]/ML
0-8 INJECTION, SOLUTION INTRAVENOUS; SUBCUTANEOUS NIGHTLY
Status: DISCONTINUED | OUTPATIENT
Start: 2024-08-30 | End: 2024-09-02 | Stop reason: HOSPADM

## 2024-08-30 RX ORDER — FUROSEMIDE 10 MG/ML
40 INJECTION INTRAMUSCULAR; INTRAVENOUS ONCE
Status: COMPLETED | OUTPATIENT
Start: 2024-08-30 | End: 2024-08-30

## 2024-08-30 RX ORDER — FAMOTIDINE 20 MG/1
10 TABLET, FILM COATED ORAL 2 TIMES DAILY PRN
Status: DISCONTINUED | OUTPATIENT
Start: 2024-08-30 | End: 2024-09-02 | Stop reason: HOSPADM

## 2024-08-30 RX ORDER — ALLOPURINOL 100 MG/1
200 TABLET ORAL DAILY
Status: DISCONTINUED | OUTPATIENT
Start: 2024-08-31 | End: 2024-09-02 | Stop reason: HOSPADM

## 2024-08-30 RX ORDER — DEXAMETHASONE SODIUM PHOSPHATE 10 MG/ML
10 INJECTION INTRAMUSCULAR; INTRAVENOUS ONCE
Status: COMPLETED | OUTPATIENT
Start: 2024-08-30 | End: 2024-08-30

## 2024-08-30 RX ORDER — DOXEPIN HYDROCHLORIDE 10 MG/1
10 CAPSULE ORAL NIGHTLY
Status: DISCONTINUED | OUTPATIENT
Start: 2024-08-31 | End: 2024-09-02 | Stop reason: HOSPADM

## 2024-08-30 RX ORDER — ONDANSETRON 2 MG/ML
4 INJECTION INTRAMUSCULAR; INTRAVENOUS EVERY 6 HOURS PRN
Status: DISCONTINUED | OUTPATIENT
Start: 2024-08-30 | End: 2024-09-02 | Stop reason: HOSPADM

## 2024-08-30 RX ORDER — POTASSIUM CHLORIDE 1500 MG/1
20 TABLET, EXTENDED RELEASE ORAL DAILY
Status: DISCONTINUED | OUTPATIENT
Start: 2024-08-31 | End: 2024-08-31

## 2024-08-30 RX ORDER — SODIUM CHLORIDE 0.9 % (FLUSH) 0.9 %
5-40 SYRINGE (ML) INJECTION PRN
Status: DISCONTINUED | OUTPATIENT
Start: 2024-08-30 | End: 2024-09-02 | Stop reason: HOSPADM

## 2024-08-30 RX ORDER — DEXTROSE MONOHYDRATE 100 MG/ML
INJECTION, SOLUTION INTRAVENOUS CONTINUOUS PRN
Status: DISCONTINUED | OUTPATIENT
Start: 2024-08-30 | End: 2024-09-02 | Stop reason: HOSPADM

## 2024-08-30 RX ORDER — ACETAMINOPHEN 650 MG/1
650 SUPPOSITORY RECTAL EVERY 6 HOURS PRN
Status: DISCONTINUED | OUTPATIENT
Start: 2024-08-30 | End: 2024-09-02 | Stop reason: HOSPADM

## 2024-08-30 RX ORDER — INSULIN LISPRO 100 [IU]/ML
0-10 INJECTION, SOLUTION INTRAVENOUS; SUBCUTANEOUS
Status: DISCONTINUED | OUTPATIENT
Start: 2024-08-30 | End: 2024-09-02 | Stop reason: HOSPADM

## 2024-08-30 RX ORDER — GUAIFENESIN/DEXTROMETHORPHAN 100-10MG/5
5 SYRUP ORAL EVERY 12 HOURS PRN
Status: DISCONTINUED | OUTPATIENT
Start: 2024-08-30 | End: 2024-09-02 | Stop reason: HOSPADM

## 2024-08-30 RX ORDER — LANOLIN ALCOHOL/MO/W.PET/CERES
12 CREAM (GRAM) TOPICAL NIGHTLY
Status: DISCONTINUED | OUTPATIENT
Start: 2024-08-30 | End: 2024-09-02 | Stop reason: HOSPADM

## 2024-08-30 RX ORDER — TRAMADOL HYDROCHLORIDE 50 MG/1
50 TABLET ORAL DAILY
Status: DISCONTINUED | OUTPATIENT
Start: 2024-08-31 | End: 2024-09-02 | Stop reason: HOSPADM

## 2024-08-30 RX ORDER — ONDANSETRON 4 MG/1
4 TABLET, ORALLY DISINTEGRATING ORAL EVERY 8 HOURS PRN
Status: DISCONTINUED | OUTPATIENT
Start: 2024-08-30 | End: 2024-09-02 | Stop reason: HOSPADM

## 2024-08-30 RX ORDER — BISACODYL 10 MG
10 SUPPOSITORY, RECTAL RECTAL DAILY PRN
Status: DISCONTINUED | OUTPATIENT
Start: 2024-08-30 | End: 2024-09-02 | Stop reason: HOSPADM

## 2024-08-30 RX ORDER — MAGNESIUM HYDROXIDE/ALUMINUM HYDROXICE/SIMETHICONE 120; 1200; 1200 MG/30ML; MG/30ML; MG/30ML
30 SUSPENSION ORAL EVERY 6 HOURS PRN
Status: DISCONTINUED | OUTPATIENT
Start: 2024-08-30 | End: 2024-09-02 | Stop reason: HOSPADM

## 2024-08-30 RX ORDER — VITAMIN B COMPLEX
4000 TABLET ORAL DAILY
Status: DISCONTINUED | OUTPATIENT
Start: 2024-08-31 | End: 2024-09-02 | Stop reason: HOSPADM

## 2024-08-30 RX ORDER — BUMETANIDE 0.25 MG/ML
1 INJECTION INTRAMUSCULAR; INTRAVENOUS 2 TIMES DAILY
Status: DISCONTINUED | OUTPATIENT
Start: 2024-08-30 | End: 2024-09-01

## 2024-08-30 RX ORDER — LEVOFLOXACIN 5 MG/ML
750 INJECTION, SOLUTION INTRAVENOUS
Status: COMPLETED | OUTPATIENT
Start: 2024-08-30 | End: 2024-08-30

## 2024-08-30 RX ORDER — CETIRIZINE HYDROCHLORIDE 10 MG/1
10 TABLET ORAL DAILY PRN
Status: DISCONTINUED | OUTPATIENT
Start: 2024-08-30 | End: 2024-09-02 | Stop reason: HOSPADM

## 2024-08-30 RX ORDER — DEXAMETHASONE SODIUM PHOSPHATE 10 MG/ML
6 INJECTION INTRAMUSCULAR; INTRAVENOUS DAILY
Status: DISCONTINUED | OUTPATIENT
Start: 2024-08-31 | End: 2024-08-31

## 2024-08-30 RX ORDER — LISINOPRIL 20 MG/1
20 TABLET ORAL 2 TIMES DAILY
Status: DISCONTINUED | OUTPATIENT
Start: 2024-08-30 | End: 2024-09-02 | Stop reason: HOSPADM

## 2024-08-30 RX ORDER — MAGNESIUM SULFATE IN WATER 40 MG/ML
2000 INJECTION, SOLUTION INTRAVENOUS PRN
Status: DISCONTINUED | OUTPATIENT
Start: 2024-08-30 | End: 2024-09-02 | Stop reason: HOSPADM

## 2024-08-30 RX ORDER — SODIUM CHLORIDE 9 MG/ML
INJECTION, SOLUTION INTRAVENOUS PRN
Status: DISCONTINUED | OUTPATIENT
Start: 2024-08-30 | End: 2024-09-02 | Stop reason: HOSPADM

## 2024-08-30 RX ORDER — ACETAMINOPHEN 325 MG/1
650 TABLET ORAL EVERY 6 HOURS PRN
Status: DISCONTINUED | OUTPATIENT
Start: 2024-08-30 | End: 2024-09-02 | Stop reason: HOSPADM

## 2024-08-30 RX ORDER — POTASSIUM CHLORIDE 1500 MG/1
40 TABLET, EXTENDED RELEASE ORAL PRN
Status: DISCONTINUED | OUTPATIENT
Start: 2024-08-30 | End: 2024-09-02 | Stop reason: HOSPADM

## 2024-08-30 RX ORDER — GABAPENTIN 300 MG/1
600 CAPSULE ORAL 2 TIMES DAILY
Status: DISCONTINUED | OUTPATIENT
Start: 2024-08-30 | End: 2024-09-02 | Stop reason: HOSPADM

## 2024-08-30 RX ORDER — IBUPROFEN 600 MG/1
1 TABLET ORAL PRN
Status: DISCONTINUED | OUTPATIENT
Start: 2024-08-30 | End: 2024-09-02 | Stop reason: HOSPADM

## 2024-08-30 RX ORDER — FLUTICASONE PROPIONATE 50 MCG
2 SPRAY, SUSPENSION (ML) NASAL 2 TIMES DAILY PRN
Status: DISCONTINUED | OUTPATIENT
Start: 2024-08-30 | End: 2024-09-02 | Stop reason: HOSPADM

## 2024-08-30 RX ORDER — IPRATROPIUM BROMIDE AND ALBUTEROL SULFATE 2.5; .5 MG/3ML; MG/3ML
2 SOLUTION RESPIRATORY (INHALATION)
Status: COMPLETED | OUTPATIENT
Start: 2024-08-30 | End: 2024-08-30

## 2024-08-30 RX ORDER — POTASSIUM CHLORIDE 7.45 MG/ML
10 INJECTION INTRAVENOUS PRN
Status: DISCONTINUED | OUTPATIENT
Start: 2024-08-30 | End: 2024-09-02 | Stop reason: HOSPADM

## 2024-08-30 RX ORDER — LEVOFLOXACIN 5 MG/ML
750 INJECTION, SOLUTION INTRAVENOUS
Status: DISCONTINUED | OUTPATIENT
Start: 2024-09-01 | End: 2024-08-31

## 2024-08-30 RX ORDER — METOPROLOL SUCCINATE 50 MG/1
50 TABLET, EXTENDED RELEASE ORAL 2 TIMES DAILY
Status: DISCONTINUED | OUTPATIENT
Start: 2024-08-30 | End: 2024-09-02 | Stop reason: HOSPADM

## 2024-08-30 RX ORDER — SENNOSIDES 8.6 MG
1300 CAPSULE ORAL EVERY 8 HOURS PRN
Status: DISCONTINUED | OUTPATIENT
Start: 2024-08-30 | End: 2024-08-30 | Stop reason: SDUPTHER

## 2024-08-30 RX ORDER — SODIUM CHLORIDE 0.9 % (FLUSH) 0.9 %
5-40 SYRINGE (ML) INJECTION EVERY 12 HOURS SCHEDULED
Status: DISCONTINUED | OUTPATIENT
Start: 2024-08-30 | End: 2024-09-02 | Stop reason: HOSPADM

## 2024-08-30 RX ORDER — AMLODIPINE BESYLATE 5 MG/1
5 TABLET ORAL DAILY
Status: DISCONTINUED | OUTPATIENT
Start: 2024-08-31 | End: 2024-09-02 | Stop reason: HOSPADM

## 2024-08-30 RX ORDER — HEPARIN SODIUM 5000 [USP'U]/ML
5000 INJECTION, SOLUTION INTRAVENOUS; SUBCUTANEOUS 2 TIMES DAILY
Status: DISCONTINUED | OUTPATIENT
Start: 2024-08-30 | End: 2024-09-02 | Stop reason: HOSPADM

## 2024-08-30 RX ADMIN — Medication 12 MG: at 22:43

## 2024-08-30 RX ADMIN — IPRATROPIUM BROMIDE AND ALBUTEROL SULFATE 2 DOSE: 2.5; .5 SOLUTION RESPIRATORY (INHALATION) at 18:02

## 2024-08-30 RX ADMIN — FUROSEMIDE 40 MG: 10 INJECTION, SOLUTION INTRAMUSCULAR; INTRAVENOUS at 21:11

## 2024-08-30 RX ADMIN — DEXAMETHASONE SODIUM PHOSPHATE 10 MG: 10 INJECTION INTRAMUSCULAR; INTRAVENOUS at 18:28

## 2024-08-30 RX ADMIN — SODIUM CHLORIDE, PRESERVATIVE FREE 10 ML: 5 INJECTION INTRAVENOUS at 22:42

## 2024-08-30 RX ADMIN — LEVOFLOXACIN 750 MG: 750 INJECTION, SOLUTION INTRAVENOUS at 18:32

## 2024-08-30 RX ADMIN — METOPROLOL SUCCINATE 50 MG: 50 TABLET, EXTENDED RELEASE ORAL at 22:43

## 2024-08-30 RX ADMIN — LISINOPRIL 20 MG: 20 TABLET ORAL at 22:42

## 2024-08-30 RX ADMIN — HEPARIN SODIUM 5000 UNITS: 5000 INJECTION INTRAVENOUS; SUBCUTANEOUS at 22:43

## 2024-08-30 RX ADMIN — GABAPENTIN 600 MG: 300 CAPSULE ORAL at 22:43

## 2024-08-30 NOTE — ED PROVIDER NOTES
Emergency Department Provider Note       PCP: Marva Xavier, APRN - CNP   Age: 79 y.o.   Sex: female     DISPOSITION Decision To Admit 08/30/2024 07:00:30 PM  Condition at Disposition: Good       ICD-10-CM    1. Acute respiratory failure with hypoxia (HCC)  J96.01       2. COVID  U07.1       3. Congestive heart failure, unspecified HF chronicity, unspecified heart failure type (HCC)  I50.9           Medical Decision Making     Patient is a 79-year-old female presents from [facility] urgent care with positive COVID diagnosis.  Patient had a prehospital temp of one 101.7 with a respiratory rate of 25 and O2 sat 94%.  Patient is had some nausea vomiting and cough x 1 week.  Patient does have a history of chronic bilateral knee pain, chronic anemia, chronic diarrhea, CKD, CHF, HTN, HLD, dilated cardiomyopathy, prediabetes        Cough  Cough characteristics:  Non-productive  Severity:  Moderate  Onset quality:  Gradual  Duration:  3 days  Timing:  Intermittent  Progression:  Waxing and waning  Chronicity:  New  Smoker: no    Context: sick contacts    Relieved by:  Nothing  Worsened by:  Nothing  Ineffective treatments:  None tried  Associated symptoms: no chest pain, no diaphoresis, no ear fullness, no fever, no headaches, no myalgias, no rash and no rhinorrhea      Differential diagnosis includes but is not limited to CHF, COVID, pneumonia, pneumothorax    Patient's physical exam is remarkable for mild wheezing bilaterally.    Patient is laboratory testing does show elevated BNP chest x-ray shows mild pulmonary edema and patient does have a history of CHF.  Patient was wheezing and did receive DuoNeb x 2.  Patient is positive for COVID which is surely exacerbating her issue.  We will admit for CHF, and dyspnea, acute respiratory failure in addition patient states she cannot walk without becoming significantly short of breath..   1 or more acute illnesses that pose a threat to life or bodily function.   Shared medical  no administration in time range)   ipratropium 0.5 mg-albuterol 2.5 mg (DUONEB) nebulizer solution 2 Dose (2 Doses Inhalation Given 8/30/24 1802)   dexAMETHasone (DECADRON) injection 10 mg (10 mg IntraVENous Given 8/30/24 1828)       New Prescriptions    No medications on file        Past Medical History:   Diagnosis Date    Ankle fracture 2014    Ankle osteomyelitis, left (HCC) 2014    Breast cancer (HCC)     Chemotherapy-induced neuropathy (HCC)     Chronic anemia     Chronic diarrhea 06/08/2023    Chronic kidney disease     Chronic systolic CHF (congestive heart failure) (HCC)     CKD (chronic kidney disease) stage 3, GFR 30-59 ml/min (HCC)     Colon polyp 2020    Depression     Dilated cardiomyopathy ([facility])     Dry eye syndrome of both eyes     Dyslipidemia     Elevated blood uric acid level     Essential hypertension     Fracture of right patella 2013    History of left breast cancer 2001    with mastectomy    Kaw (hard of hearing)     wears hearing aids    Hyperlipidemia     Left leg weakness 9/8/2020    Non-ischemic cardiomyopathy (HCC) 2018    Echo 50-55%, Cath-minimal CAD. 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 Pod 2 ORIF doing well.  C/o moderate pain.  Controlled on PO meds.  Ready to go home    Type 2 diabetes mellitus, controlled ([facility])     BS am 104    Urinary incontinence     UTI (urinary tract infection) 2020    Vaginal infection     Vitamin B12 deficiency         Past Surgical History:   Procedure Laterality Date    APPENDECTOMY      BREAST BIOPSY      left breast biopsy    BREAST BIOPSY  1968    left cyst removed    BREAST LUMPECTOMY Left 2001    BREAST

## 2024-08-30 NOTE — H&P
% 1 0.0 - 5.0 %    Neutrophils Absolute 5.0 1.7 - 8.2 K/UL    Lymphocytes Absolute 0.7 0.5 - 4.6 K/UL    Monocytes Absolute 0.7 0.1 - 1.3 K/UL    Eosinophils Absolute 0.0 0.0 - 0.8 K/UL    Basophils Absolute 0.0 0.0 - 0.2 K/UL    Immature Granulocytes Absolute 0.1 0.0 - 0.5 K/UL   Comprehensive Metabolic Panel    Collection Time: 08/30/24  5:03 PM   Result Value Ref Range    Sodium 138 136 - 145 mmol/L    Potassium 4.0 3.5 - 5.1 mmol/L    Chloride 96 (L) 98 - 107 mmol/L    CO2 25 20 - 28 mmol/L    Anion Gap 18 9 - 18 mmol/L    Glucose 158 (H) 70 - 99 mg/dL    BUN 13 8 - 23 MG/DL    Creatinine 1.05 0.60 - 1.10 MG/DL    Est, Glom Filt Rate 54 (L) >60 ml/min/1.73m2    Calcium 8.7 (L) 8.8 - 10.2 MG/DL    Total Bilirubin 0.6 0.0 - 1.2 MG/DL    ALT 47 12 - 65 U/L    AST 76 (H) 15 - 37 U/L    Alk Phosphatase 72 35 - 104 U/L    Total Protein 8.4 (H) 6.3 - 8.2 g/dL    Albumin 4.2 3.2 - 4.6 g/dL    Globulin 4.2 (H) 2.3 - 3.5 g/dL    Albumin/Globulin Ratio 1.0 1.0 - 1.9     Brain Natriuretic Peptide    Collection Time: 08/30/24  5:03 PM   Result Value Ref Range    NT Pro-BNP 2,800 (H) 0 - 450 PG/ML   Lactate, Sepsis    Collection Time: 08/30/24  5:03 PM   Result Value Ref Range    Lactic Acid, Sepsis 2.5 (H) 0.5 - 2.0 MMOL/L   Lipase    Collection Time: 08/30/24  5:03 PM   Result Value Ref Range    Lipase 27 13 - 60 U/L   EKG 12 Lead (SOB)    Collection Time: 08/30/24  6:13 PM   Result Value Ref Range    Ventricular Rate 98 BPM    Atrial Rate 98 BPM    P-R Interval 198 ms    QRS Duration 95 ms    Q-T Interval 387 ms    QTc Calculation (Bazett) 495 ms    P Axis 40 degrees    R Axis 96 degrees    T Axis 72 degrees    Diagnosis       Sinus rhythm  Anteroseptal infarct, age indeterminate  Nonspecific T wave abnormality    Confirmed by MD JACOBS DANIEL ([id number]) on 8/30/2024 7:12:52 PM     Troponin    Collection Time: 08/30/24  6:14 PM   Result Value Ref Range    Troponin T 13.0 0 - 14 ng/L   COVID-19, Rapid    Collection Time:  08/30/24  6:15 PM    Specimen: Nasopharyngeal   Result Value Ref Range    Source NASAL      SARS-CoV-2, Rapid Detected (A) NOTD         Recent Labs     08/30/24  1815   COVID19 Detected*       XR CHEST PORTABLE    Result Date: 8/30/2024  Chest X-ray INDICATION: Shortness of Breath COMPARISON: 2/7/2023. TECHNIQUE: AP/PA view of the chest was obtained. FINDINGS: Lungs are hypoaerated. Vascular prominence which may be due to hypoaeration or mild edema..  The heart size is normal.  The bony thorax is intact.      As above. Electronically signed by Sudeep Ledesma        Signed:  Dave Lamas MD    Part of this note may have been written by using a voice dictation software.  The note has been proof read but may still contain some grammatical/other typographical errors.

## 2024-08-30 NOTE — ED TRIAGE NOTES
Pt arrives via ems from [facility] urgent care on [facility]. Ems called for covid positive diagnosis. Ems vitals: 101.7, P: 100, BP: 160/90, RR: 25, o2: 94%, BGL:156. Pt n/v cough x one week.

## 2024-08-31 ENCOUNTER — APPOINTMENT (OUTPATIENT)
Dept: NON INVASIVE DIAGNOSTICS | Age: 79
DRG: 177 | End: 2024-08-31
Attending: INTERNAL MEDICINE
Payer: MEDICARE

## 2024-08-31 PROBLEM — J96.01 ACUTE RESPIRATORY FAILURE WITH HYPOXIA (HCC): Status: ACTIVE | Noted: 2024-08-31

## 2024-08-31 LAB
ANION GAP SERPL CALC-SCNC: 17 MMOL/L (ref 9–18)
BASOPHILS # BLD: 0 K/UL (ref 0–0.2)
BASOPHILS NFR BLD: 1 % (ref 0–2)
BUN SERPL-MCNC: 16 MG/DL (ref 8–23)
CALCIUM SERPL-MCNC: 8.3 MG/DL (ref 8.8–10.2)
CHLORIDE SERPL-SCNC: 95 MMOL/L (ref 98–107)
CHOLEST SERPL-MCNC: 177 MG/DL (ref 0–200)
CO2 SERPL-SCNC: 23 MMOL/L (ref 20–28)
CREAT SERPL-MCNC: 1.05 MG/DL (ref 0.6–1.1)
DIFFERENTIAL METHOD BLD: ABNORMAL
ECHO AO ASC DIAM: 3.6 CM
ECHO AO ASCENDING AORTA INDEX: 2.06 CM/M2
ECHO AO ROOT DIAM: 3 CM
ECHO AO ROOT INDEX: 1.71 CM/M2
ECHO AV AREA PEAK VELOCITY: 2.7 CM2
ECHO AV AREA VTI: 3 CM2
ECHO AV AREA/BSA PEAK VELOCITY: 1.5 CM2/M2
ECHO AV AREA/BSA VTI: 1.7 CM2/M2
ECHO AV MEAN GRADIENT: 2 MMHG
ECHO AV MEAN VELOCITY: 0.7 M/S
ECHO AV PEAK GRADIENT: 5 MMHG
ECHO AV PEAK VELOCITY: 1.1 M/S
ECHO AV VELOCITY RATIO: 0.73
ECHO AV VTI: 20.2 CM
ECHO BSA: 1.76 M2
ECHO EST RA PRESSURE: 3 MMHG
ECHO IVC PROX: 1.4 CM
ECHO LA AREA 2C: 14.9 CM2
ECHO LA AREA 4C: 17.8 CM2
ECHO LA DIAMETER INDEX: 1.89 CM/M2
ECHO LA DIAMETER: 3.3 CM
ECHO LA MAJOR AXIS: 5.8 CM
ECHO LA MINOR AXIS: 5.4 CM
ECHO LA TO AORTIC ROOT RATIO: 1.1
ECHO LA VOL BP: 39 ML (ref 22–52)
ECHO LA VOL MOD A2C: 35 ML (ref 22–52)
ECHO LA VOL MOD A4C: 43 ML (ref 22–52)
ECHO LA VOL/BSA BIPLANE: 22 ML/M2 (ref 16–34)
ECHO LA VOLUME INDEX MOD A2C: 20 ML/M2 (ref 16–34)
ECHO LA VOLUME INDEX MOD A4C: 25 ML/M2 (ref 16–34)
ECHO LV E' LATERAL VELOCITY: 6 CM/S
ECHO LV E' SEPTAL VELOCITY: 5 CM/S
ECHO LV EDV A2C: 53 ML
ECHO LV EDV A4C: 65 ML
ECHO LV EDV INDEX A4C: 37 ML/M2
ECHO LV EDV NDEX A2C: 30 ML/M2
ECHO LV EJECTION FRACTION A2C: 48 %
ECHO LV EJECTION FRACTION A4C: 45 %
ECHO LV EJECTION FRACTION BIPLANE: 47 % (ref 55–100)
ECHO LV ESV A2C: 28 ML
ECHO LV ESV A4C: 36 ML
ECHO LV ESV INDEX A2C: 16 ML/M2
ECHO LV ESV INDEX A4C: 21 ML/M2
ECHO LV FRACTIONAL SHORTENING: 33 % (ref 28–44)
ECHO LV INTERNAL DIMENSION DIASTOLE INDEX: 2.23 CM/M2
ECHO LV INTERNAL DIMENSION DIASTOLIC: 3.9 CM (ref 3.9–5.3)
ECHO LV INTERNAL DIMENSION SYSTOLIC INDEX: 1.49 CM/M2
ECHO LV INTERNAL DIMENSION SYSTOLIC: 2.6 CM
ECHO LV IVSD: 1 CM (ref 0.6–0.9)
ECHO LV MASS 2D: 131 G (ref 67–162)
ECHO LV MASS INDEX 2D: 74.8 G/M2 (ref 43–95)
ECHO LV POSTERIOR WALL DIASTOLIC: 1.1 CM (ref 0.6–0.9)
ECHO LV RELATIVE WALL THICKNESS RATIO: 0.56
ECHO LVOT AREA: 3.8 CM2
ECHO LVOT AV VTI INDEX: 0.8
ECHO LVOT DIAM: 2.2 CM
ECHO LVOT MEAN GRADIENT: 1 MMHG
ECHO LVOT PEAK GRADIENT: 2 MMHG
ECHO LVOT PEAK VELOCITY: 0.8 M/S
ECHO LVOT STROKE VOLUME INDEX: 35 ML/M2
ECHO LVOT SV: 61.2 ML
ECHO LVOT VTI: 16.1 CM
ECHO MV A VELOCITY: 0.81 M/S
ECHO MV E DECELERATION TIME (DT): 290 MS
ECHO MV E VELOCITY: 0.57 M/S
ECHO MV E/A RATIO: 0.7
ECHO MV E/E' LATERAL: 9.5
ECHO MV E/E' RATIO (AVERAGED): 10.45
ECHO MV E/E' SEPTAL: 11.4
ECHO PV ACCELERATION TIME (AT): 106 MS
ECHO PV MAX VELOCITY: 1.2 M/S
ECHO PV PEAK GRADIENT: 5 MMHG
ECHO RV BASAL DIMENSION: 3 CM
ECHO RV FREE WALL PEAK S': 13 CM/S
ECHO RV INTERNAL DIMENSION: 2.7 CM
ECHO RV TAPSE: 1.6 CM (ref 1.7–?)
EKG ATRIAL RATE: 73 BPM
EKG DIAGNOSIS: NORMAL
EKG P AXIS: 25 DEGREES
EKG P-R INTERVAL: 216 MS
EKG Q-T INTERVAL: 462 MS
EKG QRS DURATION: 92 MS
EKG QTC CALCULATION (BAZETT): 508 MS
EKG R AXIS: 56 DEGREES
EKG T AXIS: 125 DEGREES
EKG VENTRICULAR RATE: 73 BPM
EOSINOPHIL # BLD: 0 K/UL (ref 0–0.8)
EOSINOPHIL NFR BLD: 0 % (ref 0.5–7.8)
ERYTHROCYTE [DISTWIDTH] IN BLOOD BY AUTOMATED COUNT: 14.4 % (ref 11.9–14.6)
EST. AVERAGE GLUCOSE BLD GHB EST-MCNC: 134 MG/DL
GLUCOSE BLD STRIP.AUTO-MCNC: 131 MG/DL (ref 65–100)
GLUCOSE BLD STRIP.AUTO-MCNC: 137 MG/DL (ref 65–100)
GLUCOSE BLD STRIP.AUTO-MCNC: 144 MG/DL (ref 65–100)
GLUCOSE BLD STRIP.AUTO-MCNC: 178 MG/DL (ref 65–100)
GLUCOSE SERPL-MCNC: 206 MG/DL (ref 70–99)
HBA1C MFR BLD: 6.3 % (ref 0–5.6)
HCT VFR BLD AUTO: 36.4 % (ref 35.8–46.3)
HDLC SERPL-MCNC: 41 MG/DL (ref 40–60)
HDLC SERPL: 4.4 (ref 0–5)
HGB BLD-MCNC: 11.9 G/DL (ref 11.7–15.4)
IMM GRANULOCYTES # BLD AUTO: 0 K/UL (ref 0–0.5)
IMM GRANULOCYTES NFR BLD AUTO: 1 % (ref 0–5)
LACTATE SERPL-SCNC: 1.2 MMOL/L (ref 0.5–2)
LACTATE SERPL-SCNC: 2.4 MMOL/L (ref 0.5–2)
LDLC SERPL CALC-MCNC: 108 MG/DL (ref 0–100)
LYMPHOCYTES # BLD: 0.5 K/UL (ref 0.5–4.6)
LYMPHOCYTES NFR BLD: 12 % (ref 13–44)
MAGNESIUM SERPL-MCNC: 1.4 MG/DL (ref 1.8–2.4)
MAGNESIUM SERPL-MCNC: 2.2 MG/DL (ref 1.8–2.4)
MCH RBC QN AUTO: 31.2 PG (ref 26.1–32.9)
MCHC RBC AUTO-ENTMCNC: 32.7 G/DL (ref 31.4–35)
MCV RBC AUTO: 95.3 FL (ref 82–102)
MONOCYTES # BLD: 0.1 K/UL (ref 0.1–1.3)
MONOCYTES NFR BLD: 3 % (ref 4–12)
NEUTS SEG # BLD: 3.4 K/UL (ref 1.7–8.2)
NEUTS SEG NFR BLD: 83 % (ref 43–78)
NRBC # BLD: 0 K/UL (ref 0–0.2)
PLATELET # BLD AUTO: 232 K/UL (ref 150–450)
PMV BLD AUTO: 9.9 FL (ref 9.4–12.3)
POTASSIUM SERPL-SCNC: 3.5 MMOL/L (ref 3.5–5.1)
RBC # BLD AUTO: 3.82 M/UL (ref 4.05–5.2)
SERVICE CMNT-IMP: ABNORMAL
SODIUM SERPL-SCNC: 135 MMOL/L (ref 136–145)
TRIGL SERPL-MCNC: 143 MG/DL (ref 0–150)
TROPONIN T SERPL HS-MCNC: 12 NG/L (ref 0–14)
TROPONIN T SERPL HS-MCNC: 13 NG/L (ref 0–14)
TROPONIN T SERPL HS-MCNC: 15 NG/L (ref 0–14)
TROPONIN T SERPL HS-MCNC: 15 NG/L (ref 0–14)
TSH, 3RD GENERATION: 1.79 UIU/ML (ref 0.27–4.2)
VLDLC SERPL CALC-MCNC: 29 MG/DL (ref 6–23)
WBC # BLD AUTO: 4.1 K/UL (ref 4.3–11.1)

## 2024-08-31 PROCEDURE — 36415 COLL VENOUS BLD VENIPUNCTURE: CPT

## 2024-08-31 PROCEDURE — 93306 TTE W/DOPPLER COMPLETE: CPT | Performed by: INTERNAL MEDICINE

## 2024-08-31 PROCEDURE — 6360000002 HC RX W HCPCS: Performed by: CASE MANAGER/CARE COORDINATOR

## 2024-08-31 PROCEDURE — 2060000000 HC ICU INTERMEDIATE R&B

## 2024-08-31 PROCEDURE — 51798 US URINE CAPACITY MEASURE: CPT

## 2024-08-31 PROCEDURE — 6370000000 HC RX 637 (ALT 250 FOR IP): Performed by: STUDENT IN AN ORGANIZED HEALTH CARE EDUCATION/TRAINING PROGRAM

## 2024-08-31 PROCEDURE — 83605 ASSAY OF LACTIC ACID: CPT

## 2024-08-31 PROCEDURE — 82962 GLUCOSE BLOOD TEST: CPT

## 2024-08-31 PROCEDURE — 6370000000 HC RX 637 (ALT 250 FOR IP): Performed by: CASE MANAGER/CARE COORDINATOR

## 2024-08-31 PROCEDURE — 6370000000 HC RX 637 (ALT 250 FOR IP): Performed by: INTERNAL MEDICINE

## 2024-08-31 PROCEDURE — 85025 COMPLETE CBC W/AUTO DIFF WBC: CPT

## 2024-08-31 PROCEDURE — 80061 LIPID PANEL: CPT

## 2024-08-31 PROCEDURE — 6360000002 HC RX W HCPCS: Performed by: INTERNAL MEDICINE

## 2024-08-31 PROCEDURE — 84443 ASSAY THYROID STIM HORMONE: CPT

## 2024-08-31 PROCEDURE — 83036 HEMOGLOBIN GLYCOSYLATED A1C: CPT

## 2024-08-31 PROCEDURE — 83735 ASSAY OF MAGNESIUM: CPT

## 2024-08-31 PROCEDURE — 93005 ELECTROCARDIOGRAM TRACING: CPT | Performed by: CASE MANAGER/CARE COORDINATOR

## 2024-08-31 PROCEDURE — 6360000002 HC RX W HCPCS: Performed by: STUDENT IN AN ORGANIZED HEALTH CARE EDUCATION/TRAINING PROGRAM

## 2024-08-31 PROCEDURE — 84484 ASSAY OF TROPONIN QUANT: CPT

## 2024-08-31 PROCEDURE — 93306 TTE W/DOPPLER COMPLETE: CPT

## 2024-08-31 PROCEDURE — 2580000003 HC RX 258: Performed by: INTERNAL MEDICINE

## 2024-08-31 PROCEDURE — 99222 1ST HOSP IP/OBS MODERATE 55: CPT | Performed by: INTERNAL MEDICINE

## 2024-08-31 PROCEDURE — 80048 BASIC METABOLIC PNL TOTAL CA: CPT

## 2024-08-31 PROCEDURE — 93010 ELECTROCARDIOGRAM REPORT: CPT | Performed by: INTERNAL MEDICINE

## 2024-08-31 RX ORDER — POTASSIUM CHLORIDE 1500 MG/1
40 TABLET, EXTENDED RELEASE ORAL EVERY 4 HOURS
Status: COMPLETED | OUTPATIENT
Start: 2024-08-31 | End: 2024-08-31

## 2024-08-31 RX ORDER — AMITRIPTYLINE HYDROCHLORIDE 10 MG/1
10 TABLET ORAL NIGHTLY
Status: DISCONTINUED | OUTPATIENT
Start: 2024-08-31 | End: 2024-09-02 | Stop reason: HOSPADM

## 2024-08-31 RX ORDER — METOPROLOL TARTRATE 1 MG/ML
2.5 INJECTION, SOLUTION INTRAVENOUS ONCE
Status: DISCONTINUED | OUTPATIENT
Start: 2024-08-31 | End: 2024-09-02 | Stop reason: HOSPADM

## 2024-08-31 RX ORDER — POTASSIUM CHLORIDE 1500 MG/1
20 TABLET, EXTENDED RELEASE ORAL DAILY
Status: DISCONTINUED | OUTPATIENT
Start: 2024-09-01 | End: 2024-09-02 | Stop reason: HOSPADM

## 2024-08-31 RX ORDER — CITALOPRAM HYDROBROMIDE 20 MG/1
10 TABLET ORAL EVERY EVENING
Status: DISCONTINUED | OUTPATIENT
Start: 2024-08-31 | End: 2024-09-02 | Stop reason: HOSPADM

## 2024-08-31 RX ORDER — CITALOPRAM HYDROBROMIDE 10 MG/1
10 TABLET ORAL DAILY
COMMUNITY
End: 2024-09-10 | Stop reason: SDUPTHER

## 2024-08-31 RX ORDER — AMITRIPTYLINE HYDROCHLORIDE 10 MG/1
10 TABLET ORAL NIGHTLY
COMMUNITY
End: 2024-09-10 | Stop reason: SDUPTHER

## 2024-08-31 RX ORDER — LEVOFLOXACIN 500 MG/1
750 TABLET, FILM COATED ORAL EVERY OTHER DAY
Status: DISCONTINUED | OUTPATIENT
Start: 2024-09-01 | End: 2024-09-02 | Stop reason: HOSPADM

## 2024-08-31 RX ORDER — DEXAMETHASONE 4 MG/1
6 TABLET ORAL DAILY
Status: DISCONTINUED | OUTPATIENT
Start: 2024-08-31 | End: 2024-09-02 | Stop reason: HOSPADM

## 2024-08-31 RX ORDER — MAGNESIUM SULFATE IN WATER 40 MG/ML
2000 INJECTION, SOLUTION INTRAVENOUS ONCE
Status: COMPLETED | OUTPATIENT
Start: 2024-08-31 | End: 2024-08-31

## 2024-08-31 RX ADMIN — ALLOPURINOL 200 MG: 100 TABLET ORAL at 08:53

## 2024-08-31 RX ADMIN — CITALOPRAM HYDROBROMIDE 10 MG: 20 TABLET ORAL at 18:18

## 2024-08-31 RX ADMIN — TRAMADOL HYDROCHLORIDE 50 MG: 50 TABLET ORAL at 08:54

## 2024-08-31 RX ADMIN — METOPROLOL SUCCINATE 50 MG: 50 TABLET, EXTENDED RELEASE ORAL at 08:53

## 2024-08-31 RX ADMIN — BUMETANIDE 1 MG: 0.25 INJECTION INTRAMUSCULAR; INTRAVENOUS at 18:17

## 2024-08-31 RX ADMIN — POTASSIUM CHLORIDE 40 MEQ: 1500 TABLET, EXTENDED RELEASE ORAL at 08:53

## 2024-08-31 RX ADMIN — VITAMIN D, TAB 1000IU (100/BT) 4000 UNITS: 25 TAB at 08:52

## 2024-08-31 RX ADMIN — HEPARIN SODIUM 5000 UNITS: 5000 INJECTION INTRAVENOUS; SUBCUTANEOUS at 21:45

## 2024-08-31 RX ADMIN — Medication 12 MG: at 21:46

## 2024-08-31 RX ADMIN — LISINOPRIL 20 MG: 20 TABLET ORAL at 21:46

## 2024-08-31 RX ADMIN — SODIUM CHLORIDE, PRESERVATIVE FREE 10 ML: 5 INJECTION INTRAVENOUS at 21:44

## 2024-08-31 RX ADMIN — AMLODIPINE BESYLATE 5 MG: 5 TABLET ORAL at 08:53

## 2024-08-31 RX ADMIN — SODIUM CHLORIDE, PRESERVATIVE FREE 10 ML: 5 INJECTION INTRAVENOUS at 08:52

## 2024-08-31 RX ADMIN — POTASSIUM CHLORIDE 40 MEQ: 1500 TABLET, EXTENDED RELEASE ORAL at 12:41

## 2024-08-31 RX ADMIN — DOXEPIN HYDROCHLORIDE 10 MG: 10 CAPSULE ORAL at 21:46

## 2024-08-31 RX ADMIN — METOPROLOL SUCCINATE 50 MG: 50 TABLET, EXTENDED RELEASE ORAL at 21:46

## 2024-08-31 RX ADMIN — AMITRIPTYLINE HYDROCHLORIDE 10 MG: 10 TABLET, FILM COATED ORAL at 21:46

## 2024-08-31 RX ADMIN — LISINOPRIL 20 MG: 20 TABLET ORAL at 08:53

## 2024-08-31 RX ADMIN — FAMOTIDINE 10 MG: 20 TABLET, FILM COATED ORAL at 09:12

## 2024-08-31 RX ADMIN — GABAPENTIN 600 MG: 300 CAPSULE ORAL at 08:53

## 2024-08-31 RX ADMIN — DEXAMETHASONE 6 MG: 4 TABLET ORAL at 12:40

## 2024-08-31 RX ADMIN — HEPARIN SODIUM 5000 UNITS: 5000 INJECTION INTRAVENOUS; SUBCUTANEOUS at 08:53

## 2024-08-31 RX ADMIN — MAGNESIUM SULFATE HEPTAHYDRATE 2000 MG: 40 INJECTION, SOLUTION INTRAVENOUS at 09:08

## 2024-08-31 RX ADMIN — BUMETANIDE 1 MG: 0.25 INJECTION INTRAMUSCULAR; INTRAVENOUS at 08:53

## 2024-08-31 RX ADMIN — GABAPENTIN 600 MG: 300 CAPSULE ORAL at 21:46

## 2024-08-31 NOTE — PROGRESS NOTES
4 Eyes Skin Assessment     NAME:  Mirtha Posada  YOB: 1945  MEDICAL RECORD NUMBER:  [id number]    The patient is being assessed for  Admission    I agree that at least one RN has performed a thorough Head to Toe Skin Assessment on the patient. ALL assessment sites listed below have been assessed.      Areas assessed by both nurses:    Head, Face, Ears, Shoulders, Back, Chest, Arms, Elbows, Hands, Sacrum. Buttock, Coccyx, Ischium, and Legs. Feet and Heels        Does the Patient have a Wound? No noted wound(s)       Ghulam Prevention initiated by RN: No  Wound Care Orders initiated by RN: No    Pressure Injury (Stage 3,4, Unstageable, DTI, NWPT, and Complex wounds) if present, place Wound referral order by RN under : No    New Ostomies, if present place, Ostomy referral order under : No     Nurse 1 eSignature: Electronically signed by FRED JHAVERI RN on 8/31/24 at 2:37 AM EDT    **SHARE this note so that the co-signing nurse can place an eSignature**    Nurse 2 eSignature: {Esignature:[id number]}

## 2024-08-31 NOTE — PROGRESS NOTES
Patient resting in bed, alert with periods of confusion, but  cooperative with care. Patient on 1 liter of Oxygen via N/C. Telemetry in place. Patient denies pain or distress, safety measures in place, call light within reach

## 2024-08-31 NOTE — PROGRESS NOTES
Patient resting in bed, alert with periods of confusion, but  cooperative with care. Patient on 1 liter of Oxygen via N/C. Telemetry in place. Patient denies pain or distress, safety measures in place, call light within reach.

## 2024-08-31 NOTE — CONSULTS
[facility] Card[facility] Initial Cardiac Evaluation      Date of  Admission: 8/30/2024  4:53 PM     Primary Care Physician: Marva Xavier, APRN - CNP  Primary Cardiologist: Jackson Rahman DO  Referring Physician: Dr. Lamas  Supervising Physician: Heladio Corea MD    CC/Reason for evaluation: CHF     HPI:  Mirtha Posada is a 79 y.o. female with prior history of hypertension, hyperlipidemia, NICM, CHF (EF: 54% in Oct 2023), Sjogren's syndrome, DMT2, CKD3, gout, history of breast cancer S/PE left mastectomy and status post chemo including stem cell transplant presented to [facility] [place] ED with runny nose, cough, shortness of breath and headache x 7 days.   with recent diagnosis of COVID on Paxlovid.    In the ED, initial vitals included BP: 160/80, HR: 96, RR: 15, SpO2: 98% on RA, T: 90 9.7F.  ECG showing sinus rhythm with nonspecific T wave abnormality, rate: 98.  CXR suggestive of vascular prominence which may be d/t hypoaeration vs mild edema.  Labwork included NA: 138, K: 4.0, BUN: 13, CR: 1.05, GFR: 54, lactic acid: 2.5, glucose: 158, BNP: 2800, WBC: 6.5, hemoglobin: 13.6, PLT: 276.  Blood cultures pending.  SARS-CoV-2 PCR positive.  She received Decadron IV, Lasix 40 mg IV, was started on antibiotics and nebulizer and admitted to hospitalist service.  We are consulted for symptoms of congestive heart failure.          Past Medical History:   Diagnosis Date    Ankle fracture 2014    Ankle osteomyelitis, left (HCC) 2014    Breast cancer (HCC)     Chemotherapy-induced neuropathy (HCC)     Chronic anemia     Chronic diarrhea 06/08/2023    Chronic kidney disease     Chronic systolic CHF (congestive heart failure) (HCC)     CKD (chronic kidney disease) stage 3, GFR 30-59 ml/min (HCC)     Colon polyp 2020    Depression     Dilated cardiomyopathy (HCC)     Dry eye syndrome of both eyes     Dyslipidemia     Elevated blood uric acid level     Essential hypertension     Fracture of right  Findings consistent with mild concentric hypertrophy. Normal wall motion. Abnormal diastolic function. Average E/e' ratio is 16.88.    Aortic Valve: Mild sclerosis of the aortic valve cusp.    Mitral Valve: Mild regurgitation with a centrally directed jet.    Signed by: Alla Schwartz MD on 10/20/2023  8:03 AM    Cath: No results found for this or any previous visit.    EP: No results found for this or any previous visit.      Labs:     Recent Labs     24  1703   WBC 6.5   HGB 13.6   HCT 41.6   MCV 95.0        Lab Results   Component Value Date    WBC 6.5 2024    HGB 13.6 2024    HCT 41.6 2024     2024    CHOL 223 (H) 06/10/2024    TRIG 283 (H) 06/10/2024    HDL 54 06/10/2024    LDLDIRECT 127 (H) 2022    ALT 47 2024    AST 76 (H) 2024     2024    K 4.0 2024    CL 96 (L) 2024    CREATININE 1.05 2024    BUN 13 2024    CO2 25 2024    TSH 1.690 2021    LABA1C 6.3 (H) 06/10/2024        Pt has been seen and examined by Dr. Corea. He agrees with the following assessment and plan.     Assessment/Plan:       Principal Problem:    Acute pulmonary edema with congestive heart failure (HCC)  Pt presenting with SOB.  BNP 2800.  CXR suggestive of vascular prominence which may be d/t hypoaeration vs mild edema.  Last EF: 54 in Oct 2023.  On ToprolXL and ACE-I home meds.  Takes Lasix PRN at home--per pt has had little effect.  Currently being diuresed with Bumex 1mg BID per primary team.   Strict I/O's, daily weights.  Q4h vitals and prn.  Continuous telemetry.  Monitor daily labs/lytes and replete as necessary to keep K>4, Mg>2.  K: 3.5, M.4.  Replete Mg and Potassium now.  Echo for am.            Active Problems:         Chronic combined systolic and diastolic CHF (congestive heart failure) (HCC)  History of this.  Last EF: 54% in Oct 2023.  Obtain updated Echo for am.           Dilated cardiomyopathy

## 2024-08-31 NOTE — PROGRESS NOTES
Patient had a burst of SVT'S/ with complaining of chest pain. MACARIO Gallegso ordered to just monitor this patient.

## 2024-08-31 NOTE — PROGRESS NOTES
Hospitalist Progress Note   Admit Date:  2024  4:53 PM   Name:  Mirtha Posada   Age:  79 y.o.  Sex:  female  :  1945   MRN:  [id number]   Room:  6/    Presenting/Chief Complaint: Cough     Reason(s) for Admission: Acute pulmonary edema with congestive heart failure (HCC) [I50.1]  Acute on chronic combined systolic and diastolic congestive heart failure (HCC) [I50.43]  Acute respiratory failure with hypoxia (HCC) [J96.01]  Congestive heart failure, unspecified HF chronicity, unspecified heart failure type (HCC) [I50.9]  COVID [U07.1]     Hospital Course:    79 y.o. female with medical history of insomnia, Sjogren's syndrome, type 2 diabetes, CKD 3A, gout, CHF with systolic and diastolic dysfunction, dyslipidemia, depression, osteoarthritis, chemotherapy-induced peripheral neuropathy, dilated cardiomyopathy, vitamin D and B12 deficiency, hypertension, GERD with esophagitis, history of breast s/p left mastectomy presented with rhinorrhea and shortness of breath-patient's symptoms been ongoing for the last 7 days.  Patient saw PCP diagnosed with COVID and given Paxlovid.  Patient presented to the ED lactic acid 2.5, BNP of 2800 chest x-ray with evidence of congestion with no evidence of pneumonia.  Patient does not wear oxygen at home.      Subjective & 24hr Events:   Patient seen and examined at the bedside.  No overnight events.  Patient feeling slightly better this morning.  On room air at 97%.      Assessment & Plan:   Congestive heart failure  Dilated cardiomyopathy  - Patient on fluid restriction/salt restriction  - Continue 1 mg Bumex IV twice daily  - Echo pending  - Cardiology consulted  - BNP 2800    COVID  - Currently on room air at 97%  - Continue Decadron 6 mg IV x 7 days  - Patient does not qualify for baricitinib since symptoms ongoing greater than 7 days    Primary insomnia  - Continue melatonin    Chronic gout of right hand  - Continue allopurinol    Hyperlipidemia  - Patient

## 2024-08-31 NOTE — PROGRESS NOTES
TRANSFER - IN REPORT:    Verbal report received from Cherry shin Mirtha Posada  being received from ED for routine progression of patient care      Report consisted of patient's Situation, Background, Assessment and   Recommendations(SBAR).     Information from the following report(s) Nurse Handoff Report was reviewed with the receiving nurse.    Opportunity for questions and clarification was provided.      Assessment completed upon patient's arrival to unit and care assumed.      fever/pneumonia/throat clearing/upper respiratory infection/cough/gurgly voice/change of breathing pattern/Monitor for s/s aspiration/laryngeal penetration. If noted:  D/C p.o. intake, provide non-oral nutrition/hydration/meds, and contact this service @ [phone]

## 2024-08-31 NOTE — CONSULTS
Nutrition Note:   Acknowledge Consult for Education: CHF diet education    Initial education to be completed by CHF Nurse Navigator. Will defer RD intervention at this time. Navigator will reconsult RD if additional diet education intervention needed.    MARIE PAULA, RD

## 2024-08-31 NOTE — ED NOTES
Order in for straight cath urine at this time. Pt refusing to be straight cathed at this time. Pt requesting to use restroom once she is transferred to her new room. Request for pt transport placed at this time.     Venus Winchester RN  08/30/24 2024

## 2024-08-31 NOTE — ACP (ADVANCE CARE PLANNING)
Advance Care Planning   General Advance Care Planning (ACP) Conversation    Date of Conversation: 8/30/2024  Conducted with: Patient with Decision Making Capacity  Other persons present: None    Healthcare Decision Maker:    Primary Decision Maker: Mychal Posada - Spouse - [phone]    Secondary Decision Maker: Christian Posada - Child - [phone]    Today we documented Decision Maker(s) consistent with Legal Next of Kin hierarchy.  Content/Action Overview:  Has ACP document(s) on file - reflects the patient's care preferences  Reviewed DNR/DNI and patient elects Full Code (Attempt Resuscitation)        Length of Voluntary ACP Conversation in minutes:  <16 minutes (Non-Billable)    Birgit Aldrich RN

## 2024-08-31 NOTE — PLAN OF CARE
Problem: Discharge Planning  Goal: Discharge to home or other facility with appropriate resources  Outcome: Progressing  Flowsheets (Taken 8/31/2024 0808)  Discharge to home or other facility with appropriate resources: Identify barriers to discharge with patient and caregiver     Problem: Safety - Adult  Goal: Free from fall injury  Outcome: Progressing

## 2024-08-31 NOTE — PROGRESS NOTES
Md Smith ordered to  hold metoprolol IV 2.5 mg and ordered Troponin to be checked every 2 hrs 4 times.

## 2024-09-01 LAB
ANION GAP SERPL CALC-SCNC: 13 MMOL/L (ref 9–18)
BASOPHILS # BLD: 0 K/UL (ref 0–0.2)
BASOPHILS NFR BLD: 0 % (ref 0–2)
BUN SERPL-MCNC: 41 MG/DL (ref 8–23)
CALCIUM SERPL-MCNC: 8.3 MG/DL (ref 8.8–10.2)
CHLORIDE SERPL-SCNC: 99 MMOL/L (ref 98–107)
CO2 SERPL-SCNC: 22 MMOL/L (ref 20–28)
CREAT SERPL-MCNC: 1.54 MG/DL (ref 0.6–1.1)
DIFFERENTIAL METHOD BLD: ABNORMAL
DRUGS UR: NORMAL
EOSINOPHIL # BLD: 0 K/UL (ref 0–0.8)
EOSINOPHIL NFR BLD: 0 % (ref 0.5–7.8)
ERYTHROCYTE [DISTWIDTH] IN BLOOD BY AUTOMATED COUNT: 14.5 % (ref 11.9–14.6)
GLUCOSE BLD STRIP.AUTO-MCNC: 115 MG/DL (ref 65–100)
GLUCOSE BLD STRIP.AUTO-MCNC: 120 MG/DL (ref 65–100)
GLUCOSE BLD STRIP.AUTO-MCNC: 131 MG/DL (ref 65–100)
GLUCOSE BLD STRIP.AUTO-MCNC: 153 MG/DL (ref 65–100)
GLUCOSE SERPL-MCNC: 133 MG/DL (ref 70–99)
HCT VFR BLD AUTO: 36.6 % (ref 35.8–46.3)
HGB BLD-MCNC: 11.8 G/DL (ref 11.7–15.4)
IMM GRANULOCYTES # BLD AUTO: 0.1 K/UL (ref 0–0.5)
IMM GRANULOCYTES NFR BLD AUTO: 1 % (ref 0–5)
LYMPHOCYTES # BLD: 1.4 K/UL (ref 0.5–4.6)
LYMPHOCYTES NFR BLD: 17 % (ref 13–44)
MAGNESIUM SERPL-MCNC: 2.2 MG/DL (ref 1.8–2.4)
MCH RBC QN AUTO: 31.5 PG (ref 26.1–32.9)
MCHC RBC AUTO-ENTMCNC: 32.2 G/DL (ref 31.4–35)
MCV RBC AUTO: 97.6 FL (ref 82–102)
MONOCYTES # BLD: 0.6 K/UL (ref 0.1–1.3)
MONOCYTES NFR BLD: 7 % (ref 4–12)
NEUTS SEG # BLD: 6.4 K/UL (ref 1.7–8.2)
NEUTS SEG NFR BLD: 75 % (ref 43–78)
NRBC # BLD: 0 K/UL (ref 0–0.2)
PLATELET # BLD AUTO: 265 K/UL (ref 150–450)
PMV BLD AUTO: 10.7 FL (ref 9.4–12.3)
POTASSIUM SERPL-SCNC: 5 MMOL/L (ref 3.5–5.1)
RBC # BLD AUTO: 3.75 M/UL (ref 4.05–5.2)
SERVICE CMNT-IMP: ABNORMAL
SODIUM SERPL-SCNC: 134 MMOL/L (ref 136–145)
WBC # BLD AUTO: 8.5 K/UL (ref 4.3–11.1)

## 2024-09-01 PROCEDURE — 6370000000 HC RX 637 (ALT 250 FOR IP): Performed by: INTERNAL MEDICINE

## 2024-09-01 PROCEDURE — 6370000000 HC RX 637 (ALT 250 FOR IP): Performed by: CASE MANAGER/CARE COORDINATOR

## 2024-09-01 PROCEDURE — 36415 COLL VENOUS BLD VENIPUNCTURE: CPT

## 2024-09-01 PROCEDURE — 6360000002 HC RX W HCPCS: Performed by: STUDENT IN AN ORGANIZED HEALTH CARE EDUCATION/TRAINING PROGRAM

## 2024-09-01 PROCEDURE — 6370000000 HC RX 637 (ALT 250 FOR IP): Performed by: STUDENT IN AN ORGANIZED HEALTH CARE EDUCATION/TRAINING PROGRAM

## 2024-09-01 PROCEDURE — 99232 SBSQ HOSP IP/OBS MODERATE 35: CPT | Performed by: INTERNAL MEDICINE

## 2024-09-01 PROCEDURE — 2580000003 HC RX 258: Performed by: INTERNAL MEDICINE

## 2024-09-01 PROCEDURE — 85025 COMPLETE CBC W/AUTO DIFF WBC: CPT

## 2024-09-01 PROCEDURE — 80048 BASIC METABOLIC PNL TOTAL CA: CPT

## 2024-09-01 PROCEDURE — 97161 PT EVAL LOW COMPLEX 20 MIN: CPT

## 2024-09-01 PROCEDURE — 6360000002 HC RX W HCPCS: Performed by: INTERNAL MEDICINE

## 2024-09-01 PROCEDURE — 83735 ASSAY OF MAGNESIUM: CPT

## 2024-09-01 PROCEDURE — 97530 THERAPEUTIC ACTIVITIES: CPT

## 2024-09-01 PROCEDURE — 2060000000 HC ICU INTERMEDIATE R&B

## 2024-09-01 PROCEDURE — 82962 GLUCOSE BLOOD TEST: CPT

## 2024-09-01 RX ORDER — BUMETANIDE 0.25 MG/ML
1 INJECTION INTRAMUSCULAR; INTRAVENOUS DAILY
Status: DISCONTINUED | OUTPATIENT
Start: 2024-09-01 | End: 2024-09-02 | Stop reason: HOSPADM

## 2024-09-01 RX ORDER — SODIUM CHLORIDE 9 MG/ML
INJECTION, SOLUTION INTRAVENOUS CONTINUOUS
Status: DISCONTINUED | OUTPATIENT
Start: 2024-09-01 | End: 2024-09-01

## 2024-09-01 RX ADMIN — LISINOPRIL 20 MG: 20 TABLET ORAL at 08:09

## 2024-09-01 RX ADMIN — DOXEPIN HYDROCHLORIDE 10 MG: 10 CAPSULE ORAL at 19:57

## 2024-09-01 RX ADMIN — CITALOPRAM HYDROBROMIDE 10 MG: 20 TABLET ORAL at 16:08

## 2024-09-01 RX ADMIN — GABAPENTIN 600 MG: 300 CAPSULE ORAL at 19:56

## 2024-09-01 RX ADMIN — PHENOL 1 SPRAY: 1.5 LIQUID ORAL at 16:08

## 2024-09-01 RX ADMIN — METOPROLOL SUCCINATE 50 MG: 50 TABLET, EXTENDED RELEASE ORAL at 19:56

## 2024-09-01 RX ADMIN — ALUMINUM HYDROXIDE, MAGNESIUM HYDROXIDE, AND SIMETHICONE 30 ML: 1200; 120; 1200 SUSPENSION ORAL at 16:14

## 2024-09-01 RX ADMIN — AMLODIPINE BESYLATE 5 MG: 5 TABLET ORAL at 08:09

## 2024-09-01 RX ADMIN — LEVOFLOXACIN 750 MG: 500 TABLET, FILM COATED ORAL at 08:08

## 2024-09-01 RX ADMIN — VITAMIN D, TAB 1000IU (100/BT) 4000 UNITS: 25 TAB at 08:08

## 2024-09-01 RX ADMIN — HEPARIN SODIUM 5000 UNITS: 5000 INJECTION INTRAVENOUS; SUBCUTANEOUS at 08:09

## 2024-09-01 RX ADMIN — Medication 12 MG: at 19:56

## 2024-09-01 RX ADMIN — SODIUM CHLORIDE, PRESERVATIVE FREE 10 ML: 5 INJECTION INTRAVENOUS at 08:10

## 2024-09-01 RX ADMIN — ALLOPURINOL 200 MG: 100 TABLET ORAL at 08:08

## 2024-09-01 RX ADMIN — AMITRIPTYLINE HYDROCHLORIDE 10 MG: 10 TABLET, FILM COATED ORAL at 19:56

## 2024-09-01 RX ADMIN — HEPARIN SODIUM 5000 UNITS: 5000 INJECTION INTRAVENOUS; SUBCUTANEOUS at 19:56

## 2024-09-01 RX ADMIN — POTASSIUM CHLORIDE 20 MEQ: 1500 TABLET, EXTENDED RELEASE ORAL at 08:08

## 2024-09-01 RX ADMIN — DEXAMETHASONE 6 MG: 4 TABLET ORAL at 08:09

## 2024-09-01 RX ADMIN — SODIUM CHLORIDE, PRESERVATIVE FREE 10 ML: 5 INJECTION INTRAVENOUS at 19:57

## 2024-09-01 RX ADMIN — GABAPENTIN 600 MG: 300 CAPSULE ORAL at 08:08

## 2024-09-01 RX ADMIN — LISINOPRIL 20 MG: 20 TABLET ORAL at 19:56

## 2024-09-01 RX ADMIN — TRAMADOL HYDROCHLORIDE 50 MG: 50 TABLET ORAL at 08:09

## 2024-09-01 RX ADMIN — METOPROLOL SUCCINATE 50 MG: 50 TABLET, EXTENDED RELEASE ORAL at 08:09

## 2024-09-01 ASSESSMENT — PAIN SCALES - GENERAL
PAINLEVEL_OUTOF10: 0
PAINLEVEL_OUTOF10: 0

## 2024-09-01 NOTE — PROGRESS NOTES
Pt resting in bed comfortably at this time, alert and oriented times 4. No distress noted, respirations even and unlabored on room air. Pt has complaints of gas today, pt given PRN gas medication. Pt instructed to call for assistance if needed, call light in place, will continue to monitor. Will prepare for bedside shift report.

## 2024-09-01 NOTE — PROGRESS NOTES
Range    POC Glucose 137 (H) 65 - 100 mg/dL    Performed by: Lina    Transthoracic echocardiogram (TTE) complete with contrast, bubble, strain, and 3D PRN    Collection Time: 08/31/24 10:10 AM   Result Value Ref Range    LV EDV A2C 53 mL    LV EDV A4C 65 mL    LV ESV A2C 28 mL    LV ESV A4C 36 mL    IVSd 1.0 (A) 0.6 - 0.9 cm    LVIDd 3.9 3.9 - 5.3 cm    LVIDs 2.6 cm    LVOT Diameter 2.2 cm    LVOT Mean Gradient 1 mmHg    LVOT VTI 16.1 cm    LVOT Peak Velocity 0.8 m/s    LVOT Peak Gradient 2 mmHg    LVPWd 1.1 (A) 0.6 - 0.9 cm    LV E' Lateral Velocity 6 cm/s    LV E' Septal Velocity 5 cm/s    LV Ejection Fraction A2C 48 %    LV Ejection Fraction A4C 45 %    EF BP 47 (A) 55 - 100 %    LVOT Area 3.8 cm2    LVOT SV 61.2 ml    LA Minor Axis 5.4 cm    LA Major Axis 5.8 cm    LA Area 2C 14.9 cm2    LA Area 4C 17.8 cm2    LA Volume MOD A2C 35 22 - 52 mL    LA Volume MOD A4C 43 22 - 52 mL    LA Volume BP 39 22 - 52 mL    LA Diameter 3.3 cm    AV Mean Velocity 0.7 m/s    AV Mean Gradient 2 mmHg    AV VTI 20.2 cm    AV Peak Velocity 1.1 m/s    AV Peak Gradient 5 mmHg    AV Area by VTI 3.0 cm2    AV Area by Peak Velocity 2.7 cm2    Aortic Root 3.0 cm    Ascending Aorta 3.6 cm    IVC Proxmal 1.4 cm    MV E Wave Deceleration Time 290.0 ms    MV A Velocity 0.81 m/s    MV E Velocity 0.57 m/s    PV .0 ms    PV Max Velocity 1.2 m/s    PV Peak Gradient 5 mmHg    Est. RA Pressure 3 mmHg    RVIDd 2.7 cm    RV Basal Dimension 3.0 cm    RV Free Wall Peak S' 13 cm/s    TAPSE 1.6 (A) 1.7 cm    Body Surface Area 1.76 m2    Fractional Shortening 2D 33 28 - 44 %    LV ESV Index A4C 21 mL/m2    LV EDV Index A4C 37 mL/m2    LV ESV Index A2C 16 mL/m2    LV EDV Index A2C 30 mL/m2    LVIDd Index 2.23 cm/m2    LVIDs Index 1.49 cm/m2    LV RWT Ratio 0.56     LV Mass 2D 131.0 67 - 162 g    LV Mass 2D Index 74.8 43 - 95 g/m2    MV E/A 0.70     E/E' Ratio (Averaged) 10.45     E/E' Lateral 9.50     E/E' Septal 11.40     LA Volume  ml/min/1.73m2    Calcium 8.3 (L) 8.8 - 10.2 MG/DL   Magnesium    Collection Time: 09/01/24  5:09 AM   Result Value Ref Range    Magnesium 2.2 1.8 - 2.4 mg/dL   CBC with Auto Differential    Collection Time: 09/01/24  5:09 AM   Result Value Ref Range    WBC 8.5 4.3 - 11.1 K/uL    RBC 3.75 (L) 4.05 - 5.2 M/uL    Hemoglobin 11.8 11.7 - 15.4 g/dL    Hematocrit 36.6 35.8 - 46.3 %    MCV 97.6 82 - 102 FL    MCH 31.5 26.1 - 32.9 PG    MCHC 32.2 31.4 - 35.0 g/dL    RDW 14.5 11.9 - 14.6 %    Platelets 265 150 - 450 K/uL    MPV 10.7 9.4 - 12.3 FL    nRBC 0.00 0.0 - 0.2 K/uL    Differential Type AUTOMATED      Neutrophils % 75 43 - 78 %    Lymphocytes % 17 13 - 44 %    Monocytes % 7 4.0 - 12.0 %    Eosinophils % 0 (L) 0.5 - 7.8 %    Basophils % 0 0.0 - 2.0 %    Immature Granulocytes % 1 0.0 - 5.0 %    Neutrophils Absolute 6.4 1.7 - 8.2 K/UL    Lymphocytes Absolute 1.4 0.5 - 4.6 K/UL    Monocytes Absolute 0.6 0.1 - 1.3 K/UL    Eosinophils Absolute 0.0 0.0 - 0.8 K/UL    Basophils Absolute 0.0 0.0 - 0.2 K/UL    Immature Granulocytes Absolute 0.1 0.0 - 0.5 K/UL   POCT Glucose    Collection Time: 09/01/24  7:40 AM   Result Value Ref Range    POC Glucose 115 (H) 65 - 100 mg/dL    Performed by: Kermit[facility])        Recent Labs     08/30/24  1815   COVID19 Detected*       Current Meds:  Current Facility-Administered Medications   Medication Dose Route Frequency    [Held by provider] bumetanide (BUMEX) injection 1 mg  1 mg IntraVENous Daily    metoprolol (LOPRESSOR) injection 2.5 mg  2.5 mg IntraVENous Once    potassium chloride (KLOR-CON M) extended release tablet 20 mEq  20 mEq Oral Daily    dexAMETHasone (DECADRON) tablet 6 mg  6 mg Oral Daily    levoFLOXacin (LEVAQUIN) tablet 750 mg  750 mg Oral Every Other Day    amitriptyline (ELAVIL) tablet 10 mg  10 mg Oral Nightly    citalopram (CELEXA) tablet 10 mg  10 mg Oral QPM    allopurinol (ZYLOPRIM) tablet 200 mg  200 mg Oral Daily    amLODIPine (NORVASC) tablet 5 mg  5

## 2024-09-01 NOTE — PROGRESS NOTES
Pt resting in bed comfortably at this time, alert and oriented times 4. No distress noted, respirations even and unlabored on room air. Pt complains of sore throat, MD Leigh is aware, new orders placed. Pt instructed to call for assistance if needed, call light in place, will continue to monitor.

## 2024-09-01 NOTE — PROGRESS NOTES
(LEVAQUIN) tablet 750 mg  750 mg Oral Every Other Day    amitriptyline (ELAVIL) tablet 10 mg  10 mg Oral Nightly    citalopram (CELEXA) tablet 10 mg  10 mg Oral QPM    allopurinol (ZYLOPRIM) tablet 200 mg  200 mg Oral Daily    amLODIPine (NORVASC) tablet 5 mg  5 mg Oral Daily    cetirizine (ZYRTEC) tablet 10 mg  10 mg Oral Daily PRN    guaiFENesin-dextromethorphan (ROBITUSSIN DM) 100-10 MG/5ML syrup 5 mL  5 mL Oral Q12H PRN    doxepin (SINEQUAN) capsule 10 mg  10 mg Oral Nightly    fluticasone (FLONASE) 50 MCG/ACT nasal spray 2 spray  2 spray Nasal BID PRN    gabapentin (NEURONTIN) capsule 600 mg  600 mg Oral BID    lisinopril (PRINIVIL;ZESTRIL) tablet 20 mg  20 mg Oral BID    melatonin tablet 12 mg  12 mg Oral Nightly    metoprolol succinate (TOPROL XL) extended release tablet 50 mg  50 mg Oral BID    traMADol (ULTRAM) tablet 50 mg  50 mg Oral Daily    Vitamin D (CHOLECALCIFEROL) tablet 4,000 Units  4,000 Units Oral Daily    sodium chloride flush 0.9 % injection 5-40 mL  5-40 mL IntraVENous 2 times per day    sodium chloride flush 0.9 % injection 5-40 mL  5-40 mL IntraVENous PRN    0.9 % sodium chloride infusion   IntraVENous PRN    famotidine (PEPCID) tablet 10 mg  10 mg Oral BID PRN    aluminum & magnesium hydroxide-simethicone (MAALOX) 200-200-20 MG/5ML suspension 30 mL  30 mL Oral Q6H PRN    ondansetron (ZOFRAN-ODT) disintegrating tablet 4 mg  4 mg Oral Q8H PRN    Or    ondansetron (ZOFRAN) injection 4 mg  4 mg IntraVENous Q6H PRN    polyethylene glycol (GLYCOLAX) packet 17 g  17 g Oral Daily PRN    bisacodyl (DULCOLAX) suppository 10 mg  10 mg Rectal Daily PRN    acetaminophen (TYLENOL) tablet 650 mg  650 mg Oral Q6H PRN    Or    acetaminophen (TYLENOL) suppository 650 mg  650 mg Rectal Q6H PRN    potassium chloride (KLOR-CON M) extended release tablet 40 mEq  40 mEq Oral PRN    Or    potassium bicarb-citric acid (EFFER-K) effervescent tablet 40 mEq  40 mEq Oral PRN    Or    potassium chloride 10 mEq/100 mL  IVPB (Peripheral Line)  10 mEq IntraVENous PRN    magnesium sulfate 2000 mg in 50 mL IVPB premix  2,000 mg IntraVENous PRN    heparin (porcine) injection 5,000 Units  5,000 Units SubCUTAneous BID    insulin lispro (HUMALOG,ADMELOG) injection vial 0-10 Units  0-10 Units SubCUTAneous TID WC    insulin lispro (HUMALOG,ADMELOG) injection vial 0-8 Units  0-8 Units SubCUTAneous Nightly    glucose chewable tablet 16 g  4 tablet Oral PRN    dextrose bolus 10% 125 mL  125 mL IntraVENous PRN    Or    dextrose bolus 10% 250 mL  250 mL IntraVENous PRN    Glucagon Emergency KIT 1 mg  1 mg SubCUTAneous PRN    dextrose 10 % infusion   IntraVENous Continuous PRN           Assessment/Plan:     Principal Problem:    Acute pulmonary edema with congestive heart failure (HCC)  Active Problems:    Primary insomnia    Chronic gout of right hand    Chronic combined systolic and diastolic CHF (congestive heart failure) (HCC)    Dyslipidemia    Depression    Osteoarthritis    Type 2 diabetes mellitus, controlled (HCC)    Chemotherapy-induced neuropathy (HCC)    Stage 3a chronic kidney disease (HCC)    Stem cells transplant status (HCC)    Dilated cardiomyopathy (HCC)    Vitamin D deficiency    Vitamin B12 deficiency    Essential hypertension    GERD without esophagitis    Acute respiratory failure with hypoxia (HCC)  Resolved Problems:    * No resolved hospital problems. *    Assessment:  COVID-19 infection  Heart failure with preserved ejection fraction  Dilated cardiomyopathy  Essential hypertension  Hyperlipidemia  Diabetes  CKD stage IIIa     Plan:     -Echo shows preserved ejection fraction, no significant valve abnormalities.  She has no edema on examination, no orthopnea.  Creatinine bumped slightly, agree with discontinuing diuretic therapy.  She does appear euvolemic at this point.  Continue Toprol and lisinopril.     -Continue home medications for blood pressure control, further adjustments pending clinical course     -COVID-19

## 2024-09-01 NOTE — THERAPY EVALUATION
ACUTE PHYSICAL THERAPY GOALS:   (Developed with and agreed upon by patient and/or caregiver.)  Pt will ambulate x 30 ft with RW and mod I    PHYSICAL THERAPY Initial Assessment, Daily Note, Discharge, and PM  (Link to Caseload Tracking: PT Visit Days : 1  Acknowledge Orders  Time In/Out  PT Charge Capture  Rehab Caseload Tracker    Mirtha Posada is a 79 y.o. female   PRIMARY DIAGNOSIS: Acute pulmonary edema with congestive heart failure (HCC)  Acute pulmonary edema with congestive heart failure (HCC) [I50.1]  Acute on chronic combined systolic and diastolic congestive heart failure (HCC) [I50.43]  Acute respiratory failure with hypoxia (HCC) [J96.01]  Congestive heart failure, unspecified HF chronicity, unspecified heart failure type (HCC) [I50.9]  COVID [U07.1]       Reason for Referral: Generalized Muscle Weakness (M62.81)  Inpatient: Payor: MEDICARE / Plan: MEDICARE PART A AND B / Product Type: *No Product type* /     ASSESSMENT:     REHAB RECOMMENDATIONS:   Recommendation to date pending progress:  Setting:  No further skilled physical therapy after discharge from hospital    Equipment:    None     ASSESSMENT:  Ms. Posada is a 79 year old female who presents to [facility] with cough/congestion, N/V. She is diagnosed with COVID and CHF and pulmonary edema. Pt is on RA and O2 sats 96%.  reports that pt has very low activity level at baseline. Pt is modified independent with cane. This date pt performs mobility including bed mobility independently and transfers with RW and ambulated x 30 ft with RW and modified independence. Pt presents as functioning at her baseline, no acute PT intervention is indicated at this time. Will discharge from PT caseload. Please re-consult if any new needs arise. Thank you. No needs anticipated upon d/c.     [facility] AM-PAC™ “6 Clicks” Basic Mobility Inpatient Short Form  AM-PAC Basic Mobility - Inpatient   How much help is needed turning from your back to your side  [x]     Vision [x]     Hearing []  [place]   Cognition  [x]       MOBILITY: I Mod I S SBA CGA Min Mod Max Total  NT x2 Comments:   Bed Mobility    Rolling [x] [] [] [] [] [] [] [] [] [] []    Supine to Sit [x] [] [] [] [] [] [] [] [] [] []    Scooting [x] [] [] [] [] [] [] [] [] [] []    Sit to Supine [x] [] [] [] [] [] [] [] [] [] []    Transfers    Sit to Stand [] [x] [] [] [] [] [] [] [] [] []    Bed to Chair [] [] [] [] [] [] [] [] [] [x] []    Stand to Sit [] [x] [] [] [] [] [] [] [] [] []     [] [] [] [] [] [] [] [] [] [] []    I=Independent, Mod I=Modified Independent, S=Supervision, SBA=Standby Assistance, CGA=Contact Guard Assistance,   Min=Minimal Assistance, Mod=Moderate Assistance, Max=Maximal Assistance, Total=Total Assistance, NT=Not Tested    GAIT: I Mod I S SBA CGA Min Mod Max Total  NT x2 Comments:   Level of Assistance [] [x] [] [] [] [] [] [] [] [] []    Distance 30  feet    DME Rolling Walker    Gait Quality Decreased akilah     Weightbearing Status Restrictions/Precautions  Restrictions/Precautions: Isolation    Stairs NT     I=Independent, Mod I=Modified Independent, S=Supervision, SBA=Standby Assistance, CGA=Contact Guard Assistance,   Min=Minimal Assistance, Mod=Moderate Assistance, Max=Maximal Assistance, Total=Total Assistance, NT=Not Tested    PLAN:   FREQUENCY AND DURATION:  (eval and discharge)     TREATMENT:   EVALUATION: LOW COMPLEXITY: (Untimed Charge)  The initial evaluation charge encompasses clinical chart review, objective assessment, interpretation of assessment, and skilled monitoring of the patient's response to treatment in order to develop a plan of care.     TREATMENT:   Therapeutic Activity (12 Minutes): Therapeutic activity included Rolling, Supine to Sit, Sit to Supine, Scooting, Transfer Training, Ambulation on level ground, Sitting balance , and Standing balance to improve functional Activity tolerance.    TREATMENT GRID:  N/A    AFTER TREATMENT PRECAUTIONS: Bed, Bed/Chair

## 2024-09-01 NOTE — PROGRESS NOTES
Pt resting in bed comfortably at this time, alert and oriented times 4. No distress noted, respirations even and unlabored on room air. Pt instructed to call for assistance if needed, call light in place, will continue to monitor.     Droplet plus precautions noted.

## 2024-09-02 VITALS
HEIGHT: 65 IN | SYSTOLIC BLOOD PRESSURE: 133 MMHG | DIASTOLIC BLOOD PRESSURE: 69 MMHG | RESPIRATION RATE: 18 BRPM | HEART RATE: 52 BPM | TEMPERATURE: 97.3 F | OXYGEN SATURATION: 96 % | WEIGHT: 151.01 LBS | BODY MASS INDEX: 25.16 KG/M2

## 2024-09-02 LAB
ANION GAP SERPL CALC-SCNC: 11 MMOL/L (ref 9–18)
BASOPHILS # BLD: 0 K/UL (ref 0–0.2)
BASOPHILS NFR BLD: 0 % (ref 0–2)
BUN SERPL-MCNC: 40 MG/DL (ref 8–23)
CALCIUM SERPL-MCNC: 8.9 MG/DL (ref 8.8–10.2)
CHLORIDE SERPL-SCNC: 97 MMOL/L (ref 98–107)
CO2 SERPL-SCNC: 23 MMOL/L (ref 20–28)
CREAT SERPL-MCNC: 1.19 MG/DL (ref 0.6–1.1)
DIFFERENTIAL METHOD BLD: ABNORMAL
EOSINOPHIL # BLD: 0 K/UL (ref 0–0.8)
EOSINOPHIL NFR BLD: 0 % (ref 0.5–7.8)
ERYTHROCYTE [DISTWIDTH] IN BLOOD BY AUTOMATED COUNT: 14.5 % (ref 11.9–14.6)
GLUCOSE BLD STRIP.AUTO-MCNC: 113 MG/DL (ref 65–100)
GLUCOSE SERPL-MCNC: 134 MG/DL (ref 70–99)
HCT VFR BLD AUTO: 33.7 % (ref 35.8–46.3)
HGB BLD-MCNC: 11.1 G/DL (ref 11.7–15.4)
IMM GRANULOCYTES # BLD AUTO: 0 K/UL (ref 0–0.5)
IMM GRANULOCYTES NFR BLD AUTO: 1 % (ref 0–5)
LYMPHOCYTES # BLD: 1.5 K/UL (ref 0.5–4.6)
LYMPHOCYTES NFR BLD: 21 % (ref 13–44)
MCH RBC QN AUTO: 31.6 PG (ref 26.1–32.9)
MCHC RBC AUTO-ENTMCNC: 32.9 G/DL (ref 31.4–35)
MCV RBC AUTO: 96 FL (ref 82–102)
MONOCYTES # BLD: 0.5 K/UL (ref 0.1–1.3)
MONOCYTES NFR BLD: 7 % (ref 4–12)
NEUTS SEG # BLD: 5.3 K/UL (ref 1.7–8.2)
NEUTS SEG NFR BLD: 71 % (ref 43–78)
NRBC # BLD: 0 K/UL (ref 0–0.2)
PLATELET # BLD AUTO: 341 K/UL (ref 150–450)
PMV BLD AUTO: 11.3 FL (ref 9.4–12.3)
POTASSIUM SERPL-SCNC: 4.7 MMOL/L (ref 3.5–5.1)
RBC # BLD AUTO: 3.51 M/UL (ref 4.05–5.2)
SERVICE CMNT-IMP: ABNORMAL
SODIUM SERPL-SCNC: 131 MMOL/L (ref 136–145)
WBC # BLD AUTO: 7.3 K/UL (ref 4.3–11.1)

## 2024-09-02 PROCEDURE — 85025 COMPLETE CBC W/AUTO DIFF WBC: CPT

## 2024-09-02 PROCEDURE — 97165 OT EVAL LOW COMPLEX 30 MIN: CPT

## 2024-09-02 PROCEDURE — 36415 COLL VENOUS BLD VENIPUNCTURE: CPT

## 2024-09-02 PROCEDURE — 82962 GLUCOSE BLOOD TEST: CPT

## 2024-09-02 PROCEDURE — 6360000002 HC RX W HCPCS: Performed by: STUDENT IN AN ORGANIZED HEALTH CARE EDUCATION/TRAINING PROGRAM

## 2024-09-02 PROCEDURE — 6360000002 HC RX W HCPCS: Performed by: INTERNAL MEDICINE

## 2024-09-02 PROCEDURE — 6370000000 HC RX 637 (ALT 250 FOR IP): Performed by: INTERNAL MEDICINE

## 2024-09-02 PROCEDURE — 6370000000 HC RX 637 (ALT 250 FOR IP): Performed by: HOSPITALIST

## 2024-09-02 PROCEDURE — 99232 SBSQ HOSP IP/OBS MODERATE 35: CPT | Performed by: INTERNAL MEDICINE

## 2024-09-02 PROCEDURE — 97535 SELF CARE MNGMENT TRAINING: CPT

## 2024-09-02 PROCEDURE — 2580000003 HC RX 258: Performed by: INTERNAL MEDICINE

## 2024-09-02 PROCEDURE — 80048 BASIC METABOLIC PNL TOTAL CA: CPT

## 2024-09-02 PROCEDURE — 6370000000 HC RX 637 (ALT 250 FOR IP): Performed by: CASE MANAGER/CARE COORDINATOR

## 2024-09-02 RX ORDER — DIPHENHYDRAMINE HCL 25 MG
25 CAPSULE ORAL EVERY 8 HOURS PRN
Status: DISCONTINUED | OUTPATIENT
Start: 2024-09-02 | End: 2024-09-02 | Stop reason: HOSPADM

## 2024-09-02 RX ORDER — DEXAMETHASONE 6 MG/1
6 TABLET ORAL DAILY
Qty: 3 TABLET | Refills: 0 | Status: SHIPPED | OUTPATIENT
Start: 2024-09-03 | End: 2024-09-06

## 2024-09-02 RX ORDER — LEVOFLOXACIN 750 MG/1
750 TABLET, FILM COATED ORAL EVERY OTHER DAY
Qty: 2 TABLET | Refills: 0 | Status: SHIPPED | OUTPATIENT
Start: 2024-09-03 | End: 2024-09-06

## 2024-09-02 RX ADMIN — ALLOPURINOL 200 MG: 100 TABLET ORAL at 07:50

## 2024-09-02 RX ADMIN — VITAMIN D, TAB 1000IU (100/BT) 4000 UNITS: 25 TAB at 07:50

## 2024-09-02 RX ADMIN — HEPARIN SODIUM 5000 UNITS: 5000 INJECTION INTRAVENOUS; SUBCUTANEOUS at 07:51

## 2024-09-02 RX ADMIN — DIPHENHYDRAMINE HYDROCHLORIDE 25 MG: 25 CAPSULE ORAL at 05:40

## 2024-09-02 RX ADMIN — AMLODIPINE BESYLATE 5 MG: 5 TABLET ORAL at 07:51

## 2024-09-02 RX ADMIN — GABAPENTIN 600 MG: 300 CAPSULE ORAL at 07:51

## 2024-09-02 RX ADMIN — DEXAMETHASONE 6 MG: 4 TABLET ORAL at 07:50

## 2024-09-02 RX ADMIN — TRAMADOL HYDROCHLORIDE 50 MG: 50 TABLET ORAL at 07:50

## 2024-09-02 RX ADMIN — POTASSIUM CHLORIDE 20 MEQ: 1500 TABLET, EXTENDED RELEASE ORAL at 07:51

## 2024-09-02 RX ADMIN — LISINOPRIL 20 MG: 20 TABLET ORAL at 07:51

## 2024-09-02 RX ADMIN — METOPROLOL SUCCINATE 50 MG: 50 TABLET, EXTENDED RELEASE ORAL at 07:51

## 2024-09-02 RX ADMIN — SODIUM CHLORIDE, PRESERVATIVE FREE 10 ML: 5 INJECTION INTRAVENOUS at 07:51

## 2024-09-02 ASSESSMENT — PAIN SCALES - GENERAL: PAINLEVEL_OUTOF10: 0

## 2024-09-02 NOTE — CARE COORDINATION
MSN, CM:  patient to be discharged home today with no services ordered or requested.  Patient and family agree with this discharge plan.  Patient has met all milestones for this admission.  Family to transport patient home.       09/02/24 1018   Service Assessment   Patient Orientation Alert and Oriented   Cognition Alert   Services At/After Discharge   Services At/After Discharge None   Mode of Transport at Discharge Other (see comment)  (family to transport)   Confirm Follow Up Transport Family   Condition of Participation: Discharge Planning   The Patient and/Or Patient Representative agree with the Discharge Plan? Yes       
Paying/Finance Responsibility     Shopping Responsibility     Dependent Care Responsibility     Health Care Management     Other (Comment)     Ambulation Assistance Independent   Transfer Assistance Independent   Active  Yes   Patient's  Info     Mode of Transportation SUV   Education     Occupation Retired   Type of Occupation       Discharge Planning   Type of Residence House   Living Arrangements Spouse/Significant Other   Support Systems Spouse/Significant Other   Current Services Prior To Admission Durable Medical Equipment   Potential Assistance Needed     DME Cane   DME     DME Ordered? No   Potential Assistance Purchasing Medications No   Meds-to-Beds: Does the patient want to have any new prescriptions delivered to bedside prior to discharge?     Type of Home Care Services None   Patient expects to be discharged to: House   Follow Up Appointment: Best Day/Time     One/Two Story Residence: One story   # of Interior Steps     Height of Each Step (in)     Interior Rails     Lift Chair Available     History of Falls? Yes     Services At/After Discharge  Transition of Care Consult (CM Consult):     Internal Home Health     Internal Hospice     Reason Outside Agency Chosen     Partner SNF     Reason Why Partner SNF Not Chosen     Internal Comfort Care     Reason Outside Comfort Care Chosen     Services At/After Discharge      Resource Information Provided?     Mode of Transport at Discharge     Hospital Transport Time of Discharge     Confirm Follow Up Transport       Condition of Participation: Discharge Planning  The plan for Transition of Care is related to the following treatment goals:     The Patient and/or Patient Representative was provided with a Choice of Provider?     Name of the Patient Representative who was provided with the Choice of Provider and agrees with the Discharge Plan?     The Patient and/or Patient Representative Agree with the Discharge Plan?     Freedom of Choice list

## 2024-09-02 NOTE — DISCHARGE INSTRUCTIONS
Pulmonary Edema: Care Instructions  Overview     Pulmonary edema is the buildup of fluid in the lungs. It usually occurs when the heart does not pump blood through the body properly. Pulmonary edema can also be caused by another disease, such as liver or kidney failure. It can also happen at high altitudes, from a poisoning, or as a result of a nonfatal drowning.  If you have fluid in your lungs, you may have trouble breathing, be restless, have a fast heart rate, or cough up foamy pink fluid. Breathing problems may be worse when you lie down.  Follow-up care is a key part of your treatment and safety. Be sure to make and go to all appointments, and call your doctor if you are having problems. It's also a good idea to know your test results and keep a list of the medicines you take.  How can you care for yourself at home?  Medicines    Take your medicines exactly as prescribed. Call your doctor if you think you are having a problem with your medicine.     Review all of your regular medicines with your doctor. Do not take any vitamins, over-the-counter medicines, or herbal products without talking to your doctor first.   Diet    Eat a balanced diet. Make an appointment with a dietitian if you have questions about what type of diet might be best for you.     Do not eat more than 2,000 milligrams (mg) of sodium each day. That is less than 1 teaspoon of salt a day, including all the salt you eat in prepared or packaged foods.  Do not add salt while you are cooking or at the table. Flavor with garlic, lemon juice, onion, vinegar, herbs, and spices instead of salt.  Eat fewer processed foods and foods from restaurants, including fast food.  Use fresh or frozen foods instead of canned.  Count and record how much sodium you eat each day. Check food labels for sodium.  Ask your doctor before using salt substitutes that have potassium, such as Lite Salt.   Lifestyle    Stay out of air pollution; smog; cold, dry air; hot,  humid air; and high altitudes.     Learn breathing methods that help the airflow in and out of your lungs.     Take rest breaks often. Schedule short rest breaks when doing housework and other activities. An occupational or physical therapist can help you find ways to do everyday activities with less effort.     Start light exercise if your doctor says it is okay. Try to stay as active as possible. If you have not exercised in the past, start out slowly. Walking is a good way to start.     Get enough rest at night. Sleeping with 1 or 2 pillows under your upper body and head may help you breathe easier at night.     Discuss rehabilitation with your doctor. Find out what programs are available in your area.     Do not smoke or use other tobacco products. Smoking can make your condition worse. If you need help quitting, talk to your doctor about stop-smoking programs and medicines. These can increase your chances of quitting for good.     Do not use alcohol or illegal drugs.   When should you call for help?   Call 911 anytime you think you may need emergency care. For example, call if:    You have severe trouble breathing.     You passed out (lost consciousness).     You have symptoms of a heart attack. These may include:  Chest pain or pressure, or a strange feeling in the chest.  Sweating.  Shortness of breath.  Nausea or vomiting.  Pain, pressure, or a strange feeling in the back, neck, jaw, or upper belly or in one or both shoulders or arms.  Lightheadedness or sudden weakness.  A fast or irregular heartbeat. Pain that spreads from the chest to the neck, jaw, or one or both shoulders or arms.   After you call 911, the  may tell you to chew 1 adult-strength or 2 to 4 low-dose aspirin. Wait for an ambulance. Do not try to drive yourself.  Call your doctor now or seek immediate medical care if:    You have trouble breathing or have wheezing that is getting worse.     You are coughing more deeply or more often.

## 2024-09-02 NOTE — PROGRESS NOTES
ACUTE OCCUPATIONAL THERAPY GOALS:   (Developed with and agreed upon by patient and/or caregiver.)  Patient will complete functional mobility for household distances with MOD I. - MET  Patient will complete toileting tasks with MOD I. - MET  Patient will complete self-grooming tasks standing edge of sink with MOD I. - MET    Time Frame: 1 visit     OCCUPATIONAL THERAPY Initial Assessment, Daily Note, Discharge, and AM       OT Visit Days: 1   Acknowledge Orders  Time  OT Charge Capture  Rehab Caseload Tracker      Mirtha Posada is a 79 y.o. female   PRIMARY DIAGNOSIS: Acute pulmonary edema with congestive heart failure (HCC)  Acute pulmonary edema with congestive heart failure (HCC) [I50.1]  Acute on chronic combined systolic and diastolic congestive heart failure (HCC) [I50.43]  Acute respiratory failure with hypoxia (HCC) [J96.01]  Congestive heart failure, unspecified HF chronicity, unspecified heart failure type (HCC) [I50.9]  COVID [U07.1]       Reason for Referral: Generalized Muscle Weakness (M62.81)  Inpatient: Payor: MEDICARE / Plan: MEDICARE PART A AND B / Product Type: *No Product type* /     ASSESSMENT:     REHAB RECOMMENDATIONS:   Recommendation to date pending progress:  Setting:  No further skilled occupational therapy after discharge from hospital    Equipment:    None     ASSESSMENT:  Ms. Posada presents for acute pulmonary edema and COVID-19. Alert and resting on room air. Of note, pt lives with spouse; independent at baseline for ADLs and functional mobility       Today, BUE are generally decreased but WFL. Independent for functional bed mobility/supine <> sit transfer to edge of bed; intact unsupported edge of bed sitting balance. MOD I for toileting tasks while in unsupported sitting as well as self-grooming, standing edge of sink. SPO2 tolerated well.  At this time, Mirtha Posada is functioning close to baseline for activities of daily living and functional mobility. Will

## 2024-09-02 NOTE — PROGRESS NOTES
Patient calls out c/o generalized itching  and requests benadryl. MD Martinez notified at this time and orders benadryl 25mg q8h PRN for itching. Upon entering room to administer medication, patient is noted to have accidentally removed 20G IV that was placed in the right hand. Bleeding noted from the site and on bedsheets. IV was removed by this RN and dressing applied. Patient has no further complaints at this time. Call light within reach.

## 2024-09-02 NOTE — DISCHARGE SUMMARY
ED lactic acid 2.5, BNP of 2800 chest x-ray with evidence of congestion with no evidence of pneumonia. Patient does not wear oxygen at home.   Patient present with congestive heart failure, dilated cardiomyopathy.  Was continued on 1 mg Bumex IV twice daily.  Echo with ejection fraction of 50 to 55% with normal systolic function.  Cardiology was consulted and followed during hospitalization.  Per their recommendations continue diuretic p.o. at discharge.  Patient also found to have COVID.  Remain on room air satting well.  Patient will continue Decadron 6 mg to finish course.  Patient also will finish course of Levaquin which she was on.  For patient's other comorbid conditions she was restarted back on her home medications.  Patient was advised she needs to follow-up with her primary care provider within next 3 to 5 days to discuss recent hospitalization and any changes made to her medications.  All questions answered.  Patient agreed with plan as above.  Patient denied any cardiac chest pain, shortness of breath, Edgard pain, fever/chills.    Disposition: Home  Diet: ADULT DIET; Regular; 3 carb choices (45 gm/meal); Low Fat/Low Chol/High Fiber/JAZMIN; Low Sodium (2 gm); 1500 ml  Code Status: Full Code    Follow Ups:  Follow-up Information       Follow up With Specialties Details Why Contact Info    [facility] CARDIOLOGY Cardiology Schedule an appointment as soon as possible for a visit in 1 week(s)  [address] [place]  [facility] [address]  [phone]    Marva Xavier APRN - CNP Family Medicine, Nurse Practitioner Follow up in 3 day(s)  [address]  [place] [address]  [phone]      Marva Xavier APRN - CNP Family Medicine, Nurse Practitioner   [address]  [place] [address]  [phone]                  Time spent in patient discharge and coordination 48 minutes.      Follow up labs/diagnostics (ultimately defer to outpatient provider):  Defer to Follow-up

## 2024-09-02 NOTE — PROGRESS NOTES
Discharge instructions reviewed with patient at this time. Pt given opportunity for questions. IV removed, cath tip intact. Pt instructed to call for assistance if needed, call light in place.  at the bedside helping patient get dressed. Tele box removed and placed at the nurses station. Will continue to monitor.

## 2024-09-02 NOTE — PROGRESS NOTES
[facility] CARDIOLOGY PROGRESS NOTE           9/2/2024 9:08 AM    Admit Date: 8/30/2024    Getting better  Subjective:   No overnight events.      Review of Systems   All other systems reviewed and are negative.          Objective:      Vitals:    09/02/24 0353 09/02/24 0603 09/02/24 0749 09/02/24 0820   BP:   133/69    Pulse: 52  52    Resp:   18 18   Temp:   97.3 °F (36.3 °C)    TempSrc:   Oral    SpO2:   96%    Weight:  68.5 kg (151 lb 0.2 oz)     Height:             Physical Exam  Vitals reviewed.   Constitutional:       Appearance: Normal appearance.   HENT:      Head: Normocephalic and atraumatic.   Eyes:      General: No scleral icterus.  Neck:      Vascular: No carotid bruit.   Cardiovascular:      Rate and Rhythm: Normal rate and regular rhythm.      Heart sounds: No murmur heard.     No gallop.   Pulmonary:      Breath sounds: Normal breath sounds.   Abdominal:      Palpations: Abdomen is soft.   Musculoskeletal:         General: No swelling.      Cervical back: Neck supple.   Skin:     General: Skin is warm and dry.   Neurological:      Mental Status: She is alert and oriented to person, place, and time.   Psychiatric:         Mood and Affect: Mood normal.     Is a lot of driving    Data Review:   Recent Labs     08/31/24  0114 08/31/24  1452 09/01/24  0509 09/02/24  0450   *  --  134* 131*   K 3.5  --  5.0 4.7   MG 1.4* 2.2 2.2  --    BUN 16  --  41* 40*   WBC 4.1*  --  8.5 7.3   HGB 11.9  --  11.8 11.1*   HCT 36.4  --  36.6 33.7*     --  265 341   CHOL 177  --   --   --    HDL 41  --   --   --        [unfilled]  Current Facility-Administered Medications   Medication Dose Route Frequency    diphenhydrAMINE (BENADRYL) capsule 25 mg  25 mg Oral Q8H PRN    [Held by provider] bumetanide (BUMEX) injection 1 mg  1 mg IntraVENous Daily    phenol 1.4 % mouth spray 1 spray  1 spray Mouth/Throat Q2H PRN    metoprolol (LOPRESSOR) injection 2.5 mg  2.5 mg IntraVENous Once    potassium

## 2024-09-02 NOTE — PROGRESS NOTES
Pt resting in bed comfortably at this time, alert and oriented times 4. No distress noted, respirations even and unlabored on room air. Pt denies pain at this time. Pt instructed to call for assistance if needed, call light in place, will continue to monitor.

## 2024-09-03 ENCOUNTER — TELEPHONE (OUTPATIENT)
Dept: INTERNAL MEDICINE CLINIC | Facility: CLINIC | Age: 79
End: 2024-09-03

## 2024-09-03 NOTE — TELEPHONE ENCOUNTER
Care Transitions Initial Follow Up Call    Outreach made within 2 business days of discharge: Yes    Patient: Mirtha Posada Patient : 1945   MRN: [id number]  Reason for Admission: Acute respiratory falure w/ hypoxia, CHF, COVID  Discharge Date: 24       Spoke with: Mirtha Posada    Discharge department/facility: [facility] Interactive Patient Contact:  Was patient able to fill all prescriptions: Yes  Was patient instructed to bring all medications to the follow-up visit: Yes  Is patient taking all medications as directed in the discharge summary? Yes  Does patient understand their discharge instructions: Yes  Does patient have questions or concerns that need addressed prior to 7-14 day follow up office visit: no    Additional needs identified to be addressed with provider  No needs identified             Scheduled appointment with PCP within 7-14 days    Follow Up  Future Appointments   Date Time Provider Department Center   9/10/2024 11:30 AM Marva Xavier APRN - CNP [facility] DEP   2024  2:00 PM Veronica Randall PA PM [facility] AMB   10/23/2024  3:15 PM David Rahman DO [facility] AMB   10/31/2024  9:45 AM PAIN MGMT PROVIDER POAI [facility] AMB   12/10/2024 10:00 AM Marva Xavier APRN - CNP [facility] DEP   2025  3:00 PM Trina Armenta APRN - CNP [id number] [facility] AMB   2025  2:30 PM PERIPHERAL GCCOIG GCC   2025  3:00 PM BMT GCCOPIC GCC       RANDELL VASQUEZ MA

## 2024-09-04 ENCOUNTER — TELEPHONE (OUTPATIENT)
Age: 79
End: 2024-09-04

## 2024-09-04 LAB
BACTERIA SPEC CULT: NORMAL
BACTERIA SPEC CULT: NORMAL
SERVICE CMNT-IMP: NORMAL
SERVICE CMNT-IMP: NORMAL

## 2024-09-04 NOTE — TELEPHONE ENCOUNTER
Care Transitions Initial Follow Up Call    Call within 2 business days of discharge: Yes     Patient: Mirtha Posada Patient : 1945 MRN: [id number]    [unfilled]    RARS: Readmission Risk Score: 18.4       Spoke with: Patient    Discharge department/facility: [facility]    Non-face-to-face services provided:  Transitional Care call to FU on 24-24 hospitalization at [facility] for acute pulmonary edema with acute on chronic combined systolic and diastolic CHF, acute respiratory failure, and COVID. Patient states she is still having difficulty sleeping, but has been feeling better since D/C from [facility]. Advised patient to contact Dr. Xavier for recommendations for sleep. Patient verbalized understanding. Instructed patient to increase activity as tolerated to limit of SOB or fatigue. Instructed patient to follow low salt, low cholesterol, low saturated fat diet and 1.5-2 liter per day fluid restriction. Instructed patient to weigh every day and call to report any weight gain of 2 lbs overnight or 5 lbs in 1 week, or any increase in SOB or peripheral edema. Reviewed meds with patient, stressing importance of compliance. Reminded patient of 9/10/24 4:30 pm MA appointment with Dr. Rahman. Advised patient to call with any questions or concerns prior to appointment. Patient verbalized understanding and states she has no questions or concerns at this time.      Follow Up  Future Appointments   Date Time Provider Department Center   9/10/2024 11:30 AM Marva Xavier APRN - CNP [facility] DEP   9/10/2024  4:30 PM David Rahman DO [facility]CD[facility] AMB   2024  2:00 PM Veronica Randall PA PM [facility] AMB   10/23/2024  3:15 PM David Rahman DO [facility] AMB   10/31/2024  9:45 AM PAIN MGMT PROVIDER SINDY [facility] AMB   12/10/2024 10:00 AM Marva Xavier APRN - CNP [facility] DEP   2025  3:00 PM Trina Armenta APRN - CNP [id number] [facility] AMB   2025  2:30 PM PERIPHERAL GCCOIG GCC

## 2024-09-10 ENCOUNTER — OFFICE VISIT (OUTPATIENT)
Age: 79
End: 2024-09-10

## 2024-09-10 ENCOUNTER — OFFICE VISIT (OUTPATIENT)
Dept: INTERNAL MEDICINE CLINIC | Facility: CLINIC | Age: 79
End: 2024-09-10

## 2024-09-10 VITALS
HEIGHT: 65 IN | SYSTOLIC BLOOD PRESSURE: 88 MMHG | HEART RATE: 80 BPM | BODY MASS INDEX: 24.83 KG/M2 | WEIGHT: 149 LBS | DIASTOLIC BLOOD PRESSURE: 50 MMHG

## 2024-09-10 VITALS
DIASTOLIC BLOOD PRESSURE: 62 MMHG | HEIGHT: 65 IN | BODY MASS INDEX: 24.41 KG/M2 | WEIGHT: 146.5 LBS | HEART RATE: 85 BPM | SYSTOLIC BLOOD PRESSURE: 116 MMHG | OXYGEN SATURATION: 95 % | TEMPERATURE: 97.7 F

## 2024-09-10 DIAGNOSIS — R53.81 DEBILITY: ICD-10-CM

## 2024-09-10 DIAGNOSIS — Z78.9 IMPAIRED MOBILITY AND ADLS: ICD-10-CM

## 2024-09-10 DIAGNOSIS — N18.31 TYPE 2 DIABETES MELLITUS WITH STAGE 3A CHRONIC KIDNEY DISEASE, WITHOUT LONG-TERM CURRENT USE OF INSULIN (HCC): Chronic | ICD-10-CM

## 2024-09-10 DIAGNOSIS — I42.0 DILATED CARDIOMYOPATHY (HCC): ICD-10-CM

## 2024-09-10 DIAGNOSIS — J96.01 ACUTE RESPIRATORY FAILURE WITH HYPOXIA (HCC): ICD-10-CM

## 2024-09-10 DIAGNOSIS — R30.0 DYSURIA: ICD-10-CM

## 2024-09-10 DIAGNOSIS — I50.42 CHRONIC COMBINED SYSTOLIC AND DIASTOLIC CHF (CONGESTIVE HEART FAILURE) (HCC): Chronic | ICD-10-CM

## 2024-09-10 DIAGNOSIS — F32.A DEPRESSION, UNSPECIFIED DEPRESSION TYPE: ICD-10-CM

## 2024-09-10 DIAGNOSIS — Z74.09 IMPAIRED MOBILITY AND ADLS: ICD-10-CM

## 2024-09-10 DIAGNOSIS — I10 ESSENTIAL HYPERTENSION: Chronic | ICD-10-CM

## 2024-09-10 DIAGNOSIS — Z78.9 STATIN INTOLERANCE: ICD-10-CM

## 2024-09-10 DIAGNOSIS — F51.01 PRIMARY INSOMNIA: ICD-10-CM

## 2024-09-10 DIAGNOSIS — Z09 HOSPITAL DISCHARGE FOLLOW-UP: Primary | ICD-10-CM

## 2024-09-10 DIAGNOSIS — E11.22 TYPE 2 DIABETES MELLITUS WITH STAGE 3A CHRONIC KIDNEY DISEASE, WITHOUT LONG-TERM CURRENT USE OF INSULIN (HCC): Chronic | ICD-10-CM

## 2024-09-10 DIAGNOSIS — N18.31 STAGE 3A CHRONIC KIDNEY DISEASE (HCC): ICD-10-CM

## 2024-09-10 DIAGNOSIS — I42.0 DILATED CARDIOMYOPATHY (HCC): Primary | ICD-10-CM

## 2024-09-10 DIAGNOSIS — I50.1 ACUTE PULMONARY EDEMA WITH CONGESTIVE HEART FAILURE (HCC): ICD-10-CM

## 2024-09-10 DIAGNOSIS — M35.00 SJOGREN SYNDROME, UNSPECIFIED (HCC): ICD-10-CM

## 2024-09-10 LAB
ANION GAP SERPL CALC-SCNC: 14 MMOL/L (ref 9–18)
BASOPHILS # BLD: 0 K/UL (ref 0–0.2)
BASOPHILS NFR BLD: 0 % (ref 0–2)
BUN SERPL-MCNC: 33 MG/DL (ref 8–23)
CALCIUM SERPL-MCNC: 10.1 MG/DL (ref 8.8–10.2)
CHLORIDE SERPL-SCNC: 98 MMOL/L (ref 98–107)
CO2 SERPL-SCNC: 24 MMOL/L (ref 20–28)
CREAT SERPL-MCNC: 1.75 MG/DL (ref 0.6–1.1)
DIFFERENTIAL METHOD BLD: ABNORMAL
EOSINOPHIL # BLD: 0.1 K/UL (ref 0–0.8)
EOSINOPHIL NFR BLD: 0 % (ref 0.5–7.8)
ERYTHROCYTE [DISTWIDTH] IN BLOOD BY AUTOMATED COUNT: 14.7 % (ref 11.9–14.6)
GLUCOSE SERPL-MCNC: 171 MG/DL (ref 70–99)
HCT VFR BLD AUTO: 41.4 % (ref 35.8–46.3)
HGB BLD-MCNC: 12.7 G/DL (ref 11.7–15.4)
IMM GRANULOCYTES # BLD AUTO: 0.3 K/UL (ref 0–0.5)
IMM GRANULOCYTES NFR BLD AUTO: 2 % (ref 0–5)
LYMPHOCYTES # BLD: 3.1 K/UL (ref 0.5–4.6)
LYMPHOCYTES NFR BLD: 23 % (ref 13–44)
MCH RBC QN AUTO: 30.8 PG (ref 26.1–32.9)
MCHC RBC AUTO-ENTMCNC: 30.7 G/DL (ref 31.4–35)
MCV RBC AUTO: 100.2 FL (ref 82–102)
MONOCYTES # BLD: 1.3 K/UL (ref 0.1–1.3)
MONOCYTES NFR BLD: 9 % (ref 4–12)
NEUTS SEG # BLD: 9.2 K/UL (ref 1.7–8.2)
NEUTS SEG NFR BLD: 66 % (ref 43–78)
NRBC # BLD: 0 K/UL (ref 0–0.2)
PLATELET # BLD AUTO: 278 K/UL (ref 150–450)
PMV BLD AUTO: 11.6 FL (ref 9.4–12.3)
POTASSIUM SERPL-SCNC: 4.5 MMOL/L (ref 3.5–5.1)
RBC # BLD AUTO: 4.13 M/UL (ref 4.05–5.2)
SODIUM SERPL-SCNC: 137 MMOL/L (ref 136–145)
WBC # BLD AUTO: 13.9 K/UL (ref 4.3–11.1)

## 2024-09-10 RX ORDER — AMITRIPTYLINE HYDROCHLORIDE 10 MG/1
10 TABLET ORAL NIGHTLY
Qty: 90 TABLET | Refills: 1 | Status: SHIPPED | OUTPATIENT
Start: 2024-09-10

## 2024-09-10 RX ORDER — CITALOPRAM HYDROBROMIDE 10 MG/1
10 TABLET ORAL DAILY
Qty: 90 TABLET | Refills: 1 | Status: SHIPPED | OUTPATIENT
Start: 2024-09-10

## 2024-09-10 RX ORDER — CITALOPRAM HYDROBROMIDE 10 MG/1
10 TABLET ORAL DAILY
Qty: 90 TABLET | Refills: 1 | Status: SHIPPED | OUTPATIENT
Start: 2024-09-10 | End: 2024-09-10 | Stop reason: SDUPTHER

## 2024-09-10 ASSESSMENT — ENCOUNTER SYMPTOMS
COUGH: 1
BACK PAIN: 1
NAUSEA: 0
VOMITING: 0
WHEEZING: 0
SHORTNESS OF BREATH: 0
ABDOMINAL PAIN: 0
DIARRHEA: 0

## 2024-09-11 ENCOUNTER — HOSPITAL ENCOUNTER (INPATIENT)
Age: 79
LOS: 4 days | Discharge: HOME HEALTH | DRG: 683 | End: 2024-09-15
Attending: GENERAL PRACTICE | Admitting: FAMILY MEDICINE
Payer: MEDICARE

## 2024-09-11 DIAGNOSIS — E86.0 DEHYDRATION: ICD-10-CM

## 2024-09-11 DIAGNOSIS — U07.1 COVID-19 VIRUS INFECTION: ICD-10-CM

## 2024-09-11 DIAGNOSIS — R91.1 LUNG NODULE: ICD-10-CM

## 2024-09-11 DIAGNOSIS — N17.9 ACUTE KIDNEY INJURY (HCC): Primary | ICD-10-CM

## 2024-09-11 PROBLEM — N39.0 UTI (URINARY TRACT INFECTION): Status: ACTIVE | Noted: 2024-09-11

## 2024-09-11 PROBLEM — N18.9 ACUTE KIDNEY INJURY SUPERIMPOSED ON CKD (HCC): Status: ACTIVE | Noted: 2024-09-11

## 2024-09-11 LAB
ALBUMIN SERPL-MCNC: 3.1 G/DL (ref 3.2–4.6)
ALBUMIN/GLOB SERPL: 0.6 (ref 1–1.9)
ALP SERPL-CCNC: 39 U/L (ref 35–104)
ALT SERPL-CCNC: 28 U/L (ref 12–65)
AMORPH CRY URNS QL MICRO: ABNORMAL
ANION GAP BLD CALC-SCNC: 7.7 MMOL/L
ANION GAP SERPL CALC-SCNC: 12 MMOL/L (ref 9–18)
APPEARANCE UR: CLEAR
AST SERPL-CCNC: 116 U/L (ref 15–37)
BACTERIA URNS QL MICRO: ABNORMAL /HPF
BASOPHILS # BLD: 0 K/UL (ref 0–0.2)
BASOPHILS NFR BLD: 0 % (ref 0–2)
BILIRUB SERPL-MCNC: 0.6 MG/DL (ref 0–1.2)
BILIRUB UR QL: NEGATIVE
BUN BLD-MCNC: 42 MG/DL (ref 8–26)
BUN SERPL-MCNC: 44 MG/DL (ref 8–23)
CALCIUM SERPL-MCNC: 9.1 MG/DL (ref 8.8–10.2)
CASTS URNS QL MICRO: ABNORMAL /LPF
CHLORIDE BLD-SCNC: 105 MMOL/L (ref 98–107)
CHLORIDE SERPL-SCNC: 97 MMOL/L (ref 98–107)
CO2 BLD-SCNC: 25.3 MMOL/L (ref 21–32)
CO2 SERPL-SCNC: 23 MMOL/L (ref 20–28)
COLOR UR: ABNORMAL
CREAT BLD-MCNC: 2.49 MG/DL (ref 0.8–1.5)
CREAT SERPL-MCNC: 2.99 MG/DL (ref 0.6–1.1)
DIFFERENTIAL METHOD BLD: ABNORMAL
EOSINOPHIL # BLD: 0.1 K/UL (ref 0–0.8)
EOSINOPHIL NFR BLD: 1 % (ref 0.5–7.8)
EPI CELLS #/AREA URNS HPF: ABNORMAL /HPF
ERYTHROCYTE [DISTWIDTH] IN BLOOD BY AUTOMATED COUNT: 15.4 % (ref 11.9–14.6)
GLOBULIN SER CALC-MCNC: 5 G/DL (ref 2.3–3.5)
GLUCOSE BLD STRIP.AUTO-MCNC: 107 MG/DL (ref 65–100)
GLUCOSE BLD STRIP.AUTO-MCNC: 148 MG/DL (ref 65–100)
GLUCOSE BLD-MCNC: 143 MG/DL (ref 65–100)
GLUCOSE SERPL-MCNC: 158 MG/DL (ref 70–99)
GLUCOSE UR STRIP.AUTO-MCNC: NEGATIVE MG/DL
HCT VFR BLD AUTO: 34.5 % (ref 35.8–46.3)
HGB BLD-MCNC: 11.1 G/DL (ref 11.7–15.4)
HGB UR QL STRIP: NEGATIVE
IMM GRANULOCYTES # BLD AUTO: 0.1 K/UL (ref 0–0.5)
IMM GRANULOCYTES NFR BLD AUTO: 1 % (ref 0–5)
KETONES UR QL STRIP.AUTO: NEGATIVE MG/DL
LEUKOCYTE ESTERASE UR QL STRIP.AUTO: ABNORMAL
LYMPHOCYTES # BLD: 2.2 K/UL (ref 0.5–4.6)
LYMPHOCYTES NFR BLD: 16 % (ref 13–44)
MCH RBC QN AUTO: 31.9 PG (ref 26.1–32.9)
MCHC RBC AUTO-ENTMCNC: 32.2 G/DL (ref 31.4–35)
MCV RBC AUTO: 99.1 FL (ref 82–102)
MONOCYTES # BLD: 1.3 K/UL (ref 0.1–1.3)
MONOCYTES NFR BLD: 9 % (ref 4–12)
NEUTS SEG # BLD: 10.1 K/UL (ref 1.7–8.2)
NEUTS SEG NFR BLD: 73 % (ref 43–78)
NITRITE UR QL STRIP.AUTO: NEGATIVE
NRBC # BLD: 0 K/UL (ref 0–0.2)
OTHER OBSERVATIONS: ABNORMAL
PH UR STRIP: 5 (ref 5–9)
PLATELET # BLD AUTO: 217 K/UL (ref 150–450)
PMV BLD AUTO: 12.5 FL (ref 9.4–12.3)
POTASSIUM BLD-SCNC: 4.9 MMOL/L (ref 3.5–5.1)
POTASSIUM SERPL-SCNC: ABNORMAL MMOL/L (ref 3.5–5.1)
PROT SERPL-MCNC: 8.2 G/DL (ref 6.3–8.2)
PROT UR STRIP-MCNC: NEGATIVE MG/DL
RBC # BLD AUTO: 3.48 M/UL (ref 4.05–5.2)
RBC #/AREA URNS HPF: ABNORMAL /HPF
SERVICE CMNT-IMP: ABNORMAL
SERVICE CMNT-IMP: ABNORMAL
SODIUM BLD-SCNC: 138 MMOL/L (ref 136–145)
SODIUM SERPL-SCNC: 131 MMOL/L (ref 136–145)
SP GR UR REFRACTOMETRY: 1.01 (ref 1–1.02)
UROBILINOGEN UR QL STRIP.AUTO: 0.2 EU/DL (ref 0.2–1)
WBC # BLD AUTO: 13.7 K/UL (ref 4.3–11.1)
WBC URNS QL MICRO: ABNORMAL /HPF

## 2024-09-11 PROCEDURE — 85025 COMPLETE CBC W/AUTO DIFF WBC: CPT

## 2024-09-11 PROCEDURE — 81001 URINALYSIS AUTO W/SCOPE: CPT

## 2024-09-11 PROCEDURE — 6360000002 HC RX W HCPCS: Performed by: FAMILY MEDICINE

## 2024-09-11 PROCEDURE — 2580000003 HC RX 258: Performed by: GENERAL PRACTICE

## 2024-09-11 PROCEDURE — 1100000000 HC RM PRIVATE

## 2024-09-11 PROCEDURE — 80047 BASIC METABLC PNL IONIZED CA: CPT

## 2024-09-11 PROCEDURE — 82962 GLUCOSE BLOOD TEST: CPT

## 2024-09-11 PROCEDURE — 2580000003 HC RX 258: Performed by: FAMILY MEDICINE

## 2024-09-11 PROCEDURE — 87086 URINE CULTURE/COLONY COUNT: CPT

## 2024-09-11 PROCEDURE — 80053 COMPREHEN METABOLIC PANEL: CPT

## 2024-09-11 PROCEDURE — 99285 EMERGENCY DEPT VISIT HI MDM: CPT

## 2024-09-11 PROCEDURE — 6370000000 HC RX 637 (ALT 250 FOR IP): Performed by: FAMILY MEDICINE

## 2024-09-11 RX ORDER — AMLODIPINE BESYLATE 5 MG/1
5 TABLET ORAL DAILY
Status: DISCONTINUED | OUTPATIENT
Start: 2024-09-12 | End: 2024-09-15 | Stop reason: HOSPADM

## 2024-09-11 RX ORDER — DEXTROSE MONOHYDRATE 100 MG/ML
INJECTION, SOLUTION INTRAVENOUS CONTINUOUS PRN
Status: DISCONTINUED | OUTPATIENT
Start: 2024-09-11 | End: 2024-09-15 | Stop reason: HOSPADM

## 2024-09-11 RX ORDER — DOXEPIN HYDROCHLORIDE 10 MG/1
10 CAPSULE ORAL NIGHTLY
Status: DISCONTINUED | OUTPATIENT
Start: 2024-09-11 | End: 2024-09-15 | Stop reason: HOSPADM

## 2024-09-11 RX ORDER — CETIRIZINE HYDROCHLORIDE 10 MG/1
10 TABLET ORAL DAILY PRN
Status: DISCONTINUED | OUTPATIENT
Start: 2024-09-11 | End: 2024-09-15 | Stop reason: HOSPADM

## 2024-09-11 RX ORDER — TRAMADOL HYDROCHLORIDE 50 MG/1
50 TABLET ORAL DAILY PRN
Status: DISCONTINUED | OUTPATIENT
Start: 2024-09-11 | End: 2024-09-13

## 2024-09-11 RX ORDER — IBUPROFEN 600 MG/1
1 TABLET ORAL PRN
Status: DISCONTINUED | OUTPATIENT
Start: 2024-09-11 | End: 2024-09-15 | Stop reason: HOSPADM

## 2024-09-11 RX ORDER — FLUTICASONE PROPIONATE 50 MCG
2 SPRAY, SUSPENSION (ML) NASAL 2 TIMES DAILY PRN
Status: DISCONTINUED | OUTPATIENT
Start: 2024-09-11 | End: 2024-09-15 | Stop reason: HOSPADM

## 2024-09-11 RX ORDER — GABAPENTIN 300 MG/1
600 CAPSULE ORAL 2 TIMES DAILY
Status: DISCONTINUED | OUTPATIENT
Start: 2024-09-12 | End: 2024-09-15 | Stop reason: HOSPADM

## 2024-09-11 RX ORDER — 0.9 % SODIUM CHLORIDE 0.9 %
500 INTRAVENOUS SOLUTION INTRAVENOUS ONCE
Status: COMPLETED | OUTPATIENT
Start: 2024-09-11 | End: 2024-09-11

## 2024-09-11 RX ORDER — ONDANSETRON 2 MG/ML
4 INJECTION INTRAMUSCULAR; INTRAVENOUS EVERY 6 HOURS PRN
Status: DISCONTINUED | OUTPATIENT
Start: 2024-09-11 | End: 2024-09-15 | Stop reason: HOSPADM

## 2024-09-11 RX ORDER — CITALOPRAM HYDROBROMIDE 20 MG/1
10 TABLET ORAL
Status: DISCONTINUED | OUTPATIENT
Start: 2024-09-11 | End: 2024-09-15 | Stop reason: HOSPADM

## 2024-09-11 RX ORDER — ACETAMINOPHEN 650 MG/1
650 SUPPOSITORY RECTAL EVERY 6 HOURS PRN
Status: DISCONTINUED | OUTPATIENT
Start: 2024-09-11 | End: 2024-09-15 | Stop reason: HOSPADM

## 2024-09-11 RX ORDER — SODIUM CHLORIDE 0.9 % (FLUSH) 0.9 %
5-40 SYRINGE (ML) INJECTION PRN
Status: DISCONTINUED | OUTPATIENT
Start: 2024-09-11 | End: 2024-09-15 | Stop reason: HOSPADM

## 2024-09-11 RX ORDER — HEPARIN SODIUM 5000 [USP'U]/ML
5000 INJECTION, SOLUTION INTRAVENOUS; SUBCUTANEOUS EVERY 8 HOURS SCHEDULED
Status: DISCONTINUED | OUTPATIENT
Start: 2024-09-11 | End: 2024-09-15 | Stop reason: HOSPADM

## 2024-09-11 RX ORDER — SODIUM CHLORIDE 9 MG/ML
INJECTION, SOLUTION INTRAVENOUS PRN
Status: DISCONTINUED | OUTPATIENT
Start: 2024-09-11 | End: 2024-09-15 | Stop reason: HOSPADM

## 2024-09-11 RX ORDER — SODIUM CHLORIDE 0.9 % (FLUSH) 0.9 %
5-40 SYRINGE (ML) INJECTION EVERY 12 HOURS SCHEDULED
Status: DISCONTINUED | OUTPATIENT
Start: 2024-09-11 | End: 2024-09-15 | Stop reason: HOSPADM

## 2024-09-11 RX ORDER — METOPROLOL SUCCINATE 50 MG/1
50 TABLET, EXTENDED RELEASE ORAL 2 TIMES DAILY
Status: DISCONTINUED | OUTPATIENT
Start: 2024-09-11 | End: 2024-09-15 | Stop reason: HOSPADM

## 2024-09-11 RX ORDER — AMITRIPTYLINE HYDROCHLORIDE 10 MG/1
10 TABLET ORAL NIGHTLY
Status: DISCONTINUED | OUTPATIENT
Start: 2024-09-11 | End: 2024-09-15 | Stop reason: HOSPADM

## 2024-09-11 RX ORDER — ENOXAPARIN SODIUM 100 MG/ML
30 INJECTION SUBCUTANEOUS DAILY
Status: DISCONTINUED | OUTPATIENT
Start: 2024-09-11 | End: 2024-09-11

## 2024-09-11 RX ORDER — SODIUM CHLORIDE 9 MG/ML
INJECTION, SOLUTION INTRAVENOUS CONTINUOUS
Status: DISCONTINUED | OUTPATIENT
Start: 2024-09-11 | End: 2024-09-13

## 2024-09-11 RX ORDER — ACETAMINOPHEN 325 MG/1
650 TABLET ORAL EVERY 6 HOURS PRN
Status: DISCONTINUED | OUTPATIENT
Start: 2024-09-11 | End: 2024-09-15 | Stop reason: HOSPADM

## 2024-09-11 RX ORDER — ONDANSETRON 4 MG/1
4 TABLET, ORALLY DISINTEGRATING ORAL EVERY 8 HOURS PRN
Status: DISCONTINUED | OUTPATIENT
Start: 2024-09-11 | End: 2024-09-15 | Stop reason: HOSPADM

## 2024-09-11 RX ORDER — INSULIN LISPRO 100 [IU]/ML
0-4 INJECTION, SOLUTION INTRAVENOUS; SUBCUTANEOUS NIGHTLY
Status: DISCONTINUED | OUTPATIENT
Start: 2024-09-11 | End: 2024-09-15 | Stop reason: HOSPADM

## 2024-09-11 RX ORDER — ALLOPURINOL 100 MG/1
200 TABLET ORAL DAILY
Status: DISCONTINUED | OUTPATIENT
Start: 2024-09-11 | End: 2024-09-15 | Stop reason: HOSPADM

## 2024-09-11 RX ORDER — INSULIN LISPRO 100 [IU]/ML
0-4 INJECTION, SOLUTION INTRAVENOUS; SUBCUTANEOUS
Status: DISCONTINUED | OUTPATIENT
Start: 2024-09-11 | End: 2024-09-15 | Stop reason: HOSPADM

## 2024-09-11 RX ORDER — POLYETHYLENE GLYCOL 3350 17 G/17G
17 POWDER, FOR SOLUTION ORAL DAILY PRN
Status: DISCONTINUED | OUTPATIENT
Start: 2024-09-11 | End: 2024-09-15 | Stop reason: HOSPADM

## 2024-09-11 RX ADMIN — HEPARIN SODIUM 5000 UNITS: 5000 INJECTION INTRAVENOUS; SUBCUTANEOUS at 20:47

## 2024-09-11 RX ADMIN — CHOLECALCIFEROL TAB 125 MCG (5000 UNIT) 5000 UNITS: 125 TAB at 20:53

## 2024-09-11 RX ADMIN — WATER 1000 MG: 1 INJECTION INTRAMUSCULAR; INTRAVENOUS; SUBCUTANEOUS at 16:59

## 2024-09-11 RX ADMIN — ACETAMINOPHEN 650 MG: 325 TABLET ORAL at 17:48

## 2024-09-11 RX ADMIN — DOXEPIN HYDROCHLORIDE 10 MG: 10 CAPSULE ORAL at 20:47

## 2024-09-11 RX ADMIN — SODIUM CHLORIDE: 9 INJECTION, SOLUTION INTRAVENOUS at 16:30

## 2024-09-11 RX ADMIN — AMITRIPTYLINE HYDROCHLORIDE 10 MG: 10 TABLET, FILM COATED ORAL at 20:47

## 2024-09-11 RX ADMIN — SODIUM CHLORIDE, PRESERVATIVE FREE 10 ML: 5 INJECTION INTRAVENOUS at 20:54

## 2024-09-11 RX ADMIN — CITALOPRAM HYDROBROMIDE 10 MG: 20 TABLET ORAL at 20:47

## 2024-09-11 RX ADMIN — METOPROLOL SUCCINATE 50 MG: 50 TABLET, EXTENDED RELEASE ORAL at 20:47

## 2024-09-11 RX ADMIN — ALLOPURINOL 200 MG: 100 TABLET ORAL at 20:56

## 2024-09-11 RX ADMIN — SODIUM CHLORIDE 500 ML: 9 INJECTION, SOLUTION INTRAVENOUS at 14:45

## 2024-09-11 ASSESSMENT — LIFESTYLE VARIABLES
HOW OFTEN DO YOU HAVE A DRINK CONTAINING ALCOHOL: NEVER
HOW MANY STANDARD DRINKS CONTAINING ALCOHOL DO YOU HAVE ON A TYPICAL DAY: PATIENT DOES NOT DRINK

## 2024-09-11 ASSESSMENT — PAIN DESCRIPTION - LOCATION: LOCATION: HEAD

## 2024-09-11 ASSESSMENT — PAIN DESCRIPTION - DESCRIPTORS: DESCRIPTORS: ACHING

## 2024-09-11 ASSESSMENT — PAIN - FUNCTIONAL ASSESSMENT: PAIN_FUNCTIONAL_ASSESSMENT: NONE - DENIES PAIN

## 2024-09-11 ASSESSMENT — PAIN SCALES - GENERAL
PAINLEVEL_OUTOF10: 3
PAINLEVEL_OUTOF10: 6

## 2024-09-11 NOTE — ED PROVIDER NOTES
Emergency Department Provider Note       PCP: Marva Xavier, APRN - CNP   Age: 79 y.o.   Sex: female     DISPOSITION Admitted 09/11/2024 02:57:23 PM  Condition at Disposition: Data Unavailable       ICD-10-CM    1. Acute kidney injury (HCC)  N17.9       2. Dehydration  E86.0       3. COVID-19 virus infection  U07.1           Medical Decision Making     Patient presented with lack of urinary output.  She actually did have urine in her bladder and did not have anything to consider retention.  Had 170 cc of fluid on bladder scan.  However, patient did have increase in her creatinine and did appear somewhat prerenal.  This appears to be due to dehydration.  Patient has started on IV fluids.  She is recovering from a COVID-19 infection.  This is likely contributing.  No respiratory problems at this time.  Patient will be admitted for IV fluids and recheck of labs     1 or more acute illnesses that pose a threat to life or bodily function.   Discussion with external consultants.  Shared medical decision making was utilized in creating the patients health plan today.  I independently ordered and reviewed each unique test.    I reviewed external records: provider visit note from outside specialist.  I reviewed external records: previous lab results from outside ED.  I reviewed external records: previous imaging study including radiologist interpretation.           The patient was admitted and I have discussed patient management with the admitting provider.    Exclusion criteria - the patient is NOT to be included for SEP-1 Core Measure due to: Infection is not suspected       History     79 year old female patient with significant medical history of CKD stage 3a, DM type II and osteoarthritis presents today with left lower abdominal pain and urinary retention for two days. Patient says that she has not been able to urinate for the past two days although trying to multiple times. She has not have a bowel movement

## 2024-09-11 NOTE — PROGRESS NOTES
TRANSFER - IN REPORT:    Verbal report received from Heraclio RN on Mirtha Posada  being received from ER for routine progression of patient care      Report consisted of patient's Situation, Background, Assessment and   Recommendations(SBAR).     Information from the following report(s) Nurse Handoff Report was reviewed with the receiving nurse.    Opportunity for questions and clarification was provided.      Assessment completed upon patient's arrival to unit and care assumed.

## 2024-09-11 NOTE — ED TRIAGE NOTES
Per patient no urinary output x2 days. States hx of ckd. Denies n/v/d. States recent dx of covid 8-30-24. Denies fever/chills. States pressure llq abdominal pain.

## 2024-09-11 NOTE — H&P
Hospitalist History and Physical   Admit Date:  2024 10:15 AM   Name:  Mirtha Posada   Age:  79 y.o.  Sex:  female  :  1945   MRN:  [id number]   Room:  ER01/    Presenting/Chief Complaint: Urinary Retention     Reason(s) for Admission: Acute kidney injury superimposed on CKD (HCC) [N17.9, N18.9]     History of Present Illness:   9 y.o. female with medical history of insomnia, Sjogren's syndrome, type 2 diabetes, CKD 3A, gout, CHF with systolic and diastolic dysfunction, dyslipidemia, depression, osteoarthritis, chemotherapy-induced peripheral neuropathy, dilated cardiomyopathy, vitamin D and B12 deficiency, hypertension, GERD with esophagitis, history of breast s/p left mastectomy .    Recent discharge from our service on -pulmonary edema/CHF exacerbation.  Was positive for COVID on .    Present to the emergency room with a generalized weakness, not much urination for past 2 days, generalized body aches for past 2 weeks.  Says last bowel movement was about 4 days ago.  Decreased p.o. intake.    Saw her cardiologist Dr. Tian Watkins yesterday, noticed to have low normal blood pressures, recommending to decrease her ACE inhibitor and beta-blocker to half dosage.    No headache or dizziness or chest pain or nausea vomiting or abdominal pain.    WBC 13.7, sodium of 131, creatinine of 2.99, mildly elevated AST of 116  UA trace leukocyte esterase WBC 5-10 bacteria 2+    Patient will be admitted for acute on chronic kidney disease, generalized weakness probably secondary to her recent COVID and probable UTI.  Assessment & Plan:     Principal Problem:    Acute kidney injury superimposed on CKD (HCC)  Creatinine of 2.99  Will hold lisinopril and metformin  Normal Saline at 75 cc/h  Repeat BMP in morning    Probable UTI  Will order urine culture  Rocephin 1 g IV daily    Mild elevated AST? Etiology  Will repeat cmp in am    Combined systolic and diastolic heart failure  Will hold Lasix and

## 2024-09-11 NOTE — PROGRESS NOTES
Patient is in bed, axox4, no pain or distress noted, respirations are even and unlabored on RA, no other needs stated,  at bedside, call light is within reach.

## 2024-09-11 NOTE — ED NOTES
TRANSFER - OUT REPORT:    Verbal report given to Tia RN  on Mirtha Posada  being transferred to 5 for routine progression of patient care       Report consisted of patient's Situation, Background, Assessment and   Recommendations(SBAR).     Information from the following report(s) Nurse Handoff Report was reviewed with the receiving nurse.    Flaquita Fall Assessment:    Presents to emergency department  because of falls (Syncope, seizure, or loss of consciousness): No  Age > 70: Yes  Altered Mental Status, Intoxication with alcohol or substance confusion (Disorientation, impaired judgment, poor safety awaremess, or inability to follow instructions): No  Impaired Mobility: Ambulates or transfers with assistive devices or assistance; Unable to ambulate or transer.: No  Nursing Judgement: No          Lines:   Peripheral IV 09/11/24 Proximal;Right Forearm (Active)        Opportunity for questions and clarification was provided.      Patient transported with:  Heraclio Oconnell RN  09/11/24 [id number]

## 2024-09-11 NOTE — PROGRESS NOTES
Pt arrived to room 805 via stretcher from the ER. Pt alert, hard of hearing, oriented times 3 at this time. Pt has no skin breakdown noted, pt skin assessed with IRINEO Diaz. Pt complaints of headache, no distress at this time. Pt encouraged to call for assistance if needed call light in reach, will monitor.

## 2024-09-12 LAB
ALBUMIN SERPL-MCNC: 2.7 G/DL (ref 3.2–4.6)
ALBUMIN/GLOB SERPL: 0.6 (ref 1–1.9)
ALP SERPL-CCNC: 49 U/L (ref 35–104)
ALT SERPL-CCNC: 14 U/L (ref 12–65)
ANION GAP SERPL CALC-SCNC: 14 MMOL/L (ref 9–18)
AST SERPL-CCNC: 21 U/L (ref 15–37)
BASOPHILS # BLD: 0 K/UL (ref 0–0.2)
BASOPHILS NFR BLD: 0 % (ref 0–2)
BILIRUB SERPL-MCNC: 0.4 MG/DL (ref 0–1.2)
BUN SERPL-MCNC: 40 MG/DL (ref 8–23)
CALCIUM SERPL-MCNC: 8.8 MG/DL (ref 8.8–10.2)
CHLORIDE SERPL-SCNC: 99 MMOL/L (ref 98–107)
CO2 SERPL-SCNC: 22 MMOL/L (ref 20–28)
CREAT SERPL-MCNC: 2.05 MG/DL (ref 0.6–1.1)
DIFFERENTIAL METHOD BLD: ABNORMAL
EOSINOPHIL # BLD: 0.2 K/UL (ref 0–0.8)
EOSINOPHIL NFR BLD: 2 % (ref 0.5–7.8)
ERYTHROCYTE [DISTWIDTH] IN BLOOD BY AUTOMATED COUNT: 14.6 % (ref 11.9–14.6)
GLOBULIN SER CALC-MCNC: 4.4 G/DL (ref 2.3–3.5)
GLUCOSE BLD STRIP.AUTO-MCNC: 104 MG/DL (ref 65–100)
GLUCOSE BLD STRIP.AUTO-MCNC: 132 MG/DL (ref 65–100)
GLUCOSE BLD STRIP.AUTO-MCNC: 137 MG/DL (ref 65–100)
GLUCOSE BLD STRIP.AUTO-MCNC: 140 MG/DL (ref 65–100)
GLUCOSE SERPL-MCNC: 107 MG/DL (ref 70–99)
HCT VFR BLD AUTO: 34.6 % (ref 35.8–46.3)
HGB BLD-MCNC: 11 G/DL (ref 11.7–15.4)
IMM GRANULOCYTES # BLD AUTO: 0.1 K/UL (ref 0–0.5)
IMM GRANULOCYTES NFR BLD AUTO: 1 % (ref 0–5)
LYMPHOCYTES # BLD: 2.6 K/UL (ref 0.5–4.6)
LYMPHOCYTES NFR BLD: 31 % (ref 13–44)
MCH RBC QN AUTO: 31.5 PG (ref 26.1–32.9)
MCHC RBC AUTO-ENTMCNC: 31.8 G/DL (ref 31.4–35)
MCV RBC AUTO: 99.1 FL (ref 82–102)
MONOCYTES # BLD: 1.1 K/UL (ref 0.1–1.3)
MONOCYTES NFR BLD: 12 % (ref 4–12)
NEUTS SEG # BLD: 4.6 K/UL (ref 1.7–8.2)
NEUTS SEG NFR BLD: 54 % (ref 43–78)
NRBC # BLD: 0 K/UL (ref 0–0.2)
PLATELET # BLD AUTO: 190 K/UL (ref 150–450)
PMV BLD AUTO: 11.3 FL (ref 9.4–12.3)
POTASSIUM SERPL-SCNC: 5.3 MMOL/L (ref 3.5–5.1)
PROT SERPL-MCNC: 7.1 G/DL (ref 6.3–8.2)
RBC # BLD AUTO: 3.49 M/UL (ref 4.05–5.2)
SERVICE CMNT-IMP: ABNORMAL
SODIUM SERPL-SCNC: 135 MMOL/L (ref 136–145)
WBC # BLD AUTO: 8.6 K/UL (ref 4.3–11.1)

## 2024-09-12 PROCEDURE — 82962 GLUCOSE BLOOD TEST: CPT

## 2024-09-12 PROCEDURE — 36415 COLL VENOUS BLD VENIPUNCTURE: CPT

## 2024-09-12 PROCEDURE — 6370000000 HC RX 637 (ALT 250 FOR IP): Performed by: INTERNAL MEDICINE

## 2024-09-12 PROCEDURE — 97165 OT EVAL LOW COMPLEX 30 MIN: CPT

## 2024-09-12 PROCEDURE — 51798 US URINE CAPACITY MEASURE: CPT

## 2024-09-12 PROCEDURE — 6370000000 HC RX 637 (ALT 250 FOR IP): Performed by: FAMILY MEDICINE

## 2024-09-12 PROCEDURE — 6360000002 HC RX W HCPCS: Performed by: FAMILY MEDICINE

## 2024-09-12 PROCEDURE — 2580000003 HC RX 258: Performed by: FAMILY MEDICINE

## 2024-09-12 PROCEDURE — 1100000000 HC RM PRIVATE

## 2024-09-12 PROCEDURE — 80053 COMPREHEN METABOLIC PANEL: CPT

## 2024-09-12 PROCEDURE — 97530 THERAPEUTIC ACTIVITIES: CPT

## 2024-09-12 PROCEDURE — 97161 PT EVAL LOW COMPLEX 20 MIN: CPT

## 2024-09-12 PROCEDURE — 85025 COMPLETE CBC W/AUTO DIFF WBC: CPT

## 2024-09-12 PROCEDURE — 97535 SELF CARE MNGMENT TRAINING: CPT

## 2024-09-12 RX ADMIN — HEPARIN SODIUM 5000 UNITS: 5000 INJECTION INTRAVENOUS; SUBCUTANEOUS at 14:00

## 2024-09-12 RX ADMIN — DOXEPIN HYDROCHLORIDE 10 MG: 10 CAPSULE ORAL at 21:54

## 2024-09-12 RX ADMIN — GABAPENTIN 600 MG: 300 CAPSULE ORAL at 10:15

## 2024-09-12 RX ADMIN — AMLODIPINE BESYLATE 5 MG: 5 TABLET ORAL at 10:20

## 2024-09-12 RX ADMIN — SODIUM ZIRCONIUM CYCLOSILICATE 5 G: 5 POWDER, FOR SUSPENSION ORAL at 15:40

## 2024-09-12 RX ADMIN — CITALOPRAM HYDROBROMIDE 10 MG: 20 TABLET ORAL at 21:55

## 2024-09-12 RX ADMIN — SODIUM ZIRCONIUM CYCLOSILICATE 5 G: 5 POWDER, FOR SUSPENSION ORAL at 21:53

## 2024-09-12 RX ADMIN — AMITRIPTYLINE HYDROCHLORIDE 10 MG: 10 TABLET, FILM COATED ORAL at 21:54

## 2024-09-12 RX ADMIN — WATER 1000 MG: 1 INJECTION INTRAMUSCULAR; INTRAVENOUS; SUBCUTANEOUS at 16:00

## 2024-09-12 RX ADMIN — ALLOPURINOL 200 MG: 100 TABLET ORAL at 10:15

## 2024-09-12 RX ADMIN — HEPARIN SODIUM 5000 UNITS: 5000 INJECTION INTRAVENOUS; SUBCUTANEOUS at 06:16

## 2024-09-12 RX ADMIN — METOPROLOL SUCCINATE 50 MG: 50 TABLET, EXTENDED RELEASE ORAL at 21:54

## 2024-09-12 RX ADMIN — SODIUM CHLORIDE, PRESERVATIVE FREE 10 ML: 5 INJECTION INTRAVENOUS at 21:59

## 2024-09-12 RX ADMIN — SODIUM ZIRCONIUM CYCLOSILICATE 5 G: 5 POWDER, FOR SUSPENSION ORAL at 10:15

## 2024-09-12 RX ADMIN — SODIUM CHLORIDE: 9 INJECTION, SOLUTION INTRAVENOUS at 06:38

## 2024-09-12 RX ADMIN — HEPARIN SODIUM 5000 UNITS: 5000 INJECTION INTRAVENOUS; SUBCUTANEOUS at 21:54

## 2024-09-12 RX ADMIN — METOPROLOL SUCCINATE 50 MG: 50 TABLET, EXTENDED RELEASE ORAL at 10:20

## 2024-09-12 RX ADMIN — GABAPENTIN 600 MG: 300 CAPSULE ORAL at 21:54

## 2024-09-12 RX ADMIN — SODIUM CHLORIDE: 9 INJECTION, SOLUTION INTRAVENOUS at 22:02

## 2024-09-12 RX ADMIN — CHOLECALCIFEROL TAB 125 MCG (5000 UNIT) 5000 UNITS: 125 TAB at 10:15

## 2024-09-12 ASSESSMENT — PAIN SCALES - GENERAL: PAINLEVEL_OUTOF10: 0

## 2024-09-12 NOTE — PROGRESS NOTES
Per bladder scan pt retaining >709, pt voided 400ml in purewick, PVR read 535ml, MD martin ordered kruse catheter, catheter inserted, pt tolerated well, output of 600ml per kruse.

## 2024-09-12 NOTE — PROGRESS NOTES
Comprehensive Nutrition Assessment    Type and Reason for Visit: Initial, Positive Nutrition Screen  Malnutrition Screening Tool: Malnutrition Screen  Have you recently lost weight without trying?: 2 to 13 pounds (1 point)  Have you been eating poorly because of a decreased appetite?: Yes (1 point)  Malnutrition Screening Tool Score: 2    Nutrition Recommendations/Plan:   Meals and Snacks:  Diet: Continue current order  Oral Nutriton Supplement Therapy:   Medical food supplement therapy:  None Pt declined offers      Malnutrition Assessment:  Malnutrition Status: At risk for malnutrition (Comment) (poor appetite and intakes r/t COVID & feeling ill)  no olivia wasting    Nutrition Assessment:  Nutrition History:   Pt's  reports that her appetite has been poor the last week or so while being sick with COVID. States that she's been sick and hasn't had an appetite. Pt is refusing pretty much everything his is cooking for her to eat. Intakes have been minimal the last days leading up to admit. Denies having issues chewing/swallowing.      Do You Have Any Cultural, Rastafarian, or Ethnic Food Preferences?: No   Weight History:   EMR wt hx review: 161# 10/23/23 (Cardiology), 159# 2/6/24 (FOV), 157# 4/23/24 (Cardiology), 155# 6/10/24 (FOV), 146# 9/10/24 (FOV).   Pt confirms height and reports she was 150 lbs at her last Dr's appointment. Isn't sure of any recent weight changes.   Nutrition Background:       PMH significant for insomnia, Sjogren's syndrome, DM 2, CKD 3, Gout, CHF, HLD, Depression, OA, chemo-induced peripheral neuropathy, dilated cardiomyopathy, Vitamin D & B12 deficiency, HTN, GERD, esophagitis, Breast Cancer s/p left mastectomy, anemia.   Pt presents with urinary retention and JACKY on CKD. Tested positive for COVID 8/30.   Nutrition Interval:  Pt is laying at bedside, RN trying to getting IV access during my visit.  is at bedside, and provides a majority of the history provided above. Pt does not

## 2024-09-12 NOTE — PROGRESS NOTES
ACUTE PHYSICAL THERAPY GOALS:   (Developed with and agreed upon by patient and/or caregiver.)  Pt will perform bed mobility Mod (I) c inc time, cueing and use of rails as needed in 7 therapy sessions.  Pt will perform sit-to-stand/ stand-to-sit transfers Mod (I) c use of LRAD/external supports as needed and no LOB or miss-steps in 7 therapy sessions.  Pt will ambulate 175 ft Mod (I) with use of LRAD, no LOB or miss-steps and breaks as needed in 7 therapy sessions.  Pt will perform standing dynamic balance activities with minimal postural sway in 7 therapy sessions.  Pt will tolerate multiple sets and reps of BLE exercises in 7 therapy sessions.      PHYSICAL THERAPY Initial Assessment, Daily Note, and AM  (Link to Caseload Tracking: PT Visit Days : 1  Acknowledge Orders  Time In/Out  PT Charge Capture  Rehab Caseload Tracker    Mirtha Posada is a 79 y.o. female   PRIMARY DIAGNOSIS: Acute kidney injury superimposed on CKD (HCC)  Dehydration [E86.0]  Acute kidney injury (HCC) [N17.9]  Acute kidney injury superimposed on CKD (HCC) [N17.9, N18.9]  COVID-19 virus infection [U07.1]       Reason for Referral: Generalized Muscle Weakness (M62.81)  Difficulty in walking, Not elsewhere classified (R26.2)  Other abnormalities of gait and mobility (R26.89)  Inpatient: Payor: MEDICARE / Plan: MEDICARE PART A AND B / Product Type: *No Product type* /     ASSESSMENT:     REHAB RECOMMENDATIONS:   Recommendation to date pending progress:  Setting:  Home Health Therapy    Equipment:    None  To Be Determined     ASSESSMENT:  Ms. Posada Is a 79 y.o. female presenting to PT after being admitted on 9/11/24 for JACKY superimposed on CKD. At time of initial evaluation, pt presents below baseline LOF with deficits in bed mobility, strength, transfers, balance, gait and activity tolerance limiting her overall functional mobility. Today, pt performed all mobility with CG/Min (A), inc time and cueing while requiring additional use of

## 2024-09-12 NOTE — PLAN OF CARE
Problem: Discharge Planning  Goal: Discharge to home or other facility with appropriate resources  Outcome: Progressing  Flowsheets (Taken 9/12/2024 0748)  Discharge to home or other facility with appropriate resources:   Identify barriers to discharge with patient and caregiver   Arrange for needed discharge resources and transportation as appropriate   Identify discharge learning needs (meds, wound care, etc)   Refer to discharge planning if patient needs post-hospital services based on physician order or complex needs related to functional status, cognitive ability or social support system     Problem: Safety - Adult  Goal: Free from fall injury  Outcome: Progressing     Problem: Skin/Tissue Integrity  Goal: Absence of new skin breakdown  Description: 1.  Monitor for areas of redness and/or skin breakdown  2.  Assess vascular access sites hourly  3.  Every 4-6 hours minimum:  Change oxygen saturation probe site  4.  Every 4-6 hours:  If on nasal continuous positive airway pressure, respiratory therapy assess nares and determine need for appliance change or resting period.  Outcome: Progressing     Problem: Pain  Goal: Verbalizes/displays adequate comfort level or baseline comfort level  Outcome: Progressing     Problem: Chronic Conditions and Co-morbidities  Goal: Patient's chronic conditions and co-morbidity symptoms are monitored and maintained or improved  Outcome: Progressing  Flowsheets (Taken 9/12/2024 0748)  Care Plan - Patient's Chronic Conditions and Co-Morbidity Symptoms are Monitored and Maintained or Improved:   Monitor and assess patient's chronic conditions and comorbid symptoms for stability, deterioration, or improvement   Collaborate with multidisciplinary team to address chronic and comorbid conditions and prevent exacerbation or deterioration   Update acute care plan with appropriate goals if chronic or comorbid symptoms are exacerbated and prevent overall improvement and discharge

## 2024-09-12 NOTE — PROGRESS NOTES
Pt awake in bed. A/ox4. Respirations even and unlabored on room air. Pt denies pain, no distress noted. Trejo intact. Fluids infusing. Pt instructed to use call light if assistance is needed with call light in reach. Plan of care ongoing.

## 2024-09-12 NOTE — PROGRESS NOTES
Hospitalist Progress Note   Admit Date:  2024 10:15 AM   Name:  Mirtha Posada   Age:  79 y.o.  Sex:  female  :  1945   MRN:  [id number]   Room:  [facility]/    Presenting/Chief Complaint: Urinary Retention     Reason(s) for Admission: Dehydration [E86.0]  Acute kidney injury (HCC) [N17.9]  Acute kidney injury superimposed on CKD (HCC) [N17.9, N18.9]  COVID-19 virus infection [U07.1]     Hospital Course:   Mirtha Posada is a 79 y.o. female with medical history of insomnia, Sjogren's syndrome, type 2 diabetes, CKD 3A, gout, CHF with systolic and diastolic dysfunction, dyslipidemia, depression, osteoarthritis, chemotherapy-induced peripheral neuropathy, dilated cardiomyopathy, vitamin D and B12 deficiency, hypertension, GERD with esophagitis, history of breast s/p left mastectomy who was recently discharged from [facility] Hospitalist service on 2024 for acute pulmonary edema/CHF exacerbation and positive for COVID on .  Pt presented to the ER c/o generalized weakness and oliguria x 2 days with generalized aches.    Of note, pt was seen by her primary cardiologist Dr. Tian Watkins the day prior to admission and found with low normal blood pressures; her dose of ACE inhibitor and beta-blocker was decreased in half.     In ER,  VSS.  WBC 13.7, sodium of 131, creatinine of 2.99, mildly elevated AST of 116  UA trace leukocyte esterase WBC 5-10 bacteria 2+; Hospitalist consulted for inpatient admission.    Subjective & 24hr Events:   \"I guess I'm doing okay; I know I feel much better than when I first came in.\"  Pleasant 79y.o. WF sitting up in recliner     Admits to generalized improvement; denies CP, HA, dizziness, fever, chills, N/V, abd pains, oliguria    Assessment & Plan:     Principal Problem:    Acute kidney injury superimposed on CKD (HCC)  - suspect from over-diuresis during recent hospitalization for acute exacerbation HF  - cont gentle IVF  - renal fxn improved (creatinine 2.99 ->  acetaminophen (TYLENOL) tablet 650 mg  650 mg Oral Q6H PRN    Or    acetaminophen (TYLENOL) suppository 650 mg  650 mg Rectal Q6H PRN    0.9 % sodium chloride infusion   IntraVENous Continuous    insulin lispro (HUMALOG,ADMELOG) injection vial 0-4 Units  0-4 Units SubCUTAneous TID WC    insulin lispro (HUMALOG,ADMELOG) injection vial 0-4 Units  0-4 Units SubCUTAneous Nightly    heparin (porcine) injection 5,000 Units  5,000 Units SubCUTAneous 3 times per day    cefTRIAXone (ROCEPHIN) 1,000 mg in sterile water 10 mL IV syringe  1,000 mg IntraVENous Q24H    glucose chewable tablet 16 g  4 tablet Oral PRN    dextrose bolus 10% 125 mL  125 mL IntraVENous PRN    Or    dextrose bolus 10% 250 mL  250 mL IntraVENous PRN    Glucagon Emergency KIT 1 mg  1 mg SubCUTAneous PRN    dextrose 10 % infusion   IntraVENous Continuous PRN       Signed:  TERESA Velazquez

## 2024-09-12 NOTE — PROGRESS NOTES
ACUTE OCCUPATIONAL THERAPY GOALS:   (Developed with and agreed upon by patient and/or caregiver.)  1. Patient will complete lower body bathing and dressing with MODIFIED INDEPENDENCE and adaptive equipment as needed.   2. Patient will complete toileting with MODIFIED INDEPENDENCE.   3. Patient will tolerate 30 minutes of OT treatment with 1-2 rest breaks to increase activity tolerance for ADLs.   4. Patient will complete functional transfers with MODIFIED INDEPENDENCE and adaptive equipment as needed.   5. Patient will complete functional mobility for household distances with MODIFIED INDEPENDENCE and adaptive equipment as needed.  6. Patient will complete self-grooming while standing edge of sink with MODIFIED INDEPENDENCE and adaptive equipment as needed.    Timeframe: 7 visits       OCCUPATIONAL THERAPY Initial Assessment, Daily Note, and AM       OT Visit Days: 1   Acknowledge Orders  Time  OT Charge Capture  Rehab Caseload Tracker      Mirtha Posada is a 79 y.o. female   PRIMARY DIAGNOSIS: Acute kidney injury superimposed on CKD (HCC)  Dehydration [E86.0]  Acute kidney injury (HCC) [N17.9]  Acute kidney injury superimposed on CKD (HCC) [N17.9, N18.9]  COVID-19 virus infection [U07.1]       Reason for Referral: Generalized Muscle Weakness (M62.81)  Inpatient: Payor: MEDICARE / Plan: MEDICARE PART A AND B / Product Type: *No Product type* /     ASSESSMENT:     REHAB RECOMMENDATIONS:   Recommendation to date pending progress:  Setting:  Home Health Therapy    Equipment:    To Be Determined - has cane, rolling walker, and BSC     ASSESSMENT:  Ms. Posada presents with deficits in overall strength, activity tolerance, activities of daily living performance, and functional mobility. Presents for JACKY d/t urinary retention; alert and doing well today. Resting on room air. Of note, pt lives with spouse; independent for ADLs while MOD I for functional mobility w/ use of cane..        Today, BUE are generally

## 2024-09-13 ENCOUNTER — APPOINTMENT (OUTPATIENT)
Dept: GENERAL RADIOLOGY | Age: 79
DRG: 683 | End: 2024-09-13
Payer: MEDICARE

## 2024-09-13 LAB
ANION GAP SERPL CALC-SCNC: 11 MMOL/L (ref 9–18)
BACTERIA SPEC CULT: NORMAL
BUN SERPL-MCNC: 23 MG/DL (ref 8–23)
CALCIUM SERPL-MCNC: 8.2 MG/DL (ref 8.8–10.2)
CHLORIDE SERPL-SCNC: 101 MMOL/L (ref 98–107)
CO2 SERPL-SCNC: 22 MMOL/L (ref 20–28)
CREAT SERPL-MCNC: 1.33 MG/DL (ref 0.6–1.1)
ERYTHROCYTE [DISTWIDTH] IN BLOOD BY AUTOMATED COUNT: 14.2 % (ref 11.9–14.6)
GLUCOSE BLD STRIP.AUTO-MCNC: 111 MG/DL (ref 65–100)
GLUCOSE BLD STRIP.AUTO-MCNC: 122 MG/DL (ref 65–100)
GLUCOSE BLD STRIP.AUTO-MCNC: 129 MG/DL (ref 65–100)
GLUCOSE BLD STRIP.AUTO-MCNC: 130 MG/DL (ref 65–100)
GLUCOSE SERPL-MCNC: 121 MG/DL (ref 70–99)
HCT VFR BLD AUTO: 28 % (ref 35.8–46.3)
HGB BLD-MCNC: 9 G/DL (ref 11.7–15.4)
LACTATE SERPL-SCNC: 0.8 MMOL/L (ref 0.5–2)
MCH RBC QN AUTO: 31.4 PG (ref 26.1–32.9)
MCHC RBC AUTO-ENTMCNC: 32.1 G/DL (ref 31.4–35)
MCV RBC AUTO: 97.6 FL (ref 82–102)
NRBC # BLD: 0 K/UL (ref 0–0.2)
PLATELET # BLD AUTO: 200 K/UL (ref 150–450)
PMV BLD AUTO: 11 FL (ref 9.4–12.3)
POTASSIUM SERPL-SCNC: 4.3 MMOL/L (ref 3.5–5.1)
PROCALCITONIN SERPL-MCNC: 0.2 NG/ML (ref 0–0.1)
RBC # BLD AUTO: 2.87 M/UL (ref 4.05–5.2)
SERVICE CMNT-IMP: ABNORMAL
SERVICE CMNT-IMP: NORMAL
SODIUM SERPL-SCNC: 133 MMOL/L (ref 136–145)
WBC # BLD AUTO: 7 K/UL (ref 4.3–11.1)

## 2024-09-13 PROCEDURE — 2580000003 HC RX 258: Performed by: FAMILY MEDICINE

## 2024-09-13 PROCEDURE — 97530 THERAPEUTIC ACTIVITIES: CPT

## 2024-09-13 PROCEDURE — 71045 X-RAY EXAM CHEST 1 VIEW: CPT

## 2024-09-13 PROCEDURE — 83605 ASSAY OF LACTIC ACID: CPT

## 2024-09-13 PROCEDURE — 6370000000 HC RX 637 (ALT 250 FOR IP): Performed by: FAMILY MEDICINE

## 2024-09-13 PROCEDURE — 84145 PROCALCITONIN (PCT): CPT

## 2024-09-13 PROCEDURE — 36415 COLL VENOUS BLD VENIPUNCTURE: CPT

## 2024-09-13 PROCEDURE — 6360000002 HC RX W HCPCS: Performed by: FAMILY MEDICINE

## 2024-09-13 PROCEDURE — 82962 GLUCOSE BLOOD TEST: CPT

## 2024-09-13 PROCEDURE — 80048 BASIC METABOLIC PNL TOTAL CA: CPT

## 2024-09-13 PROCEDURE — 1100000000 HC RM PRIVATE

## 2024-09-13 PROCEDURE — 85027 COMPLETE CBC AUTOMATED: CPT

## 2024-09-13 RX ORDER — TRAMADOL HYDROCHLORIDE 50 MG/1
50 TABLET ORAL DAILY PRN
Status: DISCONTINUED | OUTPATIENT
Start: 2024-09-13 | End: 2024-09-15 | Stop reason: HOSPADM

## 2024-09-13 RX ADMIN — HEPARIN SODIUM 5000 UNITS: 5000 INJECTION INTRAVENOUS; SUBCUTANEOUS at 21:27

## 2024-09-13 RX ADMIN — HEPARIN SODIUM 5000 UNITS: 5000 INJECTION INTRAVENOUS; SUBCUTANEOUS at 14:26

## 2024-09-13 RX ADMIN — AMITRIPTYLINE HYDROCHLORIDE 10 MG: 10 TABLET, FILM COATED ORAL at 21:26

## 2024-09-13 RX ADMIN — SODIUM CHLORIDE, PRESERVATIVE FREE 10 ML: 5 INJECTION INTRAVENOUS at 21:27

## 2024-09-13 RX ADMIN — AMLODIPINE BESYLATE 5 MG: 5 TABLET ORAL at 10:35

## 2024-09-13 RX ADMIN — CHOLECALCIFEROL TAB 125 MCG (5000 UNIT) 5000 UNITS: 125 TAB at 10:35

## 2024-09-13 RX ADMIN — HEPARIN SODIUM 5000 UNITS: 5000 INJECTION INTRAVENOUS; SUBCUTANEOUS at 06:23

## 2024-09-13 RX ADMIN — GABAPENTIN 600 MG: 300 CAPSULE ORAL at 21:26

## 2024-09-13 RX ADMIN — METOPROLOL SUCCINATE 50 MG: 50 TABLET, EXTENDED RELEASE ORAL at 10:35

## 2024-09-13 RX ADMIN — CITALOPRAM HYDROBROMIDE 10 MG: 20 TABLET ORAL at 21:26

## 2024-09-13 RX ADMIN — ACETAMINOPHEN 650 MG: 325 TABLET ORAL at 03:39

## 2024-09-13 RX ADMIN — GABAPENTIN 600 MG: 300 CAPSULE ORAL at 10:34

## 2024-09-13 RX ADMIN — DOXEPIN HYDROCHLORIDE 10 MG: 10 CAPSULE ORAL at 21:26

## 2024-09-13 RX ADMIN — METOPROLOL SUCCINATE 50 MG: 50 TABLET, EXTENDED RELEASE ORAL at 21:26

## 2024-09-13 RX ADMIN — WATER 1000 MG: 1 INJECTION INTRAMUSCULAR; INTRAVENOUS; SUBCUTANEOUS at 17:42

## 2024-09-13 RX ADMIN — ALLOPURINOL 200 MG: 100 TABLET ORAL at 10:33

## 2024-09-13 NOTE — PLAN OF CARE
Problem: Discharge Planning  Goal: Discharge to home or other facility with appropriate resources  Outcome: Progressing  Flowsheets (Taken 9/13/2024 0753)  Discharge to home or other facility with appropriate resources:   Identify barriers to discharge with patient and caregiver   Arrange for needed discharge resources and transportation as appropriate   Identify discharge learning needs (meds, wound care, etc)   Refer to discharge planning if patient needs post-hospital services based on physician order or complex needs related to functional status, cognitive ability or social support system     Problem: Safety - Adult  Goal: Free from fall injury  Outcome: Progressing     Problem: Skin/Tissue Integrity  Goal: Absence of new skin breakdown  Description: 1.  Monitor for areas of redness and/or skin breakdown  2.  Assess vascular access sites hourly  3.  Every 4-6 hours minimum:  Change oxygen saturation probe site  4.  Every 4-6 hours:  If on nasal continuous positive airway pressure, respiratory therapy assess nares and determine need for appliance change or resting period.  Outcome: Progressing     Problem: Pain  Goal: Verbalizes/displays adequate comfort level or baseline comfort level  Outcome: Progressing     Problem: Chronic Conditions and Co-morbidities  Goal: Patient's chronic conditions and co-morbidity symptoms are monitored and maintained or improved  Outcome: Progressing  Flowsheets (Taken 9/13/2024 0753)  Care Plan - Patient's Chronic Conditions and Co-Morbidity Symptoms are Monitored and Maintained or Improved:   Monitor and assess patient's chronic conditions and comorbid symptoms for stability, deterioration, or improvement   Collaborate with multidisciplinary team to address chronic and comorbid conditions and prevent exacerbation or deterioration   Update acute care plan with appropriate goals if chronic or comorbid symptoms are exacerbated and prevent overall improvement and discharge

## 2024-09-13 NOTE — PROGRESS NOTES
Hospitalist Progress Note   Admit Date:  2024 10:15 AM   Name:  Mirtha Posada   Age:  79 y.o.  Sex:  female  :  1945   MRN:  [id number]   Room:  [facility]/    Presenting/Chief Complaint: Urinary Retention     Reason(s) for Admission: Dehydration [E86.0]  Acute kidney injury (HCC) [N17.9]  Acute kidney injury superimposed on CKD (HCC) [N17.9, N18.9]  COVID-19 virus infection [U07.1]     Hospital Course:   Mirtha Posada is a 79 y.o. female with medical history of insomnia, Sjogren's syndrome, type 2 diabetes, CKD 3A, gout, CHF with systolic and diastolic dysfunction, dyslipidemia, depression, osteoarthritis, chemotherapy-induced peripheral neuropathy, dilated cardiomyopathy, vitamin D and B12 deficiency, hypertension, GERD with esophagitis, history of breast s/p left mastectomy who was recently discharged from [facility] Hospitalist service on 2024 for acute pulmonary edema/CHF exacerbation and positive for COVID on .  Pt presented to the ER c/o generalized weakness and oliguria x 2 days with generalized aches.    Of note, pt was seen by her primary cardiologist Dr. Tian Watkins the day prior to admission and found with low normal blood pressures; her dose of ACE inhibitor and beta-blocker was decreased in half.     In ER,  VSS.  WBC 13.7, sodium of 131, creatinine of 2.99, mildly elevated AST of 116  UA trace leukocyte esterase WBC 5-10 bacteria 2+; Hospitalist consulted for inpatient admission.    Subjective & 24hr Events:   \"I am doing okay; I lost my hearing aids.\"  Pleasant 79y.o. WF laying in bed in NAD    No complaints this AM; denies CP, HA, dizziness, fever, chills, N/V, abd pains, oliguria    Assessment & Plan:     Principal Problem:    Acute kidney injury superimposed on CKD (HCC)  - suspect from over-diuresis during recent hospitalization for acute exacerbation HF  - renal fxn recovering well; creatinine 2.99 -> 2.05 -> 1.33 this AM  - dc IVF  - avoid nephrotoxic rx    Active  11.7 - 15.4 g/dL    Hematocrit 28.0 (L) 35.8 - 46.3 %    MCV 97.6 82 - 102 FL    MCH 31.4 26.1 - 32.9 PG    MCHC 32.1 31.4 - 35.0 g/dL    RDW 14.2 11.9 - 14.6 %    Platelets 200 150 - 450 K/uL    MPV 11.0 9.4 - 12.3 FL    nRBC 0.00 0.0 - 0.2 K/uL   Basic Metabolic Panel w/ Reflex to MG    Collection Time: 09/13/24  4:54 AM   Result Value Ref Range    Sodium 133 (L) 136 - 145 mmol/L    Potassium 4.3 3.5 - 5.1 mmol/L    Chloride 101 98 - 107 mmol/L    CO2 22 20 - 28 mmol/L    Anion Gap 11 9 - 18 mmol/L    Glucose 121 (H) 70 - 99 mg/dL    BUN 23 8 - 23 MG/DL    Creatinine 1.33 (H) 0.60 - 1.10 MG/DL    Est, Glom Filt Rate 41 (L) >60 ml/min/1.73m2    Calcium 8.2 (L) 8.8 - 10.2 MG/DL   POCT Glucose    Collection Time: 09/13/24  7:56 AM   Result Value Ref Range    POC Glucose 111 (H) 65 - 100 mg/dL    Performed by: Preston        No results for input(s): \"COVID19\" in the last 72 hours.    Current Meds:  Current Facility-Administered Medications   Medication Dose Route Frequency    traMADol (ULTRAM) tablet 50 mg  50 mg Oral Daily PRN    allopurinol (ZYLOPRIM) tablet 200 mg  200 mg Oral Daily    amitriptyline (ELAVIL) tablet 10 mg  10 mg Oral Nightly    amLODIPine (NORVASC) tablet 5 mg  5 mg Oral Daily    cetirizine (ZYRTEC) tablet 10 mg  10 mg Oral Daily PRN    citalopram (CELEXA) tablet 10 mg  10 mg Oral QHS    doxepin (SINEQUAN) capsule 10 mg  10 mg Oral Nightly    fluticasone (FLONASE) 50 MCG/ACT nasal spray 2 spray  2 spray Nasal BID PRN    gabapentin (NEURONTIN) capsule 600 mg  600 mg Oral BID    metoprolol succinate (TOPROL XL) extended release tablet 50 mg  50 mg Oral BID    vitamin D3 (CHOLECALCIFEROL) tablet 5,000 Units  5,000 Units Oral Daily    sodium chloride flush 0.9 % injection 5-40 mL  5-40 mL IntraVENous 2 times per day    sodium chloride flush 0.9 % injection 5-40 mL  5-40 mL IntraVENous PRN    0.9 % sodium chloride infusion   IntraVENous PRN    ondansetron (ZOFRAN-ODT) disintegrating tablet

## 2024-09-13 NOTE — PROGRESS NOTES
ACUTE PHYSICAL THERAPY GOALS:   (Developed with and agreed upon by patient and/or caregiver.)  Pt will perform bed mobility Mod (I) c inc time, cueing and use of rails as needed in 7 therapy sessions.  Pt will perform sit-to-stand/ stand-to-sit transfers Mod (I) c use of LRAD/external supports as needed and no LOB or miss-steps in 7 therapy sessions.  Pt will ambulate 175 ft Mod (I) with use of LRAD, no LOB or miss-steps and breaks as needed in 7 therapy sessions.  Pt will perform standing dynamic balance activities with minimal postural sway in 7 therapy sessions.  Pt will tolerate multiple sets and reps of BLE exercises in 7 therapy sessions.    PHYSICAL THERAPY: Daily Note PM   (Link to Caseload Tracking: PT Visit Days : 2  Time In/Out PT Charge Capture  Rehab Caseload Tracker  Orders    Mirtha Posada is a 79 y.o. female   PRIMARY DIAGNOSIS: Acute kidney injury superimposed on CKD (HCC)  Dehydration [E86.0]  Acute kidney injury (HCC) [N17.9]  Acute kidney injury superimposed on CKD (HCC) [N17.9, N18.9]  COVID-19 virus infection [U07.1]       Inpatient: Payor: MEDICARE / Plan: MEDICARE PART A AND B / Product Type: *No Product type* /     ASSESSMENT:     REHAB RECOMMENDATIONS:   Recommendation to date pending progress:  Setting:  Home Health Therapy    Equipment:    None     ASSESSMENT:  Ms. Posada is agreeable with some encouragement.  Her  states and she smiles and agrees that her favorite place is in bed and it is where she spends most of her time.  Supine to sit with supervision.  Sit to stand and gait with rolling walker 50 ft at slow but steady pace.  She really looked quite good with the walker and encouraged her to use it at home.  She seemed open to it.  Through conversation it is determined that she really is up all night watching tv and sleeps during the day.  Encouraged her to be up in the chair for at least 2 hours.     SUBJECTIVE:   Ms. Posaad states, \"My bed is my happy place\"

## 2024-09-14 ENCOUNTER — APPOINTMENT (OUTPATIENT)
Dept: CT IMAGING | Age: 79
DRG: 683 | End: 2024-09-14
Payer: MEDICARE

## 2024-09-14 LAB
ANION GAP SERPL CALC-SCNC: 13 MMOL/L (ref 9–18)
APPEARANCE UR: CLEAR
B PERT DNA SPEC QL NAA+PROBE: NOT DETECTED
BILIRUB UR QL: NEGATIVE
BORDETELLA PARAPERTUSSIS BY PCR: NOT DETECTED
BUN SERPL-MCNC: 17 MG/DL (ref 8–23)
C PNEUM DNA SPEC QL NAA+PROBE: NOT DETECTED
CALCIUM SERPL-MCNC: 8.3 MG/DL (ref 8.8–10.2)
CHLORIDE SERPL-SCNC: 101 MMOL/L (ref 98–107)
CO2 SERPL-SCNC: 23 MMOL/L (ref 20–28)
COLOR UR: ABNORMAL
CREAT SERPL-MCNC: 1.06 MG/DL (ref 0.6–1.1)
ERYTHROCYTE [DISTWIDTH] IN BLOOD BY AUTOMATED COUNT: 13.8 % (ref 11.9–14.6)
FLUAV SUBTYP SPEC NAA+PROBE: NOT DETECTED
FLUBV RNA SPEC QL NAA+PROBE: NOT DETECTED
GLUCOSE BLD STRIP.AUTO-MCNC: 108 MG/DL (ref 65–100)
GLUCOSE BLD STRIP.AUTO-MCNC: 124 MG/DL (ref 65–100)
GLUCOSE BLD STRIP.AUTO-MCNC: 130 MG/DL (ref 65–100)
GLUCOSE BLD STRIP.AUTO-MCNC: 165 MG/DL (ref 65–100)
GLUCOSE SERPL-MCNC: 105 MG/DL (ref 70–99)
GLUCOSE UR STRIP.AUTO-MCNC: NEGATIVE MG/DL
HADV DNA SPEC QL NAA+PROBE: NOT DETECTED
HCOV 229E RNA SPEC QL NAA+PROBE: NOT DETECTED
HCOV HKU1 RNA SPEC QL NAA+PROBE: NOT DETECTED
HCOV NL63 RNA SPEC QL NAA+PROBE: NOT DETECTED
HCOV OC43 RNA SPEC QL NAA+PROBE: NOT DETECTED
HCT VFR BLD AUTO: 30.3 % (ref 35.8–46.3)
HGB BLD-MCNC: 9.8 G/DL (ref 11.7–15.4)
HGB UR QL STRIP: NEGATIVE
HMPV RNA SPEC QL NAA+PROBE: NOT DETECTED
HPIV1 RNA SPEC QL NAA+PROBE: NOT DETECTED
HPIV2 RNA SPEC QL NAA+PROBE: NOT DETECTED
HPIV3 RNA SPEC QL NAA+PROBE: NOT DETECTED
HPIV4 RNA SPEC QL NAA+PROBE: NOT DETECTED
KETONES UR QL STRIP.AUTO: NEGATIVE MG/DL
LEUKOCYTE ESTERASE UR QL STRIP.AUTO: NEGATIVE
M PNEUMO DNA SPEC QL NAA+PROBE: NOT DETECTED
MCH RBC QN AUTO: 31.6 PG (ref 26.1–32.9)
MCHC RBC AUTO-ENTMCNC: 32.3 G/DL (ref 31.4–35)
MCV RBC AUTO: 97.7 FL (ref 82–102)
NITRITE UR QL STRIP.AUTO: NEGATIVE
NRBC # BLD: 0 K/UL (ref 0–0.2)
PH UR STRIP: 6.5 (ref 5–9)
PLATELET # BLD AUTO: 223 K/UL (ref 150–450)
PMV BLD AUTO: 11 FL (ref 9.4–12.3)
POTASSIUM SERPL-SCNC: 4.3 MMOL/L (ref 3.5–5.1)
PROT UR STRIP-MCNC: NEGATIVE MG/DL
RBC # BLD AUTO: 3.1 M/UL (ref 4.05–5.2)
RSV RNA SPEC QL NAA+PROBE: NOT DETECTED
RV+EV RNA SPEC QL NAA+PROBE: NOT DETECTED
SARS-COV-2 RNA RESP QL NAA+PROBE: DETECTED
SERVICE CMNT-IMP: ABNORMAL
SODIUM SERPL-SCNC: 136 MMOL/L (ref 136–145)
SP GR UR REFRACTOMETRY: >1.035 (ref 1–1.02)
UROBILINOGEN UR QL STRIP.AUTO: 0.2 EU/DL (ref 0.2–1)
WBC # BLD AUTO: 7 K/UL (ref 4.3–11.1)

## 2024-09-14 PROCEDURE — 0202U NFCT DS 22 TRGT SARS-COV-2: CPT

## 2024-09-14 PROCEDURE — 76937 US GUIDE VASCULAR ACCESS: CPT

## 2024-09-14 PROCEDURE — 82962 GLUCOSE BLOOD TEST: CPT

## 2024-09-14 PROCEDURE — 36415 COLL VENOUS BLD VENIPUNCTURE: CPT

## 2024-09-14 PROCEDURE — 6360000002 HC RX W HCPCS: Performed by: FAMILY MEDICINE

## 2024-09-14 PROCEDURE — 6370000000 HC RX 637 (ALT 250 FOR IP): Performed by: FAMILY MEDICINE

## 2024-09-14 PROCEDURE — 6360000004 HC RX CONTRAST MEDICATION: Performed by: STUDENT IN AN ORGANIZED HEALTH CARE EDUCATION/TRAINING PROGRAM

## 2024-09-14 PROCEDURE — 94761 N-INVAS EAR/PLS OXIMETRY MLT: CPT

## 2024-09-14 PROCEDURE — 80048 BASIC METABOLIC PNL TOTAL CA: CPT

## 2024-09-14 PROCEDURE — 81003 URINALYSIS AUTO W/O SCOPE: CPT

## 2024-09-14 PROCEDURE — 71260 CT THORAX DX C+: CPT

## 2024-09-14 PROCEDURE — 2580000003 HC RX 258: Performed by: FAMILY MEDICINE

## 2024-09-14 PROCEDURE — 02HV33Z INSERTION OF INFUSION DEVICE INTO SUPERIOR VENA CAVA, PERCUTANEOUS APPROACH: ICD-10-PCS | Performed by: STUDENT IN AN ORGANIZED HEALTH CARE EDUCATION/TRAINING PROGRAM

## 2024-09-14 PROCEDURE — 85027 COMPLETE CBC AUTOMATED: CPT

## 2024-09-14 PROCEDURE — 1100000000 HC RM PRIVATE

## 2024-09-14 RX ORDER — IOPAMIDOL 755 MG/ML
100 INJECTION, SOLUTION INTRAVASCULAR
Status: COMPLETED | OUTPATIENT
Start: 2024-09-14 | End: 2024-09-14

## 2024-09-14 RX ADMIN — HEPARIN SODIUM 5000 UNITS: 5000 INJECTION INTRAVENOUS; SUBCUTANEOUS at 16:00

## 2024-09-14 RX ADMIN — CITALOPRAM HYDROBROMIDE 10 MG: 20 TABLET ORAL at 21:19

## 2024-09-14 RX ADMIN — HEPARIN SODIUM 5000 UNITS: 5000 INJECTION INTRAVENOUS; SUBCUTANEOUS at 05:34

## 2024-09-14 RX ADMIN — DOXEPIN HYDROCHLORIDE 10 MG: 10 CAPSULE ORAL at 21:18

## 2024-09-14 RX ADMIN — GABAPENTIN 600 MG: 300 CAPSULE ORAL at 21:18

## 2024-09-14 RX ADMIN — AMITRIPTYLINE HYDROCHLORIDE 10 MG: 10 TABLET, FILM COATED ORAL at 21:18

## 2024-09-14 RX ADMIN — HEPARIN SODIUM 5000 UNITS: 5000 INJECTION INTRAVENOUS; SUBCUTANEOUS at 21:32

## 2024-09-14 RX ADMIN — SODIUM CHLORIDE, PRESERVATIVE FREE 10 ML: 5 INJECTION INTRAVENOUS at 10:06

## 2024-09-14 RX ADMIN — IOPAMIDOL 100 ML: 755 INJECTION, SOLUTION INTRAVENOUS at 13:10

## 2024-09-14 RX ADMIN — ALLOPURINOL 200 MG: 100 TABLET ORAL at 10:05

## 2024-09-14 RX ADMIN — SODIUM CHLORIDE, PRESERVATIVE FREE 10 ML: 5 INJECTION INTRAVENOUS at 21:20

## 2024-09-14 RX ADMIN — METOPROLOL SUCCINATE 50 MG: 50 TABLET, EXTENDED RELEASE ORAL at 10:05

## 2024-09-14 RX ADMIN — METOPROLOL SUCCINATE 50 MG: 50 TABLET, EXTENDED RELEASE ORAL at 21:18

## 2024-09-14 RX ADMIN — CHOLECALCIFEROL TAB 125 MCG (5000 UNIT) 5000 UNITS: 125 TAB at 10:06

## 2024-09-14 RX ADMIN — GABAPENTIN 600 MG: 300 CAPSULE ORAL at 10:05

## 2024-09-14 RX ADMIN — AMLODIPINE BESYLATE 5 MG: 5 TABLET ORAL at 10:05

## 2024-09-14 ASSESSMENT — PAIN SCALES - GENERAL: PAINLEVEL_OUTOF10: 0

## 2024-09-14 NOTE — PROGRESS NOTES
Patient completed ambulatory saturation check. Patient on room air at rest, SpO2 94%. Patient on room air ambulatory, SpO2 96%. Patient tolerated well.

## 2024-09-14 NOTE — PROGRESS NOTES
Hospitalist Progress Note   Admit Date:  2024 10:15 AM   Name:  Mirtha Posada   Age:  79 y.o.  Sex:  female  :  1945   MRN:  [id number]   Room:  [facility]/    Presenting/Chief Complaint: Urinary Retention     Reason(s) for Admission: Dehydration [E86.0]  Acute kidney injury (HCC) [N17.9]  Acute kidney injury superimposed on CKD (HCC) [N17.9, N18.9]  COVID-19 virus infection [U07.1]     Hospital Course:   Mirtha Posada is a 79 y.o. female with medical history of insomnia, Sjogren's syndrome, type 2 diabetes, CKD 3A, gout, CHF with systolic and diastolic dysfunction, dyslipidemia, depression, osteoarthritis, chemotherapy-induced peripheral neuropathy, dilated cardiomyopathy, vitamin D and B12 deficiency, hypertension, GERD with esophagitis, history of breast s/p left mastectomy who was recently discharged from [facility] Hospitalist service on 2024 for acute pulmonary edema/CHF exacerbation and positive for COVID on .  Pt presented to the ER c/o generalized weakness and oliguria x 2 days with generalized aches.    Of note, pt was seen by her primary cardiologist Dr. Tian Watkins the day prior to admission and found with low normal blood pressures; her dose of ACE inhibitor and beta-blocker was decreased in half.     In ER,  VSS.  WBC 13.7, sodium of 131, creatinine of 2.99, mildly elevated AST of 116  UA trace leukocyte esterase WBC 5-10 bacteria 2+; Hospitalist consulted for inpatient admission.    Subjective & 24hr Events:   \"I feel bad today; I feel like I can't breath good sometimes.  Do you think I got COVID again?\"  Pleasant 79y.o. WF laying in bed in NAD    Multiple concerns today; thinks she has COVID again and urinary urgency.  RN reported episode of hypoxia on RA; denies CP, HA, dizziness, fever, chills, N/V, abd pains    Assessment & Plan:     Principal Problem:    Acute kidney injury superimposed on CKD (HCC)  - suspect from over-diuresis during recent hospitalization for acute  tablet 10 mg  10 mg Oral QHS    doxepin (SINEQUAN) capsule 10 mg  10 mg Oral Nightly    fluticasone (FLONASE) 50 MCG/ACT nasal spray 2 spray  2 spray Nasal BID PRN    gabapentin (NEURONTIN) capsule 600 mg  600 mg Oral BID    metoprolol succinate (TOPROL XL) extended release tablet 50 mg  50 mg Oral BID    vitamin D3 (CHOLECALCIFEROL) tablet 5,000 Units  5,000 Units Oral Daily    sodium chloride flush 0.9 % injection 5-40 mL  5-40 mL IntraVENous 2 times per day    sodium chloride flush 0.9 % injection 5-40 mL  5-40 mL IntraVENous PRN    0.9 % sodium chloride infusion   IntraVENous PRN    ondansetron (ZOFRAN-ODT) disintegrating tablet 4 mg  4 mg Oral Q8H PRN    Or    ondansetron (ZOFRAN) injection 4 mg  4 mg IntraVENous Q6H PRN    polyethylene glycol (GLYCOLAX) packet 17 g  17 g Oral Daily PRN    acetaminophen (TYLENOL) tablet 650 mg  650 mg Oral Q6H PRN    Or    acetaminophen (TYLENOL) suppository 650 mg  650 mg Rectal Q6H PRN    insulin lispro (HUMALOG,ADMELOG) injection vial 0-4 Units  0-4 Units SubCUTAneous TID WC    insulin lispro (HUMALOG,ADMELOG) injection vial 0-4 Units  0-4 Units SubCUTAneous Nightly    heparin (porcine) injection 5,000 Units  5,000 Units SubCUTAneous 3 times per day    glucose chewable tablet 16 g  4 tablet Oral PRN    dextrose bolus 10% 125 mL  125 mL IntraVENous PRN    Or    dextrose bolus 10% 250 mL  250 mL IntraVENous PRN    Glucagon Emergency KIT 1 mg  1 mg SubCUTAneous PRN    dextrose 10 % infusion   IntraVENous Continuous PRN       Signed:  TREESA Velazquez

## 2024-09-14 NOTE — PROGRESS NOTES
Kruse Catheter removed by RN at 1130 per May PA orders. Pt tolerated kruse removal well without any complications.

## 2024-09-15 VITALS
WEIGHT: 147.05 LBS | DIASTOLIC BLOOD PRESSURE: 64 MMHG | HEART RATE: 68 BPM | HEIGHT: 65 IN | TEMPERATURE: 98.4 F | BODY MASS INDEX: 24.5 KG/M2 | SYSTOLIC BLOOD PRESSURE: 135 MMHG | RESPIRATION RATE: 18 BRPM | OXYGEN SATURATION: 92 %

## 2024-09-15 PROBLEM — R91.1 LUNG NODULE: Status: ACTIVE | Noted: 2024-09-15

## 2024-09-15 LAB
GLUCOSE BLD STRIP.AUTO-MCNC: 109 MG/DL (ref 65–100)
GLUCOSE BLD STRIP.AUTO-MCNC: 109 MG/DL (ref 65–100)
SERVICE CMNT-IMP: ABNORMAL
SERVICE CMNT-IMP: ABNORMAL

## 2024-09-15 PROCEDURE — 2580000003 HC RX 258: Performed by: FAMILY MEDICINE

## 2024-09-15 PROCEDURE — 6370000000 HC RX 637 (ALT 250 FOR IP): Performed by: FAMILY MEDICINE

## 2024-09-15 PROCEDURE — 6360000002 HC RX W HCPCS: Performed by: FAMILY MEDICINE

## 2024-09-15 PROCEDURE — 82962 GLUCOSE BLOOD TEST: CPT

## 2024-09-15 RX ADMIN — CHOLECALCIFEROL TAB 125 MCG (5000 UNIT) 5000 UNITS: 125 TAB at 08:52

## 2024-09-15 RX ADMIN — ALLOPURINOL 200 MG: 100 TABLET ORAL at 08:51

## 2024-09-15 RX ADMIN — HEPARIN SODIUM 5000 UNITS: 5000 INJECTION INTRAVENOUS; SUBCUTANEOUS at 06:21

## 2024-09-15 RX ADMIN — METOPROLOL SUCCINATE 50 MG: 50 TABLET, EXTENDED RELEASE ORAL at 08:51

## 2024-09-15 RX ADMIN — AMLODIPINE BESYLATE 5 MG: 5 TABLET ORAL at 08:52

## 2024-09-15 RX ADMIN — GABAPENTIN 600 MG: 300 CAPSULE ORAL at 08:51

## 2024-09-15 RX ADMIN — SODIUM CHLORIDE, PRESERVATIVE FREE 10 ML: 5 INJECTION INTRAVENOUS at 08:52

## 2024-09-15 NOTE — FLOWSHEET NOTE
09/15/24 1108   AVS Reviewed   AVS & discharge instructions reviewed with patient and/or representative? Yes   Reviewed instructions with Patient;Other (name and relationship in comment)   Level of Understanding Questions answered;Verbalized understanding

## 2024-09-15 NOTE — DISCHARGE SUMMARY
Hospitalist Discharge Summary   Admit Date:  2024 10:15 AM   DC Note date: 9/15/2024  Name:  Mirtha Posada   Age:  79 y.o.  Sex:  female  :  1945   MRN:  [id number]   Room:  [facility]  PCP:  Marva Xavier, LIU - CNP    Presenting Complaint: Urinary Retention     Initial Admission Diagnosis: Dehydration [E86.0]  Acute kidney injury (HCC) [N17.9]  Acute kidney injury superimposed on CKD (HCC) [N17.9, N18.9]  COVID-19 virus infection [U07.1]     Problem List for this Hospitalization (present on admission):    Principal Problem:    Acute kidney injury superimposed on CKD (HCC)  Active Problems:    Sjogren syndrome, unspecified (HCC)    Type 2 diabetes mellitus with chronic kidney disease    Chronic combined systolic and diastolic CHF (congestive heart failure) (HCC)    Dyslipidemia    Depression    Osteoarthritis    Peripheral sensory-motor axonal polyneuropathy    Type 2 diabetes mellitus, controlled (HCC)    Chemotherapy-induced neuropathy (HCC)    Vitamin B12 deficiency    Essential hypertension    GERD without esophagitis    UTI (urinary tract infection)    Lung nodule  Resolved Problems:    * No resolved hospital problems. *      Hospital Course:  Mirtha Posada is a 79 y.o. female with medical history of insomnia, Sjogren's syndrome, type 2 diabetes, CKD 3A, gout, CHF with systolic and diastolic dysfunction, dyslipidemia, depression, osteoarthritis, chemotherapy-induced peripheral neuropathy, dilated cardiomyopathy, vitamin D and B12 deficiency, hypertension, GERD with esophagitis, history of breast s/p left mastectomy who was recently discharged from [facility] Hospitalist service on 2024 for acute pulmonary edema/CHF exacerbation and positive for COVID on .  Pt presented to the ER c/o generalized weakness and oliguria x 2 days with generalized aches.     Of note, pt was seen by her primary cardiologist Dr. Tian Watkins the day prior to admission and found with low normal blood

## 2024-09-16 ENCOUNTER — TELEPHONE (OUTPATIENT)
Dept: INTERNAL MEDICINE CLINIC | Facility: CLINIC | Age: 79
End: 2024-09-16

## 2024-09-19 ENCOUNTER — NURSE ONLY (OUTPATIENT)
Age: 79
End: 2024-09-19

## 2024-09-19 ENCOUNTER — TELEPHONE (OUTPATIENT)
Dept: PULMONOLOGY | Age: 79
End: 2024-09-19

## 2024-09-19 DIAGNOSIS — I50.1 ACUTE PULMONARY EDEMA WITH CONGESTIVE HEART FAILURE (HCC): Primary | ICD-10-CM

## 2024-09-19 DIAGNOSIS — M35.00 SJOGREN SYNDROME, UNSPECIFIED (HCC): ICD-10-CM

## 2024-09-19 DIAGNOSIS — M25.561 CHRONIC PAIN OF BOTH KNEES: Primary | ICD-10-CM

## 2024-09-19 DIAGNOSIS — R91.1 LUNG NODULE: ICD-10-CM

## 2024-09-19 DIAGNOSIS — M25.562 CHRONIC PAIN OF BOTH KNEES: Primary | ICD-10-CM

## 2024-09-19 DIAGNOSIS — Z85.3 HISTORY OF BREAST CANCER: ICD-10-CM

## 2024-09-19 DIAGNOSIS — M47.817 LUMBOSACRAL SPONDYLOSIS WITHOUT MYELOPATHY: ICD-10-CM

## 2024-09-19 DIAGNOSIS — G89.29 CHRONIC PAIN OF BOTH KNEES: Primary | ICD-10-CM

## 2024-09-19 RX ORDER — METHYLPREDNISOLONE ACETATE 40 MG/ML
40 INJECTION, SUSPENSION INTRA-ARTICULAR; INTRALESIONAL; INTRAMUSCULAR; SOFT TISSUE ONCE
Status: COMPLETED | OUTPATIENT
Start: 2024-09-19 | End: 2024-09-19

## 2024-09-19 RX ORDER — TRAMADOL HYDROCHLORIDE 50 MG/1
50 TABLET ORAL DAILY
Qty: 30 TABLET | Refills: 1 | Status: SHIPPED | OUTPATIENT
Start: 2024-09-21 | End: 2024-11-20

## 2024-09-19 RX ADMIN — Medication 10 ML: at 14:04

## 2024-09-19 RX ADMIN — METHYLPREDNISOLONE ACETATE 40 MG: 40 INJECTION, SUSPENSION INTRA-ARTICULAR; INTRALESIONAL; INTRAMUSCULAR; SOFT TISSUE at 14:04

## 2024-09-20 NOTE — ED NOTES
Attempt made to contact pt post discharge to inquire about follow up care. Unable to reach pt. VM left.       Brenda Munoz, RN  09/20/24 8790

## 2024-09-25 ENCOUNTER — CLINICAL DOCUMENTATION (OUTPATIENT)
Dept: PULMONOLOGY | Age: 79
End: 2024-09-25

## 2024-09-25 ENCOUNTER — HOSPITAL ENCOUNTER (OUTPATIENT)
Dept: PET IMAGING | Age: 79
Discharge: HOME | End: 2024-09-28
Payer: MEDICARE

## 2024-09-25 DIAGNOSIS — R91.1 LUNG NODULE: ICD-10-CM

## 2024-09-25 DIAGNOSIS — I50.1 ACUTE PULMONARY EDEMA WITH CONGESTIVE HEART FAILURE (HCC): ICD-10-CM

## 2024-09-25 DIAGNOSIS — Z85.3 HISTORY OF BREAST CANCER: ICD-10-CM

## 2024-09-25 DIAGNOSIS — M35.00 SJOGREN SYNDROME, UNSPECIFIED (HCC): ICD-10-CM

## 2024-09-25 LAB
GLUCOSE BLD STRIP.AUTO-MCNC: 95 MG/DL (ref 65–100)
SERVICE CMNT-IMP: NORMAL

## 2024-09-25 PROCEDURE — A9609 HC RX DIAGNOSTIC RADIOPHARMACEUTICAL: HCPCS | Performed by: INTERNAL MEDICINE

## 2024-09-25 PROCEDURE — 82962 GLUCOSE BLOOD TEST: CPT

## 2024-09-25 PROCEDURE — 78815 PET IMAGE W/CT SKULL-THIGH: CPT

## 2024-09-25 PROCEDURE — 3430000000 HC RX DIAGNOSTIC RADIOPHARMACEUTICAL: Performed by: INTERNAL MEDICINE

## 2024-09-25 PROCEDURE — 2580000003 HC RX 258: Performed by: INTERNAL MEDICINE

## 2024-09-25 RX ORDER — FLUDEOXYGLUCOSE F 18 200 MCI/ML
12.96 INJECTION, SOLUTION INTRAVENOUS
Status: COMPLETED | OUTPATIENT
Start: 2024-09-25 | End: 2024-09-25

## 2024-09-25 RX ORDER — SODIUM CHLORIDE 0.9 % (FLUSH) 0.9 %
10 SYRINGE (ML) INJECTION ONCE AS NEEDED
Status: COMPLETED | OUTPATIENT
Start: 2024-09-25 | End: 2024-09-25

## 2024-09-25 RX ADMIN — SODIUM CHLORIDE, PRESERVATIVE FREE 10 ML: 5 INJECTION INTRAVENOUS at 13:00

## 2024-09-25 RX ADMIN — FLUDEOXYGLUCOSE F 18 12.96 MILLICURIE: 200 INJECTION, SOLUTION INTRAVENOUS at 13:00

## 2024-10-01 ENCOUNTER — OFFICE VISIT (OUTPATIENT)
Dept: PULMONOLOGY | Age: 79
End: 2024-10-01
Payer: MEDICARE

## 2024-10-01 VITALS
OXYGEN SATURATION: 98 % | TEMPERATURE: 98.1 F | WEIGHT: 150 LBS | SYSTOLIC BLOOD PRESSURE: 140 MMHG | RESPIRATION RATE: 14 BRPM | HEIGHT: 65 IN | BODY MASS INDEX: 24.99 KG/M2 | DIASTOLIC BLOOD PRESSURE: 80 MMHG | HEART RATE: 69 BPM

## 2024-10-01 DIAGNOSIS — R91.8 LUNG NODULES: Primary | ICD-10-CM

## 2024-10-01 DIAGNOSIS — Z85.3 HISTORY OF BREAST CANCER: ICD-10-CM

## 2024-10-01 PROCEDURE — 1090F PRES/ABSN URINE INCON ASSESS: CPT | Performed by: INTERNAL MEDICINE

## 2024-10-01 PROCEDURE — 3077F SYST BP >= 140 MM HG: CPT | Performed by: INTERNAL MEDICINE

## 2024-10-01 PROCEDURE — 1123F ACP DISCUSS/DSCN MKR DOCD: CPT | Performed by: INTERNAL MEDICINE

## 2024-10-01 PROCEDURE — 1036F TOBACCO NON-USER: CPT | Performed by: INTERNAL MEDICINE

## 2024-10-01 PROCEDURE — G8484 FLU IMMUNIZE NO ADMIN: HCPCS | Performed by: INTERNAL MEDICINE

## 2024-10-01 PROCEDURE — 1111F DSCHRG MED/CURRENT MED MERGE: CPT | Performed by: INTERNAL MEDICINE

## 2024-10-01 PROCEDURE — G8420 CALC BMI NORM PARAMETERS: HCPCS | Performed by: INTERNAL MEDICINE

## 2024-10-01 PROCEDURE — 3079F DIAST BP 80-89 MM HG: CPT | Performed by: INTERNAL MEDICINE

## 2024-10-01 PROCEDURE — G8399 PT W/DXA RESULTS DOCUMENT: HCPCS | Performed by: INTERNAL MEDICINE

## 2024-10-01 PROCEDURE — G8427 DOCREV CUR MEDS BY ELIG CLIN: HCPCS | Performed by: INTERNAL MEDICINE

## 2024-10-01 PROCEDURE — 99204 OFFICE O/P NEW MOD 45 MIN: CPT | Performed by: INTERNAL MEDICINE

## 2024-10-01 NOTE — PROGRESS NOTES
Name:  Mirtha Posada  YOB: 1945   MRN: [id number]      Office Visit: 10/1/2024       Assessment & Plan (Medical Decision Making)    Impression: 79 y.o. female with second hand smoking history and distant L breast cancer. Incidentally discovered B lower lobe nodules during covid illness, resolved on PET. However 4mm RUL nodule.     Discussed with her her particular risk factors. Never smoker but a lot of second hand exposure through out her life as well as distant breast cancer.     -seems reasonable to offer her a repeat CT chest in 1 year. This will be scheduled.     F/u in 1 year. If stable no further pulmonary follow up planned.       1. Lung nodules      2. History of breast cancer      No orders of the defined types were placed in this encounter.    No orders of the defined types were placed in this encounter.    Follow-up and Dispositions    Return in about 1 year (around 10/1/2025) for CT chest before next appt, With Me or Courtney.       Nam Duran MD    No specialty comments available.  No problem-specific Assessment & Plan notes found for this encounter.        _________________________________________________________________________    HISTORY OF PRESENT ILLNESS:    Ms. Mirtha Posada is a 79 y.o. female who is seen at [facility] today for  New Patient (Referred for LLL Nodule)   She has a history of sjogren's, DM2, CKD, HFrEF, HLD, h/o breast cancer s/p L mastectomy 2001 followed by stem cell transplant and radiation/chemo.   .   She is here as a new pt for eval of lung nodules.   She was admitted 8/30-9/2 with SOB, COVID +.   Another admit 9/11-15 with UTI symptoms.   At that time a CT chest was performed showing L>R lower lobe nodules as well as a smaller RUL 4mm nodule.   She was sent for PET scan and evaluation here. Fortunately, the PET scan shows resolution of the previously demonstrated lower lobe nodules and stability of her 4mm RUL nodule.     She states since

## 2024-10-07 ENCOUNTER — TELEMEDICINE (OUTPATIENT)
Dept: INTERNAL MEDICINE CLINIC | Facility: CLINIC | Age: 79
End: 2024-10-07
Payer: MEDICARE

## 2024-10-07 DIAGNOSIS — N17.9 ACUTE KIDNEY INJURY SUPERIMPOSED ON CKD (HCC): ICD-10-CM

## 2024-10-07 DIAGNOSIS — F32.A DEPRESSION, UNSPECIFIED DEPRESSION TYPE: ICD-10-CM

## 2024-10-07 DIAGNOSIS — R11.0 NAUSEA: ICD-10-CM

## 2024-10-07 DIAGNOSIS — G62.0 CHEMOTHERAPY-INDUCED NEUROPATHY (HCC): ICD-10-CM

## 2024-10-07 DIAGNOSIS — N18.9 ACUTE KIDNEY INJURY SUPERIMPOSED ON CKD (HCC): ICD-10-CM

## 2024-10-07 DIAGNOSIS — T45.1X5A CHEMOTHERAPY-INDUCED NEUROPATHY (HCC): ICD-10-CM

## 2024-10-07 DIAGNOSIS — R91.8 LUNG NODULES: ICD-10-CM

## 2024-10-07 DIAGNOSIS — I50.42 CHRONIC COMBINED SYSTOLIC AND DIASTOLIC CHF (CONGESTIVE HEART FAILURE) (HCC): Chronic | ICD-10-CM

## 2024-10-07 DIAGNOSIS — Z09 HOSPITAL DISCHARGE FOLLOW-UP: Primary | ICD-10-CM

## 2024-10-07 PROBLEM — N39.0 UTI (URINARY TRACT INFECTION): Status: RESOLVED | Noted: 2024-09-11 | Resolved: 2024-10-07

## 2024-10-07 PROBLEM — I50.1 ACUTE PULMONARY EDEMA WITH CONGESTIVE HEART FAILURE (HCC): Status: RESOLVED | Noted: 2024-08-30 | Resolved: 2024-10-07

## 2024-10-07 PROBLEM — R91.1 LUNG NODULE: Status: RESOLVED | Noted: 2024-09-15 | Resolved: 2024-10-07

## 2024-10-07 PROBLEM — J96.01 ACUTE RESPIRATORY FAILURE WITH HYPOXIA: Status: RESOLVED | Noted: 2024-08-31 | Resolved: 2024-10-07

## 2024-10-07 PROCEDURE — G8427 DOCREV CUR MEDS BY ELIG CLIN: HCPCS | Performed by: NURSE PRACTITIONER

## 2024-10-07 PROCEDURE — G8399 PT W/DXA RESULTS DOCUMENT: HCPCS | Performed by: NURSE PRACTITIONER

## 2024-10-07 PROCEDURE — G8484 FLU IMMUNIZE NO ADMIN: HCPCS | Performed by: NURSE PRACTITIONER

## 2024-10-07 PROCEDURE — 1090F PRES/ABSN URINE INCON ASSESS: CPT | Performed by: NURSE PRACTITIONER

## 2024-10-07 PROCEDURE — 99214 OFFICE O/P EST MOD 30 MIN: CPT | Performed by: NURSE PRACTITIONER

## 2024-10-07 PROCEDURE — G8420 CALC BMI NORM PARAMETERS: HCPCS | Performed by: NURSE PRACTITIONER

## 2024-10-07 PROCEDURE — 1123F ACP DISCUSS/DSCN MKR DOCD: CPT | Performed by: NURSE PRACTITIONER

## 2024-10-07 PROCEDURE — 1036F TOBACCO NON-USER: CPT | Performed by: NURSE PRACTITIONER

## 2024-10-07 PROCEDURE — 1111F DSCHRG MED/CURRENT MED MERGE: CPT | Performed by: NURSE PRACTITIONER

## 2024-10-07 RX ORDER — CITALOPRAM HYDROBROMIDE 10 MG/1
10 TABLET ORAL DAILY
Qty: 90 TABLET | Refills: 1 | Status: SHIPPED | OUTPATIENT
Start: 2024-10-07

## 2024-10-07 RX ORDER — LISINOPRIL 20 MG/1
TABLET ORAL
COMMUNITY

## 2024-10-07 RX ORDER — PROMETHAZINE HYDROCHLORIDE 25 MG/1
25 TABLET ORAL DAILY PRN
Qty: 15 TABLET | Refills: 2 | Status: SHIPPED | OUTPATIENT
Start: 2024-10-07

## 2024-10-07 ASSESSMENT — ENCOUNTER SYMPTOMS
ABDOMINAL PAIN: 0
COUGH: 0
VOMITING: 0
NAUSEA: 1
WHEEZING: 0
SHORTNESS OF BREATH: 0

## 2024-10-07 NOTE — PROGRESS NOTES
5 MG tablet       gabapentin (NEURONTIN) 600 MG tablet Take 1 tablet by mouth 2 times daily for 360 days. 180 tablet 1    metFORMIN (GLUCOPHAGE-XR) 500 MG extended release tablet Take 2 tablets by mouth 2 times daily 360 tablet 1    allopurinol (ZYLOPRIM) 100 MG tablet Take 2 tablets by mouth daily (Patient taking differently: Take 2 tablets by mouth 2 times daily) 180 tablet 1    Cranberry (ELLURA) 200 MG CAPS Take 1 capsule by mouth daily 90 capsule 3    furosemide (LASIX) 40 MG tablet Take 1 tablet by mouth daily (Patient taking differently: Take 1 tablet by mouth daily PRN) 90 tablet 3    metoprolol succinate (TOPROL XL) 50 MG extended release tablet Take 1 tablet by mouth in the morning and at bedtime Once a day 180 tablet 3    fluticasone (FLONASE) 50 MCG/ACT nasal spray 2 sprays by Nasal route 2 times daily as needed for Allergies 16 g 5    cetirizine (ZYRTEC) 10 MG tablet Take 1 tablet by mouth daily as needed for Allergies      diphenhydrAMINE (BENADRYL) 25 MG tablet Take 1 tablet by mouth every 6 hours as needed for Itching      acetaminophen (TYLENOL) 650 MG extended release tablet Take 2 tablets by mouth every 8 hours as needed for Pain      Vitamin D (CHOLECALCIFEROL) 50 MCG (2000 UT) TABS tablet Take 1 tablet by mouth daily for 80 doses 80 tablet 0    valACYclovir (VALTREX) 1 g tablet Take 1 tablet by mouth daily as needed      melatonin 3 MG TABS tablet Take 4 tablets by mouth at bedtime      B-D 3CC LUER-KATY SYR 25GX1\" 25G X 1\" 3 ML MISC       estradiol (ESTRACE VAGINAL) 0.1 MG/GM vaginal cream Insert 2g daily vaginally for two weeks. Then insert 1g three times a week. (Patient not taking: Reported on 10/7/2024) 1 each 3     No current facility-administered medications for this visit.       Review of Systems:  Review of Systems   Constitutional:  Positive for fatigue. Negative for fever.   Respiratory:  Negative for cough, shortness of breath and wheezing.    Cardiovascular:  Negative for chest pain

## 2024-10-16 DIAGNOSIS — E11.22 CONTROLLED TYPE 2 DIABETES MELLITUS WITH STAGE 3 CHRONIC KIDNEY DISEASE, WITHOUT LONG-TERM CURRENT USE OF INSULIN (HCC): Chronic | ICD-10-CM

## 2024-10-16 DIAGNOSIS — N18.30 CONTROLLED TYPE 2 DIABETES MELLITUS WITH STAGE 3 CHRONIC KIDNEY DISEASE, WITHOUT LONG-TERM CURRENT USE OF INSULIN (HCC): Chronic | ICD-10-CM

## 2024-10-16 DIAGNOSIS — G62.0 CHEMOTHERAPY-INDUCED NEUROPATHY (HCC): Chronic | ICD-10-CM

## 2024-10-16 DIAGNOSIS — F32.A DEPRESSION, UNSPECIFIED DEPRESSION TYPE: ICD-10-CM

## 2024-10-16 DIAGNOSIS — T45.1X5A CHEMOTHERAPY-INDUCED NEUROPATHY (HCC): Chronic | ICD-10-CM

## 2024-10-17 RX ORDER — GABAPENTIN 600 MG/1
600 TABLET ORAL 2 TIMES DAILY
Qty: 180 TABLET | Refills: 1 | OUTPATIENT
Start: 2024-10-17 | End: 2025-10-12

## 2024-10-17 RX ORDER — METFORMIN HYDROCHLORIDE 500 MG/1
1000 TABLET, EXTENDED RELEASE ORAL 2 TIMES DAILY
Qty: 360 TABLET | Refills: 1 | OUTPATIENT
Start: 2024-10-17

## 2024-10-17 RX ORDER — CITALOPRAM HYDROBROMIDE 10 MG/1
10 TABLET ORAL DAILY
Qty: 90 TABLET | Refills: 1 | OUTPATIENT
Start: 2024-10-17

## 2024-10-23 ENCOUNTER — OFFICE VISIT (OUTPATIENT)
Age: 79
End: 2024-10-23
Payer: MEDICARE

## 2024-10-23 VITALS
WEIGHT: 150.2 LBS | HEIGHT: 65 IN | DIASTOLIC BLOOD PRESSURE: 60 MMHG | SYSTOLIC BLOOD PRESSURE: 108 MMHG | BODY MASS INDEX: 25.02 KG/M2 | HEART RATE: 84 BPM

## 2024-10-23 DIAGNOSIS — E78.5 DYSLIPIDEMIA: Chronic | ICD-10-CM

## 2024-10-23 DIAGNOSIS — I50.42 CHRONIC COMBINED SYSTOLIC AND DIASTOLIC CHF (CONGESTIVE HEART FAILURE) (HCC): Primary | Chronic | ICD-10-CM

## 2024-10-23 DIAGNOSIS — I10 ESSENTIAL HYPERTENSION: Chronic | ICD-10-CM

## 2024-10-23 DIAGNOSIS — I42.0 DILATED CARDIOMYOPATHY (HCC): ICD-10-CM

## 2024-10-23 PROCEDURE — G8428 CUR MEDS NOT DOCUMENT: HCPCS | Performed by: INTERNAL MEDICINE

## 2024-10-23 PROCEDURE — 1125F AMNT PAIN NOTED PAIN PRSNT: CPT | Performed by: INTERNAL MEDICINE

## 2024-10-23 PROCEDURE — 1090F PRES/ABSN URINE INCON ASSESS: CPT | Performed by: INTERNAL MEDICINE

## 2024-10-23 PROCEDURE — 99214 OFFICE O/P EST MOD 30 MIN: CPT | Performed by: INTERNAL MEDICINE

## 2024-10-23 PROCEDURE — 1123F ACP DISCUSS/DSCN MKR DOCD: CPT | Performed by: INTERNAL MEDICINE

## 2024-10-23 PROCEDURE — 1126F AMNT PAIN NOTED NONE PRSNT: CPT | Performed by: INTERNAL MEDICINE

## 2024-10-23 PROCEDURE — 1036F TOBACCO NON-USER: CPT | Performed by: INTERNAL MEDICINE

## 2024-10-23 PROCEDURE — G8420 CALC BMI NORM PARAMETERS: HCPCS | Performed by: INTERNAL MEDICINE

## 2024-10-23 PROCEDURE — 3074F SYST BP LT 130 MM HG: CPT | Performed by: INTERNAL MEDICINE

## 2024-10-23 PROCEDURE — 3078F DIAST BP <80 MM HG: CPT | Performed by: INTERNAL MEDICINE

## 2024-10-23 PROCEDURE — G8399 PT W/DXA RESULTS DOCUMENT: HCPCS | Performed by: INTERNAL MEDICINE

## 2024-10-23 PROCEDURE — G8484 FLU IMMUNIZE NO ADMIN: HCPCS | Performed by: INTERNAL MEDICINE

## 2024-10-23 NOTE — PROGRESS NOTES
2 [facility], [address] [place] [address]  PHONE: [phone]     10/23/24    NAME:  Mirtha Posada  : 1945  MRN: [id number]       SUBJECTIVE:   Mirtha Posada is a 79 y.o. female seen for a follow up visit regarding the following:     Chief Complaint   Patient presents with    Congestive Heart Failure       HPI: She has h/o NICM and SHF, EF 40% in  with LHC showing mild CAD.    She has prior breast CA, went thru chemo and radiation on left breast in .       Echo 10/2023: EF 54%, mild LVH  Echo 2024: low normal EF     2024 admission for A/C DHF, COVID  2024 admission for JACKY - Ace and K stopped     No CP, feeling stronger.  No new RUFFIN, using cane.    She is struggling with chronic LBP.    Patient denies recent history of orthopnea, PND, excessive dizziness and/or syncope.  \"I love salt\"          Past Medical History, Past Surgical History, Family history, Social History, and Medications were all reviewed with the patient today and updated as necessary.     Current Outpatient Medications   Medication Sig Dispense Refill    Loperamide HCl (IMODIUM PO) Take by mouth as needed      lisinopril (PRINIVIL;ZESTRIL) 20 MG tablet by NOT APPLICABLE route      promethazine (PHENERGAN) 25 MG tablet Take 1 tablet by mouth daily as needed for Nausea 15 tablet 2    citalopram (CELEXA) 10 MG tablet Take 1 tablet by mouth daily evening 90 tablet 1    traMADol (ULTRAM) 50 MG tablet Take 1 tablet by mouth daily for 60 days. Max Daily Amount: 50 mg 30 tablet 1    amitriptyline (ELAVIL) 10 MG tablet Take 1 tablet by mouth nightly 90 tablet 1    amLODIPine (NORVASC) 5 MG tablet       gabapentin (NEURONTIN) 600 MG tablet Take 1 tablet by mouth 2 times daily for 360 days. 180 tablet 1    metFORMIN (GLUCOPHAGE-XR) 500 MG extended release tablet Take 2 tablets by mouth 2 times daily 360 tablet 1    allopurinol (ZYLOPRIM) 100 MG tablet Take 2 tablets by mouth daily (Patient taking differently:

## 2024-10-28 NOTE — PROGRESS NOTES
Procedure Date: 2024     Location: [facility]       Procedure: Left L4-SA MBB #1       Time Out performed prior to start of the procedure:       Jacobo Garsia M.D.  performed the following reviews on Mirtha Posada 1945 prior to the start of the procedure:       patient was identified by name and     agreement on procedure being performed was verified   risks and benefits explained to patient by the provider  procedure site verified as Left  patient was positioned for comfort   consent signed and verified for procedure       Time:  9:45 AM        Procedure performed by:   Jacobo Garsia M.D.       Patient assisted by:   CARINA VICKERS MA

## 2024-10-31 ENCOUNTER — OFFICE VISIT (OUTPATIENT)
Dept: ORTHOPEDIC SURGERY | Age: 79
End: 2024-10-31

## 2024-10-31 DIAGNOSIS — M47.817 LUMBOSACRAL SPONDYLOSIS WITHOUT MYELOPATHY: Primary | ICD-10-CM

## 2024-10-31 NOTE — PROGRESS NOTES
Name: Mirtha Posada   MRN:[id number]   :1945    Location: [facility]    Procedure: Left Lumbar Medial Branch Block Under Fluoroscopic Imaging, L4-L5, L5-SA Facets     Pre-op Diagnosis: 1. Lumbar Spondylosis without Myelopathy. 2. Lumbar Facet Arthropathy. 3. Low Back Pain     Post-op Diagnosis: Same    Anesthesia: Local only       Complications: None    PROCEDURE: Fluroscopically Guided Lumbar Medial Branch Blocks    After confirming written and informed consent and discussing the risk, benefits and alternatives for the procedure. The patient had the correct site marked by the physician performing the procedure. The specific risks of bleeding and infection were discussed. The patient was taken to the fluoroscopy suite. The patient was then placed in the prone position. A pulse oximeter was placed, and verbal and visual monitoring were maintained throughout the procedure. The skin overlying the lumbo-sacral spine was then prepped with chlorhexidine gluconate and a sterile drape was placed. A hat and mask were worn, and the hands were cleaned with an alcohol-containing solution. Sterile gloves were then worn. A time out was then performed involving the physician, radiation technologist and the patient.    Next, the anatomy of the lumbar spine was then identified using fluoroscopic guidance. The area of usual pain was identified using patient assistance, and was confirmed with fluoroscopy. A 22 G, 5 inch Quincke needle was used for the procedure.    Next, a posterior-anterior view of the left sacral ala was obtained. The skin overlying the sacral ala was anesthetized with 0.2 ml of 1% lidocaine via a 25G 1.5 inch needle. The spinal needle needle was then advanced using intermittent fluoroscopic guidance toward the cephalad aspect of the sacral ala. Satisfactory needle position was confirmed on AP, lateral, and oblique visualizations. 0.5 ml of 0.5% bupivacaine were then injected.    An oblique view of the left L5

## 2024-10-31 NOTE — PATIENT INSTRUCTIONS
Hello    You had your medial branch block with Dr. Garsia today.  Please be mindful that this is a temporary procedure and the numbing agent will wear off.  During the duration of the block, do activities that normally cause you pain, to see if this block was successful in giving you 80% relief or more.     Someone from the office, will reach out to you, in a few days to see how the block did.  If you do not receive the 80% relief from the block, we will need to see you back in office, to determine next steps.    Thank you

## 2024-11-20 ENCOUNTER — OFFICE VISIT (OUTPATIENT)
Age: 79
End: 2024-11-20
Payer: MEDICARE

## 2024-11-20 DIAGNOSIS — M46.1 SACROILIITIS (HCC): Primary | ICD-10-CM

## 2024-11-20 DIAGNOSIS — M70.62 GREATER TROCHANTERIC BURSITIS OF LEFT HIP: ICD-10-CM

## 2024-11-20 PROCEDURE — 1090F PRES/ABSN URINE INCON ASSESS: CPT | Performed by: ANESTHESIOLOGY

## 2024-11-20 PROCEDURE — 1159F MED LIST DOCD IN RCRD: CPT | Performed by: ANESTHESIOLOGY

## 2024-11-20 PROCEDURE — 99213 OFFICE O/P EST LOW 20 MIN: CPT | Performed by: ANESTHESIOLOGY

## 2024-11-20 PROCEDURE — 1036F TOBACCO NON-USER: CPT | Performed by: ANESTHESIOLOGY

## 2024-11-20 PROCEDURE — G8484 FLU IMMUNIZE NO ADMIN: HCPCS | Performed by: ANESTHESIOLOGY

## 2024-11-20 PROCEDURE — G8399 PT W/DXA RESULTS DOCUMENT: HCPCS | Performed by: ANESTHESIOLOGY

## 2024-11-20 PROCEDURE — G8420 CALC BMI NORM PARAMETERS: HCPCS | Performed by: ANESTHESIOLOGY

## 2024-11-20 PROCEDURE — G8427 DOCREV CUR MEDS BY ELIG CLIN: HCPCS | Performed by: ANESTHESIOLOGY

## 2024-11-20 PROCEDURE — 1123F ACP DISCUSS/DSCN MKR DOCD: CPT | Performed by: ANESTHESIOLOGY

## 2024-11-20 NOTE — PROGRESS NOTES
Elevated blood uric acid level     Essential hypertension     Fracture of right patella 2013    History of left breast cancer 2001    with mastectomy    [place] (hard of hearing)     wears hearing aids    Hyperlipidemia     Left leg weakness 9/8/2020    Non-ischemic cardiomyopathy (HCC) 2018    Echo 50-55%, Cath-minimal CAD. 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 Pod 2 ORIF doing well.  C/o moderate pain.  Controlled on PO meds.  Ready to go home    Type 2 diabetes mellitus, controlled (HCC)     BS am 104    Urinary incontinence     UTI (urinary tract infection) 2020    Vaginal infection     Vitamin B12 deficiency         Past Surgical History:   Procedure Laterality Date    APPENDECTOMY      BREAST BIOPSY      left breast biopsy    BREAST BIOPSY  1968    left cyst removed    BREAST LUMPECTOMY Left 2001    BREAST REDUCTION SURGERY      Rt breast    CARDIAC CATHETERIZATION  2009    CARPAL TUNNEL RELEASE Bilateral     CHOLECYSTECTOMY, OPEN  1981    COLONOSCOPY  07/2014    COLONOSCOPY N/A 07/26/2023    COLONOSCOPY POLYPECTOMY and BIOPSY performed by Iqra Pedro MD at [facility] ENDOSCOPY    CYST REMOVAL Right 11/2020    hand    FRACTURE SURGERY      HAND SURGERY Right 11/16/2022    Right index finger distal interphalangeal joint arthrodesis performed by Kerry Johnson MD at [facility] OPC    HEENT      Sinus Surgery    HEENT      RK procedure bilateral eyes    HIP FRACTURE SURGERY Left 02/08/2023    HIP OPEN REDUCTION INTERNAL FIXATION performed by Irwin Chen MD at [facility] MAIN OR    HX OPEN REDUCTION INTERNAL FIXATION Left 2013    Ankle    HX OPEN REDUCTION INTERNAL FIXATION  08/2016    Tibia

## 2024-11-22 ENCOUNTER — OFFICE VISIT (OUTPATIENT)
Age: 79
End: 2024-11-22
Payer: MEDICARE

## 2024-11-22 VITALS
HEART RATE: 86 BPM | BODY MASS INDEX: 25.16 KG/M2 | HEIGHT: 65 IN | WEIGHT: 151 LBS | SYSTOLIC BLOOD PRESSURE: 116 MMHG | DIASTOLIC BLOOD PRESSURE: 72 MMHG

## 2024-11-22 DIAGNOSIS — N18.31 STAGE 3A CHRONIC KIDNEY DISEASE (HCC): ICD-10-CM

## 2024-11-22 DIAGNOSIS — I42.0 DILATED CARDIOMYOPATHY (HCC): ICD-10-CM

## 2024-11-22 DIAGNOSIS — I10 ESSENTIAL HYPERTENSION: Chronic | ICD-10-CM

## 2024-11-22 DIAGNOSIS — I50.42 CHRONIC COMBINED SYSTOLIC AND DIASTOLIC CHF (CONGESTIVE HEART FAILURE) (HCC): Primary | Chronic | ICD-10-CM

## 2024-11-22 PROCEDURE — 99214 OFFICE O/P EST MOD 30 MIN: CPT | Performed by: INTERNAL MEDICINE

## 2024-11-22 PROCEDURE — 1090F PRES/ABSN URINE INCON ASSESS: CPT | Performed by: INTERNAL MEDICINE

## 2024-11-22 PROCEDURE — 1036F TOBACCO NON-USER: CPT | Performed by: INTERNAL MEDICINE

## 2024-11-22 PROCEDURE — G8484 FLU IMMUNIZE NO ADMIN: HCPCS | Performed by: INTERNAL MEDICINE

## 2024-11-22 PROCEDURE — G8417 CALC BMI ABV UP PARAM F/U: HCPCS | Performed by: INTERNAL MEDICINE

## 2024-11-22 PROCEDURE — 3078F DIAST BP <80 MM HG: CPT | Performed by: INTERNAL MEDICINE

## 2024-11-22 PROCEDURE — 1123F ACP DISCUSS/DSCN MKR DOCD: CPT | Performed by: INTERNAL MEDICINE

## 2024-11-22 PROCEDURE — G8399 PT W/DXA RESULTS DOCUMENT: HCPCS | Performed by: INTERNAL MEDICINE

## 2024-11-22 PROCEDURE — 1126F AMNT PAIN NOTED NONE PRSNT: CPT | Performed by: INTERNAL MEDICINE

## 2024-11-22 PROCEDURE — G8428 CUR MEDS NOT DOCUMENT: HCPCS | Performed by: INTERNAL MEDICINE

## 2024-11-22 PROCEDURE — 3074F SYST BP LT 130 MM HG: CPT | Performed by: INTERNAL MEDICINE

## 2024-11-22 RX ORDER — GLIPIZIDE 5 MG/1
5 TABLET ORAL PRN
COMMUNITY

## 2024-11-22 RX ORDER — TRAMADOL HYDROCHLORIDE 50 MG/1
TABLET ORAL
COMMUNITY
Start: 2024-10-30

## 2024-11-22 NOTE — PROGRESS NOTES
2 [facility], [address] [place] [address]  PHONE: [phone]     24    NAME:  Mirtha Posaad  : 1945  MRN: [id number]       SUBJECTIVE:   Mirtha Posada is a 79 y.o. female seen for a follow up visit regarding the following:     Chief Complaint   Patient presents with    Congestive Heart Failure    Follow-up       HPI: She has h/o NICM and SHF, EF 40% in  with LHC showing mild CAD.    She has prior breast CA, went thru chemo and radiation on left breast in .       Echo 10/2023: EF 54%, mild LVH  Echo 2024: low normal EF     2024 admission for A/C DHF, COVID  2024 admission for JACKY - Ace and K stopped       No CP, feeling stronger.  No new RUFFIN, using cane.    Lasix JUST as needed, qod or every3 days.  Following weights.   Some weakness, using cane.   She is struggling with chronic LBP.    Patient denies recent history of orthopnea, PND, excessive dizziness and/or syncope.  \"I love salt\"        Past Medical History, Past Surgical History, Family history, Social History, and Medications were all reviewed with the patient today and updated as necessary.     Current Outpatient Medications   Medication Sig Dispense Refill    glipiZIDE (GLUCOTROL) 5 MG tablet Take 1 tablet by mouth as needed PRN if BG is over 200      traMADol (ULTRAM) 50 MG tablet       Loperamide HCl (IMODIUM PO) Take by mouth as needed      promethazine (PHENERGAN) 25 MG tablet Take 1 tablet by mouth daily as needed for Nausea 15 tablet 2    citalopram (CELEXA) 10 MG tablet Take 1 tablet by mouth daily evening 90 tablet 1    amitriptyline (ELAVIL) 10 MG tablet Take 1 tablet by mouth nightly 90 tablet 1    amLODIPine (NORVASC) 5 MG tablet       gabapentin (NEURONTIN) 600 MG tablet Take 1 tablet by mouth 2 times daily for 360 days. 180 tablet 1    metFORMIN (GLUCOPHAGE-XR) 500 MG extended release tablet Take 2 tablets by mouth 2 times daily 360 tablet 1    allopurinol (ZYLOPRIM) 100 MG tablet Take 2

## 2024-12-04 ENCOUNTER — OFFICE VISIT (OUTPATIENT)
Dept: INTERNAL MEDICINE CLINIC | Facility: CLINIC | Age: 79
End: 2024-12-04

## 2024-12-04 VITALS
DIASTOLIC BLOOD PRESSURE: 72 MMHG | BODY MASS INDEX: 25.16 KG/M2 | SYSTOLIC BLOOD PRESSURE: 136 MMHG | HEIGHT: 65 IN | TEMPERATURE: 97.3 F | HEART RATE: 46 BPM | OXYGEN SATURATION: 94 % | WEIGHT: 151 LBS

## 2024-12-04 DIAGNOSIS — I42.0 DILATED CARDIOMYOPATHY (HCC): ICD-10-CM

## 2024-12-04 DIAGNOSIS — I50.42 CHRONIC COMBINED SYSTOLIC AND DIASTOLIC CHF (CONGESTIVE HEART FAILURE) (HCC): Primary | Chronic | ICD-10-CM

## 2024-12-04 DIAGNOSIS — N18.31 TYPE 2 DIABETES MELLITUS WITH STAGE 3A CHRONIC KIDNEY DISEASE, WITHOUT LONG-TERM CURRENT USE OF INSULIN (HCC): Chronic | ICD-10-CM

## 2024-12-04 DIAGNOSIS — N18.31 STAGE 3A CHRONIC KIDNEY DISEASE (HCC): ICD-10-CM

## 2024-12-04 DIAGNOSIS — I10 ESSENTIAL HYPERTENSION: Chronic | ICD-10-CM

## 2024-12-04 DIAGNOSIS — E78.5 DYSLIPIDEMIA: Chronic | ICD-10-CM

## 2024-12-04 DIAGNOSIS — M85.89 OSTEOPENIA OF MULTIPLE SITES: ICD-10-CM

## 2024-12-04 DIAGNOSIS — Z94.84 STEM CELLS TRANSPLANT STATUS (HCC): ICD-10-CM

## 2024-12-04 DIAGNOSIS — M35.00 SJOGREN SYNDROME, UNSPECIFIED (HCC): ICD-10-CM

## 2024-12-04 DIAGNOSIS — F51.01 PRIMARY INSOMNIA: ICD-10-CM

## 2024-12-04 DIAGNOSIS — Z78.9 STATIN INTOLERANCE: ICD-10-CM

## 2024-12-04 DIAGNOSIS — K21.9 GERD WITHOUT ESOPHAGITIS: ICD-10-CM

## 2024-12-04 DIAGNOSIS — E11.22 TYPE 2 DIABETES MELLITUS WITH STAGE 3A CHRONIC KIDNEY DISEASE, WITHOUT LONG-TERM CURRENT USE OF INSULIN (HCC): Chronic | ICD-10-CM

## 2024-12-04 DIAGNOSIS — G62.0 CHEMOTHERAPY-INDUCED NEUROPATHY (HCC): ICD-10-CM

## 2024-12-04 DIAGNOSIS — T45.1X5A CHEMOTHERAPY-INDUCED NEUROPATHY (HCC): ICD-10-CM

## 2024-12-04 DIAGNOSIS — N39.0 RECURRENT UTI: ICD-10-CM

## 2024-12-04 DIAGNOSIS — R92.8 ABNORMAL MAMMOGRAM OF RIGHT BREAST: ICD-10-CM

## 2024-12-04 DIAGNOSIS — R91.8 LUNG NODULES: ICD-10-CM

## 2024-12-04 PROBLEM — N18.9 ACUTE KIDNEY INJURY SUPERIMPOSED ON CKD (HCC): Status: RESOLVED | Noted: 2024-09-11 | Resolved: 2024-12-04

## 2024-12-04 PROBLEM — N17.9 ACUTE KIDNEY INJURY SUPERIMPOSED ON CKD (HCC): Status: RESOLVED | Noted: 2024-09-11 | Resolved: 2024-12-04

## 2024-12-04 LAB
ANION GAP SERPL CALC-SCNC: 13 MMOL/L (ref 7–16)
BASOPHILS # BLD: 0.1 K/UL (ref 0–0.2)
BASOPHILS NFR BLD: 1 % (ref 0–2)
BUN SERPL-MCNC: 14 MG/DL (ref 8–23)
CALCIUM SERPL-MCNC: 9.4 MG/DL (ref 8.8–10.2)
CHLORIDE SERPL-SCNC: 99 MMOL/L (ref 98–107)
CHOLEST SERPL-MCNC: 196 MG/DL (ref 0–200)
CO2 SERPL-SCNC: 24 MMOL/L (ref 20–29)
CREAT SERPL-MCNC: 1.27 MG/DL (ref 0.6–1.1)
DIFFERENTIAL METHOD BLD: ABNORMAL
EOSINOPHIL # BLD: 0.2 K/UL (ref 0–0.8)
EOSINOPHIL NFR BLD: 3 % (ref 0.5–7.8)
ERYTHROCYTE [DISTWIDTH] IN BLOOD BY AUTOMATED COUNT: 14.5 % (ref 11.9–14.6)
GLUCOSE SERPL-MCNC: 98 MG/DL (ref 70–99)
HCT VFR BLD AUTO: 37.4 % (ref 35.8–46.3)
HDLC SERPL-MCNC: 44 MG/DL (ref 40–60)
HDLC SERPL: 4.5 (ref 0–5)
HGB BLD-MCNC: 12 G/DL (ref 11.7–15.4)
IMM GRANULOCYTES # BLD AUTO: 0.1 K/UL (ref 0–0.5)
IMM GRANULOCYTES NFR BLD AUTO: 1 % (ref 0–5)
LDLC SERPL CALC-MCNC: 100 MG/DL (ref 0–100)
LYMPHOCYTES # BLD: 2.7 K/UL (ref 0.5–4.6)
LYMPHOCYTES NFR BLD: 36 % (ref 13–44)
MCH RBC QN AUTO: 30.8 PG (ref 26.1–32.9)
MCHC RBC AUTO-ENTMCNC: 32.1 G/DL (ref 31.4–35)
MCV RBC AUTO: 95.9 FL (ref 82–102)
MONOCYTES # BLD: 0.5 K/UL (ref 0.1–1.3)
MONOCYTES NFR BLD: 7 % (ref 4–12)
NEUTS SEG # BLD: 4 K/UL (ref 1.7–8.2)
NEUTS SEG NFR BLD: 52 % (ref 43–78)
NRBC # BLD: 0.02 K/UL (ref 0–0.2)
PLATELET # BLD AUTO: 291 K/UL (ref 150–450)
PMV BLD AUTO: 10.7 FL (ref 9.4–12.3)
POTASSIUM SERPL-SCNC: 4.2 MMOL/L (ref 3.5–5.1)
RBC # BLD AUTO: 3.9 M/UL (ref 4.05–5.2)
SODIUM SERPL-SCNC: 136 MMOL/L (ref 136–145)
TRIGL SERPL-MCNC: 263 MG/DL (ref 0–150)
VLDLC SERPL CALC-MCNC: 53 MG/DL (ref 6–23)
WBC # BLD AUTO: 7.5 K/UL (ref 4.3–11.1)

## 2024-12-04 RX ORDER — TRAMADOL HYDROCHLORIDE 50 MG/1
TABLET ORAL
Status: CANCELLED | OUTPATIENT
Start: 2024-12-04

## 2024-12-04 RX ORDER — DOXEPIN HYDROCHLORIDE 10 MG/1
10 CAPSULE ORAL NIGHTLY
Qty: 90 CAPSULE | Refills: 1 | Status: ON HOLD | OUTPATIENT
Start: 2024-12-04

## 2024-12-04 RX ORDER — DOXEPIN HYDROCHLORIDE 10 MG/1
10 CAPSULE ORAL NIGHTLY
COMMUNITY
End: 2024-12-04 | Stop reason: SDUPTHER

## 2024-12-04 RX ORDER — POTASSIUM CHLORIDE 750 MG/1
CAPSULE, EXTENDED RELEASE ORAL
Status: ON HOLD | COMMUNITY

## 2024-12-04 NOTE — PROGRESS NOTES
hopeless 0   PHQ-2 Score 0   PHQ-9 Total Score 0        Assessment/Plan:      Health Maintenance Due   Topic Date Due    Shingles vaccine (1 of 2) 02/26/2015    COVID-19 Vaccine (3 - Pfizer risk series) 03/13/2021    Respiratory Syncytial Virus (RSV) Pregnant or age 60 yrs+ (1 - 1-dose 75+ series) Never done    DEXA (modify frequency per FRAX score)  12/02/2023    DTaP/Tdap/Td vaccine (2 - Td or Tdap) 03/18/2024    Flu vaccine (1) 08/01/2024          Mirtha was seen today for diabetes and hypertension.    Diagnoses and all orders for this visit:    Chronic combined systolic and diastolic CHF (congestive heart failure) (HCC)    Dilated cardiomyopathy (HCC)    Essential hypertension  -     CBC with Auto Differential; Future  -     Basic Metabolic Panel; Future  -     Lipid Panel; Future    Lung nodules    Type 2 diabetes mellitus with stage 3a chronic kidney disease, without long-term current use of insulin (HCC)  -     CBC with Auto Differential; Future  -     Basic Metabolic Panel; Future  -     Lipid Panel; Future    GERD without esophagitis    Chemotherapy-induced neuropathy (HCC)    Osteopenia of multiple sites  -     DEXA BONE DENSITY AXIAL SKELETON; Future  -     CBC with Auto Differential; Future  -     Basic Metabolic Panel; Future  -     Lipid Panel; Future    Stage 3a chronic kidney disease (HCC)  -     CBC with Auto Differential; Future  -     Basic Metabolic Panel; Future  -     Lipid Panel; Future    Dyslipidemia  -     CBC with Auto Differential; Future  -     Basic Metabolic Panel; Future  -     Lipid Panel; Future    Sjogren syndrome, unspecified (HCC)    Statin intolerance    Stem cells transplant status (HCC)    Primary insomnia  -     doxepin (SINEQUAN) 10 MG capsule; Take 1 capsule by mouth nightly    Recurrent UTI  -     Culture, Urine; Future  -     AMB POC URINALYSIS DIP STICK AUTO W/O MICRO    Abnormal mammogram of right breast  -     BRIAN DIGITAL DIAGNOSTIC W OR WO CAD RIGHT;

## 2024-12-06 LAB
BACTERIA SPEC CULT: NORMAL
SERVICE CMNT-IMP: NORMAL

## 2024-12-07 ENCOUNTER — HOSPITAL ENCOUNTER (OUTPATIENT)
Age: 79
Setting detail: OBSERVATION
Discharge: HOME | End: 2024-12-09
Attending: EMERGENCY MEDICINE | Admitting: FAMILY MEDICINE
Payer: MEDICARE

## 2024-12-07 ENCOUNTER — APPOINTMENT (OUTPATIENT)
Dept: CT IMAGING | Age: 79
End: 2024-12-07
Payer: MEDICARE

## 2024-12-07 DIAGNOSIS — I42.0 DILATED CARDIOMYOPATHY (HCC): Chronic | ICD-10-CM

## 2024-12-07 DIAGNOSIS — R42 VERTIGO: ICD-10-CM

## 2024-12-07 DIAGNOSIS — I10 ESSENTIAL HYPERTENSION: Chronic | ICD-10-CM

## 2024-12-07 DIAGNOSIS — R11.2 INTRACTABLE NAUSEA AND VOMITING: Primary | ICD-10-CM

## 2024-12-07 DIAGNOSIS — R42 VERTIGO, CONSTANT: ICD-10-CM

## 2024-12-07 LAB
ALBUMIN SERPL-MCNC: 4.1 G/DL (ref 3.2–4.6)
ALBUMIN/GLOB SERPL: 0.9 (ref 1–1.9)
ALP SERPL-CCNC: 64 U/L (ref 35–104)
ALT SERPL-CCNC: 18 U/L (ref 8–45)
ANION GAP SERPL CALC-SCNC: 18 MMOL/L (ref 7–16)
APPEARANCE UR: CLEAR
AST SERPL-CCNC: 42 U/L (ref 15–37)
BACTERIA URNS QL MICRO: NEGATIVE /HPF
BASOPHILS # BLD: 0 K/UL (ref 0–0.2)
BASOPHILS NFR BLD: 1 % (ref 0–2)
BILIRUB SERPL-MCNC: 0.4 MG/DL (ref 0–1.2)
BILIRUB UR QL: NEGATIVE
BUN SERPL-MCNC: 11 MG/DL (ref 8–23)
CALCIUM SERPL-MCNC: 9.4 MG/DL (ref 8.8–10.2)
CASTS URNS QL MICRO: 0 /LPF
CHLORIDE SERPL-SCNC: 97 MMOL/L (ref 98–107)
CO2 SERPL-SCNC: 24 MMOL/L (ref 20–29)
COLOR UR: ABNORMAL
CREAT SERPL-MCNC: 0.85 MG/DL (ref 0.6–1.1)
CRYSTALS URNS QL MICRO: 0 /LPF
DIFFERENTIAL METHOD BLD: ABNORMAL
EOSINOPHIL # BLD: 0 K/UL (ref 0–0.8)
EOSINOPHIL NFR BLD: 0 % (ref 0.5–7.8)
EPI CELLS #/AREA URNS HPF: ABNORMAL /HPF
ERYTHROCYTE [DISTWIDTH] IN BLOOD BY AUTOMATED COUNT: 14.8 % (ref 11.9–14.6)
GLOBULIN SER CALC-MCNC: 4.8 G/DL (ref 2.3–3.5)
GLUCOSE SERPL-MCNC: 151 MG/DL (ref 70–99)
GLUCOSE UR STRIP.AUTO-MCNC: NEGATIVE MG/DL
HCT VFR BLD AUTO: 43 % (ref 35.8–46.3)
HGB BLD-MCNC: 14 G/DL (ref 11.7–15.4)
HGB UR QL STRIP: ABNORMAL
HYALINE CASTS URNS QL MICRO: ABNORMAL /LPF
IMM GRANULOCYTES # BLD AUTO: 0 K/UL (ref 0–0.5)
IMM GRANULOCYTES NFR BLD AUTO: 1 % (ref 0–5)
KETONES UR QL STRIP.AUTO: ABNORMAL MG/DL
LEUKOCYTE ESTERASE UR QL STRIP.AUTO: NEGATIVE
LIPASE SERPL-CCNC: 27 U/L (ref 13–60)
LYMPHOCYTES # BLD: 1.5 K/UL (ref 0.5–4.6)
LYMPHOCYTES NFR BLD: 18 % (ref 13–44)
MCH RBC QN AUTO: 30.7 PG (ref 26.1–32.9)
MCHC RBC AUTO-ENTMCNC: 32.6 G/DL (ref 31.4–35)
MCV RBC AUTO: 94.3 FL (ref 82–102)
MONOCYTES # BLD: 0.4 K/UL (ref 0.1–1.3)
MONOCYTES NFR BLD: 4 % (ref 4–12)
MUCOUS THREADS URNS QL MICRO: 0 /LPF
NEUTS SEG # BLD: 6.3 K/UL (ref 1.7–8.2)
NEUTS SEG NFR BLD: 76 % (ref 43–78)
NITRITE UR QL STRIP.AUTO: NEGATIVE
NRBC # BLD: 0 K/UL (ref 0–0.2)
PH UR STRIP: 5.5 (ref 5–9)
PLATELET # BLD AUTO: 339 K/UL (ref 150–450)
PMV BLD AUTO: 9.7 FL (ref 9.4–12.3)
POTASSIUM SERPL-SCNC: 4 MMOL/L (ref 3.5–5.1)
PROT SERPL-MCNC: 8.9 G/DL (ref 6.3–8.2)
PROT UR STRIP-MCNC: 300 MG/DL
RBC # BLD AUTO: 4.56 M/UL (ref 4.05–5.2)
RBC #/AREA URNS HPF: ABNORMAL /HPF
SODIUM SERPL-SCNC: 138 MMOL/L (ref 136–145)
SP GR UR REFRACTOMETRY: 1.03 (ref 1–1.02)
URINE CULTURE IF INDICATED: ABNORMAL
UROBILINOGEN UR QL STRIP.AUTO: 0.2 EU/DL (ref 0.2–1)
WBC # BLD AUTO: 8.3 K/UL (ref 4.3–11.1)
WBC URNS QL MICRO: ABNORMAL /HPF

## 2024-12-07 PROCEDURE — 70498 CT ANGIOGRAPHY NECK: CPT

## 2024-12-07 PROCEDURE — 96375 TX/PRO/DX INJ NEW DRUG ADDON: CPT

## 2024-12-07 PROCEDURE — 96376 TX/PRO/DX INJ SAME DRUG ADON: CPT

## 2024-12-07 PROCEDURE — 6360000004 HC RX CONTRAST MEDICATION: Performed by: EMERGENCY MEDICINE

## 2024-12-07 PROCEDURE — 80053 COMPREHEN METABOLIC PANEL: CPT

## 2024-12-07 PROCEDURE — 2580000003 HC RX 258: Performed by: EMERGENCY MEDICINE

## 2024-12-07 PROCEDURE — G0378 HOSPITAL OBSERVATION PER HR: HCPCS

## 2024-12-07 PROCEDURE — 81001 URINALYSIS AUTO W/SCOPE: CPT

## 2024-12-07 PROCEDURE — 6360000002 HC RX W HCPCS: Performed by: EMERGENCY MEDICINE

## 2024-12-07 PROCEDURE — 99285 EMERGENCY DEPT VISIT HI MDM: CPT

## 2024-12-07 PROCEDURE — 6370000000 HC RX 637 (ALT 250 FOR IP): Performed by: EMERGENCY MEDICINE

## 2024-12-07 PROCEDURE — 96361 HYDRATE IV INFUSION ADD-ON: CPT

## 2024-12-07 PROCEDURE — 85025 COMPLETE CBC W/AUTO DIFF WBC: CPT

## 2024-12-07 PROCEDURE — 83690 ASSAY OF LIPASE: CPT

## 2024-12-07 RX ORDER — DIAZEPAM 2 MG/1
2 TABLET ORAL ONCE
Status: COMPLETED | OUTPATIENT
Start: 2024-12-07 | End: 2024-12-07

## 2024-12-07 RX ORDER — MECLIZINE HYDROCHLORIDE 25 MG/1
25 TABLET ORAL EVERY 6 HOURS PRN
Status: DISCONTINUED | OUTPATIENT
Start: 2024-12-07 | End: 2024-12-09 | Stop reason: HOSPADM

## 2024-12-07 RX ORDER — ONDANSETRON 2 MG/ML
4 INJECTION INTRAMUSCULAR; INTRAVENOUS ONCE
Status: COMPLETED | OUTPATIENT
Start: 2024-12-07 | End: 2024-12-07

## 2024-12-07 RX ORDER — 0.9 % SODIUM CHLORIDE 0.9 %
1000 INTRAVENOUS SOLUTION INTRAVENOUS ONCE
Status: COMPLETED | OUTPATIENT
Start: 2024-12-07 | End: 2024-12-07

## 2024-12-07 RX ORDER — IOPAMIDOL 755 MG/ML
100 INJECTION, SOLUTION INTRAVASCULAR
Status: COMPLETED | OUTPATIENT
Start: 2024-12-07 | End: 2024-12-07

## 2024-12-07 RX ORDER — DIAZEPAM 2 MG/1
2 TABLET ORAL EVERY 6 HOURS PRN
Status: DISCONTINUED | OUTPATIENT
Start: 2024-12-07 | End: 2024-12-09 | Stop reason: HOSPADM

## 2024-12-07 RX ORDER — MECLIZINE HYDROCHLORIDE 25 MG/1
25 TABLET ORAL
Status: COMPLETED | OUTPATIENT
Start: 2024-12-07 | End: 2024-12-07

## 2024-12-07 RX ORDER — ONDANSETRON 2 MG/ML
4 INJECTION INTRAMUSCULAR; INTRAVENOUS
Status: COMPLETED | OUTPATIENT
Start: 2024-12-07 | End: 2024-12-07

## 2024-12-07 RX ADMIN — IOPAMIDOL 100 ML: 755 INJECTION, SOLUTION INTRAVENOUS at 19:29

## 2024-12-07 RX ADMIN — ONDANSETRON 4 MG: 2 INJECTION INTRAMUSCULAR; INTRAVENOUS at 21:09

## 2024-12-07 RX ADMIN — SODIUM CHLORIDE 1000 ML: 9 INJECTION, SOLUTION INTRAVENOUS at 19:16

## 2024-12-07 RX ADMIN — MECLIZINE HYDROCHLORIDE 25 MG: 25 TABLET ORAL at 21:09

## 2024-12-07 RX ADMIN — ONDANSETRON 4 MG: 2 INJECTION INTRAMUSCULAR; INTRAVENOUS at 19:16

## 2024-12-07 RX ADMIN — DIAZEPAM 2 MG: 2 TABLET ORAL at 19:15

## 2024-12-07 ASSESSMENT — PAIN - FUNCTIONAL ASSESSMENT
PAIN_FUNCTIONAL_ASSESSMENT: 0-10
PAIN_FUNCTIONAL_ASSESSMENT: ACTIVITIES ARE NOT PREVENTED

## 2024-12-07 ASSESSMENT — PAIN DESCRIPTION - LOCATION
LOCATION: HEAD
LOCATION: HEAD

## 2024-12-07 ASSESSMENT — PAIN DESCRIPTION - ONSET: ONSET: ON-GOING

## 2024-12-07 ASSESSMENT — PAIN DESCRIPTION - DESCRIPTORS: DESCRIPTORS: ACHING

## 2024-12-07 ASSESSMENT — PAIN DESCRIPTION - PAIN TYPE: TYPE: ACUTE PAIN

## 2024-12-07 ASSESSMENT — PAIN SCALES - GENERAL
PAINLEVEL_OUTOF10: 7
PAINLEVEL_OUTOF10: 4

## 2024-12-07 ASSESSMENT — PAIN DESCRIPTION - ORIENTATION: ORIENTATION: POSTERIOR

## 2024-12-07 ASSESSMENT — PAIN DESCRIPTION - FREQUENCY: FREQUENCY: INTERMITTENT

## 2024-12-07 NOTE — ED PROVIDER NOTES
Stability: Low Risk  (9/11/2024)    Housing Stability Vital Sign     Unable to Pay for Housing in the Last Year: No     Number of Times Moved in the Last Year: 0     Homeless in the Last Year: No        Previous Medications    ACETAMINOPHEN (TYLENOL) 650 MG EXTENDED RELEASE TABLET    Take 2 tablets by mouth every 8 hours as needed for Pain    ALLOPURINOL (ZYLOPRIM) 100 MG TABLET    Take 2 tablets by mouth daily    AMITRIPTYLINE (ELAVIL) 10 MG TABLET    Take 1 tablet by mouth nightly    AMLODIPINE (NORVASC) 5 MG TABLET        B-D 3CC LUER-KATY SYR 25GX1\" 25G X 1\" 3 ML MISC        CETIRIZINE (ZYRTEC) 10 MG TABLET    Take 1 tablet by mouth daily as needed for Allergies    CITALOPRAM (CELEXA) 10 MG TABLET    Take 1 tablet by mouth daily evening    CRANBERRY (ELLURA) 200 MG CAPS    Take 1 capsule by mouth daily    DIPHENHYDRAMINE (BENADRYL) 25 MG TABLET    Take 1 tablet by mouth every 6 hours as needed for Itching    DOXEPIN (SINEQUAN) 10 MG CAPSULE    Take 1 capsule by mouth nightly    ESTRADIOL (ESTRACE VAGINAL) 0.1 MG/GM VAGINAL CREAM    Insert 2g daily vaginally for two weeks. Then insert 1g three times a week.    FLUTICASONE (FLONASE) 50 MCG/ACT NASAL SPRAY    2 sprays by Nasal route 2 times daily as needed for Allergies    FUROSEMIDE (LASIX) 40 MG TABLET    Take 1 tablet by mouth daily    GABAPENTIN (NEURONTIN) 600 MG TABLET    Take 1 tablet by mouth 2 times daily for 360 days.    GLIPIZIDE (GLUCOTROL) 5 MG TABLET    Take 1 tablet by mouth as needed PRN if BG is over 200    LOPERAMIDE HCL (IMODIUM PO)    Take by mouth as needed    MELATONIN 3 MG TABS TABLET    Take 4 tablets by mouth at bedtime    METFORMIN (GLUCOPHAGE-XR) 500 MG EXTENDED RELEASE TABLET    Take 2 tablets by mouth 2 times daily    METOPROLOL SUCCINATE (TOPROL XL) 50 MG EXTENDED RELEASE TABLET    Take 1 tablet by mouth in the morning and at bedtime Once a day    POTASSIUM CHLORIDE (MICRO-K) 10 MEQ EXTENDED RELEASE CAPSULE        PROMETHAZINE

## 2024-12-07 NOTE — ED TRIAGE NOTES
Patient arrives from home via EMS with cc n/v since this morning. Denies any blood in emesis, abd pain, SOB. Hx of DM2 and HTN, CHF.  . A&O x4.    4mg IM zofran  NSR with ecg

## 2024-12-08 ENCOUNTER — APPOINTMENT (OUTPATIENT)
Dept: MRI IMAGING | Age: 79
End: 2024-12-08
Payer: MEDICARE

## 2024-12-08 PROBLEM — R11.2 INTRACTABLE NAUSEA AND VOMITING: Status: RESOLVED | Noted: 2024-12-07 | Resolved: 2024-12-08

## 2024-12-08 LAB
ANION GAP SERPL CALC-SCNC: 16 MMOL/L (ref 7–16)
BUN SERPL-MCNC: 10 MG/DL (ref 8–23)
CALCIUM SERPL-MCNC: 8.9 MG/DL (ref 8.8–10.2)
CHLORIDE SERPL-SCNC: 98 MMOL/L (ref 98–107)
CHOLEST SERPL-MCNC: 205 MG/DL (ref 0–200)
CO2 SERPL-SCNC: 24 MMOL/L (ref 20–29)
CREAT SERPL-MCNC: 0.94 MG/DL (ref 0.6–1.1)
ERYTHROCYTE [DISTWIDTH] IN BLOOD BY AUTOMATED COUNT: 14.6 % (ref 11.9–14.6)
EST. AVERAGE GLUCOSE BLD GHB EST-MCNC: 131 MG/DL
GLUCOSE SERPL-MCNC: 111 MG/DL (ref 70–99)
HBA1C MFR BLD: 6.2 % (ref 0–5.6)
HCT VFR BLD AUTO: 39.4 % (ref 35.8–46.3)
HDLC SERPL-MCNC: 47 MG/DL (ref 40–60)
HDLC SERPL: 4.4 (ref 0–5)
HGB BLD-MCNC: 13.1 G/DL (ref 11.7–15.4)
LDLC SERPL CALC-MCNC: 108 MG/DL (ref 0–100)
MAGNESIUM SERPL-MCNC: 1.2 MG/DL (ref 1.8–2.4)
MCH RBC QN AUTO: 31.2 PG (ref 26.1–32.9)
MCHC RBC AUTO-ENTMCNC: 33.2 G/DL (ref 31.4–35)
MCV RBC AUTO: 93.8 FL (ref 82–102)
NRBC # BLD: 0 K/UL (ref 0–0.2)
PLATELET # BLD AUTO: 305 K/UL (ref 150–450)
PMV BLD AUTO: 10.3 FL (ref 9.4–12.3)
POTASSIUM SERPL-SCNC: 3.4 MMOL/L (ref 3.5–5.1)
RBC # BLD AUTO: 4.2 M/UL (ref 4.05–5.2)
SODIUM SERPL-SCNC: 138 MMOL/L (ref 136–145)
TRIGL SERPL-MCNC: 251 MG/DL (ref 0–150)
VLDLC SERPL CALC-MCNC: 50 MG/DL (ref 6–23)
WBC # BLD AUTO: 9.7 K/UL (ref 4.3–11.1)

## 2024-12-08 PROCEDURE — 85027 COMPLETE CBC AUTOMATED: CPT

## 2024-12-08 PROCEDURE — G0378 HOSPITAL OBSERVATION PER HR: HCPCS

## 2024-12-08 PROCEDURE — 6370000000 HC RX 637 (ALT 250 FOR IP): Performed by: FAMILY MEDICINE

## 2024-12-08 PROCEDURE — 70551 MRI BRAIN STEM W/O DYE: CPT

## 2024-12-08 PROCEDURE — 6370000000 HC RX 637 (ALT 250 FOR IP): Performed by: INTERNAL MEDICINE

## 2024-12-08 PROCEDURE — 80048 BASIC METABOLIC PNL TOTAL CA: CPT

## 2024-12-08 PROCEDURE — 6360000002 HC RX W HCPCS: Performed by: INTERNAL MEDICINE

## 2024-12-08 PROCEDURE — 83735 ASSAY OF MAGNESIUM: CPT

## 2024-12-08 PROCEDURE — 2580000003 HC RX 258: Performed by: FAMILY MEDICINE

## 2024-12-08 PROCEDURE — 94760 N-INVAS EAR/PLS OXIMETRY 1: CPT

## 2024-12-08 PROCEDURE — 80061 LIPID PANEL: CPT

## 2024-12-08 PROCEDURE — 2580000003 HC RX 258: Performed by: NURSE PRACTITIONER

## 2024-12-08 PROCEDURE — 96366 THER/PROPH/DIAG IV INF ADDON: CPT

## 2024-12-08 PROCEDURE — 83036 HEMOGLOBIN GLYCOSYLATED A1C: CPT

## 2024-12-08 PROCEDURE — 96361 HYDRATE IV INFUSION ADD-ON: CPT

## 2024-12-08 PROCEDURE — 96365 THER/PROPH/DIAG IV INF INIT: CPT

## 2024-12-08 RX ORDER — SODIUM CHLORIDE 9 MG/ML
INJECTION, SOLUTION INTRAVENOUS PRN
Status: DISCONTINUED | OUTPATIENT
Start: 2024-12-08 | End: 2024-12-09 | Stop reason: HOSPADM

## 2024-12-08 RX ORDER — ASPIRIN 81 MG/1
81 TABLET, CHEWABLE ORAL DAILY
Status: DISCONTINUED | OUTPATIENT
Start: 2024-12-08 | End: 2024-12-09 | Stop reason: HOSPADM

## 2024-12-08 RX ORDER — SODIUM CHLORIDE 0.9 % (FLUSH) 0.9 %
5-40 SYRINGE (ML) INJECTION PRN
Status: DISCONTINUED | OUTPATIENT
Start: 2024-12-08 | End: 2024-12-09 | Stop reason: HOSPADM

## 2024-12-08 RX ORDER — POTASSIUM CHLORIDE 1500 MG/1
40 TABLET, EXTENDED RELEASE ORAL ONCE
Status: COMPLETED | OUTPATIENT
Start: 2024-12-08 | End: 2024-12-08

## 2024-12-08 RX ORDER — MECLIZINE HYDROCHLORIDE 25 MG/1
25 TABLET ORAL EVERY 6 HOURS SCHEDULED
Status: DISCONTINUED | OUTPATIENT
Start: 2024-12-08 | End: 2024-12-09 | Stop reason: HOSPADM

## 2024-12-08 RX ORDER — ASPIRIN 300 MG/1
300 SUPPOSITORY RECTAL DAILY
Status: DISCONTINUED | OUTPATIENT
Start: 2024-12-08 | End: 2024-12-09 | Stop reason: HOSPADM

## 2024-12-08 RX ORDER — METOPROLOL SUCCINATE 50 MG/1
50 TABLET, EXTENDED RELEASE ORAL EVERY 12 HOURS SCHEDULED
Status: DISCONTINUED | OUTPATIENT
Start: 2024-12-08 | End: 2024-12-09 | Stop reason: HOSPADM

## 2024-12-08 RX ORDER — ONDANSETRON 2 MG/ML
4 INJECTION INTRAMUSCULAR; INTRAVENOUS EVERY 6 HOURS PRN
Status: DISCONTINUED | OUTPATIENT
Start: 2024-12-08 | End: 2024-12-09 | Stop reason: HOSPADM

## 2024-12-08 RX ORDER — ONDANSETRON 4 MG/1
4 TABLET, ORALLY DISINTEGRATING ORAL EVERY 8 HOURS PRN
Status: DISCONTINUED | OUTPATIENT
Start: 2024-12-08 | End: 2024-12-09 | Stop reason: HOSPADM

## 2024-12-08 RX ORDER — 0.9 % SODIUM CHLORIDE 0.9 %
250 INTRAVENOUS SOLUTION INTRAVENOUS ONCE
Status: COMPLETED | OUTPATIENT
Start: 2024-12-08 | End: 2024-12-08

## 2024-12-08 RX ORDER — CITALOPRAM HYDROBROMIDE 10 MG/1
10 TABLET ORAL DAILY
Status: DISCONTINUED | OUTPATIENT
Start: 2024-12-08 | End: 2024-12-09 | Stop reason: HOSPADM

## 2024-12-08 RX ORDER — GINSENG 100 MG
1 CAPSULE ORAL DAILY
Status: DISCONTINUED | OUTPATIENT
Start: 2024-12-08 | End: 2024-12-08 | Stop reason: RX

## 2024-12-08 RX ORDER — ENOXAPARIN SODIUM 100 MG/ML
40 INJECTION SUBCUTANEOUS DAILY
Status: DISCONTINUED | OUTPATIENT
Start: 2024-12-08 | End: 2024-12-09 | Stop reason: HOSPADM

## 2024-12-08 RX ORDER — GABAPENTIN 300 MG/1
600 CAPSULE ORAL 2 TIMES DAILY
Status: DISCONTINUED | OUTPATIENT
Start: 2024-12-08 | End: 2024-12-09 | Stop reason: HOSPADM

## 2024-12-08 RX ORDER — MAGNESIUM SULFATE IN WATER 40 MG/ML
4000 INJECTION, SOLUTION INTRAVENOUS ONCE
Status: COMPLETED | OUTPATIENT
Start: 2024-12-08 | End: 2024-12-08

## 2024-12-08 RX ORDER — SODIUM CHLORIDE 0.9 % (FLUSH) 0.9 %
5-40 SYRINGE (ML) INJECTION EVERY 12 HOURS SCHEDULED
Status: DISCONTINUED | OUTPATIENT
Start: 2024-12-08 | End: 2024-12-09 | Stop reason: HOSPADM

## 2024-12-08 RX ORDER — DIPHENHYDRAMINE HCL 25 MG
25 CAPSULE ORAL EVERY 6 HOURS PRN
Status: DISCONTINUED | OUTPATIENT
Start: 2024-12-08 | End: 2024-12-09 | Stop reason: HOSPADM

## 2024-12-08 RX ORDER — DOXEPIN HYDROCHLORIDE 10 MG/1
10 CAPSULE ORAL NIGHTLY
Status: DISCONTINUED | OUTPATIENT
Start: 2024-12-08 | End: 2024-12-09 | Stop reason: HOSPADM

## 2024-12-08 RX ORDER — BISACODYL 10 MG
10 SUPPOSITORY, RECTAL RECTAL DAILY PRN
Status: DISCONTINUED | OUTPATIENT
Start: 2024-12-08 | End: 2024-12-09 | Stop reason: HOSPADM

## 2024-12-08 RX ORDER — ACETAMINOPHEN 325 MG/1
650 TABLET ORAL EVERY 4 HOURS PRN
Status: DISCONTINUED | OUTPATIENT
Start: 2024-12-08 | End: 2024-12-09 | Stop reason: HOSPADM

## 2024-12-08 RX ORDER — ACETAMINOPHEN 650 MG/1
650 SUPPOSITORY RECTAL EVERY 4 HOURS PRN
Status: DISCONTINUED | OUTPATIENT
Start: 2024-12-08 | End: 2024-12-09 | Stop reason: HOSPADM

## 2024-12-08 RX ORDER — ALLOPURINOL 100 MG/1
200 TABLET ORAL DAILY
Status: DISCONTINUED | OUTPATIENT
Start: 2024-12-08 | End: 2024-12-09 | Stop reason: HOSPADM

## 2024-12-08 RX ADMIN — SODIUM CHLORIDE 250 ML: 9 INJECTION, SOLUTION INTRAVENOUS at 21:25

## 2024-12-08 RX ADMIN — MECLIZINE HYDROCHLORIDE 25 MG: 25 TABLET ORAL at 12:47

## 2024-12-08 RX ADMIN — DOXEPIN HYDROCHLORIDE 10 MG: 10 CAPSULE ORAL at 21:23

## 2024-12-08 RX ADMIN — DOXEPIN HYDROCHLORIDE 10 MG: 10 CAPSULE ORAL at 01:45

## 2024-12-08 RX ADMIN — GABAPENTIN 600 MG: 300 CAPSULE ORAL at 09:39

## 2024-12-08 RX ADMIN — ASPIRIN 81 MG: 81 TABLET, CHEWABLE ORAL at 09:46

## 2024-12-08 RX ADMIN — POTASSIUM CHLORIDE 40 MEQ: 1500 TABLET, EXTENDED RELEASE ORAL at 12:47

## 2024-12-08 RX ADMIN — METOPROLOL SUCCINATE 50 MG: 50 TABLET, EXTENDED RELEASE ORAL at 21:23

## 2024-12-08 RX ADMIN — ALLOPURINOL 200 MG: 100 TABLET ORAL at 09:46

## 2024-12-08 RX ADMIN — MAGNESIUM SULFATE IN WATER FOR 4000 MG: 40 INJECTION INTRAVENOUS at 12:47

## 2024-12-08 RX ADMIN — Medication 12 MG: at 21:23

## 2024-12-08 RX ADMIN — SODIUM CHLORIDE, PRESERVATIVE FREE 10 ML: 5 INJECTION INTRAVENOUS at 21:23

## 2024-12-08 RX ADMIN — CITALOPRAM HYDROBROMIDE 10 MG: 10 TABLET ORAL at 09:39

## 2024-12-08 RX ADMIN — Medication 12 MG: at 01:40

## 2024-12-08 RX ADMIN — METOPROLOL SUCCINATE 50 MG: 50 TABLET, EXTENDED RELEASE ORAL at 01:43

## 2024-12-08 RX ADMIN — SODIUM CHLORIDE, PRESERVATIVE FREE 10 ML: 5 INJECTION INTRAVENOUS at 12:47

## 2024-12-08 RX ADMIN — GABAPENTIN 600 MG: 300 CAPSULE ORAL at 01:44

## 2024-12-08 RX ADMIN — GABAPENTIN 600 MG: 300 CAPSULE ORAL at 21:23

## 2024-12-08 RX ADMIN — MECLIZINE HYDROCHLORIDE 25 MG: 25 TABLET ORAL at 17:54

## 2024-12-08 ASSESSMENT — PAIN SCALES - GENERAL: PAINLEVEL_OUTOF10: 0

## 2024-12-08 NOTE — CARE COORDINATION
Admitted to observation with vertigo, intractable nausea and vomiting. Assessment completed with patient and spouse at bedside. Patient lives with spouse at address on file. Uses walker and cane at baseline. Independent in care of herself, spouse does cooking, cleaning, driving. No current HH or SNF. Spouse to transport at [place]. Therapy recommendations are pending, anticipate need for OP vs HH at [place].        12/08/24 1199   Service Assessment   Patient Orientation Alert and Oriented;Person;Place;Situation   Cognition Alert   History Provided By Patient;Spouse   Primary Caregiver Self   Accompanied By/Relationship spouse   Support Systems Spouse/Significant Other   Patient's Healthcare Decision Maker is: Legal Next of Kin   PCP Verified by CM Yes   Prior Functional Level Independent in ADLs/IADLs   Current Functional Level Independent in ADLs/IADLs   Can patient return to prior living arrangement Yes   Ability to make needs known: Good   Family able to assist with home care needs: Yes   Would you like for me to discuss the discharge plan with any other family members/significant others, and if so, who? Yes  (spouse)   Financial Resources Medicare   Community Resources None   CM/SW Referral Other (see comment)  (Discharge planning)   Social/Functional History   Lives With Spouse   Type of Home House   Home Layout One level   Home Access Stairs to enter without rails   Entrance Stairs - Number of Steps 2   Prior Level of Assist for ADLs Independent   Prior Level of Assist for Homemaking Needs assistance   Meal Prep Maximal   Laundry Maximal   Vacuuming Maximal   Cleaning Maximal   Gardening Maximal   Yard Work Maximal   Driving Maximal   Shopping Maximal    Other (comment)  (NA)   Homemaking Responsibilities Yes   Ambulation Assistance Independent   Prior Level of Assist for Transfers Independent   Active  No   Occupation Retired   Discharge Planning   Type of Residence House   Living Arrangements

## 2024-12-08 NOTE — ED NOTES
Prior to giving medication patient was notified that she will not be able to drive herself home if she gets to be discharged tonight. Per patient her  lives at home with her but he cannot see at night to drive.       Cousin Allie called requesting update. Per cousin she is 4 hours away so she will not be able to visit or assist patient. Patient updated on this.     Kerry Woodward, IRINEO  12/07/24 1923       Kerry Woodward RN  12/07/24 1929

## 2024-12-08 NOTE — H&P
8.3 4.3 - 11.1 K/uL    RBC 4.56 4.05 - 5.2 M/uL    Hemoglobin 14.0 11.7 - 15.4 g/dL    Hematocrit 43.0 35.8 - 46.3 %    MCV 94.3 82 - 102 FL    MCH 30.7 26.1 - 32.9 PG    MCHC 32.6 31.4 - 35.0 g/dL    RDW 14.8 (H) 11.9 - 14.6 %    Platelets 339 150 - 450 K/uL    MPV 9.7 9.4 - 12.3 FL    nRBC 0.00 0.0 - 0.2 K/uL    Differential Type AUTOMATED      Neutrophils % 76 43 - 78 %    Lymphocytes % 18 13 - 44 %    Monocytes % 4 4.0 - 12.0 %    Eosinophils % 0 (L) 0.5 - 7.8 %    Basophils % 1 0.0 - 2.0 %    Immature Granulocytes % 1 0.0 - 5.0 %    Neutrophils Absolute 6.3 1.7 - 8.2 K/UL    Lymphocytes Absolute 1.5 0.5 - 4.6 K/UL    Monocytes Absolute 0.4 0.1 - 1.3 K/UL    Eosinophils Absolute 0.0 0.0 - 0.8 K/UL    Basophils Absolute 0.0 0.0 - 0.2 K/UL    Immature Granulocytes Absolute 0.0 0.0 - 0.5 K/UL   CMP    Collection Time: 12/07/24  6:47 PM   Result Value Ref Range    Sodium 138 136 - 145 mmol/L    Potassium 4.0 3.5 - 5.1 mmol/L    Chloride 97 (L) 98 - 107 mmol/L    CO2 24 20 - 29 mmol/L    Anion Gap 18 (H) 7 - 16 mmol/L    Glucose 151 (H) 70 - 99 mg/dL    BUN 11 8 - 23 MG/DL    Creatinine 0.85 0.60 - 1.10 MG/DL    Est, Glom Filt Rate 70 >60 ml/min/1.73m2    Calcium 9.4 8.8 - 10.2 MG/DL    Total Bilirubin 0.4 0.0 - 1.2 MG/DL    ALT 18 8 - 45 U/L    AST 42 (H) 15 - 37 U/L    Alk Phosphatase 64 35 - 104 U/L    Total Protein 8.9 (H) 6.3 - 8.2 g/dL    Albumin 4.1 3.2 - 4.6 g/dL    Globulin 4.8 (H) 2.3 - 3.5 g/dL    Albumin/Globulin Ratio 0.9 (L) 1.0 - 1.9     Lipase    Collection Time: 12/07/24  6:47 PM   Result Value Ref Range    Lipase 27 13 - 60 U/L   Urinalysis with Reflex to Culture    Collection Time: 12/07/24  8:27 PM    Specimen: Urine   Result Value Ref Range    Color, UA YELLOW/STRAW      Appearance CLEAR      Specific Gravity, UA 1.033 (H) 1.001 - 1.023      pH, Urine 5.5 5.0 - 9.0      Protein,  (A) NEG mg/dL    Glucose, Ur Negative NEG mg/dL    Ketones, Urine TRACE (A) NEG mg/dL    Bilirubin, Urine

## 2024-12-09 VITALS
BODY MASS INDEX: 24.66 KG/M2 | SYSTOLIC BLOOD PRESSURE: 150 MMHG | WEIGHT: 148 LBS | OXYGEN SATURATION: 95 % | HEART RATE: 64 BPM | DIASTOLIC BLOOD PRESSURE: 73 MMHG | RESPIRATION RATE: 18 BRPM | HEIGHT: 65 IN | TEMPERATURE: 97.7 F

## 2024-12-09 LAB
ANION GAP SERPL CALC-SCNC: 7 MMOL/L (ref 7–16)
BUN SERPL-MCNC: 14 MG/DL (ref 8–23)
CALCIUM SERPL-MCNC: 8.9 MG/DL (ref 8.8–10.2)
CHLORIDE SERPL-SCNC: 102 MMOL/L (ref 98–107)
CO2 SERPL-SCNC: 27 MMOL/L (ref 20–29)
CREAT SERPL-MCNC: 0.98 MG/DL (ref 0.6–1.1)
GLUCOSE BLD STRIP.AUTO-MCNC: 110 MG/DL (ref 65–100)
GLUCOSE BLD STRIP.AUTO-MCNC: 205 MG/DL (ref 65–100)
GLUCOSE SERPL-MCNC: 108 MG/DL (ref 70–99)
MAGNESIUM SERPL-MCNC: 2.3 MG/DL (ref 1.8–2.4)
POTASSIUM SERPL-SCNC: 4.2 MMOL/L (ref 3.5–5.1)
SERVICE CMNT-IMP: ABNORMAL
SERVICE CMNT-IMP: ABNORMAL
SODIUM SERPL-SCNC: 136 MMOL/L (ref 136–145)

## 2024-12-09 PROCEDURE — 6370000000 HC RX 637 (ALT 250 FOR IP): Performed by: FAMILY MEDICINE

## 2024-12-09 PROCEDURE — 6360000002 HC RX W HCPCS: Performed by: FAMILY MEDICINE

## 2024-12-09 PROCEDURE — 97535 SELF CARE MNGMENT TRAINING: CPT

## 2024-12-09 PROCEDURE — 6370000000 HC RX 637 (ALT 250 FOR IP): Performed by: INTERNAL MEDICINE

## 2024-12-09 PROCEDURE — 97161 PT EVAL LOW COMPLEX 20 MIN: CPT

## 2024-12-09 PROCEDURE — 36415 COLL VENOUS BLD VENIPUNCTURE: CPT

## 2024-12-09 PROCEDURE — 2580000003 HC RX 258: Performed by: FAMILY MEDICINE

## 2024-12-09 PROCEDURE — 97165 OT EVAL LOW COMPLEX 30 MIN: CPT

## 2024-12-09 PROCEDURE — 82962 GLUCOSE BLOOD TEST: CPT

## 2024-12-09 PROCEDURE — 96372 THER/PROPH/DIAG INJ SC/IM: CPT

## 2024-12-09 PROCEDURE — 80048 BASIC METABOLIC PNL TOTAL CA: CPT

## 2024-12-09 PROCEDURE — 83735 ASSAY OF MAGNESIUM: CPT

## 2024-12-09 PROCEDURE — 97530 THERAPEUTIC ACTIVITIES: CPT

## 2024-12-09 PROCEDURE — G0378 HOSPITAL OBSERVATION PER HR: HCPCS

## 2024-12-09 RX ORDER — FUROSEMIDE 40 MG/1
40 TABLET ORAL DAILY
Qty: 30 TABLET | Refills: 0 | Status: SHIPPED
Start: 2024-12-09

## 2024-12-09 RX ORDER — ONDANSETRON 4 MG/1
4 TABLET, FILM COATED ORAL 3 TIMES DAILY PRN
Qty: 15 TABLET | Refills: 1 | Status: SHIPPED | OUTPATIENT
Start: 2024-12-09

## 2024-12-09 RX ORDER — MECLIZINE HYDROCHLORIDE 25 MG/1
25 TABLET ORAL EVERY 6 HOURS PRN
Qty: 28 TABLET | Refills: 0 | Status: SHIPPED | OUTPATIENT
Start: 2024-12-09 | End: 2024-12-16

## 2024-12-09 RX ADMIN — ENOXAPARIN SODIUM 40 MG: 100 INJECTION SUBCUTANEOUS at 09:50

## 2024-12-09 RX ADMIN — MECLIZINE HYDROCHLORIDE 25 MG: 25 TABLET ORAL at 12:03

## 2024-12-09 RX ADMIN — MECLIZINE HYDROCHLORIDE 25 MG: 25 TABLET ORAL at 06:14

## 2024-12-09 RX ADMIN — CITALOPRAM HYDROBROMIDE 10 MG: 10 TABLET ORAL at 09:51

## 2024-12-09 RX ADMIN — GABAPENTIN 600 MG: 300 CAPSULE ORAL at 09:51

## 2024-12-09 RX ADMIN — METOPROLOL SUCCINATE 50 MG: 50 TABLET, EXTENDED RELEASE ORAL at 09:51

## 2024-12-09 RX ADMIN — ASPIRIN 81 MG: 81 TABLET, CHEWABLE ORAL at 09:51

## 2024-12-09 RX ADMIN — MECLIZINE HYDROCHLORIDE 25 MG: 25 TABLET ORAL at 00:46

## 2024-12-09 RX ADMIN — ALLOPURINOL 200 MG: 100 TABLET ORAL at 09:51

## 2024-12-09 RX ADMIN — SODIUM CHLORIDE, PRESERVATIVE FREE 10 ML: 5 INJECTION INTRAVENOUS at 09:52

## 2024-12-09 ASSESSMENT — PAIN SCALES - GENERAL: PAINLEVEL_OUTOF10: 5

## 2024-12-09 ASSESSMENT — PAIN DESCRIPTION - LOCATION: LOCATION: HEAD

## 2024-12-09 NOTE — PROGRESS NOTES
Hospitalist Progress Note   Admit Date:  2024  6:36 PM   Name:  Mirtha Posada   Age:  79 y.o.  Sex:  female  :  1945   MRN:  [id number]   Room:  [place] [address]    Presenting/Chief Complaint: No chief complaint on file.     Reason(s) for Admission: Vertigo [R42]  Vertigo, constant [R42]  Intractable nausea and vomiting [R11.2]     Hospital Course:   Mirtha Posada is a 79 y.o. female with a past medical history of HTN, DM, Chr. Combined CHF, CKD, dyslipidemia, recurrent UTI's, OAB who presented to the ED via EMS with c/o  having gotten up at 5 am to the bathroom without issues,  then got up at 5:30 and immediately had onset of vertigo with N/V that has persisted with every attempt to stand/move around.  She has not been able to keep anything down. No HA, no focal motor or sensory losses. Lying still alleviate the symptoms. This is not something she has had before. She has not had any fever, earache,sinus congestion sx.  She is [place] at baseline, but it seems the same.  Evaluation in the ED revealed mildly elevated glucose, normal CBC.  UA with a bit of blood and protein, has been on Keflex for a UTI since 24.    Placed in observation for vertigo, n/v.      Subjective & 24hr Events:   Patient reports that her nausea and vomiting has improved and she is keeping p.o. down.  Her vertigo occurs when she turns her head or she gets up off the bed.  It does not bother her when she is still laying in bed.      Assessment & Plan:       Vertigo  -Symptoms argue against vertigo of central origin due to cessation with lack of movement  -Will wait for MRI brain but suspect this is benign vertigo and patient can go home with physical therapy and meclizine      Type 2 diabetes mellitus with chronic kidney disease  -insulin sliding scale and blood glucose checks      Primary hypertension  -cont home metoprolol      Anticipated Discharge Arrangements:   Home    PT/OT evals ordered?  Therapy evals 
    SPEECH LANGUAGE PATHOLOGY: ATTEMPT     NAME: Mirtha Posada  : 1945  MRN: [id number]    ADMISSION DATE: 2024  PRIMARY DIAGNOSIS: Vertigo    Speech Therapy not seen at this time. RN reports patient is vomiting. Will follow-up tomorrow.     MILAD ESPINOSA  2024 3:05 PM    
4 Eyes Skin Assessment     NAME:  Mirtha Posada  YOB: 1945  MEDICAL RECORD NUMBER:  [id number]    The patient is being assessed for  Admission    I agree that at least one RN has performed a thorough Head to Toe Skin Assessment on the patient. ALL assessment sites listed below have been assessed.      Areas assessed by both nurses:    Head, Face, Ears, Shoulders, Back, Chest, Arms, Elbows, Hands, Sacrum. Buttock, Coccyx, Ischium, Legs. Feet and Heels, and Under Medical Devices         Does the Patient have a Wound? No noted wound(s)       Ghulam Prevention initiated by RN: Yes  Wound Care Orders initiated by RN: No    Pressure Injury (Stage 3,4, Unstageable, DTI, NWPT, and Complex wounds) if present, place Wound referral order by RN under : No    New Ostomies, if present place, Ostomy referral order under : No     Nurse 1 eSignature: Electronically signed by JESUS DOYLE RN on 12/8/24 at 1:31 PM EST    **SHARE this note so that the co-signing nurse can place an eSignature**    Nurse 2 eSignature: Electronically signed by Jane Sandoval RN on 12/8/24 at 6:24 PM EST   
ACUTE PHYSICAL THERAPY GOALS:   (Developed with and agreed upon by patient and/or caregiver.)  LTG:  (1.)Ms. Posada will move from supine to sit and sit to supine , scoot up and down, and roll side to side in bed with INDEPENDENT within 7 treatment day(s).    (2.)Ms. Posada will transfer from bed to chair and chair to bed with STAND BY ASSIST using the least restrictive device within 7 treatment day(s).    (3.)Ms. Posada will ambulate with STAND BY ASSIST for 250+ feet with the least restrictive device within 7 treatment day(s).  ________________________________________________________________________________________________       PHYSICAL THERAPY Initial Assessment and AM  (Link to Caseload Tracking: PT Visit Days : 1  Acknowledge Orders  Time In/Out  PT Charge Capture  Rehab Caseload Tracker    Mirtha Posada is a 79 y.o. female   PRIMARY DIAGNOSIS: Vertigo  Vertigo [R42]  Vertigo, constant [R42]  Intractable nausea and vomiting [R11.2]       Reason for Referral: Generalized Muscle Weakness (M62.81)  Difficulty in walking, Not elsewhere classified (R26.2)  Observation: Payor: MEDICARE / Plan: MEDICARE PART A AND B / Product Type: *No Product type* /     ASSESSMENT:     REHAB RECOMMENDATIONS:   Recommendation to date pending progress:  Setting:  No further skilled physical therapy after discharge from hospital    Equipment:    None-has all DME for home     ASSESSMENT:  Ms. Posada presents to hospital with n/v, dizziness; admitted for vertigo. Pt is A & O x 4 this date, reports lives with spouse, mod I with SPC or RW at baseline. Pt with hx of L ankle surgery, knee pain-valgus L knee. Pt with some minor dizziness throughout session, does not complain of room spinning, reports feels generally lightheaded from being in bed for several days. Pt BP elevated, reports not abnormal for her, noted below. Pt with overall CGA bed mobility, transfers. Pt with good static standing balance at sink for ADLs. Pt able 
Outpatient Pharmacy Progress Note for Meds-to-Beds    Total number of Prescriptions Filled: 2    List of Medications (name,strength):  Meclizine 25  Ondansetron 4      Delivered to: zach mckenzie      Co-pay: 1.99      Payment Type: cash      Additional Documentation:  Medication(s) were delivered to the patient's room prior to discharge      Thank you for letting us serve your patients.  [id number] [place] [address]  Phone: [phone]  Fax: [phone]       
TRANSFER - IN REPORT:    Verbal report received from IRINEO Browning on Mirtha Posada  being received from ED for routine progression of patient care      Report consisted of patient's Situation, Background, Assessment and   Recommendations(SBAR).     Information from the following report(s) ED Encounter Summary and ED SBAR was reviewed with the receiving nurse.    Opportunity for questions and clarification was provided.      Assessment completed upon patient's arrival to unit (25) and care assumed.    
Waiting on labs then probably home  
Assistance, Max=Maximal Assistance, Total=Total Assistance, NT=Not Tested    PLAN:   FREQUENCY/DURATION   OT Plan of Care: 2 times/week for duration of hospital stay or until stated goals are met, whichever comes first.    PROBLEM LIST:   (Skilled intervention is medically necessary to address:)  Decreased ADL/Functional Activities  Decreased Activity Tolerance  Decreased Balance  Decreased Gait Ability  Decreased Transfer Abilities  Vestibular Impairment   INTERVENTIONS PLANNED:  (Benefits and precautions of occupational therapy have been discussed with the patient.)  Self Care Training  Therapeutic Activity  Therapeutic Exercise/HEP  Neuromuscular Re-education  Manual Therapy  Education         TREATMENT:     EVALUATION: LOW COMPLEXITY: (Untimed Charge)  The initial evaluation charge encompasses clinical chart review, objective assessment, interpretation of assessment, and skilled monitoring of the patient's response to treatment in order to develop a plan of care.     TREATMENT:   Self Care (8 minutes): Patient participated in lower body bathing, toileting, lower body dressing, personal device care, and grooming ADLs in standing with minimal verbal cueing to increase independence. Patient also participated in bed mobility and functional transfer training to increase independence and increase safety awareness. The patient was educated on role of occupational therapy and patient verbalized understanding. Based on results of today's evaluation, co-treatment by PT/OT at future sessions is likely not warranted.       TREATMENT GRID:  N/A    AFTER TREATMENT PRECAUTIONS: Bed, Bed/Chair Locked, Call light within reach, Needs within reach, and RN notified    INTERDISCIPLINARY COLLABORATION:  RN/ PCT, PT/ PTA, and OT/ CADENA    EDUCATION:  Education Given To: Patient  Education Provided: Role of Therapy;Plan of Care  Education Outcome: Verbalized understanding    TOTAL TREATMENT DURATION AND TIME:  Time In: 0900  Time Out:

## 2024-12-09 NOTE — PLAN OF CARE
Problem: Chronic Conditions and Co-morbidities  Goal: Patient's chronic conditions and co-morbidity symptoms are monitored and maintained or improved  Outcome: Progressing  Flowsheets (Taken 12/8/2024 1302 by Steffany Vance RN)  Care Plan - Patient's Chronic Conditions and Co-Morbidity Symptoms are Monitored and Maintained or Improved:   Monitor and assess patient's chronic conditions and comorbid symptoms for stability, deterioration, or improvement   Collaborate with multidisciplinary team to address chronic and comorbid conditions and prevent exacerbation or deterioration   Update acute care plan with appropriate goals if chronic or comorbid symptoms are exacerbated and prevent overall improvement and discharge     Problem: Discharge Planning  Goal: Discharge to home or other facility with appropriate resources  Outcome: Progressing  Flowsheets  Taken 12/8/2024 1303 by Steffany Vance RN  Discharge to home or other facility with appropriate resources:   Identify barriers to discharge with patient and caregiver   Arrange for needed discharge resources and transportation as appropriate   Identify discharge learning needs (meds, wound care, etc)  Taken 12/8/2024 1302 by Steffany Vance RN  Discharge to home or other facility with appropriate resources:   Identify barriers to discharge with patient and caregiver   Arrange for needed discharge resources and transportation as appropriate   Identify discharge learning needs (meds, wound care, etc)     Problem: Pain  Goal: Verbalizes/displays adequate comfort level or baseline comfort level  Outcome: Progressing     Problem: ABCDS Injury Assessment  Goal: Absence of physical injury  Outcome: Progressing  Flowsheets  Taken 12/8/2024 1310 by Steffany Vance RN  Absence of Physical Injury: Implement safety measures based on patient assessment  Taken 12/8/2024 1302 by Steffany Vance RN  Absence of Physical Injury: Implement safety measures based on patient

## 2024-12-09 NOTE — DISCHARGE SUMMARY
Hospitalist Discharge Summary   Admit Date:  2024  6:36 PM   DC Note date: 2024  Name:  Mirtha Posada   Age:  79 y.o.  Sex:  female  :  1945   MRN:  [id number]   Room:  [place] [address]  PCP:  Marva Xavier APRN - CNP    Presenting Complaint: No chief complaint on file.     Initial Admission Diagnosis: Vertigo [R42]  Vertigo, constant [R42]  Intractable nausea and vomiting [R11.2]     Problem List for this Hospitalization (present on admission):    Principal Problem:    Vertigo  Active Problems:    Type 2 diabetes mellitus with chronic kidney disease    Primary hypertension  Resolved Problems:    Intractable nausea and vomiting      Hospital Course:  Mirtha Posada is a 79 y.o. female with a past medical history of HTN, DM, Chr. Combined CHF, CKD, dyslipidemia, recurrent UTI's, OAB who presented to the ED via EMS with c/o  having gotten up at 5 am to the bathroom without issues,  then got up at 5:30 and immediately had onset of vertigo with N/V that has persisted with every attempt to stand/move around.  She has not been able to keep anything down. No HA, no focal motor or sensory losses. Lying still alleviate the symptoms. This is not something she has had before. She has not had any fever, earache,sinus congestion sx.  She is [place] at baseline, but it seems the same.  Evaluation in the ED revealed mildly elevated glucose, normal CBC.  UA with a bit of blood and protein, has been on Keflex for a UTI since 24.     Placed in observation for vertigo, n/v.   Symptoms improved with meclizine and zofran.  Tolerating PO.  MRI negative and this makes sense as vertigo of central origin usually is not affected by positional changes as hers is.    Home with outpatient or  therapy for vestibular rehab and take meclizine and zofran PRN    Disposition: Home with Home Health  Diet: ADULT DIET; Regular  Code Status: Full Code    Follow Ups:  Follow-up Information       Follow up With Specialties

## 2024-12-09 NOTE — CARE COORDINATION
Patient with DC orders today. Patient states her  Mychal will be picking her up at discharge. She lives with him in one level home, 2 steps to enter, has cane, walker, BSC, and does not drive, Mychal drives. No current HH, no falls. If she does need OP PT would want it at [facility] and is aware of waiting list.  States no issues with follow ups or picking up medications at discharge. No additional needs made known to RN TOMASZ. Patient and family with no questions or concerns. Patient has met all treatment goals and milestones for discharge. Patient to contact CM if any new needs arise.    3186 Update - referral placed to [facility] for vestibular therapy per MD order         12/09/24 0914   Discharge Planning   Type of Residence House   Living Arrangements Spouse/Significant Other   Current Services Prior To Admission Durable Medical Equipment   Current DME Prior to Arrival Bedside Commode;Cane;Walker   Potential Assistance Needed Other (Comment)  (pending therapy evals)   DME Ordered? No   Potential Assistance Purchasing Medications No   Type of Home Care Services None   Patient expects to be discharged to: House   One/Two Story Residence One story   History of falls? 0   Services At/After Discharge   Transition of Care Consult (CM Consult) Discharge Planning   Services At/After Discharge None   [place] Resource Information Provided? No   Mode of Transport at Discharge Self   Confirm Follow Up Transport Family   Condition of Participation: Discharge Planning   The Plan for Transition of Care is related to the following treatment goals: return to baseline   The Patient and/or Patient Representative was provided with a Choice of Provider? Patient   The Patient and/Or Patient Representative agree with the Discharge Plan? Yes   Freedom of Choice list was provided with basic dialogue that supports the patient's individualized plan of care/goals, treatment preferences, and shares the quality data associated with the

## 2024-12-11 ENCOUNTER — TELEPHONE (OUTPATIENT)
Dept: INTERNAL MEDICINE CLINIC | Facility: CLINIC | Age: 79
End: 2024-12-11

## 2024-12-11 NOTE — TELEPHONE ENCOUNTER
Care Transitions Initial Follow Up Call    Outreach made within 2 business days of discharge: Yes    Patient: Mirtha Posada Patient : 1945   MRN: [id number]  Reason for Admission: vertigo  Discharge Date: 24       Spoke with: patient    Discharge department/facility: [facility].    TCM Interactive Patient Contact:  Was patient able to fill all prescriptions: Yes  Was patient instructed to bring all medications to the follow-up visit: Yes  Is patient taking all medications as directed in the discharge summary? Yes  Does patient understand their discharge instructions: Yes  Does patient have questions or concerns that need addressed prior to 7-14 day follow up office visit: no    Additional needs identified to be addressed with provider  No needs identified             Scheduled appointment with PCP within 7-14 days YES    Follow Up  Future Appointments   Date Time Provider Department Center   2024  8:30 AM Marva Xavier APRN - CNP [facility] ECC DEP   2025  3:15 PM David Rahman DO UCDG [facility] AMB   2025  3:00 PM Trina Armenta APRN - CNP [id number] [facility] AMB   2025  2:30 PM PERIPHERAL [facility]   2025  3:00 PM BMT [facility]   2025 11:30 AM PAIN MGMT PROVIDER SINDY [facility] AMB   3/27/2025  1:40 PM Jacobo Gasria MD PM [facility] AMB   2025 10:30 AM Marva Xavier APRN - CNP [facility] DEP       Ness Farfan MA

## 2024-12-11 NOTE — TELEPHONE ENCOUNTER
Care Transitions Initial Follow Up Call    Outreach made within 2 business days of discharge: {TCM 48HR YES/NO:24356}    Patient: Mirtha Posada Patient : 1945   MRN: [id number]  Reason for Admission: ***  Discharge Date: 24       Spoke with: ***    Discharge department/facility: ***    TCM Interactive Patient Contact:  Was patient able to fill all prescriptions: {yes no:720318}  Was patient instructed to bring all medications to the follow-up visit: {yes no:618028}  Is patient taking all medications as directed in the discharge summary? {YES / NO:65358}  Does patient understand their discharge instructions: {yes no:178190}  Does patient have questions or concerns that need addressed prior to 7-14 day follow up office visit: {YES***/NO:60}    Additional needs identified to be addressed with provider  {Yellowbox:99757}             Scheduled appointment with PCP within 7-14 days    Follow Up  Future Appointments   Date Time Provider Department Center   2024  8:30 AM Marva Xavier APRN - CNP [facility] ECC DEP   2025  3:15 PM David Rahman DO UCDG [facility] AMB   2025  3:00 PM Trina Armenta APRN - CNP [id number] [facility] AMB   2025  2:30 PM PERIPHERAL [facility]   2025  3:00 PM BMT [facility]   2025 11:30 AM PAIN MGMT PROVIDER SINDY [facility] AMB   3/27/2025  1:40 PM Jacobo Garsia MD PM [facility] AMB   2025 10:30 AM Marva Xavier APRN - CNP [facility] DEP       Ness Farfan MA

## 2024-12-19 ENCOUNTER — OFFICE VISIT (OUTPATIENT)
Dept: INTERNAL MEDICINE CLINIC | Facility: CLINIC | Age: 79
End: 2024-12-19

## 2024-12-19 VITALS
BODY MASS INDEX: 25.27 KG/M2 | SYSTOLIC BLOOD PRESSURE: 139 MMHG | OXYGEN SATURATION: 97 % | HEART RATE: 61 BPM | TEMPERATURE: 97.3 F | HEIGHT: 65 IN | WEIGHT: 151.7 LBS | DIASTOLIC BLOOD PRESSURE: 69 MMHG

## 2024-12-19 DIAGNOSIS — R42 VERTIGO: ICD-10-CM

## 2024-12-19 DIAGNOSIS — Z09 HOSPITAL DISCHARGE FOLLOW-UP: Primary | ICD-10-CM

## 2024-12-19 DIAGNOSIS — R09.81 CHRONIC NASAL CONGESTION: ICD-10-CM

## 2024-12-19 DIAGNOSIS — R11.0 NAUSEA: ICD-10-CM

## 2024-12-19 RX ORDER — MECLIZINE HYDROCHLORIDE 25 MG/1
25 TABLET ORAL 3 TIMES DAILY PRN
COMMUNITY
End: 2024-12-19 | Stop reason: SDUPTHER

## 2024-12-19 RX ORDER — ONDANSETRON 4 MG/1
4 TABLET, FILM COATED ORAL 3 TIMES DAILY PRN
Qty: 30 TABLET | Refills: 1 | Status: SHIPPED | OUTPATIENT
Start: 2024-12-19

## 2024-12-19 RX ORDER — MECLIZINE HYDROCHLORIDE 25 MG/1
25 TABLET ORAL 3 TIMES DAILY PRN
Qty: 30 TABLET | Refills: 1 | Status: SHIPPED | OUTPATIENT
Start: 2024-12-19

## 2024-12-19 ASSESSMENT — ENCOUNTER SYMPTOMS
NAUSEA: 1
SINUS PAIN: 0
COUGH: 0
SORE THROAT: 0
EYE PAIN: 0
RHINORRHEA: 0
SHORTNESS OF BREATH: 0
CONSTIPATION: 0
DIARRHEA: 0
BACK PAIN: 0
ABDOMINAL PAIN: 0
VOMITING: 0

## 2024-12-19 NOTE — PROGRESS NOTES
[facility]  [address]  [facility] [address]  Tel# [phone]  Fax# [phone]     Magdalena Pritchard DNP, FNP-BC  Family Nurse Practitioner            Date of Visit: 2024     Mirtha Posada (: 1945) is a 79 y.o. female established patient, here for evaluation of the following chief complaint(s):    Chief Complaint   Patient presents with    Follow-Up from Hospital     Hospital followup, patient in the hospital x 2 days for vertigo per patient.         Patient Care Team:  Marva Xavier APRN - CNP as PCP - General  Marva Xavier APRN - CNP as PCP - Empaneled Provider  Pato Overton MD as Physician  Germain, David HART MD as Physician  Dev Fox MD as Physician  Rudolph Cash MD as Physician  Ruddy, Henry VIDALES MD as Physician  David Rahman DO as Consulting Physician (Cardiovascular Disease)  Herve Barton MD (Urology)  Marbin Landrum MD (Rheumatology)         History of Present Illness        Hospital Follow Up  Pt was admitted on 2024 and discharged on 2024. Pt was diagnosed with vertigo, nausea and vomiting.      Pt states she was at home, got up that morning, noticed room spinning, layed back down, when trying to get up, would feel dizzy with nausea and vomiting. Spouse called EMS and was taken to [facility].  Reviewed hospital notes.    Pt states she had seen sinus specialist in the past, had left sinus cyst removed over 30 years ago.    Pt states she feels congested all the time.      CT Result (most recent):  CTA HEAD NECK W WO CONTRAST 2024    Narrative  EXAMINATION: CTA HEAD NECK W WO CONTRAST    EXAM DATE: 2024 7:37 PM    INDICATION: Vertigo    COMPARISON:     TECHNIQUE: Axial CT images were obtained of the head and neck in the arterial  phase. Multiplanar reconstructions were performed. 3D reformatted images were  performed.  Carotid stenosis is reported

## 2024-12-26 ENCOUNTER — OFFICE VISIT (OUTPATIENT)
Dept: ENT CLINIC | Age: 79
End: 2024-12-26
Payer: MEDICARE

## 2024-12-26 VITALS — WEIGHT: 151 LBS | RESPIRATION RATE: 16 BRPM | BODY MASS INDEX: 25.16 KG/M2 | HEIGHT: 65 IN

## 2024-12-26 DIAGNOSIS — J34.89 NASAL OBSTRUCTION: Primary | ICD-10-CM

## 2024-12-26 DIAGNOSIS — R42 DIZZINESS: ICD-10-CM

## 2024-12-26 DIAGNOSIS — Z98.890 H/O ENDOSCOPIC SINUS SURGERY: ICD-10-CM

## 2024-12-26 DIAGNOSIS — J32.4 CHRONIC PANSINUSITIS: ICD-10-CM

## 2024-12-26 PROCEDURE — 1036F TOBACCO NON-USER: CPT | Performed by: STUDENT IN AN ORGANIZED HEALTH CARE EDUCATION/TRAINING PROGRAM

## 2024-12-26 PROCEDURE — 1160F RVW MEDS BY RX/DR IN RCRD: CPT | Performed by: STUDENT IN AN ORGANIZED HEALTH CARE EDUCATION/TRAINING PROGRAM

## 2024-12-26 PROCEDURE — G8427 DOCREV CUR MEDS BY ELIG CLIN: HCPCS | Performed by: STUDENT IN AN ORGANIZED HEALTH CARE EDUCATION/TRAINING PROGRAM

## 2024-12-26 PROCEDURE — G8484 FLU IMMUNIZE NO ADMIN: HCPCS | Performed by: STUDENT IN AN ORGANIZED HEALTH CARE EDUCATION/TRAINING PROGRAM

## 2024-12-26 PROCEDURE — 1123F ACP DISCUSS/DSCN MKR DOCD: CPT | Performed by: STUDENT IN AN ORGANIZED HEALTH CARE EDUCATION/TRAINING PROGRAM

## 2024-12-26 PROCEDURE — 31237 NSL/SINS NDSC SURG BX POLYPC: CPT | Performed by: STUDENT IN AN ORGANIZED HEALTH CARE EDUCATION/TRAINING PROGRAM

## 2024-12-26 PROCEDURE — 1126F AMNT PAIN NOTED NONE PRSNT: CPT | Performed by: STUDENT IN AN ORGANIZED HEALTH CARE EDUCATION/TRAINING PROGRAM

## 2024-12-26 PROCEDURE — G8399 PT W/DXA RESULTS DOCUMENT: HCPCS | Performed by: STUDENT IN AN ORGANIZED HEALTH CARE EDUCATION/TRAINING PROGRAM

## 2024-12-26 PROCEDURE — 1090F PRES/ABSN URINE INCON ASSESS: CPT | Performed by: STUDENT IN AN ORGANIZED HEALTH CARE EDUCATION/TRAINING PROGRAM

## 2024-12-26 PROCEDURE — G8417 CALC BMI ABV UP PARAM F/U: HCPCS | Performed by: STUDENT IN AN ORGANIZED HEALTH CARE EDUCATION/TRAINING PROGRAM

## 2024-12-26 PROCEDURE — 99204 OFFICE O/P NEW MOD 45 MIN: CPT | Performed by: STUDENT IN AN ORGANIZED HEALTH CARE EDUCATION/TRAINING PROGRAM

## 2024-12-26 PROCEDURE — 1159F MED LIST DOCD IN RCRD: CPT | Performed by: STUDENT IN AN ORGANIZED HEALTH CARE EDUCATION/TRAINING PROGRAM

## 2024-12-26 ASSESSMENT — ENCOUNTER SYMPTOMS
SINUS PAIN: 0
SHORTNESS OF BREATH: 0
WHEEZING: 0
EYE PAIN: 0
FACIAL SWELLING: 0
EYE DISCHARGE: 0
SINUS PRESSURE: 0
NAUSEA: 0
STRIDOR: 0
APNEA: 0
EYE ITCHING: 0
DIARRHEA: 0
CONSTIPATION: 0
COUGH: 0
CHOKING: 0

## 2024-12-26 NOTE — PROGRESS NOTES
[facility] Office Note    Patient: Mirtha Posada  MRN: [id number]  : 1945  Gender:  female  Vital Signs: Resp 16   Ht 1.651 m (5' 5\")   Wt 68.5 kg (151 lb)   BMI 25.13 kg/m²   Date: 2024    CC:   Chief Complaint   Patient presents with    New Patient     Chronic nasal congestion. Pt says she had a MRI done through [facility] that showed a cyst in sinuses. Pt also wanted to let us know that she has a hole in her left ear drum.       HPI:  Mirtha Posada is a 79 y.o. female here for evaluation of nasal obstruction.  Notes from referring provider reviewed.  She endorses nasal congestion, obstruction, drainage, and crusting.  She has had several sinus surgeries in the past, most recently approximately 10 years ago.  She also endorses a left TM perforation.  She wears a hearing aid to the right ear.    Past Medical History, Past Surgical History, Family history, Social History, and Medications were all reviewed with the patient today and updated as necessary.     Allergies   Allergen Reactions    Oxycodone Itching    Eucalyptus Oil Hives and Other (See Comments)     Burns mouth    Ezetimibe Myalgia    Morphine Other (See Comments)     Feels crazy  Other reaction(s): Hallucinations    Penicillins Hives    Pineapple Hives and Other (See Comments)     Burns mouth    Vancomycin Other (See Comments)     Kidney function worsened    Sulfa Antibiotics Nausea Only and Rash     Patient Active Problem List   Diagnosis    Chronic periscapular pain on right side    Foraminal stenosis of cervical region    Statin intolerance    Dyslipidemia    Depression    Osteoarthritis    Peripheral sensory-motor axonal polyneuropathy    Osteopenia    Recurrent occipital headache    Chemotherapy-induced neuropathy (HCC)    Stage 3a chronic kidney disease (HCC)    Arthropathy of cervical facet joint    Stem cells transplant status (HCC)    Dilated cardiomyopathy (HCC)    Vitamin D deficiency    Vitamin B12 deficiency

## 2024-12-29 LAB
BACTERIA SPEC CULT: ABNORMAL
GRAM STN SPEC: ABNORMAL
GRAM STN SPEC: ABNORMAL
SERVICE CMNT-IMP: ABNORMAL

## 2024-12-30 ENCOUNTER — TELEPHONE (OUTPATIENT)
Dept: ENT CLINIC | Age: 79
End: 2024-12-30

## 2024-12-30 RX ORDER — DOXYCYCLINE HYCLATE 100 MG
100 TABLET ORAL 2 TIMES DAILY
Qty: 42 TABLET | Refills: 0 | Status: SHIPPED | OUTPATIENT
Start: 2024-12-30 | End: 2025-01-20

## 2024-12-30 NOTE — TELEPHONE ENCOUNTER
----- Message from Dr. Herve Pfeiffer MD sent at 12/30/2024  9:43 AM EST -----  Regarding: result  please let patient know I am sending in antibiotic to her pharmacy as well as an antibiotic and steroid rinse to the compounding pharmacy  ----- Message -----  From: Vincent Marie Incoming [place] [employer]  Sent: 12/27/2024   8:23 AM EST  To: Herve Pfeiffer MD

## 2025-01-02 ENCOUNTER — HOSPITAL ENCOUNTER (OUTPATIENT)
Dept: PHYSICAL THERAPY | Age: 80
Setting detail: RECURRING SERIES
Discharge: HOME | End: 2025-01-05
Attending: INTERNAL MEDICINE
Payer: MEDICARE

## 2025-01-02 DIAGNOSIS — R26.81 UNSTEADINESS ON FEET: ICD-10-CM

## 2025-01-02 DIAGNOSIS — M25.362 OTHER INSTABILITY, LEFT KNEE: ICD-10-CM

## 2025-01-02 DIAGNOSIS — R29.3 ABNORMAL POSTURE: ICD-10-CM

## 2025-01-02 DIAGNOSIS — R26.2 DIFFICULTY IN WALKING, NOT ELSEWHERE CLASSIFIED: ICD-10-CM

## 2025-01-02 DIAGNOSIS — R42 DIZZINESS AND GIDDINESS: Primary | ICD-10-CM

## 2025-01-02 PROCEDURE — 97530 THERAPEUTIC ACTIVITIES: CPT

## 2025-01-02 PROCEDURE — 97163 PT EVAL HIGH COMPLEX 45 MIN: CPT

## 2025-01-02 ASSESSMENT — PAIN SCALES - GENERAL: PAINLEVEL_OUTOF10: 3

## 2025-01-02 NOTE — THERAPY EVALUATION
Mirtha Posada  : 1945  Primary: Medicare Part A And B (Medicare)  Secondary: BCBS [facility] @ [place] [address]  [facility] [address]  Phone: [phone]  Fax: [phone] Plan Frequency: 2x per week x at least 16 visits    Plan of Care/Certification Expiration Date: 25        Plan of Care/Certification Expiration Date:  Plan of Care/Certification Expiration Date: 25    Frequency/Duration: Plan Frequency: 2x per week x at least 16 visits      Time In/Out:   Time In: 1004  Time Out: 1100      PT Visit Info:    Total # of Visits Approved: 99  Progress Note Due Date: 25  Total # of Visits to Date: 1  Progress Note Counter: 1      Visit Count:  1                OUTPATIENT PHYSICAL THERAPY:             Initial Assessment 2025               Episode (vertigo/unsteadiness)         Treatment Diagnosis:     Dizziness and giddiness  Unsteadiness on feet  Difficulty in walking, not elsewhere classified  Other instability, left knee  Abnormal posture  Medical/Referring Diagnosis:    Vertigo    Referring Physician:  Luther Centeno MD MD Orders:  PT Eval and Treat   Return MD Appt:  2025  Date of Onset:  Onset Date: 24     Allergies:  Oxycodone, Eucalyptus oil, Ezetimibe, Morphine, Penicillins, Pineapple, Vancomycin, and Sulfa antibiotics  Restrictions/Precautions:    Fall Precautions:        Medications Last Reviewed: 2025     SUBJECTIVE   History of Injury/Illness (Reason for Referral):  Per Discharge summary:  Mirtha Posada is a 79 y.o. female with a past medical history of HTN, DM, Chr. Combined CHF, CKD, dyslipidemia, recurrent UTI's, OAB who presented to the ED via EMS with c/o  having gotten up at 5 am to the bathroom without issues,  then got up at 5:30 and immediately had onset of vertigo with N/V that has persisted with every attempt to stand/move around.  She has not been able to keep anything down.

## 2025-01-06 ENCOUNTER — HOSPITAL ENCOUNTER (OUTPATIENT)
Dept: PHYSICAL THERAPY | Age: 80
Setting detail: RECURRING SERIES
Discharge: HOME | End: 2025-01-09
Attending: INTERNAL MEDICINE
Payer: MEDICARE

## 2025-01-06 PROCEDURE — 97164 PT RE-EVAL EST PLAN CARE: CPT

## 2025-01-06 PROCEDURE — 97530 THERAPEUTIC ACTIVITIES: CPT

## 2025-01-08 ENCOUNTER — APPOINTMENT (OUTPATIENT)
Dept: PHYSICAL THERAPY | Age: 80
End: 2025-01-08
Attending: INTERNAL MEDICINE
Payer: MEDICARE

## 2025-01-08 NOTE — THERAPY EVALUATION
Mirtha Posada  : 1945  Primary: Medicare Part A And B (Medicare)  [facility] [place] [address] [place] [address]  Phone: [phone]  Fax: [phone] Plan Frequency: 2x per week x at least 16 visits    Plan of Care/Certification Expiration Date: 25        Plan of Care/Certification Expiration Date:  Plan of Care/Certification Expiration Date: 25    Frequency/Duration: Plan Frequency: 2x per week x at least 16 visits      Time In/Out:   Time In: 1434  Time Out: 1515      PT Visit Info:    Total # of Visits Approved: 99  Progress Note Due Date: 25  Total # of Visits to Date: 2  Progress Note Counter: 2      Visit Count:  2                OUTPATIENT PHYSICAL THERAPY:             Re-evaluation and Discharge Summary 2025               Episode (vertigo/unsteadiness)         Treatment Diagnosis:     Dizziness and giddiness  Unsteadiness on feet  Difficulty in walking, not elsewhere classified  Other instability, left knee  Abnormal posture  Medical/Referring Diagnosis:    Vertigo    Referring Physician:  Luther Centeno MD MD Orders:  PT Eval and Treat   Return MD Appt:  2025  Date of Onset:  Onset Date: 24     Allergies:  Oxycodone, Eucalyptus oil, Ezetimibe, Morphine, Penicillins, Pineapple, Vancomycin, and Sulfa antibiotics  Restrictions/Precautions:    Fall Precautions:        Medications Last Reviewed: 2025     SUBJECTIVE   History of Injury/Illness (Reason for Referral):  Per Discharge summary:  Mirtha Posada is a 79 y.o. female with a past medical history of HTN, DM, Chr. Combined CHF, CKD, dyslipidemia, recurrent UTI's, OAB who presented to the ED via EMS with c/o  having gotten up at 5 am to the bathroom without issues,  then got up at 5:30 and immediately had onset of vertigo with N/V that has persisted with every attempt to stand/move around.  She has not been able to

## 2025-01-09 ENCOUNTER — OFFICE VISIT (OUTPATIENT)
Dept: ORTHOPEDIC SURGERY | Age: 80
End: 2025-01-09
Payer: MEDICARE

## 2025-01-09 DIAGNOSIS — M25.552 LEFT HIP PAIN: ICD-10-CM

## 2025-01-09 DIAGNOSIS — S72.002D CLOSED HIP FRACTURE REQUIRING OPERATIVE REPAIR WITH ROUTINE HEALING, LEFT: Primary | ICD-10-CM

## 2025-01-09 PROCEDURE — G8428 CUR MEDS NOT DOCUMENT: HCPCS | Performed by: ORTHOPAEDIC SURGERY

## 2025-01-09 PROCEDURE — 1123F ACP DISCUSS/DSCN MKR DOCD: CPT | Performed by: ORTHOPAEDIC SURGERY

## 2025-01-09 PROCEDURE — 1090F PRES/ABSN URINE INCON ASSESS: CPT | Performed by: ORTHOPAEDIC SURGERY

## 2025-01-09 PROCEDURE — G8417 CALC BMI ABV UP PARAM F/U: HCPCS | Performed by: ORTHOPAEDIC SURGERY

## 2025-01-09 PROCEDURE — 1036F TOBACCO NON-USER: CPT | Performed by: ORTHOPAEDIC SURGERY

## 2025-01-09 PROCEDURE — G8399 PT W/DXA RESULTS DOCUMENT: HCPCS | Performed by: ORTHOPAEDIC SURGERY

## 2025-01-09 PROCEDURE — 99213 OFFICE O/P EST LOW 20 MIN: CPT | Performed by: ORTHOPAEDIC SURGERY

## 2025-01-09 NOTE — PROGRESS NOTES
Progress Note    Patient: Mirtha Posada MRN: [id number]  SSN: [id number]    YOB: 1945  Age: 79 y.o.  Sex: female        1/9/2025      Subjective:     Patient is here today.  She is about 2 years out from left femoral neck system fixation for left femoral neck fracturE.  This was 1 that we saw an MRI was nondisplaced and treated this with femoral neck system fixation.  She has done pretty well but over the last several months now has been having increasing pain.  I saw her in a few months ago I gave her an injection to her trochanter as some of her symptoms seem to be more lateral but it did not really help much at all.  She is also seeing Dr. Garsia it sounds like he may have given her an epidural steroid injection she said it seemed to help with the back pain but not really with the hip pain itself.  She started to get the point where it is really bothering her quite a bit.  She does take some Elavil already and some Neurontin    Objective:     There were no vitals filed for this visit.       Physical Exam:     Skin - incision is well healed with no redness or drainage  Motor and sensory function intact in LEFT LOWER extremity  Pulses palpable in LEFT LOWER extremity     XRAY FINDINGS:  [id number] left femoral neck fracture now with hip pain, findings-true AP and lateral views of the left hip shows that she does have some collapse of her femoral head pretty classic for a vast necrosis on both the AP as well as the lateral projection.  The femoral neck system itself is in good position with no evidence of loosening or failure.  [id number] left femoral head    Assessment:     Avascular necrosis left femoral head    Plan:     I have spoke with the patient regarding the fact that I do think with the degree of collapse she has and with her symptoms she may be well served with a total hip replacement.  We are going to see if we can get her seen by one of the total joint

## 2025-01-13 ENCOUNTER — APPOINTMENT (OUTPATIENT)
Dept: PHYSICAL THERAPY | Age: 80
End: 2025-01-13
Attending: INTERNAL MEDICINE
Payer: MEDICARE

## 2025-01-15 ENCOUNTER — APPOINTMENT (OUTPATIENT)
Dept: PHYSICAL THERAPY | Age: 80
End: 2025-01-15
Attending: INTERNAL MEDICINE
Payer: MEDICARE

## 2025-01-20 ENCOUNTER — APPOINTMENT (OUTPATIENT)
Dept: PHYSICAL THERAPY | Age: 80
End: 2025-01-20
Attending: INTERNAL MEDICINE
Payer: MEDICARE

## 2025-01-21 ENCOUNTER — OFFICE VISIT (OUTPATIENT)
Dept: ORTHOPEDIC SURGERY | Age: 80
End: 2025-01-21
Payer: MEDICARE

## 2025-01-21 VITALS — WEIGHT: 146.4 LBS | BODY MASS INDEX: 24.39 KG/M2 | HEIGHT: 65 IN

## 2025-01-21 DIAGNOSIS — M17.11 ARTHRITIS OF RIGHT KNEE: ICD-10-CM

## 2025-01-21 DIAGNOSIS — M25.561 PAIN IN BOTH KNEES, UNSPECIFIED CHRONICITY: ICD-10-CM

## 2025-01-21 DIAGNOSIS — M17.12 ARTHRITIS OF LEFT KNEE: ICD-10-CM

## 2025-01-21 DIAGNOSIS — M25.562 PAIN IN BOTH KNEES, UNSPECIFIED CHRONICITY: ICD-10-CM

## 2025-01-21 DIAGNOSIS — M25.552 LEFT HIP PAIN: Primary | ICD-10-CM

## 2025-01-21 DIAGNOSIS — M87.052 AVASCULAR NECROSIS OF BONE OF HIP, LEFT: Primary | ICD-10-CM

## 2025-01-21 DIAGNOSIS — M87.052 AVASCULAR NECROSIS OF BONE OF HIP, LEFT: ICD-10-CM

## 2025-01-21 PROCEDURE — G8427 DOCREV CUR MEDS BY ELIG CLIN: HCPCS | Performed by: ORTHOPAEDIC SURGERY

## 2025-01-21 PROCEDURE — 1036F TOBACCO NON-USER: CPT | Performed by: ORTHOPAEDIC SURGERY

## 2025-01-21 PROCEDURE — 1090F PRES/ABSN URINE INCON ASSESS: CPT | Performed by: ORTHOPAEDIC SURGERY

## 2025-01-21 PROCEDURE — 99205 OFFICE O/P NEW HI 60 MIN: CPT | Performed by: ORTHOPAEDIC SURGERY

## 2025-01-21 PROCEDURE — G8399 PT W/DXA RESULTS DOCUMENT: HCPCS | Performed by: ORTHOPAEDIC SURGERY

## 2025-01-21 PROCEDURE — 1159F MED LIST DOCD IN RCRD: CPT | Performed by: ORTHOPAEDIC SURGERY

## 2025-01-21 PROCEDURE — G8420 CALC BMI NORM PARAMETERS: HCPCS | Performed by: ORTHOPAEDIC SURGERY

## 2025-01-21 PROCEDURE — 1123F ACP DISCUSS/DSCN MKR DOCD: CPT | Performed by: ORTHOPAEDIC SURGERY

## 2025-01-21 NOTE — PROGRESS NOTES
Patient ID:    Mirtha Posada  [id number]  79 y.o.  1945    Today: January 21, 2025          Chief Complaint: Left hip pain      Patient returns to office today with chief complaint of left hip pain.  She is referred by Dr. Chen.  He is a significant surgical history for left ankle surgery complicated by an MRSA infection status post fusion, patella fracture status post ORIF, tibial plateau fracture status post ORIF, left hip fracture status post ORIF with FNS in 2023 for a nondisplaced femoral neck fracture.  She has a progressive left hip pain which is worse with weightbearing.  She notes pain in her groin as well as her buttocks.  She also has some instability as well as difficulty getting out of bed.  She does have balance issues.  She has a history of CKD stage III, CHF for which she follows with a cardiologist, history of MRSA infection in her left ankle which is resolved, history of cancer with a DVT which was oncologic provoked DVT, and a history of diabetes.  She uses a cane for ambulation.  She takes minimal anti-inflammatory medicine with minimal pain relief.  She lives with her .  She is a non-smoker.  She denies any fevers or chills        Past Medical History:  Past Medical History:   Diagnosis Date    Ankle fracture 2014    Ankle osteomyelitis, left 2014    Breast cancer (HCC)     Chemotherapy-induced neuropathy (HCC)     Chronic anemia     Chronic diarrhea 06/08/2023    Chronic kidney disease     Chronic systolic CHF (congestive heart failure) (HCC)     CKD (chronic kidney disease) stage 3, GFR 30-59 ml/min (HCC)     Colon polyp 2020    Dental disease     Depression     Dilated cardiomyopathy (HCC)     Dizziness     Dry eye syndrome of both eyes     Dyslipidemia     Elevated blood uric acid level     Essential hypertension     Fracture of nasal bone     Fracture of right patella 2013    Headache     History of left breast cancer 2001    with mastectomy    [place] (hard of hearing)

## 2025-01-22 ENCOUNTER — APPOINTMENT (OUTPATIENT)
Dept: PHYSICAL THERAPY | Age: 80
End: 2025-01-22
Attending: INTERNAL MEDICINE
Payer: MEDICARE

## 2025-01-22 PROBLEM — M87.052 AVASCULAR NECROSIS OF BONE OF HIP, LEFT: Status: ACTIVE | Noted: 2025-01-21

## 2025-01-27 ENCOUNTER — APPOINTMENT (OUTPATIENT)
Dept: PHYSICAL THERAPY | Age: 80
End: 2025-01-27
Attending: INTERNAL MEDICINE
Payer: MEDICARE

## 2025-01-27 DIAGNOSIS — R11.0 NAUSEA: ICD-10-CM

## 2025-01-27 DIAGNOSIS — F51.01 PRIMARY INSOMNIA: ICD-10-CM

## 2025-01-27 DIAGNOSIS — R42 VERTIGO: ICD-10-CM

## 2025-01-28 DIAGNOSIS — N39.0 RECURRENT UTI: ICD-10-CM

## 2025-01-28 RX ORDER — GINSENG 100 MG
1 CAPSULE ORAL DAILY
Qty: 90 CAPSULE | Refills: 3 | Status: SHIPPED | OUTPATIENT
Start: 2025-01-28

## 2025-01-28 RX ORDER — DOXEPIN HYDROCHLORIDE 10 MG/1
10 CAPSULE ORAL NIGHTLY
Qty: 90 CAPSULE | Refills: 1 | OUTPATIENT
Start: 2025-01-28

## 2025-01-28 RX ORDER — MECLIZINE HYDROCHLORIDE 25 MG/1
25 TABLET ORAL 3 TIMES DAILY PRN
Qty: 30 TABLET | Refills: 1 | OUTPATIENT
Start: 2025-01-28

## 2025-01-28 RX ORDER — ONDANSETRON 4 MG/1
4 TABLET, FILM COATED ORAL 3 TIMES DAILY PRN
Qty: 30 TABLET | Refills: 1 | OUTPATIENT
Start: 2025-01-28

## 2025-01-28 RX ORDER — METOPROLOL SUCCINATE 50 MG/1
50 TABLET, EXTENDED RELEASE ORAL 2 TIMES DAILY
Qty: 180 TABLET | Refills: 3 | Status: SHIPPED | OUTPATIENT
Start: 2025-01-28

## 2025-01-28 NOTE — TELEPHONE ENCOUNTER
Requested Prescriptions     Pending Prescriptions Disp Refills    metoprolol succinate (TOPROL XL) 50 MG extended release tablet 180 tablet 3     Sig: Take 1 tablet by mouth in the morning and at bedtime Once a day        Rx verified last OV 11/22/2024

## 2025-01-29 ENCOUNTER — APPOINTMENT (OUTPATIENT)
Dept: PHYSICAL THERAPY | Age: 80
End: 2025-01-29
Attending: INTERNAL MEDICINE
Payer: MEDICARE

## 2025-01-30 ENCOUNTER — PREP FOR PROCEDURE (OUTPATIENT)
Dept: ORTHOPEDIC SURGERY | Age: 80
End: 2025-01-30

## 2025-01-30 DIAGNOSIS — M87.052 AVASCULAR NECROSIS OF BONE OF HIP, LEFT (HCC): Primary | ICD-10-CM

## 2025-01-30 RX ORDER — SODIUM CHLORIDE 0.9 % (FLUSH) 0.9 %
5-40 SYRINGE (ML) INJECTION PRN
Status: CANCELLED | OUTPATIENT
Start: 2025-01-30

## 2025-01-30 RX ORDER — SODIUM CHLORIDE 0.9 % (FLUSH) 0.9 %
5-40 SYRINGE (ML) INJECTION EVERY 12 HOURS SCHEDULED
Status: CANCELLED | OUTPATIENT
Start: 2025-01-30

## 2025-01-30 RX ORDER — ACETAMINOPHEN 325 MG/1
1000 TABLET ORAL ONCE
Status: CANCELLED | OUTPATIENT
Start: 2025-01-30 | End: 2025-01-30

## 2025-01-30 RX ORDER — SODIUM CHLORIDE 9 MG/ML
INJECTION, SOLUTION INTRAVENOUS PRN
Status: CANCELLED | OUTPATIENT
Start: 2025-01-30

## 2025-01-30 NOTE — H&P
wears hearing aids    Hyperlipidemia     Left leg weakness 9/8/2020    Non-ischemic cardiomyopathy (HCC) 2018    Echo 50-55%, Cath-minimal CAD. 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 Pod 2 ORIF doing well.  C/o moderate pain.  Controlled on PO meds.  Ready to go home    Type 2 diabetes mellitus, controlled ([facility])     BS am 104    Urinary incontinence     UTI (urinary tract infection) 2020    Vaginal infection     Vitamin B12 deficiency        Past Surgical History:  Past Surgical History:   Procedure Laterality Date    APPENDECTOMY      BREAST BIOPSY      left breast biopsy    BREAST BIOPSY  1968    left cyst removed    BREAST LUMPECTOMY Left 2001    BREAST REDUCTION SURGERY      Rt breast    CARDIAC CATHETERIZATION  2009    CARPAL TUNNEL RELEASE Bilateral     CHOLECYSTECTOMY, OPEN  1981    COLONOSCOPY  07/2014    COLONOSCOPY N/A 07/26/2023    COLONOSCOPY POLYPECTOMY and BIOPSY performed by Iqra Pedro MD at [facility] ENDOSCOPY    CYST REMOVAL Right 11/2020    hand    FRACTURE SURGERY      HAND SURGERY Right 11/16/2022    Right index finger distal interphalangeal joint arthrodesis performed by Kerry Johnson MD at [facility] OPC    HEENT      Sinus Surgery    HEENT      RK procedure bilateral eyes    HIP FRACTURE SURGERY Left 02/08/2023    HIP OPEN REDUCTION INTERNAL FIXATION performed by Irwin Chen MD at [facility] MAIN OR    HX OPEN REDUCTION INTERNAL FIXATION Left 2013    Ankle    HX OPEN REDUCTION INTERNAL FIXATION  08/2016    Tibia    HYSTERECTOMY (CERVIX STATUS UNKNOWN)      HYSTERECTOMY, VAGINAL      LUMBAR LAMINECTOMY  1992    BRIAN STEREO BREAST BX W LOC DEVICE 1ST LESION RIGHT

## 2025-02-04 ENCOUNTER — HOSPITAL ENCOUNTER (OUTPATIENT)
Dept: SURGERY | Age: 80
Discharge: HOME | End: 2025-02-07
Payer: MEDICARE

## 2025-02-04 ENCOUNTER — HOSPITAL ENCOUNTER (OUTPATIENT)
Dept: REHABILITATION | Age: 80
Discharge: HOME | End: 2025-02-07
Payer: MEDICARE

## 2025-02-04 VITALS
HEIGHT: 65 IN | RESPIRATION RATE: 16 BRPM | OXYGEN SATURATION: 98 % | DIASTOLIC BLOOD PRESSURE: 66 MMHG | WEIGHT: 146.4 LBS | SYSTOLIC BLOOD PRESSURE: 142 MMHG | BODY MASS INDEX: 24.39 KG/M2 | HEART RATE: 52 BPM | TEMPERATURE: 97.1 F

## 2025-02-04 DIAGNOSIS — M87.052 AVASCULAR NECROSIS OF BONE OF HIP, LEFT: ICD-10-CM

## 2025-02-04 LAB
ALBUMIN SERPL-MCNC: 2.9 G/DL (ref 3.2–4.6)
ALBUMIN/GLOB SERPL: 0.7 (ref 1–1.9)
ALP SERPL-CCNC: 52 U/L (ref 35–104)
ALT SERPL-CCNC: 12 U/L (ref 8–45)
ANION GAP SERPL CALC-SCNC: 15 MMOL/L (ref 7–16)
AST SERPL-CCNC: 23 U/L (ref 15–37)
BASOPHILS # BLD: 0.04 K/UL (ref 0–0.2)
BASOPHILS NFR BLD: 0.7 % (ref 0–2)
BILIRUB SERPL-MCNC: 0.2 MG/DL (ref 0–1.2)
BUN SERPL-MCNC: 23 MG/DL (ref 8–23)
CALCIUM SERPL-MCNC: 10 MG/DL (ref 8.8–10.2)
CHLORIDE SERPL-SCNC: 102 MMOL/L (ref 98–107)
CO2 SERPL-SCNC: 22 MMOL/L (ref 20–29)
CREAT SERPL-MCNC: 1.1 MG/DL (ref 0.6–1.1)
DIFFERENTIAL METHOD BLD: ABNORMAL
EKG ATRIAL RATE: 51 BPM
EKG DIAGNOSIS: NORMAL
EKG P AXIS: 51 DEGREES
EKG P-R INTERVAL: 224 MS
EKG Q-T INTERVAL: 492 MS
EKG QRS DURATION: 88 MS
EKG QTC CALCULATION (BAZETT): 453 MS
EKG R AXIS: 108 DEGREES
EKG T AXIS: 128 DEGREES
EKG VENTRICULAR RATE: 51 BPM
EOSINOPHIL # BLD: 0.21 K/UL (ref 0–0.8)
EOSINOPHIL NFR BLD: 3.7 % (ref 0.5–7.8)
ERYTHROCYTE [DISTWIDTH] IN BLOOD BY AUTOMATED COUNT: 14.6 % (ref 11.9–14.6)
EST. AVERAGE GLUCOSE BLD GHB EST-MCNC: 128 MG/DL
GLOBULIN SER CALC-MCNC: 4.1 G/DL (ref 2.3–3.5)
GLUCOSE SERPL-MCNC: 113 MG/DL (ref 70–99)
HBA1C MFR BLD: 6.1 % (ref 0–5.6)
HCT VFR BLD AUTO: 36.3 % (ref 35.8–46.3)
HGB BLD-MCNC: 11.6 G/DL (ref 11.7–15.4)
IMM GRANULOCYTES # BLD AUTO: 0.02 K/UL (ref 0–0.5)
IMM GRANULOCYTES NFR BLD AUTO: 0.4 % (ref 0–5)
INR PPP: 1
LYMPHOCYTES # BLD: 2.62 K/UL (ref 0.5–4.6)
LYMPHOCYTES NFR BLD: 46.5 % (ref 13–44)
MCH RBC QN AUTO: 30.4 PG (ref 26.1–32.9)
MCHC RBC AUTO-ENTMCNC: 32 G/DL (ref 31.4–35)
MCV RBC AUTO: 95 FL (ref 82–102)
MONOCYTES # BLD: 0.46 K/UL (ref 0.1–1.3)
MONOCYTES NFR BLD: 8.2 % (ref 4–12)
MRSA DNA SPEC QL NAA+PROBE: NOT DETECTED
NEUTS SEG # BLD: 2.29 K/UL (ref 1.7–8.2)
NEUTS SEG NFR BLD: 40.5 % (ref 43–78)
NRBC # BLD: 0 K/UL (ref 0–0.2)
PLATELET # BLD AUTO: 272 K/UL (ref 150–450)
PMV BLD AUTO: 11 FL (ref 9.4–12.3)
POTASSIUM SERPL-SCNC: 4.3 MMOL/L (ref 3.5–5.1)
PROT SERPL-MCNC: 7 G/DL (ref 6.3–8.2)
PROTHROMBIN TIME: 13.4 SEC (ref 11.3–14.9)
RBC # BLD AUTO: 3.82 M/UL (ref 4.05–5.2)
S AUREUS CPE NOSE QL NAA+PROBE: NOT DETECTED
SODIUM SERPL-SCNC: 139 MMOL/L (ref 136–145)
WBC # BLD AUTO: 5.6 K/UL (ref 4.3–11.1)

## 2025-02-04 PROCEDURE — 87641 MR-STAPH DNA AMP PROBE: CPT

## 2025-02-04 PROCEDURE — 80053 COMPREHEN METABOLIC PANEL: CPT

## 2025-02-04 PROCEDURE — 93005 ELECTROCARDIOGRAM TRACING: CPT

## 2025-02-04 PROCEDURE — 94760 N-INVAS EAR/PLS OXIMETRY 1: CPT

## 2025-02-04 PROCEDURE — 85025 COMPLETE CBC W/AUTO DIFF WBC: CPT

## 2025-02-04 PROCEDURE — 85610 PROTHROMBIN TIME: CPT

## 2025-02-04 PROCEDURE — 97161 PT EVAL LOW COMPLEX 20 MIN: CPT

## 2025-02-04 PROCEDURE — 83036 HEMOGLOBIN GLYCOSYLATED A1C: CPT

## 2025-02-04 PROCEDURE — 93010 ELECTROCARDIOGRAM REPORT: CPT | Performed by: INTERNAL MEDICINE

## 2025-02-04 PROCEDURE — 98960 EDU&TRN PT SELF-MGMT NQHP 1: CPT

## 2025-02-04 RX ORDER — FUROSEMIDE 40 MG/1
40 TABLET ORAL AS NEEDED
COMMUNITY

## 2025-02-04 RX ORDER — ESOMEPRAZOLE MAGNESIUM 20 MG/1
20 GRANULE, DELAYED RELEASE ORAL AS NEEDED
COMMUNITY

## 2025-02-04 ASSESSMENT — HOOS JR
GOING UP OR DOWN STAIRS: MODERATE
BENDING TO THE FLOOR TO PICK UP OBJECT: EXTREME
LYING IN BED (TURNING OVER, MAINTAINING HIP POSITION): EXTREME
WALKING ON UNEVEN SURFACE: MODERATE
HOOS JR RAW SCORE: 20
HOOS JR TOTAL INTERVAL SCORE: 25.103
RISING FROM SITTING: EXTREME
SITTING: EXTREME
HOOS JR RAW SCORE: 20

## 2025-02-04 ASSESSMENT — PROMIS GLOBAL HEALTH SCALE
IN GENERAL, HOW WOULD YOU RATE YOUR MENTAL HEALTH, INCLUDING YOUR MOOD AND YOUR ABILITY TO THINK [ON A SCALE OF 1 (POOR) TO 5 (EXCELLENT)]?: FAIR
IN THE PAST 7 DAYS, HOW WOULD YOU RATE YOUR FATIGUE ON AVERAGE [ON A SCALE FROM 1 (NONE) TO 5 (VERY SEVERE)]?: VERY SEVERE
HOW IS THE PROMIS V1.1 BEING ADMINISTERED?: PAPER
IN GENERAL, WOULD YOU SAY YOUR HEALTH IS...[ON A SCALE OF 1 (POOR) TO 5 (EXCELLENT)]: POOR
IN GENERAL, PLEASE RATE HOW WELL YOU CARRY OUT YOUR USUAL SOCIAL ACTIVITIES (INCLUDES ACTIVITIES AT HOME, AT WORK, AND IN YOUR COMMUNITY, AND RESPONSIBILITIES AS A PARENT, CHILD, SPOUSE, EMPLOYEE, FRIEND, ETC) [ON A SCALE OF 1 (POOR) TO 5 (EXCELLENT)]?: POOR
IN THE PAST 7 DAYS, HOW OFTEN HAVE YOU BEEN BOTHERED BY EMOTIONAL PROBLEMS, SUCH AS FEELING ANXIOUS, DEPRESSED, OR IRRITABLE [ON A SCALE FROM 1 (NEVER) TO 5 (ALWAYS)]?: SOMETIMES
IN THE PAST 7 DAYS, HOW WOULD YOU RATE YOUR PAIN ON AVERAGE [ON A SCALE FROM 0 (NO PAIN) TO 10 (WORST IMAGINABLE PAIN)]?: 10 WORST IMAGINABLE PAIN
TO WHAT EXTENT ARE YOU ABLE TO CARRY OUT YOUR EVERYDAY PHYSICAL ACTIVITIES SUCH AS WALKING, CLIMBING STAIRS, CARRYING GROCERIES, OR MOVING A CHAIR [ON A SCALE OF 1 (NOT AT ALL) TO 5 (COMPLETELY)]?: NOT AT ALL
SUM OF RESPONSES TO QUESTIONS 2, 4, 5, & 10: 9
SUM OF RESPONSES TO QUESTIONS 3, 6, 7, & 8: 13
IN GENERAL, HOW WOULD YOU RATE YOUR SATISFACTION WITH YOUR SOCIAL ACTIVITIES AND RELATIONSHIPS [ON A SCALE OF 1 (POOR) TO 5 (EXCELLENT)]?: FAIR
WHO IS THE PERSON COMPLETING THE PROMIS V1.1 SURVEY?: SURROGATE
IN GENERAL, WOULD YOU SAY YOUR QUALITY OF LIFE IS...[ON A SCALE OF 1 (POOR) TO 5 (EXCELLENT)]: FAIR
IN GENERAL, HOW WOULD YOU RATE YOUR PHYSICAL HEALTH [ON A SCALE OF 1 (POOR) TO 5 (EXCELLENT)]?: POOR

## 2025-02-04 ASSESSMENT — PAIN DESCRIPTION - LOCATION
LOCATION: HIP;LEG
LOCATION: HIP;LEG

## 2025-02-04 ASSESSMENT — PULMONARY FUNCTION TESTS
FEV1 (LITERS): 0.89
FEV1 (%PREDICTED): 48

## 2025-02-04 ASSESSMENT — PAIN DESCRIPTION - DESCRIPTORS
DESCRIPTORS: ACHING;STABBING
DESCRIPTORS: THROBBING

## 2025-02-04 ASSESSMENT — PAIN SCALES - GENERAL
PAINLEVEL_OUTOF10: 10
PAINLEVEL_OUTOF10: 10

## 2025-02-04 ASSESSMENT — PAIN DESCRIPTION - ORIENTATION
ORIENTATION: LEFT
ORIENTATION: LEFT;ANTERIOR;INNER;OUTER

## 2025-02-04 NOTE — PERIOP NOTE
Labs within anesthesia guidelines, no follow-up required. Labs automatically routed to ordering provider via Epic documentation.  MRSA received in lab with pending results.

## 2025-02-04 NOTE — PERIOP NOTE
Patient verified name and .    Order for consent was found in EHR and does match case posting; patient verified.     Message sent to scheduling to confirm of anterior approach needs to be added to case posting.    Type 3 surgery, [facility] assessment complete.    Labs per surgeon: CBC with diff,CMP, A1C, PT/INR, MRSA ; results pending  Labs per anesthesia protocol: POC Glucose DOS; Results: Glucose:113  EKG:EK25    Pt has scheduled cardiologist appointment on 2.10.25 at 1400 for cardiac clearance prior to surgery. Chart flagged for charge nurse to review cardiology office visit prior to surgery.    Please drink 32 ounces of non-caffeinated clear liquids 2 hours prior to your arrival to avoid dehydration.      MRSA/MSSA swab collected per policy. MD to consult pharmacy to dose Vanc if appropriate.     Hospital approved surgical skin cleanser and instructions to return bottle on DOS given per hospital policy.    Patient provided with handouts including Guide to Surgery, Pain Management, Preventing Surgical Site Infections, and [facility] Brochure.    Patient answered medical/surgical history questions at their best of ability. All prior to admission medications documented in Epic. Original medication prescription bottle was visualized during patient appointment.     Patient instructed to hold all vitamins 3 weeks prior to surgery and NSAIDS 5 days prior to surgery.     Patient teach back successful and patient demonstrates knowledge of instruction.

## 2025-02-04 NOTE — PROGRESS NOTES
Mirtha Posada  : 1945  Primary: Medicare Part A And B  Secondary: [facility] at [place] [address]  [phone]      Physical Therapy Prehab Evaluation Summary:2025   Time In/Out   PT Charge Capture  Episode     MEDICAL/REFERRING DIAGNOSIS: Idiopathic aseptic necrosis of left femur [M87.052]  REFERRING PHYSICIAN: Toni Braxton MD    Treatment Diagnosis:   Pain in left hip (M25.552)  Stiffness of Left Hip, Not elsewhere classified (M25.652)    DATE OF SURGERY: 25  Assessment:   COMMENTS:  Ms. Posada is present for a Prehab Physical Therapy Assessment for their upcoming left LESLY . They are here with her spouse . After discussing the surgical admission options and discharge plans, they are planning on discharging after one night in the hospital.    She plans on staying overnight and will have her spouse's support at [place]. She has a RW, cane and BSC to use at [place]. She is using her cane to ambulate now.    PROBLEM LIST:   (Impacting functional limitations):  Ms. Posada presents with the following lower extremity(s) problems:  Strength  Range of Motion  Home Exercise Program  Pain INTERVENTIONS PLANNED:   (Benefits and precautions of physical therapy have been discussed with the patient.)  Home Exercise Program  Educational Discussion       GOALS: (Goals have been discussed and agreed upon with patient.)  Discharge Goals: Time Frame: 1 Day  Patient will demonstrate independence with a home exercise program designed to increase strength, range of motion, and pain control to minimize functional deficits and optimize patient for total joint replacement.    Subjective:   Past Medical History/Comorbidities:   Ms. Posada  has a past medical history of Anesthesia complication, Ankle fracture, Ankle osteomyelitis, left, Breast cancer (HCC), Chemotherapy-induced neuropathy (HCC), Chronic anemia, Chronic diarrhea, Chronic systolic

## 2025-02-04 NOTE — PROGRESS NOTES
02/04/25 1000   Treatment   Treatment Type Bedside spirometry   Breath Sounds   Breath Sounds Bilateral Diminished   Oxygen Therapy/Pulse Ox   O2 Therapy Room air   Pulse 52   SpO2 98 %   Pulse Oximeter Device Mode Intermittent   $Pulse Oximeter $Spot check (single)   Bedside Spirometry   FEV-1/Actual (Liters) 0.89 Liters   FEV-1/Predicted (Liters) 48 Liters     Initial respiratory Assessment completed with pt. Pt was interviewed and evaluated in Joint camp prior to surgery.  Patient ID:  Mirtha Posada  [id number]  79 y.o.  1945  Surgeon:  MATT  Date of Surgery: [unfilled]2/12/2025  Procedure: Total Left Hip Arthroplasty  Primary Care Physician: Marva Xavier, APRN - [phone]  Specialists:DR PATRICIA - [facility]    Pt taught proper COUGH technique  IS REVIEWED WITH PT AS WELL AS BENEFITS OF USING IS IN SEDENTARY PTS.  DIAPHRAGMATIC BREATHING EXERCISE INSTRUCTIONS GIVEN    History of smoking:   DENIES                 Quit date:         Secondhand smoke:FATHER    Past procedures with Oxygen desaturation or delayed awakening:DENIES     Respiratory history:  TYLER- NO CPAP  DENIES SOB                                LUNG NODULES   HOSPITALIZED WITH COVID TWICE                                  Respiratory meds:  DENIES    FAMILY PRESENT:              PAST SLEEP STUDY:                  YES  HX OF TYLER:                                          DENIES  TYLER assessment:     DANGERS OF UNTREATED TYLER EXPLAINED TO PT.                                          SLEEPS ON SIDE       &      BACK           PHYSICAL EXAM   Body mass index is 24.36 kg/m².   Vitals:    02/04/25 1000   BP:    Pulse: (P) 52   Resp:    Temp:    SpO2: (P) 98%     Neck circumference:  37.5    cm    Loud snoring:                                                 YES             Witnessed apnea or wakening gasping or choking:        DENIES         Awakens with headaches:

## 2025-02-04 NOTE — PERIOP NOTE
PLEASE CONTINUE TAKING ALL PRESCRIPTION MEDICATIONS UP TO THE DAY OF SURGERY UNLESS OTHERWISE DIRECTED BELOW.    DISCONTINUE all vitamins, herbals and supplements 3 weeks prior to surgery. DISCONTINUE Non-Steroidal Anti-Inflammatory (NSAIDS) such as Advil, Ibuprofen, Motrin, Naproxen and Aleve 5 days prior to surgery.       Home Medications to take  the day of surgery      Allopurinol (Zyloprim)     Metoprolol (Toprol)     Gabapentin (Neurontin)     Home Medications to Hold- please continue all other medications except these.      Vitamin D- hold 3 weeks prior to surgery.          Comments   On the day before surgery (2.11.25) please take Acetaminophen 1000mg in the morning and then again before bed. You may substitute for Tylenol 650 mg.      Bring Dynahex wash and Incentive Spirometer with you to hospital on the day of surgery.     Please drink 32 ounces of non-caffeinated clear liquids 2 hours prior to your arrival to avoid dehydration.         Please do not bring home medications with you on the day of surgery unless otherwise directed by your nurse.  If you are instructed to bring home medications, please give them to your nurse as they will be administered by the nursing staff.    If you have any questions, please call [facility] [phone].    A copy of this note was provided to the patient for reference.        How to Use Your Incentive Spirometer       About Your Incentive Spirometer  An incentive spirometer is a device that will expand your lungs by helping you to breathe more deeply and fully. The parts of your incentive spirometer are labeled in Figure 1.    Using your incentive spirometer  When you're using your incentive spirometer, make sure to breathe through your mouth. If you breathe through your nose, the incentive spirometer won't work properly. You can hold your nose if you have trouble. DO NOT BLOW INTO THE DEVICE. If you feel dizzy at any time, stop and rest. Try again

## 2025-02-05 ENCOUNTER — OFFICE VISIT (OUTPATIENT)
Dept: ORTHOPEDIC SURGERY | Age: 80
End: 2025-02-05
Payer: MEDICARE

## 2025-02-05 ENCOUNTER — TELEPHONE (OUTPATIENT)
Dept: INTERNAL MEDICINE CLINIC | Facility: CLINIC | Age: 80
End: 2025-02-05

## 2025-02-05 VITALS — WEIGHT: 144.6 LBS | HEIGHT: 65 IN | BODY MASS INDEX: 24.09 KG/M2

## 2025-02-05 DIAGNOSIS — M87.052 AVASCULAR NECROSIS OF BONE OF HIP, LEFT: Primary | ICD-10-CM

## 2025-02-05 PROCEDURE — 99213 OFFICE O/P EST LOW 20 MIN: CPT | Performed by: ORTHOPAEDIC SURGERY

## 2025-02-05 RX ORDER — ACETAMINOPHEN 325 MG/1
975 TABLET ORAL EVERY 8 HOURS
Qty: 60 TABLET | Refills: 2 | Status: SHIPPED | OUTPATIENT
Start: 2025-02-05 | End: 2025-02-05 | Stop reason: CLARIF

## 2025-02-05 RX ORDER — OXYCODONE HYDROCHLORIDE 5 MG/1
5-10 TABLET ORAL EVERY 4 HOURS PRN
Qty: 30 TABLET | Refills: 0 | Status: SHIPPED | OUTPATIENT
Start: 2025-02-05 | End: 2025-02-05 | Stop reason: CLARIF

## 2025-02-05 RX ORDER — ASPIRIN 81 MG/1
81 TABLET ORAL EVERY 12 HOURS
Qty: 70 TABLET | Refills: 0 | Status: SHIPPED | OUTPATIENT
Start: 2025-02-05 | End: 2025-02-05 | Stop reason: CLARIF

## 2025-02-05 RX ORDER — SENNA AND DOCUSATE SODIUM 50; 8.6 MG/1; MG/1
1 TABLET, FILM COATED ORAL DAILY
Qty: 30 TABLET | Refills: 1 | Status: SHIPPED | OUTPATIENT
Start: 2025-02-05 | End: 2025-02-05 | Stop reason: CLARIF

## 2025-02-05 NOTE — ADDENDUM NOTE
Addended by: TRENT GUTIERREZ on: 2/5/2025 04:10 PM     Modules accepted: Orders, Level of Service

## 2025-02-05 NOTE — PROGRESS NOTES
Pulses palpable bilateral lower extremities.  Lymphatic: No obvious lymphedema or lymphadenopathy noted.  Skin: No obvious lacerations or rashes noted.  Musculoskeletal: No obvious deformity or pain with active movement of the upper extremities.  Neuro: No obvious deficiency or weakness noted of the upper or lower extremities    Hip Exam:   Exam of the hip reveals anterior groin pain with resisted leg raise and FADIR testing. Internal and external rotation is decreased in the hip. No evidence of arterial of venous insufficiency. DP/PT pulses appreciable. Able to plantar- and dorsi-flex the foot/ankle.  Well-healed lateral incision significant valgus knee deformity well-healed incision over the lateral proximal tibia stable to varus valgus stress at 0 and 30 partially correctable negative Lachman negative Yi's tenderness about the medial lateral joint line tenderness patellofemoral grind knee range of motion 0-1 30        Imaging:    Images obtained today or prior    XR HIP LT W OR WO PELVIS 2-3 VWS  Views Obtained: AP pelvis, lateral left hip   Indication: Left Hip Pain  Findings:  Xrays including A/P Pelvis and lateral of the hip(s) were reviewed which demonstrate mild to moderate joint space narrowing with retained hardware.  There is evidence of crescent sign of the left hip  Impression: Avascular necrosis of the left hip with retained orthopedic hardware    X-rays of the hip bilateral knees AP PA flexed view, lateral and sunrise view demonstrates end-stage arthritis of bilateral knees.  Right knee demonstrates medial compartment OA with bone-on-bone contact particular ossified subchondral sclerosis.  Prior patella ORIF fracture healed with retained hardware in proper position without evidence of failure.  Left knee demonstrates a valgus deformity with bone-on-bone contact para-articular osteophytes chondrocalcinosis.  There is evidence of a proximal tibial plate as well as an intramedullary isrrael from her

## 2025-02-05 NOTE — TELEPHONE ENCOUNTER
Patients spouse came into the office requesting to take a urine cup home and bring a specimen back tomorrow. States patient was experiencing confusion, incontinence and was unable to get out of the bed.     Called and spoke with PCP directly and Marva states that given patients history the patient needs to go to the ER for evaluation.     Went to the  and advised patients spouse of Karthik recommendations.     Patients spouse became agitated and left.

## 2025-02-06 ENCOUNTER — TELEPHONE (OUTPATIENT)
Dept: INTERNAL MEDICINE CLINIC | Facility: CLINIC | Age: 80
End: 2025-02-06

## 2025-02-06 ENCOUNTER — HOSPITAL ENCOUNTER (EMERGENCY)
Age: 80
Discharge: HOME | End: 2025-02-06
Payer: MEDICARE

## 2025-02-06 VITALS
OXYGEN SATURATION: 98 % | SYSTOLIC BLOOD PRESSURE: 152 MMHG | DIASTOLIC BLOOD PRESSURE: 76 MMHG | WEIGHT: 144.6 LBS | TEMPERATURE: 98.1 F | RESPIRATION RATE: 18 BRPM | HEART RATE: 69 BPM | BODY MASS INDEX: 24.09 KG/M2 | HEIGHT: 65 IN

## 2025-02-06 DIAGNOSIS — R42 VERTIGO: ICD-10-CM

## 2025-02-06 DIAGNOSIS — R11.0 NAUSEA: ICD-10-CM

## 2025-02-06 DIAGNOSIS — R30.0 DYSURIA: Primary | ICD-10-CM

## 2025-02-06 LAB
APPEARANCE UR: CLEAR
BACTERIA URNS QL MICRO: ABNORMAL /HPF
BILIRUB UR QL: ABNORMAL
CASTS URNS QL MICRO: ABNORMAL /LPF
COLOR UR: YELLOW
EPI CELLS #/AREA URNS HPF: ABNORMAL /HPF
GLUCOSE UR STRIP.AUTO-MCNC: NEGATIVE MG/DL
HGB UR QL STRIP: NEGATIVE
KETONES UR QL STRIP.AUTO: ABNORMAL MG/DL
LEUKOCYTE ESTERASE UR QL STRIP.AUTO: NEGATIVE
NITRITE UR QL STRIP.AUTO: NEGATIVE
OTHER OBSERVATIONS: ABNORMAL
PH UR STRIP: 5.5 (ref 5–9)
PROT UR STRIP-MCNC: ABNORMAL MG/DL
RBC #/AREA URNS HPF: ABNORMAL /HPF
SP GR UR REFRACTOMETRY: >1.03 (ref 1–1.02)
UROBILINOGEN UR QL STRIP.AUTO: 0.2 EU/DL (ref 0.2–1)
WBC URNS QL MICRO: ABNORMAL /HPF

## 2025-02-06 PROCEDURE — 87088 URINE BACTERIA CULTURE: CPT

## 2025-02-06 PROCEDURE — 87086 URINE CULTURE/COLONY COUNT: CPT

## 2025-02-06 PROCEDURE — 87186 SC STD MICRODIL/AGAR DIL: CPT

## 2025-02-06 PROCEDURE — 81003 URINALYSIS AUTO W/O SCOPE: CPT

## 2025-02-06 PROCEDURE — 99283 EMERGENCY DEPT VISIT LOW MDM: CPT

## 2025-02-06 RX ORDER — MECLIZINE HYDROCHLORIDE 25 MG/1
25 TABLET ORAL 3 TIMES DAILY PRN
Qty: 30 TABLET | Refills: 2 | Status: SHIPPED | OUTPATIENT
Start: 2025-02-06

## 2025-02-06 RX ORDER — ONDANSETRON 4 MG/1
4 TABLET, FILM COATED ORAL 3 TIMES DAILY PRN
Qty: 30 TABLET | Refills: 2 | Status: SHIPPED | OUTPATIENT
Start: 2025-02-06

## 2025-02-06 ASSESSMENT — PAIN SCALES - GENERAL: PAINLEVEL_OUTOF10: 0

## 2025-02-06 NOTE — ED NOTES
Patient mobility status  with no difficulty.     I have reviewed discharge instructions with the patient.  The patient verbalized understanding.    Patient left ED via Discharge Method: ambulatory to Home with Spouse.    Opportunity for questions and clarification provided.     Patient given 0 scripts.           Kristi Lopez RN  02/06/25 [id number]

## 2025-02-06 NOTE — ED PROVIDER NOTES
The history is provided by the patient.       ROS     Review of Systems     Physical Exam     Vitals signs and nursing note reviewed:  Vitals:    02/06/25 1442   BP: (!) 160/74   Pulse: 60   Resp: 17   Temp: 97.7 °F (36.5 °C)   TempSrc: Oral   SpO2: 98%      Physical Exam  Vitals and nursing note reviewed.   Constitutional:       Appearance: Normal appearance.   HENT:      Head: Normocephalic and atraumatic.      Right Ear: External ear normal.      Left Ear: External ear normal.      Nose: Nose normal.      Mouth/Throat:      Mouth: Mucous membranes are moist.   Eyes:      Extraocular Movements: Extraocular movements intact.      Conjunctiva/sclera: Conjunctivae normal.      Pupils: Pupils are equal, round, and reactive to light.   Cardiovascular:      Rate and Rhythm: Normal rate and regular rhythm.      Pulses: Normal pulses.      Heart sounds: Normal heart sounds.   Pulmonary:      Effort: Pulmonary effort is normal.      Breath sounds: Normal breath sounds.   Abdominal:      General: Abdomen is flat. There is no distension.      Palpations: Abdomen is soft.      Tenderness: There is no abdominal tenderness.   Musculoskeletal:         General: Normal range of motion.      Cervical back: Normal range of motion.   Skin:     General: Skin is warm.      Capillary Refill: Capillary refill takes less than 2 seconds.   Neurological:      General: No focal deficit present.      Mental Status: She is alert and oriented to person, place, and time. Mental status is at baseline.   Psychiatric:         Mood and Affect: Mood normal.         Behavior: Behavior normal.         Thought Content: Thought content normal.         Judgment: Judgment normal.        Procedures     Procedures    Orders Placed This Encounter   Procedures    Culture, Urine    Urinalysis w rflx microscopic        Medications given during this emergency department visit:  Medications - No data to display    New Prescriptions    No medications on file

## 2025-02-06 NOTE — ED TRIAGE NOTES
Arrives via [facility] from home. C/o UTI like symptoms and PCP sent her here, was ambulatory w/ assistance to stretcher. Pt c/o dysuria, foul odor, and some urinary incontinence. Is afebrile and doesn't meet sepsis criteria. Bgl 146 per ems

## 2025-02-06 NOTE — TELEPHONE ENCOUNTER
Patients spouse brought a paper into the office requesting refills on patients celexa, zofran, doxepin, antivert and amitriptyline.     Patient has remaining refills at the pharmacy on Celexa, amitriptyline and doxepin.     Refill requesting sent to PCP for Zofran and antivert.     Last OV: 12/19/24    Next OV: 4/9/25

## 2025-02-06 NOTE — TELEPHONE ENCOUNTER
Advised patients spouse after speaking with Marva that with patients history of JACKY that she needs to go to the ER immediately. Patients spouse states patient is still confused and is having issues with incontinence. Advised spouse with those symptoms patient has to go to the ER because the UTI could escalate to something fatal/detrimental to her health by the time we get the urine culture back IF the urinalysis is positive.      Patients spouse then left and stated he would call EMS to take patient to the ER to avoid 8 hour wait.     Urine specimen disposed of by Mulu Woodson.

## 2025-02-06 NOTE — DISCHARGE INSTRUCTIONS
The urine did not show infection today.  I have sent it for culture just to make sure you did not have infection.  Please make sure that you are drinking plenty of fluids and make a follow-up appointment with your primary care physician.  We will call you if it does show on the culture that you need antibiotics.  Return to the ED if worsening in any way.

## 2025-02-06 NOTE — TELEPHONE ENCOUNTER
Pt's  came into the office with his wife's urine specimen, reporting that he'd like her urine checked to confirm UTI per Ortho surgeon's recommendation, as surgery will have to be cancelled if pt tests positive for UTI. Discussed situation with PCP, then brought in CMA to assist in providing explanation to the patient's .

## 2025-02-08 LAB
BACTERIA SPEC CULT: ABNORMAL
SERVICE CMNT-IMP: ABNORMAL

## 2025-02-10 ENCOUNTER — TELEPHONE (OUTPATIENT)
Dept: INTERNAL MEDICINE CLINIC | Facility: CLINIC | Age: 80
End: 2025-02-10

## 2025-02-10 ENCOUNTER — OFFICE VISIT (OUTPATIENT)
Age: 80
End: 2025-02-10
Payer: MEDICARE

## 2025-02-10 VITALS
DIASTOLIC BLOOD PRESSURE: 70 MMHG | BODY MASS INDEX: 24.32 KG/M2 | SYSTOLIC BLOOD PRESSURE: 134 MMHG | WEIGHT: 146 LBS | HEIGHT: 65 IN | HEART RATE: 64 BPM

## 2025-02-10 DIAGNOSIS — N18.31 STAGE 3A CHRONIC KIDNEY DISEASE (HCC): ICD-10-CM

## 2025-02-10 DIAGNOSIS — I42.0 DILATED CARDIOMYOPATHY (HCC): Primary | ICD-10-CM

## 2025-02-10 DIAGNOSIS — I10 PRIMARY HYPERTENSION: Chronic | ICD-10-CM

## 2025-02-10 DIAGNOSIS — Z78.9 STATIN INTOLERANCE: ICD-10-CM

## 2025-02-10 DIAGNOSIS — I50.42 CHRONIC COMBINED SYSTOLIC AND DIASTOLIC CHF (CONGESTIVE HEART FAILURE) (HCC): Chronic | ICD-10-CM

## 2025-02-10 DIAGNOSIS — E78.5 DYSLIPIDEMIA: Chronic | ICD-10-CM

## 2025-02-10 PROCEDURE — 3075F SYST BP GE 130 - 139MM HG: CPT | Performed by: INTERNAL MEDICINE

## 2025-02-10 PROCEDURE — 1126F AMNT PAIN NOTED NONE PRSNT: CPT | Performed by: INTERNAL MEDICINE

## 2025-02-10 PROCEDURE — 1036F TOBACCO NON-USER: CPT | Performed by: INTERNAL MEDICINE

## 2025-02-10 PROCEDURE — 99214 OFFICE O/P EST MOD 30 MIN: CPT | Performed by: INTERNAL MEDICINE

## 2025-02-10 PROCEDURE — G8428 CUR MEDS NOT DOCUMENT: HCPCS | Performed by: INTERNAL MEDICINE

## 2025-02-10 PROCEDURE — G8420 CALC BMI NORM PARAMETERS: HCPCS | Performed by: INTERNAL MEDICINE

## 2025-02-10 PROCEDURE — G8399 PT W/DXA RESULTS DOCUMENT: HCPCS | Performed by: INTERNAL MEDICINE

## 2025-02-10 PROCEDURE — 1123F ACP DISCUSS/DSCN MKR DOCD: CPT | Performed by: INTERNAL MEDICINE

## 2025-02-10 PROCEDURE — 3078F DIAST BP <80 MM HG: CPT | Performed by: INTERNAL MEDICINE

## 2025-02-10 PROCEDURE — 1090F PRES/ABSN URINE INCON ASSESS: CPT | Performed by: INTERNAL MEDICINE

## 2025-02-10 NOTE — PROGRESS NOTES
and Other (See Comments)     Burns mouth    Statins Other (See Comments)     Product containing 3-hydroxy-3-methylglutaryl-coenzyme A reductase inhibitor (product)    Vancomycin Other (See Comments)     Kidney function worsened    Sulfa Antibiotics Nausea Only and Rash     Patient Active Problem List    Diagnosis Date Noted    Impaired mobility and ADLs 02/10/2023     Priority: Medium    Chronic combined systolic and diastolic CHF (congestive heart failure) (formerly [facility]) 12/19/2022     Priority: Medium    Chronic gout of right hand 11/16/2022     Priority: Medium    Sjogren syndrome, unspecified (formerly [facility]) 09/22/2022     Priority: Medium    Type 2 diabetes mellitus with chronic kidney disease 09/22/2022     Priority: Medium    Primary insomnia 06/06/2022     Priority: Medium    Intolerance of oral bisphosphonate therapy 06/06/2022     Priority: Medium    Avascular necrosis of bone of hip, left 01/21/2025    Vertigo 12/07/2024    Lung nodules 10/01/2024    Chronic pain of both knees 09/19/2024    GERD without esophagitis 06/08/2023     Added automatically from request for surgery [id number]      History of adenomatous polyp of colon 06/08/2023     Added automatically from request for surgery [id number]      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 11/02/2022     Added automatically from request for surgery [id number]      Vitamin D deficiency 02/11/2021    Stage 3a chronic kidney disease (formerly [facility]) 02/09/2021    Peripheral sensory-motor axonal polyneuropathy 10/17/2020    Stem cells transplant status (formerly [facility]) 09/14/2020    Chronic periscapular pain on right side 07/06/2020     Last Assessment & Plan:   Formatting of this note might be different from the original.  Appears to be either referral pattern pain from the neck or, muscle   spasms.  See treatment above        Dilated cardiomyopathy (formerly [facility]) 08/06/2019    Foraminal stenosis of cervical region 11/28/2018     Last Assessment & Plan:   Formatting of this note

## 2025-02-10 NOTE — TELEPHONE ENCOUNTER
Called pt's spouse and advised that the ER tried to call them to go over the results and discuss recommendations/treatments. Advised they would be the ones to prescribe any needed meds, since they are the ones that saw her for the urinary sympoms. He expressed understanding and confirmed he had their number to call them back to discuss and he would reach out to them.

## 2025-02-10 NOTE — TELEPHONE ENCOUNTER
Mr Posada called, upset that nobody has called about her urine culture result.  Mrs Posada broke her phone and was unable to receive any calls until today, she had to get a new phone.  Mr Posada was told by a physician today that her urine culture was positive and to call this office to get a prescription.  Mr Posada was told that Marva was not in the office at this time, but I would send her a message in regards to the positive urine culture, but I couldn't guarantee that the message would be seen today. The ER could be called, they had been trying to reach her to discuss the lab results - Mr Posada wants nothing to do with calling them.  I was then asked to call Marva, I told Mr Posada that I was not able to call her directly, but that a message would be sent to her - he wasn't pleased about that either. Mr Posada demands that a prescription for his wife be sent to the pharmacy, yesterday.

## 2025-02-11 ENCOUNTER — TELEPHONE (OUTPATIENT)
Dept: ORTHOPEDIC SURGERY | Age: 80
End: 2025-02-11

## 2025-02-12 ENCOUNTER — TELEPHONE (OUTPATIENT)
Dept: ORTHOPEDIC SURGERY | Age: 80
End: 2025-02-12

## 2025-02-12 DIAGNOSIS — M25.552 LEFT HIP PAIN: ICD-10-CM

## 2025-02-12 DIAGNOSIS — S72.002D CLOSED HIP FRACTURE REQUIRING OPERATIVE REPAIR WITH ROUTINE HEALING, LEFT: ICD-10-CM

## 2025-02-12 DIAGNOSIS — M87.052 AVASCULAR NECROSIS OF BONE OF HIP, LEFT: Primary | ICD-10-CM

## 2025-02-12 DIAGNOSIS — M16.12 PRIMARY OSTEOARTHRITIS OF LEFT HIP: ICD-10-CM

## 2025-02-12 NOTE — TELEPHONE ENCOUNTER
Spoke with patient and rescheduled surgery for 4/7/2025. Patient aware that I will send a letter with the updated appointments  Patient verbalized understanding and voiced no other concerns at this time.

## 2025-02-20 ENCOUNTER — TELEPHONE (OUTPATIENT)
Dept: INTERNAL MEDICINE CLINIC | Facility: CLINIC | Age: 80
End: 2025-02-20

## 2025-02-20 NOTE — TELEPHONE ENCOUNTER
Patients spouse in office today. He states he was able to reach the ER and patient was treated for her UTI.

## 2025-02-24 ENCOUNTER — HOSPITAL ENCOUNTER (OUTPATIENT)
Dept: INFUSION THERAPY | Age: 80
Setting detail: INFUSION SERIES
End: 2025-02-24

## 2025-02-25 ENCOUNTER — OFFICE VISIT (OUTPATIENT)
Dept: INTERNAL MEDICINE CLINIC | Facility: CLINIC | Age: 80
End: 2025-02-25

## 2025-02-25 VITALS
TEMPERATURE: 97.5 F | WEIGHT: 150 LBS | BODY MASS INDEX: 24.99 KG/M2 | DIASTOLIC BLOOD PRESSURE: 61 MMHG | OXYGEN SATURATION: 97 % | SYSTOLIC BLOOD PRESSURE: 138 MMHG | HEART RATE: 58 BPM | HEIGHT: 65 IN

## 2025-02-25 DIAGNOSIS — M85.89 OSTEOPENIA OF MULTIPLE SITES: ICD-10-CM

## 2025-02-25 DIAGNOSIS — E11.22 TYPE 2 DIABETES MELLITUS WITH STAGE 3A CHRONIC KIDNEY DISEASE, WITHOUT LONG-TERM CURRENT USE OF INSULIN (HCC): Primary | ICD-10-CM

## 2025-02-25 DIAGNOSIS — Z94.84 STEM CELLS TRANSPLANT STATUS (HCC): ICD-10-CM

## 2025-02-25 DIAGNOSIS — F32.A DEPRESSION, UNSPECIFIED DEPRESSION TYPE: ICD-10-CM

## 2025-02-25 DIAGNOSIS — I10 PRIMARY HYPERTENSION: Chronic | ICD-10-CM

## 2025-02-25 DIAGNOSIS — M35.00 SJOGREN SYNDROME, UNSPECIFIED: ICD-10-CM

## 2025-02-25 DIAGNOSIS — N18.31 TYPE 2 DIABETES MELLITUS WITH STAGE 3A CHRONIC KIDNEY DISEASE, WITHOUT LONG-TERM CURRENT USE OF INSULIN (HCC): Primary | ICD-10-CM

## 2025-02-25 DIAGNOSIS — M87.052 AVASCULAR NECROSIS OF BONE OF HIP, LEFT (HCC): ICD-10-CM

## 2025-02-25 DIAGNOSIS — N18.31 STAGE 3A CHRONIC KIDNEY DISEASE (HCC): ICD-10-CM

## 2025-02-25 DIAGNOSIS — I50.43 ACUTE ON CHRONIC COMBINED SYSTOLIC AND DIASTOLIC CONGESTIVE HEART FAILURE (HCC): ICD-10-CM

## 2025-02-25 DIAGNOSIS — R53.81 DEBILITY: ICD-10-CM

## 2025-02-25 DIAGNOSIS — R41.89 COGNITIVE CHANGES: ICD-10-CM

## 2025-02-25 DIAGNOSIS — R42 VERTIGO: ICD-10-CM

## 2025-02-25 RX ORDER — CITALOPRAM HYDROBROMIDE 10 MG/1
10 TABLET ORAL DAILY
Qty: 90 TABLET | Refills: 1 | Status: SHIPPED | OUTPATIENT
Start: 2025-02-25

## 2025-02-25 RX ORDER — ACETAMINOPHEN 325 MG/1
650 TABLET ORAL
OUTPATIENT
Start: 2025-02-25

## 2025-02-25 RX ORDER — FAMOTIDINE 10 MG/ML
20 INJECTION, SOLUTION INTRAVENOUS
OUTPATIENT
Start: 2025-02-25

## 2025-02-25 RX ORDER — HYDROCORTISONE SODIUM SUCCINATE 100 MG/2ML
100 INJECTION INTRAMUSCULAR; INTRAVENOUS
OUTPATIENT
Start: 2025-02-25

## 2025-02-25 RX ORDER — ONDANSETRON 2 MG/ML
8 INJECTION INTRAMUSCULAR; INTRAVENOUS
OUTPATIENT
Start: 2025-02-25

## 2025-02-25 RX ORDER — SODIUM CHLORIDE 9 MG/ML
INJECTION, SOLUTION INTRAVENOUS CONTINUOUS
OUTPATIENT
Start: 2025-02-25

## 2025-02-25 RX ORDER — DIPHENHYDRAMINE HYDROCHLORIDE 50 MG/ML
50 INJECTION INTRAMUSCULAR; INTRAVENOUS
OUTPATIENT
Start: 2025-02-25

## 2025-02-25 RX ORDER — EPINEPHRINE 1 MG/ML
0.3 INJECTION, SOLUTION, CONCENTRATE INTRAVENOUS PRN
OUTPATIENT
Start: 2025-02-25

## 2025-02-25 RX ORDER — ALBUTEROL SULFATE 90 UG/1
4 INHALANT RESPIRATORY (INHALATION) PRN
OUTPATIENT
Start: 2025-02-25

## 2025-02-25 ASSESSMENT — PATIENT HEALTH QUESTIONNAIRE - PHQ9
3. TROUBLE FALLING OR STAYING ASLEEP: NOT AT ALL
8. MOVING OR SPEAKING SO SLOWLY THAT OTHER PEOPLE COULD HAVE NOTICED. OR THE OPPOSITE, BEING SO FIGETY OR RESTLESS THAT YOU HAVE BEEN MOVING AROUND A LOT MORE THAN USUAL: NOT AT ALL
SUM OF ALL RESPONSES TO PHQ QUESTIONS 1-9: 0
SUM OF ALL RESPONSES TO PHQ9 QUESTIONS 1 & 2: 0
7. TROUBLE CONCENTRATING ON THINGS, SUCH AS READING THE NEWSPAPER OR WATCHING TELEVISION: NOT AT ALL
4. FEELING TIRED OR HAVING LITTLE ENERGY: NOT AT ALL
6. FEELING BAD ABOUT YOURSELF - OR THAT YOU ARE A FAILURE OR HAVE LET YOURSELF OR YOUR FAMILY DOWN: NOT AT ALL
SUM OF ALL RESPONSES TO PHQ QUESTIONS 1-9: 0
SUM OF ALL RESPONSES TO PHQ QUESTIONS 1-9: 0
2. FEELING DOWN, DEPRESSED OR HOPELESS: NOT AT ALL
SUM OF ALL RESPONSES TO PHQ QUESTIONS 1-9: 0
9. THOUGHTS THAT YOU WOULD BE BETTER OFF DEAD, OR OF HURTING YOURSELF: NOT AT ALL
5. POOR APPETITE OR OVEREATING: NOT AT ALL
10. IF YOU CHECKED OFF ANY PROBLEMS, HOW DIFFICULT HAVE THESE PROBLEMS MADE IT FOR YOU TO DO YOUR WORK, TAKE CARE OF THINGS AT HOME, OR GET ALONG WITH OTHER PEOPLE: NOT DIFFICULT AT ALL
1. LITTLE INTEREST OR PLEASURE IN DOING THINGS: NOT AT ALL

## 2025-02-25 ASSESSMENT — ENCOUNTER SYMPTOMS
SHORTNESS OF BREATH: 0
NAUSEA: 0
VOMITING: 0
DIARRHEA: 0
ABDOMINAL PAIN: 0
WHEEZING: 0
COUGH: 0

## 2025-02-25 NOTE — PROGRESS NOTES
[facility]  Office Visit Note    Subjective:  Chief Complaint   Patient presents with    Follow-up       Patient ID: Mirtha Posada is a 79 y.o. female presenting to the office for the above.    79 year old female for follow up.  Accompanied by her .    ( is Mychal Posada, retired  and ).  They have one son, two grandchildren, and two great-grandchildren.  Have a home at [facility].  Has a Pug dog and a cat.     They are here today primarily to discuss patient's current condition.   Her  has noticed decline over the past 6 months or so.  She was hospitalized in December with vertigo.  She is to have surgery on left hip; history of avascular necrosis.  This was postponed to April due to a UTI.  She has completed antibiotic for UTI; will recheck urine today; denies current symptoms.  She had cardiology follow up earlier this month.      Her  reports that Mrs. Posada continues to spend most of her time in bed; she confirms this.  He states she will sometimes fall out of bed but not call for him to come help, and will sleep on the floor for hours.   She is still having episodes of vertigo. Her  states that she will occasionally seem incoherent and not be able to answer questions properly; these episodes do not last long. No other red flags with the episodes.   We have discussed the possibility of having home health aides come to assist them, but Mrs. Posada is reluctant to have this.   --Encourage healthy meals, hydration, getting out more, regular exercise, etc.   --Discussed options.  Will refer to neurology for evaluation; advised patient to contact office if they do not hear from the referral in the next 7-10 days; they express understanding.            Frequent UTIs, overactive bladder, urinary incontinence--  She has had multiple UTIs over the past year, treated with Keflex and Macrobid.  Started Estrace vaginal cream but has

## 2025-02-26 LAB
BILIRUBIN, URINE, POC: NEGATIVE
BLOOD URINE, POC: NEGATIVE
GLUCOSE URINE, POC: NEGATIVE
KETONES, URINE, POC: NEGATIVE
LEUKOCYTE ESTERASE, URINE, POC: NEGATIVE
NITRITE, URINE, POC: NEGATIVE
PH, URINE, POC: 5.5 (ref 4.6–8)
PROTEIN,URINE, POC: NEGATIVE
SPECIFIC GRAVITY, URINE, POC: 1.01 (ref 1–1.03)
URINALYSIS CLARITY, POC: CLEAR
URINALYSIS COLOR, POC: YELLOW
UROBILINOGEN, POC: NORMAL

## 2025-02-27 ENCOUNTER — TELEPHONE (OUTPATIENT)
Dept: INTERNAL MEDICINE CLINIC | Facility: CLINIC | Age: 80
End: 2025-02-27

## 2025-02-27 DIAGNOSIS — B37.31 YEAST VAGINITIS: Primary | ICD-10-CM

## 2025-02-27 DIAGNOSIS — M85.89 OSTEOPENIA OF MULTIPLE SITES: Primary | ICD-10-CM

## 2025-02-27 RX ORDER — ALBUTEROL SULFATE 90 UG/1
4 INHALANT RESPIRATORY (INHALATION) PRN
OUTPATIENT
Start: 2025-02-27

## 2025-02-27 RX ORDER — DIPHENHYDRAMINE HYDROCHLORIDE 50 MG/ML
50 INJECTION INTRAMUSCULAR; INTRAVENOUS
OUTPATIENT
Start: 2025-02-27

## 2025-02-27 RX ORDER — ACETAMINOPHEN 325 MG/1
650 TABLET ORAL
OUTPATIENT
Start: 2025-02-27

## 2025-02-27 RX ORDER — FLUCONAZOLE 150 MG/1
150 TABLET ORAL
Qty: 2 TABLET | Refills: 0 | Status: SHIPPED | OUTPATIENT
Start: 2025-02-27 | End: 2025-03-05

## 2025-02-27 RX ORDER — SODIUM CHLORIDE 9 MG/ML
INJECTION, SOLUTION INTRAVENOUS CONTINUOUS
OUTPATIENT
Start: 2025-02-27

## 2025-02-27 RX ORDER — ONDANSETRON 2 MG/ML
8 INJECTION INTRAMUSCULAR; INTRAVENOUS
OUTPATIENT
Start: 2025-02-27

## 2025-02-27 RX ORDER — HYDROCORTISONE SODIUM SUCCINATE 100 MG/2ML
100 INJECTION INTRAMUSCULAR; INTRAVENOUS
OUTPATIENT
Start: 2025-02-27

## 2025-02-27 RX ORDER — EPINEPHRINE 1 MG/ML
0.3 INJECTION, SOLUTION, CONCENTRATE INTRAVENOUS PRN
OUTPATIENT
Start: 2025-02-27

## 2025-02-27 RX ORDER — FAMOTIDINE 10 MG/ML
20 INJECTION, SOLUTION INTRAVENOUS
OUTPATIENT
Start: 2025-02-27

## 2025-02-27 NOTE — TELEPHONE ENCOUNTER
Patient is sure she has a yeast infection, would like medication sent to her pharmacy [facility]

## 2025-02-27 NOTE — TELEPHONE ENCOUNTER
Diflucan sent to pharmacy. Take one tablet today; may repeat in 3 days if still having symptoms.   Spoke to patient, above message was relayed, understanding was expressed.

## 2025-03-05 DIAGNOSIS — M85.89 OSTEOPENIA OF MULTIPLE SITES: ICD-10-CM

## 2025-03-05 LAB
ALBUMIN SERPL-MCNC: 3.5 G/DL (ref 3.2–4.6)
ALBUMIN/GLOB SERPL: 0.8 (ref 1–1.9)
ALP SERPL-CCNC: 53 U/L (ref 35–104)
ALT SERPL-CCNC: 8 U/L (ref 8–45)
ANION GAP SERPL CALC-SCNC: 14 MMOL/L (ref 7–16)
AST SERPL-CCNC: 24 U/L (ref 15–37)
BILIRUB SERPL-MCNC: 0.3 MG/DL (ref 0–1.2)
BUN SERPL-MCNC: 11 MG/DL (ref 8–23)
CALCIUM SERPL-MCNC: 10.1 MG/DL (ref 8.8–10.2)
CHLORIDE SERPL-SCNC: 99 MMOL/L (ref 98–107)
CO2 SERPL-SCNC: 27 MMOL/L (ref 20–29)
CREAT SERPL-MCNC: 1.16 MG/DL (ref 0.6–1.1)
GLOBULIN SER CALC-MCNC: 4.2 G/DL (ref 2.3–3.5)
GLUCOSE SERPL-MCNC: 128 MG/DL (ref 70–99)
POTASSIUM SERPL-SCNC: 3.7 MMOL/L (ref 3.5–5.1)
PROT SERPL-MCNC: 7.6 G/DL (ref 6.3–8.2)
SODIUM SERPL-SCNC: 140 MMOL/L (ref 136–145)

## 2025-03-07 ENCOUNTER — NURSE ONLY (OUTPATIENT)
Age: 80
End: 2025-03-07

## 2025-03-07 VITALS
RESPIRATION RATE: 16 BRPM | DIASTOLIC BLOOD PRESSURE: 77 MMHG | SYSTOLIC BLOOD PRESSURE: 153 MMHG | HEART RATE: 66 BPM | TEMPERATURE: 97.3 F | OXYGEN SATURATION: 93 %

## 2025-03-07 DIAGNOSIS — M85.89 OSTEOPENIA OF MULTIPLE SITES: Primary | ICD-10-CM

## 2025-03-07 RX ORDER — EPINEPHRINE 1 MG/ML
0.3 INJECTION, SOLUTION, CONCENTRATE INTRAVENOUS PRN
OUTPATIENT
Start: 2025-09-03

## 2025-03-07 RX ORDER — SODIUM CHLORIDE 9 MG/ML
INJECTION, SOLUTION INTRAVENOUS CONTINUOUS
OUTPATIENT
Start: 2025-09-03

## 2025-03-07 RX ORDER — HYDROCORTISONE SODIUM SUCCINATE 100 MG/2ML
100 INJECTION INTRAMUSCULAR; INTRAVENOUS
OUTPATIENT
Start: 2025-09-03

## 2025-03-07 RX ORDER — ONDANSETRON 2 MG/ML
8 INJECTION INTRAMUSCULAR; INTRAVENOUS
OUTPATIENT
Start: 2025-09-03

## 2025-03-07 RX ORDER — ALBUTEROL SULFATE 90 UG/1
4 INHALANT RESPIRATORY (INHALATION) PRN
OUTPATIENT
Start: 2025-09-03

## 2025-03-07 RX ORDER — ACETAMINOPHEN 325 MG/1
650 TABLET ORAL
OUTPATIENT
Start: 2025-09-03

## 2025-03-07 RX ORDER — DIPHENHYDRAMINE HYDROCHLORIDE 50 MG/ML
50 INJECTION INTRAMUSCULAR; INTRAVENOUS
OUTPATIENT
Start: 2025-09-03

## 2025-03-07 NOTE — PROGRESS NOTES
[facility]  [address], [address] [place] [address]  Office : [phone], Fax: [phone]        Osteoporosis pre-infusion/injection questionnaire:     1. In the past month, have you had or are you planning to have any dental surgery or major dental procedures?  No     2. Are you taking a calcium and vitamin D supplement? Yes     3. When was your last osteoporosis treatment?  08/21/2024      4. In the last year, have you had any fractures? No        Patient arrived ambulatory to infusion suite today for a repeat Prolia subcutaneous injection.  Pertinent labs drawn 03/05/2025 . Labs reviewed and are within medication administration parameters.  Prolia 60mg/ml injected SC into right arm. Patient tolerated injection well.    Advised patient to continue with calcium and vitamin D supplements post injection. Advised patient to call the ordering provider with any problems post injection.       Next Prolia appt scheduled 09/2025 prior to departure from infusion suite.     Pt ambulatory with steady gait out of infusion suite.

## 2025-03-22 RX ORDER — SODIUM CHLORIDE 0.9 % (FLUSH) 0.9 %
5-40 SYRINGE (ML) INJECTION PRN
OUTPATIENT
Start: 2025-03-22

## 2025-03-22 RX ORDER — ACETAMINOPHEN 325 MG/1
1000 TABLET ORAL ONCE
OUTPATIENT
Start: 2025-03-22 | End: 2025-03-22

## 2025-03-22 RX ORDER — SODIUM CHLORIDE 0.9 % (FLUSH) 0.9 %
5-40 SYRINGE (ML) INJECTION EVERY 12 HOURS SCHEDULED
OUTPATIENT
Start: 2025-03-22

## 2025-03-22 RX ORDER — SODIUM CHLORIDE 9 MG/ML
INJECTION, SOLUTION INTRAVENOUS PRN
OUTPATIENT
Start: 2025-03-22

## 2025-03-26 ENCOUNTER — HOSPITAL ENCOUNTER (OUTPATIENT)
Dept: SURGERY | Age: 80
Discharge: HOME | End: 2025-03-29
Payer: MEDICARE

## 2025-03-26 VITALS
WEIGHT: 147.4 LBS | OXYGEN SATURATION: 96 % | DIASTOLIC BLOOD PRESSURE: 79 MMHG | SYSTOLIC BLOOD PRESSURE: 178 MMHG | RESPIRATION RATE: 16 BRPM | HEART RATE: 73 BPM | HEIGHT: 65 IN | TEMPERATURE: 97.3 F | BODY MASS INDEX: 24.56 KG/M2

## 2025-03-26 DIAGNOSIS — M87.052 AVASCULAR NECROSIS OF BONE OF HIP, LEFT (HCC): ICD-10-CM

## 2025-03-26 LAB
ALBUMIN SERPL-MCNC: 3.3 G/DL (ref 3.2–4.6)
ALBUMIN/GLOB SERPL: 0.8 (ref 1–1.9)
ALP SERPL-CCNC: 57 U/L (ref 35–104)
ALT SERPL-CCNC: 11 U/L (ref 8–45)
ANION GAP SERPL CALC-SCNC: 12 MMOL/L (ref 7–16)
AST SERPL-CCNC: 18 U/L (ref 15–37)
BASOPHILS # BLD: 0.04 K/UL (ref 0–0.2)
BASOPHILS NFR BLD: 0.6 % (ref 0–2)
BILIRUB SERPL-MCNC: <0.2 MG/DL (ref 0–1.2)
BUN SERPL-MCNC: 24 MG/DL (ref 8–23)
CALCIUM SERPL-MCNC: 9.8 MG/DL (ref 8.8–10.2)
CHLORIDE SERPL-SCNC: 101 MMOL/L (ref 98–107)
CO2 SERPL-SCNC: 26 MMOL/L (ref 20–29)
CREAT SERPL-MCNC: 1.12 MG/DL (ref 0.6–1.1)
DIFFERENTIAL METHOD BLD: ABNORMAL
EOSINOPHIL # BLD: 0.18 K/UL (ref 0–0.8)
EOSINOPHIL NFR BLD: 2.9 % (ref 0.5–7.8)
ERYTHROCYTE [DISTWIDTH] IN BLOOD BY AUTOMATED COUNT: 15 % (ref 11.9–14.6)
EST. AVERAGE GLUCOSE BLD GHB EST-MCNC: 119 MG/DL
GLOBULIN SER CALC-MCNC: 4.3 G/DL (ref 2.3–3.5)
GLUCOSE SERPL-MCNC: 131 MG/DL (ref 70–99)
HBA1C MFR BLD: 5.8 % (ref 0–5.6)
HCT VFR BLD AUTO: 35.4 % (ref 35.8–46.3)
HGB BLD-MCNC: 11.2 G/DL (ref 11.7–15.4)
IMM GRANULOCYTES # BLD AUTO: 0.01 K/UL (ref 0–0.5)
IMM GRANULOCYTES NFR BLD AUTO: 0.2 % (ref 0–5)
INR PPP: 1
LYMPHOCYTES # BLD: 2.7 K/UL (ref 0.5–4.6)
LYMPHOCYTES NFR BLD: 43.2 % (ref 13–44)
MCH RBC QN AUTO: 29.9 PG (ref 26.1–32.9)
MCHC RBC AUTO-ENTMCNC: 31.6 G/DL (ref 31.4–35)
MCV RBC AUTO: 94.7 FL (ref 82–102)
MONOCYTES # BLD: 0.41 K/UL (ref 0.1–1.3)
MONOCYTES NFR BLD: 6.6 % (ref 4–12)
NEUTS SEG # BLD: 2.91 K/UL (ref 1.7–8.2)
NEUTS SEG NFR BLD: 46.5 % (ref 43–78)
NRBC # BLD: 0 K/UL (ref 0–0.2)
PLATELET # BLD AUTO: 290 K/UL (ref 150–450)
PMV BLD AUTO: 10.7 FL (ref 9.4–12.3)
POTASSIUM SERPL-SCNC: 3.8 MMOL/L (ref 3.5–5.1)
PROT SERPL-MCNC: 7.6 G/DL (ref 6.3–8.2)
PROTHROMBIN TIME: 13.2 SEC (ref 11.3–14.9)
RBC # BLD AUTO: 3.74 M/UL (ref 4.05–5.2)
SODIUM SERPL-SCNC: 139 MMOL/L (ref 136–145)
WBC # BLD AUTO: 6.3 K/UL (ref 4.3–11.1)

## 2025-03-26 PROCEDURE — 80053 COMPREHEN METABOLIC PANEL: CPT

## 2025-03-26 PROCEDURE — 83036 HEMOGLOBIN GLYCOSYLATED A1C: CPT

## 2025-03-26 PROCEDURE — 87641 MR-STAPH DNA AMP PROBE: CPT

## 2025-03-26 PROCEDURE — 85610 PROTHROMBIN TIME: CPT

## 2025-03-26 PROCEDURE — 85025 COMPLETE CBC W/AUTO DIFF WBC: CPT

## 2025-03-26 ASSESSMENT — PAIN DESCRIPTION - DESCRIPTORS: DESCRIPTORS: ACHING

## 2025-03-26 ASSESSMENT — PAIN DESCRIPTION - ORIENTATION: ORIENTATION: LEFT

## 2025-03-26 ASSESSMENT — PAIN - FUNCTIONAL ASSESSMENT: PAIN_FUNCTIONAL_ASSESSMENT: PREVENTS OR INTERFERES SOME ACTIVE ACTIVITIES AND ADLS

## 2025-03-26 ASSESSMENT — PAIN SCALES - GENERAL: PAINLEVEL_OUTOF10: 9

## 2025-03-26 ASSESSMENT — PAIN DESCRIPTION - LOCATION: LOCATION: HIP

## 2025-03-26 NOTE — PERIOP NOTE
Patient verified name and .    Order for consent was found in EHR and does not match case posting; patient verified. Case posting does not list wording \"direct anterior approach\". Case message sent to surgery scheduling.     Type 3 surgery, PAT walk in assessment complete.    Labs per surgeon: CBC,CMP, A1C, PT/INR; results pending.   Labs per anesthesia protocol: no additional lab work needed.   EKG: found in EHR dated 25 and within anesthesia guidelines.     Last cardiology office note with cardiac clearance dated 2/10/25, Echo dated 24, pulmonary office note dated 10/1/24 and nephrology office note dated 10/7/24 founf in EHR if needed for anesthesia reference.     MRSA/MSSA swab collected per policy. MD to consult pharmacy to dose Vanc if appropriate.     Hospital approved surgical skin cleanser and instructions to return bottle on DOS given per hospital policy.    Patient provided with handouts including Guide to Surgery, Pain Management, Preventing Surgical Site Infections, and [facility] Brochure.    Patient answered medical/surgical history questions at their best of ability. All prior to admission medications documented in Epic. Original medication prescription bottle was not visualized during patient appointment.     Patient instructed to hold all vitamins 3 weeks prior to surgery and NSAIDS 5 days prior to surgery.     Patient teach back successful and patient demonstrates knowledge of instruction.

## 2025-03-26 NOTE — PERIOP NOTE
PLEASE CONTINUE TAKING ALL PRESCRIPTION MEDICATIONS UP TO THE DAY OF SURGERY UNLESS OTHERWISE DIRECTED BELOW.    DISCONTINUE all vitamins, herbals and supplements 3 weeks prior to surgery. DISCONTINUE Non-Steroidal Anti-Inflammatory (NSAIDS) such as Advil, Ibuprofen, Motrin, Naproxen and Aleve 5 days prior to surgery.       Home Medications to take  the day of surgery    Allopurinol, Zyrtec if needed, nexium if needed, gabapentin, metformin, metoprolol           Home Medications to Hold- please continue all other medications except these.    None         Comments   On the day before surgery please take Acetaminophen 1000mg in the morning and then again before bed. You may substitute for Tylenol 650 mg.      Bring Dynahex wash and Incentive Spirometer with you to hospital on the day of surgery.            Please do not bring home medications with you on the day of surgery unless otherwise directed by your nurse.  If you are instructed to bring home medications, please give them to your nurse as they will be administered by the nursing staff.    If you have any questions, please call [facility] [phone] option [phone].    A copy of this note was provided to the patient for reference.

## 2025-03-27 LAB
MRSA DNA SPEC QL NAA+PROBE: DETECTED
S AUREUS CPE NOSE QL NAA+PROBE: DETECTED

## 2025-03-31 ENCOUNTER — OFFICE VISIT (OUTPATIENT)
Dept: ORTHOPEDIC SURGERY | Age: 80
End: 2025-03-31

## 2025-03-31 VITALS — HEIGHT: 65 IN | BODY MASS INDEX: 24.96 KG/M2 | WEIGHT: 149.8 LBS

## 2025-03-31 DIAGNOSIS — M87.052 AVASCULAR NECROSIS OF BONE OF HIP, LEFT (HCC): Primary | ICD-10-CM

## 2025-03-31 PROCEDURE — 99024 POSTOP FOLLOW-UP VISIT: CPT | Performed by: ORTHOPAEDIC SURGERY

## 2025-03-31 RX ORDER — CELECOXIB 200 MG/1
200 CAPSULE ORAL 2 TIMES DAILY
Qty: 60 CAPSULE | Refills: 1 | Status: SHIPPED | OUTPATIENT
Start: 2025-03-31

## 2025-03-31 RX ORDER — TRAMADOL HYDROCHLORIDE 50 MG/1
50 TABLET ORAL EVERY 6 HOURS PRN
Qty: 28 TABLET | Refills: 0 | Status: SHIPPED | OUTPATIENT
Start: 2025-03-31 | End: 2025-04-07

## 2025-03-31 RX ORDER — ASPIRIN 81 MG/1
81 TABLET ORAL EVERY 12 HOURS
Qty: 70 TABLET | Refills: 0 | Status: SHIPPED | OUTPATIENT
Start: 2025-03-31

## 2025-03-31 RX ORDER — SENNA AND DOCUSATE SODIUM 50; 8.6 MG/1; MG/1
1 TABLET, FILM COATED ORAL DAILY
Qty: 30 TABLET | Refills: 1 | Status: SHIPPED | OUTPATIENT
Start: 2025-03-31

## 2025-03-31 RX ORDER — OXYCODONE HYDROCHLORIDE 5 MG/1
5-10 TABLET ORAL EVERY 4 HOURS PRN
Qty: 30 TABLET | Refills: 0 | Status: SHIPPED | OUTPATIENT
Start: 2025-03-31 | End: 2025-04-07

## 2025-03-31 RX ORDER — ACETAMINOPHEN 325 MG/1
975 TABLET ORAL EVERY 8 HOURS
Qty: 60 TABLET | Refills: 2 | Status: SHIPPED | OUTPATIENT
Start: 2025-03-31

## 2025-03-31 NOTE — PROGRESS NOTES
Patient ID:    Mirtha Posada  [id number]  79 y.o.  1945    Today: March 31, 2025          Chief Complaint: Left hip pain      Patient returns to office today with chief complaint of left hip pain.  This is her preop visit.         Past Medical History:  Past Medical History:   Diagnosis Date    Anesthesia complication     slow to wake up    Ankle fracture 2014    Ankle osteomyelitis, left ([facility]) 2014    Breast cancer ([facility])     Chemotherapy-induced neuropathy     Chronic anemia     Chronic diarrhea 06/08/2023    Chronic systolic CHF (congestive heart failure) ([facility])     Followed by [facility].    CKD (chronic kidney disease) stage 3, GFR 30-59 ml/min ([facility])     Colon polyp 2020    Dental disease     Depression     Dilated cardiomyopathy ([facility])     Dizziness     Dry eye syndrome of both eyes     Dyslipidemia     Elevated blood uric acid level     Essential hypertension     Fracture of nasal bone     Fracture of right patella 2013    Gout     Headache     History of DVT (deep vein thrombosis)     pt states she had DVT during chemo for breast CA    History of echocardiogram 08/31/2024    EF of 50 - 55%    History of left breast cancer     Mastectomy, chemo and radiation on left breast in 2001. Stem cell transplant    History of MRSA infection     history of MRSA infection in her left ankle    History of stem cell transplant ([facility])     [place] (hard of hearing)     wears hearing aids    Hyperlipidemia     Left leg weakness 9/8/2020    Non-ischemic cardiomyopathy ([facility]) 2018    Echo 50-55%, Cath-minimal CAD. EF normalized    TYLER (obstructive sleep apnea)     resolved with sx    Osteoarthritis     Osteopenia     Peripheral sensory-motor axonal polyneuropathy 10/17/2020    PONV (postoperative nausea and vomiting)     S/P cardiac cath 2009    Statin intolerance     Tibial fracture 2016    Tibial plateau fracture 8/1/2016    Last Assessment & Plan:  Formatting of this note might be different from the original. Pod 2

## 2025-03-31 NOTE — H&P (VIEW-ONLY)
Patient ID:    Mirtha Posada  [id number]  79 y.o.  1945    Today: March 31, 2025          Chief Complaint: Left hip pain      Patient returns to office today with chief complaint of left hip pain.  This is her preop visit.         Past Medical History:  Past Medical History:   Diagnosis Date    Anesthesia complication     slow to wake up    Ankle fracture 2014    Ankle osteomyelitis, left ([facility]) 2014    Breast cancer ([facility])     Chemotherapy-induced neuropathy     Chronic anemia     Chronic diarrhea 06/08/2023    Chronic systolic CHF (congestive heart failure) ([facility])     Followed by [facility].    CKD (chronic kidney disease) stage 3, GFR 30-59 ml/min ([facility])     Colon polyp 2020    Dental disease     Depression     Dilated cardiomyopathy ([facility])     Dizziness     Dry eye syndrome of both eyes     Dyslipidemia     Elevated blood uric acid level     Essential hypertension     Fracture of nasal bone     Fracture of right patella 2013    Gout     Headache     History of DVT (deep vein thrombosis)     pt states she had DVT during chemo for breast CA    History of echocardiogram 08/31/2024    EF of 50 - 55%    History of left breast cancer     Mastectomy, chemo and radiation on left breast in 2001. Stem cell transplant    History of MRSA infection     history of MRSA infection in her left ankle    History of stem cell transplant ([facility])     [place] (hard of hearing)     wears hearing aids    Hyperlipidemia     Left leg weakness 9/8/2020    Non-ischemic cardiomyopathy ([facility]) 2018    Echo 50-55%, Cath-minimal CAD. EF normalized    TYLER (obstructive sleep apnea)     resolved with sx    Osteoarthritis     Osteopenia     Peripheral sensory-motor axonal polyneuropathy 10/17/2020    PONV (postoperative nausea and vomiting)     S/P cardiac cath 2009    Statin intolerance     Tibial fracture 2016    Tibial plateau fracture 8/1/2016    Last Assessment & Plan:  Formatting of this note might be different from the original. Pod 2

## 2025-04-07 ENCOUNTER — ANESTHESIA EVENT (OUTPATIENT)
Dept: SURGERY | Age: 80
DRG: 470 | End: 2025-04-07
Payer: MEDICARE

## 2025-04-07 ENCOUNTER — HOSPITAL ENCOUNTER (INPATIENT)
Age: 80
LOS: 1 days | Discharge: HOME HEALTH | DRG: 470 | End: 2025-04-08
Attending: ORTHOPAEDIC SURGERY | Admitting: ORTHOPAEDIC SURGERY
Payer: MEDICARE

## 2025-04-07 ENCOUNTER — ANESTHESIA (OUTPATIENT)
Dept: SURGERY | Age: 80
DRG: 470 | End: 2025-04-07
Payer: MEDICARE

## 2025-04-07 ENCOUNTER — APPOINTMENT (OUTPATIENT)
Dept: GENERAL RADIOLOGY | Age: 80
DRG: 470 | End: 2025-04-07
Attending: ORTHOPAEDIC SURGERY
Payer: MEDICARE

## 2025-04-07 ENCOUNTER — PREP FOR PROCEDURE (OUTPATIENT)
Dept: ORTHOPEDIC SURGERY | Age: 80
End: 2025-04-07

## 2025-04-07 DIAGNOSIS — M16.12 PRIMARY OSTEOARTHRITIS OF LEFT HIP: Primary | ICD-10-CM

## 2025-04-07 LAB
ABO + RH BLD: NORMAL
BLOOD GROUP ANTIBODIES SERPL: NORMAL
GLUCOSE BLD STRIP.AUTO-MCNC: 104 MG/DL (ref 65–100)
GLUCOSE BLD STRIP.AUTO-MCNC: 121 MG/DL (ref 65–100)
SERVICE CMNT-IMP: ABNORMAL
SERVICE CMNT-IMP: ABNORMAL
SPECIMEN EXP DATE BLD: NORMAL

## 2025-04-07 PROCEDURE — 2580000003 HC RX 258: Performed by: ORTHOPAEDIC SURGERY

## 2025-04-07 PROCEDURE — 94761 N-INVAS EAR/PLS OXIMETRY MLT: CPT

## 2025-04-07 PROCEDURE — 0SRB0JA REPLACEMENT OF LEFT HIP JOINT WITH SYNTHETIC SUBSTITUTE, UNCEMENTED, OPEN APPROACH: ICD-10-PCS | Performed by: ORTHOPAEDIC SURGERY

## 2025-04-07 PROCEDURE — 73501 X-RAY EXAM HIP UNI 1 VIEW: CPT

## 2025-04-07 PROCEDURE — 6360000002 HC RX W HCPCS: Performed by: ORTHOPAEDIC SURGERY

## 2025-04-07 PROCEDURE — 6360000002 HC RX W HCPCS: Performed by: NURSE ANESTHETIST, CERTIFIED REGISTERED

## 2025-04-07 PROCEDURE — 72170 X-RAY EXAM OF PELVIS: CPT

## 2025-04-07 PROCEDURE — 97535 SELF CARE MNGMENT TRAINING: CPT

## 2025-04-07 PROCEDURE — 2500000003 HC RX 250 WO HCPCS: Performed by: NURSE ANESTHETIST, CERTIFIED REGISTERED

## 2025-04-07 PROCEDURE — 2720000010 HC SURG SUPPLY STERILE: Performed by: ORTHOPAEDIC SURGERY

## 2025-04-07 PROCEDURE — 2580000003 HC RX 258: Performed by: ANESTHESIOLOGY

## 2025-04-07 PROCEDURE — 7100000000 HC PACU RECOVERY - FIRST 15 MIN: Performed by: ORTHOPAEDIC SURGERY

## 2025-04-07 PROCEDURE — 6370000000 HC RX 637 (ALT 250 FOR IP): Performed by: ORTHOPAEDIC SURGERY

## 2025-04-07 PROCEDURE — C1713 ANCHOR/SCREW BN/BN,TIS/BN: HCPCS | Performed by: ORTHOPAEDIC SURGERY

## 2025-04-07 PROCEDURE — 3700000000 HC ANESTHESIA ATTENDED CARE: Performed by: ORTHOPAEDIC SURGERY

## 2025-04-07 PROCEDURE — 7100000001 HC PACU RECOVERY - ADDTL 15 MIN: Performed by: ORTHOPAEDIC SURGERY

## 2025-04-07 PROCEDURE — 3700000001 HC ADD 15 MINUTES (ANESTHESIA): Performed by: ORTHOPAEDIC SURGERY

## 2025-04-07 PROCEDURE — 3600000005 HC SURGERY LEVEL 5 BASE: Performed by: ORTHOPAEDIC SURGERY

## 2025-04-07 PROCEDURE — 3600000015 HC SURGERY LEVEL 5 ADDTL 15MIN: Performed by: ORTHOPAEDIC SURGERY

## 2025-04-07 PROCEDURE — 6370000000 HC RX 637 (ALT 250 FOR IP): Performed by: ANESTHESIOLOGY

## 2025-04-07 PROCEDURE — 97165 OT EVAL LOW COMPLEX 30 MIN: CPT

## 2025-04-07 PROCEDURE — 1100000000 HC RM PRIVATE

## 2025-04-07 PROCEDURE — 6370000000 HC RX 637 (ALT 250 FOR IP): Performed by: PHYSICIAN ASSISTANT

## 2025-04-07 PROCEDURE — 2500000003 HC RX 250 WO HCPCS: Performed by: ORTHOPAEDIC SURGERY

## 2025-04-07 PROCEDURE — 27132 TOTAL HIP ARTHROPLASTY: CPT | Performed by: ORTHOPAEDIC SURGERY

## 2025-04-07 PROCEDURE — 86850 RBC ANTIBODY SCREEN: CPT

## 2025-04-07 PROCEDURE — 86901 BLOOD TYPING SEROLOGIC RH(D): CPT

## 2025-04-07 PROCEDURE — 6360000002 HC RX W HCPCS: Performed by: ANESTHESIOLOGY

## 2025-04-07 PROCEDURE — 86900 BLOOD TYPING SEROLOGIC ABO: CPT

## 2025-04-07 PROCEDURE — 2709999900 HC NON-CHARGEABLE SUPPLY: Performed by: ORTHOPAEDIC SURGERY

## 2025-04-07 PROCEDURE — 82962 GLUCOSE BLOOD TEST: CPT

## 2025-04-07 PROCEDURE — C1776 JOINT DEVICE (IMPLANTABLE): HCPCS | Performed by: ORTHOPAEDIC SURGERY

## 2025-04-07 DEVICE — BIOLOX DELTA CERAMIC FEMORAL HEAD +5.0 36MM DIA 12/14 TAPER
Type: IMPLANTABLE DEVICE | Site: HIP | Status: FUNCTIONAL
Brand: BIOLOX DELTA

## 2025-04-07 DEVICE — ACTIS DUOFIX HIP PROSTHESIS (FEMORAL STEM 12/14 TAPER CEMENTLESS SIZE 5 STD COLLAR)  CE
Type: IMPLANTABLE DEVICE | Site: HIP | Status: FUNCTIONAL
Brand: ACTIS

## 2025-04-07 DEVICE — PINNACLE GRIPTION ACETABULAR SHELL SECTOR 52MM OD
Type: IMPLANTABLE DEVICE | Site: HIP | Status: FUNCTIONAL
Brand: PINNACLE GRIPTION

## 2025-04-07 DEVICE — PINNACLE HIP SOLUTIONS ALTRX POLYETHYLENE ACETABULAR LINER NEUTRAL 36MM ID 52MM OD
Type: IMPLANTABLE DEVICE | Site: HIP | Status: FUNCTIONAL
Brand: PINNACLE ALTRX

## 2025-04-07 DEVICE — HIP H2 TOT ADV OTHER HD IMPL CAPPED SYNTHES: Type: IMPLANTABLE DEVICE | Site: HIP | Status: FUNCTIONAL

## 2025-04-07 DEVICE — PINNACLE CANCELLOUS BONE SCREW 6.5MM X 15MM
Type: IMPLANTABLE DEVICE | Site: HIP | Status: FUNCTIONAL
Brand: PINNACLE

## 2025-04-07 DEVICE — PINNACLE CANCELLOUS BONE SCREW 6.5MM X 25MM
Type: IMPLANTABLE DEVICE | Site: HIP | Status: FUNCTIONAL
Brand: PINNACLE

## 2025-04-07 RX ORDER — MIDAZOLAM HYDROCHLORIDE 2 MG/2ML
2 INJECTION, SOLUTION INTRAMUSCULAR; INTRAVENOUS
Status: DISCONTINUED | OUTPATIENT
Start: 2025-04-07 | End: 2025-04-07 | Stop reason: HOSPADM

## 2025-04-07 RX ORDER — SODIUM CHLORIDE 9 MG/ML
INJECTION, SOLUTION INTRAVENOUS PRN
Status: DISCONTINUED | OUTPATIENT
Start: 2025-04-07 | End: 2025-04-07 | Stop reason: SDUPTHER

## 2025-04-07 RX ORDER — ROCURONIUM BROMIDE 10 MG/ML
INJECTION, SOLUTION INTRAVENOUS
Status: DISCONTINUED | OUTPATIENT
Start: 2025-04-07 | End: 2025-04-07 | Stop reason: SDUPTHER

## 2025-04-07 RX ORDER — PROPOFOL 10 MG/ML
INJECTION, EMULSION INTRAVENOUS
Status: DISCONTINUED | OUTPATIENT
Start: 2025-04-07 | End: 2025-04-07 | Stop reason: SDUPTHER

## 2025-04-07 RX ORDER — ACETAMINOPHEN 500 MG
1000 TABLET ORAL EVERY 6 HOURS SCHEDULED
Status: DISCONTINUED | OUTPATIENT
Start: 2025-04-07 | End: 2025-04-08 | Stop reason: HOSPADM

## 2025-04-07 RX ORDER — DEXAMETHASONE SODIUM PHOSPHATE 10 MG/ML
INJECTION, SOLUTION INTRAMUSCULAR; INTRAVENOUS
Status: DISCONTINUED | OUTPATIENT
Start: 2025-04-07 | End: 2025-04-07 | Stop reason: SDUPTHER

## 2025-04-07 RX ORDER — TRAMADOL HYDROCHLORIDE 50 MG/1
50 TABLET ORAL EVERY 6 HOURS PRN
Status: DISCONTINUED | OUTPATIENT
Start: 2025-04-07 | End: 2025-04-08 | Stop reason: HOSPADM

## 2025-04-07 RX ORDER — NALOXONE HYDROCHLORIDE 0.4 MG/ML
INJECTION, SOLUTION INTRAMUSCULAR; INTRAVENOUS; SUBCUTANEOUS PRN
Status: DISCONTINUED | OUTPATIENT
Start: 2025-04-07 | End: 2025-04-07 | Stop reason: HOSPADM

## 2025-04-07 RX ORDER — DIPHENHYDRAMINE HYDROCHLORIDE 50 MG/ML
25 INJECTION, SOLUTION INTRAMUSCULAR; INTRAVENOUS EVERY 6 HOURS PRN
Status: DISCONTINUED | OUTPATIENT
Start: 2025-04-07 | End: 2025-04-08 | Stop reason: HOSPADM

## 2025-04-07 RX ORDER — ONDANSETRON 4 MG/1
4 TABLET, ORALLY DISINTEGRATING ORAL EVERY 8 HOURS PRN
Status: DISCONTINUED | OUTPATIENT
Start: 2025-04-07 | End: 2025-04-08 | Stop reason: HOSPADM

## 2025-04-07 RX ORDER — SODIUM CHLORIDE 0.9 % (FLUSH) 0.9 %
5-40 SYRINGE (ML) INJECTION EVERY 12 HOURS SCHEDULED
Status: DISCONTINUED | OUTPATIENT
Start: 2025-04-07 | End: 2025-04-08 | Stop reason: HOSPADM

## 2025-04-07 RX ORDER — BISACODYL 10 MG
10 SUPPOSITORY, RECTAL RECTAL DAILY PRN
Status: DISCONTINUED | OUTPATIENT
Start: 2025-04-07 | End: 2025-04-08 | Stop reason: HOSPADM

## 2025-04-07 RX ORDER — DIPHENHYDRAMINE HCL 25 MG
25 CAPSULE ORAL EVERY 6 HOURS PRN
Status: DISCONTINUED | OUTPATIENT
Start: 2025-04-07 | End: 2025-04-08 | Stop reason: HOSPADM

## 2025-04-07 RX ORDER — ACETAMINOPHEN 500 MG
1000 TABLET ORAL ONCE
Status: COMPLETED | OUTPATIENT
Start: 2025-04-07 | End: 2025-04-07

## 2025-04-07 RX ORDER — KETOROLAC TROMETHAMINE 15 MG/ML
15 INJECTION, SOLUTION INTRAMUSCULAR; INTRAVENOUS EVERY 6 HOURS
Status: COMPLETED | OUTPATIENT
Start: 2025-04-07 | End: 2025-04-08

## 2025-04-07 RX ORDER — ASPIRIN 81 MG/1
81 TABLET ORAL 2 TIMES DAILY
Status: DISCONTINUED | OUTPATIENT
Start: 2025-04-07 | End: 2025-04-08 | Stop reason: HOSPADM

## 2025-04-07 RX ORDER — SODIUM CHLORIDE 0.9 % (FLUSH) 0.9 %
5-40 SYRINGE (ML) INJECTION PRN
Status: DISCONTINUED | OUTPATIENT
Start: 2025-04-07 | End: 2025-04-07 | Stop reason: HOSPADM

## 2025-04-07 RX ORDER — LIDOCAINE HYDROCHLORIDE 20 MG/ML
INJECTION, SOLUTION EPIDURAL; INFILTRATION; INTRACAUDAL; PERINEURAL
Status: DISCONTINUED | OUTPATIENT
Start: 2025-04-07 | End: 2025-04-07 | Stop reason: SDUPTHER

## 2025-04-07 RX ORDER — BISACODYL 5 MG/1
5 TABLET, DELAYED RELEASE ORAL DAILY PRN
Status: DISCONTINUED | OUTPATIENT
Start: 2025-04-07 | End: 2025-04-08 | Stop reason: HOSPADM

## 2025-04-07 RX ORDER — SODIUM CHLORIDE 9 MG/ML
INJECTION, SOLUTION INTRAVENOUS PRN
Status: DISCONTINUED | OUTPATIENT
Start: 2025-04-07 | End: 2025-04-07 | Stop reason: HOSPADM

## 2025-04-07 RX ORDER — GLYCOPYRROLATE 0.2 MG/ML
INJECTION INTRAMUSCULAR; INTRAVENOUS
Status: DISCONTINUED | OUTPATIENT
Start: 2025-04-07 | End: 2025-04-07 | Stop reason: SDUPTHER

## 2025-04-07 RX ORDER — OXYCODONE HYDROCHLORIDE 5 MG/1
5 TABLET ORAL
Status: DISCONTINUED | OUTPATIENT
Start: 2025-04-07 | End: 2025-04-07 | Stop reason: HOSPADM

## 2025-04-07 RX ORDER — SODIUM CHLORIDE, SODIUM LACTATE, POTASSIUM CHLORIDE, CALCIUM CHLORIDE 600; 310; 30; 20 MG/100ML; MG/100ML; MG/100ML; MG/100ML
INJECTION, SOLUTION INTRAVENOUS CONTINUOUS
Status: DISCONTINUED | OUTPATIENT
Start: 2025-04-07 | End: 2025-04-07 | Stop reason: HOSPADM

## 2025-04-07 RX ORDER — SODIUM CHLORIDE 0.9 % (FLUSH) 0.9 %
5-40 SYRINGE (ML) INJECTION PRN
Status: DISCONTINUED | OUTPATIENT
Start: 2025-04-07 | End: 2025-04-08 | Stop reason: HOSPADM

## 2025-04-07 RX ORDER — TRAMADOL HYDROCHLORIDE 50 MG/1
100 TABLET ORAL EVERY 6 HOURS PRN
Status: DISCONTINUED | OUTPATIENT
Start: 2025-04-07 | End: 2025-04-08 | Stop reason: HOSPADM

## 2025-04-07 RX ORDER — SODIUM CHLORIDE 0.9 % (FLUSH) 0.9 %
5-40 SYRINGE (ML) INJECTION EVERY 12 HOURS SCHEDULED
Status: DISCONTINUED | OUTPATIENT
Start: 2025-04-07 | End: 2025-04-07 | Stop reason: SDUPTHER

## 2025-04-07 RX ORDER — CYCLOBENZAPRINE HCL 10 MG
10 TABLET ORAL EVERY 12 HOURS PRN
Status: DISCONTINUED | OUTPATIENT
Start: 2025-04-07 | End: 2025-04-08 | Stop reason: HOSPADM

## 2025-04-07 RX ORDER — SODIUM CHLORIDE 9 MG/ML
INJECTION, SOLUTION INTRAVENOUS PRN
Status: DISCONTINUED | OUTPATIENT
Start: 2025-04-07 | End: 2025-04-08 | Stop reason: HOSPADM

## 2025-04-07 RX ORDER — SODIUM CHLORIDE 0.9 % (FLUSH) 0.9 %
5-40 SYRINGE (ML) INJECTION EVERY 12 HOURS SCHEDULED
Status: DISCONTINUED | OUTPATIENT
Start: 2025-04-07 | End: 2025-04-07 | Stop reason: HOSPADM

## 2025-04-07 RX ORDER — LIDOCAINE HYDROCHLORIDE 10 MG/ML
1 INJECTION, SOLUTION INFILTRATION; PERINEURAL
Status: DISCONTINUED | OUTPATIENT
Start: 2025-04-07 | End: 2025-04-07 | Stop reason: HOSPADM

## 2025-04-07 RX ORDER — KETAMINE HCL IN NACL, ISO-OSM 20 MG/2 ML
SYRINGE (ML) INJECTION
Status: DISCONTINUED | OUTPATIENT
Start: 2025-04-07 | End: 2025-04-07 | Stop reason: SDUPTHER

## 2025-04-07 RX ORDER — SODIUM CHLORIDE 9 MG/ML
INJECTION, SOLUTION INTRAVENOUS CONTINUOUS
Status: DISCONTINUED | OUTPATIENT
Start: 2025-04-07 | End: 2025-04-08 | Stop reason: HOSPADM

## 2025-04-07 RX ORDER — ONDANSETRON 2 MG/ML
4 INJECTION INTRAMUSCULAR; INTRAVENOUS EVERY 6 HOURS PRN
Status: DISCONTINUED | OUTPATIENT
Start: 2025-04-07 | End: 2025-04-08 | Stop reason: HOSPADM

## 2025-04-07 RX ORDER — PROCHLORPERAZINE EDISYLATE 5 MG/ML
5 INJECTION INTRAMUSCULAR; INTRAVENOUS
Status: DISCONTINUED | OUTPATIENT
Start: 2025-04-07 | End: 2025-04-07 | Stop reason: HOSPADM

## 2025-04-07 RX ORDER — TRANEXAMIC ACID 100 MG/ML
INJECTION, SOLUTION INTRAVENOUS
Status: DISCONTINUED | OUTPATIENT
Start: 2025-04-07 | End: 2025-04-07 | Stop reason: SDUPTHER

## 2025-04-07 RX ORDER — FENTANYL CITRATE 50 UG/ML
INJECTION, SOLUTION INTRAMUSCULAR; INTRAVENOUS
Status: DISCONTINUED | OUTPATIENT
Start: 2025-04-07 | End: 2025-04-07 | Stop reason: SDUPTHER

## 2025-04-07 RX ORDER — ACETAMINOPHEN 500 MG
1000 TABLET ORAL ONCE
Status: DISCONTINUED | OUTPATIENT
Start: 2025-04-07 | End: 2025-04-07 | Stop reason: SDUPTHER

## 2025-04-07 RX ORDER — NEOSTIGMINE METHYLSULFATE 1 MG/ML
INJECTION INTRAVENOUS
Status: DISCONTINUED | OUTPATIENT
Start: 2025-04-07 | End: 2025-04-07 | Stop reason: SDUPTHER

## 2025-04-07 RX ORDER — HYDROMORPHONE HYDROCHLORIDE 2 MG/ML
INJECTION, SOLUTION INTRAMUSCULAR; INTRAVENOUS; SUBCUTANEOUS
Status: DISCONTINUED | OUTPATIENT
Start: 2025-04-07 | End: 2025-04-07 | Stop reason: SDUPTHER

## 2025-04-07 RX ORDER — EPHEDRINE SULFATE 5 MG/ML
INJECTION INTRAVENOUS
Status: DISCONTINUED | OUTPATIENT
Start: 2025-04-07 | End: 2025-04-07 | Stop reason: SDUPTHER

## 2025-04-07 RX ORDER — SODIUM CHLORIDE 0.9 % (FLUSH) 0.9 %
5-40 SYRINGE (ML) INJECTION PRN
Status: DISCONTINUED | OUTPATIENT
Start: 2025-04-07 | End: 2025-04-07 | Stop reason: SDUPTHER

## 2025-04-07 RX ORDER — ROPIVACAINE HYDROCHLORIDE 2 MG/ML
INJECTION, SOLUTION EPIDURAL; INFILTRATION; PERINEURAL PRN
Status: DISCONTINUED | OUTPATIENT
Start: 2025-04-07 | End: 2025-04-07 | Stop reason: ALTCHOICE

## 2025-04-07 RX ADMIN — HYDROMORPHONE HYDROCHLORIDE 0.2 MG: 2 INJECTION INTRAMUSCULAR; INTRAVENOUS; SUBCUTANEOUS at 11:19

## 2025-04-07 RX ADMIN — ASPIRIN 81 MG: 81 TABLET, COATED ORAL at 21:44

## 2025-04-07 RX ADMIN — ACETAMINOPHEN 1000 MG: 500 TABLET, FILM COATED ORAL at 17:57

## 2025-04-07 RX ADMIN — EPHEDRINE SULFATE 10 MG: 5 INJECTION INTRAVENOUS at 10:48

## 2025-04-07 RX ADMIN — FENTANYL CITRATE 100 MCG: 50 INJECTION, SOLUTION INTRAMUSCULAR; INTRAVENOUS at 10:31

## 2025-04-07 RX ADMIN — KETOROLAC TROMETHAMINE 15 MG: 15 INJECTION, SOLUTION INTRAMUSCULAR; INTRAVENOUS at 16:20

## 2025-04-07 RX ADMIN — SODIUM CHLORIDE, PRESERVATIVE FREE 10 ML: 5 INJECTION INTRAVENOUS at 21:44

## 2025-04-07 RX ADMIN — TRAMADOL HYDROCHLORIDE 100 MG: 50 TABLET, COATED ORAL at 21:44

## 2025-04-07 RX ADMIN — NEOSTIGMINE METHYLSULFATE 5 MG: 1 INJECTION INTRAVENOUS at 12:28

## 2025-04-07 RX ADMIN — VANCOMYCIN HYDROCHLORIDE 1000 MG: 1 INJECTION, POWDER, LYOPHILIZED, FOR SOLUTION INTRAVENOUS at 09:48

## 2025-04-07 RX ADMIN — SODIUM CHLORIDE: 0.9 INJECTION, SOLUTION INTRAVENOUS at 14:24

## 2025-04-07 RX ADMIN — PROPOFOL 25 MG: 10 INJECTION, EMULSION INTRAVENOUS at 11:22

## 2025-04-07 RX ADMIN — PROPOFOL 150 MG: 10 INJECTION, EMULSION INTRAVENOUS at 10:31

## 2025-04-07 RX ADMIN — KETOROLAC TROMETHAMINE 15 MG: 15 INJECTION, SOLUTION INTRAMUSCULAR; INTRAVENOUS at 21:44

## 2025-04-07 RX ADMIN — Medication 2 G: at 10:19

## 2025-04-07 RX ADMIN — PHENOL 1 SPRAY: 1.5 LIQUID ORAL at 21:45

## 2025-04-07 RX ADMIN — ONDANSETRON 4 MG: 4 TABLET, ORALLY DISINTEGRATING ORAL at 16:28

## 2025-04-07 RX ADMIN — LIDOCAINE HYDROCHLORIDE 100 MG: 20 INJECTION, SOLUTION EPIDURAL; INFILTRATION; INTRACAUDAL; PERINEURAL at 10:31

## 2025-04-07 RX ADMIN — CEFAZOLIN 2000 MG: 10 INJECTION, POWDER, FOR SOLUTION INTRAVENOUS at 17:57

## 2025-04-07 RX ADMIN — EPHEDRINE SULFATE 10 MG: 5 INJECTION INTRAVENOUS at 11:56

## 2025-04-07 RX ADMIN — SODIUM CHLORIDE, SODIUM LACTATE, POTASSIUM CHLORIDE, AND CALCIUM CHLORIDE: 600; 310; 30; 20 INJECTION, SOLUTION INTRAVENOUS at 11:19

## 2025-04-07 RX ADMIN — GLYCOPYRROLATE 0.1 MG: 0.2 INJECTION INTRAMUSCULAR; INTRAVENOUS at 11:11

## 2025-04-07 RX ADMIN — GLYCOPYRROLATE 0.5 MG: 0.2 INJECTION INTRAMUSCULAR; INTRAVENOUS at 12:28

## 2025-04-07 RX ADMIN — HYDROMORPHONE HYDROCHLORIDE 0.5 MG: 1 INJECTION, SOLUTION INTRAMUSCULAR; INTRAVENOUS; SUBCUTANEOUS at 13:14

## 2025-04-07 RX ADMIN — EPHEDRINE SULFATE 10 MG: 5 INJECTION INTRAVENOUS at 11:59

## 2025-04-07 RX ADMIN — Medication 20 MG: at 10:31

## 2025-04-07 RX ADMIN — EPHEDRINE SULFATE 10 MG: 5 INJECTION INTRAVENOUS at 11:10

## 2025-04-07 RX ADMIN — SODIUM CHLORIDE, SODIUM LACTATE, POTASSIUM CHLORIDE, AND CALCIUM CHLORIDE 125 ML/HR: 600; 310; 30; 20 INJECTION, SOLUTION INTRAVENOUS at 09:25

## 2025-04-07 RX ADMIN — HYDROMORPHONE HYDROCHLORIDE 0.4 MG: 2 INJECTION INTRAMUSCULAR; INTRAVENOUS; SUBCUTANEOUS at 11:41

## 2025-04-07 RX ADMIN — TRANEXAMIC ACID 1000 MG: 1 INJECTION, SOLUTION INTRAVENOUS at 11:57

## 2025-04-07 RX ADMIN — Medication 3 AMPULE: at 09:31

## 2025-04-07 RX ADMIN — ROCURONIUM BROMIDE 50 MG: 10 INJECTION, SOLUTION INTRAVENOUS at 10:31

## 2025-04-07 RX ADMIN — DEXAMETHASONE SODIUM PHOSPHATE 10 MG: 10 INJECTION, SOLUTION INTRAMUSCULAR; INTRAVENOUS at 11:08

## 2025-04-07 RX ADMIN — HYDROMORPHONE HYDROCHLORIDE 0.4 MG: 2 INJECTION INTRAMUSCULAR; INTRAVENOUS; SUBCUTANEOUS at 11:05

## 2025-04-07 RX ADMIN — TRANEXAMIC ACID 1000 MG: 1 INJECTION, SOLUTION INTRAVENOUS at 10:44

## 2025-04-07 RX ADMIN — ACETAMINOPHEN 1000 MG: 500 TABLET, FILM COATED ORAL at 09:30

## 2025-04-07 RX ADMIN — SODIUM CHLORIDE, SODIUM LACTATE, POTASSIUM CHLORIDE, AND CALCIUM CHLORIDE: 600; 310; 30; 20 INJECTION, SOLUTION INTRAVENOUS at 12:03

## 2025-04-07 ASSESSMENT — PAIN SCALES - GENERAL
PAINLEVEL_OUTOF10: 7
PAINLEVEL_OUTOF10: 5
PAINLEVEL_OUTOF10: 9
PAINLEVEL_OUTOF10: 0

## 2025-04-07 ASSESSMENT — PAIN - FUNCTIONAL ASSESSMENT
PAIN_FUNCTIONAL_ASSESSMENT: ACTIVITIES ARE NOT PREVENTED
PAIN_FUNCTIONAL_ASSESSMENT: 0-10

## 2025-04-07 ASSESSMENT — PAIN DESCRIPTION - PAIN TYPE: TYPE: ACUTE PAIN;SURGICAL PAIN

## 2025-04-07 ASSESSMENT — PAIN DESCRIPTION - LOCATION
LOCATION: HIP

## 2025-04-07 ASSESSMENT — PAIN DESCRIPTION - ONSET: ONSET: ON-GOING

## 2025-04-07 ASSESSMENT — PAIN DESCRIPTION - ORIENTATION
ORIENTATION: LEFT

## 2025-04-07 ASSESSMENT — PAIN DESCRIPTION - DESCRIPTORS
DESCRIPTORS: THROBBING
DESCRIPTORS: TENDER

## 2025-04-07 ASSESSMENT — PAIN DESCRIPTION - FREQUENCY: FREQUENCY: INTERMITTENT

## 2025-04-07 NOTE — CARE COORDINATION
4-8-25  Patient requesting a walker for home use.  Pt w/o preference towards provider. Referral sent to [facility] and walker delivered to pt prior to dc.    4-7-25 Patient is a 80 y.o. year old female admitted for Left LESLY . Patient plans to return home on discharge. Order received to arrange home health. Patient without preference towards agency. Referral sent to [facility] by [facility]. Patient denies any equipment needs as patient has a walker. Will follow until discharge.      04/07/25 5097   Service Assessment   Patient Orientation Alert and Oriented   Cognition Alert   History Provided By Patient   Primary Caregiver Self   Accompanied By/Relationship spouse   Support Systems Spouse/Significant Other   Patient's Healthcare Decision Maker is: Legal Next of Kin   PCP Verified by CM No   Prior Functional Level Independent in ADLs/IADLs   Current Functional Level Assistance with the following:;Cooking;Housework;Shopping   Can patient return to prior living arrangement Yes   Ability to make needs known: Good   Family able to assist with home care needs: Yes   Would you like for me to discuss the discharge plan with any other family members/significant others, and if so, who? Yes  (spouse)   Financial Resources Medicare   Community Resources None   Social/Functional History   Lives With Spouse   Services At/After Discharge   Transition of Care Consult (CM Consult) Home Health   Internal Home Health Yes   Services At/After Discharge Home Health;PT   Mode of Transport at Discharge Self   Confirm Follow Up Transport Self   Condition of Participation: Discharge Planning   The Plan for Transition of Care is related to the following treatment goals: improve mobility   The Patient and/or Patient Representative was provided with a Choice of Provider? Patient   The Patient and/Or Patient Representative agree with the Discharge Plan? Yes   Freedom of Choice list was provided with basic dialogue that supports the

## 2025-04-07 NOTE — PROGRESS NOTES
PT attempted to see pt this pm s/p Left LESLY, Pt politely declined therapy due to severe nausea that is not resolving with medication. PT will follow up in am as indicated..  Neville Castro, PT, ELOY

## 2025-04-07 NOTE — PROGRESS NOTES
4 Eyes Skin Assessment     NAME:  Mirtha Posada  YOB: 1945  MEDICAL RECORD NUMBER:  [id number]    The patient is being assessed for  Post-Op Surgical    I agree that at least one RN has performed a thorough Head to Toe Skin Assessment on the patient. ALL assessment sites listed below have been assessed.      Areas assessed by both nurses:    Head, Face, Ears, Shoulders, Back, Chest, Arms, Elbows, Hands, Sacrum. Buttock, Coccyx, Ischium, Legs. Feet and Heels, and Under Medical Devices         Does the Patient have a Wound? No noted wound(s)       Ghulam Prevention initiated by RN: No  Wound Care Orders initiated by RN: No    Pressure Injury (Stage 3,4, Unstageable, DTI, NWPT, and Complex wounds) if present, place Wound referral order by RN under : No    New Ostomies, if present place, Ostomy referral order under : No     Nurse 1 eSignature: Electronically signed by Phoenix M Chimato, RN on 4/7/25 at 2:45 PM EDT    **SHARE this note so that the co-signing nurse can place an eSignature**    Nurse 2 eSignature: Electronically signed by Evi Salas RN on 4/7/25 at 2:47 PM EDT

## 2025-04-07 NOTE — ANESTHESIA PRE PROCEDURE
178/79   03/07/25 (!) 153/77       NPO Status:                                                                                 BMI:   Wt Readings from Last 3 Encounters:   04/07/25 68.8 kg (151 lb 11.2 oz)   03/31/25 67.9 kg (149 lb 12.8 oz)   03/26/25 66.9 kg (147 lb 6.4 oz)     Body mass index is 25.24 kg/m².    CBC:   Lab Results   Component Value Date/Time    WBC 6.3 03/26/2025 09:26 AM    RBC 3.74 03/26/2025 09:26 AM    HGB 11.2 03/26/2025 09:26 AM    HCT 35.4 03/26/2025 09:26 AM    MCV 94.7 03/26/2025 09:26 AM    RDW 15.0 03/26/2025 09:26 AM     03/26/2025 09:26 AM       CMP:   Lab Results   Component Value Date/Time     03/26/2025 09:26 AM    K 3.8 03/26/2025 09:26 AM     03/26/2025 09:26 AM    CO2 26 03/26/2025 09:26 AM    BUN 24 03/26/2025 09:26 AM    CREATININE 1.12 03/26/2025 09:26 AM    GFRAA 43 08/19/2022 10:54 AM    AGRATIO 1.4 10/13/2020 02:56 PM    LABGLOM 50 03/26/2025 09:26 AM    LABGLOM 47 02/08/2024 10:01 AM    GLUCOSE 131 03/26/2025 09:26 AM    CALCIUM 9.8 03/26/2025 09:26 AM    BILITOT <0.2 03/26/2025 09:26 AM    ALKPHOS 57 03/26/2025 09:26 AM    ALKPHOS 65 06/08/2021 02:12 PM    AST 18 03/26/2025 09:26 AM    ALT 11 03/26/2025 09:26 AM       POC Tests:   No results for input(s): \"POCGLU\", \"POCNA\", \"POCK\", \"POCCL\", \"POCBUN\", \"POCHEMO\", \"POCHCT\" in the last 72 hours.    Coags:   Lab Results   Component Value Date/Time    PROTIME 13.2 03/26/2025 09:26 AM    INR 1.0 03/26/2025 09:26 AM       HCG (If Applicable): No results found for: \"PREGTESTUR\", \"PREGSERUM\", \"HCG\", \"HCGQUANT\"     ABGs: No results found for: \"PHART\", \"PO2ART\", \"EBI6RIF\", \"SYW6JKZ\", \"BEART\", \"B0VRVGXO\"     Type & Screen (If Applicable):  No results found for: \"LABABO\"    Drug/Infectious Status (If Applicable):  Lab Results   Component Value Date/Time    HEPCAB 0.2 12/19/2017 11:40 AM       COVID-19 Screening (If Applicable):   Lab Results   Component Value Date/Time    COVID19 Detected 09/14/2024 04:03 PM

## 2025-04-07 NOTE — ANESTHESIA POSTPROCEDURE EVALUATION
Department of Anesthesiology  Postprocedure Note    Patient: Mirtha Posada  MRN: [id number]  YOB: 1945  Date of evaluation: 4/7/2025    Procedure Summary       Date: 04/07/25 [place] [facility] [place] [facility] [place]    Anesthesia Start: 1017 Anesthesia Stop: 1257    Procedure: LEFT-HIP TOTAL ARTHROPLASTY CONVERSION, direct anterior approach (Left: Hip) Diagnosis:       Primary osteoarthritis of left hip      (Primary osteoarthritis of left hip [M16.12])    Surgeons: Toni Braxton MD Responsible Provider: Vijay Schulz MD    Anesthesia Type: General ASA Status: 3            Anesthesia Type: General    Sharmila Phase I: Sharmila Score: 8    Sharmila Phase II:      Anesthesia Post Evaluation    Patient location during evaluation: PACU  Patient participation: complete - patient participated  Level of consciousness: awake and alert  Airway patency: patent  Nausea & Vomiting: no nausea and no vomiting  Cardiovascular status: hemodynamically stable  Respiratory status: acceptable, nonlabored ventilation and spontaneous ventilation  Hydration status: euvolemic  Comments: BP (!) 143/52   Pulse 65   Temp 97.5 °F (36.4 °C)   Resp 16   Ht 1.651 m (5' 5\")   Wt 68.8 kg (151 lb 11.2 oz)   SpO2 95%   BMI 25.24 kg/m²     Multimodal analgesia pain management approach  Pain management: adequate and satisfactory to patient    No notable events documented.

## 2025-04-07 NOTE — PERIOP NOTE
TRANSFER - OUT REPORT:    Verbal report given to [place], RN on Mirtha Posada  being transferred to room 334 for routine progression of patient care       Report consisted of patient's Situation, Background, Assessment and   Recommendations(SBAR).     Information from the following report(s) Nurse Handoff Report, Adult Overview, and MAR was reviewed with the receiving nurse.           Lines:   Peripheral IV 04/07/25 Posterior;Right Hand (Active)   Site Assessment Clean, dry & intact 04/07/25 1256   Line Status Infusing 04/07/25 1256   Line Care Connections checked and tightened 04/07/25 1256   Phlebitis Assessment No symptoms 04/07/25 1256   Infiltration Assessment 0 04/07/25 1256   Alcohol Cap Used No 04/07/25 1256   Dressing Status Clean, dry & intact 04/07/25 1256   Dressing Type Transparent 04/07/25 1256       Peripheral IV 04/07/25 Distal;Posterior;Right Forearm (Active)   Site Assessment Clean, dry & intact 04/07/25 1256   Line Status Normal saline locked 04/07/25 1256   Phlebitis Assessment No symptoms 04/07/25 1256   Infiltration Assessment 0 04/07/25 1256   Alcohol Cap Used No 04/07/25 1256   Dressing Status Clean, dry & intact 04/07/25 1256   Dressing Type Transparent 04/07/25 1256        Opportunity for questions and clarification was provided.      Patient transported with:  O2 @ 2lpm

## 2025-04-07 NOTE — PROGRESS NOTES
OCCUPATIONAL THERAPY Initial Assessment, Daily Note, and PM      (Link to Caseload Tracking: OT Visit Days: 1  OT Orders   Time  OT Charge Capture  Rehab Caseload Tracker  Episode     Mirtha Posada is a 80 y.o. female   PRIMARY DIAGNOSIS: Avascular necrosis of bone of hip, left (HCC)  Primary osteoarthritis of left hip [M16.12]  Avascular necrosis of bone of hip, left (HCC) [M87.052]  Procedure(s) (LRB):  LEFT-HIP TOTAL ARTHROPLASTY CONVERSION, direct anterior approach (Left)  * Day of Surgery *  Reason for Referral: Pain in left hip (M25.552)  Stiffness of Left Hip, Not elsewhere classified (M25.652)  Inpatient: Payor: MEDICARE / Plan: MEDICARE PART A AND B / Product Type: *No Product type* /     ASSESSMENT:     REHAB RECOMMENDATIONS:   Recommendation to date pending progress:  Setting:  No further skilled occupational therapy after discharge from hospital    Equipment:    None     ASSESSMENT:  Ms. Posada is s/p left LESLY and presents with decreased independence with functional mobility and activities of daily living as compared to baseline level of function and safety. Patient would benefit from skilled Occupational Therapy to maximize independence and safety with self-care task and functional mobility.   Patient able to complete  grooming at edge of bed with contact guard assist .  Mobilized from hospital bed to recliner/chair using a rolling walker with assist. Per supportive spouse at bedside, patient usually spends most of her day in bed due to pain, weakness and other orthopedic concerns. She has an adjustable bed and reports she uses a RW around the home. Occasionally spouse assists patient with sit>stand.  Patient is hopeful to spend the night to better prepare for safe discharge home. Today's session, limited by drowsiness and onset of nausea. RN at bedside to address. Hope to progress further with ADL session in AM.       [facility] AM-PAC™ “6 Clicks” Daily Activity Inpatient Short Form:   cueing to increase independence, increase activity tolerance, increase safety awareness, and maintain precautions. The patient was educated on role of occupational therapy, compensatory technique during ADL , strategies to improve safety, proper use of assistive device, recommended equipment, transfer training and safety, surgical precautions, and energy conservation techniques during ADL/IADLS and patient verbalized understanding and will need reinforcement at next session.     AFTER TREATMENT PRECAUTIONS: Bed/Chair Locked, Call light within reach, Chair, Needs within reach, RN at bedside, and Visitors at bedside    INTERDISCIPLINARY COLLABORATION:  RN/ PCT and PT/ PTA    Interdisciplinary Patient Education  (Reference education tab)    [] Safe And Effective Hygiene  [x] Fall Precautions  [x] Precautions  [] D/C Instruction Review [] Self Care Training and Home Safety  [x] Walker Management/Safety  [] Adaptive Equipment as Needed  [x] Therapeutic Resting Position of Joint     TOTAL TREATMENT DURATION AND TIME:  Time In: 1558  Time Out: 1632  Minutes: 34    Trina Deshpande, OT

## 2025-04-07 NOTE — RT PROTOCOL NOTE
Respiratory Care Services     Policy Number: [id number]    Title: Oxygen Protocol    Effective Date: 01/1996    Revised Date: 06/2013, 02/29/2016, 4/2018, 7/2019    Reviewed Date: 05/2014, 03/2015, 06/2017, 11/2020        I.  Policy: The Oxygen Protocol will be initiated for all patients upon written order from physician for administration of oxygen therapy or if a patient is found to have an oxygen saturation of 88% or less. Special consideration: the goal of oxygen therapy for COPD patients is to maintain oxygen saturation between 88% - 92% to comply with GOLD Guidelines.    II. Purpose: To provide protocol driven respiratory therapy for the administration of oxygen at concentrations greater than that in ambient air with the intent of treating or preventing the symptoms and manifestations of hypoxia.    III. Responsibility: Director Respiratory Care Services, all Respiratory Care Practitioners     IV. Indications:   Implement this protocol for patients when physician orders oxygen to be administered or when patient is found to have an oxygen saturation of 88% or less.  To assure routine monitoring of patient's oxygen saturation b.i.d. and to make appropriate adjustments in accordance with ordered oxygen saturation parameters.  To assure continuity of respiratory care that meets [facility] Clinical Practice Guidelines and GOLD Guidelines.  Hb < 8  Sickle Cell anemia crisis    V. Assessment:  Assess the following parameters to determine the need to adjust oxygen:  Measurement of patient's oxygen saturation via pulse oximetry.    Observation of patient's color, respiratory effort, and responsiveness.  Measurement of heart rate and respiratory rate.  Complete a three-step ambulatory oxygen saturation when ordered.    VI. Initiation:  Upon receipt of an order for oxygen, the RCP will:   Verify order in the patient's EMR, which should include the desired oxygen saturation to be maintained.  The patient shall be placed on  to anticipated discharge.   K.  Ventilator Weaning Protocol indications:         1. Patient's mechanically ventilated          2. Managed by intensivist  L. Lung Volume Expansion Protocol indications:        1. Any patient at risk for pulmonary complications.    VI. Maintenance:    Timely patient assessment is an integral part of this protocol therefore the following will be applied:  All non- critical care patients will be evaluated upon receiving initial respiratory care orders within 24 hours and re-evaluated within 48 hours (or more as needed).  Orders requesting a Respiratory Consult will be responded to in the following manner:  In patient emergency situations, the RCP assigned to the floor will respond immediately to the patient, provide an initial respiratory assessment, and contact the patient's physician as necessary for appropriate orders.  In non-emergent situations, the RCP assigned to the floor will respond to the patient within 90 minutes and provide an initial respiratory assessment and contact patient's physician as necessary for appropriate orders.  An RCP will provide a comprehensive assessment as soon as possible.  Upon completion of an evaluation, the RCP will complete documentation in the patient's EMR in the RT Assessment section.  The RCP who completes the assessment will document orders for therapy in the “orders section” of the patient's EMR selecting “new order”. Next, “per protocol” should be selected indicating it is a protocol order and “sign orders” should be selected to complete the process. The applicable protocol must be added to the progress note per Joint Commission guidelines.  The Pharmacy and Therapeutics (P&T) Committee has mandated that the medication Xopenex may be changed to unit dose albuterol without an order, except for those patients receiving Xopenex due to cardiac arrhythmias.   The dosage for these patients should be 0.63 mg. and may be changed from 1.25 mg. to 0.63  mg per P & T Committee by the RCP completing the assessment.  Patients who are not experiencing cardiac arrhythmias, and are ordered Xopenex and Atrovent may be changed to Duoneb.    VII. Safety:        The following safety issues shall be monitored:  The RCP will perform hand hygiene per hospital policy utilizing Standard Precautions for all patients and following transmission-based isolation as indicated per hospital policy.  The RCP must exercise professional judgment in classifying the patient for frequency of therapy.  Appropriate classification of the patient will require an evaluation utilizing the Therapy Assessment Protocol Guidelines.  The RCP will confer with the physician concerning the care of the patient at any time questions or problems arise.  If during therapy, the patient exhibits no improvement, or deterioration in clinical status the RCP will notify the physician and the patient's nurse.      VIII. Interventions:   The patient's nurse is responsible concerning all items related to his/her care. Ongoing communication with nursing is essential to successful protocol management.  The RCP recognizes the value of the team approach in meeting the patient's needs. Nursing input regarding the patient's pulmonary condition will be sought as needed.     IX. Reportable conditions:   The RCP will inform the physician if:  There are acute changes in patient's respiratory status.  The therapist is unable to determine appropriate care plan upon assessment.  The patient fails to reach therapeutic objective.  A change or additional medication is needed.    X.  Patient Education:    Patient will receive instruction on the following:  The treatment modality, including objectives and proper technique of therapy  Respiratory medications  Documentation shall occur in the “patient education” section of the patient's EMR.    XI. Documentation: Record all findings as described above in the patient's EMR.    Related

## 2025-04-07 NOTE — OP NOTE
Operative Note      Patient: Mirtha Posada  YOB: 1945  MRN: [id number]    Date of Procedure: 4/7/2025    Pre op diagnosis:  avascular necrosis of the left hip     Post-Op Diagnosis: Same       Procedure(s):  LEFT-HIP TOTAL ARTHROPLASTY CONVERSION, direct anterior approach    Surgeon(s):  Toni Braxton MD    Assistant:   * No surgical staff found *    Anesthesia: General    Estimated Blood Loss (mL): 300     Complications: None    Specimens:   * No specimens in log *    Implants:  Implant Name Type Inv. Item Serial No.  Lot No. LRB No. Used Action   SCREW BNE L15MM DIA6.5MM CANC HIP S STL GRIPTION FULL THRD - [id number]  SCREW BNE L15MM DIA6.5MM CANC HIP S STL GRIPTION FULL THRD  [facility] [employer] [facility] [id number] Left 1 Implanted   CUP ACET [id number] HIP GRIPTION MIGUELINA CEMENTLESS FIX SECT SER - [id number]  CUP ACET [id number] HIP GRIPTION MIGUELINA CEMENTLESS FIX SECT SER  [facility] [employer] [facility] [id number] Left 1 Implanted   LINER ACET OD52MM ID36MM HIP ALTRX PINN - [id number]  LINER ACET OD52MM ID36MM HIP ALTRX PINN  [facility] [employer] [facility] [id number] Left 1 Implanted   SCREW BNE L25MM DIA6.5MM CANC HIP S STL GRIPTION FULL THRD - [id number]  SCREW BNE L25MM DIA6.5MM CANC HIP S STL GRIPTION FULL THRD  [facility] [employer] [facility] [id number] Left 1 Implanted   STEM FEM SZ 5 HIP STD OFFSET CLLRD CEMENTLESS 12/14 TAPR - [id number]  STEM FEM SZ 5 HIP STD OFFSET CLLRD CEMENTLESS 12/14 TAPR  [facility] [employer] [facility] [id number] Left 1 Implanted   HEAD FEM [id number] +5MM OFFSET 12/14 TAPR HIP CERAMIC BIOLOX - [id number]  HEAD FEM [id number] +5MM OFFSET 12/14 TAPR HIP CERAMIC BIOLOX  [facility] [employer] [facility] [id number] Left 1 Implanted   SCREW BONE L90MM DIA6.4MM IESHA TI ALLOY ANTIROTATION T25 - [id number]  SCREW BONE L90MM DIA6.4MM IESHA TI ALLOY ANTIROTATION T25  [employer] [id number] Left 1 Explanted   SCREW BONE L44MM DIA5MM TI ALLOY LCK ST W/ T25 STARDRV -  fractures and was found to be intact.  The real head was impacted onto a clean dry trunnion..  Correct version was ensured throughout the entire process.  The wound was thoroughly irrigated with normal saline and dilute betadine wash.   The capsule was closed with No. 1 Vicryl interrupted sutures.Running barbed suture was used to close the fascia. The subcutaneous layer was reapproximated with 2-0 vicryl and monocryl.  prevena dressing was applied to the primary hip wound and a 6 inch Aquacel was placed on the lateral hip wound the patient tolerated the procedure well. There were no complications.      Post op instructions:  1) weight bearing as tolerated, no hip precautions  2) sudarshan op abx   3) pacu xray  4) PT OT  5) DVT PPX: mech + chemoprophylaxis with ASA EC 81mg BID for 4 weeks  6) pain control: Multimodal        Electronically signed by Toni Braxton MD on 4/7/2025 at 12:14 PM

## 2025-04-07 NOTE — PROGRESS NOTES
TRANSFER - IN REPORT:    Verbal report received from Evi CABELLO on Mirtha Posada  being received from PACU for routine post-op      Report consisted of patient's Situation, Background, Assessment and   Recommendations(SBAR).     Information from the following report(s) Nurse Handoff Report was reviewed with the receiving nurse.    Opportunity for questions and clarification was provided.      Assessment completed upon patient's arrival to unit and care assumed.

## 2025-04-08 VITALS
WEIGHT: 151.7 LBS | BODY MASS INDEX: 25.27 KG/M2 | HEART RATE: 83 BPM | DIASTOLIC BLOOD PRESSURE: 58 MMHG | HEIGHT: 65 IN | RESPIRATION RATE: 17 BRPM | TEMPERATURE: 98.2 F | SYSTOLIC BLOOD PRESSURE: 135 MMHG | OXYGEN SATURATION: 94 %

## 2025-04-08 PROCEDURE — 2500000003 HC RX 250 WO HCPCS: Performed by: ORTHOPAEDIC SURGERY

## 2025-04-08 PROCEDURE — 6370000000 HC RX 637 (ALT 250 FOR IP): Performed by: ORTHOPAEDIC SURGERY

## 2025-04-08 PROCEDURE — 6360000002 HC RX W HCPCS: Performed by: ORTHOPAEDIC SURGERY

## 2025-04-08 PROCEDURE — 97535 SELF CARE MNGMENT TRAINING: CPT

## 2025-04-08 PROCEDURE — 97161 PT EVAL LOW COMPLEX 20 MIN: CPT

## 2025-04-08 PROCEDURE — 97530 THERAPEUTIC ACTIVITIES: CPT

## 2025-04-08 RX ADMIN — CYCLOBENZAPRINE 10 MG: 10 TABLET, FILM COATED ORAL at 00:24

## 2025-04-08 RX ADMIN — CEFAZOLIN 2000 MG: 10 INJECTION, POWDER, FOR SOLUTION INTRAVENOUS at 02:51

## 2025-04-08 RX ADMIN — KETOROLAC TROMETHAMINE 15 MG: 15 INJECTION, SOLUTION INTRAMUSCULAR; INTRAVENOUS at 02:51

## 2025-04-08 RX ADMIN — ASPIRIN 81 MG: 81 TABLET, COATED ORAL at 10:17

## 2025-04-08 RX ADMIN — TRAMADOL HYDROCHLORIDE 100 MG: 50 TABLET, COATED ORAL at 04:28

## 2025-04-08 RX ADMIN — KETOROLAC TROMETHAMINE 15 MG: 15 INJECTION, SOLUTION INTRAMUSCULAR; INTRAVENOUS at 10:17

## 2025-04-08 RX ADMIN — ACETAMINOPHEN 1000 MG: 500 TABLET, FILM COATED ORAL at 00:24

## 2025-04-08 RX ADMIN — ACETAMINOPHEN 1000 MG: 500 TABLET, FILM COATED ORAL at 04:28

## 2025-04-08 RX ADMIN — TRAMADOL HYDROCHLORIDE 100 MG: 50 TABLET, COATED ORAL at 10:16

## 2025-04-08 ASSESSMENT — PAIN DESCRIPTION - PAIN TYPE
TYPE: ACUTE PAIN;SURGICAL PAIN
TYPE: SURGICAL PAIN
TYPE: ACUTE PAIN;SURGICAL PAIN

## 2025-04-08 ASSESSMENT — PAIN SCALES - GENERAL
PAINLEVEL_OUTOF10: 2
PAINLEVEL_OUTOF10: 2
PAINLEVEL_OUTOF10: 7
PAINLEVEL_OUTOF10: 5
PAINLEVEL_OUTOF10: 6
PAINLEVEL_OUTOF10: 2

## 2025-04-08 ASSESSMENT — PAIN DESCRIPTION - LOCATION
LOCATION: HIP
LOCATION: HIP;LEG
LOCATION: HIP

## 2025-04-08 ASSESSMENT — PAIN DESCRIPTION - FREQUENCY
FREQUENCY: CONTINUOUS
FREQUENCY: CONTINUOUS

## 2025-04-08 ASSESSMENT — PAIN DESCRIPTION - ORIENTATION
ORIENTATION: LEFT

## 2025-04-08 ASSESSMENT — PAIN DESCRIPTION - DESCRIPTORS
DESCRIPTORS: ACHING
DESCRIPTORS: SPASM

## 2025-04-08 ASSESSMENT — PAIN DESCRIPTION - ONSET
ONSET: PROGRESSIVE
ONSET: PROGRESSIVE

## 2025-04-08 NOTE — PROGRESS NOTES
ACUTE PHYSICAL THERAPY GOALS:   (Developed with and agreed upon by patient and/or caregiver.)  GOALS (1-4 days):  (1.)Ms. Posada will move from supine to sit and sit to supine  in bed with SUPERVISION.    (2.)Ms. Posada will transfer from bed to chair and chair to bed with SUPERVISION using the least restrictive device.    (3.)Ms. Posada will ambulate with SUPERVISION for 200 feet with the least restrictive device.   (4.)Ms. Posada will ambulate up/down 2 steps with cane and hand held assist  railing with MINIMAL ASSIST.met  (5.)Ms. Posada will be independent with HEP  ________________________________________________________________________________________________      PHYSICAL THERAPY: TOTAL HIP ARTHROPLASTY Initial Assessment and AM  (Link to Caseload Tracking: PT Visit Days : 1  Acknowledge Orders  Time In/Out  PT Charge Capture  Rehab Caseload Tracker  Episode   Mirtha Posada is a 80 y.o. female   PRIMARY DIAGNOSIS: Avascular necrosis of bone of hip, left (HCC)  Primary osteoarthritis of left hip [M16.12]  Avascular necrosis of bone of hip, left (HCC) [M87.052]  Procedure(s) (LRB):  LEFT-HIP TOTAL ARTHROPLASTY CONVERSION, direct anterior approach (Left)  1 Day Post-Op  Reason for Referral: Pain in left hip (M25.552)  Stiffness of Left Hip, Not elsewhere classified (M25.652)  Difficulty in walking, Not elsewhere classified (R26.2)  Inpatient: Payor: MEDICARE / Plan: MEDICARE PART A AND B / Product Type: *No Product type* /     REHAB RECOMMENDATIONS:   Recommendation to date pending progress:  Setting:  Home Health Therapy    Equipment:    Rolling Walker     GAIT: I Mod I S SBA CGA Min Mod Max Total  NT x2 Comments:   Level of Assistance [] [] [] [x] [] [] [] [] [] [] []    Weightbearing Status  Left Lower Extremity Weight Bearing: Weight Bearing As Tolerated    Distance  200 feet    Gait Quality Decreased akilah  and Decreased stance    DME Rolling Walker     Stairs Stairs/Curb  Stairs?:

## 2025-04-08 NOTE — FLOWSHEET NOTE
04/08/25 1354   AVS Reviewed   AVS & discharge instructions reviewed with patient and/or representative? Yes   Reviewed instructions with Patient   Level of Understanding Questions answered;Teach back completed;Verbalized understanding

## 2025-04-08 NOTE — DISCHARGE INSTRUCTIONS
[facility]      Patient Discharge Instructions    Mirtha Posada/[id number] : 1945    Admitted 2025 Discharged: 2025       IF YOU HAVE ANY PROBLEMS ONCE YOU ARE AT HOME CALL THE FOLLOWING NUMBERS:   Main office number: [phone]      Medications    The medications you are to continue on are listed on the medication reconciliation sheet.   Narcotic pain medications as well as supplemental iron can cause constipation. If this occurs try stopping the narcotic pain medication and/or the iron.   It is important that you take the medication exactly as they are prescribed.  Medications which increase your risk of blood clots are listed to stop for 5 weeks after surgery as well as medications or supplements which increase your risk of bleeding complications.   Keep your medication in the bottles provided by the pharmacist and keep a list of the medication names, dosages, and times to be taken in your wallet.   Do not take other medications without consulting your doctor.       Important Information    Do NOT smoke as this will greatly increase your risk of infection!    Resume your prehospital diet. If you have excessive nausea or vomitting call your doctor's office     Leg swelling and warmth is normal for 6 months after surgery. If you experience swelling in your leg elevate you leg while laying down with your toes above your heart. If you have sudden onset severe swelling with leg pain call our office. Use Nikos Hose stockings until we see you in the office for your follow up appointment.    The stitches deep inside take approximately 6 months to dissolve. There will be sharp shooting, stinging and burning pain. This is normal and will resolve between 3-6 months after surgery.     Difficulty sleeping is normal following total Knee and Hip replacement. You may try melatonin, an over-the-counter sleep aid or benadryl to help with sleep. Most patients will resume sleeping through the night

## 2025-04-08 NOTE — PROGRESS NOTES
[facility] Progress Note    NAME: Mirtha Posada  : 1945  MRN: [id number]  DATE: 2025  POD: 1 Day Post-Op  S/P: Left conversion total hip arthroplasty    SUBJECTIVE:  No acute events overnight. No new complaints this morning. Denies nausea/vomiting,  headache, chest pain or shortness of breath.    No results for input(s): \"HGB\", \"HCT\", \"INR\", \"NA\", \"K\", \"CL\", \"CO2\", \"BUN\", \"GLU\" in the last 72 hours.    Invalid input(s): \"CREA\"        PHYSICAL EXAM:  Pt is AAOx3. No acute distress  Left Lower Extremity   dressing clean, dry, and intact  Incision not visualized  Motor function intact EHL/TA, GS/PT/FHL  SILT s/s/sp/dp/t distributions  Palpable DP/PT pulses. Foot WWP  Leg lengths clinically equal    ASSESSMENT:  80 y.o. female  s/p Left LESLY DOS 25  doing well postoperatively    PLAN:  - Regular diet  - Hemodynamically stable  - Perioperative IV antibiotics x 24 hours  - Postoperative xrays reviewed and without complication  - WBAT LLE   - Hip precautions: none  - mobilize with PT/OT  - DVT prophylaxis with ASA BID x 30 days and SCDs  - Anticipate discharge home today pending PT/OT clearance  - Dispo planning: follow up in clinic in 2 weeks for a wound check    Toni Braxton MD,2025, 9:31 AM

## 2025-04-08 NOTE — PROGRESS NOTES
OCCUPATIONAL THERAPY Daily Note, Discharge, and AM      (Link to Caseload Tracking: OT Visit Days: 2  OT Orders   Time  OT Charge Capture  Rehab Caseload Tracker  Episode     Mirtha Posada is a 80 y.o. female   PRIMARY DIAGNOSIS: Avascular necrosis of bone of hip, left (HCC)  Primary osteoarthritis of left hip [M16.12]  Avascular necrosis of bone of hip, left (HCC) [M87.052]  Procedure(s) (LRB):  LEFT-HIP TOTAL ARTHROPLASTY CONVERSION, direct anterior approach (Left)  1 Day Post-Op  Reason for Referral: Pain in left hip (M25.552)  Stiffness of Left Hip, Not elsewhere classified (M25.652)  Inpatient: Payor: MEDICARE / Plan: MEDICARE PART A AND B / Product Type: *No Product type* /     ASSESSMENT:     REHAB RECOMMENDATIONS:   Recommendation to date pending progress:  Setting:  No further skilled occupational therapy after discharge from hospital    Equipment:    None     ASSESSMENT:  Ms. Posada is s/p L LESLY.  Patient completed shower and dressing as charted below in ADL grid and is ambulating with rolling walker and stand by assist.  Patient has met 4/4 goals and plans to return home with good family support.  Family able to provide patient with appropriate level of assistance at this time.  OT reviewed hip precautions throughout session. OT educated pt on bathing safety/hygiene, compensatory strategies for LB ADLs, adaptive equipment as needed, safe use of AD, pt verbalized understanding. Pt left seated in recliner with needs met, call light in reach in reach.        [facility] AM-PAC™ “6 Clicks” Daily Activity Inpatient Short Form:     AM-PAC Daily Activity - Inpatient   How much help is needed for putting on and taking off regular lower body clothing?: A Little  How much help is needed for bathing (which includes washing, rinsing, drying)?: A Little  How much help is needed for toileting (which includes using toilet, bedpan, or urinal)?: None  How much help is needed for putting on and taking off      AFTER TREATMENT PRECAUTIONS: Bed/Chair Locked, Call light within reach, Chair, Needs within reach, and RN notified    INTERDISCIPLINARY COLLABORATION:  RN/ PCT and PT/ PTA    Interdisciplinary Patient Education  (Reference education tab)    [x] Safe And Effective Hygiene  [x] Fall Precautions  [x] Precautions  [x] D/C Instruction Review [x] Self Care Training and Home Safety  [x] Walker Management/Safety  [x] Adaptive Equipment as Needed  [x] Therapeutic Resting Position of Joint     TOTAL TREATMENT DURATION AND TIME:  Time In: 0900  Time Out: 0955  Minutes: 55    Lora Tony, OT

## 2025-04-16 ENCOUNTER — TELEPHONE (OUTPATIENT)
Dept: ORTHOPEDIC SURGERY | Age: 80
End: 2025-04-16

## 2025-04-16 ENCOUNTER — TELEPHONE (OUTPATIENT)
Age: 80
End: 2025-04-16

## 2025-04-16 RX ORDER — LOSARTAN POTASSIUM 25 MG/1
25 TABLET ORAL DAILY
Qty: 90 TABLET | Refills: 3 | Status: SHIPPED | OUTPATIENT
Start: 2025-04-16

## 2025-04-16 NOTE — TELEPHONE ENCOUNTER
Lesly carol/[facility] is s/p hip replacement and on arrival BP was 178/80 hr=74 and after exercise BP was 168/80.Current HR =66 BP was elevated the previous visit as well.She is on Toprol xl 25mg bid and Lasix 40mg PRN.Any med changes needed?

## 2025-04-16 NOTE — TELEPHONE ENCOUNTER
Lesly is calling to report her BP was 168/80 and they have reached out to her cardiologist concerning her medication

## 2025-04-16 NOTE — TELEPHONE ENCOUNTER
Solitario Romo [facility] Home Health Nurse, Lesly called to report that the patient had surgery on the 9th and her BP is staying elevated and her medications need to be discussed and direction is needed.      Please call Lesly on [phone]

## 2025-04-16 NOTE — TELEPHONE ENCOUNTER
I called and informed pt.of MD response and she v/u.Med escribed as below:  Requested Prescriptions     Signed Prescriptions Disp Refills    losartan (COZAAR) 25 MG tablet 90 tablet 3     Sig: Take 1 tablet by mouth daily     Authorizing Provider: MAGDALENE WALDEN     Ordering User: REGGIE GABRIEL

## 2025-04-18 ENCOUNTER — TELEPHONE (OUTPATIENT)
Dept: ORTHOPEDIC SURGERY | Age: 80
End: 2025-04-18

## 2025-04-18 DIAGNOSIS — M87.052 AVASCULAR NECROSIS OF BONE OF HIP, LEFT (HCC): Primary | ICD-10-CM

## 2025-04-18 RX ORDER — TRAMADOL HYDROCHLORIDE 50 MG/1
50 TABLET ORAL EVERY 6 HOURS PRN
Qty: 28 TABLET | Refills: 0 | Status: SHIPPED | OUTPATIENT
Start: 2025-04-18 | End: 2025-04-25

## 2025-04-18 ASSESSMENT — PROMIS GLOBAL HEALTH SCALE
TO WHAT EXTENT ARE YOU ABLE TO CARRY OUT YOUR EVERYDAY PHYSICAL ACTIVITIES SUCH AS WALKING, CLIMBING STAIRS, CARRYING GROCERIES, OR MOVING A CHAIR [ON A SCALE OF 1 (NOT AT ALL) TO 5 (COMPLETELY)]?: A LITTLE
IN GENERAL, HOW WOULD YOU RATE YOUR PHYSICAL HEALTH [ON A SCALE OF 1 (POOR) TO 5 (EXCELLENT)]?: FAIR
IN THE PAST 7 DAYS, HOW OFTEN HAVE YOU BEEN BOTHERED BY EMOTIONAL PROBLEMS, SUCH AS FEELING ANXIOUS, DEPRESSED, OR IRRITABLE [ON A SCALE FROM 1 (NEVER) TO 5 (ALWAYS)]?: RARELY
IN GENERAL, HOW WOULD YOU RATE YOUR SATISFACTION WITH YOUR SOCIAL ACTIVITIES AND RELATIONSHIPS [ON A SCALE OF 1 (POOR) TO 5 (EXCELLENT)]?: VERY GOOD
IN THE PAST 7 DAYS, HOW OFTEN HAVE YOU BEEN BOTHERED BY EMOTIONAL PROBLEMS, SUCH AS FEELING ANXIOUS, DEPRESSED, OR IRRITABLE [ON A SCALE FROM 1 (NEVER) TO 5 (ALWAYS)]?: RARELY
IN THE PAST 7 DAYS, HOW WOULD YOU RATE YOUR PAIN ON AVERAGE [ON A SCALE FROM 0 (NO PAIN) TO 10 (WORST IMAGINABLE PAIN)]?: 9
SUM OF RESPONSES TO QUESTIONS 2, 4, 5, & 10: 15
IN GENERAL, HOW WOULD YOU RATE YOUR MENTAL HEALTH, INCLUDING YOUR MOOD AND YOUR ABILITY TO THINK [ON A SCALE OF 1 (POOR) TO 5 (EXCELLENT)]?: VERY GOOD
IN THE PAST 7 DAYS, HOW WOULD YOU RATE YOUR PAIN ON AVERAGE [ON A SCALE FROM 0 (NO PAIN) TO 10 (WORST IMAGINABLE PAIN)]?: 9
IN GENERAL, WOULD YOU SAY YOUR QUALITY OF LIFE IS...[ON A SCALE OF 1 (POOR) TO 5 (EXCELLENT)]: GOOD
TO WHAT EXTENT ARE YOU ABLE TO CARRY OUT YOUR EVERYDAY PHYSICAL ACTIVITIES SUCH AS WALKING, CLIMBING STAIRS, CARRYING GROCERIES, OR MOVING A CHAIR [ON A SCALE OF 1 (NOT AT ALL) TO 5 (COMPLETELY)]?: A LITTLE
HOW IS THE PROMIS V1.1 BEING ADMINISTERED?: ELECTRONIC
IN GENERAL, HOW WOULD YOU RATE YOUR SATISFACTION WITH YOUR SOCIAL ACTIVITIES AND RELATIONSHIPS [ON A SCALE OF 1 (POOR) TO 5 (EXCELLENT)]?: VERY GOOD
IN GENERAL, WOULD YOU SAY YOUR HEALTH IS...[ON A SCALE OF 1 (POOR) TO 5 (EXCELLENT)]: FAIR
SUM OF RESPONSES TO QUESTIONS 3, 6, 7, & 8: 17
IN GENERAL, PLEASE RATE HOW WELL YOU CARRY OUT YOUR USUAL SOCIAL ACTIVITIES (INCLUDES ACTIVITIES AT HOME, AT WORK, AND IN YOUR COMMUNITY, AND RESPONSIBILITIES AS A PARENT, CHILD, SPOUSE, EMPLOYEE, FRIEND, ETC) [ON A SCALE OF 1 (POOR) TO 5 (EXCELLENT)]?: VERY GOOD
IN GENERAL, PLEASE RATE HOW WELL YOU CARRY OUT YOUR USUAL SOCIAL ACTIVITIES (INCLUDES ACTIVITIES AT HOME, AT WORK, AND IN YOUR COMMUNITY, AND RESPONSIBILITIES AS A PARENT, CHILD, SPOUSE, EMPLOYEE, FRIEND, ETC) [ON A SCALE OF 1 (POOR) TO 5 (EXCELLENT)]?: VERY GOOD
IN THE PAST 7 DAYS, HOW WOULD YOU RATE YOUR FATIGUE ON AVERAGE [ON A SCALE FROM 1 (NONE) TO 5 (VERY SEVERE)]?: MILD
IN THE PAST 7 DAYS, HOW WOULD YOU RATE YOUR FATIGUE ON AVERAGE [ON A SCALE FROM 1 (NONE) TO 5 (VERY SEVERE)]?: MILD
WHO IS THE PERSON COMPLETING THE PROMIS V1.1 SURVEY?: SELF
HOW IS THE PROMIS V1.1 BEING ADMINISTERED?: ELECTRONIC
WHO IS THE PERSON COMPLETING THE PROMIS V1.1 SURVEY?: SELF

## 2025-04-21 ENCOUNTER — OFFICE VISIT (OUTPATIENT)
Dept: ORTHOPEDIC SURGERY | Age: 80
End: 2025-04-21

## 2025-04-21 DIAGNOSIS — M87.052 AVASCULAR NECROSIS OF BONE OF HIP, LEFT (HCC): Primary | ICD-10-CM

## 2025-04-21 DIAGNOSIS — Z96.642 S/P TOTAL LEFT HIP ARTHROPLASTY: ICD-10-CM

## 2025-04-21 PROCEDURE — 99024 POSTOP FOLLOW-UP VISIT: CPT | Performed by: ORTHOPAEDIC SURGERY

## 2025-04-21 NOTE — PROGRESS NOTES
Patient ID:  Mirtha Posada  [id number]  80 y.o.  1945    Today: April 21, 2025       CHIEF COMPLAINT:  Follow-up of left conversion total hip replacement    HISTORY:  The patient presents today for 2-week follow-up after total hip replacement.  The patient continues to improve gradually.  Working with physical therapy on strengthening and stretching.  They are on aspirin for DVT prophylaxis.  Using assistive walking device appropriately.  Continues to take medication appropriately.      PE:  Incision is examined which is healing well.  No erythema, induration or drainage.  No significant fluid accumulation around the surgical site distally.   Fires ehl fhl ta gs p splt s/s/sp/dp.t, negative homans, leg lengths equal  Sensation intact.  Limb is perfused.  No sign of DVT.      ASSESSMENT:  80F S/P Left Total Hip Replacement DOS 4/7/25    PLAN:  Continue activity and current weight bearing status.    Continue to take the aspirin 81 BID for a full month of DVT prophylaxis.  They are given a refill on their pain medication if they feel they are going to run out.   I have asked the patient not to submerge the incision under water or place any creams or oils over this for another week or two.  I will plan to check them back in 4 for follow-up or sooner if they have any issues, questions or concerns.       Signed By: Toni Braxton MD  April 21, 2025

## 2025-04-23 NOTE — PROGRESS NOTES
Physician Progress Note      PATIENT:               KRISS MAYEN  CSN #:                  [id number]  :                       1945  ADMIT DATE:       2025 8:28 AM  DISCH DATE:        2025 1:56 PM  RESPONDING  PROVIDER #:        Toni Braxton MD          QUERY TEXT:    Please clarify the following documentation:    The clinical indicators include:  -\"Patient is a 80 y.o. female who left hip pain secondary to avascular   necrosis of the left hip.  She also had a history of a femoral neck fracture   which was fixed with an FNS system\" (OR note by Toni Braxton MD   25)  -\"Patient returns to office today with chief complaint of left hip pain\" (H&P   by Toni Braxton MD 3/31/25)  -\"Assessment: Left Hip avascular necrosis bilateral knee arthritis\" (H&P by   Toni Braxton MD 3/31/25)  -\"The patient has avascular necrosis of the left hip with severe worsening   pain which has not improved despite non operative treatments.\"(H&P by Toni Braxton MD 3/31/25)  -\"X-rays demonstrate avascular necrosis of the left hip\" (H&P by Toni Braxton MD 3/31/25)  -\"past surgical surgery: HIP FRACTURE SURGERY Left (HIP OPEN REDUCTION   INTERNAL FIXATION) 2023 and  -\"Procedure: LEFT-HIP TOTAL ARTHROPLASTY CONVERSION, direct anterior approach\"   (OR note by Toni Braxton MD 25)  -\"Diagnosis: avascular necrosis of the left hip\" (OR note by Toni Braxton MD 25)  - \"mobilize with PT/OT\" (PN by Toni Braxton MD 25)  Options provided:  -- avascular necrosis of the left hip related to previous procedure (FNS   system)  -- avascular necrosis of the left hip unrelated to previous procedure (FNS   system)  -- Other - I will add my own diagnosis  -- Disagree - Not applicable / Not valid  -- Disagree - Clinically unable to determine / Unknown  -- Refer to Clinical Documentation Reviewer    PROVIDER RESPONSE TEXT:    Patient has avascular necrosis

## 2025-04-26 DIAGNOSIS — G62.0 CHEMOTHERAPY-INDUCED NEUROPATHY: Chronic | ICD-10-CM

## 2025-04-26 DIAGNOSIS — N18.30 CONTROLLED TYPE 2 DIABETES MELLITUS WITH STAGE 3 CHRONIC KIDNEY DISEASE, WITHOUT LONG-TERM CURRENT USE OF INSULIN (HCC): Chronic | ICD-10-CM

## 2025-04-26 DIAGNOSIS — T45.1X5A CHEMOTHERAPY-INDUCED NEUROPATHY: Chronic | ICD-10-CM

## 2025-04-26 DIAGNOSIS — F51.01 PRIMARY INSOMNIA: ICD-10-CM

## 2025-04-26 DIAGNOSIS — E11.22 CONTROLLED TYPE 2 DIABETES MELLITUS WITH STAGE 3 CHRONIC KIDNEY DISEASE, WITHOUT LONG-TERM CURRENT USE OF INSULIN (HCC): Chronic | ICD-10-CM

## 2025-04-26 RX ORDER — DOXEPIN HYDROCHLORIDE 10 MG/1
10 CAPSULE ORAL NIGHTLY
Qty: 90 CAPSULE | Refills: 1 | Status: CANCELLED | OUTPATIENT
Start: 2025-04-26

## 2025-04-28 RX ORDER — METFORMIN HYDROCHLORIDE 500 MG/1
1000 TABLET, EXTENDED RELEASE ORAL 2 TIMES DAILY
Qty: 360 TABLET | Refills: 0 | Status: SHIPPED | OUTPATIENT
Start: 2025-04-28

## 2025-04-28 RX ORDER — CELECOXIB 200 MG/1
200 CAPSULE ORAL 2 TIMES DAILY
Qty: 60 CAPSULE | Refills: 1 | Status: SHIPPED | OUTPATIENT
Start: 2025-04-28

## 2025-04-28 RX ORDER — GABAPENTIN 600 MG/1
600 TABLET ORAL 2 TIMES DAILY PRN
Qty: 180 TABLET | Refills: 0 | Status: SHIPPED | OUTPATIENT
Start: 2025-04-28 | End: 2025-07-27

## 2025-04-28 NOTE — TELEPHONE ENCOUNTER
Gabapentin last sent 6/10/24, 6 mos supply  Metformin last sent 6/10/24, 6 mos    Next appt 6/10/25

## 2025-05-12 ENCOUNTER — OFFICE VISIT (OUTPATIENT)
Age: 80
End: 2025-05-12
Payer: MEDICARE

## 2025-05-12 VITALS
BODY MASS INDEX: 25.06 KG/M2 | HEART RATE: 64 BPM | HEIGHT: 65 IN | SYSTOLIC BLOOD PRESSURE: 130 MMHG | WEIGHT: 150.4 LBS | DIASTOLIC BLOOD PRESSURE: 60 MMHG

## 2025-05-12 DIAGNOSIS — Z96.642 S/P TOTAL LEFT HIP ARTHROPLASTY: Primary | ICD-10-CM

## 2025-05-12 DIAGNOSIS — Z78.9 STATIN INTOLERANCE: ICD-10-CM

## 2025-05-12 DIAGNOSIS — M1A.0410 CHRONIC GOUT OF RIGHT HAND, UNSPECIFIED CAUSE: ICD-10-CM

## 2025-05-12 DIAGNOSIS — E79.0 ELEVATED URIC ACID IN BLOOD: ICD-10-CM

## 2025-05-12 DIAGNOSIS — I42.0 DILATED CARDIOMYOPATHY (HCC): ICD-10-CM

## 2025-05-12 DIAGNOSIS — I50.42 CHRONIC COMBINED SYSTOLIC AND DIASTOLIC CHF (CONGESTIVE HEART FAILURE) (HCC): Primary | Chronic | ICD-10-CM

## 2025-05-12 DIAGNOSIS — I10 PRIMARY HYPERTENSION: Chronic | ICD-10-CM

## 2025-05-12 PROCEDURE — 3078F DIAST BP <80 MM HG: CPT | Performed by: INTERNAL MEDICINE

## 2025-05-12 PROCEDURE — 1036F TOBACCO NON-USER: CPT | Performed by: INTERNAL MEDICINE

## 2025-05-12 PROCEDURE — G8399 PT W/DXA RESULTS DOCUMENT: HCPCS | Performed by: INTERNAL MEDICINE

## 2025-05-12 PROCEDURE — 99214 OFFICE O/P EST MOD 30 MIN: CPT | Performed by: INTERNAL MEDICINE

## 2025-05-12 PROCEDURE — G8417 CALC BMI ABV UP PARAM F/U: HCPCS | Performed by: INTERNAL MEDICINE

## 2025-05-12 PROCEDURE — 1090F PRES/ABSN URINE INCON ASSESS: CPT | Performed by: INTERNAL MEDICINE

## 2025-05-12 PROCEDURE — 1123F ACP DISCUSS/DSCN MKR DOCD: CPT | Performed by: INTERNAL MEDICINE

## 2025-05-12 PROCEDURE — 1125F AMNT PAIN NOTED PAIN PRSNT: CPT | Performed by: INTERNAL MEDICINE

## 2025-05-12 PROCEDURE — G8428 CUR MEDS NOT DOCUMENT: HCPCS | Performed by: INTERNAL MEDICINE

## 2025-05-12 PROCEDURE — 3075F SYST BP GE 130 - 139MM HG: CPT | Performed by: INTERNAL MEDICINE

## 2025-05-12 RX ORDER — TRAMADOL HYDROCHLORIDE 50 MG/1
50 TABLET ORAL EVERY 6 HOURS PRN
Qty: 28 TABLET | Refills: 0 | Status: SHIPPED | OUTPATIENT
Start: 2025-05-12 | End: 2025-05-19

## 2025-05-12 RX ORDER — ALLOPURINOL 100 MG/1
200 TABLET ORAL DAILY
Qty: 180 TABLET | Refills: 1 | Status: SHIPPED | OUTPATIENT
Start: 2025-05-12

## 2025-05-12 NOTE — PROGRESS NOTES
2 [facility], [address] [place] [address]  PHONE: [phone]     25    NAME:  Mirtha Posada  : 1945  MRN: [id number]       SUBJECTIVE:   Mirtha Posada is a 80 y.o. female seen for a follow up visit regarding the following:     Chief Complaint   Patient presents with    Congestive Heart Failure    Follow-up       HPI:   She has h/o NICM and SHF, EF 40% in  with LHC showing mild CAD.    She has prior breast CA, went thru chemo and radiation on left breast in .       Echo 10/2023: EF 54%, mild LVH  Echo 2024: low normal EF     2024 admission for A/C DHF, COVID  2024 admission for JACKY - Ace and K stopped     NO new CP, pressure.    No new RUFFIN, using cane.    Lasix JUST as needed, qod or every 2-3 days.  Following weights.   She is struggling with chronic LBP.    Patient denies recent history of orthopnea, PND, excessive dizziness and/or syncope.  \"I love salt\"  ARB added, BP better now.        Past Medical History, Past Surgical History, Family history, Social History, and Medications were all reviewed with the patient today and updated as necessary.     Current Outpatient Medications   Medication Sig Dispense Refill    allopurinol (ZYLOPRIM) 100 MG tablet Take 2 tablets by mouth daily 180 tablet 1    traMADol (ULTRAM) 50 MG tablet Take 1 tablet by mouth every 6 hours as needed for Pain for up to 7 days. Intended supply: 7 days. Take lowest dose possible to manage pain Max Daily Amount: 200 mg 28 tablet 0    gabapentin (NEURONTIN) 600 MG tablet Take 1 tablet by mouth 2 times daily as needed (pain) for up to 90 days. 180 tablet 0    metFORMIN (GLUCOPHAGE-XR) 500 MG extended release tablet Take 2 tablets by mouth 2 times daily 360 tablet 0    celecoxib (CELEBREX) 200 MG capsule Take 1 capsule by mouth in the morning and at bedtime 60 capsule 1    losartan (COZAAR) 25 MG tablet Take 1 tablet by mouth daily 90 tablet 3    sennosides-docusate sodium (SENOKOT-S) 8.6-50

## 2025-05-12 NOTE — TELEPHONE ENCOUNTER
Spoke with patient and informed her that Dr. Braxton refilled her Tramadol.  Patient verbalized understanding and voiced no other concerns at this time.

## 2025-05-29 ENCOUNTER — OFFICE VISIT (OUTPATIENT)
Dept: ORTHOPEDIC SURGERY | Age: 80
End: 2025-05-29

## 2025-05-29 DIAGNOSIS — Z96.642 S/P TOTAL LEFT HIP ARTHROPLASTY: Primary | ICD-10-CM

## 2025-05-29 DIAGNOSIS — R42 VERTIGO: ICD-10-CM

## 2025-05-29 DIAGNOSIS — F51.01 PRIMARY INSOMNIA: ICD-10-CM

## 2025-05-29 DIAGNOSIS — M17.12 ARTHRITIS OF LEFT KNEE: Primary | ICD-10-CM

## 2025-05-29 DIAGNOSIS — R11.0 NAUSEA: ICD-10-CM

## 2025-05-29 DIAGNOSIS — M17.11 ARTHRITIS OF RIGHT KNEE: ICD-10-CM

## 2025-05-29 DIAGNOSIS — M17.12 ARTHRITIS OF LEFT KNEE: ICD-10-CM

## 2025-05-29 DIAGNOSIS — F32.A DEPRESSION, UNSPECIFIED DEPRESSION TYPE: ICD-10-CM

## 2025-05-29 RX ORDER — CITALOPRAM HYDROBROMIDE 10 MG/1
10 TABLET ORAL DAILY
Qty: 90 TABLET | Refills: 1 | Status: SHIPPED | OUTPATIENT
Start: 2025-05-29

## 2025-05-29 RX ORDER — DOXEPIN HYDROCHLORIDE 10 MG/1
10 CAPSULE ORAL NIGHTLY
Qty: 90 CAPSULE | Refills: 1 | Status: SHIPPED | OUTPATIENT
Start: 2025-05-29

## 2025-05-29 RX ORDER — MECLIZINE HYDROCHLORIDE 25 MG/1
25 TABLET ORAL 3 TIMES DAILY PRN
Qty: 30 TABLET | Refills: 2 | Status: SHIPPED | OUTPATIENT
Start: 2025-05-29

## 2025-05-29 RX ORDER — ONDANSETRON 4 MG/1
4 TABLET, FILM COATED ORAL 3 TIMES DAILY PRN
Qty: 30 TABLET | Refills: 2 | Status: SHIPPED | OUTPATIENT
Start: 2025-05-29

## 2025-05-29 RX ORDER — AMITRIPTYLINE HYDROCHLORIDE 10 MG/1
10 TABLET ORAL NIGHTLY
Qty: 90 TABLET | Refills: 1 | Status: SHIPPED | OUTPATIENT
Start: 2025-05-29

## 2025-05-29 NOTE — PROGRESS NOTES
Patient ID:  Mirtha Posada  [id number]  80 y.o.  1945    Today: May 29, 2025       CHIEF COMPLAINT:  Follow-up of left conversion total hip replacement    HISTORY: She presents for annual follow-up.  She doing excellently with a cane at baseline secondary to her lower extremity trauma in her knee and ankle.  She has no hip pain.    PE: Healed incision no erythema, induration or drainage.  No significant fluid accumulation around the surgical site distally.   Fires ehl fhl ta gs p splt s/s/sp/dp.t, negative homans, leg lengths equal  Sensation intact.  Limb is perfused.  No sign of DVT.    AP pelvis AP lateral left hip demonstrate cementless total of arthroplasty in appropriate position without evidence of hardware failure or loosening  ASSESSMENT:  80F S/P Left Total Hip Replacement DOS 4/7/25    PLAN: Overall she is doing excellent from hip replacement.  She continue weight-bear as tolerated.  She would like bilateral knee injections with gel as she had these many years prior with excellent result.  She does have end-stage arthritis of bilateral knees and would like to pursue this prior to considering surgical intervention.  Will get the gel shots scheduled for bilateral knee arthritis    Signed By: Toni Braxton MD  May 29, 2025

## 2025-06-06 ASSESSMENT — PATIENT HEALTH QUESTIONNAIRE - PHQ9
1. LITTLE INTEREST OR PLEASURE IN DOING THINGS: NOT AT ALL
3. TROUBLE FALLING OR STAYING ASLEEP: NOT AT ALL
5. POOR APPETITE OR OVEREATING: NOT AT ALL
6. FEELING BAD ABOUT YOURSELF - OR THAT YOU ARE A FAILURE OR HAVE LET YOURSELF OR YOUR FAMILY DOWN: NOT AT ALL
6. FEELING BAD ABOUT YOURSELF - OR THAT YOU ARE A FAILURE OR HAVE LET YOURSELF OR YOUR FAMILY DOWN: NOT AT ALL
SUM OF ALL RESPONSES TO PHQ QUESTIONS 1-9: 3
4. FEELING TIRED OR HAVING LITTLE ENERGY: NEARLY EVERY DAY
8. MOVING OR SPEAKING SO SLOWLY THAT OTHER PEOPLE COULD HAVE NOTICED. OR THE OPPOSITE - BEING SO FIDGETY OR RESTLESS THAT YOU HAVE BEEN MOVING AROUND A LOT MORE THAN USUAL: NOT AT ALL
7. TROUBLE CONCENTRATING ON THINGS, SUCH AS READING THE NEWSPAPER OR WATCHING TELEVISION: NOT AT ALL
SUM OF ALL RESPONSES TO PHQ QUESTIONS 1-9: 3
SUM OF ALL RESPONSES TO PHQ QUESTIONS 1-9: 3
2. FEELING DOWN, DEPRESSED OR HOPELESS: NOT AT ALL
4. FEELING TIRED OR HAVING LITTLE ENERGY: NEARLY EVERY DAY
5. POOR APPETITE OR OVEREATING: NOT AT ALL
3. TROUBLE FALLING OR STAYING ASLEEP: NOT AT ALL
2. FEELING DOWN, DEPRESSED OR HOPELESS: NOT AT ALL
10. IF YOU CHECKED OFF ANY PROBLEMS, HOW DIFFICULT HAVE THESE PROBLEMS MADE IT FOR YOU TO DO YOUR WORK, TAKE CARE OF THINGS AT HOME, OR GET ALONG WITH OTHER PEOPLE: NOT DIFFICULT AT ALL
9. THOUGHTS THAT YOU WOULD BE BETTER OFF DEAD, OR OF HURTING YOURSELF: NOT AT ALL
10. IF YOU CHECKED OFF ANY PROBLEMS, HOW DIFFICULT HAVE THESE PROBLEMS MADE IT FOR YOU TO DO YOUR WORK, TAKE CARE OF THINGS AT HOME, OR GET ALONG WITH OTHER PEOPLE: NOT DIFFICULT AT ALL
SUM OF ALL RESPONSES TO PHQ QUESTIONS 1-9: 3
8. MOVING OR SPEAKING SO SLOWLY THAT OTHER PEOPLE COULD HAVE NOTICED. OR THE OPPOSITE, BEING SO FIGETY OR RESTLESS THAT YOU HAVE BEEN MOVING AROUND A LOT MORE THAN USUAL: NOT AT ALL
SUM OF ALL RESPONSES TO PHQ QUESTIONS 1-9: 3
1. LITTLE INTEREST OR PLEASURE IN DOING THINGS: NOT AT ALL
7. TROUBLE CONCENTRATING ON THINGS, SUCH AS READING THE NEWSPAPER OR WATCHING TELEVISION: NOT AT ALL
9. THOUGHTS THAT YOU WOULD BE BETTER OFF DEAD, OR OF HURTING YOURSELF: NOT AT ALL

## 2025-06-09 ENCOUNTER — OFFICE VISIT (OUTPATIENT)
Dept: ORTHOPEDIC SURGERY | Age: 80
End: 2025-06-09
Payer: MEDICARE

## 2025-06-09 VITALS — HEIGHT: 65 IN | WEIGHT: 149.4 LBS | BODY MASS INDEX: 24.89 KG/M2

## 2025-06-09 DIAGNOSIS — M17.11 ARTHRITIS OF RIGHT KNEE: ICD-10-CM

## 2025-06-09 DIAGNOSIS — M17.12 ARTHRITIS OF LEFT KNEE: Primary | ICD-10-CM

## 2025-06-09 PROCEDURE — 99024 POSTOP FOLLOW-UP VISIT: CPT | Performed by: ORTHOPAEDIC SURGERY

## 2025-06-09 PROCEDURE — 20610 DRAIN/INJ JOINT/BURSA W/O US: CPT | Performed by: ORTHOPAEDIC SURGERY

## 2025-06-09 NOTE — PROGRESS NOTES
Name: Mirtha Posada  YOB: 1945  Gender: female  MRN: [id number]    Allergies   Allergen Reactions    Oxycodone Itching    Vancomycin Other (See Comments)     Kidney function worsened    Eucalyptus Oil Hives and Other (See Comments)     Burns mouth    Ezetimibe Myalgia    Morphine Hallucinations     Feels crazy      Pineapple Hives and Other (See Comments)     Burns mouth    Statins Other (See Comments)     Product containing 3-hydroxy-3-methylglutaryl-coenzyme A reductase inhibitor (product)    Penicillins Hives    Sulfa Antibiotics Nausea Only and Rash       CC:  bilateral knee pain, Osteoarthritis    PROCEDURE: 1 of 3 HA injection(s). All questions answered.     Procedure Note: The patient was placed in upright position with both knees hanging freely from exam table.  Both knees were prepped in sterile fashion using alcohol wipe(s).  Using an infrapatellar approach, 2.5cc of Visco 3 was injected freely.  The needle was then removed, pressure hemostatis achieved, injection site was cleansed with alcohol wipe and dressed with band aid.    The patient tolerated the procedure without complication.  The patient  will follow up as scheduled.    Toni Braxton MD  06/09/25

## 2025-06-10 ENCOUNTER — OFFICE VISIT (OUTPATIENT)
Dept: INTERNAL MEDICINE CLINIC | Facility: CLINIC | Age: 80
End: 2025-06-10
Payer: MEDICARE

## 2025-06-10 VITALS
SYSTOLIC BLOOD PRESSURE: 124 MMHG | BODY MASS INDEX: 24.32 KG/M2 | WEIGHT: 146 LBS | HEART RATE: 65 BPM | DIASTOLIC BLOOD PRESSURE: 62 MMHG | TEMPERATURE: 98.1 F | HEIGHT: 65 IN | OXYGEN SATURATION: 99 %

## 2025-06-10 DIAGNOSIS — E78.5 DYSLIPIDEMIA: Chronic | ICD-10-CM

## 2025-06-10 DIAGNOSIS — M87.052 AVASCULAR NECROSIS OF BONE OF HIP, LEFT (HCC): ICD-10-CM

## 2025-06-10 DIAGNOSIS — F51.01 PRIMARY INSOMNIA: ICD-10-CM

## 2025-06-10 DIAGNOSIS — N18.31 TYPE 2 DIABETES MELLITUS WITH STAGE 3A CHRONIC KIDNEY DISEASE, WITHOUT LONG-TERM CURRENT USE OF INSULIN (HCC): Chronic | ICD-10-CM

## 2025-06-10 DIAGNOSIS — I50.42 CHRONIC COMBINED SYSTOLIC AND DIASTOLIC CHF (CONGESTIVE HEART FAILURE) (HCC): Chronic | ICD-10-CM

## 2025-06-10 DIAGNOSIS — I10 PRIMARY HYPERTENSION: Chronic | ICD-10-CM

## 2025-06-10 DIAGNOSIS — E11.22 TYPE 2 DIABETES MELLITUS WITH STAGE 3A CHRONIC KIDNEY DISEASE, WITHOUT LONG-TERM CURRENT USE OF INSULIN (HCC): Chronic | ICD-10-CM

## 2025-06-10 DIAGNOSIS — N18.30 CONTROLLED TYPE 2 DIABETES MELLITUS WITH STAGE 3 CHRONIC KIDNEY DISEASE, WITHOUT LONG-TERM CURRENT USE OF INSULIN (HCC): Primary | Chronic | ICD-10-CM

## 2025-06-10 DIAGNOSIS — M19.041 PRIMARY OSTEOARTHRITIS OF BOTH HANDS: ICD-10-CM

## 2025-06-10 DIAGNOSIS — B00.1 RECURRENT COLD SORES: ICD-10-CM

## 2025-06-10 DIAGNOSIS — Z78.9 STATIN INTOLERANCE: ICD-10-CM

## 2025-06-10 DIAGNOSIS — M85.89 OSTEOPENIA OF MULTIPLE SITES: ICD-10-CM

## 2025-06-10 DIAGNOSIS — Z94.84 STEM CELLS TRANSPLANT STATUS (HCC): ICD-10-CM

## 2025-06-10 DIAGNOSIS — T45.1X5A CHEMOTHERAPY-INDUCED NEUROPATHY: Chronic | ICD-10-CM

## 2025-06-10 DIAGNOSIS — G62.0 CHEMOTHERAPY-INDUCED NEUROPATHY: Chronic | ICD-10-CM

## 2025-06-10 DIAGNOSIS — M19.042 PRIMARY OSTEOARTHRITIS OF BOTH HANDS: ICD-10-CM

## 2025-06-10 DIAGNOSIS — E11.22 CONTROLLED TYPE 2 DIABETES MELLITUS WITH STAGE 3 CHRONIC KIDNEY DISEASE, WITHOUT LONG-TERM CURRENT USE OF INSULIN (HCC): Primary | Chronic | ICD-10-CM

## 2025-06-10 DIAGNOSIS — I42.0 DILATED CARDIOMYOPATHY (HCC): ICD-10-CM

## 2025-06-10 DIAGNOSIS — E53.8 VITAMIN B12 DEFICIENCY: ICD-10-CM

## 2025-06-10 DIAGNOSIS — E55.9 VITAMIN D DEFICIENCY: ICD-10-CM

## 2025-06-10 PROCEDURE — G8427 DOCREV CUR MEDS BY ELIG CLIN: HCPCS | Performed by: NURSE PRACTITIONER

## 2025-06-10 PROCEDURE — 1160F RVW MEDS BY RX/DR IN RCRD: CPT | Performed by: NURSE PRACTITIONER

## 2025-06-10 PROCEDURE — 1159F MED LIST DOCD IN RCRD: CPT | Performed by: NURSE PRACTITIONER

## 2025-06-10 PROCEDURE — 1036F TOBACCO NON-USER: CPT | Performed by: NURSE PRACTITIONER

## 2025-06-10 PROCEDURE — G8420 CALC BMI NORM PARAMETERS: HCPCS | Performed by: NURSE PRACTITIONER

## 2025-06-10 PROCEDURE — 3078F DIAST BP <80 MM HG: CPT | Performed by: NURSE PRACTITIONER

## 2025-06-10 PROCEDURE — 99214 OFFICE O/P EST MOD 30 MIN: CPT | Performed by: NURSE PRACTITIONER

## 2025-06-10 PROCEDURE — 3074F SYST BP LT 130 MM HG: CPT | Performed by: NURSE PRACTITIONER

## 2025-06-10 PROCEDURE — 1090F PRES/ABSN URINE INCON ASSESS: CPT | Performed by: NURSE PRACTITIONER

## 2025-06-10 PROCEDURE — G2211 COMPLEX E/M VISIT ADD ON: HCPCS | Performed by: NURSE PRACTITIONER

## 2025-06-10 PROCEDURE — 3044F HG A1C LEVEL LT 7.0%: CPT | Performed by: NURSE PRACTITIONER

## 2025-06-10 PROCEDURE — G8399 PT W/DXA RESULTS DOCUMENT: HCPCS | Performed by: NURSE PRACTITIONER

## 2025-06-10 PROCEDURE — 1123F ACP DISCUSS/DSCN MKR DOCD: CPT | Performed by: NURSE PRACTITIONER

## 2025-06-10 RX ORDER — TRAMADOL HYDROCHLORIDE 50 MG/1
50 TABLET ORAL EVERY 6 HOURS PRN
COMMUNITY

## 2025-06-10 RX ORDER — METFORMIN HYDROCHLORIDE 500 MG/1
1000 TABLET, EXTENDED RELEASE ORAL 2 TIMES DAILY
Qty: 360 TABLET | Refills: 1 | Status: SHIPPED | OUTPATIENT
Start: 2025-06-10

## 2025-06-10 RX ORDER — VALACYCLOVIR HYDROCHLORIDE 1 G/1
1000 TABLET, FILM COATED ORAL 2 TIMES DAILY
Qty: 10 TABLET | Refills: 1 | Status: SHIPPED | OUTPATIENT
Start: 2025-06-10

## 2025-06-10 RX ORDER — GABAPENTIN 600 MG/1
600 TABLET ORAL 2 TIMES DAILY PRN
Qty: 180 TABLET | Refills: 1 | Status: SHIPPED | OUTPATIENT
Start: 2025-06-10 | End: 2025-12-07

## 2025-06-10 ASSESSMENT — ENCOUNTER SYMPTOMS
NAUSEA: 0
COUGH: 0
VOMITING: 0
ABDOMINAL PAIN: 0
DIARRHEA: 0
SHORTNESS OF BREATH: 0

## 2025-06-10 NOTE — PROGRESS NOTES
[facility]  Office Visit Note    Subjective:  Chief Complaint   Patient presents with    Diabetes    Hypertension       Patient ID: Mirtha Posada is a 80 y.o. female presenting to the office for the above.    80 year old female for follow up.  Accompanied by her .    ( is Mychal Posada, retired  and ).  They have one son, two grandchildren, and two great-grandchildren.  Have a home at [facility].      HPI 2/25/2025:   They are here today primarily to discuss patient's current condition.   Her  has noticed decline over the past 6 months or so.  She was hospitalized in December with vertigo.  She is to have surgery on left hip; history of avascular necrosis.  This was postponed to April due to a UTI.  She has completed antibiotic for UTI; will recheck urine today; denies current symptoms.  She had cardiology follow up earlier this month.    Her  reports that Mrs. Posada continues to spend most of her time in bed; she confirms this.  He states she will sometimes fall out of bed but not call for him to come help, and will sleep on the floor for hours.   She is still having episodes of vertigo. Her  states that she will occasionally seem incoherent and not be able to answer questions properly; these episodes do not last long. No other red flags with the episodes.   We have discussed the possibility of having home health aides come to assist them, but Mrs. Posada is reluctant to have this.   --Encourage healthy meals, hydration, getting out more, regular exercise, etc.   --Discussed options.  Will refer to neurology for evaluation; advised patient to contact office if they do not hear from the referral in the next 7-10 days; they express understanding.   6/10/2025:   She has had a few falls since our last visit.  Denies injuries.  She has appointment with neurology in September.      Frequent UTIs, overactive bladder, urinary

## 2025-06-12 ENCOUNTER — OFFICE VISIT (OUTPATIENT)
Dept: ENT CLINIC | Age: 80
End: 2025-06-12

## 2025-06-12 VITALS — HEIGHT: 65 IN | RESPIRATION RATE: 16 BRPM | WEIGHT: 146 LBS | BODY MASS INDEX: 24.32 KG/M2

## 2025-06-12 DIAGNOSIS — Z98.890 H/O ENDOSCOPIC SINUS SURGERY: Primary | ICD-10-CM

## 2025-06-12 DIAGNOSIS — H72.92 TYMPANIC MEMBRANE PERFORATION, LEFT: ICD-10-CM

## 2025-06-12 DIAGNOSIS — J32.4 CHRONIC PANSINUSITIS: ICD-10-CM

## 2025-06-12 DIAGNOSIS — H92.12 OTORRHEA, LEFT: ICD-10-CM

## 2025-06-12 RX ORDER — OFLOXACIN 3 MG/ML
5 SOLUTION AURICULAR (OTIC) 2 TIMES DAILY
Qty: 10 ML | Refills: 0 | Status: SHIPPED | OUTPATIENT
Start: 2025-06-12 | End: 2025-06-19

## 2025-06-12 ASSESSMENT — ENCOUNTER SYMPTOMS
CONSTIPATION: 0
CHOKING: 0
SINUS PAIN: 0
NAUSEA: 0
EYE ITCHING: 0
SHORTNESS OF BREATH: 0
COUGH: 0
SINUS PRESSURE: 0
EYE DISCHARGE: 0
DIARRHEA: 0
FACIAL SWELLING: 0
WHEEZING: 0
STRIDOR: 0
APNEA: 0
EYE PAIN: 0

## 2025-06-12 NOTE — PROGRESS NOTES
FRACTURE SURGERY      HAND SURGERY Right 2022    Right index finger distal interphalangeal joint arthrodesis performed by Kerry Johnson MD at [facility]    HEENT      Sinus Surgery    HEENT      RK procedure bilateral eyes    HIP FRACTURE SURGERY Left 2023    HIP OPEN REDUCTION INTERNAL FIXATION performed by Irwin Chen MD at [facility] MAIN OR    HX OPEN REDUCTION INTERNAL FIXATION Left     Ankle    HX OPEN REDUCTION INTERNAL FIXATION  2016    [place]    HYSTERECTOMY (CERVIX STATUS UNKNOWN)      HYSTERECTOMY, VAGINAL      KNEE ARTHROSCOPY      KNEE SURGERY      LUMBAR LAMINECTOMY      [facility] STEREO BREAST BX W LOC DEVICE 1ST LESION RIGHT Right 2024    BRIAN STEROTACTIC LOC BREAST BIOPSY RIGHT 2024 Eula Carlin MD [facility] RADIOLOGY MAMMO    MASTECTOMY Left     With Reconstruction    ORTHOPEDIC SURGERY Right 2013    Patellar fracture repair    OTHER SURGICAL HISTORY      Chetan Cath Placed and Removed    OVARY REMOVAL      Unilateral    SINUS SURGERY      TONSILLECTOMY      TOTAL HIP ARTHROPLASTY Left 2025    LEFT-HIP TOTAL ARTHROPLASTY CONVERSION, direct anterior approach performed by Toni Braxton MD at [facility] MAIN OR    TRANSPLANT  2002    Stem Cell Transplant    UPPER GASTROINTESTINAL ENDOSCOPY N/A 2023    EGD BIOPSY and balloon dilation performed by Iqra Pedro MD at [facility] ENDOSCOPY    UVULOPALATOPHARYGOPLASTY  2004     Family History   Problem Relation Age of Onset    Cancer Mother     Diabetes Mother     Heart Disease Mother         MI at age 78    Heart Failure Mother         age 62    Colon Cancer Mother     High Blood Pressure Mother     Hypertension Mother     High Cholesterol Mother     Hearing Loss Mother     Heart Disease Father          at 46    Alcohol Abuse Father     Stroke Sister     Vision Loss Sister     Cancer Brother     Heart Disease Brother         ROS:    Review of Systems   Constitutional:  Negative for chills and fever.   HENT:  Positive

## 2025-06-13 ENCOUNTER — TELEPHONE (OUTPATIENT)
Dept: PULMONOLOGY | Age: 80
End: 2025-06-13

## 2025-06-13 NOTE — TELEPHONE ENCOUNTER
Called patient and left VM letting her know it was time to schedule her next appointment with Dr. Duran and to call the office to schedule. Sending letter/mychart message       Return in about 1 year (around 10/1/2025) for CT chest before next appt, With Me or Charisma.   *PT CAN SEE HARSHAD/CHARISMA/TOY

## 2025-06-16 ENCOUNTER — OFFICE VISIT (OUTPATIENT)
Dept: ORTHOPEDIC SURGERY | Age: 80
End: 2025-06-16
Payer: MEDICARE

## 2025-06-16 DIAGNOSIS — M17.11 ARTHRITIS OF RIGHT KNEE: ICD-10-CM

## 2025-06-16 DIAGNOSIS — M17.12 ARTHRITIS OF LEFT KNEE: Primary | ICD-10-CM

## 2025-06-16 PROCEDURE — 20610 DRAIN/INJ JOINT/BURSA W/O US: CPT | Performed by: ORTHOPAEDIC SURGERY

## 2025-06-16 NOTE — PROGRESS NOTES
Name: Mirtha Posada  YOB: 1945  Gender: female  MRN: [id number]    Allergies   Allergen Reactions    Oxycodone Itching    Vancomycin Other (See Comments)     Kidney function worsened    Eucalyptus Oil Hives and Other (See Comments)     Burns mouth    Ezetimibe Myalgia    Morphine Hallucinations     Feels crazy      Pineapple Hives and Other (See Comments)     Burns mouth    Statins Other (See Comments)     Product containing 3-hydroxy-3-methylglutaryl-coenzyme A reductase inhibitor (product)    Penicillins Hives    Sulfa Antibiotics Nausea Only and Rash       CC:  bilateral knee pain, Osteoarthritis    PROCEDURE2 of 3 HA injection(s). All questions answered.     Procedure Note: The patient was placed in upright position with both knees hanging freely from exam table.  Both knees were prepped in sterile fashion using alcohol wipe(s).  Using an infrapatellar approach, 2.5cc of Visco 3 was injected freely.  The needle was then removed, pressure hemostatis achieved, injection site was cleansed with alcohol wipe and dressed with band aid.    The patient tolerated the procedure without complication.  The patient  will follow up as scheduled.    Toni Braxton MD  06/16/25

## 2025-06-23 ENCOUNTER — OFFICE VISIT (OUTPATIENT)
Dept: ORTHOPEDIC SURGERY | Age: 80
End: 2025-06-23

## 2025-06-23 DIAGNOSIS — M17.11 OSTEOARTHRITIS OF RIGHT KNEE, UNSPECIFIED OSTEOARTHRITIS TYPE: ICD-10-CM

## 2025-06-23 DIAGNOSIS — M17.12 ARTHRITIS OF LEFT KNEE: Primary | ICD-10-CM

## 2025-06-23 NOTE — PROGRESS NOTES
Name: Mirtha Posada  YOB: 1945  Gender: female  MRN: [id number]    Allergies   Allergen Reactions    Oxycodone Itching    Vancomycin Other (See Comments)     Kidney function worsened    Eucalyptus Oil Hives and Other (See Comments)     Burns mouth    Ezetimibe Myalgia    Morphine Hallucinations     Feels crazy      Pineapple Hives and Other (See Comments)     Burns mouth    Statins Other (See Comments)     Product containing 3-hydroxy-3-methylglutaryl-coenzyme A reductase inhibitor (product)    Penicillins Hives    Sulfa Antibiotics Nausea Only and Rash       CC:  bilateral knee pain, Osteoarthritis    PROCEDURE3 of 3 HA injection(s). All questions answered.     Procedure Note: The patient was placed in upright position with both knees hanging freely from exam table.  Both knees were prepped in sterile fashion using alcohol wipe(s).  Using an infrapatellar approach, 2.5cc of Euflexa was injected freely.  The needle was then removed, pressure hemostatis achieved, injection site was cleansed with alcohol wipe and dressed with band aid.    The patient tolerated the procedure without complication.  The patient  will follow up as scheduled.    Toni Braxton MD  06/23/25

## 2025-07-22 DIAGNOSIS — N39.0 RECURRENT UTI: ICD-10-CM

## 2025-07-22 RX ORDER — CELECOXIB 200 MG/1
200 CAPSULE ORAL 2 TIMES DAILY
Qty: 60 CAPSULE | Refills: 1 | Status: ON HOLD | OUTPATIENT
Start: 2025-07-22

## 2025-07-22 RX ORDER — LOSARTAN POTASSIUM 25 MG/1
25 TABLET ORAL DAILY
Qty: 90 TABLET | Refills: 3 | Status: ON HOLD | OUTPATIENT
Start: 2025-07-22

## 2025-07-22 RX ORDER — GINSENG 100 MG
1 CAPSULE ORAL DAILY
Qty: 90 CAPSULE | Refills: 3 | Status: ON HOLD | OUTPATIENT
Start: 2025-07-22

## 2025-07-22 NOTE — TELEPHONE ENCOUNTER
Requested Prescriptions     Pending Prescriptions Disp Refills    losartan (COZAAR) 25 MG tablet 90 tablet 3     Sig: Take 1 tablet by mouth daily

## 2025-07-27 ENCOUNTER — HOSPITAL ENCOUNTER (INPATIENT)
Age: 80
LOS: 2 days | Discharge: HOME | DRG: 682 | End: 2025-07-29
Attending: EMERGENCY MEDICINE | Admitting: STUDENT IN AN ORGANIZED HEALTH CARE EDUCATION/TRAINING PROGRAM
Payer: MEDICARE

## 2025-07-27 ENCOUNTER — APPOINTMENT (OUTPATIENT)
Dept: CT IMAGING | Age: 80
DRG: 682 | End: 2025-07-27
Payer: MEDICARE

## 2025-07-27 ENCOUNTER — APPOINTMENT (OUTPATIENT)
Dept: ULTRASOUND IMAGING | Age: 80
DRG: 682 | End: 2025-07-27
Payer: MEDICARE

## 2025-07-27 DIAGNOSIS — Z91.89 AT RISK FOR POLYPHARMACY: ICD-10-CM

## 2025-07-27 DIAGNOSIS — E83.42 HYPOMAGNESEMIA: ICD-10-CM

## 2025-07-27 DIAGNOSIS — R53.1 GENERALIZED WEAKNESS: ICD-10-CM

## 2025-07-27 DIAGNOSIS — R53.83 OTHER FATIGUE: Primary | ICD-10-CM

## 2025-07-27 DIAGNOSIS — R41.82 ALTERED MENTAL STATUS, UNSPECIFIED ALTERED MENTAL STATUS TYPE: ICD-10-CM

## 2025-07-27 PROBLEM — Z79.899 POLYPHARMACY: Status: ACTIVE | Noted: 2025-07-27

## 2025-07-27 PROBLEM — D51.9 B12 DEFICIENCY ANEMIA: Status: ACTIVE | Noted: 2025-07-27

## 2025-07-27 PROBLEM — Z87.39 HISTORY OF GOUT: Status: ACTIVE | Noted: 2025-07-27

## 2025-07-27 PROBLEM — Z86.79 HISTORY OF CHF (CONGESTIVE HEART FAILURE): Status: ACTIVE | Noted: 2025-07-27

## 2025-07-27 PROBLEM — N17.9 AKI (ACUTE KIDNEY INJURY): Status: ACTIVE | Noted: 2025-07-27

## 2025-07-27 PROBLEM — N18.2 CKD (CHRONIC KIDNEY DISEASE) STAGE 2, GFR 60-89 ML/MIN: Status: ACTIVE | Noted: 2025-07-27

## 2025-07-27 PROBLEM — I50.42 CHRONIC COMBINED SYSTOLIC AND DIASTOLIC CHF (CONGESTIVE HEART FAILURE) (HCC): Chronic | Status: RESOLVED | Noted: 2022-12-19 | Resolved: 2025-07-27

## 2025-07-27 LAB
25(OH)D3 SERPL-MCNC: 39.6 NG/ML (ref 30–100)
ALBUMIN SERPL-MCNC: 3.5 G/DL (ref 3.2–4.6)
ALBUMIN/GLOB SERPL: 0.8 (ref 1–1.9)
ALP SERPL-CCNC: 84 U/L (ref 35–104)
ALT SERPL-CCNC: 13 U/L (ref 8–45)
AMMONIA PLAS-SCNC: 24 UMOL/L (ref 11–51)
ANION GAP SERPL CALC-SCNC: 14 MMOL/L (ref 7–16)
APPEARANCE UR: CLEAR
AST SERPL-CCNC: 22 U/L (ref 15–37)
B PERT DNA SPEC QL NAA+PROBE: NOT DETECTED
BASOPHILS # BLD: 0.05 K/UL (ref 0–0.2)
BASOPHILS NFR BLD: 0.7 % (ref 0–2)
BILIRUB SERPL-MCNC: 0.5 MG/DL (ref 0–1.2)
BILIRUB UR QL: NEGATIVE
BORDETELLA PARAPERTUSSIS BY PCR: NOT DETECTED
BUN SERPL-MCNC: 17 MG/DL (ref 8–23)
C PNEUM DNA SPEC QL NAA+PROBE: NOT DETECTED
CALCIUM SERPL-MCNC: 9 MG/DL (ref 8.8–10.2)
CHLORIDE SERPL-SCNC: 91 MMOL/L (ref 98–107)
CO2 SERPL-SCNC: 25 MMOL/L (ref 20–29)
COLOR UR: NORMAL
CREAT SERPL-MCNC: 1.23 MG/DL (ref 0.6–1.1)
DIFFERENTIAL METHOD BLD: ABNORMAL
EKG ATRIAL RATE: 70 BPM
EKG DIAGNOSIS: NORMAL
EKG P AXIS: 28 DEGREES
EKG P-R INTERVAL: 248 MS
EKG Q-T INTERVAL: 524 MS
EKG QRS DURATION: 99 MS
EKG QTC CALCULATION (BAZETT): 566 MS
EKG R AXIS: 164 DEGREES
EKG T AXIS: -18 DEGREES
EKG VENTRICULAR RATE: 70 BPM
EOSINOPHIL # BLD: 0.15 K/UL (ref 0–0.8)
EOSINOPHIL NFR BLD: 2 % (ref 0.5–7.8)
ERYTHROCYTE [DISTWIDTH] IN BLOOD BY AUTOMATED COUNT: 15.9 % (ref 11.9–14.6)
FLUAV SUBTYP SPEC NAA+PROBE: NOT DETECTED
FLUBV RNA SPEC QL NAA+PROBE: NOT DETECTED
FOLATE SERPL-MCNC: 19.8 NG/ML (ref 3.1–17.5)
GLOBULIN SER CALC-MCNC: 4.3 G/DL (ref 2.3–3.5)
GLUCOSE SERPL-MCNC: 106 MG/DL (ref 70–99)
GLUCOSE UR STRIP.AUTO-MCNC: NEGATIVE MG/DL
HADV DNA SPEC QL NAA+PROBE: NOT DETECTED
HCOV 229E RNA SPEC QL NAA+PROBE: NOT DETECTED
HCOV HKU1 RNA SPEC QL NAA+PROBE: NOT DETECTED
HCOV NL63 RNA SPEC QL NAA+PROBE: NOT DETECTED
HCOV OC43 RNA SPEC QL NAA+PROBE: NOT DETECTED
HCT VFR BLD AUTO: 32.9 % (ref 35.8–46.3)
HGB BLD-MCNC: 10.5 G/DL (ref 11.7–15.4)
HGB UR QL STRIP: NEGATIVE
HMPV RNA SPEC QL NAA+PROBE: NOT DETECTED
HPIV1 RNA SPEC QL NAA+PROBE: NOT DETECTED
HPIV2 RNA SPEC QL NAA+PROBE: NOT DETECTED
HPIV3 RNA SPEC QL NAA+PROBE: NOT DETECTED
HPIV4 RNA SPEC QL NAA+PROBE: NOT DETECTED
IMM GRANULOCYTES # BLD AUTO: 0.02 K/UL (ref 0–0.5)
IMM GRANULOCYTES NFR BLD AUTO: 0.3 % (ref 0–5)
KETONES UR QL STRIP.AUTO: NEGATIVE MG/DL
LACTATE SERPL-SCNC: 1.8 MMOL/L (ref 0.5–2)
LACTATE SERPL-SCNC: 2 MMOL/L (ref 0.5–2)
LEUKOCYTE ESTERASE UR QL STRIP.AUTO: NEGATIVE
LYMPHOCYTES # BLD: 2.19 K/UL (ref 0.5–4.6)
LYMPHOCYTES NFR BLD: 29.1 % (ref 13–44)
M PNEUMO DNA SPEC QL NAA+PROBE: NOT DETECTED
MAGNESIUM SERPL-MCNC: 1.2 MG/DL (ref 1.8–2.4)
MCH RBC QN AUTO: 28.2 PG (ref 26.1–32.9)
MCHC RBC AUTO-ENTMCNC: 31.9 G/DL (ref 31.4–35)
MCV RBC AUTO: 88.4 FL (ref 82–102)
MONOCYTES # BLD: 0.67 K/UL (ref 0.1–1.3)
MONOCYTES NFR BLD: 8.9 % (ref 4–12)
NEUTS SEG # BLD: 4.45 K/UL (ref 1.7–8.2)
NEUTS SEG NFR BLD: 59 % (ref 43–78)
NITRITE UR QL STRIP.AUTO: NEGATIVE
NRBC # BLD: 0 K/UL (ref 0–0.2)
PH UR STRIP: 5.5 (ref 5–9)
PLATELET # BLD AUTO: 296 K/UL (ref 150–450)
PMV BLD AUTO: 10.1 FL (ref 9.4–12.3)
POTASSIUM SERPL-SCNC: 3.5 MMOL/L (ref 3.5–5.1)
PROCALCITONIN SERPL-MCNC: 0.09 NG/ML (ref 0–0.1)
PROT SERPL-MCNC: 7.8 G/DL (ref 6.3–8.2)
PROT UR STRIP-MCNC: NEGATIVE MG/DL
RBC # BLD AUTO: 3.72 M/UL (ref 4.05–5.2)
RSV RNA SPEC QL NAA+PROBE: NOT DETECTED
RV+EV RNA SPEC QL NAA+PROBE: NOT DETECTED
SARS-COV-2 RNA RESP QL NAA+PROBE: NOT DETECTED
SODIUM SERPL-SCNC: 130 MMOL/L (ref 136–145)
SP GR UR REFRACTOMETRY: 1 (ref 1–1.02)
T4 SERPL-MCNC: 7.1 UG/DL (ref 4.5–11.7)
TSH, 3RD GENERATION: 5.3 UIU/ML (ref 0.27–4.2)
UROBILINOGEN UR QL STRIP.AUTO: 0.2 EU/DL (ref 0.2–1)
VIT B12 SERPL-MCNC: 251 PG/ML (ref 193–986)
WBC # BLD AUTO: 7.5 K/UL (ref 4.3–11.1)

## 2025-07-27 PROCEDURE — 2580000003 HC RX 258: Performed by: STUDENT IN AN ORGANIZED HEALTH CARE EDUCATION/TRAINING PROGRAM

## 2025-07-27 PROCEDURE — 84145 PROCALCITONIN (PCT): CPT

## 2025-07-27 PROCEDURE — 82607 VITAMIN B-12: CPT

## 2025-07-27 PROCEDURE — 96374 THER/PROPH/DIAG INJ IV PUSH: CPT

## 2025-07-27 PROCEDURE — 93005 ELECTROCARDIOGRAM TRACING: CPT | Performed by: EMERGENCY MEDICINE

## 2025-07-27 PROCEDURE — 93971 EXTREMITY STUDY: CPT

## 2025-07-27 PROCEDURE — 2500000003 HC RX 250 WO HCPCS: Performed by: STUDENT IN AN ORGANIZED HEALTH CARE EDUCATION/TRAINING PROGRAM

## 2025-07-27 PROCEDURE — 93010 ELECTROCARDIOGRAM REPORT: CPT | Performed by: INTERNAL MEDICINE

## 2025-07-27 PROCEDURE — 84436 ASSAY OF TOTAL THYROXINE: CPT

## 2025-07-27 PROCEDURE — 36415 COLL VENOUS BLD VENIPUNCTURE: CPT

## 2025-07-27 PROCEDURE — 80053 COMPREHEN METABOLIC PANEL: CPT

## 2025-07-27 PROCEDURE — 70450 CT HEAD/BRAIN W/O DYE: CPT

## 2025-07-27 PROCEDURE — 82140 ASSAY OF AMMONIA: CPT

## 2025-07-27 PROCEDURE — 83735 ASSAY OF MAGNESIUM: CPT

## 2025-07-27 PROCEDURE — 6370000000 HC RX 637 (ALT 250 FOR IP): Performed by: STUDENT IN AN ORGANIZED HEALTH CARE EDUCATION/TRAINING PROGRAM

## 2025-07-27 PROCEDURE — 82746 ASSAY OF FOLIC ACID SERUM: CPT

## 2025-07-27 PROCEDURE — 1100000000 HC RM PRIVATE

## 2025-07-27 PROCEDURE — 84443 ASSAY THYROID STIM HORMONE: CPT

## 2025-07-27 PROCEDURE — 6360000002 HC RX W HCPCS: Performed by: EMERGENCY MEDICINE

## 2025-07-27 PROCEDURE — 85025 COMPLETE CBC W/AUTO DIFF WBC: CPT

## 2025-07-27 PROCEDURE — 81003 URINALYSIS AUTO W/O SCOPE: CPT

## 2025-07-27 PROCEDURE — 82306 VITAMIN D 25 HYDROXY: CPT

## 2025-07-27 PROCEDURE — 0202U NFCT DS 22 TRGT SARS-COV-2: CPT

## 2025-07-27 PROCEDURE — 99285 EMERGENCY DEPT VISIT HI MDM: CPT

## 2025-07-27 PROCEDURE — 83605 ASSAY OF LACTIC ACID: CPT

## 2025-07-27 RX ORDER — QUETIAPINE FUMARATE 25 MG/1
12.5 TABLET, FILM COATED ORAL NIGHTLY PRN
Status: DISCONTINUED | OUTPATIENT
Start: 2025-07-27 | End: 2025-07-29 | Stop reason: HOSPADM

## 2025-07-27 RX ORDER — HYDRALAZINE HYDROCHLORIDE 20 MG/ML
10 INJECTION INTRAMUSCULAR; INTRAVENOUS EVERY 6 HOURS PRN
Status: DISCONTINUED | OUTPATIENT
Start: 2025-07-27 | End: 2025-07-29 | Stop reason: HOSPADM

## 2025-07-27 RX ORDER — CITALOPRAM HYDROBROMIDE 20 MG/1
10 TABLET ORAL DAILY
Status: DISCONTINUED | OUTPATIENT
Start: 2025-07-27 | End: 2025-07-29 | Stop reason: HOSPADM

## 2025-07-27 RX ORDER — POTASSIUM CHLORIDE 1500 MG/1
40 TABLET, EXTENDED RELEASE ORAL PRN
Status: DISCONTINUED | OUTPATIENT
Start: 2025-07-27 | End: 2025-07-29 | Stop reason: HOSPADM

## 2025-07-27 RX ORDER — CETIRIZINE HYDROCHLORIDE 5 MG/1
5 TABLET ORAL NIGHTLY PRN
Status: DISCONTINUED | OUTPATIENT
Start: 2025-07-27 | End: 2025-07-29 | Stop reason: HOSPADM

## 2025-07-27 RX ORDER — SODIUM CHLORIDE 0.9 % (FLUSH) 0.9 %
5-40 SYRINGE (ML) INJECTION EVERY 12 HOURS SCHEDULED
Status: DISCONTINUED | OUTPATIENT
Start: 2025-07-27 | End: 2025-07-29 | Stop reason: HOSPADM

## 2025-07-27 RX ORDER — POTASSIUM CHLORIDE 7.45 MG/ML
10 INJECTION INTRAVENOUS PRN
Status: DISCONTINUED | OUTPATIENT
Start: 2025-07-27 | End: 2025-07-29 | Stop reason: HOSPADM

## 2025-07-27 RX ORDER — ACETAMINOPHEN 650 MG/1
650 SUPPOSITORY RECTAL EVERY 6 HOURS PRN
Status: DISCONTINUED | OUTPATIENT
Start: 2025-07-27 | End: 2025-07-29 | Stop reason: HOSPADM

## 2025-07-27 RX ORDER — ONDANSETRON 2 MG/ML
8 INJECTION INTRAMUSCULAR; INTRAVENOUS EVERY 6 HOURS PRN
Status: DISCONTINUED | OUTPATIENT
Start: 2025-07-27 | End: 2025-07-29 | Stop reason: HOSPADM

## 2025-07-27 RX ORDER — GABAPENTIN 300 MG/1
300 CAPSULE ORAL 2 TIMES DAILY PRN
Status: DISCONTINUED | OUTPATIENT
Start: 2025-07-27 | End: 2025-07-29 | Stop reason: HOSPADM

## 2025-07-27 RX ORDER — LANOLIN ALCOHOL/MO/W.PET/CERES
1000 CREAM (GRAM) TOPICAL DAILY
Status: DISCONTINUED | OUTPATIENT
Start: 2025-07-27 | End: 2025-07-29 | Stop reason: HOSPADM

## 2025-07-27 RX ORDER — METOPROLOL SUCCINATE 50 MG/1
50 TABLET, EXTENDED RELEASE ORAL 2 TIMES DAILY
Status: DISCONTINUED | OUTPATIENT
Start: 2025-07-27 | End: 2025-07-29 | Stop reason: HOSPADM

## 2025-07-27 RX ORDER — AMITRIPTYLINE HYDROCHLORIDE 10 MG/1
10 TABLET ORAL NIGHTLY
Status: DISCONTINUED | OUTPATIENT
Start: 2025-07-27 | End: 2025-07-29 | Stop reason: HOSPADM

## 2025-07-27 RX ORDER — SODIUM CHLORIDE 0.9 % (FLUSH) 0.9 %
5-40 SYRINGE (ML) INJECTION PRN
Status: DISCONTINUED | OUTPATIENT
Start: 2025-07-27 | End: 2025-07-29 | Stop reason: HOSPADM

## 2025-07-27 RX ORDER — MAGNESIUM SULFATE IN WATER 40 MG/ML
2000 INJECTION, SOLUTION INTRAVENOUS PRN
Status: DISCONTINUED | OUTPATIENT
Start: 2025-07-27 | End: 2025-07-29 | Stop reason: HOSPADM

## 2025-07-27 RX ORDER — LOSARTAN POTASSIUM 50 MG/1
25 TABLET ORAL DAILY
Status: DISCONTINUED | OUTPATIENT
Start: 2025-07-27 | End: 2025-07-28

## 2025-07-27 RX ORDER — DOXEPIN HYDROCHLORIDE 10 MG/1
10 CAPSULE ORAL NIGHTLY
Status: DISCONTINUED | OUTPATIENT
Start: 2025-07-27 | End: 2025-07-29 | Stop reason: HOSPADM

## 2025-07-27 RX ORDER — SODIUM CHLORIDE 9 MG/ML
INJECTION, SOLUTION INTRAVENOUS PRN
Status: DISCONTINUED | OUTPATIENT
Start: 2025-07-27 | End: 2025-07-29 | Stop reason: HOSPADM

## 2025-07-27 RX ORDER — ALLOPURINOL 100 MG/1
200 TABLET ORAL DAILY
Status: DISCONTINUED | OUTPATIENT
Start: 2025-07-27 | End: 2025-07-29 | Stop reason: HOSPADM

## 2025-07-27 RX ORDER — ACETAMINOPHEN 325 MG/1
650 TABLET ORAL EVERY 6 HOURS PRN
Status: DISCONTINUED | OUTPATIENT
Start: 2025-07-27 | End: 2025-07-29 | Stop reason: HOSPADM

## 2025-07-27 RX ORDER — ONDANSETRON 4 MG/1
8 TABLET, ORALLY DISINTEGRATING ORAL EVERY 8 HOURS PRN
Status: DISCONTINUED | OUTPATIENT
Start: 2025-07-27 | End: 2025-07-29 | Stop reason: HOSPADM

## 2025-07-27 RX ORDER — 0.9 % SODIUM CHLORIDE 0.9 %
500 INTRAVENOUS SOLUTION INTRAVENOUS ONCE
Status: COMPLETED | OUTPATIENT
Start: 2025-07-27 | End: 2025-07-28

## 2025-07-27 RX ORDER — DIPHENHYDRAMINE HCL 25 MG
25 CAPSULE ORAL EVERY 6 HOURS PRN
Status: DISCONTINUED | OUTPATIENT
Start: 2025-07-27 | End: 2025-07-29 | Stop reason: HOSPADM

## 2025-07-27 RX ORDER — MECLIZINE HYDROCHLORIDE 25 MG/1
25 TABLET ORAL 3 TIMES DAILY PRN
Status: DISCONTINUED | OUTPATIENT
Start: 2025-07-27 | End: 2025-07-29 | Stop reason: HOSPADM

## 2025-07-27 RX ORDER — POLYETHYLENE GLYCOL 3350 17 G/17G
17 POWDER, FOR SOLUTION ORAL DAILY PRN
Status: DISCONTINUED | OUTPATIENT
Start: 2025-07-27 | End: 2025-07-29 | Stop reason: HOSPADM

## 2025-07-27 RX ORDER — MAGNESIUM SULFATE IN WATER 40 MG/ML
2000 INJECTION, SOLUTION INTRAVENOUS ONCE
Status: COMPLETED | OUTPATIENT
Start: 2025-07-27 | End: 2025-07-27

## 2025-07-27 RX ADMIN — LOSARTAN POTASSIUM 25 MG: 50 TABLET, FILM COATED ORAL at 17:42

## 2025-07-27 RX ADMIN — GABAPENTIN 300 MG: 300 CAPSULE ORAL at 20:06

## 2025-07-27 RX ADMIN — METOPROLOL SUCCINATE 50 MG: 50 TABLET, EXTENDED RELEASE ORAL at 20:06

## 2025-07-27 RX ADMIN — MAGNESIUM SULFATE HEPTAHYDRATE 2000 MG: 40 INJECTION, SOLUTION INTRAVENOUS at 14:05

## 2025-07-27 RX ADMIN — SODIUM CHLORIDE 500 ML: 0.9 INJECTION, SOLUTION INTRAVENOUS at 17:38

## 2025-07-27 RX ADMIN — CYANOCOBALAMIN TAB 1000 MCG 1000 MCG: 1000 TAB at 17:42

## 2025-07-27 RX ADMIN — QUETIAPINE FUMARATE 12.5 MG: 25 TABLET ORAL at 20:06

## 2025-07-27 RX ADMIN — SODIUM CHLORIDE, PRESERVATIVE FREE 10 ML: 5 INJECTION INTRAVENOUS at 20:06

## 2025-07-27 ASSESSMENT — PAIN - FUNCTIONAL ASSESSMENT: PAIN_FUNCTIONAL_ASSESSMENT: NONE - DENIES PAIN

## 2025-07-27 NOTE — H&P
Hospitalist History and Physical   Admit Date:  2025 12:01 PM   Name:  Mirtha Posada   Age:  80 y.o.  Sex:  female  :  1945   MRN:  [id number]   Room:  [facility]    Presenting/Chief Complaint: Fatigue     Reason(s) for Admission: Hypomagnesemia [E83.42]  Generalized weakness [R53.1]  JACKY (acute kidney injury) [N17.9]  Altered mental status, unspecified altered mental status type [R41.82]  At risk for polypharmacy [Z91.89]  Other fatigue [R53.83]     History of Present Illness:   Mirtha Posada is a 80 y.o. female with medical history of combined HFrEF and diastolic dysfunction with transition to normal findings , type 2 diabetes, who presented with generalized weakness for the past 2-3 days.  Also  noted AMS.  He was concerned for possible UTI, as patient seemed to have urinary frequency.  No fever, chills, chest pain, abdominal pain, nausea/vomiting, or diarrhea.    In ER, vitals were relatively unremarkable except hypertension.  Hemoglobin 10.5.  Creatinine 1.23.  Sodium 130.  Magnesium 1.2.  UA negative for any evidence of UTI.  CT head negative for any acute changes.  She received magnesium IV.  Hospitalist was called for admission regarding AMS.        Assessment & Plan:     JACKY on CKD 2  - Creatinine 1.23.  Baseline creatinine 0.8  - Suspecting decreased p.o. intake over the past few days  - Will order gentle IV fluid  - Daily CMP/BMP      Acute metabolic/toxic encephalopathy  -Etiology unclear, but possibly due to polypharmacy.  B12 also low.  - Replete B12  - Hold Benadryl, Zyrtec, tramadol, amitriptyline, doxepin, and meclizine  - Decrease Neurontin dose      Generalized weakness  B12 deficiency  Hypomagnesemia  - B12 251, magnesium 1.2 on admission  -In the setting of B12 deficiency and hypomagnesemia  - Replete B12 and magnesium  - PT OT  - Having dry coughing.  Will order CXR and respiratory panel.      History of HFrEF and diastolic dysfunction, now transitioned to

## 2025-07-27 NOTE — ED TRIAGE NOTES
Patient a 81 y/o Female reports to the ED with c/c of weakness. States she almost fell out of bed this morning. Spouse endorses some AMS over the last 3 days. Hx of lymphedema and HTN. Has not taken her meds yet today.

## 2025-07-27 NOTE — PROGRESS NOTES
Hourly rounding completed during shift. All needs met at this time. Bed L/L with call bell in reach, bed alarm on,  at side.  Report given to oncoming RN.

## 2025-07-27 NOTE — ED PROVIDER NOTES
Emergency Department Provider Note       [facility] EMERGENCY DEPT   PCP: Marva Xavier, APRN - CNP   Age: 80 y.o.   Sex: female     DISPOSITION Decision To Admit 07/27/2025 04:02:35 PM    ICD-10-CM    1. Other fatigue  R53.83       2. Generalized weakness  R53.1       3. Hypomagnesemia  E83.42       4. At risk for polypharmacy  Z91.89       5. Altered mental status, unspecified altered mental status type  R41.82           Medical Decision Making     Patient is alert, oriented x 2 here.  Differential diagnosis is broad.  Plan for screening labs here.  At this time she is not meeting sepsis criteria however concern for possible infectious etiologies particularly urinalysis.  Given her nonfocal neurological exam and less concern for CVA    3:50 PM EDT  CT scan of head shows no acute pathology.  Radiologist comments on possible fluid in the mastoid.  She has no mastoid tenderness on exam.  So no clinical suspicion for mastoiditis.  Labs were significant for magnesium of 1.2.  White blood cell count fairly stable.  Hemoglobin stable as well.  Urine without any signs of infection.    Magnesium was replaced here.  I did discuss has been he still voices concerns about her intermittent confusion.  Could potentially be medication related.  He also feels that the swelling in her left arm is worse.  Does have lymphedema.  Plan for ultrasound to rule out DVT.    I feel she likely needs to be admitted given her confusion, weakness and electrolyte abnormalities.     1 or more acute illnesses that pose a threat to life or bodily function.   1 or more chronic illnesses with a severe exacerbation or progression.  Drug therapy given requiring intensive monitoring for toxicity.  Chronic medical problems impacting care include arthritis, diabetes, hypertension,.  Shared medical decision making was utilized in creating the patients health plan today.  I independently ordered and reviewed each unique test.    I reviewed external     FRACTURE SURGERY      HAND SURGERY Right 11/16/2022    Right index finger distal interphalangeal joint arthrodesis performed by Kerry Johnson MD at [facility]    HEENT      Sinus Surgery    HEENT      RK procedure bilateral eyes    HIP FRACTURE SURGERY Left 02/08/2023    HIP OPEN REDUCTION INTERNAL FIXATION performed by Irwin Chen MD at [facility] MAIN OR    HX OPEN REDUCTION INTERNAL FIXATION Left 2013    Ankle    HX OPEN REDUCTION INTERNAL FIXATION  08/2016    [place]    HYSTERECTOMY (CERVIX STATUS UNKNOWN)      HYSTERECTOMY, VAGINAL      KNEE ARTHROSCOPY      KNEE SURGERY      LUMBAR LAMINECTOMY  1992    [facility] STEREO BREAST BX W LOC DEVICE 1ST LESION RIGHT Right 07/19/2024    BRIAN STEROTACTIC LOC BREAST BIOPSY RIGHT 7/19/2024 Eula Carlin MD [facility] RADIOLOGY [facility]MO    MASTECTOMY Left 2002    With Reconstruction    ORTHOPEDIC SURGERY Right 08/2013    Patellar fracture repair    OTHER SURGICAL HISTORY      Chetan Cath Placed and Removed    OVARY REMOVAL      Unilateral    SINUS SURGERY      TONSILLECTOMY      TOTAL HIP ARTHROPLASTY Left 04/07/2025    LEFT-HIP TOTAL ARTHROPLASTY CONVERSION, direct anterior approach performed by Toni Braxton MD at [facility] MAIN OR    TRANSPLANT  2002    Stem Cell Transplant    UPPER GASTROINTESTINAL ENDOSCOPY N/A 07/26/2023    EGD BIOPSY and balloon dilation performed by Iqra Pedro MD at [facility] ENDOSCOPY    UVULOPALATOPHARYGOPLASTY  2004        Social History     Socioeconomic History    Marital status:    Tobacco Use    Smoking status: Never    Smokeless tobacco: Never    Tobacco comments:     never used tobacco   Vaping Use    Vaping status: Never Used   Substance and Sexual Activity    Alcohol use: No    Drug use: No    Sexual activity: Not Currently     Partners: Male     Social Drivers of Health     Financial Resource Strain: Low Risk  (6/6/2022)    Overall Financial Resource Strain (CARDIA)     Difficulty of Paying Living Expenses: Not hard at all   Food Insecurity: No

## 2025-07-27 NOTE — PROGRESS NOTES
TRANSFER - IN REPORT:    Verbal report received from IRINEO Browning on Mirtha Posada  being received from ER for routine progression of patient care      Report consisted of patient's Situation, Background, Assessment and   Recommendations(SBAR).     Information from the following report(s) ED SBAR, Intake/Output, MAR, and Recent Results was reviewed with the receiving nurse.    Opportunity for questions and clarification was provided.      Assessment to be completed upon patient's arrival to unit and care assumed.

## 2025-07-27 NOTE — PROGRESS NOTES
4 Eyes Skin Assessment     NAME:  Mirtha Posada  YOB: 1945  MEDICAL RECORD NUMBER:  [id number]    The patient is being assessed for  Admission    I agree that at least one RN has performed a thorough Head to Toe Skin Assessment on the patient. ALL assessment sites listed below have been assessed.      Areas assessed by both nurses:    Head, Face, Ears, Shoulders, Back, Chest, Arms, Elbows, Hands, Sacrum. Buttock, Coccyx, Ischium, Legs. Feet and Heels, and Under Medical Devices         Does the Patient have a Wound? No noted wound(s)       Ghulam Prevention initiated by RN: Yes  Wound Care Orders initiated by RN: No    For hospital-acquired stage 1 & 2 and ALL Stage 3,4, Unstageable, DTI, NWPT, and Complex wounds: place order “IP Wound Care/Ostomy Nurse Eval and Treat” by RN under : No    New Ostomies, if present place, Ostomy referral order under : No     Nurse 1 eSignature: Electronically signed by Cindi Bynum RN on 7/27/25 at 5:12 PM EDT    **SHARE this note so that the co-signing nurse can place an eSignature**    Nurse 2 eSignature: Electronically signed by Alea Jackson RN on 7/27/25 at 5:50 PM EDT

## 2025-07-28 ENCOUNTER — APPOINTMENT (OUTPATIENT)
Dept: GENERAL RADIOLOGY | Age: 80
DRG: 682 | End: 2025-07-28
Payer: MEDICARE

## 2025-07-28 LAB
ALBUMIN SERPL-MCNC: 3.3 G/DL (ref 3.2–4.6)
ALBUMIN/GLOB SERPL: 0.8 (ref 1–1.9)
ALP SERPL-CCNC: 76 U/L (ref 35–104)
ALT SERPL-CCNC: 12 U/L (ref 8–45)
ANION GAP SERPL CALC-SCNC: 14 MMOL/L (ref 7–16)
AST SERPL-CCNC: 24 U/L (ref 15–37)
BASOPHILS # BLD: 0.03 K/UL (ref 0–0.2)
BASOPHILS NFR BLD: 0.5 % (ref 0–2)
BILIRUB SERPL-MCNC: 0.6 MG/DL (ref 0–1.2)
BUN SERPL-MCNC: 16 MG/DL (ref 8–23)
CALCIUM SERPL-MCNC: 8.9 MG/DL (ref 8.8–10.2)
CHLORIDE SERPL-SCNC: 98 MMOL/L (ref 98–107)
CO2 SERPL-SCNC: 24 MMOL/L (ref 20–29)
CREAT SERPL-MCNC: 0.99 MG/DL (ref 0.6–1.1)
DIFFERENTIAL METHOD BLD: ABNORMAL
EOSINOPHIL # BLD: 0.13 K/UL (ref 0–0.8)
EOSINOPHIL NFR BLD: 2.3 % (ref 0.5–7.8)
ERYTHROCYTE [DISTWIDTH] IN BLOOD BY AUTOMATED COUNT: 16.4 % (ref 11.9–14.6)
GLOBULIN SER CALC-MCNC: 4 G/DL (ref 2.3–3.5)
GLUCOSE SERPL-MCNC: 94 MG/DL (ref 70–99)
HCT VFR BLD AUTO: 35 % (ref 35.8–46.3)
HGB BLD-MCNC: 10.5 G/DL (ref 11.7–15.4)
IMM GRANULOCYTES # BLD AUTO: 0.01 K/UL (ref 0–0.5)
IMM GRANULOCYTES NFR BLD AUTO: 0.2 % (ref 0–5)
LYMPHOCYTES # BLD: 1.79 K/UL (ref 0.5–4.6)
LYMPHOCYTES NFR BLD: 31.2 % (ref 13–44)
MAGNESIUM SERPL-MCNC: 1.7 MG/DL (ref 1.8–2.4)
MAGNESIUM SERPL-MCNC: 2.3 MG/DL (ref 1.8–2.4)
MCH RBC QN AUTO: 27.4 PG (ref 26.1–32.9)
MCHC RBC AUTO-ENTMCNC: 30 G/DL (ref 31.4–35)
MCV RBC AUTO: 91.4 FL (ref 82–102)
MONOCYTES # BLD: 0.57 K/UL (ref 0.1–1.3)
MONOCYTES NFR BLD: 9.9 % (ref 4–12)
NEUTS SEG # BLD: 3.2 K/UL (ref 1.7–8.2)
NEUTS SEG NFR BLD: 55.9 % (ref 43–78)
NRBC # BLD: 0 K/UL (ref 0–0.2)
PLATELET # BLD AUTO: 272 K/UL (ref 150–450)
PMV BLD AUTO: 10.3 FL (ref 9.4–12.3)
POTASSIUM SERPL-SCNC: 3.8 MMOL/L (ref 3.5–5.1)
PROT SERPL-MCNC: 7.2 G/DL (ref 6.3–8.2)
RBC # BLD AUTO: 3.83 M/UL (ref 4.05–5.2)
SODIUM SERPL-SCNC: 136 MMOL/L (ref 136–145)
WBC # BLD AUTO: 5.7 K/UL (ref 4.3–11.1)

## 2025-07-28 PROCEDURE — 97161 PT EVAL LOW COMPLEX 20 MIN: CPT

## 2025-07-28 PROCEDURE — 80053 COMPREHEN METABOLIC PANEL: CPT

## 2025-07-28 PROCEDURE — 71045 X-RAY EXAM CHEST 1 VIEW: CPT

## 2025-07-28 PROCEDURE — 2500000003 HC RX 250 WO HCPCS: Performed by: STUDENT IN AN ORGANIZED HEALTH CARE EDUCATION/TRAINING PROGRAM

## 2025-07-28 PROCEDURE — 97165 OT EVAL LOW COMPLEX 30 MIN: CPT

## 2025-07-28 PROCEDURE — 97535 SELF CARE MNGMENT TRAINING: CPT

## 2025-07-28 PROCEDURE — 36415 COLL VENOUS BLD VENIPUNCTURE: CPT

## 2025-07-28 PROCEDURE — 6360000002 HC RX W HCPCS

## 2025-07-28 PROCEDURE — 85025 COMPLETE CBC W/AUTO DIFF WBC: CPT

## 2025-07-28 PROCEDURE — 1100000000 HC RM PRIVATE

## 2025-07-28 PROCEDURE — 6370000000 HC RX 637 (ALT 250 FOR IP)

## 2025-07-28 PROCEDURE — 83735 ASSAY OF MAGNESIUM: CPT

## 2025-07-28 PROCEDURE — 97112 NEUROMUSCULAR REEDUCATION: CPT

## 2025-07-28 PROCEDURE — 6370000000 HC RX 637 (ALT 250 FOR IP): Performed by: STUDENT IN AN ORGANIZED HEALTH CARE EDUCATION/TRAINING PROGRAM

## 2025-07-28 PROCEDURE — 6360000002 HC RX W HCPCS: Performed by: STUDENT IN AN ORGANIZED HEALTH CARE EDUCATION/TRAINING PROGRAM

## 2025-07-28 PROCEDURE — 97530 THERAPEUTIC ACTIVITIES: CPT

## 2025-07-28 RX ORDER — MAGNESIUM SULFATE IN WATER 40 MG/ML
2000 INJECTION, SOLUTION INTRAVENOUS ONCE
Status: COMPLETED | OUTPATIENT
Start: 2025-07-28 | End: 2025-07-28

## 2025-07-28 RX ORDER — LOSARTAN POTASSIUM 50 MG/1
50 TABLET ORAL DAILY
Status: DISCONTINUED | OUTPATIENT
Start: 2025-07-28 | End: 2025-07-29

## 2025-07-28 RX ADMIN — CYANOCOBALAMIN TAB 1000 MCG 1000 MCG: 1000 TAB at 08:49

## 2025-07-28 RX ADMIN — METOPROLOL SUCCINATE 50 MG: 50 TABLET, EXTENDED RELEASE ORAL at 20:48

## 2025-07-28 RX ADMIN — MAGNESIUM SULFATE HEPTAHYDRATE 2000 MG: 40 INJECTION, SOLUTION INTRAVENOUS at 09:29

## 2025-07-28 RX ADMIN — METOPROLOL SUCCINATE 50 MG: 50 TABLET, EXTENDED RELEASE ORAL at 08:49

## 2025-07-28 RX ADMIN — SODIUM CHLORIDE, PRESERVATIVE FREE 10 ML: 5 INJECTION INTRAVENOUS at 20:52

## 2025-07-28 RX ADMIN — SODIUM CHLORIDE, PRESERVATIVE FREE 10 ML: 5 INJECTION INTRAVENOUS at 08:50

## 2025-07-28 RX ADMIN — DOXEPIN HYDROCHLORIDE 10 MG: 10 CAPSULE ORAL at 20:48

## 2025-07-28 RX ADMIN — ALLOPURINOL 200 MG: 100 TABLET ORAL at 08:49

## 2025-07-28 RX ADMIN — LOSARTAN POTASSIUM 50 MG: 50 TABLET, FILM COATED ORAL at 08:49

## 2025-07-28 RX ADMIN — CITALOPRAM HYDROBROMIDE 10 MG: 20 TABLET ORAL at 08:49

## 2025-07-28 RX ADMIN — AMITRIPTYLINE HYDROCHLORIDE 10 MG: 10 TABLET, FILM COATED ORAL at 20:48

## 2025-07-28 RX ADMIN — ONDANSETRON 8 MG: 2 INJECTION, SOLUTION INTRAMUSCULAR; INTRAVENOUS at 23:24

## 2025-07-28 RX ADMIN — GABAPENTIN 300 MG: 300 CAPSULE ORAL at 09:23

## 2025-07-28 NOTE — PROGRESS NOTES
Hospitalist Progress Note   Admit Date:  2025 12:01 PM   Name:  Mirtha Posada   Age:  80 y.o.  Sex:  female  :  1945   MRN:  [id number]   Room:  [facility]    Presenting/Chief Complaint: Fatigue     Reason(s) for Admission: Hypomagnesemia [E83.42]  Generalized weakness [R53.1]  JACKY (acute kidney injury) [N17.9]  Altered mental status, unspecified altered mental status type [R41.82]  At risk for polypharmacy [Z91.89]  Other fatigue [R53.83]     Hospital Course:   Mirtha Posada is a 80 y.o. female with medical history of combined HFrEF and diastolic dysfunction with transition to normal findings , type 2 diabetes, who presented with generalized weakness for the past 2-3 days.  Also  noted AMS.  He was concerned for possible UTI, as patient seemed to have urinary frequency.  No fever, chills, chest pain, abdominal pain, nausea/vomiting, or diarrhea.     In ER, vitals were relatively unremarkable except hypertension.  Hemoglobin 10.5.  Creatinine 1.23.  Sodium 130.  Magnesium 1.2.  UA negative for any evidence of UTI.  CT head negative for any acute changes.  She received magnesium IV.  Hospitalist was called for admission regarding AMS.    Subjective & 24hr Events:   Reports improvement in mental status.  PT/OT evaluated recommending home health therapy      Assessment & Plan:     JACKY on CKD  Patient's presenting electrolytes and renal function suggestive of prerenal.  Improved with IV fluid resuscitation.  Stop IV fluids and allow p.o. intake    Altered mental status  Weakness  Hypomagnesemia  Likely in the setting of electrolyte derangements.  Improved after repletion's.  - As needed electrolyte repletion  - PT/OT recommending home health  - Resume amitriptyline and doxepin    History of HFrEF with recovered ejection fraction  Hypertension  While chest x-ray suggest some pulmonary vascular congestion, patient with significant hyponatremia and JACKY which were improved with IV

## 2025-07-28 NOTE — PROGRESS NOTES
ACUTE PHYSICAL THERAPY GOALS:   (Developed with and agreed upon by patient and/or caregiver.)    (1.) Mirtha Posada  will move from supine to sit and sit to supine  with INDEPENDENCE within 7 treatment day(s).    (2.) Mirtha Posada will transfer from bed to chair and chair to bed with MODIFIED INDEPENDENCE using the least restrictive device within 7 treatment day(s).    (3.) Mirtha Posada will ambulate with MODIFIED INDEPENDENCE for 300 feet with the least restrictive device within 7 treatment day(s).   (4.) Mirtha Posada will perform standing static and dynamic balance activities x 20 minutes with SUPERVISION to improve safety within 7 treatment day(s).  (5.) Mirtha Posada will ascend and descend 2 stairs using 1 hand rail(s) with SUPERVISION to improve functional mobility and safety within 7 treatment day(s).  (6.) Mirtha Posada will perform therapeutic exercises x 20 min for HEP with INDEPENDENCE to improve strength, endurance, and functional mobility within 7 treatment day(s).       PHYSICAL THERAPY Initial Assessment, Daily Note, and AM  (Link to Caseload Tracking: PT Visit Days : 1  Acknowledge Orders  Time In/Out  PT Charge Capture  Rehab Caseload Tracker    Mirtha Posada is a 80 y.o. female   PRIMARY DIAGNOSIS: JACKY (acute kidney injury)  Hypomagnesemia [E83.42]  Generalized weakness [R53.1]  JACKY (acute kidney injury) [N17.9]  Altered mental status, unspecified altered mental status type [R41.82]  At risk for polypharmacy [Z91.89]  Other fatigue [R53.83]       Reason for Referral: Generalized Muscle Weakness (M62.81)  Difficulty in walking, Not elsewhere classified (R26.2)  History of falling (Z91.81)  Inpatient: Payor: MEDICARE / Plan: MEDICARE PART A AND B / Product Type: *No Product type* /     ASSESSMENT:     REHAB RECOMMENDATIONS:   Recommendation to date pending progress:  Setting:  Home Health Therapy    Equipment:    To Be Determined     ASSESSMENT:  Ms.

## 2025-07-28 NOTE — THERAPY EVALUATION
ACUTE OCCUPATIONAL THERAPY GOALS:   (Developed with and agreed upon by patient and/or caregiver.)  1. Patient will complete lower body bathing and dressing with MODIFIED INDEPENDENCE and adaptive equipment as needed.     2. Patient will complete toilet transfers and toileting with MODIFIED INDEPENDENCE.  3. Patient will complete self-grooming ADL tasks at standing level with MODIFIED INDEPENDENCE.  4. Patient will tolerate 30 minutes of OT treatment with 1-2 rest breaks to increase activity tolerance for ADLs.   5. Patient will complete functional transfers with MODIFIED INDEPENDENCE and adaptive equipment as needed.   6. Patient will tolerate 10 minutes BUE exercises to increase strength for safe, functional transfers.     Timeframe: 7 visits      OCCUPATIONAL THERAPY Initial Assessment, Daily Note, and AM       OT Visit Days: 1  Acknowledge Orders  Time  OT Charge Capture  Rehab Caseload Tracker      Mirtha Posada is a 80 y.o. female   PRIMARY DIAGNOSIS: JACKY (acute kidney injury)  Hypomagnesemia [E83.42]  Generalized weakness [R53.1]  JACKY (acute kidney injury) [N17.9]  Altered mental status, unspecified altered mental status type [R41.82]  At risk for polypharmacy [Z91.89]  Other fatigue [R53.83]       Reason for Referral: Generalized Muscle Weakness (M62.81)  Other lack of cordination (R27.8)  Inpatient: Payor: MEDICARE / Plan: MEDICARE PART A AND B / Product Type: *No Product type* /     ASSESSMENT:     REHAB RECOMMENDATIONS:   Recommendation to date pending progress:  Setting:  Home Health Therapy    Equipment:   Tub Transfer Bench (discussed with patient)  Patient has a cane, rolling walker, and bedside commode at home     ASSESSMENT:  Ms. Posada is a 81 y/o F who presents to the hospital with AMS and generalized weakness x 2-3 days. Admitted for above diagnoses.     Today, pt is received supine in bed, agreeable to participate in the session. A&Ox4. Per patient report, she lives with her  in

## 2025-07-29 VITALS
DIASTOLIC BLOOD PRESSURE: 96 MMHG | TEMPERATURE: 97.5 F | HEART RATE: 74 BPM | SYSTOLIC BLOOD PRESSURE: 170 MMHG | RESPIRATION RATE: 16 BRPM | HEIGHT: 65 IN | BODY MASS INDEX: 29.16 KG/M2 | OXYGEN SATURATION: 96 % | WEIGHT: 175 LBS

## 2025-07-29 LAB
ALBUMIN SERPL-MCNC: 3.3 G/DL (ref 3.2–4.6)
ALBUMIN/GLOB SERPL: 0.8 (ref 1–1.9)
ALP SERPL-CCNC: 85 U/L (ref 35–104)
ALT SERPL-CCNC: 10 U/L (ref 8–45)
ANION GAP SERPL CALC-SCNC: 13 MMOL/L (ref 7–16)
AST SERPL-CCNC: 23 U/L (ref 15–37)
BASOPHILS # BLD: 0.04 K/UL (ref 0–0.2)
BASOPHILS NFR BLD: 0.5 % (ref 0–2)
BILIRUB SERPL-MCNC: 0.5 MG/DL (ref 0–1.2)
BUN SERPL-MCNC: 15 MG/DL (ref 8–23)
CALCIUM SERPL-MCNC: 9 MG/DL (ref 8.8–10.2)
CHLORIDE SERPL-SCNC: 99 MMOL/L (ref 98–107)
CO2 SERPL-SCNC: 22 MMOL/L (ref 20–29)
CREAT SERPL-MCNC: 1.04 MG/DL (ref 0.6–1.1)
DIFFERENTIAL METHOD BLD: ABNORMAL
EOSINOPHIL # BLD: 0.1 K/UL (ref 0–0.8)
EOSINOPHIL NFR BLD: 1.3 % (ref 0.5–7.8)
ERYTHROCYTE [DISTWIDTH] IN BLOOD BY AUTOMATED COUNT: 16.5 % (ref 11.9–14.6)
GLOBULIN SER CALC-MCNC: 4.2 G/DL (ref 2.3–3.5)
GLUCOSE SERPL-MCNC: 121 MG/DL (ref 70–99)
HCT VFR BLD AUTO: 35.7 % (ref 35.8–46.3)
HGB BLD-MCNC: 10.8 G/DL (ref 11.7–15.4)
IMM GRANULOCYTES # BLD AUTO: 0.04 K/UL (ref 0–0.5)
IMM GRANULOCYTES NFR BLD AUTO: 0.5 % (ref 0–5)
LYMPHOCYTES # BLD: 2.07 K/UL (ref 0.5–4.6)
LYMPHOCYTES NFR BLD: 26.9 % (ref 13–44)
MAGNESIUM SERPL-MCNC: 2 MG/DL (ref 1.8–2.4)
MCH RBC QN AUTO: 27.6 PG (ref 26.1–32.9)
MCHC RBC AUTO-ENTMCNC: 30.3 G/DL (ref 31.4–35)
MCV RBC AUTO: 91.1 FL (ref 82–102)
MONOCYTES # BLD: 0.86 K/UL (ref 0.1–1.3)
MONOCYTES NFR BLD: 11.2 % (ref 4–12)
NEUTS SEG # BLD: 4.58 K/UL (ref 1.7–8.2)
NEUTS SEG NFR BLD: 59.6 % (ref 43–78)
NRBC # BLD: 0 K/UL (ref 0–0.2)
PLATELET # BLD AUTO: 288 K/UL (ref 150–450)
PMV BLD AUTO: 10.1 FL (ref 9.4–12.3)
POTASSIUM SERPL-SCNC: 4 MMOL/L (ref 3.5–5.1)
PROT SERPL-MCNC: 7.5 G/DL (ref 6.3–8.2)
RBC # BLD AUTO: 3.92 M/UL (ref 4.05–5.2)
SODIUM SERPL-SCNC: 134 MMOL/L (ref 136–145)
WBC # BLD AUTO: 7.7 K/UL (ref 4.3–11.1)

## 2025-07-29 PROCEDURE — 85025 COMPLETE CBC W/AUTO DIFF WBC: CPT

## 2025-07-29 PROCEDURE — 6370000000 HC RX 637 (ALT 250 FOR IP)

## 2025-07-29 PROCEDURE — 80053 COMPREHEN METABOLIC PANEL: CPT

## 2025-07-29 PROCEDURE — 83735 ASSAY OF MAGNESIUM: CPT

## 2025-07-29 PROCEDURE — 6370000000 HC RX 637 (ALT 250 FOR IP): Performed by: STUDENT IN AN ORGANIZED HEALTH CARE EDUCATION/TRAINING PROGRAM

## 2025-07-29 PROCEDURE — 36415 COLL VENOUS BLD VENIPUNCTURE: CPT

## 2025-07-29 RX ORDER — LOSARTAN POTASSIUM 50 MG/1
100 TABLET ORAL DAILY
Status: DISCONTINUED | OUTPATIENT
Start: 2025-07-30 | End: 2025-07-29 | Stop reason: HOSPADM

## 2025-07-29 RX ORDER — LOSARTAN POTASSIUM 50 MG/1
50 TABLET ORAL ONCE
Status: COMPLETED | OUTPATIENT
Start: 2025-07-29 | End: 2025-07-29

## 2025-07-29 RX ORDER — LOSARTAN POTASSIUM 100 MG/1
100 TABLET ORAL DAILY
Qty: 30 TABLET | Refills: 3 | Status: SHIPPED | OUTPATIENT
Start: 2025-07-30

## 2025-07-29 RX ADMIN — ALLOPURINOL 200 MG: 100 TABLET ORAL at 08:27

## 2025-07-29 RX ADMIN — METOPROLOL SUCCINATE 50 MG: 50 TABLET, EXTENDED RELEASE ORAL at 08:26

## 2025-07-29 RX ADMIN — ONDANSETRON 8 MG: 4 TABLET, ORALLY DISINTEGRATING ORAL at 06:59

## 2025-07-29 RX ADMIN — LOSARTAN POTASSIUM 50 MG: 50 TABLET, FILM COATED ORAL at 08:27

## 2025-07-29 RX ADMIN — CYANOCOBALAMIN TAB 1000 MCG 1000 MCG: 1000 TAB at 08:27

## 2025-07-29 RX ADMIN — CITALOPRAM HYDROBROMIDE 10 MG: 20 TABLET ORAL at 08:26

## 2025-07-29 RX ADMIN — LOSARTAN POTASSIUM 50 MG: 50 TABLET, FILM COATED ORAL at 13:22

## 2025-07-29 ASSESSMENT — PAIN SCALES - GENERAL: PAINLEVEL_OUTOF10: 0

## 2025-07-29 NOTE — DISCHARGE SUMMARY
Hospitalist Discharge Summary   Admit Date:  2025 12:01 PM   DC Note date: 2025  Name:  Mirtha Posada   Age:  80 y.o.  Sex:  female  :  1945   MRN:  [id number]   Room:  [facility]  PCP:  Marva Xavier, LIU - CNP    Presenting Complaint: Fatigue     Initial Admission Diagnosis: Hypomagnesemia [E83.42]  Generalized weakness [R53.1]  JACKY (acute kidney injury) [N17.9]  Altered mental status, unspecified altered mental status type [R41.82]  At risk for polypharmacy [Z91.89]  Other fatigue [R53.83]     Problem List for this Hospitalization (present on admission):    Principal Problem:    JACKY (acute kidney injury)  Active Problems:    Type 2 diabetes mellitus with chronic kidney disease    Toxic metabolic encephalopathy    Depression    Dilated cardiomyopathy (HCC)    Primary hypertension    CKD (chronic kidney disease) stage 2, GFR 60-89 ml/min    Polypharmacy    Generalized weakness    B12 deficiency anemia    Hypomagnesemia    History of CHF (congestive heart failure)    History of gout  Resolved Problems:    * No resolved hospital problems. *      Hospital Course:  Mirtha Posada is a 80 y.o. female with medical history of combined HFrEF and diastolic dysfunction with transition to normal findings , type 2 diabetes, who presented with generalized weakness for the past 2-3 days. Also  noted AMS. He was concerned for possible UTI, as patient seemed to have urinary frequency. In ER, vitals were relatively unremarkable except hypertension. Hemoglobin 10.5. Creatinine 1.23. Sodium 130. Magnesium 1.2. UA negative for any evidence of UTI. CT head negative for any acute changes. She received magnesium IV. Hospitalist was called for admission regarding AMS.   Patient noted to have improvement in mental status.  Symptoms may likely be due to polypharmacy.  Discontinued all antihistamines and sedatives.  Patient encouraged to follow-up with PCP on discharge.  Seen by PT/OT with home  been written by using a voice dictation software.  The note has been proof read but may still contain some grammatical/other typographical errors.

## 2025-07-29 NOTE — PLAN OF CARE
Problem: Chronic Conditions and Co-morbidities  Goal: Patient's chronic conditions and co-morbidity symptoms are monitored and maintained or improved  7/29/2025 0749 by Samaria Johnson RN  Outcome: Progressing  Flowsheets (Taken 7/28/2025 0710 by Maribel Bird, RN)  Care Plan - Patient's Chronic Conditions and Co-Morbidity Symptoms are Monitored and Maintained or Improved: Monitor and assess patient's chronic conditions and comorbid symptoms for stability, deterioration, or improvement  7/29/2025 0029 by Faith Carbajal RN  Outcome: Progressing     Problem: Discharge Planning  Goal: Discharge to home or other facility with appropriate resources  7/29/2025 0749 by Samaria Johnson RN  Outcome: Progressing  Flowsheets (Taken 7/28/2025 0710 by Maribel Bird, RN)  Discharge to home or other facility with appropriate resources: Identify barriers to discharge with patient and caregiver  7/29/2025 0029 by Faith Carbajal RN  Outcome: Progressing     Problem: Safety - Adult  Goal: Free from fall injury  7/29/2025 0749 by Samaria Johnson RN  Outcome: Progressing  Flowsheets (Taken 7/28/2025 0710 by Maribel Bird, RN)  Free From Fall Injury: Instruct family/caregiver on patient safety  7/29/2025 0029 by Faith Carbajal RN  Outcome: Progressing     Problem: Skin/Tissue Integrity  Goal: Skin integrity remains intact  Description: 1.  Monitor for areas of redness and/or skin breakdown  2.  Assess vascular access sites hourly  3.  Every 4-6 hours minimum:  Change oxygen saturation probe site  4.  Every 4-6 hours:  If on nasal continuous positive airway pressure, respiratory therapy assess nares and determine need for appliance change or resting period  7/29/2025 0749 by Samaria Johnson RN  Outcome: Progressing  Flowsheets (Taken 7/29/2025 0749)  Skin Integrity Remains Intact: Monitor for areas of redness and/or skin breakdown  7/29/2025 0029 by Faith Carbajal RN  Outcome: Progressing     Problem: Pain  Goal: Verbalizes/displays adequate

## 2025-07-29 NOTE — CARE COORDINATION
Pt will DC home today with , pt has been with [facility] recently and  request them referral sent and accepted, pt  will transport her home today.

## 2025-07-30 ENCOUNTER — TELEPHONE (OUTPATIENT)
Dept: INTERNAL MEDICINE CLINIC | Facility: CLINIC | Age: 80
End: 2025-07-30

## 2025-07-30 NOTE — TELEPHONE ENCOUNTER
Care Transitions Initial Follow Up Call    Outreach made within 2 business days of discharge: yes    Patient: Mirtha Posada Patient : 1945   MRN: [id number]  Reason for Admission: JACKY  Discharge Date: 25       Spoke with:  - James    Discharge department/facility: [facility]    TCM Interactive Patient Contact:  Was patient able to fill all prescriptions: Yes  Was patient instructed to bring all medications to the follow-up visit: Yes  Is patient taking all medications as directed in the discharge summary? Yes  Does patient understand their discharge instructions: Yes  Does patient have questions or concerns that need addressed prior to 7-14 day follow up office visit: no    Additional needs identified to be addressed with provider  No needs identified             Scheduled appointment with PCP within 7-14 days    Follow Up  Future Appointments   Date Time Provider Department Center   2025 11:00 AM Marva Xavier APRN [facility] DEP   9/3/2025 10:30 AM Jim Rubi DO NI [facility]   9/10/2025 12:30 PM CHAIR 2 [facility]   2025  2:00 PM Marva Xavier APRN - CNP [facility]   2025  3:15 PM David Rahman DO [facility]   2026  2:30 PM Yeny Hernandez AuD [facility]   2026  3:00 PM Herve Pfeiffer MD [facility]       Ness Farfan MA

## 2025-07-31 ENCOUNTER — OFFICE VISIT (OUTPATIENT)
Dept: INTERNAL MEDICINE CLINIC | Facility: CLINIC | Age: 80
End: 2025-07-31
Payer: MEDICARE

## 2025-07-31 VITALS
HEART RATE: 64 BPM | OXYGEN SATURATION: 100 % | BODY MASS INDEX: 25.33 KG/M2 | SYSTOLIC BLOOD PRESSURE: 134 MMHG | WEIGHT: 152 LBS | DIASTOLIC BLOOD PRESSURE: 68 MMHG | HEIGHT: 65 IN | TEMPERATURE: 97.8 F

## 2025-07-31 DIAGNOSIS — N18.31 TYPE 2 DIABETES MELLITUS WITH STAGE 3A CHRONIC KIDNEY DISEASE, WITHOUT LONG-TERM CURRENT USE OF INSULIN (HCC): Chronic | ICD-10-CM

## 2025-07-31 DIAGNOSIS — N18.31 STAGE 3A CHRONIC KIDNEY DISEASE (HCC): ICD-10-CM

## 2025-07-31 DIAGNOSIS — R41.3 MEMORY CHANGES: ICD-10-CM

## 2025-07-31 DIAGNOSIS — N17.9 AKI (ACUTE KIDNEY INJURY): ICD-10-CM

## 2025-07-31 DIAGNOSIS — Z09 HOSPITAL DISCHARGE FOLLOW-UP: Primary | ICD-10-CM

## 2025-07-31 DIAGNOSIS — E11.22 TYPE 2 DIABETES MELLITUS WITH STAGE 3A CHRONIC KIDNEY DISEASE, WITHOUT LONG-TERM CURRENT USE OF INSULIN (HCC): Chronic | ICD-10-CM

## 2025-07-31 DIAGNOSIS — I10 PRIMARY HYPERTENSION: Chronic | ICD-10-CM

## 2025-07-31 DIAGNOSIS — T45.1X5A CHEMOTHERAPY-INDUCED NEUROPATHY: ICD-10-CM

## 2025-07-31 DIAGNOSIS — R82.90 ABNORMAL FINDING ON URINALYSIS: ICD-10-CM

## 2025-07-31 DIAGNOSIS — Z79.899 POLYPHARMACY: ICD-10-CM

## 2025-07-31 DIAGNOSIS — G62.0 CHEMOTHERAPY-INDUCED NEUROPATHY: ICD-10-CM

## 2025-07-31 DIAGNOSIS — R53.1 GENERALIZED WEAKNESS: ICD-10-CM

## 2025-07-31 LAB
BILIRUBIN, URINE, POC: NEGATIVE
BLOOD URINE, POC: NEGATIVE
GLUCOSE URINE, POC: NEGATIVE
KETONES, URINE, POC: NEGATIVE
LEUKOCYTE ESTERASE, URINE, POC: NORMAL
NITRITE, URINE, POC: NEGATIVE
PH, URINE, POC: 5.5 (ref 4.6–8)
PROTEIN,URINE, POC: NEGATIVE
SPECIFIC GRAVITY, URINE, POC: 1.01 (ref 1–1.03)
URINALYSIS CLARITY, POC: CLEAR
URINALYSIS COLOR, POC: YELLOW
UROBILINOGEN, POC: NORMAL

## 2025-07-31 PROCEDURE — 3078F DIAST BP <80 MM HG: CPT | Performed by: NURSE PRACTITIONER

## 2025-07-31 PROCEDURE — G8417 CALC BMI ABV UP PARAM F/U: HCPCS | Performed by: NURSE PRACTITIONER

## 2025-07-31 PROCEDURE — 1159F MED LIST DOCD IN RCRD: CPT | Performed by: NURSE PRACTITIONER

## 2025-07-31 PROCEDURE — G8399 PT W/DXA RESULTS DOCUMENT: HCPCS | Performed by: NURSE PRACTITIONER

## 2025-07-31 PROCEDURE — 99214 OFFICE O/P EST MOD 30 MIN: CPT | Performed by: NURSE PRACTITIONER

## 2025-07-31 PROCEDURE — 3044F HG A1C LEVEL LT 7.0%: CPT | Performed by: NURSE PRACTITIONER

## 2025-07-31 PROCEDURE — 3075F SYST BP GE 130 - 139MM HG: CPT | Performed by: NURSE PRACTITIONER

## 2025-07-31 PROCEDURE — G2211 COMPLEX E/M VISIT ADD ON: HCPCS | Performed by: NURSE PRACTITIONER

## 2025-07-31 PROCEDURE — 1036F TOBACCO NON-USER: CPT | Performed by: NURSE PRACTITIONER

## 2025-07-31 PROCEDURE — 1123F ACP DISCUSS/DSCN MKR DOCD: CPT | Performed by: NURSE PRACTITIONER

## 2025-07-31 PROCEDURE — 1160F RVW MEDS BY RX/DR IN RCRD: CPT | Performed by: NURSE PRACTITIONER

## 2025-07-31 PROCEDURE — 81003 URINALYSIS AUTO W/O SCOPE: CPT | Performed by: NURSE PRACTITIONER

## 2025-07-31 PROCEDURE — 1111F DSCHRG MED/CURRENT MED MERGE: CPT | Performed by: NURSE PRACTITIONER

## 2025-07-31 PROCEDURE — 1090F PRES/ABSN URINE INCON ASSESS: CPT | Performed by: NURSE PRACTITIONER

## 2025-07-31 PROCEDURE — G8427 DOCREV CUR MEDS BY ELIG CLIN: HCPCS | Performed by: NURSE PRACTITIONER

## 2025-07-31 ASSESSMENT — ENCOUNTER SYMPTOMS
DIARRHEA: 0
NAUSEA: 0
ABDOMINAL PAIN: 0
SHORTNESS OF BREATH: 0
COUGH: 0
VOMITING: 0

## 2025-07-31 NOTE — PROGRESS NOTES
[facility]  Office Visit Note    Subjective:  Chief Complaint   Patient presents with    Follow-Up from Hospital       Patient ID: Mirtha Posada is a 80 y.o. female presenting to the office for the above.    80 year old female for hospital follow up.  Accompanied by her .   She was hospitalized at [facility] 7/27-7/29 with JACKY.   CT without acute changes.  No UTI.  Magnesium was repleted.  Symptoms thought may be due to polypharmacy.  Antihistamines and sedatives were discontinued (meclizine, Imodium, Zyrtec, Benadryl).    states she was taking meclizine and Benadryl most days.  She also takes tramadol, gabapentin, amitriptyline, doxepin.    She has lymphedema of left arm that has worsened in past week; discussed conservative therapies.   She has home health with nursing/PT/OT.   --Discussed options for probable polypharmacy.  Will stop amitriptyline.  Continue doxepin for sleep for now.  Will trial reducing gabapentin to 300mg BID.  Notify office of any changes in symptoms with medication adjustments.   --Referral placed to [facility].  Advised patient to contact office if they do not hear from the referral in the next 7-10 days; they express understanding.   --She has appointment with neurology in September for cognitive concerns, balance problems, and neuropathy.       She is overdue for repeat right breast diagnostic mammogram.  Provided her with the number to call to schedule this.           Previous history for reference:      ( is Mychal Posada, retired  and ).  They have one son, two grandchildren, and two great-grandchildren.  Have a home at [facility].      HPI 2/25/2025:   They are here today primarily to discuss patient's current condition.   Her  has noticed decline over the past 6 months or so.  She was hospitalized in December with vertigo.  She is to have surgery on left hip; history of avascular necrosis.

## 2025-08-03 LAB
BACTERIA SPEC CULT: ABNORMAL
BACTERIA SPEC CULT: ABNORMAL
SERVICE CMNT-IMP: ABNORMAL

## 2025-08-05 ENCOUNTER — RESULTS FOLLOW-UP (OUTPATIENT)
Dept: INTERNAL MEDICINE CLINIC | Facility: CLINIC | Age: 80
End: 2025-08-05

## 2025-08-05 DIAGNOSIS — N18.31 STAGE 3A CHRONIC KIDNEY DISEASE (HCC): ICD-10-CM

## 2025-08-05 DIAGNOSIS — I10 PRIMARY HYPERTENSION: ICD-10-CM

## 2025-08-05 DIAGNOSIS — N18.31 TYPE 2 DIABETES MELLITUS WITH STAGE 3A CHRONIC KIDNEY DISEASE, WITHOUT LONG-TERM CURRENT USE OF INSULIN (HCC): ICD-10-CM

## 2025-08-05 DIAGNOSIS — E11.22 TYPE 2 DIABETES MELLITUS WITH STAGE 3A CHRONIC KIDNEY DISEASE, WITHOUT LONG-TERM CURRENT USE OF INSULIN (HCC): ICD-10-CM

## 2025-08-05 DIAGNOSIS — N17.9 AKI (ACUTE KIDNEY INJURY): Primary | ICD-10-CM

## 2025-08-11 ENCOUNTER — HOSPITAL ENCOUNTER (OUTPATIENT)
Dept: MAMMOGRAPHY | Age: 80
Discharge: HOME | End: 2025-08-14
Payer: MEDICARE

## 2025-08-11 ENCOUNTER — LAB (OUTPATIENT)
Dept: INTERNAL MEDICINE CLINIC | Facility: CLINIC | Age: 80
End: 2025-08-11

## 2025-08-11 VITALS — BODY MASS INDEX: 24.99 KG/M2 | WEIGHT: 150 LBS | HEIGHT: 65 IN

## 2025-08-11 DIAGNOSIS — E11.22 TYPE 2 DIABETES MELLITUS WITH STAGE 3A CHRONIC KIDNEY DISEASE, WITHOUT LONG-TERM CURRENT USE OF INSULIN (HCC): ICD-10-CM

## 2025-08-11 DIAGNOSIS — M85.89 OSTEOPENIA OF MULTIPLE SITES: ICD-10-CM

## 2025-08-11 DIAGNOSIS — R92.8 ABNORMAL MAMMOGRAM OF RIGHT BREAST: ICD-10-CM

## 2025-08-11 DIAGNOSIS — N18.31 STAGE 3A CHRONIC KIDNEY DISEASE (HCC): ICD-10-CM

## 2025-08-11 DIAGNOSIS — N18.31 TYPE 2 DIABETES MELLITUS WITH STAGE 3A CHRONIC KIDNEY DISEASE, WITHOUT LONG-TERM CURRENT USE OF INSULIN (HCC): ICD-10-CM

## 2025-08-11 DIAGNOSIS — N17.9 AKI (ACUTE KIDNEY INJURY): ICD-10-CM

## 2025-08-11 DIAGNOSIS — I10 PRIMARY HYPERTENSION: ICD-10-CM

## 2025-08-11 LAB
ALBUMIN SERPL-MCNC: 3.4 G/DL (ref 3.2–4.6)
ALBUMIN/GLOB SERPL: 0.8 (ref 1–1.9)
ALP SERPL-CCNC: 81 U/L (ref 35–104)
ALT SERPL-CCNC: 9 U/L (ref 8–45)
ANION GAP SERPL CALC-SCNC: 12 MMOL/L (ref 7–16)
APPEARANCE UR: CLEAR
AST SERPL-CCNC: 21 U/L (ref 15–37)
BILIRUB SERPL-MCNC: 0.6 MG/DL (ref 0–1.2)
BILIRUB UR QL: NEGATIVE
BUN SERPL-MCNC: 17 MG/DL (ref 8–23)
CALCIUM SERPL-MCNC: 10.2 MG/DL (ref 8.8–10.2)
CHLORIDE SERPL-SCNC: 96 MMOL/L (ref 98–107)
CO2 SERPL-SCNC: 28 MMOL/L (ref 20–29)
COLOR UR: NORMAL
CREAT SERPL-MCNC: 1.2 MG/DL (ref 0.6–1.1)
GLOBULIN SER CALC-MCNC: 4.3 G/DL (ref 2.3–3.5)
GLUCOSE SERPL-MCNC: 107 MG/DL (ref 70–99)
GLUCOSE UR STRIP.AUTO-MCNC: NEGATIVE MG/DL
HGB UR QL STRIP: NEGATIVE
KETONES UR QL STRIP.AUTO: NEGATIVE MG/DL
LEUKOCYTE ESTERASE UR QL STRIP.AUTO: NEGATIVE
NITRITE UR QL STRIP.AUTO: NEGATIVE
PH UR STRIP: 5.5 (ref 5–9)
POTASSIUM SERPL-SCNC: 3.7 MMOL/L (ref 3.5–5.1)
PROT SERPL-MCNC: 7.7 G/DL (ref 6.3–8.2)
PROT UR STRIP-MCNC: NEGATIVE MG/DL
SODIUM SERPL-SCNC: 136 MMOL/L (ref 136–145)
SP GR UR REFRACTOMETRY: 1.01 (ref 1–1.02)
UROBILINOGEN UR QL STRIP.AUTO: 0.2 EU/DL (ref 0.2–1)

## 2025-08-11 PROCEDURE — 76642 ULTRASOUND BREAST LIMITED: CPT

## 2025-08-11 PROCEDURE — G0279 TOMOSYNTHESIS, MAMMO: HCPCS

## 2025-08-12 ENCOUNTER — TELEPHONE (OUTPATIENT)
Dept: INTERNAL MEDICINE CLINIC | Facility: CLINIC | Age: 80
End: 2025-08-12

## 2025-08-12 ENCOUNTER — RESULTS FOLLOW-UP (OUTPATIENT)
Dept: INTERNAL MEDICINE CLINIC | Facility: CLINIC | Age: 80
End: 2025-08-12

## 2025-08-19 ENCOUNTER — TELEPHONE (OUTPATIENT)
Dept: INTERNAL MEDICINE CLINIC | Facility: CLINIC | Age: 80
End: 2025-08-19

## 2025-08-25 ENCOUNTER — TELEPHONE (OUTPATIENT)
Dept: INTERNAL MEDICINE CLINIC | Facility: CLINIC | Age: 80
End: 2025-08-25

## 2025-08-27 ENCOUNTER — TELEPHONE (OUTPATIENT)
Dept: INTERNAL MEDICINE CLINIC | Facility: CLINIC | Age: 80
End: 2025-08-27

## 2025-09-03 ENCOUNTER — TELEPHONE (OUTPATIENT)
Dept: INTERNAL MEDICINE CLINIC | Facility: CLINIC | Age: 80
End: 2025-09-03

## 2025-09-03 ENCOUNTER — OFFICE VISIT (OUTPATIENT)
Dept: NEUROLOGY | Age: 80
End: 2025-09-03
Payer: MEDICARE

## 2025-09-03 DIAGNOSIS — G62.9 NEUROPATHY: ICD-10-CM

## 2025-09-03 DIAGNOSIS — E53.8 B12 DEFICIENCY: ICD-10-CM

## 2025-09-03 DIAGNOSIS — R41.3 MEMORY LOSS: Primary | ICD-10-CM

## 2025-09-03 DIAGNOSIS — G45.9 TIA (TRANSIENT ISCHEMIC ATTACK): ICD-10-CM

## 2025-09-03 PROCEDURE — G8427 DOCREV CUR MEDS BY ELIG CLIN: HCPCS | Performed by: PSYCHIATRY & NEUROLOGY

## 2025-09-03 PROCEDURE — 1123F ACP DISCUSS/DSCN MKR DOCD: CPT | Performed by: PSYCHIATRY & NEUROLOGY

## 2025-09-03 PROCEDURE — 1160F RVW MEDS BY RX/DR IN RCRD: CPT | Performed by: PSYCHIATRY & NEUROLOGY

## 2025-09-03 PROCEDURE — 99204 OFFICE O/P NEW MOD 45 MIN: CPT | Performed by: PSYCHIATRY & NEUROLOGY

## 2025-09-03 PROCEDURE — G8399 PT W/DXA RESULTS DOCUMENT: HCPCS | Performed by: PSYCHIATRY & NEUROLOGY

## 2025-09-03 PROCEDURE — 1090F PRES/ABSN URINE INCON ASSESS: CPT | Performed by: PSYCHIATRY & NEUROLOGY

## 2025-09-03 PROCEDURE — 1036F TOBACCO NON-USER: CPT | Performed by: PSYCHIATRY & NEUROLOGY

## 2025-09-03 PROCEDURE — G8420 CALC BMI NORM PARAMETERS: HCPCS | Performed by: PSYCHIATRY & NEUROLOGY

## 2025-09-03 PROCEDURE — 1159F MED LIST DOCD IN RCRD: CPT | Performed by: PSYCHIATRY & NEUROLOGY

## 2025-09-03 ASSESSMENT — ENCOUNTER SYMPTOMS
EYES NEGATIVE: 1
GASTROINTESTINAL NEGATIVE: 1
RESPIRATORY NEGATIVE: 1

## 2025-09-04 ENCOUNTER — CLINICAL DOCUMENTATION (OUTPATIENT)
Dept: NEUROLOGY | Age: 80
End: 2025-09-04

## (undated) DEVICE — 7 DAY SILVER-COATED ANTIMICROBIAL BARRIER DRESSING: Brand: ACTICOAT 7  4" X 5"

## (undated) DEVICE — DRAPE,U/SHT,SPLIT,FILM,60X84,STERILE: Brand: MEDLINE

## (undated) DEVICE — GAUZE,SPONGE,4"X4",12PLY,WOVEN,NS,LF: Brand: MEDLINE

## (undated) DEVICE — SHEET, DRAPE, SPLIT, STERILE: Brand: MEDLINE

## (undated) DEVICE — KIT ARMOR C DRP COLLAPSIBLE AND SELF EXP TOP CVR FOR FLUOROSCOPIC

## (undated) DEVICE — GLOVE SURG SZ 65 THK91MIL LTX FREE SYN POLYISOPRENE

## (undated) DEVICE — CONNECTOR TBNG OD5-7MM O2 END DISP

## (undated) DEVICE — CLEANER,CAUTERY TIP,2X2",STERILE: Brand: MEDLINE

## (undated) DEVICE — BANDAGE COMPR W1INXL5YD FOAM COHESIVE QUIK STK SELF ADH SFT

## (undated) DEVICE — YANKAUER,BULB TIP,W/O VENT,RIGID,STERILE: Brand: MEDLINE

## (undated) DEVICE — GRIPPER SURGICAL RETRACTOR DISP

## (undated) DEVICE — SUTURE MCRYL SZ 4-0 L27IN ABSRB UD L19MM PS-2 1/2 CIR PRIM Y426H

## (undated) DEVICE — SUTURE ETHIBOND EXCEL SZ 5 L30IN NONABSORBABLE GRN L40MM V-37 MB66G

## (undated) DEVICE — K-WIRE

## (undated) DEVICE — TOTAL HIP PACK: Brand: MEDLINE INDUSTRIES, INC.

## (undated) DEVICE — GLOVE ORANGE PI 7 1/2   MSG9075

## (undated) DEVICE — SUTURE STRATAFIX SYMMETRIC SZ 1 L18IN ABSRB VLT CT1 L36CM SXPP1A404

## (undated) DEVICE — YANKAUER,FLEXIBLE HANDLE,REGLR CAPACITY: Brand: MEDLINE INDUSTRIES, INC.

## (undated) DEVICE — BLADE ES L6IN ELASTOMERIC COAT EXT DURABLE BEND UPTO 90DEG

## (undated) DEVICE — SUTURE MCRYL SZ 2-0 L27IN ABSRB UD SH L26MM TAPERPOINT NDL Y417H

## (undated) DEVICE — HOOD: Brand: T7PLUS

## (undated) DEVICE — REAMER SHAFT, MOD.TRINKLE: Brand: BIXCUT

## (undated) DEVICE — TFN: Brand: MEDLINE INDUSTRIES, INC.

## (undated) DEVICE — GLOVE SURG SZ 8 L12IN FNGR THK79MIL GRN LTX FREE

## (undated) DEVICE — MARKER SURG SKIN UTIL BLK REG TIP NONSMEARING W/ 6IN RUL

## (undated) DEVICE — DRESSING,GAUZE,XEROFORM,CURAD,1"X8",ST: Brand: CURAD

## (undated) DEVICE — SYRINGE MED 3ML CLR PLAS STD N CTRL LUERLOCK TIP DISP

## (undated) DEVICE — ILLUMINATOR SURG YANKAUER MTL TIP STRL PHOTONSABER Y DISP

## (undated) DEVICE — 1010 S-DRAPE TOWEL DRAPE 10/BX: Brand: STERI-DRAPE™

## (undated) DEVICE — PATIENT CARE KIT ADJ ARM BRD PERINL POST CVR BLU FOAM

## (undated) DEVICE — HAND PACK: Brand: MEDLINE INDUSTRIES, INC.

## (undated) DEVICE — SOLUTION IRRIG 1000ML 09% SOD CHL USP PIC PLAS CONTAINER

## (undated) DEVICE — SOL IRR SOD CHL 0.9% TITAN XL CNTNR 3000ML

## (undated) DEVICE — BLOCK BITE AD 60FR W/ VELC STRP ADDRESSES MOST PT AND

## (undated) DEVICE — INTENDED FOR TISSUE SEPARATION, AND OTHER PROCEDURES THAT REQUIRE A SHARP SURGICAL BLADE TO PUNCTURE OR CUT.: Brand: BARD-PARKER ® STAINLESS STEEL BLADES

## (undated) DEVICE — AIRLIFE™ OXYGEN TUBING 7 FEET (2.1 M) CRUSH RESISTANT OXYGEN TUBING, VINYL TIPPED: Brand: AIRLIFE™

## (undated) DEVICE — NEEDLE SYR 18GA L1.5IN RED PLAS HUB S STL BLNT FILL W/O

## (undated) DEVICE — BNDG,ELSTC,MATRIX,STRL,2"X5YD,LF,HOOK&LP: Brand: MEDLINE

## (undated) DEVICE — GLOVE SURG SZ 65 L12IN FNGR THK79MIL GRN LTX FREE

## (undated) DEVICE — BIPOLAR SEALER 23-112-1 AQM 6.0: Brand: AQUAMANTYS ®

## (undated) DEVICE — LUBE JELLY FOIL PACK 1.4 OZ: Brand: MEDLINE INDUSTRIES, INC.

## (undated) DEVICE — SPLINT ORTH W3XL15IN PLSTR OF PARIS LO EXOTHERM SMOOTH

## (undated) DEVICE — KENDALL RADIOLUCENT FOAM MONITORING ELECTRODE RECTANGULAR SHAPE: Brand: KENDALL

## (undated) DEVICE — PADDING CAST W3INXL4YD COT BLEND MIC PLEAT UNDERCAST SPEC

## (undated) DEVICE — PAD,ABDOMINAL,5"X9",ST,LF,25/BX: Brand: MEDLINE INDUSTRIES, INC.

## (undated) DEVICE — DRAPE SURG 1 MIN SET TBL ESYSUIT

## (undated) DEVICE — BIT DRL L413MM DIA4.3MM FOR FEM NK SYSTEN

## (undated) DEVICE — SUIT SURG ISOLATN ZIP TOGA XL T7 +

## (undated) DEVICE — SUTURE VICRYL + SZ 2-0 L27IN ABSRB CLR CT-1 1/2 CIR TAPERCUT VCP259H

## (undated) DEVICE — SYRINGE IRRIG 60ML SFT PLIABLE BLB EZ TO GRP 1 HND USE W/

## (undated) DEVICE — DISPOSABLE BIPOLAR CODE, 12' (3.66 M): Brand: CONMED

## (undated) DEVICE — SYRINGE MED 10ML LUERLOCK TIP W/O SFTY DISP

## (undated) DEVICE — SUTURE MONOCRYL + SZ 3-0 L27IN ABSRB UD PS1 L24MM 3/8 CIR REV MCP936H

## (undated) DEVICE — SUTURE MONOCRYL SZ 3-0 L27IN ABSRB UD L24MM PS-1 3/8 CIR PRIM Y936H

## (undated) DEVICE — DRILL

## (undated) DEVICE — SUTURE VICRYL SZ 0 L36IN ABSRB UD CT-1 L36MM 1/2 CIR TAPR PNT VCP946H

## (undated) DEVICE — SHEET,DRAPE,53X77,STERILE: Brand: MEDLINE

## (undated) DEVICE — BANDAGE COBAN 6 IN WND 6INX5YD FOAM

## (undated) DEVICE — CONTAINER FORMALIN PREFILLED 10% NBF 60ML

## (undated) DEVICE — PREVENA INCISION MANAGEMENT SYSTEM- PEEL & PLACE DRESSING: Brand: PREVENA™ PEEL & PLACE™

## (undated) DEVICE — DRAPE,U/ SHT,SPLIT,PLAS,STERIL: Brand: MEDLINE

## (undated) DEVICE — SYRINGE, LUER SLIP, STERILE, 60ML: Brand: MEDLINE

## (undated) DEVICE — HERCULES 3 STAGE BALLOON ESOPHAGEAL: Brand: HERCULES

## (undated) DEVICE — C-ARM: Brand: UNBRANDED

## (undated) DEVICE — DRIVER

## (undated) DEVICE — OSCILLATING TIP SAW CARTRIDGE: Brand: PRECISION FALCON

## (undated) DEVICE — CANNULA NSL ORAL AD FOR CAPNOFLEX CO2 O2 AIRLFE

## (undated) DEVICE — GUIDE WIRE, BALL-TIPPED, STERILE

## (undated) DEVICE — FORCEPS BX L240CM JAW DIA2.8MM L CAP W/ NDL MIC MESH TOOTH

## (undated) DEVICE — SYRINGE INFL 60ML DISP ALLIANCE II

## (undated) DEVICE — STERILE SYNTHETIC POLYISOPRENE POWDER-FREE SURGICAL GLOVES WITH HYDROGEL COATING, SMOOTH FINISH, STRAIGHT FINGER: Brand: PROTEXIS

## (undated) DEVICE — SUTURE VICRYL + SZ 1 L18IN ABSRB UD L36MM CT-1 1/2 CIR VCP841D

## (undated) DEVICE — DRILL, AO, STERILE

## (undated) DEVICE — DRESSING HYDROFIBER AQUACEL AG ADVANTAGE 3.5X6 IN

## (undated) DEVICE — GUIDEWIRE ORTH L400MM DIA3.2MM FOR TFN

## (undated) DEVICE — SUTURE ETHLN SZ 4-0 L18IN NONABSORBABLE BLK L19MM PS-2 3/8 1667H

## (undated) DEVICE — STERILE PVP: Brand: MEDLINE INDUSTRIES, INC.

## (undated) DEVICE — SUTURE VICRYL SZ 2-0 L36IN ABSRB UD L36MM CT-1 1/2 CIR J945H

## (undated) DEVICE — LIQUIBAND RAPID ADHESIVE 36/CS 0.8ML: Brand: MEDLINE

## (undated) DEVICE — PADDING CAST W4INXL4YD ST COT COHESIVE HND TEARABLE SPEC

## (undated) DEVICE — SINGLE PORT MANIFOLD: Brand: NEPTUNE 2